# Patient Record
Sex: MALE | Race: BLACK OR AFRICAN AMERICAN | Employment: FULL TIME | URBAN - METROPOLITAN AREA
[De-identification: names, ages, dates, MRNs, and addresses within clinical notes are randomized per-mention and may not be internally consistent; named-entity substitution may affect disease eponyms.]

---

## 2019-08-26 ENCOUNTER — APPOINTMENT (OUTPATIENT)
Dept: NUCLEAR MEDICINE | Age: 49
DRG: 194 | End: 2019-08-26
Payer: MEDICAID

## 2019-08-26 ENCOUNTER — HOSPITAL ENCOUNTER (INPATIENT)
Age: 49
LOS: 2 days | Discharge: HOME | DRG: 194 | End: 2019-08-28
Attending: INTERNAL MEDICINE | Admitting: INTERNAL MEDICINE
Payer: MEDICAID

## 2019-08-26 ENCOUNTER — APPOINTMENT (OUTPATIENT)
Dept: GENERAL RADIOLOGY | Age: 49
DRG: 194 | End: 2019-08-26
Payer: MEDICAID

## 2019-08-26 ENCOUNTER — APPOINTMENT (OUTPATIENT)
Dept: ULTRASOUND IMAGING | Age: 49
DRG: 194 | End: 2019-08-26
Payer: MEDICAID

## 2019-08-26 DIAGNOSIS — N17.9 AKI (ACUTE KIDNEY INJURY) (HCC): ICD-10-CM

## 2019-08-26 DIAGNOSIS — R79.89 ELEVATED D-DIMER: ICD-10-CM

## 2019-08-26 DIAGNOSIS — I50.41 SYSTOLIC AND DIASTOLIC CHF, ACUTE (HCC): Primary | ICD-10-CM

## 2019-08-26 DIAGNOSIS — I50.9 ACUTE CONGESTIVE HEART FAILURE, UNSPECIFIED HEART FAILURE TYPE (HCC): ICD-10-CM

## 2019-08-26 DIAGNOSIS — R06.02 SOB (SHORTNESS OF BREATH) ON EXERTION: ICD-10-CM

## 2019-08-26 DIAGNOSIS — I42.9 CARDIOMYOPATHY, UNSPECIFIED TYPE (HCC): ICD-10-CM

## 2019-08-26 DIAGNOSIS — I10 ESSENTIAL HYPERTENSION: ICD-10-CM

## 2019-08-26 PROBLEM — I16.0 HYPERTENSIVE URGENCY: Status: ACTIVE | Noted: 2019-08-26

## 2019-08-26 LAB
ALBUMIN SERPL-MCNC: 3.7 G/DL (ref 3.5–4.6)
ALP BLD-CCNC: 84 U/L (ref 35–104)
ALT SERPL-CCNC: 21 U/L (ref 0–41)
ANION GAP SERPL CALCULATED.3IONS-SCNC: 14 MEQ/L (ref 9–15)
APTT: 28.8 SEC (ref 24.4–36.8)
AST SERPL-CCNC: 19 U/L (ref 0–40)
BACTERIA: NEGATIVE /HPF
BASOPHILS ABSOLUTE: 0.1 K/UL (ref 0–0.2)
BASOPHILS RELATIVE PERCENT: 0.7 %
BILIRUB SERPL-MCNC: 0.4 MG/DL (ref 0.2–0.7)
BILIRUBIN URINE: NEGATIVE
BLOOD, URINE: ABNORMAL
BUN BLDV-MCNC: 21 MG/DL (ref 6–20)
CALCIUM SERPL-MCNC: 9 MG/DL (ref 8.5–9.9)
CHLORIDE BLD-SCNC: 104 MEQ/L (ref 95–107)
CLARITY: CLEAR
CO2: 23 MEQ/L (ref 20–31)
COLOR: YELLOW
CREAT SERPL-MCNC: 2.72 MG/DL (ref 0.7–1.2)
CREATININE URINE: 48.8 MG/DL
D DIMER: 1.41 MG/L FEU (ref 0–0.5)
EOSINOPHILS ABSOLUTE: 0.1 K/UL (ref 0–0.7)
EOSINOPHILS RELATIVE PERCENT: 1.4 %
EPITHELIAL CELLS, UA: ABNORMAL /HPF (ref 0–5)
GFR AFRICAN AMERICAN: 30.5
GFR NON-AFRICAN AMERICAN: 25.2
GLOBULIN: 3.4 G/DL (ref 2.3–3.5)
GLUCOSE BLD-MCNC: 90 MG/DL (ref 70–99)
GLUCOSE URINE: NEGATIVE MG/DL
HCT VFR BLD CALC: 38.3 % (ref 42–52)
HEMOGLOBIN: 13.6 G/DL (ref 14–18)
HYALINE CASTS: ABNORMAL /HPF (ref 0–5)
INR BLD: 0.9
KETONES, URINE: NEGATIVE MG/DL
LEUKOCYTE ESTERASE, URINE: NEGATIVE
LV EF: 40 %
LVEF MODALITY: NORMAL
LYMPHOCYTES ABSOLUTE: 1 K/UL (ref 1–4.8)
LYMPHOCYTES RELATIVE PERCENT: 9.9 %
MAGNESIUM: 2.1 MG/DL (ref 1.7–2.4)
MCH RBC QN AUTO: 29.8 PG (ref 27–31.3)
MCHC RBC AUTO-ENTMCNC: 35.5 % (ref 33–37)
MCV RBC AUTO: 83.9 FL (ref 80–100)
MONOCYTES ABSOLUTE: 0.9 K/UL (ref 0.2–0.8)
MONOCYTES RELATIVE PERCENT: 9 %
NEUTROPHILS ABSOLUTE: 8 K/UL (ref 1.4–6.5)
NEUTROPHILS RELATIVE PERCENT: 79 %
NITRITE, URINE: NEGATIVE
PDW BLD-RTO: 12.8 % (ref 11.5–14.5)
PH UA: 6 (ref 5–9)
PLATELET # BLD: 358 K/UL (ref 130–400)
POTASSIUM SERPL-SCNC: 3.7 MEQ/L (ref 3.4–4.9)
PRO-BNP: 5803 PG/ML
PROTEIN PROTEIN: 44 MG/DL
PROTEIN UA: 30 MG/DL
PROTEIN/CREAT RATIO: 0.9 ML/ML
PROTEIN/CREAT RATIO: 0.9 ML/ML (ref 0–0.2)
PROTHROMBIN TIME: 12.8 SEC (ref 12.3–14.9)
RBC # BLD: 4.57 M/UL (ref 4.7–6.1)
RBC UA: ABNORMAL /HPF (ref 0–5)
SODIUM BLD-SCNC: 141 MEQ/L (ref 135–144)
SPECIFIC GRAVITY UA: 1.01 (ref 1–1.03)
TOTAL CK: 124 U/L (ref 0–190)
TOTAL PROTEIN: 7.1 G/DL (ref 6.3–8)
TROPONIN: <0.01 NG/ML (ref 0–0.01)
TSH SERPL DL<=0.05 MIU/L-ACNC: 0.82 UIU/ML (ref 0.44–3.86)
UROBILINOGEN, URINE: 0.2 E.U./DL
WBC # BLD: 10.2 K/UL (ref 4.8–10.8)
WBC UA: ABNORMAL /HPF (ref 0–5)

## 2019-08-26 PROCEDURE — 6370000000 HC RX 637 (ALT 250 FOR IP): Performed by: INTERNAL MEDICINE

## 2019-08-26 PROCEDURE — 6360000002 HC RX W HCPCS: Performed by: PHYSICIAN ASSISTANT

## 2019-08-26 PROCEDURE — 84443 ASSAY THYROID STIM HORMONE: CPT

## 2019-08-26 PROCEDURE — 99255 IP/OBS CONSLTJ NEW/EST HI 80: CPT | Performed by: INTERNAL MEDICINE

## 2019-08-26 PROCEDURE — 2060000000 HC ICU INTERMEDIATE R&B

## 2019-08-26 PROCEDURE — 85379 FIBRIN DEGRADATION QUANT: CPT

## 2019-08-26 PROCEDURE — 93306 TTE W/DOPPLER COMPLETE: CPT

## 2019-08-26 PROCEDURE — 2580000003 HC RX 258: Performed by: INTERNAL MEDICINE

## 2019-08-26 PROCEDURE — 80053 COMPREHEN METABOLIC PANEL: CPT

## 2019-08-26 PROCEDURE — 2500000003 HC RX 250 WO HCPCS: Performed by: INTERNAL MEDICINE

## 2019-08-26 PROCEDURE — 96374 THER/PROPH/DIAG INJ IV PUSH: CPT

## 2019-08-26 PROCEDURE — 84156 ASSAY OF PROTEIN URINE: CPT

## 2019-08-26 PROCEDURE — 3430000000 HC RX DIAGNOSTIC RADIOPHARMACEUTICAL: Performed by: PHYSICIAN ASSISTANT

## 2019-08-26 PROCEDURE — 76775 US EXAM ABDO BACK WALL LIM: CPT

## 2019-08-26 PROCEDURE — 78582 LUNG VENTILAT&PERFUS IMAGING: CPT

## 2019-08-26 PROCEDURE — 71046 X-RAY EXAM CHEST 2 VIEWS: CPT

## 2019-08-26 PROCEDURE — 82550 ASSAY OF CK (CPK): CPT

## 2019-08-26 PROCEDURE — 84484 ASSAY OF TROPONIN QUANT: CPT

## 2019-08-26 PROCEDURE — 85610 PROTHROMBIN TIME: CPT

## 2019-08-26 PROCEDURE — 6370000000 HC RX 637 (ALT 250 FOR IP): Performed by: PHYSICIAN ASSISTANT

## 2019-08-26 PROCEDURE — 93005 ELECTROCARDIOGRAM TRACING: CPT | Performed by: INTERNAL MEDICINE

## 2019-08-26 PROCEDURE — 99284 EMERGENCY DEPT VISIT MOD MDM: CPT

## 2019-08-26 PROCEDURE — 83735 ASSAY OF MAGNESIUM: CPT

## 2019-08-26 PROCEDURE — A9540 TC99M MAA: HCPCS | Performed by: PHYSICIAN ASSISTANT

## 2019-08-26 PROCEDURE — 94664 DEMO&/EVAL PT USE INHALER: CPT

## 2019-08-26 PROCEDURE — 85025 COMPLETE CBC W/AUTO DIFF WBC: CPT

## 2019-08-26 PROCEDURE — 2580000003 HC RX 258: Performed by: PHYSICIAN ASSISTANT

## 2019-08-26 PROCEDURE — A9539 TC99M PENTETATE: HCPCS | Performed by: PHYSICIAN ASSISTANT

## 2019-08-26 PROCEDURE — 85730 THROMBOPLASTIN TIME PARTIAL: CPT

## 2019-08-26 PROCEDURE — 36415 COLL VENOUS BLD VENIPUNCTURE: CPT

## 2019-08-26 PROCEDURE — 81001 URINALYSIS AUTO W/SCOPE: CPT

## 2019-08-26 PROCEDURE — 2500000003 HC RX 250 WO HCPCS: Performed by: PHYSICIAN ASSISTANT

## 2019-08-26 PROCEDURE — 83880 ASSAY OF NATRIURETIC PEPTIDE: CPT

## 2019-08-26 RX ORDER — LABETALOL 20 MG/4 ML (5 MG/ML) INTRAVENOUS SYRINGE
10 EVERY 4 HOURS PRN
Status: DISCONTINUED | OUTPATIENT
Start: 2019-08-26 | End: 2019-08-28 | Stop reason: HOSPADM

## 2019-08-26 RX ORDER — SODIUM CHLORIDE 0.9 % (FLUSH) 0.9 %
10 SYRINGE (ML) INJECTION PRN
Status: DISCONTINUED | OUTPATIENT
Start: 2019-08-26 | End: 2019-08-28 | Stop reason: HOSPADM

## 2019-08-26 RX ORDER — ACETAMINOPHEN 325 MG/1
650 TABLET ORAL EVERY 4 HOURS PRN
Status: DISCONTINUED | OUTPATIENT
Start: 2019-08-26 | End: 2019-08-28 | Stop reason: HOSPADM

## 2019-08-26 RX ORDER — METOPROLOL TARTRATE 5 MG/5ML
5 INJECTION INTRAVENOUS ONCE
Status: COMPLETED | OUTPATIENT
Start: 2019-08-26 | End: 2019-08-26

## 2019-08-26 RX ORDER — ISOSORBIDE MONONITRATE 30 MG/1
30 TABLET, EXTENDED RELEASE ORAL DAILY
Status: DISCONTINUED | OUTPATIENT
Start: 2019-08-26 | End: 2019-08-28 | Stop reason: HOSPADM

## 2019-08-26 RX ORDER — HEPARIN SODIUM 5000 [USP'U]/ML
5000 INJECTION, SOLUTION INTRAVENOUS; SUBCUTANEOUS EVERY 8 HOURS SCHEDULED
Status: DISCONTINUED | OUTPATIENT
Start: 2019-08-27 | End: 2019-08-28 | Stop reason: HOSPADM

## 2019-08-26 RX ORDER — LOSARTAN POTASSIUM 25 MG/1
25 TABLET ORAL DAILY
Status: DISCONTINUED | OUTPATIENT
Start: 2019-08-26 | End: 2019-08-26

## 2019-08-26 RX ORDER — GUAIFENESIN/DEXTROMETHORPHAN 100-10MG/5
5 SYRUP ORAL EVERY 4 HOURS PRN
Status: DISCONTINUED | OUTPATIENT
Start: 2019-08-26 | End: 2019-08-28 | Stop reason: HOSPADM

## 2019-08-26 RX ORDER — SODIUM CHLORIDE 0.9 % (FLUSH) 0.9 %
10 SYRINGE (ML) INJECTION EVERY 12 HOURS SCHEDULED
Status: DISCONTINUED | OUTPATIENT
Start: 2019-08-26 | End: 2019-08-28 | Stop reason: HOSPADM

## 2019-08-26 RX ORDER — ASPIRIN 81 MG/1
81 TABLET ORAL DAILY
Status: DISCONTINUED | OUTPATIENT
Start: 2019-08-26 | End: 2019-08-28 | Stop reason: HOSPADM

## 2019-08-26 RX ORDER — CARVEDILOL 6.25 MG/1
6.25 TABLET ORAL 2 TIMES DAILY WITH MEALS
Status: DISCONTINUED | OUTPATIENT
Start: 2019-08-26 | End: 2019-08-27

## 2019-08-26 RX ORDER — FUROSEMIDE 10 MG/ML
20 INJECTION INTRAMUSCULAR; INTRAVENOUS ONCE
Status: DISCONTINUED | OUTPATIENT
Start: 2019-08-26 | End: 2019-08-26

## 2019-08-26 RX ORDER — GUAIFENESIN 600 MG/1
600 TABLET, EXTENDED RELEASE ORAL 2 TIMES DAILY
Status: DISCONTINUED | OUTPATIENT
Start: 2019-08-26 | End: 2019-08-28 | Stop reason: HOSPADM

## 2019-08-26 RX ORDER — IPRATROPIUM BROMIDE AND ALBUTEROL SULFATE 2.5; .5 MG/3ML; MG/3ML
1 SOLUTION RESPIRATORY (INHALATION) EVERY 4 HOURS PRN
Status: DISCONTINUED | OUTPATIENT
Start: 2019-08-26 | End: 2019-08-28 | Stop reason: HOSPADM

## 2019-08-26 RX ORDER — ONDANSETRON 2 MG/ML
4 INJECTION INTRAMUSCULAR; INTRAVENOUS EVERY 6 HOURS PRN
Status: DISCONTINUED | OUTPATIENT
Start: 2019-08-26 | End: 2019-08-26 | Stop reason: SDUPTHER

## 2019-08-26 RX ORDER — SODIUM CHLORIDE 0.9 % (FLUSH) 0.9 %
10 SYRINGE (ML) INJECTION PRN
Status: DISCONTINUED | OUTPATIENT
Start: 2019-08-26 | End: 2019-08-26 | Stop reason: SDUPTHER

## 2019-08-26 RX ORDER — SODIUM CHLORIDE 0.9 % (FLUSH) 0.9 %
10 SYRINGE (ML) INJECTION EVERY 12 HOURS SCHEDULED
Status: DISCONTINUED | OUTPATIENT
Start: 2019-08-26 | End: 2019-08-26 | Stop reason: SDUPTHER

## 2019-08-26 RX ORDER — IPRATROPIUM BROMIDE AND ALBUTEROL SULFATE 2.5; .5 MG/3ML; MG/3ML
1 SOLUTION RESPIRATORY (INHALATION)
Status: DISCONTINUED | OUTPATIENT
Start: 2019-08-26 | End: 2019-08-26

## 2019-08-26 RX ORDER — NICOTINE 21 MG/24HR
1 PATCH, TRANSDERMAL 24 HOURS TRANSDERMAL DAILY
Status: DISCONTINUED | OUTPATIENT
Start: 2019-08-26 | End: 2019-08-28 | Stop reason: HOSPADM

## 2019-08-26 RX ORDER — FUROSEMIDE 10 MG/ML
20 INJECTION INTRAMUSCULAR; INTRAVENOUS 2 TIMES DAILY
Status: DISCONTINUED | OUTPATIENT
Start: 2019-08-26 | End: 2019-08-26

## 2019-08-26 RX ORDER — ONDANSETRON 2 MG/ML
4 INJECTION INTRAMUSCULAR; INTRAVENOUS EVERY 6 HOURS PRN
Status: DISCONTINUED | OUTPATIENT
Start: 2019-08-26 | End: 2019-08-28 | Stop reason: HOSPADM

## 2019-08-26 RX ORDER — KIT FOR THE PREPARATION OF TECHNETIUM TC 99M PENTETATE 20 MG/1
1 INJECTION, POWDER, LYOPHILIZED, FOR SOLUTION INTRAVENOUS; RESPIRATORY (INHALATION)
Status: COMPLETED | OUTPATIENT
Start: 2019-08-26 | End: 2019-08-26

## 2019-08-26 RX ADMIN — ENOXAPARIN SODIUM 40 MG: 40 INJECTION SUBCUTANEOUS at 12:31

## 2019-08-26 RX ADMIN — ISOSORBIDE MONONITRATE 30 MG: 30 TABLET, EXTENDED RELEASE ORAL at 17:24

## 2019-08-26 RX ADMIN — LABETALOL 20 MG/4 ML (5 MG/ML) INTRAVENOUS SYRINGE 10 MG: at 12:30

## 2019-08-26 RX ADMIN — METOPROLOL TARTRATE 5 MG: 1 INJECTION, SOLUTION INTRAVENOUS at 09:49

## 2019-08-26 RX ADMIN — Medication 10 ML: at 12:31

## 2019-08-26 RX ADMIN — Medication 4.6 MILLICURIE: at 14:50

## 2019-08-26 RX ADMIN — CARVEDILOL 6.25 MG: 6.25 TABLET, FILM COATED ORAL at 17:23

## 2019-08-26 RX ADMIN — KIT FOR THE PREPARATION OF TECHNETIUM TC 99M PENTETATE 1 MILLICURIE: 20 INJECTION, POWDER, LYOPHILIZED, FOR SOLUTION INTRAVENOUS; RESPIRATORY (INHALATION) at 14:36

## 2019-08-26 RX ADMIN — ASPIRIN 81 MG: 81 TABLET, COATED ORAL at 17:23

## 2019-08-26 RX ADMIN — GUAIFENESIN 600 MG: 600 TABLET, EXTENDED RELEASE ORAL at 21:10

## 2019-08-26 RX ADMIN — FUROSEMIDE 20 MG: 10 INJECTION, SOLUTION INTRAVENOUS at 12:30

## 2019-08-26 RX ADMIN — LOSARTAN POTASSIUM 25 MG: 25 TABLET ORAL at 12:31

## 2019-08-26 RX ADMIN — Medication 10 ML: at 14:50

## 2019-08-26 ASSESSMENT — ENCOUNTER SYMPTOMS
EYES NEGATIVE: 1
CONSTIPATION: 0
DIARRHEA: 0
NAUSEA: 0
GASTROINTESTINAL NEGATIVE: 1
RHINORRHEA: 0
BLOOD IN STOOL: 0
EYE DISCHARGE: 0
WHEEZING: 0
ABDOMINAL PAIN: 0
SORE THROAT: 0
SHORTNESS OF BREATH: 1
CHEST TIGHTNESS: 0
STRIDOR: 0
COLOR CHANGE: 0
COUGH: 1
ABDOMINAL DISTENTION: 0
VOMITING: 0

## 2019-08-26 ASSESSMENT — PAIN SCALES - GENERAL
PAINLEVEL_OUTOF10: 3
PAINLEVEL_OUTOF10: 2

## 2019-08-26 ASSESSMENT — PAIN DESCRIPTION - ORIENTATION: ORIENTATION: RIGHT;UPPER;MID

## 2019-08-26 ASSESSMENT — PAIN DESCRIPTION - LOCATION: LOCATION: CHEST

## 2019-08-26 ASSESSMENT — PAIN DESCRIPTION - PAIN TYPE: TYPE: ACUTE PAIN

## 2019-08-26 NOTE — PROGRESS NOTES
[facility] [place] Respiratory Therapy Evaluation   Current Order:  duoneb q4       Home Regimen: no      Ordering Physician: Temo Santo  Re-evaluation Date: 8/29     Diagnosis: chf     Patient Status: Stable     The following MDI Criteria must be met in order to convert aerosol to MDI with spacer. If unable to meet, MDI will be converted to aerosol:  []  Patient able to demonstrate the ability to use MDI effectively  []  Patient alert and cooperative  []  Patient able to take deep breath with 5-10 second hold  []  Medication(s) available in this delivery method   []  Peak flow greater than or equal to 200 ml/min            Current Order Substituted To  (same drug, same frequency)   Aerosol to MDI [] Albuterol Sulfate 0.083% unit dose by aerosol Albuterol Sulfate MDI 2 puffs by inhalation with spacer    [] Levalbuterol 1.25 mg unit dose by aerosol Levalbuterol MDI 2 puffs by inhalation with spacer    [] Levalbuterol 0.63 mg unit dose by aerosol Levalbuterol MDI 2 puffs by inhalation with spacer    [] Ipratropium Bromide 0.02% unit dose by aerosol Ipratropium Bromide MDI 2 puffs by inhalation with spacer    [] Duoneb (Ipratropium + Albuterol) unit dose by aerosol Ipratropium MDI + Albuterol MDI 2 puffs by inhalation w/spacer   MDI to Aerosol [] Albuterol Sulfate MDI Albuterol Sulfate 0.083% unit dose by aerosol    [] Levalbuterol MDI 2 puffs by inhalation Levalbuterol 1.25 mg unit dose by aerosol    [] Ipratropium Bromide MDI by inhalation Ipratropium Bromide 0.02% unit dose by aerosol    [] Combivent (Ipratropium + Albuterol) MDI by inhalation Duoneb (Ipratropium + Albuterol) unit dose by aerosol   Treatment Assessment [Frequency/Schedule]:  Change frequency to: _duoneb q4 prn_________________________________________________per Protocol, P&T, MEC      Points 0 1 2 3 4   Pulmonary Status  Non-Smoker  []   Smoking history   < 20 pack years  []   Smoking history  ?  20 pack years  [x]   Pulmonary Disorder  (acute or chronic)  []   Severe or Chronic w/ Exacerbation  []     Surgical Status No [x]   Surgeries     General []   Surgery Lower []   Abdominal Thoracic or []   Upper Abdominal Thoracic with  PulmonaryDisorder  []     Chest X-ray Clear/Not  Ordered     []  Chronic Changes  Results Pending  []  Infiltrates, atelectasis, pleural effusion, or edema  [x]  Infiltrates in more than one lobe []  Infiltrate + Atelectasis, &/or pleural effusion  []    Respiratory Pattern Regular,  RR = 12-20 [x]  Increased,  RR = 21-25 []  [place], irregular,  or RR = 26-30 []  Decreased FEV1  or RR = 31-35 []  Severe SOB, use  of accessory muscles, or RR ? 35  []    Mental Status Alert, oriented,  Cooperative [x]  Confused but Follows commands []  Lethargic or unable to follow commands []  Obtunded  []  Comatose  []    Breath Sounds Clear to  auscultation  [x]  Decreased unilaterally or  in bases only []  Decreased  bilaterally  []  Crackles or intermittent wheezes []  Wheezes []    Cough Strong, Spontan., & nonproductive [x]  Strong,  spontaneous, &  productive []  Weak,  Nonproductive []  Weak, productive or  with wheezes []  No spontaneous  cough or may require suctioning []    Level of Activity Ambulatory [x]  Ambulatory w/ Assist  []  Non-ambulatory []  Paraplegic []  Quadriplegic []    Total    Score:___4___     Triage Score:_____5___      Tri       Triage:     1. (>20) Freq: Q3    2. (16-20) Freq: Q4   3. (11-15) Freq: QID & Albuterol Q2 PRN    4. (6-10) Freq: TID & Albuterol Q2 PRN    5. (0-5) Freq Q4prn

## 2019-08-26 NOTE — H&P
Hospital Medicine History & Physical      PCP: No primary care provider on file. Date of Admission: 8/26/2019    Date of Service: 8/26/19      Chief Complaint:  cough      History Of Present Illness:  54 y.o. male who presented to [facility] ED for complaints of a non productive cough for the past three days. The patient also complains of chest congestion and nocturnal shortness of breath for the past 6 days. The patient states that he has intermittent non radiating chest pain, which is worse with deep inspiration. The patient checked his blood pressure several days ago and it was found to be elevated. The patient admits to drinking alcohol and smoking 2 ppd tobacco. Lung sounds clear. No lower extremity edema. Denies prod cough ha rash anx n v d f    Past Medical History:      History reviewed. No pertinent past medical history. Past Surgical History:      History reviewed. No pertinent surgical history. Medications Prior to Admission:      Prior to Admission medications    Not on File       Allergies:  Patient has no known allergies. Social History:      The patient currently lives home    TOBACCO:   reports that he has been smoking cigarettes. He has been smoking about 1.00 pack per day. He has never used smokeless tobacco.  ETOH:   reports that he drinks alcohol. Family History:      Positive as follows: htn        REVIEW OF SYSTEMS:   Pertinent positives as noted in the HPI. All other systems reviewed and negative. PHYSICAL EXAM:    BP (!) 161/118   Pulse 102   Temp 97.8 °F (36.6 °C) (Oral)   Resp 20   Ht 6' (1.829 m)   Wt 208 lb (94.3 kg)   SpO2 96%   BMI 28.21 kg/m²     General appearance:  No apparent distress, appears stated age and cooperative. HEENT:  Normal cephalic, atraumatic without obvious deformity. Pupils equal, round, and reactive to light. Extra ocular muscles intact. Conjunctivae/corneas clear. Neck: Supple, with full range of motion.  No jugular venous

## 2019-08-26 NOTE — PROGRESS NOTES
cessation of smoking and etOH- will establish patient with a PCP  - supportive care for viral URI    2. Acute Kidney Injury vs chronic kidney disease Status: worsening  - avoid nephrotoxic medications. Renal team following    3. Tobacco Use Disorder: encouraged cessation  4.   EtOH Use Disorder: encouraged cessation    Cardiac diet  [facility]  Full Code       DISCHARGE PLANNING  Home after cardiac medications optimized- hope for tomorrow       Electronically signed by Pedro Bradshaw DO on 8/26/2019 at 5:16 PM

## 2019-08-26 NOTE — ED PROVIDER NOTES
Marty Coma Scale  Eye Opening: Spontaneous  Best Verbal Response: Oriented  Best Motor Response: Obeys commands  Marty Coma Scale Score: 15 @FLOW([id number])@      PHYSICAL EXAM    (up to 7 for level 4, 8 or more for level 5)     ED Triage Vitals [08/26/19 0910]   BP Temp Temp Source Pulse Resp SpO2 Height Weight   (!) 187/118 97.8 °F (36.6 °C) Oral 115 17 98 % 6' (1.829 m) 208 lb (94.3 kg)       Physical Exam   Constitutional: He is oriented to person, place, and time. He appears well-developed and well-nourished. HENT:   Head: Normocephalic and atraumatic. Eyes: Pupils are equal, round, and reactive to light. EOM are normal.   Neck: Normal range of motion. Neck supple. No JVD present. No tracheal deviation present. Cardiovascular: Normal rate. Pulmonary/Chest: Effort normal and breath sounds normal. No respiratory distress. He has no wheezes. He has no rales. He exhibits no tenderness. Lung sounds are clear in all fields there is no wheezes rales or rhonchi there is no accessory muscle use there is no signs of retractions, room air saturations are 98%. Abdominal: Soft. Bowel sounds are normal. He exhibits no distension and no mass. There is no tenderness. There is no guarding. Musculoskeletal: Normal range of motion. He exhibits no edema or deformity. Neurological: He is alert and oriented to person, place, and time. Coordination normal.   Psychiatric: He has a normal mood and affect. DIAGNOSTIC RESULTS     EKG: All EKG's are interpreted by the Emergency Department Physician who either signs or Co-signsthis chart in the absence of a cardiologist.    EKG shows sinus tachycardia at 108 bpm there is no acute ST segment abnormality no ventricular ectopy QT is 360 ms.     RADIOLOGY:   Non-plain filmimages such as CT, Ultrasound and MRI are read by the radiologist. Plain radiographic images are visualized and preliminarily interpreted by the emergency physician with the below HOSPITALIST    PROCEDURES:  Unless otherwise noted below, none     Procedures    FINAL IMPRESSION      1. BLUE (acute kidney injury) ([facility] [address]    2. Acute congestive heart failure, unspecified heart failure type (HCC)    3. Elevated d-dimer    4. SOB (shortness of breath) on exertion    5. Essential hypertension          DISPOSITION/PLAN   DISPOSITION Admitted 08/26/2019 11:16:21 AM      PATIENT REFERRED TO:  No follow-up provider specified.     DISCHARGE MEDICATIONS:  New Prescriptions    No medications on file          (Please note that portions of this note were completed with a voice recognition program.  Efforts were made to edit the dictations but occasionally words are mis-transcribed.)    Astrid Solares PA-C (electronically signed)  Attending Emergency Physician          Astrid Solares PA-C  08/26/19 354 [facility] Jasmyn Carrasco PA-C  08/31/19 [id number]

## 2019-08-27 ENCOUNTER — APPOINTMENT (OUTPATIENT)
Dept: ULTRASOUND IMAGING | Age: 49
DRG: 194 | End: 2019-08-27
Payer: MEDICAID

## 2019-08-27 LAB
ANION GAP SERPL CALCULATED.3IONS-SCNC: 12 MEQ/L (ref 9–15)
BASOPHILS ABSOLUTE: 0.1 K/UL (ref 0–0.2)
BASOPHILS RELATIVE PERCENT: 0.5 %
BUN BLDV-MCNC: 23 MG/DL (ref 6–20)
CALCIUM SERPL-MCNC: 8.8 MG/DL (ref 8.5–9.9)
CHLORIDE BLD-SCNC: 103 MEQ/L (ref 95–107)
CHOLESTEROL, TOTAL: 137 MG/DL (ref 0–199)
CO2: 24 MEQ/L (ref 20–31)
CREAT SERPL-MCNC: 2.93 MG/DL (ref 0.7–1.2)
EOSINOPHILS ABSOLUTE: 0.2 K/UL (ref 0–0.7)
EOSINOPHILS RELATIVE PERCENT: 1.9 %
GFR AFRICAN AMERICAN: 28
GFR NON-AFRICAN AMERICAN: 23.1
GLUCOSE BLD-MCNC: 90 MG/DL (ref 70–99)
HAV IGM SER IA-ACNC: NORMAL
HBA1C MFR BLD: 5 % (ref 4.8–5.9)
HCT VFR BLD CALC: 37.8 % (ref 42–52)
HDLC SERPL-MCNC: 47 MG/DL (ref 40–59)
HEMOGLOBIN: 12.9 G/DL (ref 14–18)
HEPATITIS B CORE IGM ANTIBODY: NORMAL
HEPATITIS B SURFACE ANTIGEN INTERPRETATION: NORMAL
HEPATITIS C ANTIBODY INTERPRETATION: NORMAL
HEPATITIS INTERPRETATION:: NORMAL
LDL CHOLESTEROL CALCULATED: 62 MG/DL (ref 0–129)
LYMPHOCYTES ABSOLUTE: 1.4 K/UL (ref 1–4.8)
LYMPHOCYTES RELATIVE PERCENT: 14.9 %
MAGNESIUM: 2.1 MG/DL (ref 1.7–2.4)
MCH RBC QN AUTO: 29.4 PG (ref 27–31.3)
MCHC RBC AUTO-ENTMCNC: 34.1 % (ref 33–37)
MCV RBC AUTO: 86.3 FL (ref 80–100)
MONOCYTES ABSOLUTE: 1.1 K/UL (ref 0.2–0.8)
MONOCYTES RELATIVE PERCENT: 11.6 %
NEUTROPHILS ABSOLUTE: 6.6 K/UL (ref 1.4–6.5)
NEUTROPHILS RELATIVE PERCENT: 71.1 %
PARATHYROID HORMONE INTACT: 36.7 PG/ML (ref 15–65)
PDW BLD-RTO: 13 % (ref 11.5–14.5)
PLATELET # BLD: 334 K/UL (ref 130–400)
POTASSIUM SERPL-SCNC: 3.5 MEQ/L (ref 3.4–4.9)
RBC # BLD: 4.38 M/UL (ref 4.7–6.1)
SODIUM BLD-SCNC: 139 MEQ/L (ref 135–144)
TRIGL SERPL-MCNC: 139 MG/DL (ref 0–150)
WBC # BLD: 9.3 K/UL (ref 4.8–10.8)

## 2019-08-27 PROCEDURE — 86160 COMPLEMENT ANTIGEN: CPT

## 2019-08-27 PROCEDURE — 80074 ACUTE HEPATITIS PANEL: CPT

## 2019-08-27 PROCEDURE — 2580000003 HC RX 258: Performed by: INTERNAL MEDICINE

## 2019-08-27 PROCEDURE — 80061 LIPID PANEL: CPT

## 2019-08-27 PROCEDURE — 93005 ELECTROCARDIOGRAM TRACING: CPT | Performed by: INTERNAL MEDICINE

## 2019-08-27 PROCEDURE — 83735 ASSAY OF MAGNESIUM: CPT

## 2019-08-27 PROCEDURE — 85025 COMPLETE CBC W/AUTO DIFF WBC: CPT

## 2019-08-27 PROCEDURE — 2060000000 HC ICU INTERMEDIATE R&B

## 2019-08-27 PROCEDURE — 6370000000 HC RX 637 (ALT 250 FOR IP): Performed by: INTERNAL MEDICINE

## 2019-08-27 PROCEDURE — 83036 HEMOGLOBIN GLYCOSYLATED A1C: CPT

## 2019-08-27 PROCEDURE — 6360000002 HC RX W HCPCS: Performed by: INTERNAL MEDICINE

## 2019-08-27 PROCEDURE — 86255 FLUORESCENT ANTIBODY SCREEN: CPT

## 2019-08-27 PROCEDURE — 84165 PROTEIN E-PHORESIS SERUM: CPT

## 2019-08-27 PROCEDURE — 93975 VASCULAR STUDY: CPT | Performed by: INTERNAL MEDICINE

## 2019-08-27 PROCEDURE — 93010 ELECTROCARDIOGRAM REPORT: CPT | Performed by: INTERNAL MEDICINE

## 2019-08-27 PROCEDURE — 83970 ASSAY OF PARATHORMONE: CPT

## 2019-08-27 PROCEDURE — 80048 BASIC METABOLIC PNL TOTAL CA: CPT

## 2019-08-27 PROCEDURE — 99233 SBSQ HOSP IP/OBS HIGH 50: CPT | Performed by: INTERNAL MEDICINE

## 2019-08-27 PROCEDURE — 84160 ASSAY OF PROTEIN ANY SOURCE: CPT

## 2019-08-27 PROCEDURE — 36415 COLL VENOUS BLD VENIPUNCTURE: CPT

## 2019-08-27 PROCEDURE — 86038 ANTINUCLEAR ANTIBODIES: CPT

## 2019-08-27 PROCEDURE — 93975 VASCULAR STUDY: CPT

## 2019-08-27 RX ORDER — POTASSIUM CHLORIDE 20 MEQ/1
40 TABLET, EXTENDED RELEASE ORAL 2 TIMES DAILY WITH MEALS
Status: DISCONTINUED | OUTPATIENT
Start: 2019-08-27 | End: 2019-08-27

## 2019-08-27 RX ORDER — AMLODIPINE BESYLATE 5 MG/1
5 TABLET ORAL DAILY
Status: DISCONTINUED | OUTPATIENT
Start: 2019-08-27 | End: 2019-08-28

## 2019-08-27 RX ORDER — CARVEDILOL 12.5 MG/1
12.5 TABLET ORAL 2 TIMES DAILY WITH MEALS
Status: DISCONTINUED | OUTPATIENT
Start: 2019-08-27 | End: 2019-08-28

## 2019-08-27 RX ADMIN — HEPARIN SODIUM 5000 UNITS: 5000 INJECTION INTRAVENOUS; SUBCUTANEOUS at 16:11

## 2019-08-27 RX ADMIN — HEPARIN SODIUM 5000 UNITS: 5000 INJECTION INTRAVENOUS; SUBCUTANEOUS at 21:36

## 2019-08-27 RX ADMIN — HEPARIN SODIUM 5000 UNITS: 5000 INJECTION INTRAVENOUS; SUBCUTANEOUS at 05:19

## 2019-08-27 RX ADMIN — ISOSORBIDE MONONITRATE 30 MG: 30 TABLET, EXTENDED RELEASE ORAL at 08:19

## 2019-08-27 RX ADMIN — Medication 10 ML: at 08:20

## 2019-08-27 RX ADMIN — GUAIFENESIN 600 MG: 600 TABLET, EXTENDED RELEASE ORAL at 08:19

## 2019-08-27 RX ADMIN — CARVEDILOL 6.25 MG: 6.25 TABLET, FILM COATED ORAL at 08:19

## 2019-08-27 RX ADMIN — GUAIFENESIN 600 MG: 600 TABLET, EXTENDED RELEASE ORAL at 20:47

## 2019-08-27 RX ADMIN — AMLODIPINE BESYLATE 5 MG: 5 TABLET ORAL at 10:29

## 2019-08-27 RX ADMIN — ASPIRIN 81 MG: 81 TABLET, COATED ORAL at 08:19

## 2019-08-27 RX ADMIN — Medication 10 ML: at 20:47

## 2019-08-27 RX ADMIN — CARVEDILOL 12.5 MG: 12.5 TABLET, FILM COATED ORAL at 16:11

## 2019-08-27 ASSESSMENT — PAIN SCALES - GENERAL
PAINLEVEL_OUTOF10: 0

## 2019-08-27 ASSESSMENT — ENCOUNTER SYMPTOMS
GASTROINTESTINAL NEGATIVE: 1
COUGH: 0
BLOOD IN STOOL: 0
STRIDOR: 0
RESPIRATORY NEGATIVE: 1
CHEST TIGHTNESS: 0
SHORTNESS OF BREATH: 1
EYE DISCHARGE: 0
EYES NEGATIVE: 1
SHORTNESS OF BREATH: 0
WHEEZING: 0
NAUSEA: 0
ABDOMINAL DISTENTION: 0

## 2019-08-27 ASSESSMENT — PAIN DESCRIPTION - PAIN TYPE
TYPE: CHRONIC PAIN
TYPE: CHRONIC PAIN;ACUTE PAIN

## 2019-08-27 ASSESSMENT — PAIN DESCRIPTION - LOCATION
LOCATION: GENERALIZED
LOCATION: GENERALIZED

## 2019-08-27 NOTE — CONSULTS
Financial resource strain: Not on file    Food insecurity:     Worry: Not on file     Inability: Not on file    Transportation needs:     Medical: Not on file     Non-medical: Not on file   Tobacco Use    Smoking status: Current Every Day Smoker     Packs/day: 1.00     Types: Cigarettes    Smokeless tobacco: Never Used   Substance and Sexual Activity    Alcohol use: Yes    Drug use: Never    Sexual activity: Not on file   Lifestyle    Physical activity:     Days per week: Not on file     Minutes per session: Not on file    Stress: Not on file   Relationships    Social connections:     Talks on phone: Not on file     Gets together: Not on file     Attends Jehov[employer]'s witness service: Not on file     Active member of club or organization: Not on file     Attends meetings of clubs or organizations: Not on file     Relationship status: Not on file    Intimate partner violence:     Fear of current or ex partner: Not on file     Emotionally abused: Not on file     Physically abused: Not on file     Forced sexual activity: Not on file   Other Topics Concern    Not on file   Social History Narrative    Not on file       Family History:   History reviewed. No pertinent family history. Review of Systems:   Review of Systems   Constitutional: Positive for unexpected weight change. Negative for activity change. HENT: Positive for congestion. Eyes: Negative for discharge. Respiratory: Positive for shortness of breath. Cardiovascular: Negative for chest pain. Gastrointestinal: Negative for abdominal distention. Endocrine: Negative for cold intolerance. Genitourinary: Negative for difficulty urinating. Musculoskeletal: Negative for arthralgias. Allergic/Immunologic: Negative for environmental allergies. Neurological: Negative for dizziness. Hematological: Negative for adenopathy. Psychiatric/Behavioral: Negative for confusion.          Physical exam:   Constitutional:  VITALS:  BP (!) 148/106 Pulse 99   Temp 98.1 °F (36.7 °C) (Oral)   Resp 18   Ht 6' (1.829 m)   Wt 197 lb 14.4 oz (89.8 kg)   SpO2 97%   BMI 26.84 kg/m²   Gen: alert, awake, nad  Skin: no rash, turgor wnl  Heent:  eomi, mmm  Neck: no bruits or jvd noted, thyroid normal  Lungs:  Normal expansion. Clear to auscultation. No rales, rhonchi, or wheezing. Heart:  Heart sounds are normal.  Regular rate and rhythm without murmur, gallop or rub. Abdomen:  +bs, soft, nt, nd, no hepatosplenomegaly   Extremities: Extremities warm to touch, pink, with no edema. Psychiatric: mood and affect appropriate; judgement and insight intact  Musculoskeletal:  Rom, muscular strength intact; digits, nails normal    Data/  CBC:   Lab Results   Component Value Date    WBC 9.3 08/27/2019    RBC 4.38 08/27/2019    HGB 12.9 08/27/2019    HCT 37.8 08/27/2019    MCV 86.3 08/27/2019    MCH 29.4 08/27/2019    MCHC 34.1 08/27/2019    RDW 13.0 08/27/2019     08/27/2019     BMP:    Lab Results   Component Value Date     08/27/2019    K 3.5 08/27/2019     08/27/2019    CO2 24 08/27/2019    BUN 23 08/27/2019    LABALBU 3.7 08/26/2019    CREATININE 2.93 08/27/2019    CALCIUM 8.8 08/27/2019    GFRAA 28.0 08/27/2019    LABGLOM 23.1 08/27/2019    GLUCOSE 90 08/27/2019     Xr Chest Standard (2 Vw)    Result Date: 8/26/2019  EXAMINATION: Chest x-ray, 2 view HISTORY: Cough TECHNIQUE: Frontal and lateral views of the chest COMPARISON: None available FINDINGS: Heart size is mildly enlarged. Pulmonary vascular congestion. Trace left pleural effusion. No pneumothorax or focal consolidation. No acute osseous abnormality. Cardiomegaly, pulmonary vascular congestion, and trace left pleural effusion. Correlation for fluid overload/CHF recommended. Nm Lung Vent/perfusion (vq)    Result Date: 8/26/2019  NM LUNG VENT/PERFUSION (VQ) : 8/26/2019 CLINICAL HISTORY:  sob, elevated d dimer .  COMPARISON: Two-view chest from earlier 8/26/2019 TECHNIQUE: Planar images

## 2019-08-27 NOTE — CARE COORDINATION
Care Transition Nurse provided CHF booklet and zone sheet and reviewed at length with pt. This is a new diagnosis of CHF for this pt, reviewed causes of CHF and ways to manage and prevent acute episodes. Pt admits to unhealthy lifestyle habits, like overindulging in alcohol and smoking cigarettes, reviewed smoking cessation and encouraged pt to stop smoking and limit alcohol use. I informed pt of free smoking cessation programs available through [facility] ([facility]) and [place]. Stressed importance of daily weights as an early indicator of fluid overload. Pt states he has a scale in his garage, but had not been weighing himself, he will pull it out and start using it, made pt aware that if scale is broken and he is unable to purchase one, he can call me for assistance or obtain one from Dr. Kwame Guzman office. Educated on importance of weighing self daily and reporting weight gain of 3 or more pounds in 1 to 7 days. Reminded to weigh self at same time of day with same amount of clothes. Advised to contact physician with any symptoms in yellow zone, including weight gain of 3 pounds in 1-7 days, shortness of breath, edema, fatigue, lack of appetite, increased abdominal girth,or needing more pillows to sleep at night. Discussed early symptom management can help prevent hospitalization. Reminded of need to monitor BP, pt states he has asked his girlfriend to purchase BP cuff, advised he log both weights and BP, to note fluctuations and for review with clinician. Instructed pt he should keep sodium intake at 2000 mg or less. Pt states girlfriend does the cooking, states they do not eat out much, discussed table salt has a high sodium is content and advised he use other condiments like garlic and onion powder,or pepper to season foods. Referred pt to review nutrition section of CHF book for guidance in choosing healthier low sodium alternatives and seasonings  to diet.   Reinforced need to take all medications as ordered, pt states he has prescription coverage. Pt does not have a PCP, he states he would like to be followed by Dr. Miguelito Glover, [address] [place] [address] aware and has scheduled new pt and hospital follow up for next week with Dr. Miguelito Glover. Stressed importance of phoning physician promptly for symptoms in the yellow zone and EMS for symptoms in the red zone. Educated on importance of follow up with clinician within 7 days of discharge.  I left my contact information and requested he call me with any questions or concerns.

## 2019-08-28 VITALS
RESPIRATION RATE: 17 BRPM | HEIGHT: 72 IN | TEMPERATURE: 98 F | HEART RATE: 103 BPM | WEIGHT: 197.9 LBS | OXYGEN SATURATION: 98 % | SYSTOLIC BLOOD PRESSURE: 147 MMHG | BODY MASS INDEX: 26.81 KG/M2 | DIASTOLIC BLOOD PRESSURE: 105 MMHG

## 2019-08-28 PROBLEM — I42.9 CARDIOMYOPATHY (HCC): Status: ACTIVE | Noted: 2019-08-28

## 2019-08-28 PROBLEM — I50.41 SYSTOLIC AND DIASTOLIC CHF, ACUTE (HCC): Status: ACTIVE | Noted: 2019-08-28

## 2019-08-28 PROBLEM — N17.9 AKI (ACUTE KIDNEY INJURY) (HCC): Status: ACTIVE | Noted: 2019-08-28

## 2019-08-28 LAB
ANION GAP SERPL CALCULATED.3IONS-SCNC: 13 MEQ/L (ref 9–15)
BACTERIA: NEGATIVE /HPF
BASOPHILS ABSOLUTE: 0.1 K/UL (ref 0–0.2)
BASOPHILS RELATIVE PERCENT: 0.7 %
BILIRUBIN URINE: NEGATIVE
BLOOD, URINE: NEGATIVE
BUN BLDV-MCNC: 23 MG/DL (ref 6–20)
CALCIUM SERPL-MCNC: 8.9 MG/DL (ref 8.5–9.9)
CHLORIDE BLD-SCNC: 105 MEQ/L (ref 95–107)
CLARITY: CLEAR
CO2: 24 MEQ/L (ref 20–31)
COLOR: YELLOW
COMPLEMENT C3: 134 MG/DL (ref 90–180)
COMPLEMENT C4: 44 MG/DL (ref 10–40)
CREAT SERPL-MCNC: 3.04 MG/DL (ref 0.7–1.2)
EKG ATRIAL RATE: 103 BPM
EKG ATRIAL RATE: 108 BPM
EKG ATRIAL RATE: 96 BPM
EKG P AXIS: 61 DEGREES
EKG P AXIS: 63 DEGREES
EKG P AXIS: 65 DEGREES
EKG P-R INTERVAL: 186 MS
EKG P-R INTERVAL: 188 MS
EKG P-R INTERVAL: 194 MS
EKG Q-T INTERVAL: 360 MS
EKG Q-T INTERVAL: 380 MS
EKG Q-T INTERVAL: 406 MS
EKG QRS DURATION: 102 MS
EKG QRS DURATION: 88 MS
EKG QRS DURATION: 98 MS
EKG QTC CALCULATION (BAZETT): 482 MS
EKG QTC CALCULATION (BAZETT): 497 MS
EKG QTC CALCULATION (BAZETT): 512 MS
EKG R AXIS: -16 DEGREES
EKG R AXIS: -19 DEGREES
EKG R AXIS: -21 DEGREES
EKG T AXIS: 76 DEGREES
EKG T AXIS: 81 DEGREES
EKG T AXIS: 83 DEGREES
EKG VENTRICULAR RATE: 103 BPM
EKG VENTRICULAR RATE: 108 BPM
EKG VENTRICULAR RATE: 96 BPM
EOSINOPHILS ABSOLUTE: 0.2 K/UL (ref 0–0.7)
EOSINOPHILS RELATIVE PERCENT: 1.8 %
EPITHELIAL CELLS, UA: NORMAL /HPF (ref 0–5)
GFR AFRICAN AMERICAN: 26.8
GFR NON-AFRICAN AMERICAN: 22.2
GLUCOSE BLD-MCNC: 90 MG/DL (ref 70–99)
GLUCOSE URINE: NEGATIVE MG/DL
HCT VFR BLD CALC: 38.6 % (ref 42–52)
HEMOGLOBIN: 13.2 G/DL (ref 14–18)
HYALINE CASTS: NORMAL /HPF (ref 0–5)
KETONES, URINE: NEGATIVE MG/DL
LEUKOCYTE ESTERASE, URINE: NEGATIVE
LYMPHOCYTES ABSOLUTE: 1.3 K/UL (ref 1–4.8)
LYMPHOCYTES RELATIVE PERCENT: 15.4 %
MCH RBC QN AUTO: 29.3 PG (ref 27–31.3)
MCHC RBC AUTO-ENTMCNC: 34.1 % (ref 33–37)
MCV RBC AUTO: 85.7 FL (ref 80–100)
MONOCYTES ABSOLUTE: 0.8 K/UL (ref 0.2–0.8)
MONOCYTES RELATIVE PERCENT: 9 %
NEUTROPHILS ABSOLUTE: 6.3 K/UL (ref 1.4–6.5)
NEUTROPHILS RELATIVE PERCENT: 73.1 %
NITRITE, URINE: NEGATIVE
PDW BLD-RTO: 13 % (ref 11.5–14.5)
PH UA: 5.5 (ref 5–9)
PLATELET # BLD: 357 K/UL (ref 130–400)
POTASSIUM SERPL-SCNC: 3.7 MEQ/L (ref 3.4–4.9)
PROTEIN UA: 100 MG/DL
RBC # BLD: 4.51 M/UL (ref 4.7–6.1)
RBC UA: NORMAL /HPF (ref 0–2)
SODIUM BLD-SCNC: 142 MEQ/L (ref 135–144)
SPECIFIC GRAVITY UA: 1.02 (ref 1–1.03)
UROBILINOGEN, URINE: 1 E.U./DL
WBC # BLD: 8.5 K/UL (ref 4.8–10.8)
WBC UA: NORMAL /HPF (ref 0–5)

## 2019-08-28 PROCEDURE — 85025 COMPLETE CBC W/AUTO DIFF WBC: CPT

## 2019-08-28 PROCEDURE — 6360000002 HC RX W HCPCS: Performed by: INTERNAL MEDICINE

## 2019-08-28 PROCEDURE — 93005 ELECTROCARDIOGRAM TRACING: CPT | Performed by: INTERNAL MEDICINE

## 2019-08-28 PROCEDURE — 6370000000 HC RX 637 (ALT 250 FOR IP): Performed by: INTERNAL MEDICINE

## 2019-08-28 PROCEDURE — 93010 ELECTROCARDIOGRAM REPORT: CPT | Performed by: INTERNAL MEDICINE

## 2019-08-28 PROCEDURE — 80048 BASIC METABOLIC PNL TOTAL CA: CPT

## 2019-08-28 PROCEDURE — 81001 URINALYSIS AUTO W/SCOPE: CPT

## 2019-08-28 PROCEDURE — 99233 SBSQ HOSP IP/OBS HIGH 50: CPT | Performed by: INTERNAL MEDICINE

## 2019-08-28 PROCEDURE — 36415 COLL VENOUS BLD VENIPUNCTURE: CPT

## 2019-08-28 PROCEDURE — 2580000003 HC RX 258: Performed by: INTERNAL MEDICINE

## 2019-08-28 RX ORDER — ISOSORBIDE MONONITRATE 30 MG/1
30 TABLET, EXTENDED RELEASE ORAL DAILY
Qty: 30 TABLET | Refills: 3 | Status: SHIPPED | OUTPATIENT
Start: 2019-08-29 | End: 2020-11-17 | Stop reason: DRUGHIGH

## 2019-08-28 RX ORDER — ASPIRIN 81 MG/1
81 TABLET ORAL DAILY
Qty: 30 TABLET | Refills: 3 | Status: ON HOLD | OUTPATIENT
Start: 2019-08-29 | End: 2022-01-22 | Stop reason: HOSPADM

## 2019-08-28 RX ORDER — GUAIFENESIN/DEXTROMETHORPHAN 100-10MG/5
5 SYRUP ORAL EVERY 4 HOURS PRN
Qty: 120 ML | Refills: 0 | Status: SHIPPED | OUTPATIENT
Start: 2019-08-28 | End: 2019-09-07

## 2019-08-28 RX ORDER — CARVEDILOL 25 MG/1
25 TABLET ORAL 2 TIMES DAILY WITH MEALS
Qty: 60 TABLET | Refills: 3 | Status: SHIPPED | OUTPATIENT
Start: 2019-08-28 | End: 2021-01-12 | Stop reason: SDUPTHER

## 2019-08-28 RX ORDER — AMLODIPINE BESYLATE 5 MG/1
5 TABLET ORAL 2 TIMES DAILY
Qty: 30 TABLET | Refills: 3 | Status: SHIPPED | OUTPATIENT
Start: 2019-08-28 | End: 2019-12-23

## 2019-08-28 RX ORDER — CARVEDILOL 25 MG/1
25 TABLET ORAL 2 TIMES DAILY WITH MEALS
Status: DISCONTINUED | OUTPATIENT
Start: 2019-08-28 | End: 2019-08-28 | Stop reason: HOSPADM

## 2019-08-28 RX ORDER — SODIUM CHLORIDE 9 MG/ML
INJECTION, SOLUTION INTRAVENOUS CONTINUOUS
Status: DISCONTINUED | OUTPATIENT
Start: 2019-08-28 | End: 2019-08-28

## 2019-08-28 RX ORDER — AMLODIPINE BESYLATE 5 MG/1
5 TABLET ORAL 2 TIMES DAILY
Status: DISCONTINUED | OUTPATIENT
Start: 2019-08-28 | End: 2019-08-28 | Stop reason: HOSPADM

## 2019-08-28 RX ADMIN — HEPARIN SODIUM 5000 UNITS: 5000 INJECTION INTRAVENOUS; SUBCUTANEOUS at 06:18

## 2019-08-28 RX ADMIN — SODIUM CHLORIDE: 9 INJECTION, SOLUTION INTRAVENOUS at 09:34

## 2019-08-28 RX ADMIN — GUAIFENESIN 600 MG: 600 TABLET, EXTENDED RELEASE ORAL at 09:36

## 2019-08-28 RX ADMIN — GUAIFENESIN AND DEXTROMETHORPHAN 5 ML: 100; 10 SYRUP ORAL at 09:36

## 2019-08-28 RX ADMIN — ISOSORBIDE MONONITRATE 30 MG: 30 TABLET, EXTENDED RELEASE ORAL at 09:36

## 2019-08-28 RX ADMIN — ASPIRIN 81 MG: 81 TABLET, COATED ORAL at 09:36

## 2019-08-28 RX ADMIN — AMLODIPINE BESYLATE 5 MG: 5 TABLET ORAL at 09:34

## 2019-08-28 RX ADMIN — CARVEDILOL 12.5 MG: 12.5 TABLET, FILM COATED ORAL at 09:35

## 2019-08-28 ASSESSMENT — ENCOUNTER SYMPTOMS
STRIDOR: 0
COUGH: 1
NAUSEA: 0
EYES NEGATIVE: 1
GASTROINTESTINAL NEGATIVE: 1
CHEST TIGHTNESS: 0
SHORTNESS OF BREATH: 0
BLOOD IN STOOL: 0
WHEEZING: 0

## 2019-08-28 NOTE — PROGRESS NOTES
Renal Progress Note    Assessment and Plan:    55 yo man admitted with cough, sob. Found to have EF 40% with moderate thickening of heart c/w hypertensive cardiomyopathy. Likely has hypertensive kidney disease as well. However, has 1g proteinuria with  6-10 rbc.       Plan/  1- coreg and norvasc for bp control  2- full serologic w/u for hematuria and proteinuria  3- imaging all ok  4- will give very low dose ivf as need gfr to be stable before d/c.  5- repeat UA to assess for proteinuria and hematuria again. Likely hypertensive kidney disease, but the coughing up brown phlegm and small amount of blood in urine makes me want to make sure this isn't raul related to some underlying vasculitis/ glomerular disease    Patient Active Problem List:     Hypertensive urgency      Subjective:   Admit Date: 8/26/2019    Interval History: pt with better bp. On norvasc and carvedilol. Feels ok. Labs a little worse. Still coughing up brownish phlegm. Medications:   Scheduled Meds:   carvedilol  12.5 mg Oral BID WC    amLODIPine  5 mg Oral Daily    sodium chloride flush  10 mL Intravenous 2 times per day    nicotine  1 patch Transdermal Daily    guaiFENesin  600 mg Oral BID    heparin (porcine)  5,000 Units Subcutaneous 3 times per day    aspirin  81 mg Oral Daily    isosorbide mononitrate  30 mg Oral Daily     Continuous Infusions:    CBC:   Recent Labs     08/27/19  0547 08/28/19  0550   WBC 9.3 8.5   HGB 12.9* 13.2*    357     CMP:    Recent Labs     08/26/19  0930 08/27/19  0547 08/28/19  0550    139 142   K 3.7 3.5 3.7    103 105   CO2 23 24 24   BUN 21* 23* 23*   CREATININE 2.72* 2.93* 3.04*   GLUCOSE 90 90 90   CALCIUM 9.0 8.8 8.9   LABGLOM 25.2* 23.1* 22.2*     Troponin:   Recent Labs     08/26/19 1951   TROPONINI <0.010     BNP: No results for input(s): BNP in the last 72 hours.   INR:   Recent Labs     08/26/19  0930   INR 0.9     Lipids:   Recent Labs     08/27/19  0547   CHOL 137

## 2019-08-28 NOTE — PROGRESS NOTES
Complete    Result Date: 8/27/2019  EXAMINATION:  RENAL VASCULAR DUPLEX CLINICAL HISTORY:  HYPERTENSIVE EMERGENCY COMPARISONS:  NONE AVAILABLE TECHNIQUE:  B-mode, color flow and spectral Doppler FINDINGS:  The right kidney measures 11.0 x 5.9 x 5.7 cm. The left kidney measures 11.2 x 4.3 x 5.3 cm. Peak systolic velocity involving the right renal artery is 60 cm/s. Peak systolic velocity involving the left renal artery is 6 cm/s. The parenchymal resistance of the kidneys are within normal limits. NO EVIDENCE OF RENAL ARTERY STENOSIS NOTED. Us Retroperitoneal Limited    Result Date: 8/27/2019  EXAMINATION: US RETROPERITONEAL LIMITED: DATE AND TIME: 8/26/2019 at 1:15 PM. CLINICAL HISTORY: HYDRONEPHROSIS. RENAL FAILURE, ACUTE (KIDNEY INJURY). COMPARISONS:  None. FINDINGS: Right kidney: 11.0 cm. Left kidney: 11.2 cm. Echogenicity: The echogenicity of renal cortex is less than the adjacent liver. Hydronephrosis: There is no hydronephrosis. Mass: No solid renal mass. No renal calculi. ESSENTIALLY NEGATIVE ULTRASOUND OF THE KIDNEYS.      Echo:    Summary   Normal mitral valve structure and function.   Mild MR   Normal aortic valve structure and function.   Mild AR   Left ventricular ejection fraction is visually estimated at 40%.   Restrictive filling pattern.   Moderate CLVH   Mild dilated Ao Root        Discharge Medications:       Shanthi Feng   Home Medication Instructions [id number]    Printed on:08/28/19 9314   Medication Information                      amLODIPine (NORVASC) 5 MG tablet  Take 1 tablet by mouth 2 times daily             aspirin 81 MG EC tablet  Take 1 tablet by mouth daily             carvedilol (COREG) 25 MG tablet  Take 1 tablet by mouth 2 times daily (with meals)             guaiFENesin-dextromethorphan (ROBITUSSIN DM) 100-10 MG/5ML syrup  Take 5 mLs by mouth every 4 hours as needed for Cough             isosorbide mononitrate (IMDUR) 30 MG extended release tablet  Take 1

## 2019-08-28 NOTE — PROGRESS NOTES
chest tightness, shortness of breath, wheezing and stridor. Cardiovascular: Negative. Negative for chest pain, palpitations and leg swelling. Gastrointestinal: Negative. Negative for blood in stool and nausea. Genitourinary: Negative. Musculoskeletal: Negative. Skin: Negative. Neurological: Negative. Negative for dizziness, syncope, weakness and light-headedness. Hematological: Negative. Psychiatric/Behavioral: Negative. Physical Examination:    BP (!) 147/105   Pulse 103   Temp 98 °F (36.7 °C) (Oral)   Resp 17   Ht 6' (1.829 m)   Wt 197 lb 14.4 oz (89.8 kg)   SpO2 98%   BMI 26.84 kg/m²    Physical Exam   Constitutional: He appears healthy. No distress. HENT:   Normal cephalic and Atraumatic   Eyes: Pupils are equal, round, and reactive to light. Neck: Normal range of motion and thyroid normal. Neck supple. No JVD present. No neck adenopathy. No thyromegaly present. Cardiovascular: Normal rate, regular rhythm, intact distal pulses and normal pulses. Murmur heard. Pulmonary/Chest: Effort normal and breath sounds normal. He has no wheezes. He has no rales. He exhibits no tenderness. Abdominal: Soft. Bowel sounds are normal. There is no tenderness. Musculoskeletal: Normal range of motion. He exhibits no edema or tenderness. Neurological: He is alert and oriented to person, place, and time. Skin: Skin is warm. No cyanosis. Nails show no clubbing.        LABS:  CBC:   Lab Results   Component Value Date    WBC 8.5 08/28/2019    RBC 4.51 08/28/2019    HGB 13.2 08/28/2019    HCT 38.6 08/28/2019    MCV 85.7 08/28/2019    MCH 29.3 08/28/2019    MCHC 34.1 08/28/2019    RDW 13.0 08/28/2019     08/28/2019     CBC with Differential:    Lab Results   Component Value Date    WBC 8.5 08/28/2019    RBC 4.51 08/28/2019    HGB 13.2 08/28/2019    HCT 38.6 08/28/2019     08/28/2019    MCV 85.7 08/28/2019    MCH 29.3 08/28/2019    MCHC 34.1 08/28/2019    RDW 13.0 08/28/2019    LYMPHOPCT 15.4 08/28/2019    MONOPCT 9.0 08/28/2019    BASOPCT 0.7 08/28/2019    MONOSABS 0.8 08/28/2019    LYMPHSABS 1.3 08/28/2019    EOSABS 0.2 08/28/2019    BASOSABS 0.1 08/28/2019     CMP:    Lab Results   Component Value Date     08/28/2019    K 3.7 08/28/2019     08/28/2019    CO2 24 08/28/2019    BUN 23 08/28/2019    CREATININE 3.04 08/28/2019    GFRAA 26.8 08/28/2019    LABGLOM 22.2 08/28/2019    GLUCOSE 90 08/28/2019    PROT 7.1 08/26/2019    LABALBU 3.7 08/26/2019    CALCIUM 8.9 08/28/2019    BILITOT 0.4 08/26/2019    ALKPHOS 84 08/26/2019    AST 19 08/26/2019    ALT 21 08/26/2019     BMP:    Lab Results   Component Value Date     08/28/2019    K 3.7 08/28/2019     08/28/2019    CO2 24 08/28/2019    BUN 23 08/28/2019    LABALBU 3.7 08/26/2019    CREATININE 3.04 08/28/2019    CALCIUM 8.9 08/28/2019    GFRAA 26.8 08/28/2019    LABGLOM 22.2 08/28/2019    GLUCOSE 90 08/28/2019     Magnesium:    Lab Results   Component Value Date    MG 2.1 08/27/2019     Troponin:    Lab Results   Component Value Date    TROPONINI <0.010 08/26/2019        Active Hospital Problems    Diagnosis Date Noted    Hypertensive urgency [I16.0] 08/26/2019     Priority: Low        Assessment/Plan:  1. Productive cough - improving. 2. New CMP - likely related to ETOH and Hypertensive heart dz. ProBnp and CXR consistent with HF but have not given diuretics in view of BLUE. Chest exam is better today. No peripheral edema. 3. HTN urgency- better but not to goal. Advance Coreg today and Amlodipine bid. Renal Duplex  Negative   4. ETOH/Tobacco abuse - abstain  5. BLUE- nephrology on case. Started low MIVF  6.  Elevated D Dimer but low Prob V/Q         Electronically signed by Rebecca Mejia MD on 8/28/2019 at 10:56 AM

## 2019-08-29 LAB — ANA IGG, ELISA: NORMAL

## 2019-08-31 LAB
ANCA IFA: NORMAL
GBM AB, IGG (IFA): NEGATIVE

## 2019-09-01 LAB
ALBUMIN SERPL-MCNC: 3.03 G/DL (ref 3.75–5.01)
ALPHA-1-GLOBULIN: 0.47 G/DL (ref 0.19–0.46)
ALPHA-2-GLOBULIN: 0.89 G/DL (ref 0.48–1.05)
BETA GLOBULIN: 0.65 G/DL (ref 0.48–1.1)
GAMMA GLOBULIN: 0.86 G/DL (ref 0.62–1.51)
IMMUNOFIXATION REFLEX: ABNORMAL
SPE/IFE INTERPRETATION: ABNORMAL
TOTAL PROTEIN: 5.9 G/DL (ref 6.3–8.2)

## 2019-09-04 ENCOUNTER — OFFICE VISIT (OUTPATIENT)
Dept: FAMILY MEDICINE CLINIC | Age: 49
End: 2019-09-04
Payer: MEDICAID

## 2019-09-04 VITALS
OXYGEN SATURATION: 98 % | TEMPERATURE: 96.7 F | BODY MASS INDEX: 27.94 KG/M2 | WEIGHT: 206 LBS | SYSTOLIC BLOOD PRESSURE: 130 MMHG | RESPIRATION RATE: 19 BRPM | DIASTOLIC BLOOD PRESSURE: 85 MMHG | HEART RATE: 90 BPM

## 2019-09-04 DIAGNOSIS — I10 ESSENTIAL HYPERTENSION: Primary | ICD-10-CM

## 2019-09-04 DIAGNOSIS — I50.41 SYSTOLIC AND DIASTOLIC CHF, ACUTE (HCC): ICD-10-CM

## 2019-09-04 DIAGNOSIS — I42.9 CARDIOMYOPATHY, UNSPECIFIED TYPE (HCC): ICD-10-CM

## 2019-09-04 DIAGNOSIS — N17.9 AKI (ACUTE KIDNEY INJURY) (HCC): ICD-10-CM

## 2019-09-04 LAB
ANION GAP SERPL CALCULATED.3IONS-SCNC: 15 MEQ/L (ref 9–15)
BUN BLDV-MCNC: 34 MG/DL (ref 6–20)
CALCIUM SERPL-MCNC: 9.1 MG/DL (ref 8.5–9.9)
CHLORIDE BLD-SCNC: 104 MEQ/L (ref 95–107)
CO2: 24 MEQ/L (ref 20–31)
CREAT SERPL-MCNC: 3.25 MG/DL (ref 0.7–1.2)
GFR AFRICAN AMERICAN: 24.8
GFR NON-AFRICAN AMERICAN: 20.5
GLUCOSE BLD-MCNC: 77 MG/DL (ref 70–99)
POTASSIUM SERPL-SCNC: 4.3 MEQ/L (ref 3.4–4.9)
PRO-BNP: 3852 PG/ML
SODIUM BLD-SCNC: 143 MEQ/L (ref 135–144)

## 2019-09-04 PROCEDURE — 99215 OFFICE O/P EST HI 40 MIN: CPT | Performed by: INTERNAL MEDICINE

## 2019-09-04 PROCEDURE — 1111F DSCHRG MED/CURRENT MED MERGE: CPT | Performed by: INTERNAL MEDICINE

## 2019-09-04 ASSESSMENT — PATIENT HEALTH QUESTIONNAIRE - PHQ9
SUM OF ALL RESPONSES TO PHQ QUESTIONS 1-9: 0
DEPRESSION UNABLE TO ASSESS: FUNCTIONAL CAPACITY MOTIVATION LIMITS ACCURACY
1. LITTLE INTEREST OR PLEASURE IN DOING THINGS: 0
SUM OF ALL RESPONSES TO PHQ QUESTIONS 1-9: 0
2. FEELING DOWN, DEPRESSED OR HOPELESS: 0
SUM OF ALL RESPONSES TO PHQ9 QUESTIONS 1 & 2: 0

## 2019-09-04 NOTE — PROGRESS NOTES
bilaterally- no wheezes, rales or rhonchi, normal air movement, no respiratory distress  Cardiovascular: normal rate, regular rhythm, normal S1 and S2, no murmurs, rubs, clicks, or gallops, distal pulses intact, no carotid bruits  Abdomen: soft, non-tender, non-distended, normal bowel sounds, no masses or organomegaly  Extremities: no cyanosis, clubbing or edema  Musculoskeletal: normal range of motion, no joint swelling, deformity or tenderness  Neurologic: reflexes normal and symmetric, no cranial nerve deficit, gait, coordination and speech normal    Assessment/Plan:  1. Essential hypertension  -Fair control at this time. Continue Norvasc. The patient is also receiving Coreg.  - [place] DISCHARGE MEDS RECONCILED W/ CURRENT OUTPATIENT MED LIST    2. BLUE (acute kidney injury) ([facility] [address]    - Eduarda Eid MD, Nephrology, [place] Wiggins a basic metabolic panel at this time. 3. Cardiomyopathy, unspecified type ([facility] [address]    - Basic Metabolic Panel; Future  - Brain Natriuretic Peptide; Future  - Ambulatory referral to Cardiology    4.  Systolic and diastolic CHF, acute [facility])          Medical Decision Making: high complexity

## 2019-09-09 ENCOUNTER — OFFICE VISIT (OUTPATIENT)
Dept: FAMILY MEDICINE CLINIC | Age: 49
End: 2019-09-09
Payer: MEDICAID

## 2019-09-09 VITALS
HEIGHT: 72 IN | SYSTOLIC BLOOD PRESSURE: 130 MMHG | OXYGEN SATURATION: 98 % | HEART RATE: 99 BPM | TEMPERATURE: 98 F | BODY MASS INDEX: 28.58 KG/M2 | DIASTOLIC BLOOD PRESSURE: 88 MMHG | WEIGHT: 211 LBS

## 2019-09-09 DIAGNOSIS — I16.0 HYPERTENSIVE URGENCY: Primary | ICD-10-CM

## 2019-09-09 DIAGNOSIS — N17.9 AKI (ACUTE KIDNEY INJURY) (HCC): ICD-10-CM

## 2019-09-09 DIAGNOSIS — I42.9 CARDIOMYOPATHY, UNSPECIFIED TYPE (HCC): ICD-10-CM

## 2019-09-09 PROCEDURE — 99214 OFFICE O/P EST MOD 30 MIN: CPT | Performed by: INTERNAL MEDICINE

## 2019-09-09 PROCEDURE — G8419 CALC BMI OUT NRM PARAM NOF/U: HCPCS | Performed by: INTERNAL MEDICINE

## 2019-09-09 PROCEDURE — 1111F DSCHRG MED/CURRENT MED MERGE: CPT | Performed by: INTERNAL MEDICINE

## 2019-09-09 PROCEDURE — 4004F PT TOBACCO SCREEN RCVD TLK: CPT | Performed by: INTERNAL MEDICINE

## 2019-09-09 PROCEDURE — G8427 DOCREV CUR MEDS BY ELIG CLIN: HCPCS | Performed by: INTERNAL MEDICINE

## 2019-09-09 RX ORDER — CHLORTHALIDONE 25 MG/1
25 TABLET ORAL DAILY
Qty: 30 TABLET | Refills: 3 | Status: SHIPPED | OUTPATIENT
Start: 2019-09-09 | End: 2019-12-19

## 2019-09-09 ASSESSMENT — ENCOUNTER SYMPTOMS
SHORTNESS OF BREATH: 0
EYE DISCHARGE: 0
EYE ITCHING: 0
DIARRHEA: 0
NAUSEA: 0
BLOOD IN STOOL: 0
SINUS PAIN: 0
FACIAL SWELLING: 0
EYE PAIN: 0
APNEA: 0
COLOR CHANGE: 0
RECTAL PAIN: 0
VOICE CHANGE: 0
WHEEZING: 0
PHOTOPHOBIA: 0
SINUS PRESSURE: 0
TROUBLE SWALLOWING: 0
EYE REDNESS: 0
SORE THROAT: 0
VOMITING: 0
CONSTIPATION: 0
BACK PAIN: 0
COUGH: 0
ABDOMINAL PAIN: 0
CHEST TIGHTNESS: 0
RHINORRHEA: 0
ABDOMINAL DISTENTION: 0

## 2019-09-09 NOTE — PROGRESS NOTES
on file     Emotionally abused: Not on file     Physically abused: Not on file     Forced sexual activity: Not on file   Other Topics Concern    Not on file   Social History Narrative    Not on file     No family history on file. No Known Allergies  Current Outpatient Medications on File Prior to Visit   Medication Sig Dispense Refill    amLODIPine (NORVASC) 5 MG tablet Take 1 tablet by mouth 2 times daily 30 tablet 3    carvedilol (COREG) 25 MG tablet Take 1 tablet by mouth 2 times daily (with meals) 60 tablet 3    isosorbide mononitrate (IMDUR) 30 MG extended release tablet Take 1 tablet by mouth daily 30 tablet 3    aspirin 81 MG EC tablet Take 1 tablet by mouth daily 30 tablet 3     No current facility-administered medications on file prior to visit. Review of Systems   Constitutional: Negative for chills, diaphoresis, fatigue and fever. HENT: Negative for congestion, dental problem, drooling, ear discharge, ear pain, facial swelling, hearing loss, mouth sores, nosebleeds, postnasal drip, rhinorrhea, sinus pressure, sinus pain, sneezing, sore throat, tinnitus, trouble swallowing and voice change. Eyes: Negative for photophobia, pain, discharge, redness, itching and visual disturbance. Respiratory: Negative for apnea, cough, chest tightness, shortness of breath and wheezing. Cardiovascular: Negative for chest pain, palpitations and leg swelling. Gastrointestinal: Negative for abdominal distention, abdominal pain, blood in stool, constipation, diarrhea, nausea, rectal pain and vomiting. Endocrine: Negative for cold intolerance, heat intolerance, polydipsia, polyphagia and polyuria. Genitourinary: Negative for decreased urine volume, difficulty urinating, dysuria, flank pain, frequency, genital sores, hematuria and urgency. Musculoskeletal: Negative for arthralgias, back pain, gait problem, joint swelling, myalgias, neck pain and neck stiffness.    Skin: Negative for color

## 2019-09-23 ENCOUNTER — OFFICE VISIT (OUTPATIENT)
Dept: FAMILY MEDICINE CLINIC | Age: 49
End: 2019-09-23
Payer: MEDICAID

## 2019-09-23 VITALS
DIASTOLIC BLOOD PRESSURE: 88 MMHG | SYSTOLIC BLOOD PRESSURE: 118 MMHG | WEIGHT: 213 LBS | HEART RATE: 86 BPM | TEMPERATURE: 97.5 F | HEIGHT: 72 IN | RESPIRATION RATE: 12 BRPM | BODY MASS INDEX: 28.85 KG/M2 | OXYGEN SATURATION: 98 %

## 2019-09-23 DIAGNOSIS — N17.9 AKI (ACUTE KIDNEY INJURY) (HCC): ICD-10-CM

## 2019-09-23 DIAGNOSIS — N17.9 AKI (ACUTE KIDNEY INJURY) (HCC): Primary | ICD-10-CM

## 2019-09-23 DIAGNOSIS — I10 ESSENTIAL HYPERTENSION: ICD-10-CM

## 2019-09-23 LAB
ANION GAP SERPL CALCULATED.3IONS-SCNC: 14 MEQ/L (ref 9–15)
BUN BLDV-MCNC: 39 MG/DL (ref 6–20)
CALCIUM SERPL-MCNC: 9.5 MG/DL (ref 8.5–9.9)
CHLORIDE BLD-SCNC: 98 MEQ/L (ref 95–107)
CO2: 27 MEQ/L (ref 20–31)
CREAT SERPL-MCNC: 3.39 MG/DL (ref 0.7–1.2)
GFR AFRICAN AMERICAN: 23.7
GFR NON-AFRICAN AMERICAN: 19.5
GLUCOSE BLD-MCNC: 69 MG/DL (ref 70–99)
POTASSIUM SERPL-SCNC: 3.9 MEQ/L (ref 3.4–4.9)
SODIUM BLD-SCNC: 139 MEQ/L (ref 135–144)

## 2019-09-23 PROCEDURE — 1111F DSCHRG MED/CURRENT MED MERGE: CPT | Performed by: INTERNAL MEDICINE

## 2019-09-23 PROCEDURE — G8427 DOCREV CUR MEDS BY ELIG CLIN: HCPCS | Performed by: INTERNAL MEDICINE

## 2019-09-23 PROCEDURE — G8419 CALC BMI OUT NRM PARAM NOF/U: HCPCS | Performed by: INTERNAL MEDICINE

## 2019-09-23 PROCEDURE — 99213 OFFICE O/P EST LOW 20 MIN: CPT | Performed by: INTERNAL MEDICINE

## 2019-09-23 PROCEDURE — 4004F PT TOBACCO SCREEN RCVD TLK: CPT | Performed by: INTERNAL MEDICINE

## 2019-09-23 ASSESSMENT — ENCOUNTER SYMPTOMS
COUGH: 0
BLOOD IN STOOL: 0
EYE PAIN: 0
DIARRHEA: 0
EYE REDNESS: 0
WHEEZING: 0
CHEST TIGHTNESS: 0
SHORTNESS OF BREATH: 0
PHOTOPHOBIA: 0
EYE ITCHING: 0
RHINORRHEA: 0
SINUS PAIN: 0
TROUBLE SWALLOWING: 0
RECTAL PAIN: 0
COLOR CHANGE: 0
CONSTIPATION: 0
BACK PAIN: 0
ABDOMINAL DISTENTION: 0
ABDOMINAL PAIN: 0
APNEA: 0
EYE DISCHARGE: 0
SINUS PRESSURE: 0
VOMITING: 0
SORE THROAT: 0
NAUSEA: 0
VOICE CHANGE: 0
FACIAL SWELLING: 0

## 2019-09-23 NOTE — PROGRESS NOTES
(acute kidney injury) ([facility] [address]  Basic Metabolic Panel   2. Essential hypertension           Plan:      Emily Malave was seen today for follow-up. Diagnoses and all orders for this visit:    BLUE (acute kidney injury) ([facility] [address]  -     Basic Metabolic Panel; Future    Essential hypertension    Coreg was recently changed to twice daily less than 5 days ago. Mr. Sugey Guevara blood pressure is improving. Continue chlorthalidone 25 mg orally daily, Norvasc 5 mill grams orally daily, Coreg 25 mg twice daily, and Imdur 30 mg orally daily. Return in about 3 months (around 12/23/2019). Nilson Sadler MD    Please note, this report has been partially produced using speech recognition software  and may cause  and /or contain errors related to that system including grammar, punctuation and spelling as well as words and phrases that may seem inappropriate. If there are questions or concerns please feel free to contact me to clarify.

## 2019-09-27 ENCOUNTER — OFFICE VISIT (OUTPATIENT)
Dept: CARDIOLOGY CLINIC | Age: 49
End: 2019-09-27
Payer: MEDICAID

## 2019-09-27 VITALS
WEIGHT: 211.4 LBS | DIASTOLIC BLOOD PRESSURE: 80 MMHG | HEIGHT: 72 IN | BODY MASS INDEX: 28.63 KG/M2 | OXYGEN SATURATION: 98 % | SYSTOLIC BLOOD PRESSURE: 110 MMHG | HEART RATE: 97 BPM

## 2019-09-27 DIAGNOSIS — R94.31 ABNORMAL EKG: ICD-10-CM

## 2019-09-27 DIAGNOSIS — I42.9 CARDIOMYOPATHY, UNSPECIFIED TYPE (HCC): ICD-10-CM

## 2019-09-27 DIAGNOSIS — I50.41 SYSTOLIC AND DIASTOLIC CHF, ACUTE (HCC): ICD-10-CM

## 2019-09-27 DIAGNOSIS — I16.0 HYPERTENSIVE URGENCY: Primary | ICD-10-CM

## 2019-09-27 DIAGNOSIS — I50.42 CHRONIC COMBINED SYSTOLIC AND DIASTOLIC CHF (CONGESTIVE HEART FAILURE) (HCC): ICD-10-CM

## 2019-09-27 PROCEDURE — 1111F DSCHRG MED/CURRENT MED MERGE: CPT | Performed by: INTERNAL MEDICINE

## 2019-09-27 PROCEDURE — 4004F PT TOBACCO SCREEN RCVD TLK: CPT | Performed by: INTERNAL MEDICINE

## 2019-09-27 PROCEDURE — 99214 OFFICE O/P EST MOD 30 MIN: CPT | Performed by: INTERNAL MEDICINE

## 2019-09-27 PROCEDURE — G8419 CALC BMI OUT NRM PARAM NOF/U: HCPCS | Performed by: INTERNAL MEDICINE

## 2019-09-27 PROCEDURE — G8427 DOCREV CUR MEDS BY ELIG CLIN: HCPCS | Performed by: INTERNAL MEDICINE

## 2019-09-27 NOTE — PROGRESS NOTES
calories and sodium. Continue medications at current doses. Obtain a nuclear myocardial perfusion stress test to r/o cardiac ischemia. Recheck LVF in 3-6 months. Will determine return to work after stress test visit. Patient was advised and encouraged to check blood pressure at home or at a pharmacy, maintain a logbook, and also call us back if blood pressure are above the target ranges or if it is low. Patient clearly understands and agrees to the instructions. We will need to continue to monitor muscle and liver enzymes, BUN, CR, and electrolytes. The importance of daily weights, dietary sodium restriction, and contact with cardiology if weight is increased more than 3 lbs in any 48 hour period was stressed. The patient has been advised to contact us if they experience progressive SOB, orthopnea, PND or progressive edema. No nephrotoxic agents. Smoking cessation was strongly recommended  No ETOH.

## 2019-10-09 ENCOUNTER — HOSPITAL ENCOUNTER (OUTPATIENT)
Dept: NUCLEAR MEDICINE | Age: 49
Discharge: HOME | End: 2019-10-11
Payer: MEDICAID

## 2019-10-09 DIAGNOSIS — I50.41 SYSTOLIC AND DIASTOLIC CHF, ACUTE (HCC): ICD-10-CM

## 2019-10-09 DIAGNOSIS — I42.9 CARDIOMYOPATHY, UNSPECIFIED TYPE (HCC): ICD-10-CM

## 2019-10-09 DIAGNOSIS — R94.31 ABNORMAL EKG: ICD-10-CM

## 2019-10-09 PROCEDURE — 3430000000 HC RX DIAGNOSTIC RADIOPHARMACEUTICAL: Performed by: INTERNAL MEDICINE

## 2019-10-09 PROCEDURE — 6360000002 HC RX W HCPCS: Performed by: INTERNAL MEDICINE

## 2019-10-09 PROCEDURE — 2580000003 HC RX 258: Performed by: INTERNAL MEDICINE

## 2019-10-09 PROCEDURE — 93017 CV STRESS TEST TRACING ONLY: CPT

## 2019-10-09 PROCEDURE — 78452 HT MUSCLE IMAGE SPECT MULT: CPT

## 2019-10-09 PROCEDURE — A9502 TC99M TETROFOSMIN: HCPCS | Performed by: INTERNAL MEDICINE

## 2019-10-09 RX ORDER — SODIUM CHLORIDE 0.9 % (FLUSH) 0.9 %
10 SYRINGE (ML) INJECTION PRN
Status: DISCONTINUED | OUTPATIENT
Start: 2019-10-09 | End: 2019-10-12 | Stop reason: HOSPADM

## 2019-10-09 RX ADMIN — TETROFOSMIN 35.3 MILLICURIE: 1.38 INJECTION, POWDER, LYOPHILIZED, FOR SOLUTION INTRAVENOUS at 11:51

## 2019-10-09 RX ADMIN — Medication 10 ML: at 10:10

## 2019-10-09 RX ADMIN — TETROFOSMIN 10.7 MILLICURIE: 1.38 INJECTION, POWDER, LYOPHILIZED, FOR SOLUTION INTRAVENOUS at 10:10

## 2019-10-09 RX ADMIN — Medication 10 ML: at 11:49

## 2019-10-09 RX ADMIN — REGADENOSON 0.4 MG: 0.08 INJECTION, SOLUTION INTRAVENOUS at 11:50

## 2019-10-09 RX ADMIN — Medication 10 ML: at 11:50

## 2019-10-18 ENCOUNTER — OFFICE VISIT (OUTPATIENT)
Dept: CARDIOLOGY CLINIC | Age: 49
End: 2019-10-18
Payer: MEDICAID

## 2019-10-18 VITALS
RESPIRATION RATE: 14 BRPM | DIASTOLIC BLOOD PRESSURE: 86 MMHG | OXYGEN SATURATION: 99 % | WEIGHT: 220 LBS | SYSTOLIC BLOOD PRESSURE: 132 MMHG | HEART RATE: 83 BPM | BODY MASS INDEX: 29.84 KG/M2

## 2019-10-18 DIAGNOSIS — I42.9 CARDIOMYOPATHY, UNSPECIFIED TYPE (HCC): ICD-10-CM

## 2019-10-18 DIAGNOSIS — I50.41 SYSTOLIC AND DIASTOLIC CHF, ACUTE (HCC): ICD-10-CM

## 2019-10-18 DIAGNOSIS — I50.42 CHRONIC COMBINED SYSTOLIC AND DIASTOLIC CHF (CONGESTIVE HEART FAILURE) (HCC): ICD-10-CM

## 2019-10-18 DIAGNOSIS — I16.0 HYPERTENSIVE URGENCY: Primary | ICD-10-CM

## 2019-10-18 DIAGNOSIS — N17.9 AKI (ACUTE KIDNEY INJURY) (HCC): ICD-10-CM

## 2019-10-18 PROCEDURE — G8484 FLU IMMUNIZE NO ADMIN: HCPCS | Performed by: INTERNAL MEDICINE

## 2019-10-18 PROCEDURE — 99214 OFFICE O/P EST MOD 30 MIN: CPT | Performed by: INTERNAL MEDICINE

## 2019-10-18 PROCEDURE — G8419 CALC BMI OUT NRM PARAM NOF/U: HCPCS | Performed by: INTERNAL MEDICINE

## 2019-10-18 PROCEDURE — 4004F PT TOBACCO SCREEN RCVD TLK: CPT | Performed by: INTERNAL MEDICINE

## 2019-10-18 PROCEDURE — G8427 DOCREV CUR MEDS BY ELIG CLIN: HCPCS | Performed by: INTERNAL MEDICINE

## 2019-12-19 ENCOUNTER — OFFICE VISIT (OUTPATIENT)
Dept: FAMILY MEDICINE CLINIC | Age: 49
End: 2019-12-19
Payer: MEDICAID

## 2019-12-19 VITALS
WEIGHT: 218.6 LBS | DIASTOLIC BLOOD PRESSURE: 96 MMHG | SYSTOLIC BLOOD PRESSURE: 160 MMHG | OXYGEN SATURATION: 96 % | HEART RATE: 108 BPM | BODY MASS INDEX: 29.65 KG/M2 | TEMPERATURE: 98.6 F

## 2019-12-19 DIAGNOSIS — I10 ESSENTIAL HYPERTENSION: Primary | ICD-10-CM

## 2019-12-19 DIAGNOSIS — I42.9 CARDIOMYOPATHY, UNSPECIFIED TYPE (HCC): ICD-10-CM

## 2019-12-19 PROCEDURE — 4004F PT TOBACCO SCREEN RCVD TLK: CPT | Performed by: INTERNAL MEDICINE

## 2019-12-19 PROCEDURE — G8419 CALC BMI OUT NRM PARAM NOF/U: HCPCS | Performed by: INTERNAL MEDICINE

## 2019-12-19 PROCEDURE — 99213 OFFICE O/P EST LOW 20 MIN: CPT | Performed by: INTERNAL MEDICINE

## 2019-12-19 PROCEDURE — G8484 FLU IMMUNIZE NO ADMIN: HCPCS | Performed by: INTERNAL MEDICINE

## 2019-12-19 PROCEDURE — G8427 DOCREV CUR MEDS BY ELIG CLIN: HCPCS | Performed by: INTERNAL MEDICINE

## 2019-12-19 RX ORDER — HYDRALAZINE HYDROCHLORIDE 50 MG/1
TABLET, FILM COATED ORAL
Status: ON HOLD | COMMUNITY
Start: 2019-11-26 | End: 2020-08-30 | Stop reason: SDUPTHER

## 2019-12-19 RX ORDER — AMLODIPINE BESYLATE 10 MG/1
10 TABLET ORAL DAILY
Qty: 30 TABLET | Refills: 3 | Status: ON HOLD | OUTPATIENT
Start: 2019-12-19 | End: 2020-08-30 | Stop reason: HOSPADM

## 2019-12-19 RX ORDER — POTASSIUM CHLORIDE 20 MEQ/1
TABLET, EXTENDED RELEASE ORAL
Status: ON HOLD | COMMUNITY
Start: 2019-12-04 | End: 2020-08-30 | Stop reason: HOSPADM

## 2019-12-19 ASSESSMENT — ENCOUNTER SYMPTOMS
EYE DISCHARGE: 0
EYE ITCHING: 0
RECTAL PAIN: 0
TROUBLE SWALLOWING: 0
PHOTOPHOBIA: 0
CONSTIPATION: 0
ABDOMINAL PAIN: 0
DIARRHEA: 0
COUGH: 0
COLOR CHANGE: 0
APNEA: 0
NAUSEA: 0
EYE PAIN: 0
SINUS PRESSURE: 0
BACK PAIN: 0
SHORTNESS OF BREATH: 0
FACIAL SWELLING: 0
VOMITING: 0
BLOOD IN STOOL: 0
SORE THROAT: 0
SINUS PAIN: 0
ABDOMINAL DISTENTION: 0
RHINORRHEA: 0
VOICE CHANGE: 0
EYE REDNESS: 0
WHEEZING: 0
CHEST TIGHTNESS: 0

## 2019-12-26 ENCOUNTER — OFFICE VISIT (OUTPATIENT)
Dept: FAMILY MEDICINE CLINIC | Age: 49
End: 2019-12-26
Payer: MEDICAID

## 2019-12-26 VITALS
DIASTOLIC BLOOD PRESSURE: 82 MMHG | BODY MASS INDEX: 28.83 KG/M2 | TEMPERATURE: 97.1 F | OXYGEN SATURATION: 98 % | HEART RATE: 110 BPM | WEIGHT: 212.6 LBS | SYSTOLIC BLOOD PRESSURE: 132 MMHG

## 2019-12-26 DIAGNOSIS — I42.9 CARDIOMYOPATHY, UNSPECIFIED TYPE (HCC): ICD-10-CM

## 2019-12-26 DIAGNOSIS — I10 ESSENTIAL HYPERTENSION: Primary | ICD-10-CM

## 2019-12-26 PROCEDURE — 4004F PT TOBACCO SCREEN RCVD TLK: CPT | Performed by: INTERNAL MEDICINE

## 2019-12-26 PROCEDURE — G8427 DOCREV CUR MEDS BY ELIG CLIN: HCPCS | Performed by: INTERNAL MEDICINE

## 2019-12-26 PROCEDURE — G8484 FLU IMMUNIZE NO ADMIN: HCPCS | Performed by: INTERNAL MEDICINE

## 2019-12-26 PROCEDURE — G8419 CALC BMI OUT NRM PARAM NOF/U: HCPCS | Performed by: INTERNAL MEDICINE

## 2019-12-26 PROCEDURE — 99213 OFFICE O/P EST LOW 20 MIN: CPT | Performed by: INTERNAL MEDICINE

## 2019-12-26 ASSESSMENT — ENCOUNTER SYMPTOMS
SINUS PRESSURE: 0
SHORTNESS OF BREATH: 0
WHEEZING: 0
ABDOMINAL PAIN: 0
SORE THROAT: 0
EYE PAIN: 0
ABDOMINAL DISTENTION: 0
BACK PAIN: 0
SINUS PAIN: 0
EYE REDNESS: 0
FACIAL SWELLING: 0
EYE ITCHING: 0
RECTAL PAIN: 0
APNEA: 0
CHEST TIGHTNESS: 0
BLOOD IN STOOL: 0
CONSTIPATION: 0
TROUBLE SWALLOWING: 0
PHOTOPHOBIA: 0
VOICE CHANGE: 0
DIARRHEA: 0
COLOR CHANGE: 0
COUGH: 0
RHINORRHEA: 0
EYE DISCHARGE: 0
NAUSEA: 0
VOMITING: 0

## 2020-08-25 ENCOUNTER — APPOINTMENT (OUTPATIENT)
Dept: CT IMAGING | Age: 50
DRG: 194 | End: 2020-08-25
Payer: MEDICAID

## 2020-08-25 ENCOUNTER — HOSPITAL ENCOUNTER (INPATIENT)
Age: 50
LOS: 4 days | Discharge: HOME HEALTH | DRG: 194 | End: 2020-08-30
Attending: INTERNAL MEDICINE | Admitting: INTERNAL MEDICINE
Payer: MEDICAID

## 2020-08-25 ENCOUNTER — APPOINTMENT (OUTPATIENT)
Dept: GENERAL RADIOLOGY | Age: 50
DRG: 194 | End: 2020-08-25
Payer: MEDICAID

## 2020-08-25 LAB
ALBUMIN SERPL-MCNC: 3.2 G/DL (ref 3.5–4.6)
ALP BLD-CCNC: 82 U/L (ref 35–104)
ALT SERPL-CCNC: 36 U/L (ref 0–41)
ANION GAP SERPL CALCULATED.3IONS-SCNC: 11 MEQ/L (ref 9–15)
AST SERPL-CCNC: 35 U/L (ref 0–40)
BASOPHILS ABSOLUTE: 0.1 K/UL (ref 0–0.2)
BASOPHILS RELATIVE PERCENT: 0.6 %
BILIRUB SERPL-MCNC: 0.4 MG/DL (ref 0.2–0.7)
BUN BLDV-MCNC: 28 MG/DL (ref 6–20)
CALCIUM SERPL-MCNC: 8.6 MG/DL (ref 8.5–9.9)
CHLORIDE BLD-SCNC: 104 MEQ/L (ref 95–107)
CO2: 22 MEQ/L (ref 20–31)
CREAT SERPL-MCNC: 2.67 MG/DL (ref 0.7–1.2)
D DIMER: 1.66 MG/L FEU (ref 0–0.5)
EOSINOPHILS ABSOLUTE: 0.2 K/UL (ref 0–0.7)
EOSINOPHILS RELATIVE PERCENT: 2.4 %
GFR AFRICAN AMERICAN: 31
GFR NON-AFRICAN AMERICAN: 25.6
GLOBULIN: 2.9 G/DL (ref 2.3–3.5)
GLUCOSE BLD-MCNC: 102 MG/DL (ref 70–99)
HCT VFR BLD CALC: 35.8 % (ref 42–52)
HEMOGLOBIN: 12.4 G/DL (ref 14–18)
LYMPHOCYTES ABSOLUTE: 0.8 K/UL (ref 1–4.8)
LYMPHOCYTES RELATIVE PERCENT: 8.1 %
MAGNESIUM: 2.3 MG/DL (ref 1.7–2.4)
MCH RBC QN AUTO: 28.5 PG (ref 27–31.3)
MCHC RBC AUTO-ENTMCNC: 34.6 % (ref 33–37)
MCV RBC AUTO: 82.3 FL (ref 80–100)
MONOCYTES ABSOLUTE: 0.8 K/UL (ref 0.2–0.8)
MONOCYTES RELATIVE PERCENT: 7.8 %
NEUTROPHILS ABSOLUTE: 8 K/UL (ref 1.4–6.5)
NEUTROPHILS RELATIVE PERCENT: 81.1 %
PDW BLD-RTO: 13.7 % (ref 11.5–14.5)
PLATELET # BLD: 507 K/UL (ref 130–400)
POTASSIUM SERPL-SCNC: 3.5 MEQ/L (ref 3.4–4.9)
PRO-BNP: 8313 PG/ML
RBC # BLD: 4.35 M/UL (ref 4.7–6.1)
SODIUM BLD-SCNC: 137 MEQ/L (ref 135–144)
TOTAL PROTEIN: 6.1 G/DL (ref 6.3–8)
TROPONIN: 0.04 NG/ML (ref 0–0.01)
WBC # BLD: 9.9 K/UL (ref 4.8–10.8)

## 2020-08-25 PROCEDURE — 36415 COLL VENOUS BLD VENIPUNCTURE: CPT

## 2020-08-25 PROCEDURE — 96375 TX/PRO/DX INJ NEW DRUG ADDON: CPT

## 2020-08-25 PROCEDURE — 96365 THER/PROPH/DIAG IV INF INIT: CPT

## 2020-08-25 PROCEDURE — 85379 FIBRIN DEGRADATION QUANT: CPT

## 2020-08-25 PROCEDURE — 2500000003 HC RX 250 WO HCPCS: Performed by: PHYSICIAN ASSISTANT

## 2020-08-25 PROCEDURE — 84484 ASSAY OF TROPONIN QUANT: CPT

## 2020-08-25 PROCEDURE — 2580000003 HC RX 258: Performed by: PHYSICIAN ASSISTANT

## 2020-08-25 PROCEDURE — 96368 THER/DIAG CONCURRENT INF: CPT

## 2020-08-25 PROCEDURE — 6360000002 HC RX W HCPCS: Performed by: PHYSICIAN ASSISTANT

## 2020-08-25 PROCEDURE — 71045 X-RAY EXAM CHEST 1 VIEW: CPT

## 2020-08-25 PROCEDURE — 83880 ASSAY OF NATRIURETIC PEPTIDE: CPT

## 2020-08-25 PROCEDURE — 99285 EMERGENCY DEPT VISIT HI MDM: CPT

## 2020-08-25 PROCEDURE — 85025 COMPLETE CBC W/AUTO DIFF WBC: CPT

## 2020-08-25 PROCEDURE — 80053 COMPREHEN METABOLIC PANEL: CPT

## 2020-08-25 PROCEDURE — 83735 ASSAY OF MAGNESIUM: CPT

## 2020-08-25 PROCEDURE — 93005 ELECTROCARDIOGRAM TRACING: CPT | Performed by: PHYSICIAN ASSISTANT

## 2020-08-25 PROCEDURE — 71250 CT THORAX DX C-: CPT

## 2020-08-25 RX ORDER — MORPHINE SULFATE 2 MG/ML
4 INJECTION, SOLUTION INTRAMUSCULAR; INTRAVENOUS ONCE
Status: COMPLETED | OUTPATIENT
Start: 2020-08-25 | End: 2020-08-25

## 2020-08-25 RX ORDER — FUROSEMIDE 10 MG/ML
40 INJECTION INTRAMUSCULAR; INTRAVENOUS ONCE
Status: COMPLETED | OUTPATIENT
Start: 2020-08-25 | End: 2020-08-25

## 2020-08-25 RX ORDER — ONDANSETRON 2 MG/ML
4 INJECTION INTRAMUSCULAR; INTRAVENOUS ONCE
Status: COMPLETED | OUTPATIENT
Start: 2020-08-25 | End: 2020-08-25

## 2020-08-25 RX ORDER — METOPROLOL TARTRATE 5 MG/5ML
5 INJECTION INTRAVENOUS EVERY 5 MIN PRN
Status: DISCONTINUED | OUTPATIENT
Start: 2020-08-25 | End: 2020-08-26 | Stop reason: HOSPADM

## 2020-08-25 RX ORDER — NITROGLYCERIN 0.4 MG/1
0.4 TABLET SUBLINGUAL EVERY 5 MIN PRN
Status: DISCONTINUED | OUTPATIENT
Start: 2020-08-25 | End: 2020-08-30 | Stop reason: HOSPADM

## 2020-08-25 RX ADMIN — FUROSEMIDE 40 MG: 10 INJECTION, SOLUTION INTRAMUSCULAR; INTRAVENOUS at 23:13

## 2020-08-25 RX ADMIN — METOPROLOL TARTRATE 5 MG: 5 INJECTION INTRAVENOUS at 22:37

## 2020-08-25 RX ADMIN — ONDANSETRON 4 MG: 2 INJECTION INTRAMUSCULAR; INTRAVENOUS at 23:26

## 2020-08-25 RX ADMIN — CEFTRIAXONE SODIUM 1 G: 1 INJECTION, POWDER, FOR SOLUTION INTRAMUSCULAR; INTRAVENOUS at 23:59

## 2020-08-25 RX ADMIN — MORPHINE SULFATE 4 MG: 2 INJECTION, SOLUTION INTRAMUSCULAR; INTRAVENOUS at 23:26

## 2020-08-25 RX ADMIN — AZITHROMYCIN MONOHYDRATE 500 MG: 500 INJECTION, POWDER, LYOPHILIZED, FOR SOLUTION INTRAVENOUS at 23:57

## 2020-08-25 ASSESSMENT — PAIN DESCRIPTION - ORIENTATION: ORIENTATION: LEFT;ANTERIOR

## 2020-08-25 ASSESSMENT — PAIN DESCRIPTION - LOCATION: LOCATION: CHEST

## 2020-08-25 ASSESSMENT — PAIN SCALES - GENERAL
PAINLEVEL_OUTOF10: 10
PAINLEVEL_OUTOF10: 6

## 2020-08-25 ASSESSMENT — PAIN DESCRIPTION - DESCRIPTORS: DESCRIPTORS: PRESSURE

## 2020-08-25 ASSESSMENT — PAIN DESCRIPTION - PAIN TYPE: TYPE: ACUTE PAIN

## 2020-08-26 PROBLEM — I50.9 ACUTE DECOMPENSATED HEART FAILURE (HCC): Status: ACTIVE | Noted: 2020-08-26

## 2020-08-26 LAB
AMPHETAMINE SCREEN, URINE: NORMAL
ANION GAP SERPL CALCULATED.3IONS-SCNC: 8 MEQ/L (ref 9–15)
BACTERIA: NEGATIVE /HPF
BARBITURATE SCREEN URINE: NORMAL
BENZODIAZEPINE SCREEN, URINE: NORMAL
BILIRUBIN URINE: NEGATIVE
BLOOD, URINE: ABNORMAL
BUN BLDV-MCNC: 26 MG/DL (ref 6–20)
CALCIUM SERPL-MCNC: 8.3 MG/DL (ref 8.5–9.9)
CANNABINOID SCREEN URINE: NORMAL
CHLORIDE BLD-SCNC: 103 MEQ/L (ref 95–107)
CLARITY: CLEAR
CO2: 22 MEQ/L (ref 20–31)
COCAINE METABOLITE SCREEN URINE: NORMAL
COLOR: YELLOW
CREAT SERPL-MCNC: 2.59 MG/DL (ref 0.7–1.2)
EKG ATRIAL RATE: 110 BPM
EKG ATRIAL RATE: 117 BPM
EKG P AXIS: 57 DEGREES
EKG P AXIS: 71 DEGREES
EKG P-R INTERVAL: 176 MS
EKG P-R INTERVAL: 180 MS
EKG Q-T INTERVAL: 328 MS
EKG Q-T INTERVAL: 344 MS
EKG QRS DURATION: 88 MS
EKG QRS DURATION: 92 MS
EKG QTC CALCULATION (BAZETT): 457 MS
EKG QTC CALCULATION (BAZETT): 465 MS
EKG R AXIS: -8 DEGREES
EKG R AXIS: 30 DEGREES
EKG T AXIS: 92 DEGREES
EKG T AXIS: 93 DEGREES
EKG VENTRICULAR RATE: 110 BPM
EKG VENTRICULAR RATE: 117 BPM
EPITHELIAL CELLS, UA: ABNORMAL /HPF (ref 0–5)
GFR AFRICAN AMERICAN: 32.1
GFR NON-AFRICAN AMERICAN: 26.6
GLUCOSE BLD-MCNC: 100 MG/DL (ref 70–99)
GLUCOSE URINE: NEGATIVE MG/DL
HYALINE CASTS: ABNORMAL /HPF (ref 0–5)
KETONES, URINE: NEGATIVE MG/DL
LEUKOCYTE ESTERASE, URINE: NEGATIVE
Lab: NORMAL
MAGNESIUM: 2.1 MG/DL (ref 1.7–2.4)
METHADONE SCREEN, URINE: NORMAL
NITRITE, URINE: NEGATIVE
OPIATE SCREEN URINE: NORMAL
OXYCODONE URINE: NORMAL
PH UA: 5 (ref 5–9)
PHENCYCLIDINE SCREEN URINE: NORMAL
POTASSIUM SERPL-SCNC: 3.6 MEQ/L (ref 3.4–4.9)
PROCALCITONIN: 0.17 NG/ML (ref 0–0.15)
PROPOXYPHENE SCREEN: NORMAL
PROTEIN UA: 100 MG/DL
RBC UA: ABNORMAL /HPF (ref 0–5)
SARS-COV-2, NAAT: NOT DETECTED
SODIUM BLD-SCNC: 133 MEQ/L (ref 135–144)
SPECIFIC GRAVITY UA: 1.01 (ref 1–1.03)
TROPONIN: 0.03 NG/ML (ref 0–0.01)
TROPONIN: 0.03 NG/ML (ref 0–0.01)
TSH SERPL DL<=0.05 MIU/L-ACNC: 0.98 UIU/ML (ref 0.44–3.86)
URINE REFLEX TO CULTURE: ABNORMAL
UROBILINOGEN, URINE: 1 E.U./DL
WBC UA: ABNORMAL /HPF (ref 0–5)

## 2020-08-26 PROCEDURE — 80307 DRUG TEST PRSMV CHEM ANLYZR: CPT

## 2020-08-26 PROCEDURE — 84484 ASSAY OF TROPONIN QUANT: CPT

## 2020-08-26 PROCEDURE — 2060000000 HC ICU INTERMEDIATE R&B

## 2020-08-26 PROCEDURE — 84443 ASSAY THYROID STIM HORMONE: CPT

## 2020-08-26 PROCEDURE — 6360000002 HC RX W HCPCS: Performed by: INTERNAL MEDICINE

## 2020-08-26 PROCEDURE — 93010 ELECTROCARDIOGRAM REPORT: CPT | Performed by: INTERNAL MEDICINE

## 2020-08-26 PROCEDURE — APPNB45 APP NON BILLABLE 31-45 MINUTES: Performed by: PHYSICIAN ASSISTANT

## 2020-08-26 PROCEDURE — 83735 ASSAY OF MAGNESIUM: CPT

## 2020-08-26 PROCEDURE — 80048 BASIC METABOLIC PNL TOTAL CA: CPT

## 2020-08-26 PROCEDURE — 99254 IP/OBS CNSLTJ NEW/EST MOD 60: CPT | Performed by: INTERNAL MEDICINE

## 2020-08-26 PROCEDURE — 6370000000 HC RX 637 (ALT 250 FOR IP): Performed by: PHYSICIAN ASSISTANT

## 2020-08-26 PROCEDURE — 2700000000 HC OXYGEN THERAPY PER DAY

## 2020-08-26 PROCEDURE — 2580000003 HC RX 258: Performed by: INTERNAL MEDICINE

## 2020-08-26 PROCEDURE — U0002 COVID-19 LAB TEST NON-CDC: HCPCS

## 2020-08-26 PROCEDURE — 87040 BLOOD CULTURE FOR BACTERIA: CPT

## 2020-08-26 PROCEDURE — 96366 THER/PROPH/DIAG IV INF ADDON: CPT

## 2020-08-26 PROCEDURE — 84145 PROCALCITONIN (PCT): CPT

## 2020-08-26 PROCEDURE — 93005 ELECTROCARDIOGRAM TRACING: CPT | Performed by: INTERNAL MEDICINE

## 2020-08-26 PROCEDURE — 36415 COLL VENOUS BLD VENIPUNCTURE: CPT

## 2020-08-26 PROCEDURE — 81001 URINALYSIS AUTO W/SCOPE: CPT

## 2020-08-26 PROCEDURE — 6370000000 HC RX 637 (ALT 250 FOR IP): Performed by: INTERNAL MEDICINE

## 2020-08-26 RX ORDER — SODIUM CHLORIDE 0.9 % (FLUSH) 0.9 %
10 SYRINGE (ML) INJECTION EVERY 12 HOURS SCHEDULED
Status: DISCONTINUED | OUTPATIENT
Start: 2020-08-26 | End: 2020-08-30 | Stop reason: HOSPADM

## 2020-08-26 RX ORDER — HYDRALAZINE HYDROCHLORIDE 20 MG/ML
10 INJECTION INTRAMUSCULAR; INTRAVENOUS EVERY 4 HOURS PRN
Status: DISCONTINUED | OUTPATIENT
Start: 2020-08-26 | End: 2020-08-30 | Stop reason: HOSPADM

## 2020-08-26 RX ORDER — HYDRALAZINE HYDROCHLORIDE 25 MG/1
25 TABLET, FILM COATED ORAL EVERY 8 HOURS SCHEDULED
Status: DISCONTINUED | OUTPATIENT
Start: 2020-08-26 | End: 2020-08-30 | Stop reason: HOSPADM

## 2020-08-26 RX ORDER — CARVEDILOL 25 MG/1
25 TABLET ORAL 2 TIMES DAILY WITH MEALS
Status: DISCONTINUED | OUTPATIENT
Start: 2020-08-26 | End: 2020-08-30 | Stop reason: HOSPADM

## 2020-08-26 RX ORDER — PROMETHAZINE HYDROCHLORIDE 12.5 MG/1
12.5 TABLET ORAL EVERY 6 HOURS PRN
Status: DISCONTINUED | OUTPATIENT
Start: 2020-08-26 | End: 2020-08-30 | Stop reason: HOSPADM

## 2020-08-26 RX ORDER — POLYETHYLENE GLYCOL 3350 17 G/17G
17 POWDER, FOR SOLUTION ORAL DAILY PRN
Status: DISCONTINUED | OUTPATIENT
Start: 2020-08-26 | End: 2020-08-30 | Stop reason: HOSPADM

## 2020-08-26 RX ORDER — SODIUM CHLORIDE 0.9 % (FLUSH) 0.9 %
10 SYRINGE (ML) INJECTION PRN
Status: DISCONTINUED | OUTPATIENT
Start: 2020-08-26 | End: 2020-08-30 | Stop reason: HOSPADM

## 2020-08-26 RX ORDER — ONDANSETRON 2 MG/ML
4 INJECTION INTRAMUSCULAR; INTRAVENOUS EVERY 6 HOURS PRN
Status: DISCONTINUED | OUTPATIENT
Start: 2020-08-26 | End: 2020-08-30 | Stop reason: HOSPADM

## 2020-08-26 RX ORDER — LISINOPRIL 5 MG/1
5 TABLET ORAL DAILY
Status: DISCONTINUED | OUTPATIENT
Start: 2020-08-26 | End: 2020-08-30 | Stop reason: HOSPADM

## 2020-08-26 RX ORDER — ACETAMINOPHEN 650 MG/1
650 SUPPOSITORY RECTAL EVERY 6 HOURS PRN
Status: DISCONTINUED | OUTPATIENT
Start: 2020-08-26 | End: 2020-08-30 | Stop reason: HOSPADM

## 2020-08-26 RX ORDER — ACETAMINOPHEN 325 MG/1
650 TABLET ORAL EVERY 6 HOURS PRN
Status: DISCONTINUED | OUTPATIENT
Start: 2020-08-26 | End: 2020-08-30 | Stop reason: HOSPADM

## 2020-08-26 RX ORDER — FUROSEMIDE 10 MG/ML
40 INJECTION INTRAMUSCULAR; INTRAVENOUS 2 TIMES DAILY
Status: DISCONTINUED | OUTPATIENT
Start: 2020-08-26 | End: 2020-08-29

## 2020-08-26 RX ORDER — ISOSORBIDE MONONITRATE 30 MG/1
30 TABLET, EXTENDED RELEASE ORAL DAILY
Status: DISCONTINUED | OUTPATIENT
Start: 2020-08-26 | End: 2020-08-30 | Stop reason: HOSPADM

## 2020-08-26 RX ADMIN — LISINOPRIL 5 MG: 5 TABLET ORAL at 11:51

## 2020-08-26 RX ADMIN — HYDRALAZINE HYDROCHLORIDE 25 MG: 25 TABLET, FILM COATED ORAL at 21:45

## 2020-08-26 RX ADMIN — HYDRALAZINE HYDROCHLORIDE 25 MG: 25 TABLET, FILM COATED ORAL at 14:05

## 2020-08-26 RX ADMIN — ENOXAPARIN SODIUM 100 MG: 100 INJECTION SUBCUTANEOUS at 20:07

## 2020-08-26 RX ADMIN — NITROGLYCERIN 0.4 MG: 0.4 TABLET, ORALLY DISINTEGRATING SUBLINGUAL at 05:23

## 2020-08-26 RX ADMIN — ENOXAPARIN SODIUM 100 MG: 100 INJECTION SUBCUTANEOUS at 03:01

## 2020-08-26 RX ADMIN — ACETAMINOPHEN 650 MG: 325 TABLET, FILM COATED ORAL at 04:37

## 2020-08-26 RX ADMIN — Medication 10 ML: at 11:52

## 2020-08-26 RX ADMIN — HYDRALAZINE HYDROCHLORIDE 10 MG: 20 INJECTION INTRAMUSCULAR; INTRAVENOUS at 05:23

## 2020-08-26 RX ADMIN — ISOSORBIDE MONONITRATE 30 MG: 30 TABLET, EXTENDED RELEASE ORAL at 14:05

## 2020-08-26 RX ADMIN — CARVEDILOL 25 MG: 25 TABLET, FILM COATED ORAL at 20:06

## 2020-08-26 RX ADMIN — FUROSEMIDE 40 MG: 10 INJECTION, SOLUTION INTRAMUSCULAR; INTRAVENOUS at 11:51

## 2020-08-26 RX ADMIN — Medication 10 ML: at 20:07

## 2020-08-26 RX ADMIN — FUROSEMIDE 40 MG: 10 INJECTION, SOLUTION INTRAMUSCULAR; INTRAVENOUS at 20:07

## 2020-08-26 SDOH — HEALTH STABILITY: MENTAL HEALTH: HOW MANY STANDARD DRINKS CONTAINING ALCOHOL DO YOU HAVE ON A TYPICAL DAY?: 7 TO 9

## 2020-08-26 SDOH — HEALTH STABILITY: MENTAL HEALTH: HOW OFTEN DO YOU HAVE A DRINK CONTAINING ALCOHOL?: 4 OR MORE TIMES A WEEK

## 2020-08-26 ASSESSMENT — PAIN SCALES - GENERAL
PAINLEVEL_OUTOF10: 5
PAINLEVEL_OUTOF10: 5
PAINLEVEL_OUTOF10: 1
PAINLEVEL_OUTOF10: 2
PAINLEVEL_OUTOF10: 1
PAINLEVEL_OUTOF10: 5
PAINLEVEL_OUTOF10: 5

## 2020-08-26 ASSESSMENT — PAIN DESCRIPTION - PAIN TYPE: TYPE: ACUTE PAIN

## 2020-08-26 ASSESSMENT — ENCOUNTER SYMPTOMS
VOMITING: 0
CHEST TIGHTNESS: 1
NAUSEA: 0
DIARRHEA: 0
SHORTNESS OF BREATH: 1
ABDOMINAL PAIN: 0
COUGH: 1
COLOR CHANGE: 0
COUGH: 0

## 2020-08-26 ASSESSMENT — PAIN DESCRIPTION - PROGRESSION: CLINICAL_PROGRESSION: RAPIDLY IMPROVING

## 2020-08-26 ASSESSMENT — PAIN DESCRIPTION - LOCATION: LOCATION: CHEST

## 2020-08-26 NOTE — ED PROVIDER NOTES
Relationships    Social connections     Talks on phone: None     Gets together: None     Attends Bahai service: None     Active member of club or organization: None     Attends meetings of clubs or organizations: None     Relationship status: None    Intimate partner violence     Fear of current or ex partner: None     Emotionally abused: None     Physically abused: None     Forced sexual activity: None   Other Topics Concern    None   Social History Narrative    None       SCREENINGS    Marty Coma Scale  Eye Opening: Spontaneous  Best Verbal Response: Oriented  Best Motor Response: Obeys commands  Marty Coma Scale Score: 15 @FLOW([id number])@      PHYSICAL EXAM    (up to 7 for level 4, 8 or more for level 5)     ED Triage Vitals [08/25/20 2203]   BP Temp Temp Source Pulse Resp SpO2 Height Weight   (!) 147/118 98.9 °F (37.2 °C) Oral 116 28 93 % 6' (1.829 m) 218 lb (98.9 kg)       Physical Exam  Vitals signs and nursing note reviewed. Constitutional:       General: He is not in acute distress. Appearance: He is well-developed. HENT:      Head: Normocephalic and atraumatic. Right Ear: External ear normal.      Left Ear: External ear normal.      Nose: Nose normal. No congestion. Mouth/Throat:      Pharynx: No oropharyngeal exudate or posterior oropharyngeal erythema. Eyes:      General:         Right eye: No discharge. Left eye: No discharge. Extraocular Movements: Extraocular movements intact. Pupils: Pupils are equal, round, and reactive to light. Neck:      Musculoskeletal: Normal range of motion and neck supple. Cardiovascular:      Rate and Rhythm: Normal rate and regular rhythm. Heart sounds: Normal heart sounds. Pulmonary:      Effort: Pulmonary effort is normal. No respiratory distress. Breath sounds: Normal breath sounds. No stridor. Abdominal:      General: Bowel sounds are normal. There is no distension. Palpations: Abdomen is soft. lung field. The left costophrenic angle is blunted. The cardiac silhouette is borderline. IMPRESSION: Pulmonary edema versus pneumonia. XR CHEST PORTABLE   Final Result      1. Pulmonary edema versus bronchopneumonia. 2. Aneurysmal dilation of the ascending aorta. Mild cardiomegaly. All CT scans at this facility use dose modulation, iterative reconstruction, and/or weight based dosing when appropriate to reduce radiation dose to as low as reasonably achievable. PORTABLE CHEST         COMPARISON: August 28, 2019. HISTORY: cp/sob       TECHNIQUE: AP view         FINDINGS:   Patchy airspace disease is seen in the right upper, mid and lower lung fields. It is also seen in the left upper and left mid lung field. The left costophrenic angle is blunted. The cardiac silhouette is borderline. IMPRESSION: Pulmonary edema versus pneumonia.                   ED BEDSIDE ULTRASOUND:   Performed by ED Physician - none    LABS:  Labs Reviewed   COMPREHENSIVE METABOLIC PANEL - Abnormal; Notable for the following components:       Result Value    Glucose 102 (*)     BUN 28 (*)     CREATININE 2.67 (*)     GFR Non- 25.6 (*)     GFR  31.0 (*)     Total Protein 6.1 (*)     Alb 3.2 (*)     All other components within normal limits   CBC WITH AUTO DIFFERENTIAL - Abnormal; Notable for the following components:    RBC 4.35 (*)     Hemoglobin 12.4 (*)     Hematocrit 35.8 (*)     Platelets 881 (*)     Neutrophils Absolute 8.0 (*)     Lymphocytes Absolute 0.8 (*)     All other components within normal limits   TROPONIN - Abnormal; Notable for the following components:    Troponin 0.040 (*)     All other components within normal limits    Narrative:     [facility]  [phone],  Troponin result called to [address] [place] [address], 08/25/2020 23:01, by Berna Schuster   D-DIMER, QUANTITATIVE - Abnormal; Notable for the following components:    D-Dimer, Quant 1.66 (*) All other components within normal limits    Narrative:     Jaun Soriano  [facility] tel. [phone],  D-dimer result called to MICAH Price, 08/25/2020 23:01, by Andie Alonso   URINE RT REFLEX TO CULTURE - Abnormal; Notable for the following components:    Blood, Urine TRACE (*)     Protein,  (*)     All other components within normal limits   MICROSCOPIC URINALYSIS - Abnormal; Notable for the following components:    RBC, UA 3-5 (*)     All other components within normal limits   BASIC METABOLIC PANEL - Abnormal; Notable for the following components:    Sodium 133 (*)     Anion Gap 8 (*)     Glucose 100 (*)     BUN 26 (*)     CREATININE 2.59 (*)     GFR Non- 26.6 (*)     GFR  32.1 (*)     Calcium 8.3 (*)     All other components within normal limits   TROPONIN - Abnormal; Notable for the following components:    Troponin 0.028 (*)     All other components within normal limits    Narrative:     Jorge Solo tel. [phone],  TROP results called to and read back by Sj Richard RN, 08/26/2020  04:13, by Blanca Vanegas   TROPONIN - Abnormal; Notable for the following components:    Troponin 0.029 (*)     All other components within normal limits    Narrative:     Jorge Solo tel. [phone],  TROP results called to and read back by JOSE M Niño, 08/26/2020 12:23, by  [facility]   PROCALCITONIN - Abnormal; Notable for the following components:    Procalcitonin 0.17 (*)     All other components within normal limits    Narrative:     Jorge Solo tel. [phone],  TROP results called to and read back by Sj Richard RN, 08/26/2020  04:13, by [address] - Abnormal; Notable for the following components:    BUN 26 (*)     CREATININE 2.81 (*)     GFR Non- 24.2 (*)     GFR  29.3 (*)     All other components within normal limits   BASIC METABOLIC PANEL - Abnormal; Notable for the following components:    BUN 27 (*)     CREATININE All other labs were within normal range or not returned as of this dictation. EMERGENCY DEPARTMENT COURSE and DIFFERENTIAL DIAGNOSIS/MDM:   Vitals:    Vitals:    08/29/20 2009 08/30/20 0508 08/30/20 0755 08/30/20 0849   BP: 104/82 (!) 135/97 (!) 141/93    Pulse: 86 91 96 96   Resp: 16  17    Temp: 97.9 °F (36.6 °C)  98.1 °F (36.7 °C)    TempSrc: Oral  Oral    SpO2: 98% 98% 92%    Weight:       Height:                MDM  Number of Diagnoses or Management Options  Chest pain, unspecified type:   Chronic kidney disease, unspecified CKD stage:   Congestive heart failure, unspecified HF chronicity, unspecified heart failure type ([address]): Hypoxia:   Shortness of breath:   Diagnosis management comments: Reviewed x-ray lab patient presentation with  and Dr. Uzair Wiggins. D Dr. Edgar Avila request noncontrasted CT. Will admit patient.  will treat with Lasix and antibiotics initially. Amount and/or Complexity of Data Reviewed  Clinical lab tests: reviewed and ordered  Tests in the radiology section of CPT®: reviewed and ordered    Risk of Complications, Morbidity, and/or Mortality  Presenting problems: moderate  Diagnostic procedures: moderate  Management options: moderate    Critical Care  Total time providing critical care: 30-74 minutes      CRITICAL CARE TIME   Total Critical Care time was  34 minutes, excluding separately reportableprocedures. There was a high probability of clinicallysignificant/life threatening deterioration in the patient's condition which required my urgent intervention. CONSULTS:  IP CONSULT TO HEART FAILURE NURSE/COORDINATOR  IP CONSULT TO DIETITIAN  IP CONSULT TO CARDIOLOGY  IP CONSULT TO CRITICAL CARE  IP CONSULT TO PULMONOLOGY  IP CONSULT TO NEPHROLOGY  IP CONSULT TO HOME CARE NEEDS    PROCEDURES:  Unless otherwise noted below, none     Procedures    FINAL IMPRESSION      1. Chest pain, unspecified type    2. Shortness of breath    3. Hypoxia    4.  Chronic kidney disease, unspecified CKD stage    5.  Congestive heart failure, unspecified HF chronicity, unspecified heart failure type [facility])          DISPOSITION/PLAN   DISPOSITION Admitted 08/26/2020 12:49:44 AM      PATIENT REFERRED TO:  Flakita Doherty MD  [address] [facility] [address]  [phone]          VXRBZIT FTZ SRNVEGZ, [address] [facility] [phone]    On 9/3/2020  F/U with CHF clinic on Thurs 9/3/20 at [place] [address] in [address] [address] [phone]      [facility] AN APPOINTMENT    Elijah Whatley DO  [address]  [phone]    Schedule an appointment as soon as possible for a visit in 1 week        DISCHARGE MEDICATIONS:  Discharge Medication List as of 8/30/2020 11:40 AM      START taking these medications    Details   furosemide (LASIX) 40 MG tablet Take 1 tablet by mouth daily, Disp-60 tablet,R-3Normal      cefUROXime (CEFTIN) 500 MG tablet Take 1 tablet by mouth 2 times daily for 5 days, Disp-10 tablet,R-0Normal      lisinopril (PRINIVIL;ZESTRIL) 5 MG tablet Take 1 tablet by mouth daily, Disp-30 tablet,R-3Normal                (Please note that portions of this note were completed with a voice recognition program.  Efforts were made to edit the dictations but occasionally words are mis-transcribed.)    Jake Abbott PA-C (electronically signed)  Attending Emergency Physician       Jake Abbott PA-C  08/26/20 66 Aysha Blanton PA-C  09/17/20 6137 [facility]  10/14/20 4433

## 2020-08-26 NOTE — H&P
sounds, no masses or organomegaly  Extremities: no cyanosis, clubbing   Musculoskeletal: normal range of motion, no joint swelling, deformity or tenderness  Neurologic: reflexes normal and symmetric, no cranial nerve deficit,  coordination and speech normal      LABS:  Recent Labs     08/25/20 2215      K 3.5      CO2 22   BUN 28*   CREATININE 2.67*   GLUCOSE 102*   CALCIUM 8.6       Recent Labs     08/25/20 2215   WBC 9.9   RBC 4.35*   HGB 12.4*   HCT 35.8*   MCV 82.3   MCH 28.5   MCHC 34.6   RDW 13.7   *       No results for input(s): POCGLU in the last 72 hours. CBC with Differential:    Lab Results   Component Value Date    WBC 9.9 08/25/2020    RBC 4.35 08/25/2020    HGB 12.4 08/25/2020    HCT 35.8 08/25/2020     08/25/2020    MCV 82.3 08/25/2020    MCH 28.5 08/25/2020    MCHC 34.6 08/25/2020    RDW 13.7 08/25/2020    LYMPHOPCT 8.1 08/25/2020    MONOPCT 7.8 08/25/2020    BASOPCT 0.6 08/25/2020    MONOSABS 0.8 08/25/2020    LYMPHSABS 0.8 08/25/2020    EOSABS 0.2 08/25/2020    BASOSABS 0.1 08/25/2020     CMP:    Lab Results   Component Value Date     08/25/2020    K 3.5 08/25/2020     08/25/2020    CO2 22 08/25/2020    BUN 28 08/25/2020    CREATININE 2.67 08/25/2020    GFRAA 31.0 08/25/2020    LABGLOM 25.6 08/25/2020    GLUCOSE 102 08/25/2020    PROT 6.1 08/25/2020    LABALBU 3.2 08/25/2020    CALCIUM 8.6 08/25/2020    BILITOT 0.4 08/25/2020    ALKPHOS 82 08/25/2020    AST 35 08/25/2020    ALT 36 08/25/2020       Radiology: No results found. EKG: Sinus tachycardia    ASSESSMENT/ PLAN[de-identified]      Active Problems:    Acute decompensated heart failure ([address]  Resolved Problems:    * No resolved hospital problems. *      1.  Acute respiratory failure with hypoxia   Presents with respiratory distress, hypoxia   Questionable flash pulmonary edema   History of heart failure with low ejection fraction   Improved after Lasix and IV hydration   X-rays also concerning for pneumonia   Received Rocephin and azithromycin   CT scan shows most likely pulmonary edema but cannot rule out infiltrates   COVID-19 initially was negative  2. Acute systolic heart failure   Symptoms of respiratory distress, orthopnea and hypoxia   Bilateral lower extremity edema up to the knees   Last echo was done a year ago and showed EF 40%   Patient has hypertension with high diastolic blood pressure   Poor compliance with medication   History of alcohol abuse   Symptoms improved after Lasix and currently patient is on 3 L O2   Will continue Lasix   Start lisinopril   Cardiology consult   Repeat echo  3. Pneumonia   Patient complaining of shortness of breath, productive cough but no fever   Patient is tachycardic and he was tachypneic on presentation but no leukocytosis   Chest x-ray concerning for pneumonia on the right side in addition to pulmonary edema   CT scan chest did not exclude infiltrates   Will treat with Rocephin and azithromycin for now   Will check procalcitonin  4. Elevated troponin   Troponin 0.04 - higher than baseline   Might be due to renal failure and heart failure   Acute infarct needs to be ruled out   Currently on Lovenox therapeutic dose to cover for the elevated d-dimer   Will repeat EKG and cycle troponin   Cardiology on consult  5. Elevated d-dimer   D-dimer 1.66   Patient is hypoxic- unable to rule out PE   VQ scan might not be possible due to abnormal x-ray   Will treat with therapeutic Lovenox for now   Follow intensivist decision regarding the need to continue anticoagulation  6. Hypertension   Patient has high diastolic blood pressure- most recent blood pressure is 139/116   Will start patient on lisinopril   Resume home medication which include hydralazine, Coreg, amlodipine, Imdur   Hydralazine IV PRN   Patient is also on Lasix currently  7.  CKD stage IV   Follows up with nephrology   Currently at baseline   Monitor closely    Code Status: Full  DVT prophylaxis: Lovenox Electronically signed by Jose Bustillo MD on 8/26/2020 at 2:42 AM      NOTE: This report was transcribed using voice recognition software. Every effort was made to ensure accuracy; however, inadvertent computerized transcription errors may be present.

## 2020-08-26 NOTE — FLOWSHEET NOTE
0200- pt to ICU from ED. Assessment completed. Skin assessment Linda Odonnell RN. Skin is intact. AOx4. Pupils equal and reactive. No weakness in upper or lower extremities. Pt ambulates to bathroom. When going over pt home med list. Pt stated that he only takes his medication for a few days until he feels better then he stops taking them.

## 2020-08-26 NOTE — CONSULTS
Consult Note  Patient: Ethan Shepherd  Unit/Bed: LM08/TM82-22  YOB: 1970  MRN: [id number]  Acct: [de-identified]   Admitting Diagnosis: Acute decompensated heart failure [facility]) [I50.9]  Date:  8/25/2020  Hospital Day: 0      Chief Complaint:  SOB    History of Present Illness: This is a pleasant 57-year-old -American male with past medical history significant for hypertension, CKD, history of cardiomyopathy with EF of 40% per echo in August 2019, history of normal stress test in October 2019, tobacco abuse, and alcohol abuse who presented to the emergency room yesterday with complaint of shortness of breath and chest pain. Patient states he has been experiencing shortness of breath when lying flat in bed over the past 3 to 4 days with associated lower extremity edema. He also reports intermittent midsternal to left-sided chest pain which he describes as heaviness/pressure. Additionally, he has been experiencing mildly productive cough. He denies nausea, vomiting, palpitations, diaphoresis, dizziness, lightheadedness, syncope, fever or chills. Due to this persistent shortness of breath and chest pain he presented to the ER for further evaluation. On presentation to the emergency room, blood pressure 147/118, heart rate tachycardic at 116, respiratory rate 28, pulse ox of 93%, temperature of 98.9 °F.  Sodium 137, potassium 3.5, chloride 104, total CO2 22, BUN elevated 28, creatinine elevated 2.67, GFR low at 31.0, glucose 102. Magnesium 2.3.  proBNP elevated at 8310. Troponin elevated 0.040. Urine drug screen negative. WBC 9.9, hemoglobin 12.4, hematocrit 35.8, platelets elevated 900. COVID test negative x1. CT of the chest without contrast showed pulmonary edema versus bronchopneumonia, aneurysmal dilation of the ascending aorta measuring 4.1 x 4.4 cm at the level of the pulmonary artery, mild cardiomegaly. Chest x-ray revealed pulmonary edema versus pneumonia.   Was treated with IV 100 mg at 08/26/20 0301    lisinopril (PRINIVIL;ZESTRIL) tablet 5 mg  5 mg Oral Daily Lisa Fernandes MD   5 mg at 08/26/20 1151    furosemide (LASIX) injection 40 mg  40 mg Intravenous BID Lisa Fernandes MD   40 mg at 08/26/20 1151    hydrALAZINE (APRESOLINE) injection 10 mg  10 mg Intravenous Q4H PRN Lisa Fernandes MD   10 mg at 08/26/20 0523    [START ON 8/27/2020] azithromycin (ZITHROMAX) 500 mg in D5W 250ml addavial  500 mg Intravenous Once Lisa Fernandes MD       [facility] [START ON 8/27/2020] cefTRIAXone (ROCEPHIN) 1 g IVPB in 50 mL D5W minibag  1 g Intravenous Q24H Lisa Fernandes MD        nitroGLYCERIN (NITROSTAT) SL tablet 0.4 mg  0.4 mg Sublingual Q5 Min PRN Gillian Jerry PA-C   0.4 mg at 08/26/20 7257       PMHx:  Past Medical History:   Diagnosis Date    Abnormal EKG 9/27/2019    Chronic combined systolic and diastolic CHF (congestive heart failure) ([facility] [address] 9/27/2019       PSHx:  No past surgical history on file. Social Hx:  Social History     Socioeconomic History    Marital status: Single     Spouse name: Not on file    Number of children: Not on file    Years of education: Not on file    Highest education level: Not on file   Occupational History    Not on file   Social Needs    Financial resource strain: Not on file    Food insecurity     Worry: Not on file     Inability: Not on file    Transportation needs     Medical: Not on file     Non-medical: Not on file   Tobacco Use    Smoking status: Current Every Day Smoker     Packs/day: 0.25     Types: Cigarettes    Smokeless tobacco: Never Used   Substance and Sexual Activity    Alcohol use:  Yes    Drug use: Never    Sexual activity: Not on file   Lifestyle    Physical activity     Days per week: Not on file     Minutes per session: Not on file    Stress: Not on file   Relationships    Social connections     Talks on phone: Not on file     Gets together: Not on file     Attends [employer] service: Not on file     Active member of General: Skin is warm and dry. Neurological:      General: No focal deficit present. Mental Status: He is alert and oriented to person, place, and time. Cranial Nerves: No cranial nerve deficit.    Psychiatric:         Mood and Affect: Mood normal.         Behavior: Behavior normal.         LABS:  CBC:  Lab Results   Component Value Date    WBC 9.9 08/25/2020    RBC 4.35 08/25/2020    HGB 12.4 08/25/2020    HCT 35.8 08/25/2020    MCV 82.3 08/25/2020    MCH 28.5 08/25/2020    MCHC 34.6 08/25/2020    RDW 13.7 08/25/2020     08/25/2020     CBC with Differential:   Lab Results   Component Value Date    WBC 9.9 08/25/2020    RBC 4.35 08/25/2020    HGB 12.4 08/25/2020    HCT 35.8 08/25/2020     08/25/2020    MCV 82.3 08/25/2020    MCH 28.5 08/25/2020    MCHC 34.6 08/25/2020    RDW 13.7 08/25/2020    LYMPHOPCT 8.1 08/25/2020    MONOPCT 7.8 08/25/2020    BASOPCT 0.6 08/25/2020    MONOSABS 0.8 08/25/2020    LYMPHSABS 0.8 08/25/2020    EOSABS 0.2 08/25/2020    BASOSABS 0.1 08/25/2020     CMP:    Lab Results   Component Value Date     08/26/2020    K 3.6 08/26/2020     08/26/2020    CO2 22 08/26/2020    BUN 26 08/26/2020    CREATININE 2.59 08/26/2020    GFRAA 32.1 08/26/2020    LABGLOM 26.6 08/26/2020    GLUCOSE 100 08/26/2020    PROT 6.1 08/25/2020    LABALBU 3.2 08/25/2020    CALCIUM 8.3 08/26/2020    BILITOT 0.4 08/25/2020    ALKPHOS 82 08/25/2020    AST 35 08/25/2020    ALT 36 08/25/2020     BMP:    Lab Results   Component Value Date     08/26/2020    K 3.6 08/26/2020     08/26/2020    CO2 22 08/26/2020    BUN 26 08/26/2020    LABALBU 3.2 08/25/2020    CREATININE 2.59 08/26/2020    CALCIUM 8.3 08/26/2020    GFRAA 32.1 08/26/2020    LABGLOM 26.6 08/26/2020    GLUCOSE 100 08/26/2020     Magnesium:    Lab Results   Component Value Date    MG 2.1 08/26/2020     Troponin:    Lab Results   Component Value Date    TROPONINI 0.029 08/26/2020       Radiology:  Ct Chest Wo Contrast    Result Date: 8/26/2020  HISTORY: Chuckie Mcdaniels is a Male of 52 years age. DIAGNOSIS:  cp/sob COMPARISON: None available. TECHNIQUE: Thin spiral axial CT was performed from the thoracic inlet to the lung bases, without intravenous contrast administration. 2-D reformatted axial, sagittal and coronal images were obtained. 3-D MIPS images were also performed. FINDINGS: Patchy groundglass opacities are seen with air bronchograms. Throughout the lungs. Bilateral small pleural effusions are also seen left slightly greater than right. Slightly enlarged mediastinal lymph nodes are seen in the viola are limited due to the lack of contrast. The ascending aorta measures 4.1 x 4.4 cm at the level of the pulmonary artery. The heart is mildly enlarged. No pericardial effusion is seen. A small hiatal hernia is visualized. Limited survey views of the upper abdomen are unremarkable. Mild degenerative changes are seen in the dorsal spine. 1. Pulmonary edema versus bronchopneumonia. 2. Aneurysmal dilation of the ascending aorta. Mild cardiomegaly. All CT scans at this facility use dose modulation, iterative reconstruction, and/or weight based dosing when appropriate to reduce radiation dose to as low as reasonably achievable. PORTABLE CHEST COMPARISON: August 28, 2019. HISTORY: cp/sob TECHNIQUE: AP view FINDINGS: Patchy airspace disease is seen in the right upper, mid and lower lung fields. It is also seen in the left upper and left mid lung field. The left costophrenic angle is blunted. The cardiac silhouette is borderline. IMPRESSION: Pulmonary edema versus pneumonia. Xr Chest Portable    Result Date: 8/26/2020  HISTORY: Chuckie Mcdaniels is a Male of 52 years age. DIAGNOSIS:  cp/sob COMPARISON: None available. TECHNIQUE: Thin spiral axial CT was performed from the thoracic inlet to the lung bases, without intravenous contrast administration. 2-D reformatted axial, sagittal and coronal images were obtained.  3-D MIPS images were also performed. FINDINGS: Patchy groundglass opacities are seen with air bronchograms. Throughout the lungs. Bilateral small pleural effusions are also seen left slightly greater than right. Slightly enlarged mediastinal lymph nodes are seen in the viola are limited due to the lack of contrast. The ascending aorta measures 4.1 x 4.4 cm at the level of the pulmonary artery. The heart is mildly enlarged. No pericardial effusion is seen. A small hiatal hernia is visualized. Limited survey views of the upper abdomen are unremarkable. Mild degenerative changes are seen in the dorsal spine. 1. Pulmonary edema versus bronchopneumonia. 2. Aneurysmal dilation of the ascending aorta. Mild cardiomegaly. All CT scans at this facility use dose modulation, iterative reconstruction, and/or weight based dosing when appropriate to reduce radiation dose to as low as reasonably achievable. PORTABLE CHEST COMPARISON: August 28, 2019. HISTORY: cp/sob TECHNIQUE: AP view FINDINGS: Patchy airspace disease is seen in the right upper, mid and lower lung fields. It is also seen in the left upper and left mid lung field. The left costophrenic angle is blunted. The cardiac silhouette is borderline. IMPRESSION: Pulmonary edema versus pneumonia. Stress test 10/9/19:  FINDINGS:  The stress and rest images exhibit homogeneous uptake of  tracer throughout the left ventricular myocardium. There is no evidence  of stress-induced reversible perfusion abnormalities to suggest  myocardial ischemia. Gated imaging exhibits normal left ventricular  size and normal wall motion and myocardial thickening. EKG analysis did  not demonstrate ST-segment changes nor arrhythmias concerning for  myocardial ischemia.     LVEF:  50%. TID ratio:  0.95.     IMPRESSION:  1. No evidence of stress-induced myocardial ischemia.   2.  Normal left ventricular systolic function.     Echocardiogram 8/26/19:  Conclusions   Summary   Normal mitral valve twice daily, Imdur 30 mg p.o. daily, lisinopril 5 mg p.o. daily, IV diuresis as tolerated-currently with Lasix 40 mg IV twice daily, sublingual nitroglycerin PRN for chest pain  4. Check echocardiogram  5. Cardiac/less than 2 g sodium diet recommended  6. Check daily weight and strict intake and output  7. Tobacco and alcohol cessation strongly recommended  8. Monitor on telemetry for any tachycardia or bradycardia arrhythmias  9. Maintain potassium greater than 4, magnesium greater than 2  10. GI/DVT prophylaxis  11. Pulmonary recommendations--currently on IV Rocephin and Zithromax for possible pneumonia  12. Internal medicine recommendations  13. Coronary evaluation per Dr. Bella Gleason  14.  Further recommendations to follow            Electronically signed by MICAH Leung on 8/26/2020 at 1:23 PM

## 2020-08-26 NOTE — ACP (ADVANCE CARE PLANNING)
state of health and suddenly became very ill and were unable to breathe on your own, what would your preference be about the use of a ventilator (breathing machine) if it were available to you? \"      Would the patient desire the use of ventilator (breathing machine)?: WOULD NOT ANSWER QUESTION    \"If your health worsens and it becomes clear that your chance of recovery is unlikely, what would your preference be about the use of a ventilator (breathing machine) if it were available to you? \"     Would the patient desire the use of ventilator (breathing machine)?: WOULD NOT ANSWER QUESTION      Resuscitation  \"CPR works best to restart the heart when there is a sudden event, like a heart attack, in someone who is otherwise healthy. Unfortunately, CPR does not typically restart the heart for people who have serious health conditions or who are very sick. \"    \"In the event your heart stopped as a result of an underlying serious health condition, would you want attempts to be made to restart your heart (answer \"yes\" for attempt to resuscitate) or would you prefer a natural death (answer \"no\" for do not attempt to resuscitate)? \" yes      NOTE: If the patient has a valid advance directive AND now provides care preference(s) that are inconsistent with that prior directive, advise the patient to consider either: creating a new advance directive that complies with state-specific requirements; or, if that is not possible, orally revoking that prior directive in accordance with state-specific requirements, which must be documented in the EHR. [] Yes   [] No   Educated Patient / Era Moots regarding differences between Advance Directives and portable DNR orders. Length of ACP Conversation in minutes:      Conversation Outcomes: TOLD  [facility]CUSS VENTILATORS WITH PATIENT. PATIENT FIXATED ON WHAT NEWS REPORTS ARE INDICATING ABOUT VENTILATORS.    [x] ACP discussion completed   [] Existing advance

## 2020-08-26 NOTE — ED TRIAGE NOTES
Patient presents with complaints of chest pain, shortness of breath, and increased swelling to his lower extremities x 3 days.

## 2020-08-26 NOTE — ED NOTES
Blood cultures drawn and sent via tube system with yellow stickers     Cleta Leyden, RN  08/25/20 3953

## 2020-08-26 NOTE — PROGRESS NOTES
Pt stand by assist to bathroom, pt steady but does become a little short of breath with ambulation. O2 sats remain in the mid 90's with ambulation, but resp rate goes into the 30's.

## 2020-08-26 NOTE — ED NOTES
Per supervisor Carlitos Daily, ok to send covid swab as rapid due to not being able to talk to Dr. Michaels Said, RN  08/26/20 8766

## 2020-08-27 ENCOUNTER — APPOINTMENT (OUTPATIENT)
Dept: GENERAL RADIOLOGY | Age: 50
DRG: 194 | End: 2020-08-27
Payer: MEDICAID

## 2020-08-27 LAB
ANION GAP SERPL CALCULATED.3IONS-SCNC: 14 MEQ/L (ref 9–15)
BUN BLDV-MCNC: 26 MG/DL (ref 6–20)
CALCIUM SERPL-MCNC: 8.5 MG/DL (ref 8.5–9.9)
CHLORIDE BLD-SCNC: 102 MEQ/L (ref 95–107)
CHOLESTEROL, TOTAL: 102 MG/DL (ref 0–199)
CO2: 21 MEQ/L (ref 20–31)
CREAT SERPL-MCNC: 2.81 MG/DL (ref 0.7–1.2)
GFR AFRICAN AMERICAN: 29.3
GFR NON-AFRICAN AMERICAN: 24.2
GLUCOSE BLD-MCNC: 91 MG/DL (ref 70–99)
HDLC SERPL-MCNC: 43 MG/DL (ref 40–59)
LDL CHOLESTEROL CALCULATED: 42 MG/DL (ref 0–129)
LV EF: 28 %
LVEF MODALITY: NORMAL
MAGNESIUM: 2.2 MG/DL (ref 1.7–2.4)
POTASSIUM SERPL-SCNC: 3.5 MEQ/L (ref 3.4–4.9)
SARS-COV-2, NAAT: NOT DETECTED
SODIUM BLD-SCNC: 137 MEQ/L (ref 135–144)
TRIGL SERPL-MCNC: 85 MG/DL (ref 0–150)

## 2020-08-27 PROCEDURE — 36415 COLL VENOUS BLD VENIPUNCTURE: CPT

## 2020-08-27 PROCEDURE — 80061 LIPID PANEL: CPT

## 2020-08-27 PROCEDURE — 99231 SBSQ HOSP IP/OBS SF/LOW 25: CPT | Performed by: INTERNAL MEDICINE

## 2020-08-27 PROCEDURE — U0002 COVID-19 LAB TEST NON-CDC: HCPCS

## 2020-08-27 PROCEDURE — 6360000002 HC RX W HCPCS: Performed by: INTERNAL MEDICINE

## 2020-08-27 PROCEDURE — 6370000000 HC RX 637 (ALT 250 FOR IP): Performed by: INTERNAL MEDICINE

## 2020-08-27 PROCEDURE — 99255 IP/OBS CONSLTJ NEW/EST HI 80: CPT | Performed by: INTERNAL MEDICINE

## 2020-08-27 PROCEDURE — 83735 ASSAY OF MAGNESIUM: CPT

## 2020-08-27 PROCEDURE — 80048 BASIC METABOLIC PNL TOTAL CA: CPT

## 2020-08-27 PROCEDURE — 71046 X-RAY EXAM CHEST 2 VIEWS: CPT

## 2020-08-27 PROCEDURE — 93306 TTE W/DOPPLER COMPLETE: CPT

## 2020-08-27 PROCEDURE — 6370000000 HC RX 637 (ALT 250 FOR IP): Performed by: PHYSICIAN ASSISTANT

## 2020-08-27 PROCEDURE — 2580000003 HC RX 258: Performed by: INTERNAL MEDICINE

## 2020-08-27 PROCEDURE — 2060000000 HC ICU INTERMEDIATE R&B

## 2020-08-27 RX ADMIN — CARVEDILOL 25 MG: 25 TABLET, FILM COATED ORAL at 08:28

## 2020-08-27 RX ADMIN — ENOXAPARIN SODIUM 100 MG: 100 INJECTION SUBCUTANEOUS at 08:28

## 2020-08-27 RX ADMIN — AZITHROMYCIN MONOHYDRATE 500 MG: 500 INJECTION, POWDER, LYOPHILIZED, FOR SOLUTION INTRAVENOUS at 02:10

## 2020-08-27 RX ADMIN — CEFTRIAXONE SODIUM 1 G: 1 INJECTION, POWDER, FOR SOLUTION INTRAMUSCULAR; INTRAVENOUS at 01:23

## 2020-08-27 RX ADMIN — FUROSEMIDE 40 MG: 10 INJECTION, SOLUTION INTRAMUSCULAR; INTRAVENOUS at 16:26

## 2020-08-27 RX ADMIN — Medication 10 ML: at 08:29

## 2020-08-27 RX ADMIN — FUROSEMIDE 40 MG: 10 INJECTION, SOLUTION INTRAMUSCULAR; INTRAVENOUS at 08:28

## 2020-08-27 RX ADMIN — HYDRALAZINE HYDROCHLORIDE 25 MG: 25 TABLET, FILM COATED ORAL at 06:43

## 2020-08-27 RX ADMIN — Medication 10 ML: at 22:10

## 2020-08-27 RX ADMIN — ISOSORBIDE MONONITRATE 30 MG: 30 TABLET, EXTENDED RELEASE ORAL at 08:28

## 2020-08-27 RX ADMIN — ENOXAPARIN SODIUM 100 MG: 100 INJECTION SUBCUTANEOUS at 21:30

## 2020-08-27 RX ADMIN — HYDRALAZINE HYDROCHLORIDE 25 MG: 25 TABLET, FILM COATED ORAL at 21:30

## 2020-08-27 RX ADMIN — LISINOPRIL 5 MG: 5 TABLET ORAL at 08:28

## 2020-08-27 ASSESSMENT — ENCOUNTER SYMPTOMS
BLOOD IN STOOL: 0
COUGH: 0
NAUSEA: 0
VOMITING: 0
SHORTNESS OF BREATH: 1
DIARRHEA: 0

## 2020-08-27 ASSESSMENT — PAIN SCALES - GENERAL
PAINLEVEL_OUTOF10: 0

## 2020-08-27 NOTE — PROGRESS NOTES
Keith Rust is a 52 y.o. male patient.   Pt was seen and evaluated, feeling better compare to before  Current Facility-Administered Medications   Medication Dose Route Frequency Provider Last Rate Last Dose    sodium chloride flush 0.9 % injection 10 mL  10 mL Intravenous 2 times per day Emiliana Ritchie MD   10 mL at 08/27/20 0829    sodium chloride flush 0.9 % injection 10 mL  10 mL Intravenous PRN Emiliana Ritchie MD        acetaminophen (TYLENOL) tablet 650 mg  650 mg Oral Q6H PRN Emiliana Ritchie MD   650 mg at 08/26/20 4232    Or    acetaminophen (TYLENOL) suppository 650 mg  650 mg Rectal Q6H PRN Emiliana Ritchie MD        polyethylene glycol (GLYCOLAX) packet 17 g  17 g Oral Daily PRN Emiliana Ritchie MD        promethazine (PHENERGAN) tablet 12.5 mg  12.5 mg Oral Q6H PRN Emiliana Ritchie MD        Or    ondansetron [facility]) injection 4 mg  4 mg Intravenous Q6H PRN Emiliana Ritchie MD        enoxaparin (LOVENOX) injection 100 mg  1 mg/kg Subcutaneous BID Emiliana Ritchie MD   100 mg at 08/27/20 0828    lisinopril (PRINIVIL;ZESTRIL) tablet 5 mg  5 mg Oral Daily Emiliana Ritchie MD   5 mg at 08/27/20 0768    furosemide (LASIX) injection 40 mg  40 mg Intravenous BID Emiliana Ritchie MD   40 mg at 08/27/20 2135    hydrALAZINE (APRESOLINE) injection 10 mg  10 mg Intravenous Q4H PRN Emiliana Ritchie MD   10 mg at 08/26/20 0523    cefTRIAXone (ROCEPHIN) 1 g IVPB in 50 mL D5W minibag  1 g Intravenous Q24H Emiliana Ritchie MD   Stopped at 08/27/20 0200    carvedilol (COREG) tablet 25 mg  25 mg Oral BID  MICAH Carr   25 mg at 08/27/20 1190    hydrALAZINE (APRESOLINE) tablet 25 mg  25 mg Oral 3 times per day MICAH Carr   25 mg at 08/27/20 0520    isosorbide mononitrate (IMDUR) extended release tablet 30 mg  30 mg Oral Daily Edith Montenegro, [address]   30 mg at 08/27/20 0828    nitroGLYCERIN (NITROSTAT) SL tablet 0.4 mg  0.4 mg Sublingual Q5 Min COREYN Bozena Cooper PA-C   0.4 mg at 08/26/20 2857 No Known Allergies  Active Problems:    Acute decompensated heart failure (HCC)    Chest pain  Resolved Problems:    * No resolved hospital problems. *    Blood pressure 105/81, pulse 94, temperature 98 °F (36.7 °C), temperature source Oral, resp. rate 18, height 6' (1.829 m), weight 212 lb 11.9 oz (96.5 kg), SpO2 96 %. Subjective:  Symptoms:  He reports shortness of breath, malaise and weakness. No cough, chest pain, headache, chest pressure, anorexia, diarrhea or anxiety. Diet:  No nausea or vomiting. Objective:  General Appearance: In no acute distress. Vital signs: (most recent): Blood pressure 105/81, pulse 94, temperature 98 °F (36.7 °C), temperature source Oral, resp. rate 18, height 6' (1.829 m), weight 212 lb 11.9 oz (96.5 kg), SpO2 96 %. HEENT: Normal HEENT exam.    Lungs:  Normal effort. Heart: Normal rate. Regular rhythm. S1 normal and S2 normal.    Abdomen: Abdomen is soft. There is no epigastric area tenderness. Neurological: Patient is alert and oriented to person, place and time. Pupils:  Pupils are equal, round, and reactive to light. Skin:  Warm and dry.       Lab Results   Component Value Date    WBC 9.9 08/25/2020    HGB 12.4 (L) 08/25/2020    HCT 35.8 (L) 08/25/2020    MCV 82.3 08/25/2020     (H) 08/25/2020     Lab Results   Component Value Date     08/27/2020    K 3.5 08/27/2020     08/27/2020    CO2 21 08/27/2020    BUN 26 08/27/2020    CREATININE 2.81 08/27/2020    GLUCOSE 91 08/27/2020    CALCIUM 8.5 08/27/2020        Assessment & Plan  1) acute respiratory failure with hypoxia  2) acute systolic HF  3) r/o PNa  4) elevated d/dimer CC to address  5) CKD stage 4  Monitor renal function  C/w IV abx  C/w oxygen therapy to keep O2 sat> 90  C/w IV lasix  covid negative    Terrance Ganser, MD  8/27/2020

## 2020-08-27 NOTE — PROGRESS NOTES
Progress Note  Patient: Frannie Buckner  Unit/Bed: A223/N003-81  YOB: 1970  MRN: [id number]  Acct: [de-identified]   Admitting Diagnosis: Acute decompensated heart failure [facility]) [I50.9]  Date:  8/25/2020  Hospital Day: 1    Chief Complaint:  Shortness of breath    Subjective  COVID negative. Shortness of breath somewhat improved. LV ejection fraction 40%. Has other issues including chronic kidney disease    Review of Systems:   Review of Systems   Constitutional: Negative for diaphoresis. HENT: Negative for nosebleeds. Cardiovascular: Negative for chest pain, palpitations and leg swelling. Gastrointestinal: Negative for blood in stool, nausea and vomiting. Musculoskeletal: Negative for myalgias. Neurological: Negative for dizziness, seizures, weakness and headaches. Psychiatric/Behavioral: The patient is not nervous/anxious. Physical Examination:    /81   Pulse 94   Temp 98 °F (36.7 °C) (Oral)   Resp 18   Ht 6' (1.829 m)   Wt 212 lb 11.9 oz (96.5 kg)   SpO2 96%   BMI 28.85 kg/m²    Physical Exam  Constitutional:       Appearance: He is well-developed. Cardiovascular:      Rate and Rhythm: Normal rate and regular rhythm. Heart sounds: Normal heart sounds. No murmur. No friction rub. No gallop. Pulmonary:      Effort: Pulmonary effort is normal. No respiratory distress. Breath sounds: Normal breath sounds. No wheezing or rales. Chest:      Chest wall: No tenderness. Musculoskeletal: Normal range of motion. General: No tenderness or deformity. Skin:     General: Skin is warm and dry. Findings: No erythema or rash. Neurological:      Mental Status: He is alert and oriented to person, place, and time. Cranial Nerves: No cranial nerve deficit. Motor: No abnormal muscle tone. Coordination: Coordination normal.      Deep Tendon Reflexes: Reflexes are normal and symmetric.  Reflexes normal.   Psychiatric:         Behavior: Behavior normal.         Thought Content: Thought content normal.         Judgment: Judgment normal.         LABS:  CBC:   Lab Results   Component Value Date    WBC 9.9 08/25/2020    RBC 4.35 08/25/2020    HGB 12.4 08/25/2020    HCT 35.8 08/25/2020    MCV 82.3 08/25/2020    MCH 28.5 08/25/2020    MCHC 34.6 08/25/2020    RDW 13.7 08/25/2020     08/25/2020     CBC with Differential:   Lab Results   Component Value Date    WBC 9.9 08/25/2020    RBC 4.35 08/25/2020    HGB 12.4 08/25/2020    HCT 35.8 08/25/2020     08/25/2020    MCV 82.3 08/25/2020    MCH 28.5 08/25/2020    MCHC 34.6 08/25/2020    RDW 13.7 08/25/2020    LYMPHOPCT 8.1 08/25/2020    MONOPCT 7.8 08/25/2020    BASOPCT 0.6 08/25/2020    MONOSABS 0.8 08/25/2020    LYMPHSABS 0.8 08/25/2020    EOSABS 0.2 08/25/2020    BASOSABS 0.1 08/25/2020     CMP:    Lab Results   Component Value Date     08/27/2020    K 3.5 08/27/2020     08/27/2020    CO2 21 08/27/2020    BUN 26 08/27/2020    CREATININE 2.81 08/27/2020    GFRAA 29.3 08/27/2020    LABGLOM 24.2 08/27/2020    GLUCOSE 91 08/27/2020    PROT 6.1 08/25/2020    LABALBU 3.2 08/25/2020    CALCIUM 8.5 08/27/2020    BILITOT 0.4 08/25/2020    ALKPHOS 82 08/25/2020    AST 35 08/25/2020    ALT 36 08/25/2020     BMP:    Lab Results   Component Value Date     08/27/2020    K 3.5 08/27/2020     08/27/2020    CO2 21 08/27/2020    BUN 26 08/27/2020    LABALBU 3.2 08/25/2020    CREATININE 2.81 08/27/2020    CALCIUM 8.5 08/27/2020    GFRAA 29.3 08/27/2020    LABGLOM 24.2 08/27/2020    GLUCOSE 91 08/27/2020     Magnesium:    Lab Results   Component Value Date    MG 2.2 08/27/2020     Troponin:    Lab Results   Component Value Date    TROPONINI 0.029 08/26/2020       Radiology:  Xr Chest (2 Vw)    Result Date: 8/27/2020  EXAMINATION: XR CHEST (2 VW)  CLINICAL HISTORY:  Pulmonary edema versus pneumonia COMPARISONS: August 25, 2020 2238 hours  FINDINGS: Two views of the chest are submitted.   The the right upper, mid and lower lung fields. It is also seen in the left upper and left mid lung field. The left costophrenic angle is blunted. The cardiac silhouette is borderline. IMPRESSION: Pulmonary edema versus pneumonia. Xr Chest Portable    Result Date: 8/26/2020  HISTORY: Florentin Scott is a Male of 52 years age. DIAGNOSIS:  cp/sob COMPARISON: None available. TECHNIQUE: Thin spiral axial CT was performed from the thoracic inlet to the lung bases, without intravenous contrast administration. 2-D reformatted axial, sagittal and coronal images were obtained. 3-D MIPS images were also performed. FINDINGS: Patchy groundglass opacities are seen with air bronchograms. Throughout the lungs. Bilateral small pleural effusions are also seen left slightly greater than right. Slightly enlarged mediastinal lymph nodes are seen in the viola are limited due to the lack of contrast. The ascending aorta measures 4.1 x 4.4 cm at the level of the pulmonary artery. The heart is mildly enlarged. No pericardial effusion is seen. A small hiatal hernia is visualized. Limited survey views of the upper abdomen are unremarkable. Mild degenerative changes are seen in the dorsal spine. 1. Pulmonary edema versus bronchopneumonia. 2. Aneurysmal dilation of the ascending aorta. Mild cardiomegaly. All CT scans at this facility use dose modulation, iterative reconstruction, and/or weight based dosing when appropriate to reduce radiation dose to as low as reasonably achievable. PORTABLE CHEST COMPARISON: August 28, 2019. HISTORY: cp/sob TECHNIQUE: AP view FINDINGS: Patchy airspace disease is seen in the right upper, mid and lower lung fields. It is also seen in the left upper and left mid lung field. The left costophrenic angle is blunted. The cardiac silhouette is borderline. IMPRESSION: Pulmonary edema versus pneumonia.         EKG:      Assessment:    Active Hospital Problems    Diagnosis Date Noted    Acute decompensated heart

## 2020-08-27 NOTE — CONSULTS
Pulmonary Medicine  Consult Note      Reason for consultation: Bronchopneumonia versus pulmonary edema    Consulting physician: Dr. Jesusita Gallardo:    This is 56-year-old male with history of combined systolic and diastolic heart failure with EF 40%, alcohol abuse, tobacco abuse was presented with increased shortness of breath. He was having short of breath for last 4 days worse on lying flat and any exertion. He has been coughing dark brown mucus. He has no fever or chills. He also has some increased swelling in her lower extremity. He was not taking any medication. He is following with cardiology for heart failure. He denies having any chest pain pleuritic pain. He denies any nausea vomiting or diarrhea. He said he is feeling  dizzy when standing up. Shayy Sleet He is still on 3 L O2 via nasal cannula and his O2 saturation is 96%. He had CT chest done which shows pulmonary edema versus bronchopneumonia. Aneurysmal dilatation of ascending aorta mild cardiomegaly. Past Medical History:        Diagnosis Date    Abnormal EKG 9/27/2019    Cardiomyopathy [facility])     per echo 8/2019    Chronic combined systolic and diastolic CHF (congestive heart failure) ([facility] [address] 9/27/2019    ETOH abuse     Hypertension     Tobacco abuse        Past Surgical History:    No past surgical history on file. Social History:     reports that he has been smoking cigarettes. He has been smoking about 1.00 pack per day. He has never used smokeless tobacco. He reports current alcohol use. He reports that he does not use drugs. Family History:       Problem Relation Age of Onset    Coronary Art Dis Mother        Allergies:  Patient has no known allergies.         MEDICATIONS during current hospitalization:    Continuous Infusions:    Scheduled Meds:   sodium chloride flush  10 mL Intravenous 2 times per day    enoxaparin  1 mg/kg Subcutaneous BID    lisinopril  5 mg Oral Daily    furosemide  40 mg last 72 hours. O2 Device: Nasal cannula  O2 Flow Rate (L/min): 3 L/min  [facility]: [facility]    Radiology  Ct Chest Wo Contrast    Result Date: 8/26/2020  HISTORY: Allison Garcia is a Male of 52 years age. DIAGNOSIS:  cp/sob COMPARISON: None available. TECHNIQUE: Thin spiral axial CT was performed from the thoracic inlet to the lung bases, without intravenous contrast administration. 2-D reformatted axial, sagittal and coronal images were obtained. 3-D MIPS images were also performed. FINDINGS: Patchy groundglass opacities are seen with air bronchograms. Throughout the lungs. Bilateral small pleural effusions are also seen left slightly greater than right. Slightly enlarged mediastinal lymph nodes are seen in the viola are limited due to the lack of contrast. The ascending aorta measures 4.1 x 4.4 cm at the level of the pulmonary artery. The heart is mildly enlarged. No pericardial effusion is seen. A small hiatal hernia is visualized. Limited survey views of the upper abdomen are unremarkable. Mild degenerative changes are seen in the dorsal spine. 1. Pulmonary edema versus bronchopneumonia. 2. Aneurysmal dilation of the ascending aorta. Mild cardiomegaly. All CT scans at this facility use dose modulation, iterative reconstruction, and/or weight based dosing when appropriate to reduce radiation dose to as low as reasonably achievable. PORTABLE CHEST COMPARISON: August 28, 2019. HISTORY: cp/sob TECHNIQUE: AP view FINDINGS: Patchy airspace disease is seen in the right upper, mid and lower lung fields. It is also seen in the left upper and left mid lung field. The left costophrenic angle is blunted. The cardiac silhouette is borderline. IMPRESSION: Pulmonary edema versus pneumonia. Xr Chest Portable    Result Date: 8/26/2020  HISTORY: Allison Garcia is a Male of 52 years age. DIAGNOSIS:  cp/sob COMPARISON: None available.  TECHNIQUE: Thin spiral axial CT was performed from the thoracic inlet to the lung bases, without intravenous contrast administration. 2-D reformatted axial, sagittal and coronal images were obtained. 3-D MIPS images were also performed. FINDINGS: Patchy groundglass opacities are seen with air bronchograms. Throughout the lungs. Bilateral small pleural effusions are also seen left slightly greater than right. Slightly enlarged mediastinal lymph nodes are seen in the viola are limited due to the lack of contrast. The ascending aorta measures 4.1 x 4.4 cm at the level of the pulmonary artery. The heart is mildly enlarged. No pericardial effusion is seen. A small hiatal hernia is visualized. Limited survey views of the upper abdomen are unremarkable. Mild degenerative changes are seen in the dorsal spine. 1. Pulmonary edema versus bronchopneumonia. 2. Aneurysmal dilation of the ascending aorta. Mild cardiomegaly. All CT scans at this facility use dose modulation, iterative reconstruction, and/or weight based dosing when appropriate to reduce radiation dose to as low as reasonably achievable. PORTABLE CHEST COMPARISON: August 28, 2019. HISTORY: cp/sob TECHNIQUE: AP view FINDINGS: Patchy airspace disease is seen in the right upper, mid and lower lung fields. It is also seen in the left upper and left mid lung field. The left costophrenic angle is blunted. The cardiac silhouette is borderline. IMPRESSION: Pulmonary edema versus pneumonia. Assessment and  plan:     1. Acute systolic heart failure with pulmonary edema  2. Cardiomyopathy with EF 40%  3. Rule out pneumonia, clinically less likely  4. Acute respiratory failure with hypoxia secondary to above  5. Chronic kidney disease  6. EtOH abuse  7. Tobacco abuse  8. Hypertension  9. Ascending aortic aneurysm 4.1 x 4.4 cm    Cardiology is following, chest x-ray and CT chest reviewed most likely  Acute pulmonary edema causing acute respiratory failure with hypoxia. Shortness of breath is significantly better after IV diuresis.   He is having cough with dark mucus though he has no leukocytosis no fever. Pneumonia cannot be ruled out but clinically less likely. He is on IV Rocephin 1 g every 24 hourly. Will repeat chest x-ray if shows improvement may change antibiotic to p.o. Procalcitonin only slightly elevated to 0.17. COVID-19 test is negative. Discussed with Dr. Sydney Cali will repeat chest x-ray.   Will follow    Thank you for this consult    Electronically signed by Nilsa Santamaria MD, [facility] on 8/27/2020 at 10:25 AM

## 2020-08-28 LAB
ANION GAP SERPL CALCULATED.3IONS-SCNC: 12 MEQ/L (ref 9–15)
BUN BLDV-MCNC: 27 MG/DL (ref 6–20)
CALCIUM SERPL-MCNC: 8.6 MG/DL (ref 8.5–9.9)
CHLORIDE BLD-SCNC: 106 MEQ/L (ref 95–107)
CO2: 23 MEQ/L (ref 20–31)
CREAT SERPL-MCNC: 2.98 MG/DL (ref 0.7–1.2)
GFR AFRICAN AMERICAN: 27.3
GFR NON-AFRICAN AMERICAN: 22.6
GLUCOSE BLD-MCNC: 90 MG/DL (ref 70–99)
MAGNESIUM: 2.3 MG/DL (ref 1.7–2.4)
POTASSIUM SERPL-SCNC: 3.9 MEQ/L (ref 3.4–4.9)
SODIUM BLD-SCNC: 141 MEQ/L (ref 135–144)

## 2020-08-28 PROCEDURE — 6370000000 HC RX 637 (ALT 250 FOR IP): Performed by: PHYSICIAN ASSISTANT

## 2020-08-28 PROCEDURE — 6360000002 HC RX W HCPCS: Performed by: INTERNAL MEDICINE

## 2020-08-28 PROCEDURE — 80048 BASIC METABOLIC PNL TOTAL CA: CPT

## 2020-08-28 PROCEDURE — 2060000000 HC ICU INTERMEDIATE R&B

## 2020-08-28 PROCEDURE — 6370000000 HC RX 637 (ALT 250 FOR IP): Performed by: INTERNAL MEDICINE

## 2020-08-28 PROCEDURE — 2700000000 HC OXYGEN THERAPY PER DAY

## 2020-08-28 PROCEDURE — 83735 ASSAY OF MAGNESIUM: CPT

## 2020-08-28 PROCEDURE — APPNB30 APP NON BILLABLE TIME 0-30 MINS: Performed by: PHYSICIAN ASSISTANT

## 2020-08-28 PROCEDURE — 99232 SBSQ HOSP IP/OBS MODERATE 35: CPT | Performed by: INTERNAL MEDICINE

## 2020-08-28 PROCEDURE — 2580000003 HC RX 258: Performed by: INTERNAL MEDICINE

## 2020-08-28 PROCEDURE — 36415 COLL VENOUS BLD VENIPUNCTURE: CPT

## 2020-08-28 RX ORDER — CEFUROXIME AXETIL 500 MG/1
500 TABLET ORAL 2 TIMES DAILY
Qty: 14 TABLET | Refills: 0 | Status: SHIPPED | OUTPATIENT
Start: 2020-08-28 | End: 2020-08-29 | Stop reason: HOSPADM

## 2020-08-28 RX ORDER — ASPIRIN 81 MG/1
81 TABLET, CHEWABLE ORAL DAILY
Status: DISCONTINUED | OUTPATIENT
Start: 2020-08-28 | End: 2020-08-30 | Stop reason: HOSPADM

## 2020-08-28 RX ADMIN — Medication 10 ML: at 07:57

## 2020-08-28 RX ADMIN — ENOXAPARIN SODIUM 90 MG: 100 INJECTION SUBCUTANEOUS at 20:47

## 2020-08-28 RX ADMIN — ASPIRIN 81 MG: 81 TABLET, CHEWABLE ORAL at 12:01

## 2020-08-28 RX ADMIN — ISOSORBIDE MONONITRATE 30 MG: 30 TABLET, EXTENDED RELEASE ORAL at 07:56

## 2020-08-28 RX ADMIN — CEFTRIAXONE SODIUM 1 G: 1 INJECTION, POWDER, FOR SOLUTION INTRAMUSCULAR; INTRAVENOUS at 01:10

## 2020-08-28 RX ADMIN — Medication 10 ML: at 20:47

## 2020-08-28 RX ADMIN — HYDRALAZINE HYDROCHLORIDE 25 MG: 25 TABLET, FILM COATED ORAL at 12:01

## 2020-08-28 RX ADMIN — LISINOPRIL 5 MG: 5 TABLET ORAL at 07:56

## 2020-08-28 RX ADMIN — ENOXAPARIN SODIUM 90 MG: 100 INJECTION SUBCUTANEOUS at 08:30

## 2020-08-28 RX ADMIN — FUROSEMIDE 40 MG: 10 INJECTION, SOLUTION INTRAMUSCULAR; INTRAVENOUS at 16:57

## 2020-08-28 RX ADMIN — CARVEDILOL 25 MG: 25 TABLET, FILM COATED ORAL at 07:56

## 2020-08-28 RX ADMIN — HYDRALAZINE HYDROCHLORIDE 25 MG: 25 TABLET, FILM COATED ORAL at 06:08

## 2020-08-28 RX ADMIN — CARVEDILOL 25 MG: 25 TABLET, FILM COATED ORAL at 16:56

## 2020-08-28 RX ADMIN — HYDRALAZINE HYDROCHLORIDE 25 MG: 25 TABLET, FILM COATED ORAL at 20:47

## 2020-08-28 RX ADMIN — FUROSEMIDE 40 MG: 10 INJECTION, SOLUTION INTRAMUSCULAR; INTRAVENOUS at 07:57

## 2020-08-28 ASSESSMENT — PAIN DESCRIPTION - PAIN TYPE
TYPE: ACUTE PAIN;CHRONIC PAIN

## 2020-08-28 ASSESSMENT — PAIN DESCRIPTION - LOCATION
LOCATION: GENERALIZED

## 2020-08-28 ASSESSMENT — ENCOUNTER SYMPTOMS
COLOR CHANGE: 0
SHORTNESS OF BREATH: 0
CHEST TIGHTNESS: 0
ABDOMINAL PAIN: 0
VOMITING: 0
NAUSEA: 0

## 2020-08-28 ASSESSMENT — PAIN SCALES - GENERAL
PAINLEVEL_OUTOF10: 0

## 2020-08-28 NOTE — DISCHARGE SUMMARY
Physician Discharge Summary     Patient ID:  Justin Collins  [id number]  51 y.o.  1970    Admit date: 8/25/2020    Discharge date and time: 8//30-/20    Admitting Physician: Jada Geronimo MD     Discharge Physician: [facility]    Admission Diagnoses: Acute decompensated heart failure ([facility] [address] [I50.9]    Discharge Diagnoses: acute systolic HF  PNA  CKD stage 4  BLUE  Past Medical History:   Diagnosis Date    Abnormal EKG 9/27/2019    Cardiomyopathy ([facility] [address]     per echo 8/2019    Chronic combined systolic and diastolic CHF (congestive heart failure) ([facility] [address] 9/27/2019    ETOH abuse     Hypertension     Tobacco abuse          Admission Condition: fair    Discharged Condition: stable  Indication for Admission: Acute respiratory failure  Hospital Course: Patient transferred to ICU for acute respiratory failure and acute systolic HF. Improved with IV Lasix. Patient had a questionable pneumonia was started on IV antibiotics. Chest imaging showed pulmonary edema. Patient has history of alcohol abuse and tobacco use counseling was given  Pulmonary edema versus bronchopneumonia.         2. Aneurysmal dilation of the ascending aorta.  Mild cardiomegaly.             Consults: Renal, cardiology, pulmonary    Significant Diagnostic Studies:   Lab Results   Component Value Date    WBC 9.9 08/25/2020    HGB 12.4 (L) 08/25/2020    HCT 35.8 (L) 08/25/2020    MCV 82.3 08/25/2020     (H) 08/25/2020     Lab Results   Component Value Date     08/28/2020    K 3.9 08/28/2020     08/28/2020    CO2 23 08/28/2020    BUN 27 08/28/2020    CREATININE 2.98 08/28/2020    GLUCOSE 90 08/28/2020    CALCIUM 8.6 08/28/2020          Treatments:   Current Facility-Administered Medications   Medication Dose Route Frequency Provider Last Rate Last Dose    enoxaparin (LOVENOX) injection 90 mg  1 mg/kg Subcutaneous BID Jada Geronimo MD   90 mg at 08/28/20 0830    sodium chloride flush 0.9 % injection 10 mL  10 mL Intravenous 2 times per day Ken Hager MD   10 mL at 08/28/20 0757    sodium chloride flush 0.9 % injection 10 mL  10 mL Intravenous PRN Ken Hager MD        acetaminophen (TYLENOL) tablet 650 mg  650 mg Oral Q6H PRN Ken Hager MD   650 mg at 08/26/20 7071    Or    acetaminophen (TYLENOL) suppository 650 mg  650 mg Rectal Q6H PRN Ken Hager MD        polyethylene glycol (GLYCOLAX) packet 17 g  17 g Oral Daily PRN Ken Hager MD        promethazine (PHENERGAN) tablet 12.5 mg  12.5 mg Oral Q6H PRN Ken Hager MD        Or    [facility] injection 4 mg  4 mg Intravenous Q6H PRN Ken Hager MD        lisinopril (PRINIVIL;ZESTRIL) tablet 5 mg  5 mg Oral Daily Ken Hager MD   5 mg at 08/28/20 0756    furosemide (LASIX) injection 40 mg  40 mg Intravenous BID Ken Hager MD   40 mg at 08/28/20 0757    hydrALAZINE (APRESOLINE) injection 10 mg  10 mg Intravenous Q4H PRN Ken Hager MD   10 mg at 08/26/20 0523    cefTRIAXone (ROCEPHIN) 1 g IVPB in 50 mL D5W minibag  1 g Intravenous Q24H Ken Hager MD   Stopped at 08/28/20 0145    carvedilol (COREG) tablet 25 mg  25 mg Oral BID WC MICAH Yao   25 mg at 08/28/20 0756    hydrALAZINE (APRESOLINE) tablet 25 mg  25 mg Oral 3 times per day MICAH Yao   25 mg at 08/28/20 5364    isosorbide mononitrate (IMDUR) extended release tablet 30 mg  30 mg Oral Daily David Yao   30 mg at 08/28/20 0756    nitroGLYCERIN (NITROSTAT) SL tablet 0.4 mg  0.4 mg Sublingual Q5 Min PRN Rufina Calzada PA-C   0.4 mg at 08/26/20 3862       Discharge Exam:  HEENT: AT/NC, PERRLA, no JVD  HEART: s1/s2 wnl w/o s3  LUNG: clear  ABD: soft, NT  EXT: no edema  SKin : no rash  Neuro:no focal deficits      Disposition:HOME    Patient Instructions:   [unfilled]  Activity: as tolerated  Diet: cardiac diet    Follow-up with PCP in 1 week  Overtime on dc summary was 45 min  [facility] vascular as outpt for [facility]  Signed:  Isabel Galvin Nataliia Sosa  8/28/2020  9:40 AM

## 2020-08-28 NOTE — CARE COORDINATION
Care Transition Nurse met with patient to review CHF booklet and zone sheet. Discussed CHF, symptoms to monitor, daily weights, BP management, and medication compliance. Discussed importance of weighing self daily and phoning cardiologist for weight gain of 3 or more pounds in 1-7 days, Patient states he does not have a scale , will inquire if CHF clinic can provide one for patient, encouraged him to weigh himself daily, discussed importance of early symptom management in preventing hospitalization and stressed importance of daily weights as an early indicator of fluid overload, suggested he weigh himself daily in AM and keep log to note fluctuations or increases over 7 day period. Advised he monitor BP at home, he states he has BP cuff at home, stressed calling clinician with abnormal readings, either high or low. . Reminded to monitor for symptoms in yellow zone including sob, edema, increased fatigue, weight gain,difficulty laying flat or needing more pillows to sleep, increased abdominal girth, and decreased appetite and advised he should contact clinician promptly with any of these symptoms. Educated on importance of following a low sodium diet, reminded that processed and canned foods are higher in sodium content, encouraged him to read food labels for sodium content and not consume more than 2000 mg per day,suggested he review nutrition section of CHF book for healthier alternatives for low sodium diet. Pt states his girlfriend does the cooking and he will share nutrition information with her, dietary also meeting with patient to review sodium restrictions. Pt states he does not have any issues with obtaining prescription medications, encouraged patient to use pill organizer to help with medications regimen.  Pt does admit to drinking alcohol a few times a week and smoking cigarettes, advised on eliminating use of alcohol, encouraged pt to consider a support program, will give pt information on Let's get real prior to discharge. Stressed need to limit fluid intake to 8 glasses a day, unless otherwise instructed by clinician, advised patient on smoking cessation and made him aware of free smoking cessation product through [facility]. Pt states he will keep information for reference, but he is not sure if he can quit smoking altogether, states he does plan on cutting down. Stressed importance of follow up with clinician and of phoning physician promptly for symptoms in the yellow zone and EMS for symptoms in the red zone. Per Dr. Sreedhar Jones, Pt will need heart cath prior to discharge. Patient voiced understanding of teaching, but could use reinforcement of educational material as this is a new diagnosis, [facility] to follow at discharge for disease management.

## 2020-08-28 NOTE — CONSULTS
[address]                      [facility], [address] [facility]                                  CONSULTATION    PATIENT NAME: Francois Landeros                     :        1970  MED REC NO:   [id number]                            ROOM:       [id number]  ACCOUNT NO:   [de-identified]                           ADMIT DATE: 2020  PROVIDER:     Shaw Hallman DO    CONSULT DATE:  2020    RENAL CONSULT    HISTORY OF PRESENT ILLNESS:  A 72-year-old black male admitted to the  hospital with leg swelling and shortness of breath. The patient has not  been taking his medications for a number of days. On conversation, he  appears to be quite noncompliant. He has a significant alcohol use  history. He has known combined systolic and diastolic heart failure  with the ejection fractions listed between 40% and 45%. On admission, he was short of breath and had swelling of his legs. He  denied any nausea, vomiting, or diarrhea. He denied any chest pain or  pressure. He is being seen for decreased GFR and elevated serum  creatinine. On admission to the hospital, the patient had a serum creatinine of 2.6  with a GFR of 31 and now on the day of evaluation, serum creatinine was  2.9 with a GFR of 27 at his baseline, reviewing lab for six months  prior. The patient denies any gross hematuria, urinary tract  infections, or dysuria. PAST MEDICAL HISTORY:  1. Hypertension. 2.  Systolic and diastolic heart failure. 3.  Cardiomyopathy. 4.  CKD IV. PAST SURGICAL HISTORY:  Negative. HABITS:  Pack a day of cigarette smoking; alcohol, in the past has used  8 to 12 beers a day; and denies any street drugs. MEDICATIONS:  At the time of admission; Norvasc, Klor-Con, Coreg,  aspirin, hydralazine, Imdur. ALLERGIES TO MEDICATIONS:  None. PHYSICAL EXAMINATION:  VITAL SIGNS:  Height 6 feet, 199 pounds.   Blood pressure is 130/90,  heart rate 90, respirations 18,

## 2020-08-28 NOTE — CONSULTS
Renal consult dictated    CKD-4 stable  OHDx CHF systolic//diastolic  Hx ETOH abuse  Hypertension      Plan needs home health  Maintain compliance told patient  Avoid ETOH  And no NSAIDS   Follow with primary doctor Scr   Compliance will be an issue unless he has an ephphony

## 2020-08-28 NOTE — PROGRESS NOTES
INPATIENT PROGRESS NOTES    PATIENT NAME: Boy Gibson  MRN: [id number]  SERVICE DATE:  August 28, 2020   SERVICE TIME:  4:49 PM      PRIMARY SERVICE: Pulmonary Disease    CHIEF COMPLAIN: Pulmonary edema, respiratory failure      INTERVAL HPI: Patient seen and examined at bedside, Interval Notes, orders reviewed. Nursing notes noted  Patient is feeling much better today. On 3 L O2 via nasal cannula oxygen saturation 100%. Denies any short of breath. He has some cough but not bringing mucus out. No chest pain or pleuritic pain. He is sitting in a chair near window. No complaint of palpitation    OBJECTIVE    Body mass index is 27.06 kg/m². PHYSICAL EXAM:  Vitals:  /67   Pulse 143   Temp 97.3 °F (36.3 °C) (Oral)   Resp 18   Ht 6' (1.829 m)   Wt 199 lb 8 oz (90.5 kg)   SpO2 100%   BMI 27.06 kg/m²   General: Alert, awake . comfortable in bed, No distress. Head: Atraumatic , Normocephalic   Eyes: PERRL. No sclera icterus. No conjunctival injection. No discharge   ENT: No nasal  discharge. Pharynx clear. lips, teeth, mucosa and gums are normal, tongue protrudes in the midline  Neck:  Trachea midline. No thyromegaly, no JVD, No cervical adenopathy. Chest : Bilaterally symmetrical ,Normal effort,  No accessory muscle use  Lung : . Fair BS bilateral, decreased BS at bases. No Rales. No wheezing. No rhonchi. Heart[de-identified] Tachycardia, regular rhythm. No mumur ,  Rub or gallop  ABD: Non-tender. Non-distended. No masses. No organmegaly. Normal bowel sounds. No hernia.   Ext : No Pitting both leg , No Cyanosis No clubbing  Neuro: no focal weakness          DATA:   Recent Labs     08/25/20 2215   WBC 9.9   HGB 12.4*   HCT 35.8*   MCV 82.3   *     Recent Labs     08/25/20 2215  08/27/20  0621 08/28/20  0601      < > 137 141   K 3.5   < > 3.5 3.9      < > 102 106   CO2 22   < > 21 23   BUN 28*   < > 26* 27*   CREATININE 2.67*   < > 2.81* 2.98*   GLUCOSE 102*   < > 91 90   CALCIUM 8.6   < > 8.5 8.6   PROT 6.1*  --   --   --    LABALBU 3.2*  --   --   --    BILITOT 0.4  --   --   --    ALKPHOS 82  --   --   --    AST 35  --   --   --    ALT 36  --   --   --    LABGLOM 25.6*   < > 24.2* 22.6*   GFRAA 31.0*   < > 29.3* 27.3*   GLOB 2.9  --   --   --     < > = values in this interval not displayed. MV Settings:          No results for input(s): PHART, TWC0UXZ, PO2ART, BPL9SLD, BEART, B2GQXZVD in the last 72 hours. O2 Device: Nasal cannula  O2 Flow Rate (L/min): 3 L/min    DIET LOW SODIUM 2 GM;     MEDICATIONS during current hospitalization:    Continuous Infusions:    Scheduled Meds:   enoxaparin  1 mg/kg Subcutaneous BID    aspirin  81 mg Oral Daily    sodium chloride flush  10 mL Intravenous 2 times per day    lisinopril  5 mg Oral Daily    furosemide  40 mg Intravenous BID    cefTRIAXone (ROCEPHIN) IV  1 g Intravenous Q24H    carvedilol  25 mg Oral BID WC    hydrALAZINE  25 mg Oral 3 times per day    isosorbide mononitrate  30 mg Oral Daily       PRN Meds:sodium chloride flush, acetaminophen **OR** acetaminophen, polyethylene glycol, promethazine **OR** ondansetron, hydrALAZINE, nitroGLYCERIN    Radiology  Xr Chest (2 Vw)    Result Date: 8/27/2020  EXAMINATION: XR CHEST (2 VW)  CLINICAL HISTORY:  Pulmonary edema versus pneumonia COMPARISONS: August 25, 2020 2238 hours  FINDINGS: Two views of the chest are submitted. The cardiac silhouette is borderline enlarged. There is central pulmonary vascular congestion with increased interstitial markings. There has been interval improvement since the prior examination. Right sided trachea. Left lower lobe atelectasis. Trace left pleural effusion. .  No Pneumothoraces. CENTRAL PULMONARY VASCULAR CONGESTION WITH INCREASED INTERSTITIAL MARKINGS. INTERVAL IMPROVEMENT SINCE THE PRIOR EXAMINATION. FINDINGS WOULD FAVOR CHF. CONTINUED CLOSE FOLLOW-UP RECOMMENDED.     Ct Chest Wo Contrast    Result Date: 8/26/2020  HISTORY: Allison Garcia is a Male of 52 years age. DIAGNOSIS:  cp/sob COMPARISON: None available. TECHNIQUE: Thin spiral axial CT was performed from the thoracic inlet to the lung bases, without intravenous contrast administration. 2-D reformatted axial, sagittal and coronal images were obtained. 3-D MIPS images were also performed. FINDINGS: Patchy groundglass opacities are seen with air bronchograms. Throughout the lungs. Bilateral small pleural effusions are also seen left slightly greater than right. Slightly enlarged mediastinal lymph nodes are seen in the viola are limited due to the lack of contrast. The ascending aorta measures 4.1 x 4.4 cm at the level of the pulmonary artery. The heart is mildly enlarged. No pericardial effusion is seen. A small hiatal hernia is visualized. Limited survey views of the upper abdomen are unremarkable. Mild degenerative changes are seen in the dorsal spine. 1. Pulmonary edema versus bronchopneumonia. 2. Aneurysmal dilation of the ascending aorta. Mild cardiomegaly. All CT scans at this facility use dose modulation, iterative reconstruction, and/or weight based dosing when appropriate to reduce radiation dose to as low as reasonably achievable. PORTABLE CHEST COMPARISON: August 28, 2019. HISTORY: cp/sob TECHNIQUE: AP view FINDINGS: Patchy airspace disease is seen in the right upper, mid and lower lung fields. It is also seen in the left upper and left mid lung field. The left costophrenic angle is blunted. The cardiac silhouette is borderline. IMPRESSION: Pulmonary edema versus pneumonia. Xr Chest Portable    Result Date: 8/26/2020  HISTORY: Allison Garcia is a Male of 52 years age. DIAGNOSIS:  cp/sob COMPARISON: None available. TECHNIQUE: Thin spiral axial CT was performed from the thoracic inlet to the lung bases, without intravenous contrast administration. 2-D reformatted axial, sagittal and coronal images were obtained. 3-D MIPS images were also performed. FINDINGS: Patchy groundglass opacities are seen with air bronchograms. Throughout the lungs. Bilateral small pleural effusions are also seen left slightly greater than right. Slightly enlarged mediastinal lymph nodes are seen in the viola are limited due to the lack of contrast. The ascending aorta measures 4.1 x 4.4 cm at the level of the pulmonary artery. The heart is mildly enlarged. No pericardial effusion is seen. A small hiatal hernia is visualized. Limited survey views of the upper abdomen are unremarkable. Mild degenerative changes are seen in the dorsal spine. 1. Pulmonary edema versus bronchopneumonia. 2. Aneurysmal dilation of the ascending aorta. Mild cardiomegaly. All CT scans at this facility use dose modulation, iterative reconstruction, and/or weight based dosing when appropriate to reduce radiation dose to as low as reasonably achievable. PORTABLE CHEST COMPARISON: August 28, 2019. HISTORY: cp/sob TECHNIQUE: AP view FINDINGS: Patchy airspace disease is seen in the right upper, mid and lower lung fields. It is also seen in the left upper and left mid lung field. The left costophrenic angle is blunted. The cardiac silhouette is borderline. IMPRESSION: Pulmonary edema versus pneumonia. IMPRESSION AND SUGGESTION:  1. Acute systolic heart failure with pulmonary edema  2. Cardiomyopathy with EF 40%  3. Rule out pneumonia, clinically less likely  4. Acute respiratory failure with hypoxia secondary to above  5. Chronic kidney disease  6. EtOH abuse  7. Tobacco abuse  8. Hypertension  9. Ascending aortic aneurysm 4.1 x 4.4 cm    Patient will follow-up chest x-ray done showing central pulmonary vascular congestion with increased interstitial marking With interval improvement since the prior exam.  Left lower lobe atelectasis finding favor CHF.   Okay to discharge pulmonary wise  Electronically signed by Yakelin Mcnair MD, FCCP on 8/28/2020 at 4:49 PM

## 2020-08-28 NOTE — PROGRESS NOTES
Progress Note  Patient: Frannie Buckner  Unit/Bed: J492/J820-29  YOB: 1970  MRN: [id number]  Acct: [de-identified]   Admitting Diagnosis: Acute decompensated heart failure [facility]) [I50.9]  Date:  8/25/2020  Hospital Day: 2    Chief Complaint:  SOB    Subjective      8/28/20: Patient resting comfortably in bed in no acute distress. Denies chest pain, shortness of breath or palpitations. Has been diuresed with Lasix 40 mg IV twice daily since admission however I's and O's are inaccurate as urine output is frequently unmeasured. Renal function with some mild fluctuation since admission with creatinine of 2.6 on 8/25/2020 and currently 2.98. Appears that patient's baseline creatinine is around 2.6-2.7. Nephrology consulted yesterday. Pulmonary also consulted yesterday and repeated chest x-ray. Chest x-ray from 8/27/2020 revealed central pulmonary vascular congestion with increased interstitial markings with interval improvement since prior examination. He remains on IV Rocephin for possible pneumonia. Potassium and magnesium stable on morning labs. Currently on telemetry he is sinus rhythm with heart rate in the 90s with frequent PVCs. Telemetry alarms reviewed showed a 6 beat run of nonsustained ventricular tachycardia at 1:16 AM.  Patient is status post negative stress test in October 2019 but has had no prior cardiac catheterization. He does drink alcohol heavily with at least 8 beers every other day and has been encouraged to stop drinking.    8/27/20: COVID negative. Shortness of breath somewhat improved. LV ejection fraction 40%. Has other issues including chronic kidney disease     8/26/20:  This is a pleasant 57-year-old -American male with past medical history significant for hypertension, CKD, history of cardiomyopathy with EF of 40% per echo in August 2019, history of normal stress test in October 2019, tobacco abuse, and alcohol abuse who presented to the emergency room yesterday with complaint of shortness of breath and chest pain. Patient states he has been experiencing shortness of breath when lying flat in bed over the past 3 to 4 days with associated lower extremity edema. He also reports intermittent midsternal to left-sided chest pain which he describes as heaviness/pressure. Additionally, he has been experiencing mildly productive cough. He denies nausea, vomiting, palpitations, diaphoresis, dizziness, lightheadedness, syncope, fever or chills. Due to this persistent shortness of breath and chest pain he presented to the ER for further evaluation.     On presentation to the emergency room, blood pressure 147/118, heart rate tachycardic at 116, respiratory rate 28, pulse ox of 93%, temperature of 98.9 °F.  Sodium 137, potassium 3.5, chloride 104, total CO2 22, BUN elevated 28, creatinine elevated 2.67, GFR low at 31.0, glucose 102. Magnesium 2.3.  proBNP elevated at 8310. Troponin elevated 0.040. Urine drug screen negative. WBC 9.9, hemoglobin 12.4, hematocrit 35.8, platelets elevated 705. COVID test negative x1. CT of the chest without contrast showed pulmonary edema versus bronchopneumonia, aneurysmal dilation of the ascending aorta measuring 4.1 x 4.4 cm at the level of the pulmonary artery, mild cardiomegaly. Chest x-ray revealed pulmonary edema versus pneumonia. Was treated with IV Lasix in ER and admitted for further evaluation.     At time of evaluation today, patient is seen in the ICU. He is mildly tachypneic and exhibits conversational dyspnea. His serial troponins have been elevated x3 at 0.040, 0.028 and 0.029. He has chronic kidney disease with creatinine today of 2.59 and GFR of 32.1.  BP remains uncontrolled on serial vitals but home blood pressure medications including Coreg, hydralazine and Imdur have not yet been resumed. He is on IV diuresis with Lasix 40 mg IV twice daily. Currently on telemetry he is sinus tach with heart rate in the 110s.   He states at times he has been noncompliant with his medications but states that recently he has been taking all of his medications as prescribed.     He is status post stress test in October 2019 which was negative for ischemia. Echocardiogram in August 2019 with mildly reduced LV systolic function with EF of 40%. He admits to drinking heavily approximately 8 beers every other day and smoking 1 pack of cigarettes every other day as well. Review of Systems:   Review of Systems   Constitutional: Negative for activity change and fever. HENT: Negative for congestion. Respiratory: Negative for chest tightness and shortness of breath. Cardiovascular: Negative for chest pain, palpitations and leg swelling. Gastrointestinal: Negative for abdominal pain, nausea and vomiting. Genitourinary: Negative for difficulty urinating. Musculoskeletal: Negative for arthralgias. Skin: Negative for color change, pallor and rash. Neurological: Negative for dizziness and syncope. Psychiatric/Behavioral: Negative for agitation. Physical Examination:    BP (!) 130/92   Pulse 95   Temp 97.9 °F (36.6 °C) (Oral)   Resp 18   Ht 6' (1.829 m)   Wt 199 lb 8 oz (90.5 kg)   SpO2 96%   BMI 27.06 kg/m²    Physical Exam  Constitutional:       General: He is not in acute distress. Appearance: Normal appearance. HENT:      Head: Normocephalic and atraumatic. Neck:      Musculoskeletal: Normal range of motion and neck supple. Cardiovascular:      Rate and Rhythm: Normal rate and regular rhythm. Pulmonary:      Effort: Pulmonary effort is normal. No respiratory distress. Breath sounds: No wheezing, rhonchi or rales. Abdominal:      Palpations: Abdomen is soft. Tenderness: There is no abdominal tenderness. Musculoskeletal: Normal range of motion. Right lower leg: No edema. Left lower leg: No edema. Skin:     General: Skin is warm and dry.    Neurological:      General: No focal deficit present. Mental Status: He is alert and oriented to person, place, and time. Cranial Nerves: No cranial nerve deficit.    Psychiatric:         Mood and Affect: Mood normal.         Behavior: Behavior normal.         LABS:  CBC:   Lab Results   Component Value Date    WBC 9.9 08/25/2020    RBC 4.35 08/25/2020    HGB 12.4 08/25/2020    HCT 35.8 08/25/2020    MCV 82.3 08/25/2020    MCH 28.5 08/25/2020    MCHC 34.6 08/25/2020    RDW 13.7 08/25/2020     08/25/2020     CBC with Differential:   Lab Results   Component Value Date    WBC 9.9 08/25/2020    RBC 4.35 08/25/2020    HGB 12.4 08/25/2020    HCT 35.8 08/25/2020     08/25/2020    MCV 82.3 08/25/2020    MCH 28.5 08/25/2020    MCHC 34.6 08/25/2020    RDW 13.7 08/25/2020    LYMPHOPCT 8.1 08/25/2020    MONOPCT 7.8 08/25/2020    BASOPCT 0.6 08/25/2020    MONOSABS 0.8 08/25/2020    LYMPHSABS 0.8 08/25/2020    EOSABS 0.2 08/25/2020    BASOSABS 0.1 08/25/2020     CMP:    Lab Results   Component Value Date     08/28/2020    K 3.9 08/28/2020     08/28/2020    CO2 23 08/28/2020    BUN 27 08/28/2020    CREATININE 2.98 08/28/2020    GFRAA 27.3 08/28/2020    LABGLOM 22.6 08/28/2020    GLUCOSE 90 08/28/2020    PROT 6.1 08/25/2020    LABALBU 3.2 08/25/2020    CALCIUM 8.6 08/28/2020    BILITOT 0.4 08/25/2020    ALKPHOS 82 08/25/2020    AST 35 08/25/2020    ALT 36 08/25/2020     BMP:    Lab Results   Component Value Date     08/28/2020    K 3.9 08/28/2020     08/28/2020    CO2 23 08/28/2020    BUN 27 08/28/2020    LABALBU 3.2 08/25/2020    CREATININE 2.98 08/28/2020    CALCIUM 8.6 08/28/2020    GFRAA 27.3 08/28/2020    LABGLOM 22.6 08/28/2020    GLUCOSE 90 08/28/2020     Magnesium:    Lab Results   Component Value Date    MG 2.3 08/28/2020     Troponin:    Lab Results   Component Value Date    TROPONINI 0.029 08/26/2020       Radiology:  Xr Chest (2 Vw)    Result Date: 8/27/2020  EXAMINATION: XR CHEST (2 VW)  CLINICAL HISTORY:  Pulmonary edema versus pneumonia COMPARISONS: August 25, 2020 2238 hours  FINDINGS: Two views of the chest are submitted. The cardiac silhouette is borderline enlarged. There is central pulmonary vascular congestion with increased interstitial markings. There has been interval improvement since the prior examination. Right sided trachea. Left lower lobe atelectasis. Trace left pleural effusion. .  No Pneumothoraces. CENTRAL PULMONARY VASCULAR CONGESTION WITH INCREASED INTERSTITIAL MARKINGS. INTERVAL IMPROVEMENT SINCE THE PRIOR EXAMINATION. FINDINGS WOULD FAVOR CHF. CONTINUED CLOSE FOLLOW-UP RECOMMENDED. Stress test 10/9/19:  FINDINGS:  The stress and rest images exhibit homogeneous uptake of  tracer throughout the left ventricular myocardium.  There is no evidence  of stress-induced reversible perfusion abnormalities to suggest  myocardial ischemia.  Gated imaging exhibits normal left ventricular  size and normal wall motion and myocardial thickening.  EKG analysis did  not demonstrate ST-segment changes nor arrhythmias concerning for  myocardial ischemia.     LVEF:  50%. TID ratio:  0.95.     IMPRESSION:  1.  No evidence of stress-induced myocardial ischemia. 2.  Normal left ventricular systolic function.     Echocardiogram 8/26/19:  Conclusions   Summary   Normal mitral valve structure and function.   Mild MR   Normal aortic valve structure and function.   Mild AR   Left ventricular ejection fraction is visually estimated at 40%.   Restrictive filling pattern.   Moderate CLVH   Mild dilated Ao Root   Signature   ----------------------------------------------------------------   Electronically signed by Hugo Salazar MD(Interpreting   physician) on 08/26/2019 04:44 PM   ----------------------------------------------------------------   Findings  Left Ventricle  Left ventricular ejection fraction is visually estimated at 40%.   Restrictive filling pattern. Moderate CLVH  Right Ventricle  Normal right ventricle structure and function. Normal right ventricle systolic pressure. Left Atrium  Mildly dilated left atrium. Right Atrium  Normal right atrium. Mitral Valve  Normal mitral valve structure and function. Mild MR  Tricuspid Valve  Normal tricuspid valve structure and function. Mild TR  Aortic Valve  Normal aortic valve structure and function. Mild AR  Pulmonic Valve  Normal pulmonic valve structure and function. Pericardial Effusion  No evidence of pericardial effusion. Pleural Effusion  No evidence of pleural effusion. Aorta \ Miscellaneous  Mild dilated Ao Root    Echo 8/27/20:  Conclusions   Summary   Normal aortic valve structure and function. Trace AR   Normal tricuspid valve structure and function. trace TR   RVSP 25 mmHg   Left ventricular ejection fraction is visually estimated at 25-30% with   Global Hypokinesis. Pseudonormal filling pattern noted. Signature   ----------------------------------------------------------------   Electronically signed by Brady Alonzo MD(Interpreting   physician) on 08/27/2020 03:28 PM   ----------------------------------------------------------------   Findings  Left Ventricle  Left ventricular ejection fraction is visually estimated at 25-30% with  Global Hypokinesis. Pseudonormal filling pattern noted. Right Ventricle  Normal right ventricle structure and function. Normal right ventricle systolic pressure. Left Atrium  Normal left atrium. Right Atrium  Normal right atrium. Mitral Valve  Normal mitral valve structure and function. Tricuspid Valve  Normal tricuspid valve structure and function. trace TR  RVSP 25 mmHg  Aortic Valve  Normal aortic valve structure and function. Trace AR  Pulmonic Valve  The pulmonic valve was not well visualized . Pericardial Effusion  No evidence of pericardial effusion. Pleural Effusion  No evidence of pleural effusion.   Aorta \ Miscellaneous  The aorta is within normal limits.        EKG 8/26/20: , occasional PVCs, ST depression with T wave inversion V5-V6, QTc 465ms     Telemetry 8/26/20: ST 110s, occasional/frequent PVCs  Telemetry 8/28/20: SR 90s, frequent PVCs, 6 beat NSVT at 1:16am      Assessment:    Active Hospital Problems    Diagnosis Date Noted    Acute decompensated heart failure ([facility] [address] [I50.9] 08/26/2020    Chest pain [R07.9]      1. Acute on chronic systolic CHF  2. Acute respiratory failure  3. Elevated troponin  4. CKD  5. Hx of cardiomyopathy with EF 40% per echo 8/26/19--worsening LVF with EF 25-30% per echo 8/27/20   6. S/p negative stress test 10/9/19  7. HTN  8. Tobacco abuse  9. ETOH abuse  10. Ascending aortic aneurysm--4.1 x4.4cm per CT chest 8/25/20  11. NSVT     Plan:  1. Maximize medical therapy- aspirin 81 mg p.o. daily, Coreg 25 mg p.o. twice daily, Imdur 30 mg p.o. daily, lisinopril 5 mg p.o. daily, IV diuresis as tolerated-currently with Lasix 40 mg IV twice daily, sublingual nitroglycerin PRN for chest pain  2. May consider switching lisinopril to [facility] in future as outpatient but will hold off for now in light of CKD. 3. Consider addition of Corlanor as outpatient if heart rate remains suboptimally controlled despite maximum dose of carvedilol. 4. Cardiac/less than 2 g sodium diet recommended  5. Check daily weight and strict intake and output  6. Tobacco and alcohol cessation strongly recommended  7. Monitor on telemetry for any tachycardia or bradycardia arrhythmias  8. Maintain potassium greater than 4, magnesium greater than 2  9. GI/DVT prophylaxis  10. Pulmonary recommendations--currently on IV Rocephin and Zithromax for possible pneumonia  11. Internal medicine recommendations  12. Nephrology recommendations regarding diuretics/fluid management in the setting of chronic kidney disease  13. Coronary evaluation per Dr. Liv Saldana  14.  Will need to repeat LV function in approximately 3 months and if EF remains severely reduced at less than or equal to 35%, will need referral to electrophysiology for AICD evaluation  15. Recommend outpatient follow-up with CHF clinic upon discharge  16.  Further recommendations to follow              Electronically signed by MICAH Hood on 8/28/2020 at 9:18 AM

## 2020-08-29 ENCOUNTER — APPOINTMENT (OUTPATIENT)
Dept: ULTRASOUND IMAGING | Age: 50
DRG: 194 | End: 2020-08-29
Payer: MEDICAID

## 2020-08-29 LAB
ANION GAP SERPL CALCULATED.3IONS-SCNC: 12 MEQ/L (ref 9–15)
BUN BLDV-MCNC: 28 MG/DL (ref 6–20)
CALCIUM SERPL-MCNC: 8.6 MG/DL (ref 8.5–9.9)
CHLORIDE BLD-SCNC: 104 MEQ/L (ref 95–107)
CO2: 23 MEQ/L (ref 20–31)
CREAT SERPL-MCNC: 2.88 MG/DL (ref 0.7–1.2)
GFR AFRICAN AMERICAN: 28.4
GFR NON-AFRICAN AMERICAN: 23.5
GLUCOSE BLD-MCNC: 92 MG/DL (ref 70–99)
MAGNESIUM: 2.3 MG/DL (ref 1.7–2.4)
POTASSIUM SERPL-SCNC: 3.9 MEQ/L (ref 3.4–4.9)
PRO-BNP: 3789 PG/ML
SODIUM BLD-SCNC: 139 MEQ/L (ref 135–144)

## 2020-08-29 PROCEDURE — 6370000000 HC RX 637 (ALT 250 FOR IP): Performed by: PHYSICIAN ASSISTANT

## 2020-08-29 PROCEDURE — 76775 US EXAM ABDO BACK WALL LIM: CPT

## 2020-08-29 PROCEDURE — 36415 COLL VENOUS BLD VENIPUNCTURE: CPT

## 2020-08-29 PROCEDURE — 6360000002 HC RX W HCPCS: Performed by: INTERNAL MEDICINE

## 2020-08-29 PROCEDURE — 83880 ASSAY OF NATRIURETIC PEPTIDE: CPT

## 2020-08-29 PROCEDURE — 2580000003 HC RX 258: Performed by: INTERNAL MEDICINE

## 2020-08-29 PROCEDURE — 6370000000 HC RX 637 (ALT 250 FOR IP): Performed by: INTERNAL MEDICINE

## 2020-08-29 PROCEDURE — 80048 BASIC METABOLIC PNL TOTAL CA: CPT

## 2020-08-29 PROCEDURE — 99233 SBSQ HOSP IP/OBS HIGH 50: CPT | Performed by: INTERNAL MEDICINE

## 2020-08-29 PROCEDURE — 83735 ASSAY OF MAGNESIUM: CPT

## 2020-08-29 PROCEDURE — 2060000000 HC ICU INTERMEDIATE R&B

## 2020-08-29 PROCEDURE — 2700000000 HC OXYGEN THERAPY PER DAY

## 2020-08-29 RX ORDER — BENZONATATE 100 MG/1
200 CAPSULE ORAL ONCE
Status: COMPLETED | OUTPATIENT
Start: 2020-08-29 | End: 2020-08-30

## 2020-08-29 RX ORDER — FUROSEMIDE 10 MG/ML
40 INJECTION INTRAMUSCULAR; INTRAVENOUS DAILY
Status: DISCONTINUED | OUTPATIENT
Start: 2020-08-30 | End: 2020-08-30

## 2020-08-29 RX ADMIN — CARVEDILOL 25 MG: 25 TABLET, FILM COATED ORAL at 16:17

## 2020-08-29 RX ADMIN — ISOSORBIDE MONONITRATE 30 MG: 30 TABLET, EXTENDED RELEASE ORAL at 07:38

## 2020-08-29 RX ADMIN — ENOXAPARIN SODIUM 90 MG: 100 INJECTION SUBCUTANEOUS at 21:40

## 2020-08-29 RX ADMIN — CARVEDILOL 25 MG: 25 TABLET, FILM COATED ORAL at 07:38

## 2020-08-29 RX ADMIN — Medication 10 ML: at 07:39

## 2020-08-29 RX ADMIN — ASPIRIN 81 MG: 81 TABLET, CHEWABLE ORAL at 07:38

## 2020-08-29 RX ADMIN — CEFTRIAXONE SODIUM 1 G: 1 INJECTION, POWDER, FOR SOLUTION INTRAMUSCULAR; INTRAVENOUS at 01:38

## 2020-08-29 RX ADMIN — HYDRALAZINE HYDROCHLORIDE 25 MG: 25 TABLET, FILM COATED ORAL at 13:40

## 2020-08-29 RX ADMIN — FUROSEMIDE 40 MG: 10 INJECTION, SOLUTION INTRAMUSCULAR; INTRAVENOUS at 07:38

## 2020-08-29 RX ADMIN — LISINOPRIL 5 MG: 5 TABLET ORAL at 07:38

## 2020-08-29 RX ADMIN — Medication 10 ML: at 21:40

## 2020-08-29 RX ADMIN — HYDRALAZINE HYDROCHLORIDE 25 MG: 25 TABLET, FILM COATED ORAL at 05:56

## 2020-08-29 RX ADMIN — ENOXAPARIN SODIUM 90 MG: 100 INJECTION SUBCUTANEOUS at 07:40

## 2020-08-29 ASSESSMENT — PAIN SCALES - GENERAL
PAINLEVEL_OUTOF10: 0

## 2020-08-29 ASSESSMENT — PAIN DESCRIPTION - DESCRIPTORS: DESCRIPTORS: DULL

## 2020-08-29 ASSESSMENT — PAIN DESCRIPTION - PAIN TYPE: TYPE: ACUTE PAIN;CHRONIC PAIN

## 2020-08-29 ASSESSMENT — ENCOUNTER SYMPTOMS
VOMITING: 0
NAUSEA: 0
BLOOD IN STOOL: 0
DIARRHEA: 0
GASTROINTESTINAL NEGATIVE: 1
SHORTNESS OF BREATH: 0
STRIDOR: 0
WHEEZING: 0
CHEST TIGHTNESS: 0
COUGH: 0
RESPIRATORY NEGATIVE: 1
EYES NEGATIVE: 1

## 2020-08-29 ASSESSMENT — PAIN DESCRIPTION - LOCATION: LOCATION: GENERALIZED

## 2020-08-29 NOTE — PROGRESS NOTES
Progress Note  Patient: Robbie Sarmiento  Unit/Bed: Z450/O829-41  YOB: 1970  MRN: [id number]  Acct: [de-identified]   Admitting Diagnosis: Acute decompensated heart failure [facility]) [I50.9]  Admit Date:  8/25/2020  Hospital Day: 3    Chief Complaint:HF PNA     Histories:  Past Medical History:   Diagnosis Date    Abnormal EKG 9/27/2019    Cardiomyopathy ([facility] [address]     per echo 8/2019    [facility] combined [facility] 9/27/2019    ETOH abuse     Hypertension     Tobacco abuse      History reviewed. No pertinent surgical history.   Family History   Problem Relation Age of Onset    Coronary Art Dis Mother      Social History     Socioeconomic History    Marital status: Single     Spouse name: None    Number of children: None    Years of education: None    Highest education level: None   Occupational History    None   Social Needs    Financial resource strain: None    Food insecurity     Worry: None     Inability: None    Transportation needs     Medical: None     Non-medical: None   Tobacco Use    Smoking status: Current Every Day Smoker     Packs/day: 1.00     Types: Cigarettes    Smokeless tobacco: Never Used    Tobacco comment: 1pack/2 days   Substance and Sexual Activity    Alcohol use: Yes     Frequency: 4 or more times a week     Drinks per session: 7 to 9    Drug use: Never    Sexual activity: None   Lifestyle    Physical activity     Days per week: None     Minutes per session: None    Stress: None   Relationships    Social connections     Talks on phone: None     Gets together: None     Attends Jew service: None     Active member of club or organization: None     Attends meetings of clubs or organizations: None     Relationship status: None    Intimate partner violence     Fear of current or ex partner: None     Emotionally abused: None     Physically abused: None     Forced sexual activity: None   Other Topics Concern    None 08/25/2020    MCHC 34.6 08/25/2020    RDW 13.7 08/25/2020     08/25/2020     CBC with Differential:    Lab Results   Component Value Date    WBC 9.9 08/25/2020    RBC 4.35 08/25/2020    HGB 12.4 08/25/2020    HCT 35.8 08/25/2020     08/25/2020    MCV 82.3 08/25/2020    MCH 28.5 08/25/2020    MCHC 34.6 08/25/2020    RDW 13.7 08/25/2020    LYMPHOPCT 8.1 08/25/2020    MONOPCT 7.8 08/25/2020    BASOPCT 0.6 08/25/2020    MONOSABS 0.8 08/25/2020    LYMPHSABS 0.8 08/25/2020    EOSABS 0.2 08/25/2020    BASOSABS 0.1 08/25/2020     CMP:    Lab Results   Component Value Date     08/29/2020    K 3.9 08/29/2020     08/29/2020    CO2 23 08/29/2020    BUN 28 08/29/2020    CREATININE 2.88 08/29/2020    GFRAA 28.4 08/29/2020    LABGLOM 23.5 08/29/2020    GLUCOSE 92 08/29/2020    PROT 6.1 08/25/2020    LABALBU 3.2 08/25/2020    CALCIUM 8.6 08/29/2020    BILITOT 0.4 08/25/2020    ALKPHOS 82 08/25/2020    AST 35 08/25/2020    ALT 36 08/25/2020     BMP:    Lab Results   Component Value Date     08/29/2020    K 3.9 08/29/2020     08/29/2020    CO2 23 08/29/2020    BUN 28 08/29/2020    LABALBU 3.2 08/25/2020    CREATININE 2.88 08/29/2020    CALCIUM 8.6 08/29/2020    GFRAA 28.4 08/29/2020    LABGLOM 23.5 08/29/2020    GLUCOSE 92 08/29/2020     Magnesium:    Lab Results   Component Value Date    MG 2.3 08/29/2020     Troponin:    Lab Results   Component Value Date    TROPONINI 0.029 08/26/2020        Active Hospital Problems    Diagnosis Date Noted    Acute decompensated heart failure ([facility] [address] [I50.9] 08/26/2020     Priority: Low    Chest pain [R07.9]      Priority: Low        Assessment/Plan:  1. SHF- improving. Will decrease iv Lasix to qd. 2. NICM - EF 25-30%  3. ETOH Abuse  4. Tobacco abuse  5. PNA- on iv ABX  6. CKD   7.  Ascending Ao aneurysm 4.1       Electronically signed by Tim Fulton MD on 8/29/2020 at 9:58 AM

## 2020-08-29 NOTE — PROGRESS NOTES
Elvia Lopez is a 52 y.o. male patient.   Pt was seen and evaluated, feeling better compare to before  Current Facility-Administered Medications   Medication Dose Route Frequency Provider Last Rate Last Dose    [START ON 8/30/2020] furosemide (LASIX) injection 40 mg  40 mg Intravenous Daily Baldomero Phillips MD        enoxaparin (LOVENOX) injection 90 mg  1 mg/kg Subcutaneous BID Monique Cramer MD   90 mg at 08/29/20 0740    aspirin chewable tablet 81 mg  81 mg Oral Daily Vidhi Qiu, [address]   81 mg at 08/29/20 2683    sodium chloride flush 0.9 % injection 10 mL  10 mL Intravenous 2 times per day Monique Cramer MD   10 mL at 08/29/20 0739    sodium chloride flush 0.9 % injection 10 mL  10 mL Intravenous PRN Monique Cramer MD        acetaminophen (TYLENOL) tablet 650 mg  650 mg Oral Q6H PRN Monique Cramer MD   650 mg at 08/26/20 2925    Or    acetaminophen (TYLENOL) suppository 650 mg  650 mg Rectal Q6H PRN Monique Cramer MD        polyethylene glycol (GLYCOLAX) packet 17 g  17 g Oral Daily PRN Monique Cramer MD        promethazine (PHENERGAN) tablet 12.5 mg  12.5 mg Oral Q6H PRN Monique Cramer MD        Or    [facility] injection 4 mg  4 mg Intravenous Q6H PRN Monique Cramer MD        lisinopril (PRINIVIL;ZESTRIL) tablet 5 mg  5 mg Oral Daily Monique Cramer MD   5 mg at 08/29/20 0738    hydrALAZINE (APRESOLINE) injection 10 mg  10 mg Intravenous Q4H PRN Monique Cramer MD   10 mg at 08/26/20 0523    cefTRIAXone (ROCEPHIN) 1 g IVPB in 50 mL D5W minibag  1 g Intravenous Q24H Monique Cramer MD   Stopped at 08/29/20 0221    carvedilol (COREG) tablet 25 mg  25 mg Oral BID WC Mellb Qiu PA   25 mg at 08/29/20 3570    hydrALAZINE (APRESOLINE) tablet 25 mg  25 mg Oral 3 times per day MICAH River   25 mg at 08/29/20 0556    isosorbide mononitrate (IMDUR) extended release tablet 30 mg  30 mg Oral Daily Vidhi Qiu, [address]   30 mg at 08/29/20 0738    nitroGLYCERIN (NITROSTAT) SL tablet 0.4 mg  0.4 mg Sublingual Q5 Min PRN Shaggy Alejandre PA-C   0.4 mg at 08/26/20 7612     No Known Allergies  Active Problems:    Acute decompensated heart failure (HCC)    Chest pain  Resolved Problems:    * No resolved hospital problems. *    Blood pressure 121/88, pulse 94, temperature 96.3 °F (35.7 °C), temperature source Oral, resp. rate 18, height 6' (1.829 m), weight 199 lb 8 oz (90.5 kg), SpO2 98 %. Subjective:  Symptoms:  He reports malaise and weakness. No shortness of breath, cough, chest pain, headache, chest pressure, anorexia, diarrhea or anxiety. Diet:  No nausea or vomiting. Objective:  General Appearance: In no acute distress. Vital signs: (most recent): Blood pressure 121/88, pulse 94, temperature 96.3 °F (35.7 °C), temperature source Oral, resp. rate 18, height 6' (1.829 m), weight 199 lb 8 oz (90.5 kg), SpO2 98 %. HEENT: Normal HEENT exam.    Lungs:  Normal effort. Heart: Normal rate. Regular rhythm. S1 normal and S2 normal.    Abdomen: Abdomen is soft. There is no epigastric area tenderness. Neurological: Patient is alert and oriented to person, place and time. Pupils:  Pupils are equal, round, and reactive to light. Skin:  Warm and dry.       Lab Results   Component Value Date    WBC 9.9 08/25/2020    HGB 12.4 (L) 08/25/2020    HCT 35.8 (L) 08/25/2020    MCV 82.3 08/25/2020     (H) 08/25/2020     Lab Results   Component Value Date     08/29/2020    K 3.9 08/29/2020     08/29/2020    CO2 23 08/29/2020    BUN 28 08/29/2020    CREATININE 2.88 08/29/2020    GLUCOSE 92 08/29/2020    CALCIUM 8.6 08/29/2020        Assessment & Plan  1) acute respiratory failure with hypoxia  2) acute systolic HF  Going for Cardiac cath on Monday  Will cancel discharge  Change IV lasix to PO  3)PNa  C/w IV ABx  4) elevated d/dimer CC to address  5) CKD stage 4  Monitor renal function  Renal eval prior to cardiac cath  C/w IV abx  C/w oxygen

## 2020-08-29 NOTE — PROGRESS NOTES
Renal Progress Note    Assessment and Plan:   52years old male admitted for further evaluation and management of 4 days duration of gradual onset and progressive course of shortness of breath with moderate productive cough of dark brown phlegm is chronic comorbidities include systolic and diastolic decompensated heart failure consulted for acute kidney injury on top of chronic kidney disease   1. Chronic kidney disease stage IV the etiology of which yet to be determined   2. No major electrolyte or acid-base imbalance potassium always on the lower side 3.5-3.9   3. Initially hypertensive currently normotensive blood pressure use to fluctuate 177/20 23 currently 130/92   4. No anemia requiring further management   5.   [place] urine sediment    Plan  -Check PTH 25 hydroxy vitamin D and phosphorus  -Continue current therapy  -Check renal ultrasound  -Avoid nephrotoxic exposure      Patient Active Problem List:     Hypertensive urgency     Cardiomyopathy ([address]     Systolic and diastolic CHF, acute (HCC)     BLUE (acute kidney injury) ([address]     Abnormal EKG     Chronic combined systolic and diastolic CHF (congestive heart failure) ([address]     Acute decompensated heart failure ([address]     Chest pain      Subjective:   Admit Date: 8/25/2020    Interval History: Patient seen and examined uneventful night expressed feeling better alert follow command in no apparent pain or distress denied any uremic related or fluid volume overload related symptoms interested to know how he can take better care of his health especially the kidney discussed with the patient to avoid nonsteroidal anti-inflammatory to control his blood pressure and to be compliant with medication and avoid excessive alcohol and tobacco    Medications:   Scheduled Meds:   [START ON 8/30/2020] furosemide  40 mg Intravenous Daily    enoxaparin  1 mg/kg Subcutaneous BID    aspirin  81 mg Oral Daily    sodium chloride flush  10 mL Intravenous 2 times per day    lisinopril  5 mg Oral Daily    cefTRIAXone (ROCEPHIN) IV  1 g Intravenous Q24H    carvedilol  25 mg Oral BID     hydrALAZINE  25 mg Oral 3 times per day    isosorbide mononitrate  30 mg Oral Daily     Continuous Infusions:    CBC: No results for input(s): WBC, HGB, PLT in the last 72 hours. CMP:    Recent Labs     08/27/20  0621 08/28/20  0601 08/29/20  0625    141 139   K 3.5 3.9 3.9    106 104   CO2 21 23 23   BUN 26* 27* 28*   CREATININE 2.81* 2.98* 2.88*   GLUCOSE 91 90 92   CALCIUM 8.5 8.6 8.6   LABGLOM 24.2* 22.6* 23.5*     Troponin: No results for input(s): TROPONINI in the last 72 hours. BNP: No results for input(s): BNP in the last 72 hours. INR: No results for input(s): INR in the last 72 hours. Lipids:   Recent Labs     08/27/20  0621   CHOL 102   TRIG 85   HDL 43     Liver: No results for input(s): AST, ALT, ALKPHOS, PROT, LABALBU, BILITOT in the last 72 hours. Invalid input(s): BILDIR  Iron:  No results for input(s): IRONS, FERRITIN in the last 72 hours. Invalid input(s): LABIRONS  Urinalysis: No results for input(s): UA in the last 72 hours. Objective:   Vitals: /88   Pulse 94   Temp 96.3 °F (35.7 °C) (Oral)   Resp 18   Ht 6' (1.829 m)   Wt 199 lb 8 oz (90.5 kg)   SpO2 98%   BMI 27.06 kg/m²    Wt Readings from Last 3 Encounters:   08/28/20 199 lb 8 oz (90.5 kg)   12/26/19 212 lb 9.6 oz (96.4 kg)   12/19/19 218 lb 9.6 oz (99.2 kg)      24HR INTAKE/OUTPUT:      Intake/Output Summary (Last 24 hours) at 8/29/2020 1253  Last data filed at 8/29/2020 1207  Gross per 24 hour   Intake 1720 ml   Output 3130 ml   Net -1410 ml       Constitutional:  Alert, awake, no apparent distress   Skin:normal, no rash  HEENT:sclera anicteric.   Head atraumatic normocephalic  Neck:supple with no thyromegally  Cardiovascular:  S1, S2 without m/r/g   Respiratory:  CTA B without w/r/r diminished air entry bibasilar abdomen: +bs, soft, nt  Ext: No clinically significant LE edema  Musculoskeletal:Intact  Neuro:Alert and oriented with no deficit      Electronically signed by Zackary Kocher, MD on 8/29/2020 at 12:53 PM

## 2020-08-29 NOTE — PROGRESS NOTES
Physician Progress Note      PATIENT:               Srinivas Balderas  CSN #:                  [id number]  :                       1970  ADMIT DATE:       2020 10:01 PM  [address] [place] DATE:  RESPONDING  PROVIDER #:        Williams Campbell MD          QUERY TEXT:    Dear attending. Patient admitted with acute on chronic systolic CHF, acute respiratory   failure, rule out pneumonia. Noted documentation of pneumonia per d/c summary,   Dr. Vern Castro on  documents \"pneumonia clinically less likely\". If   possible, please document in progress notes and discharge summary:    The medical record reflects the following:  Risk Factors: HTN, CHF, CM, ETOH abuse  Clinical Indicators: procalcitonin 0.17 WBC 10.2/9.3/8.5/8.2/9.9, LS crackles   bilaterally on admission   Dr. Zee Toney out pneumonia, clinically less likely. ..chest x-ray and   CT chest reviewed most likely  Acute pulmonary edema causing acute respiratory failure with hypoxia. [place] He was   having sob for last 4 days worse on lying flat and any exertion. He has been   coughing dark brown mucus. ..no fever or chills. ..some increased swelling in   her lower extremity.  Dr. Dedrick Worthy- r/o PNa  CT chest Pulmonary edema versus bronchopneumonia  Treatment: Zithromax, Rocephin, Lasix IV, pulmonology/cardiology consult,   labs, tele, CXR/CT chest    Thank you, Ian Gibson RN BSN [facility]  [phone]  Options provided:  -- Pneumonia ruled out after study  -- Pneumonia confirmed after study  -- Other - I will add my own diagnosis  -- Disagree - Not applicable / Not valid  -- Disagree - Clinically unable to determine / Unknown  -- Refer to Clinical Documentation Reviewer    PROVIDER RESPONSE TEXT:    pneumonia confirmed after study.     Query created by: John Castillo on 2020 12:58 PM      Electronically signed by:  Williams Campbell MD 2020 9:09 AM

## 2020-08-30 VITALS
TEMPERATURE: 98.1 F | OXYGEN SATURATION: 92 % | BODY MASS INDEX: 27.02 KG/M2 | RESPIRATION RATE: 17 BRPM | HEART RATE: 96 BPM | SYSTOLIC BLOOD PRESSURE: 141 MMHG | HEIGHT: 72 IN | DIASTOLIC BLOOD PRESSURE: 93 MMHG | WEIGHT: 199.5 LBS

## 2020-08-30 LAB
ANION GAP SERPL CALCULATED.3IONS-SCNC: 12 MEQ/L (ref 9–15)
BASOPHILS ABSOLUTE: 0.1 K/UL (ref 0–0.2)
BASOPHILS RELATIVE PERCENT: 1.3 %
BUN BLDV-MCNC: 25 MG/DL (ref 6–20)
CALCIUM SERPL-MCNC: 8.8 MG/DL (ref 8.5–9.9)
CHLORIDE BLD-SCNC: 106 MEQ/L (ref 95–107)
CO2: 23 MEQ/L (ref 20–31)
CREAT SERPL-MCNC: 2.93 MG/DL (ref 0.7–1.2)
EOSINOPHILS ABSOLUTE: 0.3 K/UL (ref 0–0.7)
EOSINOPHILS RELATIVE PERCENT: 5.7 %
GFR AFRICAN AMERICAN: 27.9
GFR NON-AFRICAN AMERICAN: 23
GLUCOSE BLD-MCNC: 87 MG/DL (ref 70–99)
HCT VFR BLD CALC: 35.5 % (ref 42–52)
HEMOGLOBIN: 12.1 G/DL (ref 14–18)
LYMPHOCYTES ABSOLUTE: 1 K/UL (ref 1–4.8)
LYMPHOCYTES RELATIVE PERCENT: 17.7 %
MAGNESIUM: 2.3 MG/DL (ref 1.7–2.4)
MCH RBC QN AUTO: 28.1 PG (ref 27–31.3)
MCHC RBC AUTO-ENTMCNC: 34.2 % (ref 33–37)
MCV RBC AUTO: 82.3 FL (ref 80–100)
MONOCYTES ABSOLUTE: 0.6 K/UL (ref 0.2–0.8)
MONOCYTES RELATIVE PERCENT: 11.2 %
NEUTROPHILS ABSOLUTE: 3.7 K/UL (ref 1.4–6.5)
NEUTROPHILS RELATIVE PERCENT: 64.1 %
PARATHYROID HORMONE INTACT: 55.4 PG/ML (ref 15–65)
PDW BLD-RTO: 13.9 % (ref 11.5–14.5)
PHOSPHORUS: 4.4 MG/DL (ref 2.3–4.8)
PLATELET # BLD: 557 K/UL (ref 130–400)
POTASSIUM SERPL-SCNC: 3.9 MEQ/L (ref 3.4–4.9)
PROCALCITONIN: 0.19 NG/ML (ref 0–0.15)
RBC # BLD: 4.31 M/UL (ref 4.7–6.1)
SODIUM BLD-SCNC: 141 MEQ/L (ref 135–144)
WBC # BLD: 5.7 K/UL (ref 4.8–10.8)

## 2020-08-30 PROCEDURE — 6370000000 HC RX 637 (ALT 250 FOR IP): Performed by: NURSE PRACTITIONER

## 2020-08-30 PROCEDURE — 99233 SBSQ HOSP IP/OBS HIGH 50: CPT | Performed by: INTERNAL MEDICINE

## 2020-08-30 PROCEDURE — 6360000002 HC RX W HCPCS: Performed by: INTERNAL MEDICINE

## 2020-08-30 PROCEDURE — 2580000003 HC RX 258: Performed by: INTERNAL MEDICINE

## 2020-08-30 PROCEDURE — 84100 ASSAY OF PHOSPHORUS: CPT

## 2020-08-30 PROCEDURE — 80048 BASIC METABOLIC PNL TOTAL CA: CPT

## 2020-08-30 PROCEDURE — 84145 PROCALCITONIN (PCT): CPT

## 2020-08-30 PROCEDURE — 6370000000 HC RX 637 (ALT 250 FOR IP): Performed by: INTERNAL MEDICINE

## 2020-08-30 PROCEDURE — 83970 ASSAY OF PARATHORMONE: CPT

## 2020-08-30 PROCEDURE — 6370000000 HC RX 637 (ALT 250 FOR IP): Performed by: PHYSICIAN ASSISTANT

## 2020-08-30 PROCEDURE — 85025 COMPLETE CBC W/AUTO DIFF WBC: CPT

## 2020-08-30 PROCEDURE — 2700000000 HC OXYGEN THERAPY PER DAY

## 2020-08-30 PROCEDURE — 83735 ASSAY OF MAGNESIUM: CPT

## 2020-08-30 PROCEDURE — 36415 COLL VENOUS BLD VENIPUNCTURE: CPT

## 2020-08-30 RX ORDER — LISINOPRIL 5 MG/1
5 TABLET ORAL DAILY
Qty: 30 TABLET | Refills: 3 | Status: SHIPPED | OUTPATIENT
Start: 2020-08-31 | End: 2020-09-17

## 2020-08-30 RX ORDER — CEFUROXIME AXETIL 500 MG/1
500 TABLET ORAL 2 TIMES DAILY
Qty: 10 TABLET | Refills: 0 | Status: SHIPPED | OUTPATIENT
Start: 2020-08-30 | End: 2020-09-04

## 2020-08-30 RX ORDER — FUROSEMIDE 40 MG/1
40 TABLET ORAL DAILY
Qty: 60 TABLET | Refills: 3 | Status: ON HOLD | OUTPATIENT
Start: 2020-08-30 | End: 2020-11-07 | Stop reason: HOSPADM

## 2020-08-30 RX ORDER — HYDRALAZINE HYDROCHLORIDE 50 MG/1
25 TABLET, FILM COATED ORAL 3 TIMES DAILY
Qty: 90 TABLET | Refills: 3 | Status: ON HOLD | OUTPATIENT
Start: 2020-08-30 | End: 2020-11-07 | Stop reason: HOSPADM

## 2020-08-30 RX ORDER — FUROSEMIDE 40 MG/1
40 TABLET ORAL DAILY
Status: DISCONTINUED | OUTPATIENT
Start: 2020-08-30 | End: 2020-08-30 | Stop reason: HOSPADM

## 2020-08-30 RX ADMIN — HYDRALAZINE HYDROCHLORIDE 25 MG: 25 TABLET, FILM COATED ORAL at 05:09

## 2020-08-30 RX ADMIN — ACETAMINOPHEN 650 MG: 325 TABLET, FILM COATED ORAL at 08:03

## 2020-08-30 RX ADMIN — ISOSORBIDE MONONITRATE 30 MG: 30 TABLET, EXTENDED RELEASE ORAL at 08:04

## 2020-08-30 RX ADMIN — ASPIRIN 81 MG: 81 TABLET, CHEWABLE ORAL at 08:03

## 2020-08-30 RX ADMIN — FUROSEMIDE 40 MG: 10 INJECTION, SOLUTION INTRAMUSCULAR; INTRAVENOUS at 08:04

## 2020-08-30 RX ADMIN — CARVEDILOL 25 MG: 25 TABLET, FILM COATED ORAL at 08:03

## 2020-08-30 RX ADMIN — CEFTRIAXONE SODIUM 1 G: 1 INJECTION, POWDER, FOR SOLUTION INTRAMUSCULAR; INTRAVENOUS at 00:18

## 2020-08-30 RX ADMIN — LISINOPRIL 5 MG: 5 TABLET ORAL at 08:03

## 2020-08-30 RX ADMIN — ENOXAPARIN SODIUM 90 MG: 100 INJECTION SUBCUTANEOUS at 08:04

## 2020-08-30 RX ADMIN — BENZONATATE 200 MG: 100 CAPSULE ORAL at 00:18

## 2020-08-30 ASSESSMENT — ENCOUNTER SYMPTOMS
EYES NEGATIVE: 1
SHORTNESS OF BREATH: 0
COUGH: 0
RESPIRATORY NEGATIVE: 1
BLOOD IN STOOL: 0
WHEEZING: 0
DIARRHEA: 0
NAUSEA: 0
STRIDOR: 0
GASTROINTESTINAL NEGATIVE: 1
CHEST TIGHTNESS: 0
VOMITING: 0

## 2020-08-30 ASSESSMENT — PAIN DESCRIPTION - PROGRESSION
CLINICAL_PROGRESSION: GRADUALLY IMPROVING
CLINICAL_PROGRESSION: RAPIDLY IMPROVING
CLINICAL_PROGRESSION: GRADUALLY IMPROVING
CLINICAL_PROGRESSION: GRADUALLY IMPROVING

## 2020-08-30 ASSESSMENT — PAIN DESCRIPTION - LOCATION
LOCATION: GENERALIZED
LOCATION: GENERALIZED

## 2020-08-30 ASSESSMENT — PAIN DESCRIPTION - PAIN TYPE
TYPE: ACUTE PAIN;CHRONIC PAIN
TYPE: ACUTE PAIN;CHRONIC PAIN

## 2020-08-30 ASSESSMENT — PAIN SCALES - GENERAL
PAINLEVEL_OUTOF10: 4

## 2020-08-30 ASSESSMENT — PAIN DESCRIPTION - DESCRIPTORS
DESCRIPTORS: DULL
DESCRIPTORS: DULL

## 2020-08-30 NOTE — PROGRESS NOTES
Patient given discharge instructions. No questions or concerns related to discharge. Advised to call the DrKassidy With any questions. Follow up as ordered. Plan discharge today as ordered per Dr. Omkar Vargas and Dr. Rukhsana Zuñiga. No problems voiced. Deleted NCP. Patient left via wc to home with family.

## 2020-08-30 NOTE — PROGRESS NOTES
Held Apresoline. /82. Other HS medication given. Snack provided. Urinal emptied. Watching TV. Call light at side.

## 2020-08-30 NOTE — PROGRESS NOTES
Keith Rust is a 52 y.o. male patient.   Pt was seen and evaluated, feeling better compare to before  Current Facility-Administered Medications   Medication Dose Route Frequency Provider Last Rate Last Dose    furosemide (LASIX) injection 40 mg  40 mg Intravenous Daily Cristina Wilcox MD   40 mg at 08/30/20 0804    enoxaparin (LOVENOX) injection 90 mg  1 mg/kg Subcutaneous BID Emiliana Ritchie MD   90 mg at 08/30/20 0804    aspirin chewable tablet 81 mg  81 mg Oral Daily Sagrario Carrma   81 mg at 08/30/20 0803    sodium chloride flush 0.9 % injection 10 mL  10 mL Intravenous 2 times per day Emiliana Ritchie MD   10 mL at 08/29/20 2140    sodium chloride flush 0.9 % injection 10 mL  10 mL Intravenous PRN Emiliana Ritchie MD        acetaminophen (TYLENOL) tablet 650 mg  650 mg Oral Q6H PRN Emiliana Ritchie MD   650 mg at 08/30/20 0803    Or    acetaminophen (TYLENOL) suppository 650 mg  650 mg Rectal Q6H PRN Emiliana Ritchie MD        polyethylene glycol (GLYCOLAX) packet 17 g  17 g Oral Daily PRN Emiliana Ritchie MD        promethazine (PHENERGAN) tablet 12.5 mg  12.5 mg Oral Q6H PRN Emiliana Ritchie MD        Or    [facility]etron [facility]) injection 4 mg  4 mg Intravenous Q6H PRN Emiliana Ritchie MD        lisinopril (PRINIVIL;ZESTRIL) tablet 5 mg  5 mg Oral Daily Emiliana Ritchie MD   5 mg at 08/30/20 0803    hydrALAZINE (APRESOLINE) injection 10 mg  10 mg Intravenous Q4H PRN Emiliana Ritchie MD   10 mg at 08/26/20 0523    cefTRIAXone (ROCEPHIN) 1 g IVPB in 50 mL D5W minibag  1 g Intravenous Q24H Emiliana Ritchie MD   Stopped at 08/30/20 0050    carvedilol (COREG) tablet 25 mg  25 mg Oral BID  MICAH Carr   25 mg at 08/30/20 1723    hydrALAZINE (APRESOLINE) tablet 25 mg  25 mg Oral 3 times per day MICAH Carr   25 mg at 08/30/20 0509    isosorbide mononitrate (IMDUR) extended release tablet 30 mg  30 mg Oral Daily David Carr   30 mg at 08/30/20 0804    nitroGLYCERIN (NITROSTAT) SL tablet 0.4 mg  0.4 mg Sublingual Q5 Min PRN Uli Ratliff PA-C   0.4 mg at 08/26/20 5277     No Known Allergies  Active Problems:    Acute decompensated heart failure (HCC)    Chest pain  Resolved Problems:    * No resolved hospital problems. *    Blood pressure (!) 141/93, pulse 96, temperature 98.1 °F (36.7 °C), temperature source Oral, resp. rate 17, height 6' (1.829 m), weight 199 lb 8 oz (90.5 kg), SpO2 92 %. Subjective:  Symptoms:  He reports malaise and weakness. No shortness of breath, cough, chest pain, headache, chest pressure, anorexia, diarrhea or anxiety. Diet:  No nausea or vomiting. Objective:  General Appearance: In no acute distress. Vital signs: (most recent): Blood pressure (!) 141/93, pulse 96, temperature 98.1 °F (36.7 °C), temperature source Oral, resp. rate 17, height 6' (1.829 m), weight 199 lb 8 oz (90.5 kg), SpO2 92 %. HEENT: Normal HEENT exam.    Lungs:  Normal effort. Heart: Normal rate. Regular rhythm. S1 normal and S2 normal.    Abdomen: Abdomen is soft. There is no epigastric area tenderness. Neurological: Patient is alert and oriented to person, place and time. Pupils:  Pupils are equal, round, and reactive to light. Skin:  Warm and dry.       Lab Results   Component Value Date    WBC 5.7 08/30/2020    HGB 12.1 (L) 08/30/2020    HCT 35.5 (L) 08/30/2020    MCV 82.3 08/30/2020     (H) 08/30/2020     Lab Results   Component Value Date     08/30/2020    K 3.9 08/30/2020     08/30/2020    CO2 23 08/30/2020    BUN 25 08/30/2020    CREATININE 2.93 08/30/2020    GLUCOSE 87 08/30/2020    CALCIUM 8.8 08/30/2020        Assessment & Plan  1) acute respiratory failure with hypoxia  2) acute systolic HF  Going for Cardiac cath on Monday  Will cancel discharge  Change IV lasix to PO  3)PNa  C/w IV ABx  Repeat pro calcitonin  If negative descalate abx  4) elevated d/dimer CC to address  5) CKD stage 4  Monitor renal function  Renal eval prior to cardiac cath  C/w IV abx  C/w oxygen therapy to keep O2 sat> 90  Lasix changed to once daily  covid negative    Nancy Fan MD  8/30/2020

## 2020-08-30 NOTE — CARE COORDINATION
Pt already dcd and received call from Melvina Delgado at [facility] re order and she is aware pt was dc today. He is to have [facility] .

## 2020-08-30 NOTE — PROGRESS NOTES
Progress Note  Patient: Miquel Schmidt  Unit/Bed: T698/F100-78  YOB: 1970  MRN: [id number]  Acct: [de-identified]   Admitting Diagnosis: Acute decompensated heart failure [facility]) [I50.9]  Admit Date:  8/25/2020  Hospital Day: 4    Chief Complaint:HF PNA     Histories:  Past Medical History:   Diagnosis Date    Abnormal EKG 9/27/2019    Cardiomyopathy [facility] [address]     per echo 8/2019    Chronic combined systolic and diastolic [facility]cong[facility] 9/27/2019    ETOH abuse     Hypertension     Tobacco abuse      History reviewed. No pertinent surgical history.   Family History   Problem Relation Age of Onset    Coronary Art Dis Mother      Social History     Socioeconomic History    Marital status: Single     Spouse name: None    Number of children: None    Years of education: None    Highest education level: None   Occupational History    None   Social Needs    Financial resource strain: None    Food insecurity     Worry: None     Inability: None    Transportation needs     Medical: None     Non-medical: None   Tobacco Use    Smoking status: Current Every Day Smoker     Packs/day: 1.00     Types: Cigarettes    Smokeless tobacco: Never Used    Tobacco comment: 1pack/2 days   Substance and Sexual Activity    Alcohol use: Yes     Frequency: 4 or more times a week     Drinks per session: 7 to 9    Drug use: Never    Sexual activity: None   Lifestyle    Physical activity     Days per week: None     Minutes per session: None    Stress: None   Relationships    Social connections     Talks on phone: None     Gets together: None     Attends Zoroastrian service: None     Active member of club or organization: None     Attends meetings of clubs or organizations: None     Relationship status: None    Intimate partner violence     Fear of current or ex partner: None     Emotionally abused: None     Physically abused: None     Forced sexual activity: None   Other Topics Concern    None Social History Narrative    None       Subjective/HPIfeels well. Wants to go home. No more edema no cp   EKG: SR 80-90        Review of Systems:   Review of Systems   Constitutional: Negative. Negative for diaphoresis and fatigue. HENT: Negative. Eyes: Negative. Respiratory: Negative. Negative for cough, chest tightness, shortness of breath, wheezing and stridor. Cardiovascular: Negative. Negative for chest pain, palpitations and leg swelling. Gastrointestinal: Negative. Negative for blood in stool and nausea. Genitourinary: Negative. Musculoskeletal: Negative. Skin: Negative. Neurological: Negative for syncope, weakness and light-headedness. Hematological: Negative. Psychiatric/Behavioral: Negative. Physical Examination:    BP (!) 141/93   Pulse 96   Temp 98.1 °F (36.7 °C) (Oral)   Resp 17   Ht 6' (1.829 m)   Wt 199 lb 8 oz (90.5 kg)   SpO2 92%   BMI 27.06 kg/m²    Physical Exam   Constitutional: He appears healthy. No distress. HENT:   Normal cephalic and Atraumatic   Eyes: Pupils are equal, round, and reactive to light. Neck: Normal range of motion and thyroid normal. Neck supple. No JVD present. No neck adenopathy. No thyromegaly present. Cardiovascular: Normal rate, regular rhythm, normal heart sounds, intact distal pulses and normal pulses. Pulmonary/Chest: Effort normal and breath sounds normal. He has no wheezes. He has no rales. He exhibits no tenderness. Abdominal: Soft. Bowel sounds are normal. There is no abdominal tenderness. Musculoskeletal: Normal range of motion. General: No tenderness or edema. Neurological: He is alert and oriented to person, place, and time. Skin: Skin is warm. No cyanosis. Nails show no clubbing.        LABS:  CBC:   Lab Results   Component Value Date    WBC 5.7 08/30/2020    RBC 4.31 08/30/2020    HGB 12.1 08/30/2020    HCT 35.5 08/30/2020    MCV 82.3 08/30/2020    MCH 28.1 08/30/2020    MCHC 34.2 08/30/2020    RDW 13.9 08/30/2020     08/30/2020     CBC with Differential:    Lab Results   Component Value Date    WBC 5.7 08/30/2020    RBC 4.31 08/30/2020    HGB 12.1 08/30/2020    HCT 35.5 08/30/2020     08/30/2020    MCV 82.3 08/30/2020    MCH 28.1 08/30/2020    MCHC 34.2 08/30/2020    RDW 13.9 08/30/2020    LYMPHOPCT 17.7 08/30/2020    MONOPCT 11.2 08/30/2020    BASOPCT 1.3 08/30/2020    MONOSABS 0.6 08/30/2020    LYMPHSABS 1.0 08/30/2020    EOSABS 0.3 08/30/2020    BASOSABS 0.1 08/30/2020     CMP:    Lab Results   Component Value Date     08/30/2020    K 3.9 08/30/2020     08/30/2020    CO2 23 08/30/2020    BUN 25 08/30/2020    CREATININE 2.93 08/30/2020    GFRAA 27.9 08/30/2020    LABGLOM 23.0 08/30/2020    GLUCOSE 87 08/30/2020    PROT 6.1 08/25/2020    LABALBU 3.2 08/25/2020    CALCIUM 8.8 08/30/2020    BILITOT 0.4 08/25/2020    ALKPHOS 82 08/25/2020    AST 35 08/25/2020    ALT 36 08/25/2020     BMP:    Lab Results   Component Value Date     08/30/2020    K 3.9 08/30/2020     08/30/2020    CO2 23 08/30/2020    BUN 25 08/30/2020    LABALBU 3.2 08/25/2020    CREATININE 2.93 08/30/2020    CALCIUM 8.8 08/30/2020    GFRAA 27.9 08/30/2020    LABGLOM 23.0 08/30/2020    GLUCOSE 87 08/30/2020     Magnesium:    Lab Results   Component Value Date    MG 2.3 08/30/2020     Troponin:    Lab Results   Component Value Date    TROPONINI 0.029 08/26/2020        Active Hospital Problems    Diagnosis Date Noted    Acute decompensated heart failure ([facility] [I50.9] 08/26/2020     Priority: Low    Chest pain [R07.9]      Priority: Low        Assessment/Plan:  1. SHF- resolved. Compensated. Change to PO Lasix. 2. NICM - EF 25-30%  3. ETOH Abuse  4. Tobacco abuse  5. PNA- on iv ABX  6. CKD - advanced  7. Ascending Ao aneurysm 4.1  8. OK to DC home today. F/u Dr. Carolynn Kessler for ischemic assessment.         Electronically signed by Elena Celis MD on 8/30/2020 at 9:31 AM

## 2020-08-31 ENCOUNTER — TELEPHONE (OUTPATIENT)
Dept: FAMILY MEDICINE CLINIC | Age: 50
End: 2020-08-31

## 2020-08-31 ENCOUNTER — CARE COORDINATION (OUTPATIENT)
Dept: CASE MANAGEMENT | Age: 50
End: 2020-08-31

## 2020-08-31 LAB
BLOOD CULTURE, ROUTINE: NORMAL
CULTURE, BLOOD 2: NORMAL

## 2020-08-31 NOTE — CARE COORDINATION
[facility] Transitions Initial Follow Up Call    Call within 2 business days of discharge: Yes    Patient: Patricio Silva Patient : 1970   MRN: [id number]  Reason for Admission: -2020 [address] Acute systolic HF, PNA, CKD stage 4, BLUE  Discharge Date: 20 RARS: Readmission Risk Score: 16   CV-19 lab negative  CHF clinic 9/3 10:00, PCP 9/3 9:30. Patient contacted regarding [id number] initial screening. Request call back to P: [phone].

## 2020-08-31 NOTE — TELEPHONE ENCOUNTER
Francisca Or called in from [facility] requesting Dr Carlos Villanueva follows them concerning Tacho Medrano. No need for a return call.

## 2020-09-02 ENCOUNTER — CARE COORDINATION (OUTPATIENT)
Dept: CASE MANAGEMENT | Age: 50
End: 2020-09-02

## 2020-09-02 NOTE — CARE COORDINATION
Call within 2 business days of discharge: Yes     Patient: Frannie Buckner          Patient : 1970   MRN: [id number]         Reason for Admission: -2020 [address] Acute systolic HF, PNA, CKD stage 4, BLUE  Discharge Date: 20       RARS: Readmission Risk Score: 16   CV-19 lab negative  CHF clinic 9/3 10:00, PCP 9/3 9:30.     Patient contacted regarding COVID-19 initial screening. Request call back to P: [phone]. Two call attempts. CTN s/o.

## 2020-09-08 ENCOUNTER — TELEPHONE (OUTPATIENT)
Dept: FAMILY MEDICINE CLINIC | Age: 50
End: 2020-09-08

## 2020-09-08 NOTE — TELEPHONE ENCOUNTER
Olive Jasen is calling from home health stating that the patient has an elevated BP, heart rate and a dry cough. The patient stated that he starting having these symptoms after he started taking a new medication. He was discharged from the hospital on 8/25. Please advise.     /110

## 2020-09-10 NOTE — TELEPHONE ENCOUNTER
Please have the patient discontinue the medication that he believes is causing his symptoms. Please schedule the patient a virtual visit as soon as possible so that I can discuss the matter with him.

## 2020-09-15 ENCOUNTER — TELEPHONE (OUTPATIENT)
Dept: FAMILY MEDICINE CLINIC | Age: 50
End: 2020-09-15

## 2020-09-15 NOTE — TELEPHONE ENCOUNTER
Damon Oreilly is having coughing spells. Annie Ryan noted that he is taking lisinopril. He coughs so much his stomach is painful, and he is gagging up clear phlegm.   Please advise

## 2020-09-15 NOTE — TELEPHONE ENCOUNTER
Please discontinue lisinopril. Please coordinate a virtual visit so that I can discuss the matter with the patient.

## 2020-09-17 ENCOUNTER — TELEPHONE (OUTPATIENT)
Dept: FAMILY MEDICINE CLINIC | Age: 50
End: 2020-09-17

## 2020-09-17 ENCOUNTER — VIRTUAL VISIT (OUTPATIENT)
Dept: FAMILY MEDICINE CLINIC | Age: 50
End: 2020-09-17
Payer: MEDICAID

## 2020-09-17 PROCEDURE — 99442 PR PHYS/QHP TELEPHONE EVALUATION 11-20 MIN: CPT | Performed by: INTERNAL MEDICINE

## 2020-09-17 ASSESSMENT — ENCOUNTER SYMPTOMS
RECTAL PAIN: 0
COLOR CHANGE: 0
PHOTOPHOBIA: 0
APNEA: 0
VOICE CHANGE: 0
SINUS PAIN: 0
PHOTOPHOBIA: 0
TROUBLE SWALLOWING: 0
NAUSEA: 0
APNEA: 0
BACK PAIN: 0
EYE ITCHING: 0
ABDOMINAL DISTENTION: 0
VOICE CHANGE: 0
ABDOMINAL PAIN: 0
SORE THROAT: 0
EYE PAIN: 0
DIARRHEA: 0
SINUS PRESSURE: 0
BLOOD IN STOOL: 0
ANAL BLEEDING: 0
VOMITING: 0
WHEEZING: 0
EYE DISCHARGE: 0
RHINORRHEA: 0
COUGH: 0
FACIAL SWELLING: 0
EYE REDNESS: 0
CONSTIPATION: 0
SHORTNESS OF BREATH: 1
CHEST TIGHTNESS: 0
ABDOMINAL DISTENTION: 0
SHORTNESS OF BREATH: 0
EYE DISCHARGE: 0

## 2020-09-17 NOTE — PROGRESS NOTES
Subjective:      Patient ID: Jarret Davenport is a 52 y.o. male who presents today for:  Chief Complaint   Patient presents with    Hypertension       HPI  82-year-old male with history of hypertension, acute kidney injury, and cardiomyopathy presents for follow-up visit. Hypertension: The patient is presently compliant with hydralazine, Coreg and Imdur. He is also receiving Lasix 40 mg orally daily            Regarding his cardiomyopathy  carvedilol 25 mg twice daily, Imdur 30 mg daily, hydralazine 50 tid      Nicotine abuse: The patient states that he has not smoked in 5 days. At present he denies polyuria,  Polydipsia, constitutional, sinus, visual, cardiopulmonary, gastrointestinal, immunologic/hematologic, musculoskeletal, neurologic,dermatologic, or psychiatric complaints. Past Medical History:   Diagnosis Date    Abnormal EKG 9/27/2019    Cardiomyopathy [facility])     per echo 8/2019    Chronic combined systolic and diastolic CHF (congestive heart failure) ([facility] [address] 9/27/2019    ETOH abuse     Hypertension     Tobacco abuse      No past surgical history on file.   Social History     Socioeconomic History    Marital status: Single     Spouse name: Not on file    Number of children: Not on file    Years of education: Not on file    Highest education level: Not on file   Occupational History    Not on file   Social Needs    Financial resource strain: Not on file    Food insecurity     Worry: Not on file     Inability: Not on file    Transportation needs     Medical: Not on file     Non-medical: Not on file   Tobacco Use    Smoking status: Current Every Day Smoker     Packs/day: 1.00     Types: Cigarettes    Smokeless tobacco: Never Used    Tobacco comment: 1pack/2 days   Substance and Sexual Activity    Alcohol use: Yes     Frequency: 4 or more times a week     Drinks per session: 7 to 9    Drug use: Never    Sexual activity: Not on file   Lifestyle    Physical activity     Days per week: Not on file     Minutes per session: Not on file    Stress: Not on file   Relationships    Social connections     Talks on phone: Not on file     Gets together: Not on file     Attends Church service: Not on file     Active member of club or organization: Not on file     Attends meetings of clubs or organizations: Not on file     Relationship status: Not on file    Intimate partner violence     Fear of current or ex partner: Not on file     Emotionally abused: Not on file     Physically abused: Not on file     Forced sexual activity: Not on file   Other Topics Concern    Not on file   Social History Narrative    Not on file     Family History   Problem Relation Age of Onset    Coronary Art Dis Mother      No Known Allergies  Current Outpatient Medications on File Prior to Visit   Medication Sig Dispense Refill    furosemide (LASIX) 40 MG tablet Take 1 tablet by mouth daily 60 tablet 3    hydrALAZINE (APRESOLINE) 50 MG tablet Take 0.5 tablets by mouth 3 times daily 90 tablet 3    carvedilol (COREG) 25 MG tablet Take 1 tablet by mouth 2 times daily (with meals) 60 tablet 3    isosorbide mononitrate (IMDUR) 30 MG extended release tablet Take 1 tablet by mouth daily (Patient taking differently: Take 60 mg by mouth daily Indications: take 2 tabs 30 mg by mouth daily ) 30 tablet 3    aspirin 81 MG EC tablet Take 1 tablet by mouth daily 30 tablet 3     No current facility-administered medications on file prior to visit. Review of Systems   Constitutional: Negative for chills, diaphoresis, fatigue and fever. HENT: Negative for congestion, dental problem, drooling, ear discharge, ear pain, facial swelling, hearing loss, mouth sores, nosebleeds, postnasal drip, rhinorrhea, sinus pressure, sinus pain, sneezing, sore throat, tinnitus, trouble swallowing and voice change. Eyes: Negative for photophobia, pain, discharge, redness, itching and visual disturbance.    Respiratory: Negative for apnea, cough, chest tightness, shortness of breath and wheezing. Cardiovascular: Negative for chest pain, palpitations and leg swelling. Gastrointestinal: Negative for abdominal distention, abdominal pain, blood in stool, constipation, diarrhea, nausea, rectal pain and vomiting. Endocrine: Negative for cold intolerance, heat intolerance, polydipsia, polyphagia and polyuria. Genitourinary: Negative for decreased urine volume, difficulty urinating, dysuria, flank pain, frequency, genital sores, hematuria and urgency. Musculoskeletal: Negative for arthralgias, back pain, gait problem, joint swelling, myalgias, neck pain and neck stiffness. Skin: Negative for color change, rash and wound. Allergic/Immunologic: Negative for environmental allergies and food allergies. Neurological: Negative for dizziness, tremors, seizures, syncope, facial asymmetry, speech difficulty, weakness, light-headedness, numbness and headaches. Hematological: Negative for adenopathy. Does not bruise/bleed easily. Psychiatric/Behavioral: Negative for agitation, confusion, decreased concentration, hallucinations, self-injury, sleep disturbance and suicidal ideas. The patient is not nervous/anxious. Objective: There were no vitals taken for this visit. Physical Exam  Constitutional:       General: He is not in acute distress. Appearance: He is well-developed. HENT:      Head: Normocephalic. Right Ear: External ear normal.      Left Ear: External ear normal.   Eyes:      Conjunctiva/sclera: Conjunctivae normal.   Neck:      Musculoskeletal: Neck supple. Trachea: No tracheal deviation. Cardiovascular:      Rate and Rhythm: Normal rate and regular rhythm. Heart sounds: Normal heart sounds. Pulmonary:      Effort: Pulmonary effort is normal. No respiratory distress. Breath sounds: Normal breath sounds. No wheezing or rales. Chest:      Chest wall: No tenderness.    Abdominal:      General: Bowel sounds are normal. There is no distension. Palpations: Abdomen is soft. There is no mass. Tenderness: There is no abdominal tenderness. There is no guarding. Musculoskeletal:         General: No tenderness or deformity. Skin:     General: Skin is warm and dry. Coloration: Skin is not pale. Findings: No erythema or rash. Neurological:      Mental Status: He is alert and oriented to person, place, and time. Psychiatric:         Thought Content: Thought content normal.         Judgment: Judgment normal.         Assessment:       Diagnosis Orders   1. Essential hypertension     2. Cardiomyopathy, unspecified type [facility])           Plan:      Renae Ortiz was seen today for follow-up. Diagnoses and all orders for this visit:    Essential hypertension-continue hydralazine 25 mg po tid, carvedilol 25 mg bid, lasix 40 mg po daily ,   imdur 30 mg daily . Cardiomyopathy, unspecified type (HCC)-continue Coreg 5 mg twice daily, hydralazine 50 mg orally daily and Imdur 30 mg orally daily. Return in about 2 weeks (around 10/1/2020). Froylan Byrd MD    Please note, this report has been partially produced using speech recognition software  and may cause  and /or contain errors related to that system including grammar, punctuation and spelling as well as words and phrases that may seem inappropriate. If there are questions or concerns please feel free to contact me to clarify.

## 2020-10-05 ENCOUNTER — OFFICE VISIT (OUTPATIENT)
Dept: FAMILY MEDICINE CLINIC | Age: 50
End: 2020-10-05
Payer: MEDICAID

## 2020-10-05 VITALS
DIASTOLIC BLOOD PRESSURE: 80 MMHG | HEIGHT: 69 IN | OXYGEN SATURATION: 99 % | TEMPERATURE: 97 F | SYSTOLIC BLOOD PRESSURE: 140 MMHG | WEIGHT: 206 LBS | HEART RATE: 83 BPM | BODY MASS INDEX: 30.51 KG/M2 | RESPIRATION RATE: 16 BRPM

## 2020-10-05 PROCEDURE — 99213 OFFICE O/P EST LOW 20 MIN: CPT | Performed by: INTERNAL MEDICINE

## 2020-10-05 PROCEDURE — G8427 DOCREV CUR MEDS BY ELIG CLIN: HCPCS | Performed by: INTERNAL MEDICINE

## 2020-10-05 PROCEDURE — G8484 FLU IMMUNIZE NO ADMIN: HCPCS | Performed by: INTERNAL MEDICINE

## 2020-10-05 PROCEDURE — 4004F PT TOBACCO SCREEN RCVD TLK: CPT | Performed by: INTERNAL MEDICINE

## 2020-10-05 PROCEDURE — G8417 CALC BMI ABV UP PARAM F/U: HCPCS | Performed by: INTERNAL MEDICINE

## 2020-10-05 RX ORDER — CHLORTHALIDONE 25 MG/1
25 TABLET ORAL DAILY
Status: ON HOLD | COMMUNITY
End: 2020-11-07 | Stop reason: HOSPADM

## 2020-10-05 SDOH — ECONOMIC STABILITY: TRANSPORTATION INSECURITY
IN THE PAST 12 MONTHS, HAS THE LACK OF TRANSPORTATION KEPT YOU FROM MEDICAL APPOINTMENTS OR FROM GETTING MEDICATIONS?: NO

## 2020-10-05 SDOH — ECONOMIC STABILITY: INCOME INSECURITY: HOW HARD IS IT FOR YOU TO PAY FOR THE VERY BASICS LIKE FOOD, HOUSING, MEDICAL CARE, AND HEATING?: NOT HARD AT ALL

## 2020-10-05 SDOH — ECONOMIC STABILITY: TRANSPORTATION INSECURITY
IN THE PAST 12 MONTHS, HAS LACK OF TRANSPORTATION KEPT YOU FROM MEETINGS, WORK, OR FROM GETTING THINGS NEEDED FOR DAILY LIVING?: NO

## 2020-10-05 SDOH — ECONOMIC STABILITY: FOOD INSECURITY: WITHIN THE PAST 12 MONTHS, THE FOOD YOU BOUGHT JUST DIDN'T LAST AND YOU DIDN'T HAVE MONEY TO GET MORE.: NEVER TRUE

## 2020-10-05 SDOH — ECONOMIC STABILITY: FOOD INSECURITY: WITHIN THE PAST 12 MONTHS, YOU WORRIED THAT YOUR FOOD WOULD RUN OUT BEFORE YOU GOT MONEY TO BUY MORE.: NEVER TRUE

## 2020-10-05 ASSESSMENT — ENCOUNTER SYMPTOMS
CONSTIPATION: 0
VOMITING: 0
BACK PAIN: 0
SINUS PRESSURE: 0
ABDOMINAL DISTENTION: 0
EYE DISCHARGE: 0
SINUS PAIN: 0
COUGH: 0
SORE THROAT: 0
RECTAL PAIN: 0
EYE PAIN: 0
NAUSEA: 0
DIARRHEA: 0
PHOTOPHOBIA: 0
SHORTNESS OF BREATH: 0
BLOOD IN STOOL: 0
CHEST TIGHTNESS: 0
EYE REDNESS: 0
RHINORRHEA: 0
COLOR CHANGE: 0
FACIAL SWELLING: 0
EYE ITCHING: 0
TROUBLE SWALLOWING: 0
APNEA: 0
VOICE CHANGE: 0
WHEEZING: 0
ABDOMINAL PAIN: 0

## 2020-10-05 ASSESSMENT — PATIENT HEALTH QUESTIONNAIRE - PHQ9
1. LITTLE INTEREST OR PLEASURE IN DOING THINGS: 0
2. FEELING DOWN, DEPRESSED OR HOPELESS: 0
SUM OF ALL RESPONSES TO PHQ QUESTIONS 1-9: 0
SUM OF ALL RESPONSES TO PHQ9 QUESTIONS 1 & 2: 0
SUM OF ALL RESPONSES TO PHQ QUESTIONS 1-9: 0

## 2020-10-05 NOTE — LETTER
[facility]  [address] [facility] [address]  Phone: [phone]  Fax: [phone]    Pascual Thomson MD        October 5, 2020     Patient: Evy Rock   YOB: 1970   Date of Visit: 10/5/2020       To Whom It May Concern: It is my medical opinion that Flora Nair may return to work on 10/06/2020. If you have any questions or concerns, please don't hesitate to call.     Sincerely,        Pascual Thomson MD

## 2020-10-05 NOTE — PROGRESS NOTES
Subjective:      Patient ID: Cass Puente is a 52 y.o. male who presents today for:  Chief Complaint   Patient presents with    Hypertension     2 week        Hypertension   Pertinent negatives include no chest pain, headaches, neck pain, palpitations or shortness of breath. 51-year-old male with history of hypertension, acute kidney injury, and cardiomyopathy presents for follow-up visit. Hypertension: The patient is presently compliant with hydralazine, Coreg and Imdur. He is also receiving Lasix 40 mg orally daily  The patient is presently receiving hydralazine 25 mg 3 times daily to control his blood pressure. His blood pressure remains uncontrolled at this time. Regarding his cardiomyopathy  carvedilol 25 mg twice daily, Imdur 30 mg daily, hydralazine 50 tid      Nicotine abuse: The patient states that he has not smoked in 5 days. At present he denies polyuria,  Polydipsia, constitutional, sinus, visual, cardiopulmonary, gastrointestinal, immunologic/hematologic, musculoskeletal, neurologic,dermatologic, or psychiatric complaints. Past Medical History:   Diagnosis Date    Abnormal EKG 9/27/2019    Cardiomyopathy [facility])     per echo 8/2019    Chronic combined systolic and diastolic CHF (congestive heart failure) ([facility] [address] 9/27/2019    ETOH abuse     Hypertension     Tobacco abuse      No past surgical history on file.   Social History     Socioeconomic History    Marital status: Single     Spouse name: Not on file    Number of children: Not on file    Years of education: Not on file    Highest education level: Not on file   Occupational History    Not on file   Social Needs    Financial resource strain: Not hard at all    Food insecurity     Worry: Never true     Inability: Never true   [employer] needs     Medical: No     Non-medical: No   Tobacco Use    Smoking status: Current Every Day Smoker     Packs/day: 1.00     Types: Cigarettes    Smokeless tobacco: Never Used    Tobacco comment: 1pack/2 days   Substance and Sexual Activity    Alcohol use: Yes     Frequency: 4 or more times a week     Drinks per session: 7 to 9    Drug use: Never    Sexual activity: Not on file   Lifestyle    Physical activity     Days per week: Not on file     Minutes per session: Not on file    Stress: Not on file   Relationships    Social connections     Talks on phone: Not on file     Gets together: Not on file     Attends [employer] service: Not on file     Active member of club or organization: Not on file     Attends meetings of clubs or organizations: Not on file     Relationship status: Not on file    Intimate partner violence     Fear of current or ex partner: Not on file     Emotionally abused: Not on file     Physically abused: Not on file     Forced sexual activity: Not on file   Other Topics Concern    Not on file   Social History Narrative    Not on file     Family History   Problem Relation Age of Onset    Coronary Art Dis Mother      No Known Allergies  Current Outpatient Medications on File Prior to Visit   Medication Sig Dispense Refill    chlorthalidone (HYGROTON) 25 MG tablet Take 25 mg by mouth daily      furosemide (LASIX) 40 MG tablet Take 1 tablet by mouth daily 60 tablet 3    hydrALAZINE (APRESOLINE) 50 MG tablet Take 0.5 tablets by mouth 3 times daily 90 tablet 3    carvedilol (COREG) 25 MG tablet Take 1 tablet by mouth 2 times daily (with meals) 60 tablet 3    isosorbide mononitrate (IMDUR) 30 MG extended release tablet Take 1 tablet by mouth daily (Patient taking differently: Take 60 mg by mouth daily Indications: take 2 tabs 30 mg by mouth daily ) 30 tablet 3    aspirin 81 MG EC tablet Take 1 tablet by mouth daily 30 tablet 3     No current facility-administered medications on file prior to visit. Review of Systems   Constitutional: Negative for chills, diaphoresis, fatigue and fever.    HENT: Negative for congestion, dental problem, drooling, ear discharge, ear pain, facial swelling, hearing loss, mouth sores, nosebleeds, postnasal drip, rhinorrhea, sinus pressure, sinus pain, sneezing, sore throat, tinnitus, trouble swallowing and voice change. Eyes: Negative for photophobia, pain, discharge, redness, itching and visual disturbance. Respiratory: Negative for apnea, cough, chest tightness, shortness of breath and wheezing. Cardiovascular: Negative for chest pain, palpitations and leg swelling. Gastrointestinal: Negative for abdominal distention, abdominal pain, blood in stool, constipation, diarrhea, nausea, rectal pain and vomiting. Endocrine: Negative for cold intolerance, heat intolerance, polydipsia, polyphagia and polyuria. Genitourinary: Negative for decreased urine volume, difficulty urinating, dysuria, flank pain, frequency, genital sores, hematuria and urgency. Musculoskeletal: Negative for arthralgias, back pain, gait problem, joint swelling, myalgias, neck pain and neck stiffness. Skin: Negative for color change, rash and wound. Allergic/Immunologic: Negative for environmental allergies and food allergies. Neurological: Negative for dizziness, tremors, seizures, syncope, facial asymmetry, speech difficulty, weakness, light-headedness, numbness and headaches. Hematological: Negative for adenopathy. Does not bruise/bleed easily. Psychiatric/Behavioral: Negative for agitation, confusion, decreased concentration, hallucinations, self-injury, sleep disturbance and suicidal ideas. The patient is not nervous/anxious. Objective:   BP (!) 140/80   Pulse 83   Temp 97 °F (36.1 °C)   Resp 16   Ht 5' 9\" (1.753 m)   Wt 206 lb (93.4 kg)   SpO2 99%   BMI 30.42 kg/m²     Physical Exam  Constitutional:       General: He is not in acute distress. Appearance: He is well-developed. HENT:      Head: Normocephalic.       Right Ear: External ear normal.      Left Ear: External ear normal.   Eyes:      Conjunctiva/sclera: Conjunctivae normal.   Neck:      Musculoskeletal: Neck supple. Trachea: No tracheal deviation. Cardiovascular:      Rate and Rhythm: Normal rate and regular rhythm. Heart sounds: Normal heart sounds. Pulmonary:      Effort: Pulmonary effort is normal. No respiratory distress. Breath sounds: Normal breath sounds. No wheezing or rales. Chest:      Chest wall: No tenderness. Abdominal:      General: Bowel sounds are normal. There is no distension. Palpations: Abdomen is soft. There is no mass. Tenderness: There is no abdominal tenderness. There is no guarding. Musculoskeletal:         General: No tenderness or deformity. Skin:     General: Skin is warm and dry. Coloration: Skin is not pale. Findings: No erythema or rash. Neurological:      Mental Status: He is alert and oriented to person, place, and time. Psychiatric:         Thought Content: Thought content normal.         Judgment: Judgment normal.         Assessment:       Diagnosis Orders   1. Right inguinal pain  CT ABDOMEN PELVIS WO CONTRAST Additional Contrast? None   2. Essential hypertension  CT ABDOMEN PELVIS WO CONTRAST Additional Contrast? None         Plan:      Tayler Peralta was seen today for follow-up. Diagnoses and all orders for this visit:    Essential hypertension- increase hydralazine to 50 mg po tid, carvedilol 25 mg bid, lasix 40 mg po daily ,   imdur 30 mg daily . Cardiomyopathy, unspecified type (HCC)-continue Coreg 5 mg twice daily, hydralazine 50 mg orally daily and Imdur 30 mg orally daily. Return in about 1 week (around 10/12/2020). Jessy Elizabeth MD    Please note, this report has been partially produced using speech recognition software  and may cause  and /or contain errors related to that system including grammar, punctuation and spelling as well as words and phrases that may seem inappropriate.  If there are questions or concerns please feel free to contact me to clarify.

## 2020-11-03 ENCOUNTER — HOSPITAL ENCOUNTER (INPATIENT)
Age: 50
LOS: 4 days | Discharge: HOME | DRG: 192 | End: 2020-11-07
Attending: INTERNAL MEDICINE | Admitting: INTERNAL MEDICINE
Payer: MEDICAID

## 2020-11-03 ENCOUNTER — APPOINTMENT (OUTPATIENT)
Dept: GENERAL RADIOLOGY | Age: 50
DRG: 192 | End: 2020-11-03
Payer: MEDICAID

## 2020-11-03 PROBLEM — J81.0 PULMONARY EDEMA, ACUTE (HCC): Status: ACTIVE | Noted: 2020-11-03

## 2020-11-03 LAB
ALBUMIN SERPL-MCNC: 3.5 G/DL (ref 3.5–4.6)
ALP BLD-CCNC: 81 U/L (ref 35–104)
ALT SERPL-CCNC: 22 U/L (ref 0–41)
AMPHETAMINE SCREEN, URINE: ABNORMAL
ANION GAP SERPL CALCULATED.3IONS-SCNC: 9 MEQ/L (ref 9–15)
APTT: 33.3 SEC (ref 24.4–36.8)
AST SERPL-CCNC: 18 U/L (ref 0–40)
BARBITURATE SCREEN URINE: ABNORMAL
BASOPHILS ABSOLUTE: 0.1 K/UL (ref 0–0.2)
BASOPHILS RELATIVE PERCENT: 0.7 %
BENZODIAZEPINE SCREEN, URINE: ABNORMAL
BILIRUB SERPL-MCNC: 0.6 MG/DL (ref 0.2–0.7)
BUN BLDV-MCNC: 31 MG/DL (ref 6–20)
CALCIUM SERPL-MCNC: 8.7 MG/DL (ref 8.5–9.9)
CANNABINOID SCREEN URINE: ABNORMAL
CHLORIDE BLD-SCNC: 107 MEQ/L (ref 95–107)
CO2: 22 MEQ/L (ref 20–31)
COCAINE METABOLITE SCREEN URINE: ABNORMAL
CREAT SERPL-MCNC: 2.91 MG/DL (ref 0.7–1.2)
EKG ATRIAL RATE: 117 BPM
EKG ATRIAL RATE: 95 BPM
EKG P AXIS: 56 DEGREES
EKG P AXIS: 58 DEGREES
EKG P-R INTERVAL: 174 MS
EKG P-R INTERVAL: 214 MS
EKG Q-T INTERVAL: 316 MS
EKG Q-T INTERVAL: 396 MS
EKG QRS DURATION: 94 MS
EKG QRS DURATION: 96 MS
EKG QTC CALCULATION (BAZETT): 440 MS
EKG QTC CALCULATION (BAZETT): 497 MS
EKG R AXIS: -6 DEGREES
EKG R AXIS: 22 DEGREES
EKG T AXIS: 106 DEGREES
EKG T AXIS: 223 DEGREES
EKG VENTRICULAR RATE: 117 BPM
EKG VENTRICULAR RATE: 95 BPM
EOSINOPHILS ABSOLUTE: 0.1 K/UL (ref 0–0.7)
EOSINOPHILS RELATIVE PERCENT: 1.3 %
GFR AFRICAN AMERICAN: 28.1
GFR NON-AFRICAN AMERICAN: 23.2
GLOBULIN: 2.6 G/DL (ref 2.3–3.5)
GLUCOSE BLD-MCNC: 106 MG/DL (ref 70–99)
HCT VFR BLD CALC: 36.9 % (ref 42–52)
HEMOGLOBIN: 12.7 G/DL (ref 14–18)
INR BLD: 1.1
LYMPHOCYTES ABSOLUTE: 0.8 K/UL (ref 1–4.8)
LYMPHOCYTES RELATIVE PERCENT: 7.1 %
Lab: ABNORMAL
MCH RBC QN AUTO: 27.6 PG (ref 27–31.3)
MCHC RBC AUTO-ENTMCNC: 34.2 % (ref 33–37)
MCV RBC AUTO: 80.7 FL (ref 80–100)
METHADONE SCREEN, URINE: ABNORMAL
MONOCYTES ABSOLUTE: 0.6 K/UL (ref 0.2–0.8)
MONOCYTES RELATIVE PERCENT: 5.9 %
NEUTROPHILS ABSOLUTE: 9 K/UL (ref 1.4–6.5)
NEUTROPHILS RELATIVE PERCENT: 85 %
OPIATE SCREEN URINE: POSITIVE
OXYCODONE URINE: ABNORMAL
PDW BLD-RTO: 15.6 % (ref 11.5–14.5)
PHENCYCLIDINE SCREEN URINE: ABNORMAL
PLATELET # BLD: 455 K/UL (ref 130–400)
POTASSIUM SERPL-SCNC: 3.9 MEQ/L (ref 3.4–4.9)
PRO-BNP: NORMAL PG/ML
PROPOXYPHENE SCREEN: ABNORMAL
PROTHROMBIN TIME: 14.2 SEC (ref 12.3–14.9)
RBC # BLD: 4.58 M/UL (ref 4.7–6.1)
SODIUM BLD-SCNC: 138 MEQ/L (ref 135–144)
TOTAL PROTEIN: 6.1 G/DL (ref 6.3–8)
TROPONIN: 0.03 NG/ML (ref 0–0.01)
WBC # BLD: 10.6 K/UL (ref 4.8–10.8)

## 2020-11-03 PROCEDURE — 83880 ASSAY OF NATRIURETIC PEPTIDE: CPT

## 2020-11-03 PROCEDURE — 6360000002 HC RX W HCPCS: Performed by: PERSONAL EMERGENCY RESPONSE ATTENDANT

## 2020-11-03 PROCEDURE — 93010 ELECTROCARDIOGRAM REPORT: CPT | Performed by: INTERNAL MEDICINE

## 2020-11-03 PROCEDURE — 85610 PROTHROMBIN TIME: CPT

## 2020-11-03 PROCEDURE — 85730 THROMBOPLASTIN TIME PARTIAL: CPT

## 2020-11-03 PROCEDURE — 6370000000 HC RX 637 (ALT 250 FOR IP): Performed by: PERSONAL EMERGENCY RESPONSE ATTENDANT

## 2020-11-03 PROCEDURE — 36415 COLL VENOUS BLD VENIPUNCTURE: CPT

## 2020-11-03 PROCEDURE — 99291 CRITICAL CARE FIRST HOUR: CPT | Performed by: INTERNAL MEDICINE

## 2020-11-03 PROCEDURE — 93005 ELECTROCARDIOGRAM TRACING: CPT | Performed by: INTERNAL MEDICINE

## 2020-11-03 PROCEDURE — 2580000003 HC RX 258: Performed by: PERSONAL EMERGENCY RESPONSE ATTENDANT

## 2020-11-03 PROCEDURE — 96376 TX/PRO/DX INJ SAME DRUG ADON: CPT

## 2020-11-03 PROCEDURE — 6360000002 HC RX W HCPCS: Performed by: INTERNAL MEDICINE

## 2020-11-03 PROCEDURE — 85025 COMPLETE CBC W/AUTO DIFF WBC: CPT

## 2020-11-03 PROCEDURE — 2500000003 HC RX 250 WO HCPCS: Performed by: PERSONAL EMERGENCY RESPONSE ATTENDANT

## 2020-11-03 PROCEDURE — 6370000000 HC RX 637 (ALT 250 FOR IP): Performed by: INTERNAL MEDICINE

## 2020-11-03 PROCEDURE — 2060000000 HC ICU INTERMEDIATE R&B

## 2020-11-03 PROCEDURE — 96375 TX/PRO/DX INJ NEW DRUG ADDON: CPT

## 2020-11-03 PROCEDURE — 71045 X-RAY EXAM CHEST 1 VIEW: CPT

## 2020-11-03 PROCEDURE — 99285 EMERGENCY DEPT VISIT HI MDM: CPT

## 2020-11-03 PROCEDURE — 84484 ASSAY OF TROPONIN QUANT: CPT

## 2020-11-03 PROCEDURE — 80053 COMPREHEN METABOLIC PANEL: CPT

## 2020-11-03 PROCEDURE — 96374 THER/PROPH/DIAG INJ IV PUSH: CPT

## 2020-11-03 PROCEDURE — 80307 DRUG TEST PRSMV CHEM ANLYZR: CPT

## 2020-11-03 PROCEDURE — 93005 ELECTROCARDIOGRAM TRACING: CPT | Performed by: PERSONAL EMERGENCY RESPONSE ATTENDANT

## 2020-11-03 PROCEDURE — 99223 1ST HOSP IP/OBS HIGH 75: CPT | Performed by: INTERNAL MEDICINE

## 2020-11-03 RX ORDER — ONDANSETRON 2 MG/ML
4 INJECTION INTRAMUSCULAR; INTRAVENOUS EVERY 6 HOURS PRN
Status: DISCONTINUED | OUTPATIENT
Start: 2020-11-03 | End: 2020-11-07 | Stop reason: HOSPADM

## 2020-11-03 RX ORDER — ACETAMINOPHEN 325 MG/1
650 TABLET ORAL EVERY 6 HOURS PRN
Status: DISCONTINUED | OUTPATIENT
Start: 2020-11-03 | End: 2020-11-07 | Stop reason: HOSPADM

## 2020-11-03 RX ORDER — LABETALOL HYDROCHLORIDE 5 MG/ML
20 INJECTION, SOLUTION INTRAVENOUS EVERY 4 HOURS PRN
Status: DISCONTINUED | OUTPATIENT
Start: 2020-11-03 | End: 2020-11-07 | Stop reason: HOSPADM

## 2020-11-03 RX ORDER — LABETALOL HYDROCHLORIDE 5 MG/ML
20 INJECTION, SOLUTION INTRAVENOUS ONCE
Status: COMPLETED | OUTPATIENT
Start: 2020-11-03 | End: 2020-11-03

## 2020-11-03 RX ORDER — MORPHINE SULFATE 2 MG/ML
4 INJECTION, SOLUTION INTRAMUSCULAR; INTRAVENOUS ONCE
Status: COMPLETED | OUTPATIENT
Start: 2020-11-03 | End: 2020-11-03

## 2020-11-03 RX ORDER — HYDRALAZINE HYDROCHLORIDE 25 MG/1
25 TABLET, FILM COATED ORAL 3 TIMES DAILY
Status: DISCONTINUED | OUTPATIENT
Start: 2020-11-03 | End: 2020-11-05

## 2020-11-03 RX ORDER — ONDANSETRON 2 MG/ML
4 INJECTION INTRAMUSCULAR; INTRAVENOUS ONCE
Status: COMPLETED | OUTPATIENT
Start: 2020-11-03 | End: 2020-11-03

## 2020-11-03 RX ORDER — POLYETHYLENE GLYCOL 3350 17 G/17G
17 POWDER, FOR SOLUTION ORAL DAILY PRN
Status: DISCONTINUED | OUTPATIENT
Start: 2020-11-03 | End: 2020-11-07 | Stop reason: HOSPADM

## 2020-11-03 RX ORDER — PROMETHAZINE HYDROCHLORIDE 12.5 MG/1
12.5 TABLET ORAL EVERY 6 HOURS PRN
Status: DISCONTINUED | OUTPATIENT
Start: 2020-11-03 | End: 2020-11-07 | Stop reason: HOSPADM

## 2020-11-03 RX ORDER — ACETAMINOPHEN 650 MG/1
650 SUPPOSITORY RECTAL EVERY 6 HOURS PRN
Status: DISCONTINUED | OUTPATIENT
Start: 2020-11-03 | End: 2020-11-07 | Stop reason: HOSPADM

## 2020-11-03 RX ORDER — FUROSEMIDE 10 MG/ML
40 INJECTION INTRAMUSCULAR; INTRAVENOUS ONCE
Status: COMPLETED | OUTPATIENT
Start: 2020-11-03 | End: 2020-11-03

## 2020-11-03 RX ORDER — ASPIRIN 81 MG/1
81 TABLET ORAL DAILY
Status: DISCONTINUED | OUTPATIENT
Start: 2020-11-03 | End: 2020-11-07 | Stop reason: HOSPADM

## 2020-11-03 RX ORDER — SODIUM CHLORIDE 0.9 % (FLUSH) 0.9 %
10 SYRINGE (ML) INJECTION EVERY 12 HOURS SCHEDULED
Status: DISCONTINUED | OUTPATIENT
Start: 2020-11-03 | End: 2020-11-07 | Stop reason: HOSPADM

## 2020-11-03 RX ORDER — CARVEDILOL 25 MG/1
25 TABLET ORAL 2 TIMES DAILY WITH MEALS
Status: DISCONTINUED | OUTPATIENT
Start: 2020-11-03 | End: 2020-11-07 | Stop reason: HOSPADM

## 2020-11-03 RX ORDER — NITROGLYCERIN 20 MG/100ML
50 INJECTION INTRAVENOUS CONTINUOUS
Status: DISCONTINUED | OUTPATIENT
Start: 2020-11-03 | End: 2020-11-03

## 2020-11-03 RX ORDER — HYDRALAZINE HYDROCHLORIDE 50 MG/1
50 TABLET, FILM COATED ORAL ONCE
Status: COMPLETED | OUTPATIENT
Start: 2020-11-03 | End: 2020-11-03

## 2020-11-03 RX ORDER — METOPROLOL TARTRATE 5 MG/5ML
5 INJECTION INTRAVENOUS EVERY 5 MIN PRN
Status: COMPLETED | OUTPATIENT
Start: 2020-11-03 | End: 2020-11-03

## 2020-11-03 RX ORDER — HYDRALAZINE HYDROCHLORIDE 20 MG/ML
10 INJECTION INTRAMUSCULAR; INTRAVENOUS ONCE
Status: DISCONTINUED | OUTPATIENT
Start: 2020-11-03 | End: 2020-11-03

## 2020-11-03 RX ORDER — ISOSORBIDE MONONITRATE 30 MG/1
60 TABLET, EXTENDED RELEASE ORAL DAILY
Status: DISCONTINUED | OUTPATIENT
Start: 2020-11-03 | End: 2020-11-07 | Stop reason: HOSPADM

## 2020-11-03 RX ORDER — SODIUM CHLORIDE 0.9 % (FLUSH) 0.9 %
10 SYRINGE (ML) INJECTION PRN
Status: DISCONTINUED | OUTPATIENT
Start: 2020-11-03 | End: 2020-11-07 | Stop reason: HOSPADM

## 2020-11-03 RX ORDER — ASPIRIN 81 MG/1
81 TABLET, CHEWABLE ORAL DAILY
Status: DISCONTINUED | OUTPATIENT
Start: 2020-11-03 | End: 2020-11-03 | Stop reason: SDUPTHER

## 2020-11-03 RX ORDER — FUROSEMIDE 10 MG/ML
20 INJECTION INTRAMUSCULAR; INTRAVENOUS 2 TIMES DAILY
Status: DISCONTINUED | OUTPATIENT
Start: 2020-11-03 | End: 2020-11-04

## 2020-11-03 RX ADMIN — METOPROLOL TARTRATE 5 MG: 5 INJECTION, SOLUTION INTRAVENOUS at 05:31

## 2020-11-03 RX ADMIN — FUROSEMIDE 40 MG: 10 INJECTION, SOLUTION INTRAMUSCULAR; INTRAVENOUS at 01:46

## 2020-11-03 RX ADMIN — HYDRALAZINE HYDROCHLORIDE 25 MG: 25 TABLET, FILM COATED ORAL at 20:05

## 2020-11-03 RX ADMIN — MORPHINE SULFATE 4 MG: 2 INJECTION, SOLUTION INTRAMUSCULAR; INTRAVENOUS at 00:45

## 2020-11-03 RX ADMIN — ISOSORBIDE MONONITRATE 60 MG: 30 TABLET, EXTENDED RELEASE ORAL at 09:27

## 2020-11-03 RX ADMIN — CARVEDILOL 25 MG: 25 TABLET, FILM COATED ORAL at 09:27

## 2020-11-03 RX ADMIN — ASPIRIN 81 MG: 81 TABLET, COATED ORAL at 09:27

## 2020-11-03 RX ADMIN — CARVEDILOL 25 MG: 25 TABLET, FILM COATED ORAL at 18:54

## 2020-11-03 RX ADMIN — LABETALOL HYDROCHLORIDE 20 MG: 5 INJECTION, SOLUTION INTRAVENOUS at 05:53

## 2020-11-03 RX ADMIN — METOPROLOL TARTRATE 5 MG: 5 INJECTION, SOLUTION INTRAVENOUS at 02:09

## 2020-11-03 RX ADMIN — Medication 10 ML: at 20:05

## 2020-11-03 RX ADMIN — ENOXAPARIN SODIUM 40 MG: 40 INJECTION SUBCUTANEOUS at 09:28

## 2020-11-03 RX ADMIN — HYDRALAZINE HYDROCHLORIDE 25 MG: 25 TABLET, FILM COATED ORAL at 09:27

## 2020-11-03 RX ADMIN — HYDRALAZINE HYDROCHLORIDE 25 MG: 25 TABLET, FILM COATED ORAL at 14:05

## 2020-11-03 RX ADMIN — FUROSEMIDE 20 MG: 10 INJECTION, SOLUTION INTRAVENOUS at 09:29

## 2020-11-03 RX ADMIN — METOPROLOL TARTRATE 5 MG: 5 INJECTION, SOLUTION INTRAVENOUS at 01:04

## 2020-11-03 RX ADMIN — ONDANSETRON 4 MG: 2 INJECTION INTRAMUSCULAR; INTRAVENOUS at 00:45

## 2020-11-03 RX ADMIN — FUROSEMIDE 20 MG: 10 INJECTION, SOLUTION INTRAVENOUS at 18:54

## 2020-11-03 RX ADMIN — NITROGLYCERIN 5 MCG/MIN: 20 INJECTION INTRAVENOUS at 04:17

## 2020-11-03 RX ADMIN — NITROGLYCERIN 1 INCH: 20 OINTMENT TOPICAL at 00:45

## 2020-11-03 RX ADMIN — HYDRALAZINE HYDROCHLORIDE 50 MG: 50 TABLET, FILM COATED ORAL at 02:39

## 2020-11-03 ASSESSMENT — ENCOUNTER SYMPTOMS
DIARRHEA: 0
NAUSEA: 0
WHEEZING: 0
RHINORRHEA: 0
EYES NEGATIVE: 1
BLOOD IN STOOL: 0
VOMITING: 0
ALLERGIC/IMMUNOLOGIC NEGATIVE: 1
SORE THROAT: 0
SHORTNESS OF BREATH: 1
ABDOMINAL PAIN: 0
COLOR CHANGE: 0
COUGH: 0
GASTROINTESTINAL NEGATIVE: 1

## 2020-11-03 ASSESSMENT — PAIN SCALES - GENERAL
PAINLEVEL_OUTOF10: 4
PAINLEVEL_OUTOF10: 0

## 2020-11-03 ASSESSMENT — PATIENT HEALTH QUESTIONNAIRE - PHQ9: SUM OF ALL RESPONSES TO PHQ QUESTIONS 1-9: 0

## 2020-11-03 NOTE — H&P
strain: Not hard at all    Food insecurity     Worry: Never true     Inability: Never true    Transportation needs     Medical: No     Non-medical: No   Tobacco Use    Smoking status: Current Every Day Smoker     Packs/day: 1.00     Types: Cigarettes    Smokeless tobacco: Never Used    Tobacco comment: 1pack/2 days   Substance and Sexual Activity    Alcohol use: Yes     Frequency: 4 or more times a week     Drinks per session: 7 to 9    Drug use: Never    Sexual activity: Not on file   Lifestyle    Physical activity     Days per week: Not on file     Minutes per session: Not on file    Stress: Not on file   Relationships    Social connections     Talks on phone: Not on file     Gets together: Not on file     Attends Pentecostalism service: Not on file     Active member of club or organization: Not on file     Attends meetings of clubs or organizations: Not on file     Relationship status: Not on file    Intimate partner violence     Fear of current or ex partner: Not on file     Emotionally abused: Not on file     Physically abused: Not on file     Forced sexual activity: Not on file   Other Topics Concern    Not on file   Social History Narrative    Not on file       Family History   Problem Relation Age of Onset    Coronary Art Dis Mother        Current Facility-Administered Medications   Medication Dose Route Frequency Provider Last Rate Last Dose    sodium chloride flush 0.9 % injection 10 mL  10 mL Intravenous 2 times per day MICAH Hernandez   Stopped at 11/03/20 0935    sodium chloride flush 0.9 % injection 10 mL  10 mL Intravenous PRN MICAH Hernandez        acetaminophen (TYLENOL) tablet 650 mg  650 mg Oral Q6H PRN MICAH Hernandez        Or    acetaminophen (TYLENOL) suppository 650 mg  650 mg Rectal Q6H PRN MICAH Hernandez        polyethylene glycol (GLYCOLAX) packet 17 g  17 g Oral Daily PRN MICAH Hernandez        promethazine (PHENERGAN) tablet 12.5 mg  12.5 mg Oral Q6H PRN MICAH Hernandez        Or    ondansetron [facility]) injection 4 mg  4 mg Intravenous Q6H PRN MICAH Hernandez        enoxaparin (LOVENOX) injection 40 mg  40 mg Subcutaneous Daily David Hernandez   40 mg at 11/03/20 0928    aspirin EC tablet 81 mg  81 mg Oral Daily [facility] Holiday, DO   81 mg at 11/03/20 4453    carvedilol (COREG) tablet 25 mg  25 mg Oral BID [facility] J Holiday, DO   25 mg at 11/03/20 8694    hydrALAZINE (APRESOLINE) tablet 25 mg  25 mg Oral TID [facility] Holiday, DO   25 mg at 11/03/20 1405    isosorbide mononitrate (IMDUR) extended release tablet 60 mg  60 mg Oral Daily [facility] Holiday, DO   60 mg at 11/03/20 8848    labetalol (NORMODYNE;TRANDATE) injection 20 mg  20 mg Intravenous Q4H PRN [facility] Holiday, DO        furosemide (LASIX) injection 20 mg  20 mg Intravenous BID [facility] Holiday, DO   20 mg at 11/03/20 2612       ALLERGIES: Patient has no known allergies. Review of Systems   Constitutional: Negative. Negative for chills and fever. HENT: Negative. Eyes: Negative. Respiratory: Positive for shortness of breath. Negative for wheezing. Cardiovascular: Positive for leg swelling. Negative for chest pain and palpitations. Gastrointestinal: Negative. Negative for abdominal pain, nausea and vomiting. Genitourinary: Negative. Musculoskeletal: Negative. Skin: Negative. Negative for rash. Allergic/Immunologic: Negative. Neurological: Negative for dizziness, weakness and headaches. Psychiatric/Behavioral: Negative. VITALS:  Blood pressure (!) 135/98, pulse 102, temperature 97.3 °F (36.3 °C), temperature source Oral, resp. rate 20, height 6' (1.829 m), weight 260 lb (117.9 kg), SpO2 98 %. Body mass index is 35.26 kg/m². Physical Exam   Constitutional: He is oriented to person, place, and time. He appears well-developed and well-nourished. HENT:   Head: Normocephalic and atraumatic. Eyes: Pupils are equal, round, and reactive to light.    Neck: Normal range of motion. Neck supple. No JVD present. No tracheal deviation present. No thyromegaly present. Cardiovascular: Normal rate, regular rhythm, normal heart sounds and intact distal pulses. PMI is not displaced. Exam reveals no gallop, no S3, no distant heart sounds and no friction rub. No murmur heard. Pulmonary/Chest: No respiratory distress. He has no wheezes. He has no rales. He exhibits no tenderness. Abdominal: Soft. Bowel sounds are normal. He exhibits no distension and no mass. There is no abdominal tenderness. There is no rebound and no guarding. Musculoskeletal:         General: Edema present. Neurological: He is alert and oriented to person, place, and time. No cranial nerve deficit. Skin: Skin is warm and dry. No rash noted. He is not diaphoretic. No erythema. No pallor. Psychiatric: He has a normal mood and affect.  His behavior is normal. Judgment and thought content normal.       LABS:  Recent Results (from the past 24 hour(s))   Comprehensive Metabolic Panel    Collection Time: 11/03/20 12:15 AM   Result Value Ref Range    Sodium 138 135 - 144 mEq/L    Potassium 3.9 3.4 - 4.9 mEq/L    Chloride 107 95 - 107 mEq/L    CO2 22 20 - 31 mEq/L    Anion Gap 9 9 - 15 mEq/L    Glucose 106 (H) 70 - 99 mg/dL    BUN 31 (H) 6 - 20 mg/dL    CREATININE 2.91 (H) 0.70 - 1.20 mg/dL    GFR Non-African American 23.2 (L) >60    GFR  28.1 (L) >60    Calcium 8.7 8.5 - 9.9 mg/dL    Total Protein 6.1 (L) 6.3 - 8.0 g/dL    Alb 3.5 3.5 - 4.6 g/dL    Total Bilirubin 0.6 0.2 - 0.7 mg/dL    Alkaline Phosphatase 81 35 - 104 U/L    ALT 22 0 - 41 U/L    AST 18 0 - 40 U/L    Globulin 2.6 2.3 - 3.5 g/dL   CBC Auto Differential    Collection Time: 11/03/20 12:15 AM   Result Value Ref Range    WBC 10.6 4.8 - 10.8 K/uL    RBC 4.58 (L) 4.70 - 6.10 M/uL    Hemoglobin 12.7 (L) 14.0 - 18.0 g/dL    Hematocrit 36.9 (L) 42.0 - 52.0 %    MCV 80.7 80.0 - 100.0 fL    MCH 27.6 27.0 - 31.3 pg    MCHC 34.2 33.0 - 37.0 %    RDW 15.6 (H) 11.5 - 14.5 %    Platelets 418 (H) 816 - 400 K/uL    Neutrophils % 85.0 %    Lymphocytes % 7.1 %    Monocytes % 5.9 %    Eosinophils % 1.3 %    Basophils % 0.7 %    Neutrophils Absolute 9.0 (H) 1.4 - 6.5 K/uL    Lymphocytes Absolute 0.8 (L) 1.0 - 4.8 K/uL    Monocytes Absolute 0.6 0.2 - 0.8 K/uL    Eosinophils Absolute 0.1 0.0 - 0.7 K/uL    Basophils Absolute 0.1 0.0 - 0.2 K/uL   Troponin    Collection Time: 11/03/20 12:15 AM   Result Value Ref Range    Troponin 0.032 (HH) 0.000 - 0.010 ng/mL   Brain Natriuretic Peptide    Collection Time: 11/03/20 12:15 AM   Result Value Ref Range    Pro-BNP 13,817 pg/mL   Protime-INR    Collection Time: 11/03/20 12:15 AM   Result Value Ref Range    Protime 14.2 12.3 - 14.9 sec    INR 1.1    APTT    Collection Time: 11/03/20 12:15 AM   Result Value Ref Range    aPTT 33.3 24.4 - 36.8 sec   EKG 12 Lead - Chest Pain    Collection Time: 11/03/20 12:18 AM   Result Value Ref Range    Ventricular Rate 117 BPM    Atrial Rate 117 BPM    P-R Interval 174 ms    QRS Duration 94 ms    Q-T Interval 316 ms    QTc Calculation (Bazett) 440 ms    P Axis 58 degrees    R Axis 22 degrees    T Axis 106 degrees   Urine Drug Screen    Collection Time: 11/03/20  5:30 AM   Result Value Ref Range    Amphetamine Screen, Urine Neg Negative <1000 ng/mL    Barbiturate Screen, Ur Neg Negative < 200 ng/mL    Benzodiazepine Screen, Urine Neg Negative < 200 ng/mL    Cannabinoid Scrn, Ur Neg Negative < 50 ng/mL    Cocaine Metabolite Screen, Urine Neg Negative < 300 ng/mL    Opiate Scrn, Ur POSITIVE (A) Negative < 300 ng/mL    PCP Screen, Urine Neg Negative < 25 ng/mL    Methadone Screen, Urine Neg Negative <300 ng/mL    Propoxyphene Scrn, Ur Neg Negative <300 ng/mL    Oxycodone Urine Neg Negative <100 ng/mL    Drug Screen Comment: see below    Troponin    Collection Time: 11/03/20  8:11 AM   Result Value Ref Range    Troponin 0.026 () 0.000 - 0.010 ng/mL   Troponin    Collection Time: 11/03/20 11:17 AM   Result Value Ref Range    Troponin 0.027 (HH) 0.000 - 0.010 ng/mL     Troponin:   Lab Results   Component Value Date    TROPONINI 0.027 11/03/2020       EKG: normal sinus rhythm, nonspecific ST and T waves changes      ASSESSMENT:    Acute on chronic decompensated combined congestive heart failure. Worsening cardiomyopathy rule out ischemic versus nonischemic. Elevated C.E likely demand ischemia but need to rule out cardiac ischemia. Essential hypertension    Tobacco abuse    History of alcohol abuse    Chronic kidney disease      PLAN:   1. As always, aggressive risk factor modification is strongly recommended. We should adhere to the JNC VIII guidelines for HTN management and the NCEPATP III guidelines for LDL-C management. 2. Had a long talk with the patient regarding their symptoms, test results and the different treatment options available which include cardiac cath, alternate imaging modality and medical therapy. Patient would like to proceed with cardiac catheterization and understands the risks and benefits of the procedure. 3. Maximize cardiac medications  4. Continue with IV diuresis as kasie, monitor I/O's, renal function, electrolytes. Keep K+>4 and Mg>2  5. Consult nephrology  6. Avoid nephrotoxic agents  7. CHF teaching and follow-up in CHF clinic post discharge  8. Continue monitor on telemetry for any tachycardia or bradyarrhythmias  9. Tobacco and alcohol cessation was strongly recommended. 10. Low-sodium diet. Thank you for allowing me to participate in the care of your patient, please don't hesitate to contact me if you have any further questions.     Electronically signed by Nima Altman DO on 11/3/2020 at 5:37 PM

## 2020-11-03 NOTE — CONSULTS
Pulmonary and Critical Care Medicine  Consult Note  Encounter Date: 11/3/2020 10:40 AM    Mr. Fadumo Henry is a 52 y.o. male  : 1970  Requesting Provider: DO Mago    Reason for request: ICU management            HISTORY OF PRESENT ILLNESS:    Patient is 52 y.o. presents with worsening shortness of breath, patient has history of hypertension, he ran out of his medication for couple days, he woke up yesterday with significant shortness of breath, chest tightness and pain, no cough, no fever, no worsening lower extremity edema, denies illicit drug use, blood pressure in ED was 179/135, patient is a smoker to 3 cigarettes a week, he does not use illicit drugs, he drinks beer, currently does not work, he does have family history of hypertension and heart disease. Per patient he was taken off   2 of his medication almost 2 months ago possibly Lasix and Imdur. He does not snore, denies apnea while asleep or excessive daytime sleepiness. Past Medical History:        Diagnosis Date    Abnormal EKG 2019    Cardiomyopathy [facility])     per echo 2019    Chronic combined systolic and diastolic CHF (congestive heart failure) ([facility] [address] 2019    ETOH abuse     Hypertension     Tobacco abuse        Past Surgical History:    No past surgical history on file. Social History:     reports that he has been smoking cigarettes. He has been smoking about 1.00 pack per day. He has never used smokeless tobacco. He reports current alcohol use. He reports that he does not use drugs. Family History:       Problem Relation Age of Onset    Coronary Art Dis Mother        Allergies:  Patient has no known allergies.         MEDICATIONS during current hospitalization:    Continuous Infusions:   nitroGLYCERIN Stopped (20 1005)       Scheduled Meds:   sodium chloride flush  10 mL Intravenous 2 times per day    enoxaparin  40 mg Subcutaneous Daily    aspirin  81 mg Oral Daily    carvedilol  25 mg monitoring and intervention due to below noted problems     · Hypertensive emergency  · Worsening shortness of breath secondary to #1  · Acute decompensated systolic and diastolic heart failure secondary to #1  · Noncompliance  · Alcoholism  · Increased troponin likely demand ischemia  · Chronic kidney disease at baseline     Recommendations  · Resume home blood pressure medication  · Continue nitro drip wean as tolerated target systolic blood pressure 059-569  · Lasix and monitor electrolytes, wean as tolerated  · Watch in ICU while on nitro drip  · O2 to keep sat 90 to 92%    Due to the immediate potential for life-threatening deterioration due to hypertensive emergency, I spent 36 minutes providing critical care. This time is excluding time spent performing procedures.     Thank you for consultation    Electronically signed by Shadi Simons MD, FCCP,  on 11/3/2020 at 10:40 AM

## 2020-11-03 NOTE — PROGRESS NOTES
0715 admission assessment completed as noted. Vss, will continue to monitor. Electronically signed by Lalita Mcduffie RN on 11/3/2020 at 8:07 AM  Approximately [id number] spoke with Dr. Marilia Prescott about pt bp and nitro drip. Dr. Marilia Prescott is going to address pt care when he reviews pt chart. Electronically signed by Lalita Mcduffie RN on 11/3/2020 at 8:09 AM  1005 Nitro drip stopped per Dr. Marilia Prescott. Electronically signed by Lalita Mcduffie RN on 11/3/2020 at 10:06 AM  1022 notified Dr. Marilia Prescott of ST elevation on lead 2. Electronically signed by Lalita Mcduffie RN on 11/3/2020 at 10:23 AM  1033 Dr. Marilia Prescott responded \"looks ok. .. no stemi\". Electronically signed by Lalita Mcduffie RN on 11/3/2020 at 10:36 AM  [phone] verified krystal is working with [employer] monitor. Electronically signed by Lalita Mcduffie RN on 11/3/2020 at 2:57 PM  [phone] report called to rikki.  Electronically signed by Lalita Mcduffie RN on 11/3/2020 at 3:05 PM

## 2020-11-03 NOTE — ED NOTES
Contacted Nurse supervisor on Pt status Due to medication requires Change of unit.  Waiting for new bed request. Pt resting in bed     Marty Roca RN  11/03/20 8797

## 2020-11-03 NOTE — ED NOTES
Lopressor given at 0531 3rd dose  [facility] notified,      Kain Moreno, RN  11/03/20 0928 [facility], RN  11/03/20 4600

## 2020-11-03 NOTE — ED PROVIDER NOTES
[address] [facility] ED  eMERGENCY dEPARTMENT eNCOUnter      Pt Name: Jillian Moreland  MRN: [id number]  [place] 1970  Date of evaluation: 11/3/2020  Provider: MICAH Guerra      HISTORY OF PRESENT ILLNESS    Jillian Moreland is a 52 y.o. male with PMHx of hypertension, cardiomyopathy, CHF, history of alcohol abuse, hypertension, tobacco abuse presents to the emergency department with SOB. Patient states throughout the day today he has been having increasing shortness of breath, orthopnea present. He denies fevers, cough, chest pain, abdominal pain, nausea, vomiting, leg swelling. He denies any recent hospitalizations or surgeries, travels. Patient states he is not taking Lasix at home and was taken off 2 meds 1-2 months ago by PCP (possibly lasix and imdur). HPI    Nursing Notes were reviewed. REVIEW OF SYSTEMS       Review of Systems   Constitutional: Negative for appetite change, chills and fever. HENT: Negative for congestion, rhinorrhea and sore throat. Respiratory: Positive for shortness of breath. Negative for cough. Cardiovascular: Negative for chest pain. Gastrointestinal: Negative for abdominal pain, blood in stool, diarrhea, nausea and vomiting. Genitourinary: Negative for difficulty urinating. Musculoskeletal: Negative for neck stiffness. Skin: Negative for color change and rash. Neurological: Negative for dizziness, syncope, weakness, light-headedness, numbness and headaches. All other systems reviewed and are negative. PAST MEDICAL HISTORY     Past Medical History:   Diagnosis Date    Abnormal EKG 9/27/2019    Cardiomyopathy ([facility] [address]     per echo 8/2019    Chronic combined systolic and diastolic CHF (congestive heart failure) ([address] 9/27/2019    ETOH abuse     Hypertension     Tobacco abuse          SURGICAL HISTORY     No past surgical history on file.       CURRENT MEDICATIONS       Previous Medications    ASPIRIN 81 MG EC TABLET    Take 1 tablet by mouth daily    CARVEDILOL (COREG) 25 MG TABLET    Take 1 tablet by mouth 2 times daily (with meals)    CHLORTHALIDONE (HYGROTON) 25 MG TABLET    Take 25 mg by mouth daily    FUROSEMIDE (LASIX) 40 MG TABLET    Take 1 tablet by mouth daily    HYDRALAZINE (APRESOLINE) 50 MG TABLET    Take 0.5 tablets by mouth 3 times daily    ISOSORBIDE MONONITRATE (IMDUR) 30 MG EXTENDED RELEASE TABLET    Take 1 tablet by mouth daily       ALLERGIES     Patient has no known allergies.     FAMILY HISTORY       Family History   Problem Relation Age of Onset    Coronary Art Dis Mother           SOCIAL HISTORY       Social History     Socioeconomic History    Marital status: Single     Spouse name: Not on file    Number of children: Not on file    Years of education: Not on file    Highest education level: Not on file   Occupational History    Not on file   Social Needs    Financial resource strain: Not hard at all    Food insecurity     Worry: Never true     Inability: Never true   [employer] needs     Medical: No     Non-medical: No   Tobacco Use    Smoking status: Current Every Day Smoker     Packs/day: 1.00     Types: Cigarettes    Smokeless tobacco: Never Used    Tobacco comment: 1pack/2 days   Substance and Sexual Activity    Alcohol use: Yes     Frequency: 4 or more times a week     Drinks per session: 7 to 9    Drug use: Never    Sexual activity: Not on file   Lifestyle    Physical activity     Days per week: Not on file     Minutes per session: Not on file    Stress: Not on file   Relationships    Social connections     Talks on phone: Not on file     Gets together: Not on file     Attends Restoration service: Not on file     Active member of club or organization: Not on file     Attends meetings of clubs or organizations: Not on file     Relationship status: Not on file    Intimate partner violence     Fear of current or ex partner: Not on file     Emotionally abused: Not on file     Physically abused: Not on file     Forced sexual activity: Not on file   Other Topics Concern    Not on file   Social History Narrative    Not on file         PHYSICAL EXAM         ED Triage Vitals [11/03/20 0011]   BP Temp Temp Source Pulse Resp SpO2 Height Weight   (!) 179/135 99.1 °F (37.3 °C) Oral 119 (!) 36 95 % 6' (1.829 m) 260 lb (117.9 kg)       Physical Exam  Constitutional:       Appearance: He is well-developed. HENT:      Head: Normocephalic and atraumatic. Eyes:      Conjunctiva/sclera: Conjunctivae normal.      Pupils: Pupils are equal, round, and reactive to light. Neck:      Musculoskeletal: Normal range of motion and neck supple. Trachea: No tracheal deviation. Cardiovascular:      Heart sounds: Normal heart sounds. Pulmonary:      Effort: Pulmonary effort is normal. No respiratory distress. Breath sounds: Normal breath sounds. No stridor. Comments: Tachypnea  Abdominal:      General: Bowel sounds are normal. There is no distension. Palpations: Abdomen is soft. There is no mass. Tenderness: There is no abdominal tenderness. There is no guarding or rebound. Musculoskeletal: Normal range of motion. Skin:     General: Skin is warm and dry. Capillary Refill: Capillary refill takes less than 2 seconds. Findings: No rash. Neurological:      Mental Status: He is alert and oriented to person, place, and time. Deep Tendon Reflexes: Reflexes are normal and symmetric. Psychiatric:         Behavior: Behavior normal.         Thought Content: Thought content normal.         Judgment: Judgment normal.         DIAGNOSTIC RESULTS     EKG:All EKG's are interpreted by the Emergency Department Physician who either signs or Co-signs this chart in the absence of a cardiologist.    Sinus tachycardia, rate 117, normal intervals, normal axis, no ST segment changes.   Left atrial enlargement    RADIOLOGY:   Non-plain film images such as CT, Ultrasound and MRI are read by theradiologist. Plain radiographic images are visualized and preliminarily interpreted by the emergency physician with the below findings:    Interpretation per theRadiologist below, if available at the time of this note:    XR CHEST PORTABLE    (Results Pending)           LABS:  Labs Reviewed   COMPREHENSIVE METABOLIC PANEL - Abnormal; Notable for the following components:       Result Value    Glucose 106 (*)     BUN 31 (*)     CREATININE 2.91 (*)     GFR Non- 23.2 (*)     GFR  28.1 (*)     Total Protein 6.1 (*)     All other components within normal limits   CBC WITH AUTO DIFFERENTIAL - Abnormal; Notable for the following components:    RBC 4.58 (*)     Hemoglobin 12.7 (*)     Hematocrit 36.9 (*)     RDW 15.6 (*)     Platelets 269 (*)     Neutrophils Absolute 9.0 (*)     Lymphocytes Absolute 0.8 (*)     All other components within normal limits   TROPONIN - Abnormal; Notable for the following components:    Troponin 0.032 (*)     All other components within normal limits    Narrative:     CALL  [facility]  [facility] tel. [phone],  TROP results called to and read back by [facility], 11/03/2020 03:06, by  Iam Reyes    Narrative:     Eugenie Bustillos tel. [phone],  TROP results called to and read back by [facility], 11/03/2020 03:06, by  [facility]   PROTIME-INR   APTT       All other labs were within normal range or not returned as of this dictation. EMERGENCY DEPARTMENT COURSE and DIFFERENTIAL DIAGNOSIS/MDM:   Vitals:    Vitals:    11/03/20 0159 11/03/20 0200 11/03/20 0230 11/03/20 0300   BP: (!) 167/128 (!) 167/128 (!) 158/129 (!) 166/121   Pulse: 101 101 105 109   Resp: 12 16 (!) 33 25   Temp:       TempSrc:       SpO2: 99% 93% 98%    Weight:       Height:             MDM    Chest x-ray shows pulmonary edema. He does have hypertensive and tachycardic. States he has been taking his Coreg and hydralazine as prescribed.   Patient was given metoprolol, Zofran, morphine, nitro paste,

## 2020-11-03 NOTE — ED NOTES
Shea OBRIEN at bedside pt resting in bed nonlabored breaths skin warm dry.      Dolores Davis RN  11/03/20 8298

## 2020-11-03 NOTE — ED NOTES
Pt to start on Nitro iv and titrate per PA, Pt status to floor held until medication dose is confirmed. Pt status on medication depends on room status in hospital. [facility] Nurse supervisor informed.      Kirit Arriaga RN  11/03/20 0116

## 2020-11-04 ENCOUNTER — APPOINTMENT (OUTPATIENT)
Dept: CARDIAC CATH/INVASIVE PROCEDURES | Age: 50
DRG: 192 | End: 2020-11-04
Payer: MEDICAID

## 2020-11-04 LAB
ANION GAP SERPL CALCULATED.3IONS-SCNC: 15 MEQ/L (ref 9–15)
BUN BLDV-MCNC: 36 MG/DL (ref 6–20)
CALCIUM SERPL-MCNC: 9 MG/DL (ref 8.5–9.9)
CHLORIDE BLD-SCNC: 102 MEQ/L (ref 95–107)
CO2: 23 MEQ/L (ref 20–31)
CREAT SERPL-MCNC: 3.04 MG/DL (ref 0.7–1.2)
GFR AFRICAN AMERICAN: 26.7
GFR NON-AFRICAN AMERICAN: 22.1
GLUCOSE BLD-MCNC: 93 MG/DL (ref 70–99)
HCT VFR BLD CALC: 38.8 % (ref 42–52)
HEMOGLOBIN: 13 G/DL (ref 14–18)
INR BLD: 1.1
LV EF: 20 %
LVEF MODALITY: NORMAL
MCH RBC QN AUTO: 27.5 PG (ref 27–31.3)
MCHC RBC AUTO-ENTMCNC: 33.5 % (ref 33–37)
MCV RBC AUTO: 82.2 FL (ref 80–100)
PDW BLD-RTO: 16.1 % (ref 11.5–14.5)
PLATELET # BLD: 493 K/UL (ref 130–400)
POTASSIUM SERPL-SCNC: 3.5 MEQ/L (ref 3.4–4.9)
PROTHROMBIN TIME: 14.4 SEC (ref 12.3–14.9)
RBC # BLD: 4.72 M/UL (ref 4.7–6.1)
SODIUM BLD-SCNC: 140 MEQ/L (ref 135–144)
WBC # BLD: 8.2 K/UL (ref 4.8–10.8)

## 2020-11-04 PROCEDURE — 6370000000 HC RX 637 (ALT 250 FOR IP): Performed by: INTERNAL MEDICINE

## 2020-11-04 PROCEDURE — 6360000002 HC RX W HCPCS: Performed by: STUDENT IN AN ORGANIZED HEALTH CARE EDUCATION/TRAINING PROGRAM

## 2020-11-04 PROCEDURE — 6370000000 HC RX 637 (ALT 250 FOR IP)

## 2020-11-04 PROCEDURE — 85610 PROTHROMBIN TIME: CPT

## 2020-11-04 PROCEDURE — 2700000000 HC OXYGEN THERAPY PER DAY

## 2020-11-04 PROCEDURE — B2111ZZ FLUOROSCOPY OF MULTIPLE CORONARY ARTERIES USING LOW OSMOLAR CONTRAST: ICD-10-PCS | Performed by: INTERNAL MEDICINE

## 2020-11-04 PROCEDURE — 85027 COMPLETE CBC AUTOMATED: CPT

## 2020-11-04 PROCEDURE — 4A023N7 MEASUREMENT OF CARDIAC SAMPLING AND PRESSURE, LEFT HEART, PERCUTANEOUS APPROACH: ICD-10-PCS | Performed by: INTERNAL MEDICINE

## 2020-11-04 PROCEDURE — 2580000003 HC RX 258

## 2020-11-04 PROCEDURE — C1894 INTRO/SHEATH, NON-LASER: HCPCS

## 2020-11-04 PROCEDURE — 2060000000 HC ICU INTERMEDIATE R&B

## 2020-11-04 PROCEDURE — 6360000004 HC RX CONTRAST MEDICATION: Performed by: INTERNAL MEDICINE

## 2020-11-04 PROCEDURE — 2709999900 HC NON-CHARGEABLE SUPPLY

## 2020-11-04 PROCEDURE — 2580000003 HC RX 258: Performed by: PERSONAL EMERGENCY RESPONSE ATTENDANT

## 2020-11-04 PROCEDURE — 2580000003 HC RX 258: Performed by: INTERNAL MEDICINE

## 2020-11-04 PROCEDURE — 93458 L HRT ARTERY/VENTRICLE ANGIO: CPT | Performed by: INTERNAL MEDICINE

## 2020-11-04 PROCEDURE — 36415 COLL VENOUS BLD VENIPUNCTURE: CPT

## 2020-11-04 PROCEDURE — 2500000003 HC RX 250 WO HCPCS

## 2020-11-04 PROCEDURE — 6360000002 HC RX W HCPCS: Performed by: INTERNAL MEDICINE

## 2020-11-04 PROCEDURE — C1769 GUIDE WIRE: HCPCS

## 2020-11-04 PROCEDURE — 80048 BASIC METABOLIC PNL TOTAL CA: CPT

## 2020-11-04 PROCEDURE — 6360000002 HC RX W HCPCS

## 2020-11-04 PROCEDURE — B2151ZZ FLUOROSCOPY OF LEFT HEART USING LOW OSMOLAR CONTRAST: ICD-10-PCS | Performed by: INTERNAL MEDICINE

## 2020-11-04 RX ORDER — FUROSEMIDE 10 MG/ML
80 INJECTION INTRAMUSCULAR; INTRAVENOUS 2 TIMES DAILY
Status: DISCONTINUED | OUTPATIENT
Start: 2020-11-04 | End: 2020-11-06

## 2020-11-04 RX ORDER — FUROSEMIDE 10 MG/ML
60 INJECTION INTRAMUSCULAR; INTRAVENOUS ONCE
Status: COMPLETED | OUTPATIENT
Start: 2020-11-04 | End: 2020-11-04

## 2020-11-04 RX ORDER — PREDNISONE 50 MG/1
50 TABLET ORAL ONCE
Status: DISCONTINUED | OUTPATIENT
Start: 2020-11-04 | End: 2020-11-07 | Stop reason: HOSPADM

## 2020-11-04 RX ORDER — SODIUM CHLORIDE 9 MG/ML
INJECTION, SOLUTION INTRAVENOUS CONTINUOUS
Status: DISCONTINUED | OUTPATIENT
Start: 2020-11-04 | End: 2020-11-04

## 2020-11-04 RX ORDER — ACETAMINOPHEN 325 MG/1
650 TABLET ORAL EVERY 4 HOURS PRN
Status: DISCONTINUED | OUTPATIENT
Start: 2020-11-04 | End: 2020-11-07 | Stop reason: HOSPADM

## 2020-11-04 RX ORDER — NITROGLYCERIN 0.4 MG/1
0.4 TABLET SUBLINGUAL EVERY 5 MIN PRN
Status: DISCONTINUED | OUTPATIENT
Start: 2020-11-04 | End: 2020-11-07 | Stop reason: HOSPADM

## 2020-11-04 RX ORDER — DIPHENHYDRAMINE HCL 25 MG
50 TABLET ORAL ONCE
Status: DISCONTINUED | OUTPATIENT
Start: 2020-11-04 | End: 2020-11-07 | Stop reason: HOSPADM

## 2020-11-04 RX ADMIN — IOPAMIDOL 35 ML: 612 INJECTION, SOLUTION INTRAVENOUS at 08:42

## 2020-11-04 RX ADMIN — ISOSORBIDE MONONITRATE 60 MG: 30 TABLET, EXTENDED RELEASE ORAL at 10:32

## 2020-11-04 RX ADMIN — ASPIRIN 81 MG: 81 TABLET, COATED ORAL at 08:08

## 2020-11-04 RX ADMIN — SODIUM CHLORIDE: 9 INJECTION, SOLUTION INTRAVENOUS at 05:47

## 2020-11-04 RX ADMIN — FUROSEMIDE 20 MG: 10 INJECTION, SOLUTION INTRAVENOUS at 10:32

## 2020-11-04 RX ADMIN — CARVEDILOL 25 MG: 25 TABLET, FILM COATED ORAL at 10:31

## 2020-11-04 RX ADMIN — CARVEDILOL 25 MG: 25 TABLET, FILM COATED ORAL at 16:08

## 2020-11-04 RX ADMIN — FUROSEMIDE 80 MG: 10 INJECTION, SOLUTION INTRAMUSCULAR; INTRAVENOUS at 20:47

## 2020-11-04 RX ADMIN — Medication 10 ML: at 20:47

## 2020-11-04 RX ADMIN — HYDRALAZINE HYDROCHLORIDE 25 MG: 25 TABLET, FILM COATED ORAL at 10:32

## 2020-11-04 RX ADMIN — HYDRALAZINE HYDROCHLORIDE 25 MG: 25 TABLET, FILM COATED ORAL at 13:14

## 2020-11-04 RX ADMIN — FUROSEMIDE 60 MG: 10 INJECTION, SOLUTION INTRAMUSCULAR; INTRAVENOUS at 13:14

## 2020-11-04 RX ADMIN — HYDRALAZINE HYDROCHLORIDE 25 MG: 25 TABLET, FILM COATED ORAL at 20:46

## 2020-11-04 ASSESSMENT — PAIN SCALES - GENERAL
PAINLEVEL_OUTOF10: 0

## 2020-11-04 NOTE — PROGRESS NOTES
Pt resting comfortably, vitals are stable, no bleeding or hematoma at puncture site  - right radial, called report to JOSE M Guevara on [address], will continue to monitor

## 2020-11-04 NOTE — PROGRESS NOTES
No bleeding or hematoma at puncture site - right radial, vitals are stable, will continue to monitor.

## 2020-11-04 NOTE — CONSULTS
vomiting, diarrhea, constipation   Endocrine: polydipsia, polyphagia, cold intolerance, heat intolerance  Genitourinary: dysuria, flank pain, frequency, urgency   Hematologic: easy bruising, easy bleeding  Musculoskeletal: back pain, neck pain, myalgias, arthalgias  Neurological: syncope, lightheadedness, dizziness, seizures, tremors, weakness  Psychiatric/Behavioral: anxiety, depression, hallucinations  Skin: rash, itching    Physical exam:   Constitutional:  VITALS:  BP (!) 154/72   Pulse 106   Temp 97.5 °F (36.4 °C) (Oral)   Resp 18   Ht 6' (1.829 m)   Wt 260 lb (117.9 kg)   SpO2 98%   BMI 35.26 kg/m²     General: alert, in no apparent distress  HEENT: normocephalic, atraumatic, anicteric  Neck: supple, no mass  Lungs: non-labored respirations, clear to auscultation bilaterally  Heart: regular rate and rhythm, no murmurs or rubs  Abdomen: soft, non-tender, non-distended  MSK: no joint swelling or tenderness  Ext: no cyanosis, ++ peripheral edema  Neuro: alert and oriented, no gross abnormalities  Psych: normal mood and affect    Data/  CBC:   Lab Results   Component Value Date    WBC 8.2 11/04/2020    RBC 4.72 11/04/2020    HGB 13.0 11/04/2020    HCT 38.8 11/04/2020    MCV 82.2 11/04/2020    MCH 27.5 11/04/2020    MCHC 33.5 11/04/2020    RDW 16.1 11/04/2020     11/04/2020     BMP:    Lab Results   Component Value Date     11/04/2020    K 3.5 11/04/2020     11/04/2020    CO2 23 11/04/2020    BUN 36 11/04/2020    LABALBU 3.5 11/03/2020    CREATININE 3.04 11/04/2020    CALCIUM 9.0 11/04/2020    GFRAA 26.7 11/04/2020    LABGLOM 22.1 11/04/2020    GLUCOSE 93 11/04/2020     Xr Chest Portable    Result Date: 11/3/2020  EXAMINATION: XR CHEST PORTABLE CLINICAL HISTORY: SHORTNESS OF BREATH COMPARISONS: AUGUST 27, 2020 FINDINGS: Osseous structures intact. Cardiopericardial silhouette vasculature indistinct. Bilateral perihilar and lower lung airspace disease. Blunting left costophrenic angle. CONGESTIVE HEART FAILURE. Assessment:  52y.o. year old AA male with history s/f HFrEF, HTN, medical non-compliance and CKD stage IV who presented for SOB and LE edema for a few days. 1. CKD stage IV: most likely hypertensive nephrosclerosis +/- CRS, baseline Scr ~2.7-3 w/ eGFR mid 20s/low 130s  2. Acute on chronic combined HF: s/p LHC w/ nml coronaries   3. HTN    Plan:  - increase lasix to 80 mg BID   - monitor function, at risk of contrast induced nephropathy     Thank you for the consultation. Will continue to follow  Please do not hesitate to call with questions.     Ethel Boss MD

## 2020-11-05 LAB
ANION GAP SERPL CALCULATED.3IONS-SCNC: 15 MEQ/L (ref 9–15)
BUN BLDV-MCNC: 37 MG/DL (ref 6–20)
CALCIUM SERPL-MCNC: 9.2 MG/DL (ref 8.5–9.9)
CHLORIDE BLD-SCNC: 101 MEQ/L (ref 95–107)
CO2: 25 MEQ/L (ref 20–31)
CREAT SERPL-MCNC: 3.41 MG/DL (ref 0.7–1.2)
GFR AFRICAN AMERICAN: 23.4
GFR NON-AFRICAN AMERICAN: 19.3
GLUCOSE BLD-MCNC: 109 MG/DL (ref 70–99)
HCT VFR BLD CALC: 39.9 % (ref 42–52)
HEMOGLOBIN: 13.7 G/DL (ref 14–18)
MCH RBC QN AUTO: 27.9 PG (ref 27–31.3)
MCHC RBC AUTO-ENTMCNC: 34.4 % (ref 33–37)
MCV RBC AUTO: 81 FL (ref 80–100)
PDW BLD-RTO: 16.3 % (ref 11.5–14.5)
PLATELET # BLD: 523 K/UL (ref 130–400)
POTASSIUM SERPL-SCNC: 3.3 MEQ/L (ref 3.4–4.9)
RBC # BLD: 4.92 M/UL (ref 4.7–6.1)
SODIUM BLD-SCNC: 141 MEQ/L (ref 135–144)
WBC # BLD: 7.7 K/UL (ref 4.8–10.8)

## 2020-11-05 PROCEDURE — 2580000003 HC RX 258: Performed by: PERSONAL EMERGENCY RESPONSE ATTENDANT

## 2020-11-05 PROCEDURE — 6370000000 HC RX 637 (ALT 250 FOR IP): Performed by: PHYSICIAN ASSISTANT

## 2020-11-05 PROCEDURE — APPNB30 APP NON BILLABLE TIME 0-30 MINS: Performed by: PHYSICIAN ASSISTANT

## 2020-11-05 PROCEDURE — 36415 COLL VENOUS BLD VENIPUNCTURE: CPT

## 2020-11-05 PROCEDURE — 93010 ELECTROCARDIOGRAM REPORT: CPT | Performed by: INTERNAL MEDICINE

## 2020-11-05 PROCEDURE — 80048 BASIC METABOLIC PNL TOTAL CA: CPT

## 2020-11-05 PROCEDURE — 2060000000 HC ICU INTERMEDIATE R&B

## 2020-11-05 PROCEDURE — 99232 SBSQ HOSP IP/OBS MODERATE 35: CPT | Performed by: INTERNAL MEDICINE

## 2020-11-05 PROCEDURE — 6370000000 HC RX 637 (ALT 250 FOR IP): Performed by: INTERNAL MEDICINE

## 2020-11-05 PROCEDURE — 6360000002 HC RX W HCPCS: Performed by: STUDENT IN AN ORGANIZED HEALTH CARE EDUCATION/TRAINING PROGRAM

## 2020-11-05 PROCEDURE — 85027 COMPLETE CBC AUTOMATED: CPT

## 2020-11-05 RX ORDER — NICOTINE 21 MG/24HR
1 PATCH, TRANSDERMAL 24 HOURS TRANSDERMAL DAILY
Status: DISCONTINUED | OUTPATIENT
Start: 2020-11-05 | End: 2020-11-07 | Stop reason: HOSPADM

## 2020-11-05 RX ORDER — HYDRALAZINE HYDROCHLORIDE 50 MG/1
50 TABLET, FILM COATED ORAL 3 TIMES DAILY
Status: DISCONTINUED | OUTPATIENT
Start: 2020-11-05 | End: 2020-11-07 | Stop reason: HOSPADM

## 2020-11-05 RX ORDER — POTASSIUM CHLORIDE 20 MEQ/1
20 TABLET, EXTENDED RELEASE ORAL
Status: DISCONTINUED | OUTPATIENT
Start: 2020-11-05 | End: 2020-11-07 | Stop reason: HOSPADM

## 2020-11-05 RX ADMIN — FUROSEMIDE 80 MG: 10 INJECTION, SOLUTION INTRAMUSCULAR; INTRAVENOUS at 07:50

## 2020-11-05 RX ADMIN — Medication 10 ML: at 21:05

## 2020-11-05 RX ADMIN — POTASSIUM CHLORIDE 20 MEQ: 20 TABLET, EXTENDED RELEASE ORAL at 15:58

## 2020-11-05 RX ADMIN — CARVEDILOL 25 MG: 25 TABLET, FILM COATED ORAL at 07:49

## 2020-11-05 RX ADMIN — CARVEDILOL 25 MG: 25 TABLET, FILM COATED ORAL at 15:58

## 2020-11-05 RX ADMIN — HYDRALAZINE HYDROCHLORIDE 25 MG: 25 TABLET, FILM COATED ORAL at 07:49

## 2020-11-05 RX ADMIN — HYDRALAZINE HYDROCHLORIDE 25 MG: 25 TABLET, FILM COATED ORAL at 13:16

## 2020-11-05 RX ADMIN — ASPIRIN 81 MG: 81 TABLET, COATED ORAL at 07:49

## 2020-11-05 RX ADMIN — ISOSORBIDE MONONITRATE 60 MG: 30 TABLET, EXTENDED RELEASE ORAL at 07:49

## 2020-11-05 RX ADMIN — Medication 10 ML: at 07:50

## 2020-11-05 RX ADMIN — FUROSEMIDE 80 MG: 10 INJECTION, SOLUTION INTRAMUSCULAR; INTRAVENOUS at 15:58

## 2020-11-05 RX ADMIN — HYDRALAZINE HYDROCHLORIDE 50 MG: 50 TABLET, FILM COATED ORAL at 21:05

## 2020-11-05 ASSESSMENT — ENCOUNTER SYMPTOMS
COLOR CHANGE: 0
CHEST TIGHTNESS: 0
NAUSEA: 0
SHORTNESS OF BREATH: 1
VOMITING: 0

## 2020-11-05 ASSESSMENT — PAIN SCALES - GENERAL
PAINLEVEL_OUTOF10: 0

## 2020-11-05 NOTE — CARE COORDINATION
Care Transition Nurse had provided CHF booklet and zone sheet and provided education in August of this year. Discussed potential triggers for this exacerbation, states he was doing well at home and shortness of breath came on suddenly. Reviewed symptoms to monitor, daily weights, BP management, and medication compliance. Explained the importance of weighing self daily and phoning cardiologist for weight gain of 3 or more pounds in 1-7 days, Patient states he has scale, and was weighing selfoccasionally,declined any weight gain, states he has actually lost weight. Discussed importance of early symptom management in preventing hospitalization and stressed importance of daily weights as an early indicator of fluid overload,  Pt has history of HTN, states he does not monitor BP at home, advised he obtain BP cuff and monitor BP closely, stressed need tocall clinician with  abnormal readings as high blood pressure affect heart function. I called Dr. Bard Parker office and they will provide BP cuff at follow up visit. Reminded to monitor for symptoms in yellow zone including sob, edema, increased fatigue, weight gain,difficulty laying flat or needing more pillows to sleep, increased abdominal girth, and decreased appetite and advised he should contact clinician promptly with any of these symptoms. States the only symptom he had with this admit was shortness of breath. Stressed importance of following a low sodium diet,  Pt states he uses table salt sparingly, states his girlfriend cooks meals and does not add salt to food. Reminded him that processed and restaurant foods are higher in sodium content. Advised use of salt free seasoning alternatives made of herbs and spices to make food taste better without increasing sodium content. Educated on importance of medication compliance. Pt states he does not have any issues with obtaining prescription medications.  Discussed avoiding alcohol and smoking cessation, he states he plans to quit smoking, did request Nicoderm patch while he is here, made Chloe aware of patient request. Stressed importance of follow up with clinician within one week of discharge and importance of phoning physician promptly for symptoms in the yellow zone and EMS for symptoms in the red zone. Patient voiced understanding.

## 2020-11-05 NOTE — PROGRESS NOTES
Nephrology Progress Note    Assessment:  52y.o. year old AA male with history s/f HFrEF, HTN, medical non-compliance and CKD stage IV who presented for SOB and LE edema for a few days.     1. CKD stage IV: most likely hypertensive nephrosclerosis +/- CRS, baseline Scr ~2.7-3 w/ eGFR mid 20s/low 30s  2. Acute on chronic combined HF: s/p LHC w/ nml coronaries   3. HTN     Plan:  - continue lasix to 80 mg BID   - checking BMP as at risk of contrast induced nephropathy     Patient Active Problem List:     Hypertensive urgency     Cardiomyopathy ([facility] [address]     Systolic and diastolic CHF, acute (HCC)     BLUE (acute kidney injury) ([facility]     Abnormal EKG     Chronic combined systolic and diastolic CHF (congestive heart failure) (HCC)     Acute decompensated heart failure (HCC)     Chest pain     Pulmonary edema, acute ([facility])     NSTEMI (non-ST elevated myocardial infarction) ([facility] [address]      Subjective:  Admit Date: 11/3/2020    Interval History: no BMP today, on lasix 80 mg BID, feeling better     Medications:  Scheduled Meds:   predniSONE  50 mg Oral Once    diphenhydrAMINE  50 mg Oral Once    hydrocortisone sodium succinate PF  200 mg Intravenous Once    furosemide  80 mg Intravenous BID    sodium chloride flush  10 mL Intravenous 2 times per day    enoxaparin  40 mg Subcutaneous Daily    aspirin  81 mg Oral Daily    carvedilol  25 mg Oral BID WC    hydrALAZINE  25 mg Oral TID    isosorbide mononitrate  60 mg Oral Daily     Continuous Infusions:    CBC:   Recent Labs     11/04/20  0559 11/05/20  0553   WBC 8.2 7.7   HGB 13.0* 13.7*   * 523*     CMP:    Recent Labs     11/03/20  0015 11/04/20  0559    140   K 3.9 3.5    102   CO2 22 23   BUN 31* 36*   CREATININE 2.91* 3.04*   GLUCOSE 106* 93   CALCIUM 8.7 9.0   LABGLOM 23.2* 22.1*     Troponin:   Recent Labs     11/03/20  1117   TROPONINI 0.027*     BNP: No results for input(s): BNP in the last 72 hours.   INR:   Recent Labs     11/04/20  0559   INR 1.1 Lipids: No results for input(s): CHOL, LDLDIRECT, TRIG, HDL, AMYLASE, LIPASE in the last 72 hours. Liver:   Recent Labs     11/03/20  0015   AST 18   ALT 22   ALKPHOS 81   PROT 6.1*   LABALBU 3.5   BILITOT 0.6     Iron:  No results for input(s): IRONS, FERRITIN in the last 72 hours. Invalid input(s): LABIRONS  Urinalysis: No results for input(s): UA in the last 72 hours.     Objective:  Vitals: /80   Pulse 91   Temp 97.2 °F (36.2 °C) (Oral)   Resp 18   Ht 6' (1.829 m)   Wt 260 lb (117.9 kg)   SpO2 95%   BMI 35.26 kg/m²    Wt Readings from Last 3 Encounters:   11/03/20 260 lb (117.9 kg)   10/05/20 206 lb (93.4 kg)   08/28/20 199 lb 8 oz (90.5 kg)      24HR INTAKE/OUTPUT:      Intake/Output Summary (Last 24 hours) at 11/5/2020 1021  Last data filed at 11/5/2020 0749  Gross per 24 hour   Intake 5 ml   Output 1700 ml   Net -1695 ml       General: alert, in no apparent distress  HEENT: normocephalic, atraumatic, anicteric  Lungs: non-labored respirations, clear to auscultation bilaterally  Heart: regular rate and rhythm, no murmurs or rubs  Abdomen: soft, non-tender, non-distended  Ext: no cyanosis, ++ peripheral edema  Neuro: alert and oriented, no gross abnormalities      Electronically signed by Liv Wright MD

## 2020-11-05 NOTE — PROGRESS NOTES
Progress Note  Patient: Zofia Mcclendon  Unit/Bed: B730/X213-15  YOB: 1970  MRN: [id number]  Acct: [de-identified]   Admitting Diagnosis: Pulmonary edema, acute ([facility] [J81.0]  Date:  11/3/2020  Hospital Day: 2    Chief Complaint:  Shortness of breath    Subjective      11/5/20: Patient underwent cardiac catheterization with Dr. Steve Lan yesterday which revealed normal coronary arteries and EF of 20%. Since admission, he has been diuresed with IV Lasix for treatment of acute on chronic systolic heart failure. He was evaluated by nephrology yesterday and his Lasix dose was increased to 80 mg IV twice daily. He is known to have CKD with a baseline creatinine between 2.7 and 3. He is diuresing well on IV Lasix with approximately 3.2 L negative fluid balance so far. Blood pressure remains suboptimally controlled and his hydralazine will be titrated further today. A.m. labs reviewed. He is hypokalemic with a potassium of 3.3 so we will order replacement with 20 mEq of potassium chloride today and then daily. Mild worsening in renal function with creatinine currently 3.41, BUN of 37 and GFR of 23.4. He is resting comfortably at this time. Shortness of breath much improved. Denies chest pain. Currently on telemetry, sinus rhythm with heart rates 80s to 90s. Has been educated on the importance of low-sodium diet and fluid restriction as well as compliance with medications and compliance with outpatient follow-up. 11/3/20: Patient is a 52 y.o. male who presents with a chief complaint of shortness of breath. Patient is followed on a regular basis by Dr. Dany Babb MD.  Patient with past medical history of cardiomyopathy, hypertension, tobacco abuse, EtOH abuse, congestive heart failure and medical noncompliance who presented to [facility] emergency department chief complaint of worsening shortness of breath as well as lower extremity edema for the past few days.   Patient states states he ran out of his medications a few days ago. He was noted to have elevated BNP, mildly elevated cardiac enzyme. Patient with chronic kidney disease. He denies any chest pain. Status post normal nuclear stress test and October 2019. Most recent echocardiogram on August 25, 2020 with ejection fraction of 20-25%. Patient denies any history of cardiac catheterization. He continues to smoke unfortunate. He is noncompliant with his diet. Review of Systems:   Review of Systems   Constitutional: Negative for activity change and fever. HENT: Negative for congestion. Respiratory: Positive for shortness of breath (improving). Negative for chest tightness. Cardiovascular: Negative for chest pain, palpitations and leg swelling. Gastrointestinal: Negative for nausea and vomiting. Genitourinary: Negative for difficulty urinating. Musculoskeletal: Negative for arthralgias. Skin: Negative for color change, pallor and rash. Neurological: Negative for dizziness and syncope. Psychiatric/Behavioral: Negative for agitation and behavioral problems. Physical Examination:    BP (!) 150/103 Comment: Simultaneous filing. User may not have seen previous data. Pulse 93 Comment: Simultaneous filing. User may not have seen previous data. Temp 98.1 °F (36.7 °C) (Oral) Comment: Simultaneous filing. User may not have seen previous data. Resp 17 Comment: Simultaneous filing. User may not have seen previous data. Ht 6' (1.829 m)   Wt 260 lb (117.9 kg)   SpO2 100% Comment: Simultaneous filing. User may not have seen previous data. BMI 35.26 kg/m²    Physical Exam  Constitutional:       Appearance: Normal appearance. HENT:      Head: Normocephalic and atraumatic. Neck:      Musculoskeletal: Normal range of motion and neck supple. Cardiovascular:      Rate and Rhythm: Normal rate and regular rhythm. Pulmonary:      Effort: Pulmonary effort is normal. No respiratory distress. Breath sounds:  No wheezing, rhonchi or rales. Abdominal:      Palpations: Abdomen is soft. Tenderness: There is no abdominal tenderness. Musculoskeletal: Normal range of motion. Right lower leg: No edema. Left lower leg: No edema. Skin:     General: Skin is warm and dry. Neurological:      General: No focal deficit present. Mental Status: He is alert and oriented to person, place, and time. Cranial Nerves: No cranial nerve deficit.    Psychiatric:         Mood and Affect: Mood normal.         Behavior: Behavior normal.         LABS:  CBC:   Lab Results   Component Value Date    WBC 7.7 11/05/2020    RBC 4.92 11/05/2020    HGB 13.7 11/05/2020    HCT 39.9 11/05/2020    MCV 81.0 11/05/2020    MCH 27.9 11/05/2020    MCHC 34.4 11/05/2020    RDW 16.3 11/05/2020     11/05/2020     CBC with Differential:   Lab Results   Component Value Date    WBC 7.7 11/05/2020    RBC 4.92 11/05/2020    HGB 13.7 11/05/2020    HCT 39.9 11/05/2020     11/05/2020    MCV 81.0 11/05/2020    MCH 27.9 11/05/2020    MCHC 34.4 11/05/2020    RDW 16.3 11/05/2020    LYMPHOPCT 7.1 11/03/2020    MONOPCT 5.9 11/03/2020    BASOPCT 0.7 11/03/2020    MONOSABS 0.6 11/03/2020    LYMPHSABS 0.8 11/03/2020    EOSABS 0.1 11/03/2020    BASOSABS 0.1 11/03/2020     CMP:    Lab Results   Component Value Date     11/05/2020    K 3.3 11/05/2020     11/05/2020    CO2 25 11/05/2020    BUN 37 11/05/2020    CREATININE 3.41 11/05/2020    GFRAA 23.4 11/05/2020    LABGLOM 19.3 11/05/2020    GLUCOSE 109 11/05/2020    PROT 6.1 11/03/2020    LABALBU 3.5 11/03/2020    CALCIUM 9.2 11/05/2020    BILITOT 0.6 11/03/2020    ALKPHOS 81 11/03/2020    AST 18 11/03/2020    ALT 22 11/03/2020     BMP:    Lab Results   Component Value Date     11/05/2020    K 3.3 11/05/2020     11/05/2020    CO2 25 11/05/2020    BUN 37 11/05/2020    LABALBU 3.5 11/03/2020    CREATININE 3.41 11/05/2020    CALCIUM 9.2 11/05/2020    GFRAA 23.4 11/05/2020 LABGLOM 19.3 11/05/2020    GLUCOSE 109 11/05/2020     Magnesium:    Lab Results   Component Value Date    MG 2.3 08/30/2020     Troponin:    Lab Results   Component Value Date    TROPONINI 0.027 11/03/2020       Radiology:  No results found. Echocardiogram 8/27/20:  Conclusions   Summary   Normal aortic valve structure and function. Trace AR   Normal tricuspid valve structure and function. trace TR   RVSP 25 mmHg   Left ventricular ejection fraction is visually estimated at 25-30% with   Global Hypokinesis. Pseudonormal filling pattern noted. Signature   ----------------------------------------------------------------   Electronically signed by James Mackenzie MD(Interpreting   physician) on 08/27/2020 03:28 PM   ----------------------------------------------------------------   Findings  Left Ventricle  Left ventricular ejection fraction is visually estimated at 25-30% with  Global Hypokinesis. Pseudonormal filling pattern noted. Right Ventricle  Normal right ventricle structure and function. Normal right ventricle systolic pressure. Left Atrium  Normal left atrium. Right Atrium  Normal right atrium. Mitral Valve  Normal mitral valve structure and function. Tricuspid Valve  Normal tricuspid valve structure and function. trace TR  RVSP 25 mmHg  Aortic Valve  Normal aortic valve structure and function. Trace AR  Pulmonic Valve  The pulmonic valve was not well visualized . Pericardial Effusion  No evidence of pericardial effusion. Pleural Effusion  No evidence of pleural effusion. Aorta \ Miscellaneous  The aorta is within normal limits.     [facility] 11/4/20:   Procedure(s):  LHC, b/l coronary angio  Pre-operative Diagnosis:  NSTEMI, CMP  H&P Status: Completed and reviewed.      Post-operative Diagnosis:    Normal Coronaries,   LVEDP=29mmhg  EF of 20%     Findings:  See full report  Complications:  none  Primary Proceduralist:   Dr.Wes Palomo DO     Plan  RFM  Max med rx        EKG 11/3/20: SR 95, T wave inversion in leads I, aVL, ST depression with T wave inversion in leads II, III, aVF, deep T wave inversion V3-V6, QTc 497ms    Telemetry 11/5/20: SR 80s-90s      Assessment:    Active Hospital Problems    Diagnosis Date Noted    [facility]non[facility]ST elevated [facility] [I21.4]      Priority: High    Pulmonary edema, acute ([facility] [address] [J81.0] 11/03/2020     1. Acute on chronic systolic CHF  2. NSTEMI s/p LHC 11/4/20 with normal coronaries  3. NICMP EF 25-30% per echo 8/27/20 and EF 20% per LHC  4. HTN  5. Tobacco abuse  6. ETOH abuse  7. CKD  8. Hypokalemia--replaced       Plan:  1. Maximize medical therapy-aspirin 81 mg p.o. daily, Coreg 25 mg p.o. twice daily, diuretic management per nephrology-currently on Lasix 80 mg IV twice daily, Imdur 60 mg p.o. daily, titrate hydralazine 50 mg p.o. 3 times daily, DVT prophylaxis with Lovenox 40 mg subcu daily, NicoDerm patch every 24 hours  2. Cardiac/less than 2 g sodium diet recommended  3. 1500 mL daily fluid restriction  4. Check daily weight and strict intake and output  5. Monitor on telemetry for any tachycardia or bradycardia arrhythmias  6. Maintain potassium greater than 4, magnesium greater than 2  7. GI/DVT prophylaxis  8. Tobacco cessation strongly recommended  9. Alcohol cessation strongly recommended  10. Nephrology recommendations regarding diuretics/fluid management in the setting of CKD  11. Will need to repeat LV function in 3 to 6 months and if EF remains severely reduced at less than or equal to 35%, will need referral to electrophysiology for AICD evaluation  12. Will need outpatient follow-up with CHF clinic upon discharge  13. Further recommendations to follow        Electronically signed by MICAH Alanis on 11/5/2020 at 2:21 PM    Attending Supervising [de-identified] R Harvinder Lino  The patient is a 52 y.o. male. I have performed a history and physical examination of the patient.  I discussed the case with the physician at 3:58 PM EST

## 2020-11-06 LAB
ANION GAP SERPL CALCULATED.3IONS-SCNC: 17 MEQ/L (ref 9–15)
BUN BLDV-MCNC: 35 MG/DL (ref 6–20)
CALCIUM SERPL-MCNC: 9.2 MG/DL (ref 8.5–9.9)
CHLORIDE BLD-SCNC: 102 MEQ/L (ref 95–107)
CO2: 22 MEQ/L (ref 20–31)
CREAT SERPL-MCNC: 3.21 MG/DL (ref 0.7–1.2)
GFR AFRICAN AMERICAN: 25.1
GFR NON-AFRICAN AMERICAN: 20.7
GLUCOSE BLD-MCNC: 96 MG/DL (ref 70–99)
HCT VFR BLD CALC: 40.3 % (ref 42–52)
HEMOGLOBIN: 13.9 G/DL (ref 14–18)
MAGNESIUM: 2.2 MG/DL (ref 1.7–2.4)
MCH RBC QN AUTO: 27.7 PG (ref 27–31.3)
MCHC RBC AUTO-ENTMCNC: 34.4 % (ref 33–37)
MCV RBC AUTO: 80.6 FL (ref 80–100)
PDW BLD-RTO: 15.6 % (ref 11.5–14.5)
PLATELET # BLD: 532 K/UL (ref 130–400)
POTASSIUM SERPL-SCNC: 3.3 MEQ/L (ref 3.4–4.9)
RBC # BLD: 5 M/UL (ref 4.7–6.1)
SODIUM BLD-SCNC: 141 MEQ/L (ref 135–144)
WBC # BLD: 7.6 K/UL (ref 4.8–10.8)

## 2020-11-06 PROCEDURE — 6370000000 HC RX 637 (ALT 250 FOR IP): Performed by: PHYSICIAN ASSISTANT

## 2020-11-06 PROCEDURE — 85027 COMPLETE CBC AUTOMATED: CPT

## 2020-11-06 PROCEDURE — 2580000003 HC RX 258: Performed by: PERSONAL EMERGENCY RESPONSE ATTENDANT

## 2020-11-06 PROCEDURE — 99232 SBSQ HOSP IP/OBS MODERATE 35: CPT | Performed by: INTERNAL MEDICINE

## 2020-11-06 PROCEDURE — 6370000000 HC RX 637 (ALT 250 FOR IP): Performed by: PERSONAL EMERGENCY RESPONSE ATTENDANT

## 2020-11-06 PROCEDURE — 6370000000 HC RX 637 (ALT 250 FOR IP): Performed by: STUDENT IN AN ORGANIZED HEALTH CARE EDUCATION/TRAINING PROGRAM

## 2020-11-06 PROCEDURE — 80048 BASIC METABOLIC PNL TOTAL CA: CPT

## 2020-11-06 PROCEDURE — 83735 ASSAY OF MAGNESIUM: CPT

## 2020-11-06 PROCEDURE — 6360000002 HC RX W HCPCS: Performed by: PERSONAL EMERGENCY RESPONSE ATTENDANT

## 2020-11-06 PROCEDURE — 6370000000 HC RX 637 (ALT 250 FOR IP): Performed by: INTERNAL MEDICINE

## 2020-11-06 PROCEDURE — 2060000000 HC ICU INTERMEDIATE R&B

## 2020-11-06 PROCEDURE — 6360000002 HC RX W HCPCS: Performed by: STUDENT IN AN ORGANIZED HEALTH CARE EDUCATION/TRAINING PROGRAM

## 2020-11-06 PROCEDURE — 36415 COLL VENOUS BLD VENIPUNCTURE: CPT

## 2020-11-06 RX ORDER — POTASSIUM CHLORIDE 20 MEQ/1
20 TABLET, EXTENDED RELEASE ORAL ONCE
Status: COMPLETED | OUTPATIENT
Start: 2020-11-06 | End: 2020-11-06

## 2020-11-06 RX ADMIN — POTASSIUM CHLORIDE 20 MEQ: 20 TABLET, EXTENDED RELEASE ORAL at 07:55

## 2020-11-06 RX ADMIN — ENOXAPARIN SODIUM 40 MG: 40 INJECTION SUBCUTANEOUS at 07:55

## 2020-11-06 RX ADMIN — CARVEDILOL 25 MG: 25 TABLET, FILM COATED ORAL at 07:56

## 2020-11-06 RX ADMIN — TORSEMIDE 50 MG: 10 TABLET ORAL at 15:47

## 2020-11-06 RX ADMIN — ISOSORBIDE MONONITRATE 60 MG: 30 TABLET, EXTENDED RELEASE ORAL at 07:56

## 2020-11-06 RX ADMIN — HYDRALAZINE HYDROCHLORIDE 50 MG: 50 TABLET, FILM COATED ORAL at 15:47

## 2020-11-06 RX ADMIN — FUROSEMIDE 80 MG: 10 INJECTION, SOLUTION INTRAMUSCULAR; INTRAVENOUS at 07:56

## 2020-11-06 RX ADMIN — ACETAMINOPHEN 650 MG: 325 TABLET, FILM COATED ORAL at 18:31

## 2020-11-06 RX ADMIN — Medication 10 ML: at 20:32

## 2020-11-06 RX ADMIN — HYDRALAZINE HYDROCHLORIDE 50 MG: 50 TABLET, FILM COATED ORAL at 07:56

## 2020-11-06 RX ADMIN — CARVEDILOL 25 MG: 25 TABLET, FILM COATED ORAL at 15:47

## 2020-11-06 RX ADMIN — Medication 10 ML: at 08:04

## 2020-11-06 RX ADMIN — ASPIRIN 81 MG: 81 TABLET, COATED ORAL at 07:56

## 2020-11-06 RX ADMIN — POTASSIUM CHLORIDE 20 MEQ: 20 TABLET, EXTENDED RELEASE ORAL at 15:47

## 2020-11-06 RX ADMIN — HYDRALAZINE HYDROCHLORIDE 50 MG: 50 TABLET, FILM COATED ORAL at 20:32

## 2020-11-06 ASSESSMENT — PAIN SCALES - GENERAL
PAINLEVEL_OUTOF10: 0
PAINLEVEL_OUTOF10: 0
PAINLEVEL_OUTOF10: 6
PAINLEVEL_OUTOF10: 0

## 2020-11-06 ASSESSMENT — ENCOUNTER SYMPTOMS
CHEST TIGHTNESS: 0
VOMITING: 0
SHORTNESS OF BREATH: 1
COLOR CHANGE: 0
NAUSEA: 0

## 2020-11-06 NOTE — PROGRESS NOTES
Progress Note  Patient: Janis Levin  Unit/Bed: H682/P486-44  YOB: 1970  MRN: [id number]  Acct: [de-identified]   Admitting Diagnosis: Pulmonary edema, acute ([facility] [address] [J81.0]  Date:  11/3/2020  Hospital Day: 3    Chief Complaint:  Shortness of breath    Subjective      11/5/20: Patient underwent cardiac catheterization with Dr. Derrick Gordon yesterday which revealed normal coronary arteries and EF of 20%. Since admission, he has been diuresed with IV Lasix for treatment of acute on chronic systolic heart failure. He was evaluated by nephrology yesterday and his Lasix dose was increased to 80 mg IV twice daily. He is known to have CKD with a baseline creatinine between 2.7 and 3. He is diuresing well on IV Lasix with approximately 3.2 L negative fluid balance so far. Blood pressure remains suboptimally controlled and his hydralazine will be titrated further today. A.m. labs reviewed. He is hypokalemic with a potassium of 3.3 so we will order replacement with 20 mEq of potassium chloride today and then daily. Mild worsening in renal function with creatinine currently 3.41, BUN of 37 and GFR of 23.4. He is resting comfortably at this time. Shortness of breath much improved. Denies chest pain. Currently on telemetry, sinus rhythm with heart rates 80s to 90s. Has been educated on the importance of low-sodium diet and fluid restriction as well as compliance with medications and compliance with outpatient follow-up. 11/3/20: Patient is a 52 y.o. male who presents with a chief complaint of shortness of breath. Patient is followed on a regular basis by Dr. Veronique Powell MD.  Patient with past medical history of cardiomyopathy, hypertension, tobacco abuse, EtOH abuse, congestive heart failure and medical noncompliance who presented to [facility] emergency department chief complaint of worsening shortness of breath as well as lower extremity edema for the past few days.   Patient states states he ran out of his medications a few days ago. He was noted to have elevated BNP, mildly elevated cardiac enzyme. Patient with chronic kidney disease. He denies any chest pain. Status post normal nuclear stress test and October 2019. Most recent echocardiogram on August 25, 2020 with ejection fraction of 20-25%. Patient denies any history of cardiac catheterization. He continues to smoke unfortunate. He is noncompliant with his diet. 11-6-20: doing well. nephro recs appreciated. No CP or SOB. No LE edema. Review of Systems:   Review of Systems   Constitutional: Negative for activity change and fever. HENT: Negative for congestion. Respiratory: Positive for shortness of breath (improving). Negative for chest tightness. Cardiovascular: Negative for chest pain, palpitations and leg swelling. Gastrointestinal: Negative for nausea and vomiting. Genitourinary: Negative for difficulty urinating. Musculoskeletal: Negative for arthralgias. Skin: Negative for color change, pallor and rash. Neurological: Negative for dizziness and syncope. Psychiatric/Behavioral: Negative for agitation and behavioral problems. Physical Examination:    /77   Pulse 82   Temp 97.7 °F (36.5 °C) (Oral)   Resp 16   Ht 6' (1.829 m)   Wt 260 lb (117.9 kg)   SpO2 100%   BMI 35.26 kg/m²    Physical Exam  Constitutional:       Appearance: Normal appearance. HENT:      Head: Normocephalic and atraumatic. Neck:      Musculoskeletal: Normal range of motion and neck supple. Cardiovascular:      Rate and Rhythm: Normal rate and regular rhythm. Pulmonary:      Effort: Pulmonary effort is normal. No respiratory distress. Breath sounds: No wheezing, rhonchi or rales. Abdominal:      Palpations: Abdomen is soft. Tenderness: There is no abdominal tenderness. Musculoskeletal: Normal range of motion. Right lower leg: No edema. Left lower leg: No edema.    Skin:     General: Skin is warm and dry. Neurological:      General: No focal deficit present. Mental Status: He is alert and oriented to person, place, and time. Cranial Nerves: No cranial nerve deficit. Psychiatric:         Mood and Affect: Mood normal.         Behavior: Behavior normal.         LABS:  CBC:   Lab Results   Component Value Date    WBC 7.6 11/06/2020    RBC 5.00 11/06/2020    HGB 13.9 11/06/2020    HCT 40.3 11/06/2020    MCV 80.6 11/06/2020    MCH 27.7 11/06/2020    MCHC 34.4 11/06/2020    RDW 15.6 11/06/2020     11/06/2020     CBC with Differential:   Lab Results   Component Value Date    WBC 7.6 11/06/2020    RBC 5.00 11/06/2020    HGB 13.9 11/06/2020    HCT 40.3 11/06/2020     11/06/2020    MCV 80.6 11/06/2020    MCH 27.7 11/06/2020    MCHC 34.4 11/06/2020    RDW 15.6 11/06/2020    LYMPHOPCT 7.1 11/03/2020    MONOPCT 5.9 11/03/2020    BASOPCT 0.7 11/03/2020    MONOSABS 0.6 11/03/2020    LYMPHSABS 0.8 11/03/2020    EOSABS 0.1 11/03/2020    BASOSABS 0.1 11/03/2020     CMP:    Lab Results   Component Value Date     11/06/2020    K 3.3 11/06/2020     11/06/2020    CO2 22 11/06/2020    BUN 35 11/06/2020    CREATININE 3.21 11/06/2020    GFRAA 25.1 11/06/2020    LABGLOM 20.7 11/06/2020    GLUCOSE 96 11/06/2020    PROT 6.1 11/03/2020    LABALBU 3.5 11/03/2020    CALCIUM 9.2 11/06/2020    BILITOT 0.6 11/03/2020    ALKPHOS 81 11/03/2020    AST 18 11/03/2020    ALT 22 11/03/2020     BMP:    Lab Results   Component Value Date     11/06/2020    K 3.3 11/06/2020     11/06/2020    CO2 22 11/06/2020    BUN 35 11/06/2020    LABALBU 3.5 11/03/2020    CREATININE 3.21 11/06/2020    CALCIUM 9.2 11/06/2020    GFRAA 25.1 11/06/2020    LABGLOM 20.7 11/06/2020    GLUCOSE 96 11/06/2020     Magnesium:    Lab Results   Component Value Date    MG 2.2 11/06/2020     Troponin:    Lab Results   Component Value Date    TROPONINI 0.027 11/03/2020       Radiology:  No results found.      Echocardiogram 8/27/20:  Conclusions   Summary   Normal aortic valve structure and function. Trace AR   Normal tricuspid valve structure and function. trace TR   RVSP 25 mmHg   Left ventricular ejection fraction is visually estimated at 25-30% with   Global Hypokinesis. Pseudonormal filling pattern noted. Signature   ----------------------------------------------------------------   Electronically signed by Paola Palacios MD(Interpreting   physician) on 08/27/2020 03:28 PM   ----------------------------------------------------------------   Findings  Left Ventricle  Left ventricular ejection fraction is visually estimated at 25-30% with  Global Hypokinesis. Pseudonormal filling pattern noted. Right Ventricle  Normal right ventricle structure and function. Normal right ventricle systolic pressure. Left Atrium  Normal left atrium. Right Atrium  Normal right atrium. Mitral Valve  Normal mitral valve structure and function. Tricuspid Valve  Normal tricuspid valve structure and function. trace TR  RVSP 25 mmHg  Aortic Valve  Normal aortic valve structure and function. Trace AR  Pulmonic Valve  The pulmonic valve was not well visualized . Pericardial Effusion  No evidence of pericardial effusion. Pleural Effusion  No evidence of pleural effusion. Aorta \ Miscellaneous  The aorta is within normal limits.     [facility] 11/4/20:   Procedure(s):  [facility], b/l coronary angio  Pre-operative Diagnosis:  NSTEMI, CMP  H&P Status: Completed and reviewed.      Post-operative Diagnosis:    Normal Coronaries,   LVEDP=29mmhg  EF of 20%     Findings:  See full report  Complications:  none  Primary Proceduralist:   Dr.Wes Palomo DO     Plan  RFM  Max med rx        EKG 11/3/20: SR 95, T wave inversion in leads I, aVL, ST depression with T wave inversion in leads II, III, aVF, deep T wave inversion V3-V6, QTc 497ms    Telemetry 11/5/20: SR 80s-90s      Assessment:    Active Hospital Problems    Diagnosis Date Noted    NSTEMI (non-ST elevated myocardial infarction) [facility]) [I21.4]      Priority: High    Pulmonary edema, acute ([facility] [address] [J81.0] 11/03/2020     Priority: Low     1. Acute on chronic systolic CHF  2. NSTEMI s/p LHC 11/4/20 with normal coronaries  3. NICMP EF 25-30% per echo 8/27/20 and EF 20% per LHC  4. HTN  5. Tobacco abuse  6. ETOH abuse  7. CKD  8. Hypokalemia--replaced       Plan:  1. Maximize medical therapy-aspirin 81 mg p.o. daily, Coreg 25 mg p.o. twice daily, diuretic management per nephrology-currently on Lasix 80 mg IV twice daily, Imdur 60 mg p.o. daily, titrate hydralazine 50 mg p.o. 3 times daily, DVT prophylaxis with Lovenox 40 mg subcu daily, NicoDerm patch every 24 hours  2. Cardiac/less than 2 g sodium diet recommended  3. 1500 mL daily fluid restriction  4. Check daily weight and strict intake and output  5. Monitor on telemetry for any tachycardia or bradycardia arrhythmias  6. Maintain potassium greater than 4, magnesium greater than 2  7. GI/DVT prophylaxis  8. Tobacco cessation strongly recommended  9. Alcohol cessation strongly recommended  10. Nephrology recommendations regarding diuretics/fluid management in the setting of CKD  11. Will need to repeat LV function in 3 to 6 months and if EF remains severely reduced at less than or equal to 35%, will need referral to electrophysiology for AICD evaluation  12. Will need outpatient follow-up with CHF clinic upon discharge  13. Further recommendations to follow        Electronically signed by Monica Alanis DO on 11/6/2020 at 2:33 PM    Attending Supervising [de-identified] Attestation Statement  The patient is a 52 y.o. male. I have performed a history and physical examination of the patient. I discussed the case with the physician assistant. I reviewed the patient's Past Medical History, Past Surgical History, Medications, and Allergies.      Physical Exam:  Vitals:    11/05/20 1038 11/05/20 1420 11/05/20 1840 11/06/20 0722   BP: (!) 150/103 (!) 128/90 112/82 126/77   Pulse: 93 89 87 82   Resp: 17 18 18 16   Temp: 98.1 °F (36.7 °C) 97.9 °F (36.6 °C) 97.9 °F (36.6 °C) 97.7 °F (36.5 °C)   TempSrc: Oral Oral Oral Oral   SpO2: 100% 97% 93% 100%   Weight:       Height:               Pulmonary/Chest: clear to auscultation bilaterally- no wheezes, rales or rhonchi, normal air movement, no respiratory distress  Cardiovascular: normal rate, normal S1 and S2, no gallops, intact distal pulses and no carotid bruits  Abdomen: soft, non-tender, non-distended, normal bowel sounds, no masses or organomegaly    Active Hospital Problems    Diagnosis Date Noted    NSTEMI (non-ST elevated myocardial infarction) ([facility] [address] [I21.4]      Priority: High    Pulmonary edema, acute ([facility] [address] [J81.0] 11/03/2020     Priority: Low        I reviewed and agree with the findings and plan documented in her note . ASSESSMENT:     Acute on chronic decompensated combined congestive heart failure.     NICMP- EF of 25-30%     Elevated C.E likely demand ischemia but need to rule out cardiac ischemia.      Essential hypertension     Tobacco abuse     History of alcohol abuse     Chronic kidney disease        PLAN:   1. As always, aggressive risk factor modification is strongly recommended. We should adhere to the JNC VIII guidelines for HTN management and the NCEPATP III guidelines for LDL-C management. 2. Maximize cardiac medications  3. diuresis as kasie per nephro, monitor I/O's, renal function, electrolytes. Keep K+>4 and Mg>2  4. nephro recs. 5. Avoid nephrotoxic agents  6. CHF teaching and follow-up in CHF clinic post discharge  7. Continue monitor on telemetry for any tachycardia or bradyarrhythmias  8. Tobacco and alcohol cessation was strongly recommended. 9. Low-sodium diet. 10. DC home when ok with nephro.      Electronically signed by Nima Altman DO on 11/5/20 at 3:58 PM EST

## 2020-11-07 VITALS
RESPIRATION RATE: 17 BRPM | DIASTOLIC BLOOD PRESSURE: 98 MMHG | WEIGHT: 260 LBS | TEMPERATURE: 97.4 F | BODY MASS INDEX: 35.21 KG/M2 | HEIGHT: 72 IN | HEART RATE: 88 BPM | SYSTOLIC BLOOD PRESSURE: 138 MMHG | OXYGEN SATURATION: 98 %

## 2020-11-07 LAB
ANION GAP SERPL CALCULATED.3IONS-SCNC: 15 MEQ/L (ref 9–15)
BUN BLDV-MCNC: 33 MG/DL (ref 6–20)
CALCIUM SERPL-MCNC: 9.5 MG/DL (ref 8.5–9.9)
CHLORIDE BLD-SCNC: 98 MEQ/L (ref 95–107)
CO2: 26 MEQ/L (ref 20–31)
CREAT SERPL-MCNC: 3.06 MG/DL (ref 0.7–1.2)
GFR AFRICAN AMERICAN: 26.5
GFR NON-AFRICAN AMERICAN: 21.9
GLUCOSE BLD-MCNC: 90 MG/DL (ref 70–99)
HCT VFR BLD CALC: 43.1 % (ref 42–52)
HEMOGLOBIN: 14.6 G/DL (ref 14–18)
MCH RBC QN AUTO: 27.4 PG (ref 27–31.3)
MCHC RBC AUTO-ENTMCNC: 33.9 % (ref 33–37)
MCV RBC AUTO: 80.9 FL (ref 80–100)
PDW BLD-RTO: 15.4 % (ref 11.5–14.5)
PLATELET # BLD: 545 K/UL (ref 130–400)
POTASSIUM SERPL-SCNC: 3.5 MEQ/L (ref 3.4–4.9)
RBC # BLD: 5.32 M/UL (ref 4.7–6.1)
SODIUM BLD-SCNC: 139 MEQ/L (ref 135–144)
WBC # BLD: 6.8 K/UL (ref 4.8–10.8)

## 2020-11-07 PROCEDURE — 6370000000 HC RX 637 (ALT 250 FOR IP): Performed by: INTERNAL MEDICINE

## 2020-11-07 PROCEDURE — 6370000000 HC RX 637 (ALT 250 FOR IP): Performed by: PHYSICIAN ASSISTANT

## 2020-11-07 PROCEDURE — 2580000003 HC RX 258: Performed by: PERSONAL EMERGENCY RESPONSE ATTENDANT

## 2020-11-07 PROCEDURE — 80048 BASIC METABOLIC PNL TOTAL CA: CPT

## 2020-11-07 PROCEDURE — 85027 COMPLETE CBC AUTOMATED: CPT

## 2020-11-07 PROCEDURE — 6370000000 HC RX 637 (ALT 250 FOR IP): Performed by: STUDENT IN AN ORGANIZED HEALTH CARE EDUCATION/TRAINING PROGRAM

## 2020-11-07 PROCEDURE — 99239 HOSP IP/OBS DSCHRG MGMT >30: CPT | Performed by: INTERNAL MEDICINE

## 2020-11-07 PROCEDURE — 36415 COLL VENOUS BLD VENIPUNCTURE: CPT

## 2020-11-07 RX ORDER — HYDRALAZINE HYDROCHLORIDE 50 MG/1
50 TABLET, FILM COATED ORAL 3 TIMES DAILY
Qty: 90 TABLET | Refills: 3 | Status: SHIPPED | OUTPATIENT
Start: 2020-11-07 | End: 2020-11-17

## 2020-11-07 RX ORDER — NICOTINE 21 MG/24HR
1 PATCH, TRANSDERMAL 24 HOURS TRANSDERMAL DAILY
Qty: 30 PATCH | Refills: 3 | Status: ON HOLD | OUTPATIENT
Start: 2020-11-08 | End: 2022-04-15 | Stop reason: HOSPADM

## 2020-11-07 RX ORDER — TORSEMIDE 10 MG/1
50 TABLET ORAL DAILY
Qty: 30 TABLET | Refills: 3 | Status: SHIPPED | OUTPATIENT
Start: 2020-11-08 | End: 2021-02-15

## 2020-11-07 RX ADMIN — Medication 10 ML: at 08:57

## 2020-11-07 RX ADMIN — TORSEMIDE 50 MG: 10 TABLET ORAL at 08:56

## 2020-11-07 RX ADMIN — HYDRALAZINE HYDROCHLORIDE 50 MG: 50 TABLET, FILM COATED ORAL at 08:57

## 2020-11-07 RX ADMIN — ASPIRIN 81 MG: 81 TABLET, COATED ORAL at 08:57

## 2020-11-07 RX ADMIN — ISOSORBIDE MONONITRATE 60 MG: 30 TABLET, EXTENDED RELEASE ORAL at 08:57

## 2020-11-07 RX ADMIN — POTASSIUM CHLORIDE 20 MEQ: 20 TABLET, EXTENDED RELEASE ORAL at 08:56

## 2020-11-07 RX ADMIN — CARVEDILOL 25 MG: 25 TABLET, FILM COATED ORAL at 08:57

## 2020-11-07 ASSESSMENT — PAIN SCALES - GENERAL
PAINLEVEL_OUTOF10: 0

## 2020-11-07 NOTE — DISCHARGE INSTR - DIET

## 2020-11-07 NOTE — DISCHARGE SUMMARY
Discharge Summary    Date: 11/12/2020  Patient Name: Walter Piper YOB: 1970 Age: 52 y.o. Admit Date: 11/3/2020  Discharge Date: 11/7/2020  Discharge Condition: Good    Admission Diagnosis  Pulmonary edema, acute ([facility] (J81.0)     Discharge Diagnosis  Active Problems: NSTEMI (non-ST elevated myocardial infarction) ([facility] Pulmonary edema, acute (HCC)Resolved Problems: * No resolved hospital problems. [facility] Stay  Narrative of Hospital Course:  Patient presented with acute decompensated combined congestive heart failure. He was also noted to be in acute on chronic kidney injury and nephrology was consulted. Patient was noted to have mildly elevated cardiac enzymes in the setting of acute kidney injury and acute decompensated heart failure. Given his risk factors and echo showing worsening cardiomyopathy ejection fraction of 20%, decision was made to undergo cardiac catheterization which showed normal coronary arteries and ejection fraction of 20%. Patient was maximized on medical therapy but unfortunately was unable to be initiated on Entresto as well as Aldactone due to his renal failure. He was discharged home in stable and satisfactory condition. He was provided CHF education and tobacco cessation as well as alcohol cessation education. He is arranged to follow-up with the CHF clinic as well as nephrology and myself over the next few weeks. He will also be referred to cardiac rehab as outpatient if proves compliance. Also needs ongoing evaluation for AICD placement in future if no improvement in ejection fraction.     Consultants:  IP CONSULT TO NEPHROLOGY    Surgeries/procedures Performed:  [facility]     Treatments:    Cardiac Medications    Beta-Blocker and Furosemide    Discharge Plan/Disposition:  Home    Hospital/Incidental Findings Requiring Follow Up:    Patient Instructions:    Diet: Renal Diet    Activity:Activity as Tolerated and No Heavy Lifting  For number of days (if applicable): Other Instructions:    Provider Follow-Up:   No follow-ups on file. Significant Diagnostic Studies:    Recent Labs:  Admission on 11/03/2020, Discharged on 11/07/2020Sodium                                        Date: 11/03/2020Value: 138         Ref range: 135 - 144 mEq/L    Status: FinalPotassium                                     Date: 11/03/2020Value: 3.9         Ref range: 3.4 - 4.9 mEq/L    Status: FinalChloride                                      Date: 11/03/2020Value: 107         Ref range: 95 - 107 mEq/L     Status: FinalCO2                                           Date: 11/03/2020Value: 22          Ref range: 20 - 31 mEq/L      Status: FinalAnion Gap                                     Date: 11/03/2020Value: 9           Ref range: 9 - 15 mEq/L       Status: FinalGlucose                                       Date: 11/03/2020Value: 106*        Ref range: 70 - 99 mg/dL      Status: FinalBUN                                           Date: 11/03/2020Value: 31*         Ref range: 6 - 20 mg/dL       Status: FinalCREATININE                                    Date: 11/03/2020Value: 2.91*       Ref range: 0.70 - 1.20 mg/dL  Status: FinalGFR Non-                      Date: 11/03/2020Value: 23.2*       Ref range: >60                Status: Final              Comment: >60 mL/min/1.73m2 EGFR, calc. for ages 25 and older using theMDRD formula (not corrected for weight), is valid for stablerenal function. GFR                           Date: 11/03/2020Value: 28.1*       Ref range: >60                Status: Final              Comment: >60 mL/min/1.73m2 EGFR, calc. for ages 25 and older using theMDRD formula (not corrected for weight), is valid for stablerenal function. Calcium                                       Date: 11/03/2020Value: 8.7         Ref range: 8.5 - 9.9 mg/dL    Status: FinalTotal Protein                                 Date: 11/03/2020Value: 6.1* Ref range: 6.3 - 8.0 g/dL     Status: FinalAlb                                           Date: 11/03/2020Value: 3.5         Ref range: 3.5 - 4.6 g/dL     Status: FinalTotal Bilirubin                               Date: 11/03/2020Value: 0.6         Ref range: 0.2 - 0.7 mg/dL    Status: FinalAlkaline Phosphatase                          Date: 11/03/2020Value: 81          Ref range: 35 - 104 U/L       Status: FinalALT                                           Date: 11/03/2020Value: 22          Ref range: 0 - 41 U/L         Status: FinalAST                                           Date: 11/03/2020Value: 18          Ref range: 0 - 40 U/L         Status: FinalGlobulin                                      Date: 11/03/2020Value: 2.6         Ref range: 2.3 - 3.5 g/dL     Status: [address] [facility]                                           Date: 11/03/2020Value: 10.6        Ref range: 4.8 - 10.8 K/uL    Status: FinalRBC                                           Date: 11/03/2020Value: 4.58*       Ref range: 4.70 - 6.10 M/uL   Status: FinalHemoglobin                                    Date: 11/03/2020Value: 12.7*       Ref range: 14.0 - 18.0 g/dL   Status: FinalHematocrit                                    Date: 11/03/2020Value: 36.9*       Ref range: 42.0 - 52.0 %      Status: FinalMCV                                           Date: 11/03/2020Value: 80.7        Ref range: 80.0 - 100.0 fL    Status: [facility]                                           Date: 11/03/2020Value: 27.6        Ref range: 27.0 - 31.3 pg     Status: [address]                                          Date: 11/03/2020Value: 34.2        Ref range: 33.0 - 37.0 %      Status: FinalRDW                                           Date: 11/03/2020Value: 15.6*       Ref range: 11.5 - 14.5 %      Status: FinalPlatelets                                     Date: 11/03/2020Value: 455*        Ref range: 130 - 400 K/uL     Status: FinalNeutrophils % Date: 11/03/2020Value: 85.0        Ref range: %                  Status: FinalLymphocytes %                                 Date: 11/03/2020Value: 7.1         Ref range: %                  Status: FinalMonocytes %                                   Date: 11/03/2020Value: 5.9         Ref range: %                  Status: FinalEosinophils %                                 Date: 11/03/2020Value: 1.3         Ref range: %                  Status: FinalBasophils %                                   Date: 11/03/2020Value: 0.7         Ref range: %                  Status: FinalNeutrophils Absolute                          Date: 11/03/2020Value: 9.0*        Ref range: 1.4 - 6.5 K/uL     Status: FinalLymphocytes Absolute                          Date: 11/03/2020Value: 0.8*        Ref range: 1.0 - 4.8 K/uL     Status: FinalMonocytes Absolute                            Date: 11/03/2020Value: 0.6         Ref range: 0.2 - 0.8 K/uL     Status: FinalEosinophils Absolute                          Date: 11/03/2020Value: 0.1         Ref range: 0.0 - 0.7 K/uL     Status: FinalBasophils Absolute                            Date: 11/03/2020Value: 0.1         Ref range: 0.0 - 0.2 K/uL     Status: FinalTroponin                                      Date: 11/03/2020Value: 0.032*      Ref range: 0.000 - 0.010 ng*  Status: Final              Comment: Methodology by Troponin T.Pro-BNP                                       Date: 11/03/2020Value: 13,817      Ref range: pg/mL              Status: Final              Comment: NT-pro BNP ACUTE Interpretive Guidelines:      Age        Cutoff for Heart Failure   Less than 50 yrs   450 pg/mL   50-75 yrs           900 pg/mL   Greater than 75 yrs  1800 pg/mLNT-pro BNP NON-ACUTE Interpretive Guidelines:    Age                 Reference Range  Less than 74 yrs   0-125 pg/mL  Greater than 74 yrs   0-450 pg/mLOther possible causes of an elevated NT-proBNP include:cardiac ischemia, acute coronary syndrome, COPD, Ref range: Negative < 200 n*  Status: FinalBenzodiazepine Screen, Urine                  Date: 11/03/2020Value: Neg         Ref range: Negative < 200 n*  Status: FinalCannabinoid Scrn, Ur                          Date: 11/03/2020Value: Neg         Ref range: Negative < 50 ng*  Status: FinalCocaine Metabolite Screen, Urine              Date: 11/03/2020Value: Neg         Ref range: Negative < 300 n*  Status: FinalOpiate Scrn, Ur                               Date: 11/03/2020Value: POSITIVE*   Ref range: Negative < 300 n*  Status: FinalPCP Screen, Urine                             Date: 11/03/2020Value: Neg         Ref range: Negative < 25 ng*  Status: FinalMethadone Screen, Urine                       Date: 11/03/2020Value: Neg         Ref range: Negative <300 ng*  Status: Final              Comment: Effective:  9/9/19New Test for Qualitative Drug Screen. Propoxyphene Scrn, Ur                         Date: 11/03/2020Value: Neg         Ref range: Negative <300 ng*  Status: Final              Comment: Effective:  9/9/19New Test for Qualitative Drug Screen. Oxycodone Urine                               Date: 11/03/2020Value: Neg         Ref range: Negative <100 ng*  Status: Final              Comment: Effective:  9/9/19New Test for Qualitative Drug Screen. Drug Screen Comment:                          Date: 11/03/2020Value: see below     Status: Final              Comment: This method is a screening test to detect only these drugclasses as part of a medical workup. Confirmatory testingby another method should be ordered if clinically indicated. Troponin                                      Date: 11/03/2020Value: 0.026*      Ref range: 0.000 - 0.010 ng*  Status: Final              Comment: Methodology by Troponin T. Troponin                                      Date: 11/03/2020Value: 0.027*      Ref range: 0.000 - 0.010 ng*  Status: Final              Comment: Methodology by Troponin T.WBC Date: 11/04/2020Value: 8.2         Ref range: 4.8 - 10.8 K/uL    Status: FinalRBC                                           Date: 11/04/2020Value: 4.72        Ref range: 4.70 - 6.10 M/uL   Status: FinalHemoglobin                                    Date: 11/04/2020Value: 13.0*       Ref range: 14.0 - 18.0 g/dL   Status: FinalHematocrit                                    Date: 11/04/2020Value: 38.8*       Ref range: 42.0 - 52.0 %      Status: FinalMCV                                           Date: 11/04/2020Value: 82.2        Ref range: 80.0 - 100.0 fL    Status: [facility]                                           Date: 11/04/2020Value: 27.5        Ref range: 27.0 - 31.3 pg     Status: [address]                                          Date: 11/04/2020Value: 33.5        Ref range: 33.0 - 37.0 %      Status: FinalRDW                                           Date: 11/04/2020Value: 16.1*       Ref range: 11.5 - 14.5 %      Status: FinalPlatelets                                     Date: 11/04/2020Value: 493*        Ref range: 130 - 400 K/uL     Status: FinalSodium                                        Date: 11/04/2020Value: 140         Ref range: 135 - 144 mEq/L    Status: FinalPotassium                                     Date: 11/04/2020Value: 3.5         Ref range: 3.4 - 4.9 mEq/L    Status: FinalChloride                                      Date: 11/04/2020Value: 102         Ref range: 95 - 107 mEq/L     Status: FinalCO2                                           Date: 11/04/2020Value: 23          Ref range: 20 - 31 mEq/L      Status: FinalAnion Gap                                     Date: 11/04/2020Value: 15          Ref range: 9 - 15 mEq/L       Status: FinalGlucose                                       Date: 11/04/2020Value: 93          Ref range: 70 - 99 mg/dL      Status: FinalBUN                                           Date: 11/04/2020Value: 36*         Ref range: 6 - 20 mg/dL       Status: [facility] Baltazar                                    Date: 11/04/2020Value: 3.04*       Ref range: 0.70 - 1.20 mg/dL  Status: FinalGFR Non-                      Date: 11/04/2020Value: 22.1*       Ref range: >60                Status: Final              Comment: >60 mL/min/1.73m2 EGFR, calc. for ages 25 and older using theMDRD formula (not corrected for weight), is valid for stablerenal function. GFR                           Date: 11/04/2020Value: 26.7*       Ref range: >60                Status: Final              Comment: >60 mL/min/1.73m2 EGFR, calc. for ages 25 and older using theMDRD formula (not corrected for weight), is valid for stablerenal function. Calcium                                       Date: 11/04/2020Value: 9.0         Ref range: 8.5 - 9.9 mg/dL    Status: FinalProtime                                       Date: 11/04/2020Value: 14.4        Ref range: 12.3 - 14.9 sec    Status: FinalINR                                           Date: 11/04/2020Value: 1.1           Status: FinalVentricular Rate                              Date: 11/03/2020Value: 95          Ref range: BPM                Status: FinalAtrial Rate                                   Date: 11/03/2020Value: 95          Ref range: BPM                Status: FinalP-R Interval                                  Date: 11/03/2020Value: 214         Ref range: ms                 Status: FinalQRS Duration                                  Date: 11/03/2020Value: 96          Ref range: ms                 Status: FinalQ-T Interval                                  Date: 11/03/2020Value: 396         Ref range: ms                 Status: FinalQTc Calculation (Bazett)                      Date: 11/03/2020Value: 497         Ref range: ms                 Status: FinalP Axis                                        Date: 11/03/2020Value: 56          Ref range: degrees            Status: FinalR Axis Date: 11/05/2020Value: 109*        Ref range: 70 - 99 mg/dL      Status: FinalBUN                                           Date: 11/05/2020Value: 37*         Ref range: 6 - 20 mg/dL       Status: FinalCREATININE                                    Date: 11/05/2020Value: 3.41*       Ref range: 0.70 - 1.20 mg/dL  Status: FinalGFR Non-                      Date: 11/05/2020Value: 19.3*       Ref range: >60                Status: Final              Comment: >60 mL/min/1.73m2 EGFR, calc. for ages 25 and older using theMDRD formula (not corrected for weight), is valid for stablerenal function. GFR                           Date: 11/05/2020Value: 23.4*       Ref range: >60                Status: Final              Comment: >60 mL/min/1.73m2 EGFR, calc. for ages 25 and older using theMDRD formula (not corrected for weight), is valid for stablerenal function. Calcium                                       Date: 11/05/2020Value: 9.2         Ref range: 8.5 - 9.9 mg/dL    Status: [address] [facility]                                           Date: 11/06/2020Value: 7.6         Ref range: 4.8 - 10.8 K/uL    Status: FinalRBC                                           Date: 11/06/2020Value: 5.00        Ref range: 4.70 - 6.10 M/uL   Status: FinalHemoglobin                                    Date: 11/06/2020Value: 13.9*       Ref range: 14.0 - 18.0 g/dL   Status: FinalHematocrit                                    Date: 11/06/2020Value: 40.3*       Ref range: 42.0 - 52.0 %      Status: FinalMCV                                           Date: 11/06/2020Value: 80.6        Ref range: 80.0 - 100.0 fL    Status: [address]                                           Date: 11/06/2020Value: 27.7        Ref range: 27.0 - 31.3 pg     Status: [address]                                          Date: 11/06/2020Value: 34.4        Ref range: 33.0 - 37.0 %      Status: FinalRDW Date: 11/06/2020Value: 15.6*       Ref range: 11.5 - 14.5 %      Status: FinalPlatelets                                     Date: 11/06/2020Value: 532*        Ref range: 130 - 400 K/uL     Status: FinalSodium                                        Date: 11/06/2020Value: 141         Ref range: 135 - 144 mEq/L    Status: FinalPotassium                                     Date: 11/06/2020Value: 3.3*        Ref range: 3.4 - 4.9 mEq/L    Status: FinalChloride                                      Date: 11/06/2020Value: 102         Ref range: 95 - 107 mEq/L     Status: FinalCO2                                           Date: 11/06/2020Value: 22          Ref range: 20 - 31 mEq/L      Status: FinalAnion Gap                                     Date: 11/06/2020Value: 17*         Ref range: 9 - 15 mEq/L       Status: FinalGlucose                                       Date: 11/06/2020Value: 96          Ref range: 70 - 99 mg/dL      Status: FinalBUN                                           Date: 11/06/2020Value: 35*         Ref range: 6 - 20 mg/dL       Status: FinalCREATININE                                    Date: 11/06/2020Value: 3.21*       Ref range: 0.70 - 1.20 mg/dL  Status: FinalGFR Non-                      Date: 11/06/2020Value: 20.7*       Ref range: >60                Status: Final              Comment: >60 mL/min/1.73m2 EGFR, calc. for ages 25 and older using theMDRD formula (not corrected for weight), is valid for stablerenal function. GFR                           Date: 11/06/2020Value: 25.1*       Ref range: >60                Status: Final              Comment: >60 mL/min/1.73m2 EGFR, calc. for ages 25 and older using theMDRD formula (not corrected for weight), is valid for stablerenal function. Calcium                                       Date: 11/06/2020Value: 9.2         Ref range: 8.5 - 9.9 mg/dL    Status: FinalMagnesium taking these medicationschlorthalidone (HYGROTON) 25 MG tabletComments:Reason for Stopping:furosemide (LASIX) 40 MG tabletComments:Reason for Stopping:    Time Spent on Discharge:3E] minutes were spent in patient examination, evaluation, counseling as well as medication reconciliation, prescriptions for required medications, discharge plan, and follow up.     Electronically signed by Corby Macdonald DO on 11/12/20 at 7:48 AM EST

## 2020-11-07 NOTE — PROGRESS NOTES
Nephrology Progress Note    Assessment:  CKD-4 stable GFR 26 cc/min  Hypertension  OHD hfref  Cardiomyopathy         Plan: follow with Dr David Wheat nephrology    Patient Active Problem List:     Hypertensive urgency     Cardiomyopathy ([facility]     Systolic and diastolic CHF, acute (HCC)     BLUE (acute kidney injury) ([facility]     Abnormal EKG     Chronic combined systolic and diastolic CHF (congestive heart failure) ([facility]     Acute decompensated heart failure (HCC)     Chest pain     Pulmonary edema, acute ([facility])     NSTEMI (non-ST elevated myocardial infarction) ([facility]      Subjective:  Admit Date: 11/3/2020    Interval History: going home needs work excuse    Medications:  Scheduled Meds:   torsemide  50 mg Oral Daily    nicotine  1 patch Transdermal Daily    potassium chloride  20 mEq Oral Daily with breakfast    hydrALAZINE  50 mg Oral TID    predniSONE  50 mg Oral Once    diphenhydrAMINE  50 mg Oral Once    hydrocortisone sodium succinate PF  200 mg Intravenous Once    sodium chloride flush  10 mL Intravenous 2 times per day    enoxaparin  40 mg Subcutaneous Daily    aspirin  81 mg Oral Daily    carvedilol  25 mg Oral BID WC    isosorbide mononitrate  60 mg Oral Daily     Continuous Infusions:    CBC:   Recent Labs     11/06/20  0539 11/07/20  0607   WBC 7.6 6.8   HGB 13.9* 14.6   * 545*     CMP:    Recent Labs     11/05/20  1101 11/06/20  0539 11/07/20  0607    141 139   K 3.3* 3.3* 3.5    102 98   CO2 25 22 26   BUN 37* 35* 33*   CREATININE 3.41* 3.21* 3.06*   GLUCOSE 109* 96 90   CALCIUM 9.2 9.2 9.5   LABGLOM 19.3* 20.7* 21.9*     Troponin: No results for input(s): TROPONINI in the last 72 hours. BNP: No results for input(s): BNP in the last 72 hours. INR: No results for input(s): INR in the last 72 hours. Lipids: No results for input(s): CHOL, LDLDIRECT, TRIG, HDL, AMYLASE, LIPASE in the last 72 hours.   Liver: No results for input(s): AST, ALT, ALKPHOS, PROT, LABALBU, BILITOT in the last 72 hours. Invalid input(s): BILDIR  Iron:  No results for input(s): IRONS, FERRITIN in the last 72 hours. Invalid input(s): LABIRONS  Urinalysis: No results for input(s): UA in the last 72 hours.     Objective:  Vitals: BP (!) 138/98   Pulse 88   Temp 97.4 °F (36.3 °C) (Oral)   Resp 17   Ht 6' (1.829 m)   Wt 260 lb (117.9 kg)   SpO2 98%   BMI 35.26 kg/m²    Wt Readings from Last 3 Encounters:   11/03/20 260 lb (117.9 kg)   10/05/20 206 lb (93.4 kg)   08/28/20 199 lb 8 oz (90.5 kg)      24HR INTAKE/OUTPUT:      Intake/Output Summary (Last 24 hours) at 11/7/2020 0842  Last data filed at 11/6/2020 1437  Gross per 24 hour   Intake 540 ml   Output 1100 ml   Net -560 ml       General: alert, in no apparent distress  HEENT: normocephalic, atraumatic, anicteric  Neck: supple, no mass  Lungs: non-labored respirations, clear to auscultation bilaterally  Heart: regular rate and rhythm, no murmurs or rubs  Abdomen: soft, non-tender, non-distended  Ext: no cyanosis, no peripheral edema  Neuro: alert and oriented, no gross abnormalities  Psych: normal mood and affect  Skin: no rash      Electronically signed by Jany Good MD

## 2020-11-11 ENCOUNTER — TELEPHONE (OUTPATIENT)
Dept: INTERNAL MEDICINE CLINIC | Age: 50
End: 2020-11-11

## 2020-11-11 NOTE — TELEPHONE ENCOUNTER
[facility] Transitions Initial Follow Up Call    Outreach made within 2 business days of discharge: Yes    Patient: Rudolph Blount Patient : 1970   MRN: [id number]  Reason for Admission: There are no discharge diagnoses documented for the most recent discharge. Discharge Date: 20       Spoke with: Patient     Discharge department/facility: [address] Interactive Patient Contact:  Was patient able to fill all prescriptions: Yes  Was patient instructed to bring all medications to the follow-up visit: Yes  Is patient taking all medications as directed in the discharge summary? Yes  Does patient understand their discharge instructions: Yes  Does patient have questions or concerns that need addressed prior to 7-14 day follow up office visit: no    Scheduled appointment with PCP within 7-14 days - [address] \"[facility] ERIC\" TOMORROW AND DECLINED MAKING PCP FOLLOW-UP AT THIS TIME.     Follow Up  Future Appointments   Date Time Provider Perez Ness   2020 10:30 AM David Alanis [address]

## 2020-11-12 ENCOUNTER — TELEPHONE (OUTPATIENT)
Dept: CARDIOLOGY CLINIC | Age: 50
End: 2020-11-12

## 2020-11-12 NOTE — TELEPHONE ENCOUNTER
Attempted to call patient to reschedule missed appt in the CHF clinic. Left messages with both phone numbers.

## 2020-11-17 ENCOUNTER — OFFICE VISIT (OUTPATIENT)
Dept: CARDIOLOGY CLINIC | Age: 50
End: 2020-11-17
Payer: MEDICAID

## 2020-11-17 VITALS
HEIGHT: 72 IN | SYSTOLIC BLOOD PRESSURE: 175 MMHG | RESPIRATION RATE: 18 BRPM | HEART RATE: 60 BPM | OXYGEN SATURATION: 100 % | DIASTOLIC BLOOD PRESSURE: 120 MMHG | BODY MASS INDEX: 27.77 KG/M2 | WEIGHT: 205 LBS

## 2020-11-17 DIAGNOSIS — I42.8 NONISCHEMIC CARDIOMYOPATHY (HCC): ICD-10-CM

## 2020-11-17 DIAGNOSIS — I50.21 ACUTE SYSTOLIC CONGESTIVE HEART FAILURE, NYHA CLASS 2 (HCC): ICD-10-CM

## 2020-11-17 LAB
ANION GAP SERPL CALCULATED.3IONS-SCNC: 9 MEQ/L (ref 9–15)
BUN BLDV-MCNC: 27 MG/DL (ref 6–20)
CALCIUM SERPL-MCNC: 8.8 MG/DL (ref 8.5–9.9)
CHLORIDE BLD-SCNC: 107 MEQ/L (ref 95–107)
CO2: 23 MEQ/L (ref 20–31)
CREAT SERPL-MCNC: 2.82 MG/DL (ref 0.7–1.2)
GFR AFRICAN AMERICAN: 29.1
GFR NON-AFRICAN AMERICAN: 24.1
GLUCOSE BLD-MCNC: 78 MG/DL (ref 70–99)
POTASSIUM SERPL-SCNC: 3.7 MEQ/L (ref 3.4–4.9)
PRO-BNP: 9972 PG/ML
SODIUM BLD-SCNC: 139 MEQ/L (ref 135–144)

## 2020-11-17 PROCEDURE — 1111F DSCHRG MED/CURRENT MED MERGE: CPT | Performed by: PHYSICIAN ASSISTANT

## 2020-11-17 PROCEDURE — G8427 DOCREV CUR MEDS BY ELIG CLIN: HCPCS | Performed by: PHYSICIAN ASSISTANT

## 2020-11-17 PROCEDURE — 4004F PT TOBACCO SCREEN RCVD TLK: CPT | Performed by: PHYSICIAN ASSISTANT

## 2020-11-17 PROCEDURE — G8484 FLU IMMUNIZE NO ADMIN: HCPCS | Performed by: PHYSICIAN ASSISTANT

## 2020-11-17 PROCEDURE — G8417 CALC BMI ABV UP PARAM F/U: HCPCS | Performed by: PHYSICIAN ASSISTANT

## 2020-11-17 PROCEDURE — 99215 OFFICE O/P EST HI 40 MIN: CPT | Performed by: PHYSICIAN ASSISTANT

## 2020-11-17 RX ORDER — ISOSORBIDE MONONITRATE 60 MG/1
60 TABLET, EXTENDED RELEASE ORAL DAILY
Qty: 30 TABLET | Refills: 3 | Status: SHIPPED | OUTPATIENT
Start: 2020-11-17 | End: 2020-12-01

## 2020-11-17 RX ORDER — HYDRALAZINE HYDROCHLORIDE 100 MG/1
100 TABLET, FILM COATED ORAL 3 TIMES DAILY
Qty: 90 TABLET | Refills: 3 | Status: SHIPPED | OUTPATIENT
Start: 2020-11-17 | End: 2022-03-30 | Stop reason: SDUPTHER

## 2020-11-17 ASSESSMENT — ENCOUNTER SYMPTOMS
VOMITING: 0
NAUSEA: 0
COUGH: 1
SHORTNESS OF BREATH: 1
BLOOD IN STOOL: 0
CHEST TIGHTNESS: 0

## 2020-11-17 NOTE — PROGRESS NOTES
Patient: Walter Piper  YOB: 1970  MRN: [id number]    Chief Complaint:  Chief Complaint   Patient presents with    Congestive Heart Failure    Cough     dry    Shortness of Breath         Subjective/HPI        11/17/20 CHF visit #1: This is a pleasant 68-year-old -American male with past medical history significant for nonischemic cardiomyopathy with severely reduced LV function and ejection fraction of 20%, normal coronary arteries per cardiac catheterization 11/3/2020, abnormal EKG, hypertension, tobacco abuse, history of alcohol abuse, chronic kidney disease and medical noncompliance who presents for initial CHF clinic evaluation. Patient originally presented to the emergency room on 11/3/2020 with complaints of shortness of breath and lower extremity edema. His initial cardiac enzyme was mildly elevated and proBNP elevated at 18,817. Has baseline CKD with creatinine of 2.91 on presentation. He was optimized on heart failure medications. Nephrology was consulted for evaluation of CKD and fluid/diuretic management. Given non-STEMI, severely reduced LV systolic function and risk factors for CAD, he underwent cardiac catheterization on 11/4/2020 which revealed normal coronary arteries and LVEDP of 29 mmHg. Previous echocardiogram completed in August 2020 showed severely reduced LV systolic function with EF of 25 to 30%. He was diuresed aggressively during admission and symptoms improved. He was eventually transitioned back from IV Lasix to torsemide 50 mg p.o. daily and subsequently discharged home in stable condition on 11/7/2020. Since discharge, patient states he has been doing well overall. No new or worsening heart failure symptoms. He states he is compliant with all of his medications. He does report eating diet high in sodium content and has been educated on the importance of low-sodium diet and fluid restriction.   He denies any complaint of shortness of breath or significant dyspnea on exertion but does report mild shortness of breath with moderate to strenuous activity. He denies chest pain, palpitations, diaphoresis, paroxysmal nocturnal dyspnea, orthopnea, lower extremity edema, syncope, fever or chills. He does experience an occasionally productive cough. Recent labs reviewed and documented below. BMP 11/7/2020: Sodium 139, potassium 3.5, chloride 98, total CO2 26, BUN elevated 33, creatinine elevated 3.06, GFR low at 26.5, glucose 90  CBC 11/7/2020: WBC is 6.8, hemoglobin 14.6, hematocrit 43.1, platelets elevated 735. Magnesium 11/6/2020: 2.2    EKG 11/3/20: SR 95, 1st degree AVB,  Deep T wave inversion V3-V6, mild ST depression with T wave inversion leads I and aVL, QTc 497ms    Blood pressure elevated in office today at 184/118 on the right and 175/120 on the left but patient is completely asymptomatic. Heart rate 60 bpm, pulse ox 100% on room air. Weight is 205 pounds      [facility] 11/3/20:  Conclusions  Procedure Summary  Normal coronaries. LVEDP=29mmhg  EF of 20%. Recommendations  Aggressive risk factor management. Maximize medical therapy   Angiographic Findings   Cardiac Arteries and Lesion Findings  LMCA: Normal.  LAD: Normal.  LCx: Normal.  RCA: Normal.  Dominance: Right  LV function assessed as:Abnormal.  Ejection Fraction    - Method: LV gram. EF%: 20.      Echocardiogram 8/25/20:   Conclusions   Summary   Normal aortic valve structure and function. Trace AR   Normal tricuspid valve structure and function. trace TR   RVSP 25 mmHg   Left ventricular ejection fraction is visually estimated at 25-30% with   Global Hypokinesis. Pseudonormal filling pattern noted.    Signature   ----------------------------------------------------------------   Electronically signed by Shauna Rayo MD(Interpreting   physician) on 08/27/2020 03:28 PM   ----------------------------------------------------------------    Echocardiogram 8/26/19:  Conclusions   Summary Normal mitral valve structure and function. Mild MR   Normal aortic valve structure and function. Mild AR   Left ventricular ejection fraction is visually estimated at 40%. Restrictive filling pattern. Moderate CLVH   Mild dilated Ao Root   Signature   ----------------------------------------------------------------   Electronically signed by Salome Renee MD(Interpreting   physician) on 08/26/2019 04:44 PM   ----------------------------------------------------------------    PMHx:  HTN  CKD  NICMP EF 20% per echo 8/2020 and per [facility] 11/3/20  Normal coronaries per [facility] 11/3/20  Hx ETOH abuse  Tobacco abuse    PSHx:  None    Allergies: None    Social Hx:  Former smoker--Quit during recent hospitalization 11/2020. 1ppd x 15 year. Using nicoderm patches now  +ETOH--3-4 beers 3-4 times per weeks  No drugs    Family Hx:  Mom passed from MI at 64years old    No Known Allergies    Current Outpatient Medications   Medication Sig Dispense Refill    hydrALAZINE (APRESOLINE) 100 MG tablet Take 1 tablet by mouth 3 times daily 90 tablet 3    isosorbide mononitrate (IMDUR) 60 MG extended release tablet Take 1 tablet by mouth daily 30 tablet 3    torsemide (DEMADEX) 10 MG tablet Take 5 tablets by mouth daily 30 tablet 3    nicotine (NICODERM CQ) 21 MG/24HR Place 1 patch onto the skin daily 30 patch 3    carvedilol (COREG) 25 MG tablet Take 1 tablet by mouth 2 times daily (with meals) 60 tablet 3    aspirin 81 MG EC tablet Take 1 tablet by mouth daily 30 tablet 3     No current facility-administered medications for this visit. Past Medical History:   Diagnosis Date    Abnormal EKG 9/27/2019    Cardiomyopathy [facility])     per echo 8/2019    Chronic combined systolic and diastolic CHF (congestive heart failure) ([facility] [address] 9/27/2019    ETOH abuse     Hypertension     Tobacco abuse        No past surgical history on file.     Social History     Socioeconomic History    Marital status: Single     Spouse name: None    Number of children: None    Years of education: None    Highest education level: None   Occupational History    None   Social Needs    Financial resource strain: Not hard at all   [place] insecurity     Worry: Never true     Inability: Never true   [employer] needs     Medical: No     Non-medical: No   Tobacco Use    Smoking status: Current Every Day Smoker     Packs/day: 1.00     Types: Cigarettes    Smokeless tobacco: Never Used    Tobacco comment: 1pack/2 days   Substance and Sexual Activity    Alcohol use: Yes     Frequency: 4 or more times a week     Drinks per session: 7 to 9    Drug use: Never    Sexual activity: None   Lifestyle    Physical activity     Days per week: None     Minutes per session: None    Stress: None   Relationships    Social connections     Talks on phone: None     Gets together: None     Attends [employer] service: None     Active member of club or organization: None     Attends meetings of clubs or organizations: None     Relationship status: None    Intimate partner violence     Fear of current or ex partner: None     Emotionally abused: None     Physically abused: None     Forced sexual activity: None   Other Topics Concern    None   Social History Narrative    None       Family History   Problem Relation Age of Onset    Coronary Art Dis Mother          Review of Systems:   Review of Systems   Constitutional: Negative for activity change, chills, fatigue, fever and unexpected weight change. HENT: Negative for congestion. Respiratory: Positive for cough (nonproductive) and shortness of breath (with moderate exertion). Negative for chest tightness. Cardiovascular: Negative for chest pain, palpitations and leg swelling. Gastrointestinal: Negative for blood in stool, nausea and vomiting. Right groin pain with coughing--? hernia   Genitourinary: Negative for difficulty urinating, dysuria and hematuria.    Musculoskeletal: Negative for arthralgias and myalgias. Neurological: Negative for dizziness, syncope and light-headedness. Psychiatric/Behavioral: Negative for agitation and behavioral problems. Physical Examination:    BP (!) 175/120 (Site: Left Upper Arm, Position: Sitting, Cuff Size: Large Adult)   Pulse 60   Resp 18   Ht 6' (1.829 m)   Wt 205 lb (93 kg)   SpO2 100%   BMI 27.80 kg/m²    Physical Exam  Constitutional:       General: He is not in acute distress. Appearance: Normal appearance. HENT:      Head: Normocephalic and atraumatic. Neck:      Musculoskeletal: Normal range of motion and neck supple. Cardiovascular:      Rate and Rhythm: Normal rate and regular rhythm. Pulmonary:      Effort: Pulmonary effort is normal. No respiratory distress. Breath sounds: No wheezing, rhonchi or rales. Abdominal:      Palpations: Abdomen is soft. Tenderness: There is no abdominal tenderness. Musculoskeletal: Normal range of motion. Right lower leg: No edema. Left lower leg: No edema. Skin:     General: Skin is warm and dry. Neurological:      General: No focal deficit present. Mental Status: He is alert and oriented to person, place, and time. Cranial Nerves: No cranial nerve deficit.    Psychiatric:         Mood and Affect: Mood normal.         Behavior: Behavior normal.         LABS:  CBC:   Lab Results   Component Value Date    WBC 6.8 11/07/2020    RBC 5.32 11/07/2020    HGB 14.6 11/07/2020    HCT 43.1 11/07/2020    MCV 80.9 11/07/2020    MCH 27.4 11/07/2020    MCHC 33.9 11/07/2020    RDW 15.4 11/07/2020     11/07/2020     Lipids:  Lab Results   Component Value Date    CHOL 102 08/27/2020    CHOL 137 08/27/2019     Lab Results   Component Value Date    TRIG 85 08/27/2020    TRIG 139 08/27/2019     Lab Results   Component Value Date    HDL 43 08/27/2020    HDL 47 08/27/2019     Lab Results   Component Value Date    LDLCALC 42 08/27/2020    LDLCALC 62 08/27/2019     No results found for: LABVLDL, VLDL  No results found for: CHOLHDLRATIO  CMP:    Lab Results   Component Value Date     11/07/2020    K 3.5 11/07/2020    CL 98 11/07/2020    CO2 26 11/07/2020    BUN 33 11/07/2020    CREATININE 3.06 11/07/2020    GFRAA 26.5 11/07/2020    LABGLOM 21.9 11/07/2020    GLUCOSE 90 11/07/2020    PROT 6.1 11/03/2020    LABALBU 3.5 11/03/2020    CALCIUM 9.5 11/07/2020    BILITOT 0.6 11/03/2020    ALKPHOS 81 11/03/2020    AST 18 11/03/2020    ALT 22 11/03/2020     BMP:    Lab Results   Component Value Date     11/07/2020    K 3.5 11/07/2020    CL 98 11/07/2020    CO2 26 11/07/2020    BUN 33 11/07/2020    LABALBU 3.5 11/03/2020    CREATININE 3.06 11/07/2020    CALCIUM 9.5 11/07/2020    GFRAA 26.5 11/07/2020    LABGLOM 21.9 11/07/2020    GLUCOSE 90 11/07/2020     Magnesium:    Lab Results   Component Value Date    MG 2.2 11/06/2020     TSH:  Lab Results   Component Value Date    TSH 0.976 08/26/2020     . result  No results for input(s): PROBNP in the last 72 hours. No results for input(s): INR in the last 72 hours. Patient Active Problem List   Diagnosis    Hypertensive urgency    Cardiomyopathy ([facility] [address]    Systolic and diastolic CHF, acute ([facility] [address]    BLUE (acute kidney injury) ([facility] [address]    Abnormal EKG    Chronic combined systolic and diastolic CHF (congestive heart failure) ([facility] [address]    Acute decompensated heart failure ([facility] [address]    Chest pain    Pulmonary edema, acute ([facility] [address]    NSTEMI (non-ST elevated myocardial infarction) ([facility] [address]       Assessment/Plan:     Diagnosis Orders   1. Acute systolic congestive heart failure, NYHA class 2 (HCC)  Basic Metabolic Panel    Brain Natriuretic Peptide    compensated now   2. Nonischemic cardiomyopathy [facility])  [facility] Cardiac Rehab    Basic Metabolic Panel    Brain Natriuretic Peptide    EF 25-30% per echo 8/26/20 and EF 20% per [facility] 11/3/20   3. Uncontrolled hypertension      Titrate hydralazine and Imdur   4.  Stage 3 chronic kidney disease, unspecified whether stage 3a or 3b CKD  [facility] - Mey Powell MD, Nephrology, [facility] Underwood    Refer to nephrology for follow-up and ongoing management. Repeat BMP today   5. Tobacco abuse      tobacco cessation recommended. Using nicoderm patches now   6. History of alcohol abuse      currently drinking 3-4 days per week with 3-4 beers per occasion. Alcohol cessation recommended   7. History of noncompliance with medical treatment         -Maximize medical therapy--aspirin 81mg PO daily, Coreg 25 mg p.o. twice daily, titrate hydralazine to 100 mg p.o. 3 times daily for improved blood pressure management, titrate Imdur to 60 mg p.o. daily, torsemide 50mg PO daily  -Consider addition of Corlanor in future  -Cardiac/<2 gram sodium diet recommended  -1500mL daily fluid restriction   -Repeat BMP today to monitor renal function and electrolytes  -Repeat BNP today  -Tobacco cessation recommended  -Whole cessation recommended  -Instructed patient to weigh self daily every morning upon waking and keep log book of daily weights. Notify office if gaining more than 3 pounds in 48 hours. --provided with scale today  -Recommend routine BP monitoring at home. Advised checking BP twice daily in AM and PM and keep a log of blood pressure trends to discuss at next visit. Advised patient to notify CHF clinic if persistent hypertension with systolic blood pressure greater than 096 or diastolic blood pressure greater than 95.  -Advised patient to notify office immediately if experiencing any progressive SOB, orthopnea, PND, LE edema or weight gain  -Educated patient on importance of fluid and salt restriction as well as lifestyle modification. Patient currently extremely noncompliant with diet and sodium intake.   Has been educated at length regarding less than 2000 mg sodium diet and 1500 mL daily fluid restriction.  -Refer to cardiac rehab  -Will plan to reevaluate LV systolic function in 3 months (round February 2021) following optimization of

## 2020-12-01 ENCOUNTER — VIRTUAL VISIT (OUTPATIENT)
Dept: CARDIOLOGY CLINIC | Age: 50
End: 2020-12-01
Payer: MEDICAID

## 2020-12-01 PROCEDURE — 99214 OFFICE O/P EST MOD 30 MIN: CPT | Performed by: PHYSICIAN ASSISTANT

## 2020-12-01 RX ORDER — IVABRADINE 5 MG/1
5 TABLET, FILM COATED ORAL 2 TIMES DAILY WITH MEALS
Qty: 60 TABLET | Refills: 3 | Status: SHIPPED | OUTPATIENT
Start: 2020-12-01 | End: 2021-01-19 | Stop reason: SDUPTHER

## 2020-12-01 RX ORDER — AMLODIPINE BESYLATE 5 MG/1
5 TABLET ORAL DAILY
Qty: 30 TABLET | Refills: 0 | Status: SHIPPED | OUTPATIENT
Start: 2020-12-01 | End: 2020-12-29 | Stop reason: DRUGHIGH

## 2020-12-01 RX ORDER — ISOSORBIDE MONONITRATE 120 MG/1
120 TABLET, EXTENDED RELEASE ORAL DAILY
Qty: 30 TABLET | Refills: 3 | Status: ON HOLD | OUTPATIENT
Start: 2020-12-01 | End: 2022-01-22 | Stop reason: HOSPADM

## 2020-12-01 ASSESSMENT — ENCOUNTER SYMPTOMS
NAUSEA: 0
CHEST TIGHTNESS: 0
COLOR CHANGE: 0
BLOOD IN STOOL: 0
ABDOMINAL PAIN: 0
VOMITING: 0
COUGH: 1
SHORTNESS OF BREATH: 1

## 2020-12-01 NOTE — PROGRESS NOTES
2020    TELEHEALTH EVALUATION -- Audio/Visual (During IKSHO-76 public health emergency)    HPI:    Cielo Apnote (:  1970) has requested an audio/video evaluation for the following concern(s): systolic CHF/NICMP       (virtual visit): For follow-up of nonischemic cardiomyopathy and systolic congestive heart failure. Was seen for initial CHF clinic evaluation on 2020. BP at last office visit was uncontrolled with systolic in the 513F and diastolic above 989 and BP meds were titrated. Hydralazine was increased to 100 mg p.o. 3 times daily and Imdur was increased to 60 mg p.o. daily. Patient states that he has been doing well overall since last visit but has had fluctuating blood pressures. He will occasionally get systolic pressures in the 130s but more consistently between 150s and 170s range. He states his diastolic blood pressure is never below 100. He was advised to check blood pressure while on the phone with me today and he was hypertensive with initial blood pressure of 193/121 and repeat blood pressure 189/122. He states he had already taken him 1 dose of hydralazine and his Imdur but had not yet not yet taken his carvedilol which he was advised to do so while on the phone with me. He is asymptomatic despite this elevated blood pressure with no neurologic complaints. He does report experiencing shortness of breath and some dizziness earlier today while working outside shoveling snow. He has been advised to avoid any type of strenuous activity or any further snow shoveling. He denies chest pain, nausea, vomiting, diaphoresis, paroxysmal nocturnal dyspnea, orthopnea, lower extremity edema, syncope, fever or chills. He is weighing himself every day and his weight has been stable with current weight of 194 pounds compared to 205 pounds at last visit. Recent labs reviewed and documented below.   BMP 2020: Sodium 139, potassium 3.7, chloride 107, total CO2 of 23, BUN elevated 27, creatinine elevated 2.82, GFR low at 29.1, glucose 78--renal function stable when compared to prior  proBNP 11/17/2020: 9972 compared to 13,817 on 11/3/2020. Weight: 194 pounds  BP today: 193/121 during virtual appt and repeat 189/122  HR: 115    Renal duplex US 8/27/2019:  Impression    NO EVIDENCE OF RENAL ARTERY STENOSIS NOTED.         11/17/20 CHF visit #1: This is a pleasant 43-year-old -American male with past medical history significant for nonischemic cardiomyopathy with severely reduced LV function and ejection fraction of 20%, normal coronary arteries per cardiac catheterization 11/3/2020, abnormal EKG, hypertension, tobacco abuse, history of alcohol abuse, chronic kidney disease and medical noncompliance who presents for initial CHF clinic evaluation. Patient originally presented to the emergency room on 11/3/2020 with complaints of shortness of breath and lower extremity edema. His initial cardiac enzyme was mildly elevated and proBNP elevated at 18,817. Has baseline CKD with creatinine of 2.91 on presentation. He was optimized on heart failure medications. Nephrology was consulted for evaluation of CKD and fluid/diuretic management. Given non-STEMI, severely reduced LV systolic function and risk factors for CAD, he underwent cardiac catheterization on 11/4/2020 which revealed normal coronary arteries and LVEDP of 29 mmHg. Previous echocardiogram completed in August 2020 showed severely reduced LV systolic function with EF of 25 to 30%. He was diuresed aggressively during admission and symptoms improved. He was eventually transitioned back from IV Lasix to torsemide 50 mg p.o. daily and subsequently discharged home in stable condition on 11/7/2020.     Since discharge, patient states he has been doing well overall. No new or worsening heart failure symptoms. He states he is compliant with all of his medications.   He does report eating diet high in sodium content and has been educated on the importance of low-sodium diet and fluid restriction. He denies any complaint of shortness of breath or significant dyspnea on exertion but does report mild shortness of breath with moderate to strenuous activity. He denies chest pain, palpitations, diaphoresis, paroxysmal nocturnal dyspnea, orthopnea, lower extremity edema, syncope, fever or chills. He does experience an occasionally productive cough.     Recent labs reviewed and documented below. BMP 11/7/2020: Sodium 139, potassium 3.5, chloride 98, total CO2 26, BUN elevated 33, creatinine elevated 3.06, GFR low at 26.5, glucose 90  CBC 11/7/2020: WBC is 6.8, hemoglobin 14.6, hematocrit 43.1, platelets elevated 406. Magnesium 11/6/2020: 2.2     EKG 11/3/20: SR 95, 1st degree AVB,  Deep T wave inversion V3-V6, mild ST depression with T wave inversion leads I and aVL, QTc 497ms     Blood pressure elevated in office today at 184/118 on the right and 175/120 on the left but patient is completely asymptomatic. Heart rate 60 bpm, pulse ox 100% on room air. Weight is 205 pounds        [facility] 11/3/20:  Conclusions  Procedure Summary  Normal coronaries. LVEDP=29mmhg  EF of 20%. Recommendations  Aggressive risk factor management.   Maximize medical therapy   Angiographic Findings   Cardiac Arteries and Lesion Findings  LMCA: Normal.  LAD: Normal.  LCx: Normal.  RCA: Normal.  Dominance: Right  LV function assessed as:Abnormal.  Ejection Fraction    - Method: LV gram. EF%: 20.        Echocardiogram 8/25/20:   Conclusions   Summary   Normal aortic valve structure and function.   Trace AR   Normal tricuspid valve structure and function.   trace TR   RVSP 25 mmHg   Left ventricular ejection fraction is visually estimated at 25-30% with   Global Hypokinesis.   Pseudonormal filling pattern noted.   Signature   ----------------------------------------------------------------   Electronically signed by Bonnie Fox MD(Interpreting   physician) on 08/27/2020 mononitrate (IMDUR) 120 MG extended release tablet Take 1 tablet by mouth daily Yes MICAH Egan   amLODIPine (NORVASC) 5 MG tablet Take 1 tablet by mouth daily Yes MICAH Egan   ivabradine (CORLANOR) 5 MG TABS tablet Take 1 tablet by mouth 2 times daily (with meals) Yes MICAH Egan   hydrALAZINE (APRESOLINE) 100 MG tablet Take 1 tablet by mouth 3 times daily  MICAH Egan   torsemide (DEMADEX) 10 MG tablet Take 5 tablets by mouth daily  aYn Palomo, DO   nicotine (NICODERM CQ) 21 MG/24HR Place 1 patch onto the skin daily  Yan Palomo, DO   carvedilol (COREG) 25 MG tablet Take 1 tablet by mouth 2 times daily (with meals)  Loueljenni Honey, DO   aspirin 81 MG EC tablet Take 1 tablet by mouth daily  Louellen Honey, DO       Social History     Tobacco Use    Smoking status: Current Every Day Smoker     Packs/day: 1.00     Types: Cigarettes    Smokeless tobacco: Never Used    Tobacco comment: 1pack/2 days   Substance Use Topics    Alcohol use: Yes     Frequency: 4 or more times a week     Drinks per session: 7 to 9    Drug use: Never        No Known Allergies,   Past Medical History:   Diagnosis Date    Abnormal EKG 9/27/2019    [facility]     per echo 8/2019    Chronic combined systolic and diastolic CHF (congestive heart failure) [facility] 9/27/2019    ETOH abuse     Hypertension     Tobacco abuse    , No past surgical history on file.,   Social History     Tobacco Use    Smoking status: Current Every Day Smoker     Packs/day: 1.00     Types: Cigarettes    Smokeless tobacco: Never Used    Tobacco comment: 1pack/2 days   Substance Use Topics    Alcohol use: Yes     Frequency: 4 or more times a week     Drinks per session: 7 to 9    Drug use: Never       PHYSICAL EXAMINATION:  [ INSTRUCTIONS:  \"[x]\" Indicates a positive item  \"[]\" Indicates a negative item  -- DELETE ALL ITEMS NOT EXAMINED]  Vital Signs: (As obtained by patient/caregiver or practitioner observation)    Blood pressure- 193/121 Heart rate- 115   Respiratory rate-    Temperature-  Pulse oximetry-     Constitutional: [x] Appears well-developed and well-nourished [x] No apparent distress      [] Abnormal-   Mental status  [x] Alert and awake  [x] Oriented to person/place/time [x]Able to follow commands      Eyes:  EOM    [x]  Normal  [] Abnormal-  Sclera  []  Normal  [] Abnormal -         Discharge []  None visible  [] Abnormal -    HENT:   [x] Normocephalic, atraumatic. [] Abnormal   [] Mouth/Throat: Mucous membranes are moist.     External Ears [x] Normal  [] Abnormal-     Neck: [x] No visualized mass     Pulmonary/Chest: [x] Respiratory effort normal.  [x] No visualized signs of difficulty breathing or respiratory distress        [] Abnormal-      Musculoskeletal:   [] Normal gait with no signs of ataxia         [] Normal range of motion of neck        [] Abnormal-       Neurological:        [x] No Facial Asymmetry (Cranial nerve 7 motor function) (limited exam to video visit)          [] No gaze palsy        [] Abnormal-         Skin:        [] No significant exanthematous lesions or discoloration noted on facial skin         [] Abnormal-            Psychiatric:       [x] Normal Affect [] No Hallucinations        [] Abnormal-     Other pertinent observable physical exam findings-     ASSESSMENT/PLAN:  1. Chronic systolic CHF (congestive heart failure), NYHA class 2 (HCC)  Compensated. 2. Nonischemic cardiomyopathy ([facility]  EF 20% per [facility] 11/3/20 and 25-30% per echo 8/25/20.     -[facility] 11/3/20 with normal coronaries    3. Uncontrolled hypertension  -Increase Imdur 120mg PO daily and add amlodipine 5mg PO daily. -Advised routine BP monitoring at home and notify office if BP remains uncontrolled    4. Tachycardia  -likely sinus tach with resting HR 115bpm.   -Add Corlanor 5mg PO BID (prior auth needed).  Patient advised to  samples from office until prior auth obtained  -Check EKG at next visit. 5. Tobacco abuse  -Tobacco cessation recommended    6. Stage 3 chronic kidney disease, unspecified whether stage 3a or 3b CKD  -referred to Dr. Holden Wiggins at last visit. Repeat BMP 11/17/20 stable compared to prior          -Maximize medical therapy--aspirin 81mg PO daily, Coreg 25 mg p.o. twice daily, hydralazine 100 mg p.o. 3 times daily, titrate Imdur to 120 mg p.o. daily, add amlodipine 5mg PO daily torsemide 50mg PO daily  -Add Corlanor 5mg PO twice daily for improved heart rate management. Patient's resting heart rate today 115 bpm on maximal dose of beta-blocker/carvedilol. Prior authorization needed and patient has been advised to come and obtain samples from the office until prior authorization is complete  -Heart failure appears compensated at this time  -Cardiac/<2 gram sodium diet recommended  -1500mL daily fluid restriction   -Recent BMP and BNP from 11/17/2020 reviewed and stable.  -Tobacco cessation recommended  -Alcohol cessation recommended  -Instructed patient to weigh self daily every morning upon waking and keep log book of daily weights. Notify office if gaining more than 3 pounds in 48 hours. --provided with scale today  -Recommend routine BP monitoring at home. Advised checking BP twice daily in AM and PM and keep a log of blood pressure trends to discuss at next visit. Advised patient to notify CHF clinic if persistent hypertension with systolic blood pressure greater than 946 or diastolic blood pressure greater than 95. --Patient's blood pressure is uncontrolled currently and BP meds again adjusted. Patient has been advised to notify office by the end of the week if blood pressures not improved.   Advised patient to call 911 or present immediately to ER if experiencing any neurologic symptoms such as blurred or double vision, headache, unilateral weakness, slurred speech or facial drooping.  -Advised patient to notify office immediately if experiencing any progressive SOB, orthopnea, PND, LE edema or weight gain  -Educated patient on importance of fluid and salt restriction as well as lifestyle modification. Patient currently extremely noncompliant with diet and sodium intake. Has been educated at length regarding less than 2000 mg sodium diet and 1500 mL daily fluid restriction. **Patient advised to avoid any strenuous activity or heavy lifting and has been instructed to refrain from shoveling snow. 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 He will provide us with fax number and letter will be sent. **  -Will plan to reevaluate LV systolic function in 3 months (around February 2021) following optimization of heart failure regimen. If EF remains severely reduced at less than or equal to 35%, will need referral to electrophysiology for AICD evaluation.  -Referred to nephrology for follow-up evaluation of chronic kidney disease at last visit 11/17/2020  -Referred to PCP to establish provider for medical management at last visit on 11/17/2020  -Maintain routine outpatient follow-up with general cardiologist, Dr. Juve Mooney appointment scheduled on 12/11/2020  -Maintain routine outpatient follow-up with PCP  -F/U with CHF clinic in 2 weeks or sooner if needed      Return in about 2 weeks (around 12/15/2020). Walter Piper is a 52 y.o. male being evaluated by a Virtual Visit (video visit) encounter to address concerns as mentioned above. A caregiver was present when appropriate. Due to this being a TeleHealth encounter (During XUQRJ-96 public health emergency), evaluation of the following organ systems was limited: Vitals/Constitutional/EENT/Resp/CV/GI//MS/Neuro/Skin/Heme-Lymph-Imm.   Pursuant to the emergency declaration under the [facility] [place], [facility] waiver authority and the [employer], this Virtual Visit was conducted with patient's (and/or legal guardian's) consent, to reduce the patient's risk of exposure to COVID-19 and provide necessary medical care. The patient (and/or legal guardian) has also been advised to contact this office for worsening conditions or problems, and seek emergency medical treatment and/or call 911 if deemed necessary. Patient identification was verified at the start of the visit: Yes    Total time spent on this encounter: 22 minutes with patient via virtual encounter excluding documentation time    Services were provided through a video synchronous discussion virtually to substitute for in-person clinic visit. Patient and provider were located at their individual homes. --MICAH Alarcon on 12/1/2020 at 4:08 PM    An electronic signature was used to authenticate this note.

## 2020-12-09 ENCOUNTER — TELEPHONE (OUTPATIENT)
Dept: CARDIOLOGY CLINIC | Age: 50
End: 2020-12-09

## 2020-12-09 NOTE — TELEPHONE ENCOUNTER
FANG Barnard at bedside examining patient. LMOM to check on patient condition and to check on blood pressure.

## 2020-12-29 ENCOUNTER — TELEPHONE (OUTPATIENT)
Dept: CARDIOLOGY CLINIC | Age: 50
End: 2020-12-29

## 2020-12-29 RX ORDER — AMLODIPINE BESYLATE 10 MG/1
10 TABLET ORAL DAILY
Qty: 30 TABLET | Refills: 5 | Status: SHIPPED | OUTPATIENT
Start: 2020-12-29 | End: 2021-05-06

## 2020-12-29 NOTE — TELEPHONE ENCOUNTER
Patient calling today to report that lst night his blood pressure was 200/??. Today was 166/120. No CP, DZ, HA but some SOB. Patient taking amlodipine 5 mg once daily, carvedilol 25 mg twice daily and taking his hydralazine 100 mg but only has been taking this once daily and not three times daily as prescribed. Per Charisse Sanabria, PAC he is to take the hydralazine three times daily and increase amlodipine to 10 mg once daily. Patient aware. NEW RX for amlodipine 10 mg sent into pharmacy.

## 2021-01-02 ENCOUNTER — APPOINTMENT (OUTPATIENT)
Dept: ULTRASOUND IMAGING | Age: 51
End: 2021-01-02
Payer: MEDICAID

## 2021-01-02 ENCOUNTER — HOSPITAL ENCOUNTER (EMERGENCY)
Age: 51
Discharge: HOME | End: 2021-01-02
Attending: EMERGENCY MEDICINE
Payer: MEDICAID

## 2021-01-02 VITALS
RESPIRATION RATE: 23 BRPM | SYSTOLIC BLOOD PRESSURE: 150 MMHG | HEIGHT: 69 IN | BODY MASS INDEX: 29.92 KG/M2 | OXYGEN SATURATION: 99 % | TEMPERATURE: 98.2 F | HEART RATE: 99 BPM | DIASTOLIC BLOOD PRESSURE: 110 MMHG | WEIGHT: 202 LBS

## 2021-01-02 DIAGNOSIS — M79.604 RIGHT LEG PAIN: Primary | ICD-10-CM

## 2021-01-02 LAB
ALBUMIN SERPL-MCNC: 3.9 G/DL (ref 3.5–4.6)
ALP BLD-CCNC: 102 U/L (ref 35–104)
ALT SERPL-CCNC: 17 U/L (ref 0–41)
ANION GAP SERPL CALCULATED.3IONS-SCNC: 15 MEQ/L (ref 9–15)
AST SERPL-CCNC: 19 U/L (ref 0–40)
BASOPHILS ABSOLUTE: 0.1 K/UL (ref 0–0.2)
BASOPHILS RELATIVE PERCENT: 0.9 %
BILIRUB SERPL-MCNC: 0.6 MG/DL (ref 0.2–0.7)
BUN BLDV-MCNC: 33 MG/DL (ref 6–20)
CALCIUM SERPL-MCNC: 9 MG/DL (ref 8.5–9.9)
CHLORIDE BLD-SCNC: 100 MEQ/L (ref 95–107)
CO2: 24 MEQ/L (ref 20–31)
CREAT SERPL-MCNC: 3.19 MG/DL (ref 0.7–1.2)
EOSINOPHILS ABSOLUTE: 0.2 K/UL (ref 0–0.7)
EOSINOPHILS RELATIVE PERCENT: 1.9 %
GFR AFRICAN AMERICAN: 23
GFR AFRICAN AMERICAN: 25.2
GFR NON-AFRICAN AMERICAN: 19
GFR NON-AFRICAN AMERICAN: 20.9
GLOBULIN: 3.3 G/DL (ref 2.3–3.5)
GLUCOSE BLD-MCNC: 110 MG/DL (ref 70–99)
HCT VFR BLD CALC: 39.8 % (ref 42–52)
HEMOGLOBIN: 13.7 G/DL (ref 14–18)
LYMPHOCYTES ABSOLUTE: 1.6 K/UL (ref 1–4.8)
LYMPHOCYTES RELATIVE PERCENT: 14.6 %
MCH RBC QN AUTO: 27.8 PG (ref 27–31.3)
MCHC RBC AUTO-ENTMCNC: 34.5 % (ref 33–37)
MCV RBC AUTO: 80.7 FL (ref 80–100)
MONOCYTES ABSOLUTE: 0.7 K/UL (ref 0.2–0.8)
MONOCYTES RELATIVE PERCENT: 6.9 %
NEUTROPHILS ABSOLUTE: 8.2 K/UL (ref 1.4–6.5)
NEUTROPHILS RELATIVE PERCENT: 75.7 %
PDW BLD-RTO: 14.8 % (ref 11.5–14.5)
PERFORMED ON: ABNORMAL
PLATELET # BLD: 456 K/UL (ref 130–400)
POC CREATININE: 3.5 MG/DL (ref 0.9–1.3)
POC SAMPLE TYPE: ABNORMAL
POTASSIUM SERPL-SCNC: 3.1 MEQ/L (ref 3.4–4.9)
RBC # BLD: 4.93 M/UL (ref 4.7–6.1)
SODIUM BLD-SCNC: 139 MEQ/L (ref 135–144)
TOTAL PROTEIN: 7.2 G/DL (ref 6.3–8)
TROPONIN: 0.04 NG/ML (ref 0–0.01)
WBC # BLD: 10.8 K/UL (ref 4.8–10.8)

## 2021-01-02 PROCEDURE — 80053 COMPREHEN METABOLIC PANEL: CPT

## 2021-01-02 PROCEDURE — 6360000002 HC RX W HCPCS: Performed by: EMERGENCY MEDICINE

## 2021-01-02 PROCEDURE — 36415 COLL VENOUS BLD VENIPUNCTURE: CPT

## 2021-01-02 PROCEDURE — 2500000003 HC RX 250 WO HCPCS: Performed by: EMERGENCY MEDICINE

## 2021-01-02 PROCEDURE — 96375 TX/PRO/DX INJ NEW DRUG ADDON: CPT

## 2021-01-02 PROCEDURE — 93970 EXTREMITY STUDY: CPT

## 2021-01-02 PROCEDURE — 6370000000 HC RX 637 (ALT 250 FOR IP): Performed by: EMERGENCY MEDICINE

## 2021-01-02 PROCEDURE — 96374 THER/PROPH/DIAG INJ IV PUSH: CPT

## 2021-01-02 PROCEDURE — 85025 COMPLETE CBC W/AUTO DIFF WBC: CPT

## 2021-01-02 PROCEDURE — 99283 EMERGENCY DEPT VISIT LOW MDM: CPT

## 2021-01-02 PROCEDURE — 84484 ASSAY OF TROPONIN QUANT: CPT

## 2021-01-02 PROCEDURE — 2580000003 HC RX 258: Performed by: EMERGENCY MEDICINE

## 2021-01-02 RX ORDER — 0.9 % SODIUM CHLORIDE 0.9 %
1000 INTRAVENOUS SOLUTION INTRAVENOUS ONCE
Status: COMPLETED | OUTPATIENT
Start: 2021-01-02 | End: 2021-01-02

## 2021-01-02 RX ORDER — LABETALOL HYDROCHLORIDE 5 MG/ML
20 INJECTION, SOLUTION INTRAVENOUS ONCE
Status: COMPLETED | OUTPATIENT
Start: 2021-01-02 | End: 2021-01-02

## 2021-01-02 RX ORDER — KETOROLAC TROMETHAMINE 15 MG/ML
15 INJECTION, SOLUTION INTRAMUSCULAR; INTRAVENOUS ONCE
Status: COMPLETED | OUTPATIENT
Start: 2021-01-02 | End: 2021-01-02

## 2021-01-02 RX ORDER — HYDRALAZINE HYDROCHLORIDE 50 MG/1
100 TABLET, FILM COATED ORAL ONCE
Status: COMPLETED | OUTPATIENT
Start: 2021-01-02 | End: 2021-01-02

## 2021-01-02 RX ADMIN — KETOROLAC TROMETHAMINE 15 MG: 15 INJECTION, SOLUTION INTRAMUSCULAR; INTRAVENOUS at 02:58

## 2021-01-02 RX ADMIN — LABETALOL HYDROCHLORIDE 20 MG: 5 INJECTION, SOLUTION INTRAVENOUS at 02:58

## 2021-01-02 RX ADMIN — HYDRALAZINE HYDROCHLORIDE 100 MG: 50 TABLET, FILM COATED ORAL at 02:58

## 2021-01-02 RX ADMIN — SODIUM CHLORIDE 1000 ML: 9 INJECTION, SOLUTION INTRAVENOUS at 02:58

## 2021-01-02 ASSESSMENT — ENCOUNTER SYMPTOMS
COUGH: 0
SORE THROAT: 0
EYE DISCHARGE: 0
SHORTNESS OF BREATH: 0
ABDOMINAL PAIN: 0
NAUSEA: 0
PHOTOPHOBIA: 0
VOMITING: 0
CHEST TIGHTNESS: 0
ABDOMINAL DISTENTION: 0
WHEEZING: 0
DIARRHEA: 0

## 2021-01-02 ASSESSMENT — PAIN SCALES - GENERAL: PAINLEVEL_OUTOF10: 4

## 2021-01-02 NOTE — ED PROVIDER NOTES
[facility] ED  eMERGENCY dEPARTMENT eNCOUnter      Pt Name: Kadi Velez  MRN: [id number]  [place] 1970  Date of evaluation: 1/2/2021  Provider: Jt Moreno MD    CHIEF COMPLAINT       Chief Complaint   Patient presents with    Leg Pain         HISTORY OF PRESENT ILLNESS   (Location/Symptom, Timing/Onset,Context/Setting, Quality, Duration, Modifying Factors, Severity)  Note limiting factors. Kadi Velez is a 52 y.o. male who presents to the emergency department for evaluation of bilateral lower leg pain. She woke up with cramping of his right foot into his right calf area. He also complains of left leg swelling in the lower leg. No related chest pain. He does have a history of uncontrolled hypertension on multiple meds. He was admitted just over a month ago for hypertensive urgency and pulmonary edema. No related fever chills. HPI    NursingNotes were reviewed. REVIEW OF SYSTEMS    (2-9 systems for level 4, 10 or more for level 5)     Review of Systems   Constitutional: Negative for chills, diaphoresis and fatigue. HENT: Negative for congestion, ear pain, mouth sores and sore throat. Eyes: Negative for photophobia and discharge. Respiratory: Negative for cough, chest tightness, shortness of breath and wheezing. Cardiovascular: Negative for chest pain and palpitations. Gastrointestinal: Negative for abdominal distention, abdominal pain, diarrhea, nausea and vomiting. Endocrine: Negative for cold intolerance. Genitourinary: Negative for difficulty urinating. Musculoskeletal: Negative for arthralgias, gait problem and myalgias. Skin: Negative for pallor and rash. Allergic/Immunologic: Negative for immunocompromised state. Neurological: Negative for dizziness, syncope and weakness. Hematological: Negative for adenopathy. Psychiatric/Behavioral: Negative for agitation and hallucinations. All other systems reviewed and are negative.       Except as noted above the remainder of the review of systems was reviewed and negative. PAST MEDICAL HISTORY     Past Medical History:   Diagnosis Date    Abnormal EKG 9/27/2019    Cardiomyopathy ([facility]     per echo 8/2019    Chronic combined systolic and diastolic CHF (congestive heart failure) ([facility] 9/27/2019    ETOH abuse     Hypertension     Tobacco abuse          SURGICALHISTORY     History reviewed. No pertinent surgical history. CURRENT MEDICATIONS       Previous Medications    AMLODIPINE (NORVASC) 10 MG TABLET    Take 1 tablet by mouth daily    ASPIRIN 81 MG EC TABLET    Take 1 tablet by mouth daily    CARVEDILOL (COREG) 25 MG TABLET    Take 1 tablet by mouth 2 times daily (with meals)    HYDRALAZINE (APRESOLINE) 100 MG TABLET    Take 1 tablet by mouth 3 times daily    ISOSORBIDE MONONITRATE (IMDUR) 120 MG EXTENDED RELEASE TABLET    Take 1 tablet by mouth daily    IVABRADINE (CORLANOR) 5 MG TABS TABLET    Take 1 tablet by mouth 2 times daily (with meals)    NICOTINE (NICODERM CQ) 21 MG/24HR    Place 1 patch onto the skin daily    TORSEMIDE (DEMADEX) 10 MG TABLET    Take 5 tablets by mouth daily       ALLERGIES     Patient has no known allergies.     FAMILY HISTORY       Family History   Problem Relation Age of Onset    Coronary Art Dis Mother           SOCIAL HISTORY       Social History     Socioeconomic History    Marital status: Single     Spouse name: None    Number of children: None    Years of education: None    Highest education level: None   Occupational History    None   Social Needs    Financial resource strain: Not hard at all   Solange-Ramses insecurity     Worry: Never true     Inability: Never true    Transportation needs     Medical: No     Non-medical: No   Tobacco Use    Smoking status: Current Every Day Smoker     Packs/day: 1.00     Types: Cigarettes    Smokeless tobacco: Never Used    Tobacco comment: 1pack/2 days   Substance and Sexual Activity    Alcohol use: Yes     Frequency: 4 or more times a week     Drinks per session: 7 to 9    Drug use: Never    Sexual activity: None   Lifestyle    Physical activity     Days per week: None     Minutes per session: None    Stress: None   Relationships    Social connections     Talks on phone: None     Gets together: None     Attends L[employer] service: None     Active member of club or organization: None     Attends meetings of clubs or organizations: None     Relationship status: None    Intimate partner violence     Fear of current or ex partner: None     Emotionally abused: None     Physically abused: None     Forced sexual activity: None   Other Topics Concern    None   Social History Narrative    None       SCREENINGS      @FLOW([id number])@      PHYSICAL EXAM    (up to 7 for level 4, 8 or more for level 5)     ED Triage Vitals [01/02/21 0239]   BP Temp Temp Source Pulse Resp SpO2 Height Weight   (!) 179/131 98.2 °F (36.8 °C) Oral 112 16 97 % 5' 9\" (1.753 m) 202 lb (91.6 kg)       Physical Exam  Vitals signs and nursing note reviewed. Constitutional:       Appearance: He is well-developed. HENT:      Head: Normocephalic. Nose: Nose normal.   Eyes:      Conjunctiva/sclera: Conjunctivae normal.      Pupils: Pupils are equal, round, and reactive to light. Neck:      Musculoskeletal: Normal range of motion and neck supple. Cardiovascular:      Rate and Rhythm: Normal rate and regular rhythm. Heart sounds: Normal heart sounds. Pulmonary:      Effort: Pulmonary effort is normal.      Breath sounds: Normal breath sounds. Abdominal:      General: Bowel sounds are normal.      Palpations: Abdomen is soft. Tenderness: There is no abdominal tenderness. There is no guarding. Musculoskeletal: Normal range of motion. Right lower leg: Edema present. Left lower leg: Edema present. Skin:     General: Skin is warm and dry. Capillary Refill: Capillary refill takes less than 2 seconds.    Neurological:      Mental Status: He is alert and oriented to person, place, and time. Psychiatric:         Mood and Affect: Mood normal.         DIAGNOSTIC RESULTS     EKG: All EKG's are interpreted by the Emergency Department Physician who either signs or Co-signsthis chart in the absence of a cardiologist.      RADIOLOGY:   Verdie Bonier such as CT, Ultrasound and MRI are read by the radiologist. Plain radiographic images are visualized and preliminarily interpreted by the emergency physician with the below findings:      Interpretation per the Radiologist below, if available at the time ofthis note:    [facility] DUP [address]    (Results Pending)         ED BEDSIDE ULTRASOUND:   Performed by ED Physician - none    LABS:  Labs Reviewed   COMPREHENSIVE METABOLIC PANEL - Abnormal; Notable for the following components:       Result Value    Potassium 3.1 (*)     Glucose 110 (*)     BUN 33 (*)     CREATININE 3.19 (*)     GFR Non- 20.9 (*)     GFR  25.2 (*)     All other components within normal limits   CBC WITH AUTO DIFFERENTIAL - Abnormal; Notable for the following components:    Hemoglobin 13.7 (*)     Hematocrit 39.8 (*)     RDW 14.8 (*)     Platelets 729 (*)     Neutrophils Absolute 8.2 (*)     All other components within normal limits   TROPONIN - Abnormal; Notable for the following components:    Troponin 0.042 (*)     All other components within normal limits    Narrative:     Meek Rashid tel. [id number],  results called to and read back by, 01/02/2021 03:30, by [facility]       All other labs were within normal range or not returned as of this dictation.     EMERGENCY DEPARTMENT COURSE and DIFFERENTIAL DIAGNOSIS/MDM:   Vitals:    Vitals:    01/02/21 0245 01/02/21 0300 01/02/21 0315 01/02/21 0330   BP: (!) 178/134 (!) 179/133 (!) 150/127 (!) 153/113   Pulse: 109 110 94 95   Resp: 25 23 17 18   Temp:       TempSrc:       SpO2: 97% 97% 94% 96%   Weight:       Height: MDM on recheck. Blood pressure is now 150/100. I have stressed to the patient he needs to be compliant with his medications. He has chronic renal insufficiency and this needs to be closely monitored. Ultrasound of the legs is negative today. He is discharged home improved. CONSULTS:  None    PROCEDURES:  Unless otherwise noted below, none     Procedures    FINAL IMPRESSION      1.  Right leg pain          DISPOSITION/PLAN   DISPOSITION Decision To Discharge 01/02/2021 04:36:37 AM      PATIENT REFERRED TO:  Yenifer Priest MD  [id number] [address] [phone]    In 1 week        DISCHARGE MEDICATIONS:  New Prescriptions    No medications on file          (Please note that portions of this note were completed with a voice recognition program.  Efforts were made to edit the dictations but occasionally words are mis-transcribed.)    Marty Barrett MD (electronically signed)  Attending Emergency Physician         Marty Barrett MD  01/02/21 [address] Juli Barnes MD  01/21/21 [id number]

## 2021-01-02 NOTE — ED TRIAGE NOTES
Arrived to the ER by [facility] for c/o R leg pain. States had cramping pain that started by the right medial ankle and radiated up the calf.  Reports swelling of left leg

## 2021-01-02 NOTE — ED NOTES
Bed: 12  Expected date: 1/2/21  Expected time: 2:26 AM  Means of arrival:   Comments:  52 M, right leg cramping, HX CHF, AO4, 98 % RA, 20 LAC, 200/150, 120bpm,      Richard López, JOSE M  01/02/21 [id number]

## 2021-01-04 ENCOUNTER — NURSE TRIAGE (OUTPATIENT)
Dept: OTHER | Facility: CLINIC | Age: 51
End: 2021-01-04

## 2021-01-04 NOTE — TELEPHONE ENCOUNTER
Hurts in groin, swelling right side about size of tennis ball. difficulty walking. Started about a month ago. Was coughing and that when he felt pain in area. Possible hernia. Has US order and needs it. Pain 10/10    Reason for Disposition   [1] New-onset hernia suspected (reducible bulge in groin or abdomen; non-tender) AND [2] NO pain or vomiting    Answer Assessment - Initial Assessment Questions  1. APPEARANCE of SWELLING: \"What does it look like? \" (e.g., lymph node, insect bite, mole)        Right side tennis ball. 2. SIZE: \"How large is the swelling? \" (inches, cm or compare to coins)        Unsure    3. LOCATION: \"Where is the swelling located? \"        See above    4. ONSET: \"When did the swelling start? \"        See above    5. PAIN: \"Is it painful? \" If so, ask: \"How much? \"        Pain 10/10    6. ITCH: \"Does it itch? \" If so, ask: \"How much? \"        NO    7. CAUSE: \"What do you think caused the swelling? \"        Hernia    8. OTHER SYMPTOMS: \"Do you have any other symptoms? \" (e.g., fever)        See above    Protocols used: SKIN LUMP OR LOCALIZED SWELLING-ADULT-    Caller provided care advice and instructed to call back with worsening symptoms. Attention Provider: Thank you for allowing me to participate in the care of your patient. The patient was connected to triage in response to information provided to the [facility]. Please do not respond through this encounter as the response is not directed to a shared pool. Warm transfer to [facility] at [facility].

## 2021-01-06 ENCOUNTER — OFFICE VISIT (OUTPATIENT)
Dept: FAMILY MEDICINE CLINIC | Age: 51
End: 2021-01-06
Payer: MEDICAID

## 2021-01-06 VITALS
OXYGEN SATURATION: 96 % | SYSTOLIC BLOOD PRESSURE: 138 MMHG | HEIGHT: 69 IN | HEART RATE: 113 BPM | DIASTOLIC BLOOD PRESSURE: 80 MMHG | WEIGHT: 202 LBS | TEMPERATURE: 97.8 F | BODY MASS INDEX: 29.92 KG/M2

## 2021-01-06 DIAGNOSIS — K40.90 INGUINAL HERNIA OF RIGHT SIDE WITHOUT OBSTRUCTION OR GANGRENE: Primary | ICD-10-CM

## 2021-01-06 PROCEDURE — 4004F PT TOBACCO SCREEN RCVD TLK: CPT | Performed by: PHYSICIAN ASSISTANT

## 2021-01-06 PROCEDURE — G8417 CALC BMI ABV UP PARAM F/U: HCPCS | Performed by: PHYSICIAN ASSISTANT

## 2021-01-06 PROCEDURE — 99213 OFFICE O/P EST LOW 20 MIN: CPT | Performed by: PHYSICIAN ASSISTANT

## 2021-01-06 PROCEDURE — G8484 FLU IMMUNIZE NO ADMIN: HCPCS | Performed by: PHYSICIAN ASSISTANT

## 2021-01-06 PROCEDURE — G8427 DOCREV CUR MEDS BY ELIG CLIN: HCPCS | Performed by: PHYSICIAN ASSISTANT

## 2021-01-06 ASSESSMENT — PATIENT HEALTH QUESTIONNAIRE - PHQ9
SUM OF ALL RESPONSES TO PHQ QUESTIONS 1-9: 0
SUM OF ALL RESPONSES TO PHQ QUESTIONS 1-9: 0
SUM OF ALL RESPONSES TO PHQ9 QUESTIONS 1 & 2: 0
SUM OF ALL RESPONSES TO PHQ QUESTIONS 1-9: 0
2. FEELING DOWN, DEPRESSED OR HOPELESS: 0

## 2021-01-06 ASSESSMENT — ENCOUNTER SYMPTOMS
CHEST TIGHTNESS: 0
COUGH: 0
EYE DISCHARGE: 0
ABDOMINAL PAIN: 1
TROUBLE SWALLOWING: 0
EYE PAIN: 0
DIARRHEA: 0
VOMITING: 0
BACK PAIN: 0
EYE ITCHING: 0
SHORTNESS OF BREATH: 0
SINUS PRESSURE: 0

## 2021-01-06 NOTE — PROGRESS NOTES
Subjective:      Patient ID: Ishan Rizzo is a 52 y.o. male who presents today for:  Chief Complaint   Patient presents with    Other     patient said was coughing and now causing pain in groan area, was taking a med and thats when it started. Has a bump there        HPI  52year old male who complains having a lump in his right inguinal area. He describes the pain at 10/10 with lifting heavy objects or prolonged walking. The patient having to lift heavy objects through out his life. The is a large palpable inguinal hernia present on examination    Past Medical History:   Diagnosis Date    Abnormal EKG 9/27/2019    Cardiomyopathy ([facility] [address]     per echo 8/2019    Chronic combined systolic and diastolic CHF (congestive heart failure) ([facility] [address] 9/27/2019    ETOH abuse     Hypertension     Tobacco abuse      No past surgical history on file.   Social History     Socioeconomic History    Marital status: Single     Spouse name: Not on file    Number of children: Not on file    Years of education: Not on file    Highest education level: Not on file   Occupational History    Not on file   Social Needs    Financial resource strain: Not hard at all    Food insecurity     Worry: Never true     Inability: Never true   [employer] needs     Medical: No     Non-medical: No   Tobacco Use    Smoking status: Current Every Day Smoker     Packs/day: 1.00     Types: Cigarettes    Smokeless tobacco: Never Used    Tobacco comment: 1pack/2 days   Substance and Sexual Activity    Alcohol use: Yes     Frequency: 4 or more times a week     Drinks per session: 7 to 9    Drug use: Never    Sexual activity: Not on file   Lifestyle    Physical activity     Days per week: Not on file     Minutes per session: Not on file    Stress: Not on file   Relationships    Social connections     Talks on phone: Not on file     Gets together: Not on file     Attends Gnosticism service: Not on file     Active member of club or organization: Not on file     Attends meetings of clubs or organizations: Not on file     Relationship status: Not on file    Intimate partner violence     Fear of current or ex partner: Not on file     Emotionally abused: Not on file     Physically abused: Not on file     Forced sexual activity: Not on file   Other Topics Concern    Not on file   Social History Narrative    Not on file     Family History   Problem Relation Age of Onset    Coronary Art Dis Mother      No Known Allergies  Current Outpatient Medications   Medication Sig Dispense Refill    amLODIPine (NORVASC) 10 MG tablet Take 1 tablet by mouth daily 30 tablet 5    isosorbide mononitrate (IMDUR) 120 MG extended release tablet Take 1 tablet by mouth daily 30 tablet 3    ivabradine (CORLANOR) 5 MG TABS tablet Take 1 tablet by mouth 2 times daily (with meals) 60 tablet 3    hydrALAZINE (APRESOLINE) 100 MG tablet Take 1 tablet by mouth 3 times daily 90 tablet 3    torsemide (DEMADEX) 10 MG tablet Take 5 tablets by mouth daily 30 tablet 3    nicotine (NICODERM CQ) 21 MG/24HR Place 1 patch onto the skin daily 30 patch 3    carvedilol (COREG) 25 MG tablet Take 1 tablet by mouth 2 times daily (with meals) 60 tablet 3    aspirin 81 MG EC tablet Take 1 tablet by mouth daily 30 tablet 3     No current facility-administered medications for this visit. Review of Systems   Constitutional: Negative for activity change, appetite change, chills, fever and unexpected weight change. HENT: Negative for drooling, ear pain, nosebleeds, sinus pressure and trouble swallowing. Eyes: Negative for pain, discharge and itching. Respiratory: Negative for cough, chest tightness and shortness of breath. Cardiovascular: Negative for chest pain and leg swelling. Gastrointestinal: Positive for abdominal pain (ingunial hernia). Negative for diarrhea and vomiting. Endocrine: Negative for polydipsia and polyphagia.    Genitourinary: Negative for dysuria, flank pain and frequency. Musculoskeletal: Negative for back pain and myalgias. Skin: Negative for pallor and rash. Neurological: Negative for syncope, weakness and headaches. Hematological: Does not bruise/bleed easily. Psychiatric/Behavioral: Negative for agitation, behavioral problems and confusion. All other systems reviewed and are negative. Objective:   /80 (Site: Right Upper Arm, Position: Sitting, Cuff Size: Large Adult)   Pulse 113   Temp 97.8 °F (36.6 °C) (Temporal)   Ht 5' 9\" (1.753 m)   Wt 202 lb (91.6 kg)   SpO2 96%   BMI 29.83 kg/m²     Physical Exam  Vitals signs and nursing note reviewed. Constitutional:       General: He is awake. He is not in acute distress. Appearance: Normal appearance. He is well-developed and normal weight. He is not ill-appearing, toxic-appearing or diaphoretic. Comments: No photophobia. No phonophobia. HENT:      Head: Normocephalic and atraumatic. No Jamison's sign. Right Ear: External ear normal.      Left Ear: External ear normal.      Nose: Nose normal. No congestion or rhinorrhea. Mouth/Throat:      Mouth: Mucous membranes are moist.      Pharynx: Oropharynx is clear. No oropharyngeal exudate or posterior oropharyngeal erythema. Eyes:      General: No scleral icterus. Right eye: No foreign body or discharge. Left eye: No discharge. Extraocular Movements: Extraocular movements intact. Conjunctiva/sclera: Conjunctivae normal.      Left eye: No exudate. Pupils: Pupils are equal, round, and reactive to light. Neck:      Musculoskeletal: Normal range of motion and neck supple. No neck rigidity. Vascular: No JVD. Trachea: No tracheal deviation. Comments: No meningismus. Cardiovascular:      Rate and Rhythm: Normal rate and regular rhythm. Heart sounds: Normal heart sounds. Heart sounds not distant. No murmur. No friction rub. No gallop.     Pulmonary:      Effort: Pulmonary effort is normal. No respiratory distress. Breath sounds: Normal breath sounds. No stridor. No wheezing, rhonchi or rales. Chest:      Chest wall: No tenderness. Abdominal:      General: Abdomen is flat. Bowel sounds are normal. There is no distension. Palpations: Abdomen is soft. There is mass (right inguinal hernia). Tenderness: There is abdominal tenderness (right inguinal hernia). There is no right CVA tenderness, left CVA tenderness, guarding or rebound. Hernia: No hernia is present. Musculoskeletal: Normal range of motion. General: No swelling, tenderness, deformity or signs of injury. Lymphadenopathy:      Head:      Right side of head: No submental adenopathy. Left side of head: No submental adenopathy. Skin:     General: Skin is warm and dry. Capillary Refill: Capillary refill takes less than 2 seconds. Coloration: Skin is not jaundiced or pale. Findings: No bruising, erythema, lesion or rash. Neurological:      General: No focal deficit present. Mental Status: He is alert and oriented to person, place, and time. Mental status is at baseline. Cranial Nerves: No cranial nerve deficit. Sensory: No sensory deficit. Motor: No weakness. Coordination: Coordination normal.      Deep Tendon Reflexes: Reflexes are normal and symmetric. Psychiatric:         Mood and Affect: Mood normal.         Behavior: Behavior normal. Behavior is cooperative. Thought Content: Thought content normal.         Judgment: Judgment normal.         Assessment:       Diagnosis Orders   1. Inguinal hernia of right side without obstruction or gangrene  Ambulatory referral to General Surgery    CT ABDOMEN WO CONTRAST Additional Contrast? Radiologist Recommendation     No results found for this visit on 01/06/21. Plan:     [address] was seen today for other.     Diagnoses and all orders for this visit:    Inguinal hernia of right side without

## 2021-01-07 ENCOUNTER — VIRTUAL VISIT (OUTPATIENT)
Dept: FAMILY MEDICINE CLINIC | Age: 51
End: 2021-01-07
Payer: MEDICAID

## 2021-01-07 DIAGNOSIS — I10 ESSENTIAL HYPERTENSION: ICD-10-CM

## 2021-01-07 DIAGNOSIS — K40.90 INGUINAL HERNIA OF RIGHT SIDE WITHOUT OBSTRUCTION OR GANGRENE: Primary | ICD-10-CM

## 2021-01-07 DIAGNOSIS — I42.9 CARDIOMYOPATHY, UNSPECIFIED TYPE (HCC): ICD-10-CM

## 2021-01-07 PROCEDURE — G8428 CUR MEDS NOT DOCUMENT: HCPCS | Performed by: INTERNAL MEDICINE

## 2021-01-07 PROCEDURE — 99214 OFFICE O/P EST MOD 30 MIN: CPT | Performed by: INTERNAL MEDICINE

## 2021-01-07 PROCEDURE — G8417 CALC BMI ABV UP PARAM F/U: HCPCS | Performed by: INTERNAL MEDICINE

## 2021-01-07 PROCEDURE — 4004F PT TOBACCO SCREEN RCVD TLK: CPT | Performed by: INTERNAL MEDICINE

## 2021-01-07 PROCEDURE — G8484 FLU IMMUNIZE NO ADMIN: HCPCS | Performed by: INTERNAL MEDICINE

## 2021-01-07 RX ORDER — HYDROCODONE BITARTRATE AND ACETAMINOPHEN 5; 325 MG/1; MG/1
1 TABLET ORAL EVERY 4 HOURS PRN
Qty: 42 TABLET | Refills: 0 | Status: SHIPPED | OUTPATIENT
Start: 2021-01-07 | End: 2021-02-03

## 2021-01-07 ASSESSMENT — ENCOUNTER SYMPTOMS
RHINORRHEA: 0
SORE THROAT: 0
WHEEZING: 0
EYE REDNESS: 0
SHORTNESS OF BREATH: 0
EYE ITCHING: 0
FACIAL SWELLING: 0
VOICE CHANGE: 0
CONSTIPATION: 0
CHEST TIGHTNESS: 0
RECTAL PAIN: 0
ABDOMINAL DISTENTION: 0
COUGH: 0
DIARRHEA: 0
VOMITING: 0
TROUBLE SWALLOWING: 0
EYE DISCHARGE: 0
BACK PAIN: 0
ABDOMINAL PAIN: 0
SINUS PAIN: 0
PHOTOPHOBIA: 0
NAUSEA: 0
EYE PAIN: 0
COLOR CHANGE: 0
APNEA: 0
SINUS PRESSURE: 0
BLOOD IN STOOL: 0

## 2021-01-07 NOTE — PROGRESS NOTES
Subjective:      Patient ID: Monica Watkins is a 52 y.o. male who presents today for:  No chief complaint on file. 73-year-old male with history of hypertension, acute kidney injury, and cardiomyopathy presents for follow-up visit. Hypertension: The patient is presently compliant with hydralazine, Coreg and Imdur. He is also receiving Lasix 40 mg orally daily  The patient is presently receiving hydralazine 25 mg 3 times daily to control his blood pressure. His blood pressure remains uncontrolled at this time. Regarding his cardiomyopathy  carvedilol 25 mg twice daily, Imdur 30 mg daily, hydralazine 50 tid      Nicotine abuse: The patient states that he has not smoked in 5 days. At present he denies polyuria,  Polydipsia, constitutional, sinus, visual, cardiopulmonary, gastrointestinal, immunologic/hematologic, musculoskeletal, neurologic,dermatologic, or psychiatric complaints. Past Medical History:   Diagnosis Date    Abnormal EKG 9/27/2019    Cardiomyopathy [facility])     per echo 8/2019    Chronic combined systolic and diastolic CHF (congestive heart failure) ([facility] [address] 9/27/2019    ETOH abuse     Hypertension     Tobacco abuse      No past surgical history on file.   Social History     Socioeconomic History    Marital status: Single     Spouse name: Not on file    Number of children: Not on file    Years of education: Not on file    Highest education level: Not on file   Occupational History    Not on file   Social Needs    Financial resource strain: Not hard at all    Food insecurity     Worry: Never true     Inability: Never true   [employer] needs     Medical: No     Non-medical: No   Tobacco Use    Smoking status: Current Every Day Smoker     Packs/day: 1.00     Types: Cigarettes    Smokeless tobacco: Never Used    Tobacco comment: 1pack/2 days   Substance and Sexual Activity    Alcohol use: Yes     Frequency: 4 or more times a week     Drinks per session: 7 to 9    Drug use: Never    Sexual activity: Not on file   Lifestyle    Physical activity     Days per week: Not on file     Minutes per session: Not on file    Stress: Not on file   Relationships    Social connections     Talks on phone: Not on file     Gets together: Not on file     Attends Hinduism service: Not on file     Active member of club or organization: Not on file     Attends meetings of clubs or organizations: Not on file     Relationship status: Not on file    Intimate partner violence     Fear of current or ex partner: Not on file     Emotionally abused: Not on file     Physically abused: Not on file     Forced sexual activity: Not on file   Other Topics Concern    Not on file   Social History Narrative    Not on file     Family History   Problem Relation Age of Onset    Coronary Art Dis Mother      No Known Allergies  Current Outpatient Medications on File Prior to Visit   Medication Sig Dispense Refill    amLODIPine (NORVASC) 10 MG tablet Take 1 tablet by mouth daily 30 tablet 5    isosorbide mononitrate (IMDUR) 120 MG extended release tablet Take 1 tablet by mouth daily 30 tablet 3    ivabradine (CORLANOR) 5 MG TABS tablet Take 1 tablet by mouth 2 times daily (with meals) 60 tablet 3    hydrALAZINE (APRESOLINE) 100 MG tablet Take 1 tablet by mouth 3 times daily 90 tablet 3    torsemide (DEMADEX) 10 MG tablet Take 5 tablets by mouth daily 30 tablet 3    nicotine (NICODERM CQ) 21 MG/24HR Place 1 patch onto the skin daily 30 patch 3    carvedilol (COREG) 25 MG tablet Take 1 tablet by mouth 2 times daily (with meals) 60 tablet 3    aspirin 81 MG EC tablet Take 1 tablet by mouth daily 30 tablet 3     No current facility-administered medications on file prior to visit. Review of Systems   Constitutional: Negative for chills, diaphoresis, fatigue and fever.    HENT: Negative for congestion, dental problem, drooling, ear discharge, ear pain, facial swelling, hearing loss, mouth sores, nosebleeds, postnasal drip, rhinorrhea, sinus pressure, sinus pain, sneezing, sore throat, tinnitus, trouble swallowing and voice change. Eyes: Negative for photophobia, pain, discharge, redness, itching and visual disturbance. Respiratory: Negative for apnea, cough, chest tightness, shortness of breath and wheezing. Cardiovascular: Negative for chest pain, palpitations and leg swelling. Gastrointestinal: Negative for abdominal distention, abdominal pain, blood in stool, constipation, diarrhea, nausea, rectal pain and vomiting. Endocrine: Negative for cold intolerance, heat intolerance, polydipsia, polyphagia and polyuria. Genitourinary: Negative for decreased urine volume, difficulty urinating, dysuria, flank pain, frequency, genital sores, hematuria and urgency. Musculoskeletal: Negative for arthralgias, back pain, gait problem, joint swelling, myalgias, neck pain and neck stiffness. Skin: Negative for color change, rash and wound. Allergic/Immunologic: Negative for environmental allergies and food allergies. Neurological: Negative for dizziness, tremors, seizures, syncope, facial asymmetry, speech difficulty, weakness, light-headedness, numbness and headaches. Hematological: Negative for adenopathy. Does not bruise/bleed easily. Psychiatric/Behavioral: Negative for agitation, confusion, decreased concentration, hallucinations, self-injury, sleep disturbance and suicidal ideas. The patient is not nervous/anxious. Objective: There were no vitals taken for this visit. Physical Exam  Constitutional:       General: He is not in acute distress. Appearance: He is well-developed. HENT:      Head: Normocephalic. Right Ear: External ear normal.      Left Ear: External ear normal.   Eyes:      Conjunctiva/sclera: Conjunctivae normal.   Neck:      Musculoskeletal: Neck supple. Trachea: No tracheal deviation.    Cardiovascular:      Rate and Rhythm: Normal rate and regular rhythm. Heart sounds: Normal heart sounds. Pulmonary:      Effort: Pulmonary effort is normal. No respiratory distress. Breath sounds: Normal breath sounds. No wheezing or rales. Chest:      Chest wall: No tenderness. Abdominal:      General: Bowel sounds are normal. There is no distension. Palpations: Abdomen is soft. There is no mass. Tenderness: There is no abdominal tenderness. There is no guarding. Musculoskeletal:         General: No tenderness or deformity. Skin:     General: Skin is warm and dry. Coloration: Skin is not pale. Findings: No erythema or rash. Neurological:      Mental Status: He is alert and oriented to person, place, and time. Psychiatric:         Thought Content: Thought content normal.         Judgment: Judgment normal.         Assessment:       Diagnosis Orders   1. Inguinal hernia of right side without obstruction or gangrene  HYDROcodone-acetaminophen (NORCO) 5-325 MG per tablet   2. Essential hypertension     3. Cardiomyopathy, unspecified type [facility]           Plan:      Clover Alberts was seen today for follow-up. Diagnoses and all orders for this visit:  Inguinal hernia: norco q 4 prn        Essential hypertension increase hydralazine to 50 mg po tid, carvedilol 25 mg bid, lasix 40 mg po daily ,   imdur 30 mg daily . Cardiomyopathy, unspecified type (HCC)continue Coreg 5 mg twice daily, hydralazine 50 mg orally daily and Imdur 30 mg orally daily. CKD: Continue to monitor renal function . Avoid NSAIDS. Return in about 1 month (around 2/7/2021). Rosemary Henley MD    Please note, this report has been partially produced using speech recognition software  and may cause  and /or contain errors related to that system including grammar, punctuation and spelling as well as words and phrases that may seem inappropriate. If there are questions or concerns please feel free to contact me to clarify.

## 2021-01-12 ENCOUNTER — OFFICE VISIT (OUTPATIENT)
Dept: CARDIOLOGY CLINIC | Age: 51
End: 2021-01-12
Payer: MEDICAID

## 2021-01-12 ENCOUNTER — HOSPITAL ENCOUNTER (OUTPATIENT)
Dept: CT IMAGING | Age: 51
Discharge: HOME | End: 2021-01-14
Payer: MEDICAID

## 2021-01-12 VITALS
BODY MASS INDEX: 27.77 KG/M2 | DIASTOLIC BLOOD PRESSURE: 99 MMHG | HEIGHT: 72 IN | OXYGEN SATURATION: 99 % | WEIGHT: 205 LBS | HEART RATE: 110 BPM | RESPIRATION RATE: 18 BRPM | SYSTOLIC BLOOD PRESSURE: 150 MMHG

## 2021-01-12 DIAGNOSIS — N18.30 STAGE 3 CHRONIC KIDNEY DISEASE, UNSPECIFIED WHETHER STAGE 3A OR 3B CKD (HCC): ICD-10-CM

## 2021-01-12 DIAGNOSIS — R10.31 RIGHT INGUINAL PAIN: ICD-10-CM

## 2021-01-12 DIAGNOSIS — I50.21 ACUTE SYSTOLIC CHF (CONGESTIVE HEART FAILURE), NYHA CLASS 2 (HCC): ICD-10-CM

## 2021-01-12 DIAGNOSIS — R00.0 TACHYCARDIA: ICD-10-CM

## 2021-01-12 DIAGNOSIS — I10 ESSENTIAL HYPERTENSION: ICD-10-CM

## 2021-01-12 DIAGNOSIS — I42.8 NONISCHEMIC CARDIOMYOPATHY (HCC): ICD-10-CM

## 2021-01-12 DIAGNOSIS — F10.11 HISTORY OF ALCOHOL ABUSE: ICD-10-CM

## 2021-01-12 DIAGNOSIS — I10 UNCONTROLLED HYPERTENSION: ICD-10-CM

## 2021-01-12 DIAGNOSIS — I50.22 CHRONIC SYSTOLIC CHF (CONGESTIVE HEART FAILURE), NYHA CLASS 1 (HCC): ICD-10-CM

## 2021-01-12 LAB
ANION GAP SERPL CALCULATED.3IONS-SCNC: 15 MEQ/L (ref 9–15)
BUN BLDV-MCNC: 35 MG/DL (ref 6–20)
CALCIUM SERPL-MCNC: 9.2 MG/DL (ref 8.5–9.9)
CHLORIDE BLD-SCNC: 102 MEQ/L (ref 95–107)
CO2: 25 MEQ/L (ref 20–31)
CREAT SERPL-MCNC: 3.17 MG/DL (ref 0.7–1.2)
GFR AFRICAN AMERICAN: 25.4
GFR NON-AFRICAN AMERICAN: 21
GLUCOSE BLD-MCNC: 86 MG/DL (ref 70–99)
POTASSIUM SERPL-SCNC: 3.5 MEQ/L (ref 3.4–4.9)
PRO-BNP: 5876 PG/ML
SODIUM BLD-SCNC: 142 MEQ/L (ref 135–144)

## 2021-01-12 PROCEDURE — G8417 CALC BMI ABV UP PARAM F/U: HCPCS | Performed by: PHYSICIAN ASSISTANT

## 2021-01-12 PROCEDURE — G8428 CUR MEDS NOT DOCUMENT: HCPCS | Performed by: PHYSICIAN ASSISTANT

## 2021-01-12 PROCEDURE — 99214 OFFICE O/P EST MOD 30 MIN: CPT | Performed by: PHYSICIAN ASSISTANT

## 2021-01-12 PROCEDURE — G8484 FLU IMMUNIZE NO ADMIN: HCPCS | Performed by: PHYSICIAN ASSISTANT

## 2021-01-12 PROCEDURE — 74176 CT ABD & PELVIS W/O CONTRAST: CPT

## 2021-01-12 PROCEDURE — 4004F PT TOBACCO SCREEN RCVD TLK: CPT | Performed by: PHYSICIAN ASSISTANT

## 2021-01-12 RX ORDER — CARVEDILOL 25 MG/1
12.5 TABLET ORAL 2 TIMES DAILY
Qty: 60 TABLET | Refills: 3 | Status: SHIPPED | OUTPATIENT
Start: 2021-01-12 | End: 2021-01-19

## 2021-01-12 ASSESSMENT — ENCOUNTER SYMPTOMS
COLOR CHANGE: 0
VOMITING: 0
SHORTNESS OF BREATH: 0
COUGH: 0
CHEST TIGHTNESS: 0
NAUSEA: 0
BLOOD IN STOOL: 0

## 2021-01-12 NOTE — PROGRESS NOTES
Patient: Colin Fabry  YOB: 1970  MRN: [id number]    Chief Complaint:  Chief Complaint   Patient presents with    Congestive Heart Failure     systolic    Swelling     BLE    Dizziness     occasional         Subjective/HPI        1/12/21: Patient here for follow-up of systolic congestive heart failure and nonischemic cardiomyopathy. Patient is a very difficult historian and is often uncertain with his medications. He brought in all the medications that he is currently taking which include both a 5 and 10 mg dose of amlodipine, 60 and 120 mg dose of Imdur, hydralazine 100 mg p.o. 3 times daily, aspirin 81 mg p.o. daily and torsemide 50 mg p.o. daily. Per our medicine list and per patient's report he believed he had been taking Coreg 25 mg twice a day however this is not one of his current medication bottles and he reports that he must not be taking it. I had added Corlanor 5 mg p.o. twice daily at last visit however this was not approved by his insurance as he apparently was not on a beta-blocker and patient failed to come  samples. Blood pressure remains uncontrolled currently. He states that he had some worsening lower extremity edema yesterday which improved after taking his torsemide. He denies chest pain, shortness of breath, nausea, vomiting, dizziness, lightheadedness, diaphoresis, palpitations, paroxysmal nocturnal dyspnea, orthopnea, lower extremity edema, syncope, fever or chills. Patient has been educated at length regarding importance of compliance with medications as prescribed. I went through patient's medication bottles with him and marked the ones that he should be taking. He verbalizes understanding. Most recent labs reviewed and documented below.   CBC 1/2/21: WBC 10.8, hemoglobin 13.7, hematocrit 39.8, platelets 453  CMP 3/2/83: Sodium 139, potassium low at 3.1, chloride 100, total CO2 of 24, BUN elevated at 33, creatinine elevated at 3.19, GFR low at 25.2, glucose 110  proBNP 11/17/2020: 9972    Blood pressure elevated in office today at 156/104 on the left and 150/99 on the right. Heart rate tachycardic at 110, pulse ox 99% on room air. Weight is 205 pounds which is the same weight from last visit on 11/17/2020.      12/1/20 (virtual visit): For follow-up of nonischemic cardiomyopathy and systolic congestive heart failure. Was seen for initial CHF clinic evaluation on 11/17/2020. BP at last office visit was uncontrolled with systolic in the 243U and diastolic above 494 and BP meds were titrated. Hydralazine was increased to 100 mg p.o. 3 times daily and Imdur was increased to 60 mg p.o. daily. Patient states that he has been doing well overall since last visit but has had fluctuating blood pressures. He will occasionally get systolic pressures in the 130s but more consistently between 150s and 170s range. He states his diastolic blood pressure is never below 100. He was advised to check blood pressure while on the phone with me today and he was hypertensive with initial blood pressure of 193/121 and repeat blood pressure 189/122. He states he had already taken him 1 dose of hydralazine and his Imdur but had not yet not yet taken his carvedilol which he was advised to do so while on the phone with me. He is asymptomatic despite this elevated blood pressure with no neurologic complaints. He does report experiencing shortness of breath and some dizziness earlier today while working outside shoveling snow. He has been advised to avoid any type of strenuous activity or any further snow shoveling. He denies chest pain, nausea, vomiting, diaphoresis, paroxysmal nocturnal dyspnea, orthopnea, lower extremity edema, syncope, fever or chills. He is weighing himself every day and his weight has been stable with current weight of 194 pounds compared to 205 pounds at last visit.     Recent labs reviewed and documented below.   BMP 11/17/2020: Sodium 139, potassium 3.7, chloride 107, total CO2 of 23, BUN elevated 27, creatinine elevated 2.82, GFR low at 29.1, glucose 78--renal function stable when compared to prior  proBNP 11/17/2020: 9972 compared to 13,817 on 11/3/2020.     Weight: 194 pounds  BP today: 193/121 during virtual appt and repeat 189/122  HR: 115     Renal duplex US 8/27/2019:  Impression    NO EVIDENCE OF RENAL ARTERY STENOSIS NOTED.         11/17/20 CHF visit #1: This is a pleasant 73-year-old -American male with past medical history significant for nonischemic cardiomyopathy with severely reduced LV function and ejection fraction of 20%, normal coronary arteries per cardiac catheterization 11/3/2020, abnormal EKG, hypertension, tobacco abuse, history of alcohol abuse, chronic kidney disease and medical noncompliance who presents for initial CHF clinic evaluation. Patient originally presented to the emergency room on 11/3/2020 with complaints of shortness of breath and lower extremity edema. His initial cardiac enzyme was mildly elevated and proBNP elevated at 18,817. Has baseline CKD with creatinine of 2.91 on presentation. He was optimized on heart failure medications. Nephrology was consulted for evaluation of CKD and fluid/diuretic management. Given non-STEMI, severely reduced LV systolic function and risk factors for CAD, he underwent cardiac catheterization on 11/4/2020 which revealed normal coronary arteries and LVEDP of 29 mmHg. Previous echocardiogram completed in August 2020 showed severely reduced LV systolic function with EF of 25 to 30%. He was diuresed aggressively during admission and symptoms improved. He was eventually transitioned back from IV Lasix to torsemide 50 mg p.o. daily and subsequently discharged home in stable condition on 11/7/2020. Since discharge, patient states he has been doing well overall. No new or worsening heart failure symptoms. He states he is compliant with all of his medications.   He does report eating diet high in sodium content and has been educated on the importance of low-sodium diet and fluid restriction. He denies any complaint of shortness of breath or significant dyspnea on exertion but does report mild shortness of breath with moderate to strenuous activity. He denies chest pain, palpitations, diaphoresis, paroxysmal nocturnal dyspnea, orthopnea, lower extremity edema, syncope, fever or chills. He does experience an occasionally productive cough. Recent labs reviewed and documented below. BMP 11/7/2020: Sodium 139, potassium 3.5, chloride 98, total CO2 26, BUN elevated 33, creatinine elevated 3.06, GFR low at 26.5, glucose 90  CBC 11/7/2020: WBC is 6.8, hemoglobin 14.6, hematocrit 43.1, platelets elevated 195. Magnesium 11/6/2020: 2.2    EKG 11/3/20: SR 95, 1st degree AVB,  Deep T wave inversion V3-V6, mild ST depression with T wave inversion leads I and aVL, QTc 497ms    Blood pressure elevated in office today at 184/118 on the right and 175/120 on the left but patient is completely asymptomatic. Heart rate 60 bpm, pulse ox 100% on room air. Weight is 205 pounds      [facility] 11/3/20:  Conclusions  Procedure Summary  Normal coronaries. LVEDP=29mmhg  EF of 20%. Recommendations  Aggressive risk factor management. Maximize medical therapy   Angiographic Findings   Cardiac Arteries and Lesion Findings  LMCA: Normal.  LAD: Normal.  LCx: Normal.  RCA: Normal.  Dominance: Right  LV function assessed as:Abnormal.  Ejection Fraction    - Method: LV gram. EF%: 20.      Echocardiogram 8/25/20:   Conclusions   Summary   Normal aortic valve structure and function. Trace AR   Normal tricuspid valve structure and function. trace TR   RVSP 25 mmHg   Left ventricular ejection fraction is visually estimated at 25-30% with   Global Hypokinesis. Pseudonormal filling pattern noted.    Signature   ----------------------------------------------------------------   Electronically signed by Marie Means MD(Interpreting   physician) on 08/27/2020 03:28 PM   ----------------------------------------------------------------    Echocardiogram 8/26/19:  Conclusions   Summary   Normal mitral valve structure and function. Mild MR   Normal aortic valve structure and function. Mild AR   Left ventricular ejection fraction is visually estimated at 40%. Restrictive filling pattern. Moderate CLVH   Mild dilated Ao Root   Signature   ----------------------------------------------------------------   Electronically signed by Kristen Busch MD(Interpreting   physician) on 08/26/2019 04:44 PM   ----------------------------------------------------------------    PMHx:  HTN  CKD  NICMP EF 20% per echo 8/2020 and per [facility] 11/3/20  Normal coronaries per [facility] 11/3/20  Hx ETOH abuse  Tobacco abuse    PSHx:  None    Allergies: None    Social Hx:  Former smoker--Quit during recent hospitalization 11/2020. 1ppd x 15 year. Using nicoderm patches now  +ETOH--3-4 beers 3-4 times per weeks  No drugs    Family Hx:  Mom passed from MI at 64years old    No Known Allergies    Current Outpatient Medications   Medication Sig Dispense Refill    carvedilol (COREG) 25 MG tablet Take 0.5 tablets by mouth 2 times daily HOLD for SBP<100 or HR<60 60 tablet 3    HYDROcodone-acetaminophen (NORCO) 5-325 MG per tablet Take 1 tablet by mouth every 4 hours as needed for Pain for up to 7 days. Intended supply: 7 days.  Take lowest dose possible to manage pain 42 tablet 0    amLODIPine (NORVASC) 10 MG tablet Take 1 tablet by mouth daily 30 tablet 5    isosorbide mononitrate (IMDUR) 120 MG extended release tablet Take 1 tablet by mouth daily 30 tablet 3    hydrALAZINE (APRESOLINE) 100 MG tablet Take 1 tablet by mouth 3 times daily 90 tablet 3    torsemide (DEMADEX) 10 MG tablet Take 5 tablets by mouth daily 30 tablet 3    nicotine (NICODERM CQ) 21 MG/24HR Place 1 patch onto the skin daily 30 patch 3    aspirin 81 MG EC tablet Take 1 tablet by mouth daily 30 tablet 3    ivabradine (CORLANOR) 5 MG TABS tablet Take 1 tablet by mouth 2 times daily (with meals) 60 tablet 3     No current facility-administered medications for this visit. Facility-Administered Medications Ordered in Other Visits   Medication Dose Route Frequency Provider Last Rate Last Admin    barium sulfate (READI-CAT 2) 2 % suspension 450 mL  450 mL Oral ONCE PRN Krystle Peoples MD           Past Medical History:   Diagnosis Date    Abnormal EKG 9/27/2019    Cardiomyopathy ([facility]     per echo 8/2019    Chronic combined systolic and diastolic CHF (congestive heart failure) ([facility] 9/27/2019    ETOH abuse     Hypertension     Tobacco abuse        No past surgical history on file.     Social History     Socioeconomic History    Marital status: Single     Spouse name: None    Number of children: None    Years of education: None    Highest education level: None   Occupational History    None   Social Needs    Financial resource strain: Not hard at all   [place] insecurity     Worry: Never true     Inability: Never true    Transportation needs     Medical: No     Non-medical: No   Tobacco Use    Smoking status: Current Every Day Smoker     Packs/day: 1.00     Types: Cigarettes    Smokeless tobacco: Never Used    Tobacco comment: 1pack/2 days   Substance and Sexual Activity    Alcohol use: Yes     Frequency: 4 or more times a week     Drinks per session: 7 to 9    Drug use: Never    Sexual activity: None   Lifestyle    Physical activity     Days per week: None     Minutes per session: None    Stress: None   Relationships    Social connections     Talks on phone: None     Gets together: None     Attends Jewish service: None     Active member of club or organization: None     Attends meetings of clubs or organizations: None     Relationship status: None    Intimate partner violence     Fear of current or ex partner: None     Emotionally abused: None     Physically abused: None Forced sexual activity: None   Other Topics Concern    None   Social History Narrative    None       Family History   Problem Relation Age of Onset    Coronary Art Dis Mother          Review of Systems:   Review of Systems   Constitutional: Negative for activity change, chills, fatigue, fever and unexpected weight change. HENT: Negative for congestion. Respiratory: Negative for cough, chest tightness and shortness of breath. Cardiovascular: Positive for leg swelling. Negative for chest pain and palpitations. Gastrointestinal: Negative for blood in stool, nausea and vomiting. Genitourinary: Negative for difficulty urinating, dysuria and hematuria. Musculoskeletal: Negative for arthralgias and myalgias. Skin: Negative for color change, pallor and rash. Neurological: Negative for dizziness, syncope and light-headedness. Psychiatric/Behavioral: Negative for agitation and behavioral problems. Physical Examination:    BP (!) 150/99 (Site: Right Upper Arm, Position: Sitting, Cuff Size: Large Adult)   Pulse 110   Resp 18   Ht 6' (1.829 m)   Wt 205 lb (93 kg)   SpO2 99%   BMI 27.80 kg/m²    Physical Exam  Constitutional:       General: He is not in acute distress. Appearance: Normal appearance. HENT:      Head: Normocephalic and atraumatic. Neck:      Musculoskeletal: Normal range of motion and neck supple. Cardiovascular:      Rate and Rhythm: Normal rate and regular rhythm. Pulmonary:      Effort: Pulmonary effort is normal. No respiratory distress. Breath sounds: No wheezing, rhonchi or rales. Abdominal:      Palpations: Abdomen is soft. Tenderness: There is no abdominal tenderness. Musculoskeletal: Normal range of motion. Right lower leg: Edema (1-2+ ankle edema) present. Left lower leg: Edema (1-2+ ankle edema) present. Skin:     General: Skin is warm and dry. Neurological:      General: No focal deficit present.       Mental Status: He is alert and oriented to person, place, and time. Cranial Nerves: No cranial nerve deficit. Psychiatric:         Mood and Affect: Mood normal.         Behavior: Behavior normal.         LABS:  CBC:   Lab Results   Component Value Date    WBC 10.8 01/02/2021    RBC 4.93 01/02/2021    HGB 13.7 01/02/2021    HCT 39.8 01/02/2021    MCV 80.7 01/02/2021    MCH 27.8 01/02/2021    MCHC 34.5 01/02/2021    RDW 14.8 01/02/2021     01/02/2021     Lipids:  Lab Results   Component Value Date    CHOL 102 08/27/2020    CHOL 137 08/27/2019     Lab Results   Component Value Date    TRIG 85 08/27/2020    TRIG 139 08/27/2019     Lab Results   Component Value Date    HDL 43 08/27/2020    HDL 47 08/27/2019     Lab Results   Component Value Date    LDLCALC 42 08/27/2020    LDLCALC 62 08/27/2019     No results found for: LABVLDL, VLDL  No results found for: CHOLHDLRATIO  CMP:    Lab Results   Component Value Date     01/02/2021    K 3.1 01/02/2021     01/02/2021    CO2 24 01/02/2021    BUN 33 01/02/2021    CREATININE 3.5 01/02/2021    CREATININE 3.19 01/02/2021    GFRAA 23 01/02/2021    LABGLOM 19 01/02/2021    GLUCOSE 110 01/02/2021    PROT 7.2 01/02/2021    LABALBU 3.9 01/02/2021    CALCIUM 9.0 01/02/2021    BILITOT 0.6 01/02/2021    ALKPHOS 102 01/02/2021    AST 19 01/02/2021    ALT 17 01/02/2021     BMP:    Lab Results   Component Value Date     01/02/2021    K 3.1 01/02/2021     01/02/2021    CO2 24 01/02/2021    BUN 33 01/02/2021    LABALBU 3.9 01/02/2021    CREATININE 3.5 01/02/2021    CREATININE 3.19 01/02/2021    CALCIUM 9.0 01/02/2021    GFRAA 23 01/02/2021    LABGLOM 19 01/02/2021    GLUCOSE 110 01/02/2021     Magnesium:    Lab Results   Component Value Date    MG 2.2 11/06/2020     TSH:  Lab Results   Component Value Date    TSH 0.976 08/26/2020     . result  No results for input(s): PROBNP in the last 72 hours. No results for input(s): INR in the last 72 hours.             Patient Active Problem List   Diagnosis    Hypertensive urgency    Cardiomyopathy ([facility]    Systolic and diastolic CHF, acute ([facility]    BLUE (acute kidney injury) ([facility]    Abnormal EKG    Chronic combined systolic and diastolic CHF (congestive heart failure) ([facility]    Acute decompensated heart failure ([facility]    Chest pain    Pulmonary edema, acute ([facility]    NSTEMI (non-ST elevated myocardial infarction) ([facility]    Inguinal hernia of right side without obstruction or gangrene       Assessment/Plan:     Diagnosis Orders   1. Chronic systolic CHF (congestive heart failure), NYHA class 1 ([facility])  Basic Metabolic Panel    Brain Natriuretic Peptide    stable. Worsening LE possibly secondary to addition of amlodipine +/- CHF   2. Nonischemic cardiomyopathy ([facility]      EF 25-30% per echo 8/25/20. LHC 11/3/20 with normal coronaries   3. Uncontrolled hypertension      Add coreg 12.5mg PO BID. Continue to titrate meds as needed   4. Tachycardia      Add coreg 12.5mg PO BID. 5. Stage 3 chronic kidney disease, unspecified whether stage 3a or 3b CKD      Maintain follow-up with Dr. Shaquille White   6. History of alcohol abuse         -Maximize medical therapy--aspirin 81mg PO daily, add Coreg 12.5 mg p.o. twice daily (patient has not been taking Coreg 25 mg p.o. twice daily as previously thought), hydralazine 100 mg p.o. 3 times daily, Imdur to 120 mg p.o. daily, amlodipine 10mg PO daily, torsemide 50mg PO daily  -We will consider further titration of Coreg at next office visit if needed for improved BP or heart rate management.  -If heart rate remains uncontrolled despite maximal dose of beta-blocker, will add Corlanor at that time  -Consider discontinuation of amlodipine in future if blood pressure improved following addition of Coreg.   Lower extremity edema could possibly be the result of amlodipine addition  -Cardiac/<2 gram sodium diet recommended  -1500mL daily fluid restriction   -Repeat BMP and BNP today  -Tobacco cessation recommended  -Alcohol cessation recommended  -Instructed patient to weigh self daily every morning upon waking and keep log book of daily weights. Notify office if gaining more than 3 pounds in 48 hours. --provided with scale today  -Recommend routine BP monitoring at home. Advised checking BP twice daily in AM and PM and keep a log of blood pressure trends to discuss at next visit.  Advised patient to notify CHF clinic if persistent hypertension with systolic blood pressure greater than 455 or diastolic blood pressure greater than 95. Advised patient to call 911 or present immediately to ER if experiencing any neurologic symptoms such as blurred or double vision, headache, unilateral weakness, slurred speech or facial drooping. **BP remains uncontrolled and patient with much confusion regarding current BP medications. He has been taking 2 different doses of amlodipine and Imdur as dose adjustments were made during prior visits. Also, he was not taking Coreg as he previously thought. Patient educated at length regarding importance of compliance with medications and taking only medications as prescribed. He is to adhere to current medication list from office visit. **  -Advised patient to notify office immediately if experiencing any progressive SOB, orthopnea, PND, LE edema or weight gain  -Educated patient on importance of fluid and salt restriction as well as lifestyle modification. --States he has been compliant with sodium and fluid restriction  **Will plan to order either repeat echo or MUGA scan to be completed between February and March to reevaluate LV function.   If EF remains severely reduced at less than or equal to 35%, will need referral to electrophysiology for AICD evaluation**  -Maintain follow-up with nephrology, Dr. Pura Galloway routine outpatient follow-up with general cardiologist, Dr. Daly Nogueira  -Maintain routine outpatient follow-up with PCP  -F/U with CHF clinic in 1 week or sooner if needed          Counseling: The importance of daily weights, dietary sodium restriction, and contact with cardiology if weight is increased more than 3 lbs in any 48 hour period was stressed. The patient has been advised to contact us if theyexperience progressive SOB, orthopnea, PND or progressive edema.

## 2021-01-19 ENCOUNTER — OFFICE VISIT (OUTPATIENT)
Dept: CARDIOLOGY CLINIC | Age: 51
End: 2021-01-19
Payer: MEDICAID

## 2021-01-19 VITALS
OXYGEN SATURATION: 99 % | HEART RATE: 113 BPM | BODY MASS INDEX: 27.36 KG/M2 | WEIGHT: 202 LBS | SYSTOLIC BLOOD PRESSURE: 157 MMHG | RESPIRATION RATE: 18 BRPM | HEIGHT: 72 IN | DIASTOLIC BLOOD PRESSURE: 102 MMHG

## 2021-01-19 DIAGNOSIS — N18.30 STAGE 3 CHRONIC KIDNEY DISEASE, UNSPECIFIED WHETHER STAGE 3A OR 3B CKD (HCC): ICD-10-CM

## 2021-01-19 DIAGNOSIS — I50.22 CHRONIC SYSTOLIC CHF (CONGESTIVE HEART FAILURE), NYHA CLASS 1 (HCC): ICD-10-CM

## 2021-01-19 DIAGNOSIS — I10 UNCONTROLLED HYPERTENSION: ICD-10-CM

## 2021-01-19 DIAGNOSIS — R00.0 TACHYCARDIA: ICD-10-CM

## 2021-01-19 DIAGNOSIS — F10.11 HISTORY OF ALCOHOL ABUSE: ICD-10-CM

## 2021-01-19 DIAGNOSIS — I42.8 NONISCHEMIC CARDIOMYOPATHY (HCC): ICD-10-CM

## 2021-01-19 PROCEDURE — 4004F PT TOBACCO SCREEN RCVD TLK: CPT | Performed by: PHYSICIAN ASSISTANT

## 2021-01-19 PROCEDURE — 99214 OFFICE O/P EST MOD 30 MIN: CPT | Performed by: PHYSICIAN ASSISTANT

## 2021-01-19 PROCEDURE — G8484 FLU IMMUNIZE NO ADMIN: HCPCS | Performed by: PHYSICIAN ASSISTANT

## 2021-01-19 PROCEDURE — G8427 DOCREV CUR MEDS BY ELIG CLIN: HCPCS | Performed by: PHYSICIAN ASSISTANT

## 2021-01-19 PROCEDURE — G8417 CALC BMI ABV UP PARAM F/U: HCPCS | Performed by: PHYSICIAN ASSISTANT

## 2021-01-19 RX ORDER — CLONIDINE HYDROCHLORIDE 0.1 MG/1
0.1 TABLET ORAL 2 TIMES DAILY
Qty: 60 TABLET | Refills: 3 | Status: ON HOLD | OUTPATIENT
Start: 2021-01-19 | End: 2021-04-25 | Stop reason: HOSPADM

## 2021-01-19 RX ORDER — IVABRADINE 5 MG/1
5 TABLET, FILM COATED ORAL 2 TIMES DAILY WITH MEALS
Qty: 60 TABLET | Refills: 3 | Status: SHIPPED | OUTPATIENT
Start: 2021-01-19 | End: 2021-05-06 | Stop reason: SDUPTHER

## 2021-01-19 RX ORDER — CARVEDILOL 25 MG/1
25 TABLET ORAL 2 TIMES DAILY
Qty: 60 TABLET | Refills: 3 | Status: SHIPPED | OUTPATIENT
Start: 2021-01-19 | End: 2022-03-01

## 2021-01-19 ASSESSMENT — ENCOUNTER SYMPTOMS
COLOR CHANGE: 0
SHORTNESS OF BREATH: 0
CHEST TIGHTNESS: 0
BLOOD IN STOOL: 0
NAUSEA: 0
VOMITING: 0
ABDOMINAL PAIN: 1
COUGH: 0

## 2021-01-19 NOTE — PROGRESS NOTES
Patient: Alissa Santoro  YOB: 1970  MRN: [id number]    Chief Complaint:  Chief Complaint   Patient presents with    Congestive Heart Failure     systolic    Swelling     BLE         Subjective/HPI        1/19/21: Here for follow-up of chronic systolic congestive heart failure, nonischemic cardiomyopathy and uncontrolled hypertension. Carvedilol 12.5 mg p.o. twice daily was added at last office visit as patient was not taking as previously thought and blood pressure remained uncontrolled. Patient states that he is actually been taking Coreg 25 mg twice daily instead of cutting the tablet in half twice a day. Blood pressure is somewhat improved but remains uncontrolled. He denies any new or worsening heart failure symptoms. States that his lower extremity edema is actually improved since last visit. Denies chest pain, shortness of breath or dyspnea exertion, nausea, vomiting, dizziness, lightheadedness, diaphoresis, palpitations, paroxysmal nocturnal dyspnea, orthopnea, lower extremity edema, syncope, fever or chills. Recent labs from 1/12/2021 reviewed and documented below  BMP 1/12/2021: Sodium 142, potassium 3.5, chloride 102, total CO2 25, BUN elevated 35, creatinine elevated at 3.17, GFR low at 25.4, glucose 86--stable renal function when compared to prior labs from 1/2/21  proBNP 1/12/2021: 5876 compared to 9972 on 11/17/2020    Vital signs stable in office today. Blood pressure elevated but improved from prior at 153/99 on the right and 157/102 on the left. Heart rate tachycardic at 113, pulse ox 99% on room air. Weight is 202 pounds compared to 205 pounds at last visit on 1/12/2021.    1/12/21: Patient here for follow-up of systolic congestive heart failure and nonischemic cardiomyopathy. Patient is a very difficult historian and is often uncertain with his medications.   He brought in all the medications that he is currently taking which include both a 5 and 10 mg dose of were titrated. Hydralazine was increased to 100 mg p.o. 3 times daily and Imdur was increased to 60 mg p.o. daily. Patient states that he has been doing well overall since last visit but has had fluctuating blood pressures. He will occasionally get systolic pressures in the 130s but more consistently between 150s and 170s range. He states his diastolic blood pressure is never below 100. He was advised to check blood pressure while on the phone with me today and he was hypertensive with initial blood pressure of 193/121 and repeat blood pressure 189/122. He states he had already taken him 1 dose of hydralazine and his Imdur but had not yet not yet taken his carvedilol which he was advised to do so while on the phone with me. He is asymptomatic despite this elevated blood pressure with no neurologic complaints. He does report experiencing shortness of breath and some dizziness earlier today while working outside shoveling snow. He has been advised to avoid any type of strenuous activity or any further snow shoveling. He denies chest pain, nausea, vomiting, diaphoresis, paroxysmal nocturnal dyspnea, orthopnea, lower extremity edema, syncope, fever or chills. He is weighing himself every day and his weight has been stable with current weight of 194 pounds compared to 205 pounds at last visit.     Recent labs reviewed and documented below.   BMP 11/17/2020: Sodium 139, potassium 3.7, chloride 107, total CO2 of 23, BUN elevated 27, creatinine elevated 2.82, GFR low at 29.1, glucose 78--renal function stable when compared to prior  proBNP 11/17/2020: 9972 compared to 13,817 on 11/3/2020.     Weight: 194 pounds  BP today: 193/121 during virtual appt and repeat 189/122  HR: 115     Renal duplex US 8/27/2019:  Impression    NO EVIDENCE OF RENAL ARTERY STENOSIS NOTED.         11/17/20 CHF visit #1: This is a pleasant 68-year-old -American male with past medical history significant for nonischemic cardiomyopathy with severely reduced LV function and ejection fraction of 20%, normal coronary arteries per cardiac catheterization 11/3/2020, abnormal EKG, hypertension, tobacco abuse, history of alcohol abuse, chronic kidney disease and medical noncompliance who presents for initial CHF clinic evaluation. Patient originally presented to the emergency room on 11/3/2020 with complaints of shortness of breath and lower extremity edema. His initial cardiac enzyme was mildly elevated and proBNP elevated at 18,817. Has baseline CKD with creatinine of 2.91 on presentation. He was optimized on heart failure medications. Nephrology was consulted for evaluation of CKD and fluid/diuretic management. Given non-STEMI, severely reduced LV systolic function and risk factors for CAD, he underwent cardiac catheterization on 11/4/2020 which revealed normal coronary arteries and LVEDP of 29 mmHg. Previous echocardiogram completed in August 2020 showed severely reduced LV systolic function with EF of 25 to 30%. He was diuresed aggressively during admission and symptoms improved. He was eventually transitioned back from IV Lasix to torsemide 50 mg p.o. daily and subsequently discharged home in stable condition on 11/7/2020. Since discharge, patient states he has been doing well overall. No new or worsening heart failure symptoms. He states he is compliant with all of his medications. He does report eating diet high in sodium content and has been educated on the importance of low-sodium diet and fluid restriction. He denies any complaint of shortness of breath or significant dyspnea on exertion but does report mild shortness of breath with moderate to strenuous activity. He denies chest pain, palpitations, diaphoresis, paroxysmal nocturnal dyspnea, orthopnea, lower extremity edema, syncope, fever or chills. He does experience an occasionally productive cough. Recent labs reviewed and documented below.   BMP 11/7/2020: Sodium 139, potassium 3.5, chloride 98, total CO2 26, BUN elevated 33, creatinine elevated 3.06, GFR low at 26.5, glucose 90  CBC 11/7/2020: WBC is 6.8, hemoglobin 14.6, hematocrit 43.1, platelets elevated 085. Magnesium 11/6/2020: 2.2    EKG 11/3/20: SR 95, 1st degree AVB,  Deep T wave inversion V3-V6, mild ST depression with T wave inversion leads I and aVL, QTc 497ms    Blood pressure elevated in office today at 184/118 on the right and 175/120 on the left but patient is completely asymptomatic. Heart rate 60 bpm, pulse ox 100% on room air. Weight is 205 pounds      [facility] 11/3/20:  Conclusions  Procedure Summary  Normal coronaries. LVEDP=29mmhg  EF of 20%. Recommendations  Aggressive risk factor management. Maximize medical therapy   Angiographic Findings   Cardiac Arteries and Lesion Findings  LMCA: Normal.  LAD: Normal.  LCx: Normal.  RCA: Normal.  Dominance: Right  LV function assessed as:Abnormal.  Ejection Fraction    - Method: LV gram. EF%: 20.      Echocardiogram 8/25/20:   Conclusions   Summary   Normal aortic valve structure and function. Trace AR   Normal tricuspid valve structure and function. trace TR   RVSP 25 mmHg   Left ventricular ejection fraction is visually estimated at 25-30% with   Global Hypokinesis. Pseudonormal filling pattern noted. Signature   ----------------------------------------------------------------   Electronically signed by Pop Reyes MD(Interpreting   physician) on 08/27/2020 03:28 PM   ----------------------------------------------------------------    Echocardiogram 8/26/19:  Conclusions   Summary   Normal mitral valve structure and function. Mild MR   Normal aortic valve structure and function. Mild AR   Left ventricular ejection fraction is visually estimated at 40%. Restrictive filling pattern.    Moderate CLVH   Mild dilated Ao Root   Signature   ----------------------------------------------------------------   Electronically signed by Cinthya Winn Lisandro Del Castillo MD(Interpreting   physician) on 08/26/2019 04:44 PM   ----------------------------------------------------------------    PMHx:  HTN  CKD  NICMP EF 20% per echo 8/2020 and per [facility] 11/3/20  Normal coronaries per [facility] 11/3/20  Hx ETOH abuse  Tobacco abuse    PSHx:  None    Allergies: None    Social Hx:  Former smoker--Quit during recent hospitalization 11/2020. 1ppd x 15 year. Using nicoderm patches now  +ETOH--3-4 beers 3-4 times per weeks  No drugs    Family Hx:  Mom passed from MI at 64years old    No Known Allergies    Current Outpatient Medications   Medication Sig Dispense Refill    carvedilol (COREG) 25 MG tablet Take 1 tablet by mouth 2 times daily HOLD for SBP<100 or HR<60 60 tablet 3    ivabradine (CORLANOR) 5 MG TABS tablet Take 1 tablet by mouth 2 times daily (with meals) 60 tablet 3    cloNIDine (CATAPRES) 0.1 MG tablet Take 1 tablet by mouth 2 times daily 60 tablet 3    amLODIPine (NORVASC) 10 MG tablet Take 1 tablet by mouth daily 30 tablet 5    isosorbide mononitrate (IMDUR) 120 MG extended release tablet Take 1 tablet by mouth daily 30 tablet 3    hydrALAZINE (APRESOLINE) 100 MG tablet Take 1 tablet by mouth 3 times daily 90 tablet 3    torsemide (DEMADEX) 10 MG tablet Take 5 tablets by mouth daily 30 tablet 3    nicotine (NICODERM CQ) 21 MG/24HR Place 1 patch onto the skin daily 30 patch 3    aspirin 81 MG EC tablet Take 1 tablet by mouth daily 30 tablet 3     No current facility-administered medications for this visit. Past Medical History:   Diagnosis Date    Abnormal EKG 9/27/2019    Cardiomyopathy [facility])     per echo 8/2019    Chronic combined systolic and diastolic CHF (congestive heart failure) ([facility] [address] 9/27/2019    ETOH abuse     Hypertension     Tobacco abuse        No past surgical history on file.     Social History     Socioeconomic History    Marital status: Single     Spouse name: None    Number of children: None    Years of education: None    Highest education level: None   Occupational History    None   Social Needs    Financial resource strain: Not hard at all   Sol[place]-Ramses insecurity     Worry: Never true     Inability: Never true   [employer] needs     Medical: No     Non-medical: No   Tobacco Use    Smoking status: Current Every Day Smoker     Packs/day: 1.00     Types: Cigarettes    Smokeless tobacco: Never Used    Tobacco comment: 1pack/2 days   Substance and Sexual Activity    Alcohol use: Yes     Frequency: 4 or more times a week     Drinks per session: 7 to 9    Drug use: Never    Sexual activity: None   Lifestyle    Physical activity     Days per week: None     Minutes per session: None    Stress: None   Relationships    Social connections     Talks on phone: None     Gets together: None     Attends Church service: None     Active member of club or organization: None     Attends meetings of clubs or organizations: None     Relationship status: None    Intimate partner violence     Fear of current or ex partner: None     Emotionally abused: None     Physically abused: None     Forced sexual activity: None   Other Topics Concern    None   Social History Narrative    None       Family History   Problem Relation Age of Onset    Coronary Art Dis Mother          Review of Systems:   Review of Systems   Constitutional: Negative for activity change, chills, fatigue, fever and unexpected weight change. HENT: Negative for congestion. Respiratory: Negative for cough, chest tightness and shortness of breath. Cardiovascular: Positive for leg swelling. Negative for chest pain and palpitations. Gastrointestinal: Positive for abdominal pain (RLQ pain due to hernia). Negative for blood in stool, nausea and vomiting. Genitourinary: Negative for difficulty urinating, dysuria and hematuria. Musculoskeletal: Negative for arthralgias and myalgias. Skin: Negative for color change, pallor and rash.    Neurological: Negative for dizziness, syncope and light-headedness. Psychiatric/Behavioral: Negative for agitation and behavioral problems. Physical Examination:    BP (!) 157/102 (Site: Left Upper Arm, Position: Sitting, Cuff Size: Small Adult)   Pulse 113   Resp 18   Ht 6' (1.829 m)   Wt 202 lb (91.6 kg)   SpO2 99%   BMI 27.40 kg/m²    Physical Exam  Constitutional:       General: He is not in acute distress. Appearance: Normal appearance. HENT:      Head: Normocephalic and atraumatic. Neck:      Musculoskeletal: Normal range of motion and neck supple. Cardiovascular:      Rate and Rhythm: Normal rate and regular rhythm. Pulmonary:      Effort: Pulmonary effort is normal. No respiratory distress. Breath sounds: No wheezing, rhonchi or rales. Abdominal:      Palpations: Abdomen is soft. Tenderness: There is no abdominal tenderness. Musculoskeletal: Normal range of motion. Right lower leg: Edema (trace-1+) present. Left lower leg: Edema (trace-1+) present. Skin:     General: Skin is warm and dry. Neurological:      General: No focal deficit present. Mental Status: He is alert and oriented to person, place, and time. Cranial Nerves: No cranial nerve deficit.    Psychiatric:         Mood and Affect: Mood normal.         Behavior: Behavior normal.         LABS:  CBC:   Lab Results   Component Value Date    WBC 10.8 01/02/2021    RBC 4.93 01/02/2021    HGB 13.7 01/02/2021    HCT 39.8 01/02/2021    MCV 80.7 01/02/2021    MCH 27.8 01/02/2021    MCHC 34.5 01/02/2021    RDW 14.8 01/02/2021     01/02/2021     Lipids:  Lab Results   Component Value Date    CHOL 102 08/27/2020    CHOL 137 08/27/2019     Lab Results   Component Value Date    TRIG 85 08/27/2020    TRIG 139 08/27/2019     Lab Results   Component Value Date    HDL 43 08/27/2020    HDL 47 08/27/2019     Lab Results   Component Value Date    LDLCALC 42 08/27/2020    LDLCALC 62 08/27/2019     No results found for: LABVLDL, VLDL  No results found for: CHOLHDLRATIO  CMP:    Lab Results   Component Value Date     01/12/2021    K 3.5 01/12/2021     01/12/2021    CO2 25 01/12/2021    BUN 35 01/12/2021    CREATININE 3.17 01/12/2021    GFRAA 25.4 01/12/2021    LABGLOM 21.0 01/12/2021    GLUCOSE 86 01/12/2021    PROT 7.2 01/02/2021    LABALBU 3.9 01/02/2021    CALCIUM 9.2 01/12/2021    BILITOT 0.6 01/02/2021    ALKPHOS 102 01/02/2021    AST 19 01/02/2021    ALT 17 01/02/2021     BMP:    Lab Results   Component Value Date     01/12/2021    K 3.5 01/12/2021     01/12/2021    CO2 25 01/12/2021    BUN 35 01/12/2021    LABALBU 3.9 01/02/2021    CREATININE 3.17 01/12/2021    CALCIUM 9.2 01/12/2021    GFRAA 25.4 01/12/2021    LABGLOM 21.0 01/12/2021    GLUCOSE 86 01/12/2021     Magnesium:    Lab Results   Component Value Date    MG 2.2 11/06/2020     TSH:  Lab Results   Component Value Date    TSH 0.976 08/26/2020     . result  No results for input(s): PROBNP in the last 72 hours. No results for input(s): INR in the last 72 hours. Patient Active Problem List   Diagnosis    Hypertensive urgency    Cardiomyopathy ([facility] [address]    Systolic and diastolic CHF, acute ([facility] [address]    BLUE (acute kidney injury) ([facility] [address]    Abnormal EKG    Chronic combined systolic and diastolic CHF (congestive heart failure) ([facility] [address]    Acute decompensated heart failure ([facility] [address]    Chest pain    Pulmonary edema, acute ([facility] [address]    NSTEMI (non-ST elevated myocardial infarction) ([facility] [address]    Inguinal hernia of right side without obstruction or gangrene       Assessment/Plan:     Diagnosis Orders   1. Chronic systolic CHF (congestive heart failure), NYHA class 1 (HCC)      stable   2. Nonischemic cardiomyopathy ([address]      EF 25-30% per echo 8/25/20. Normal coronaries per [facility] 11/3/20   3. Uncontrolled hypertension      improving. Continue to titrate BP meds   4. Tachycardia      Coreg titrated to 25mg PO BID. Add Corlanor 5mg PO BID   5.  Stage 3 chronic kidney disease, unspecified whether stage 3a or 3b CKD      Stable compared to prior. Maintain follow-up with nephrology, Dr. Tavo Colvin   6. History of alcohol abuse         -Maximize medical therapy--aspirin 81mg PO daily, increase Coreg 25mg PO BID (patient believes he was taking a whole tablet BID instead and 1/2 tablet BID as previously prescribed), hydralazine 100 mg p.o. 3 times daily, Imdur to 120 mg p.o. daily, amlodipine 10mg PO daily, torsemide 50mg PO daily, add clonidine 0.1 mg p.o. twice daily for improved blood pressure management  -Add Corlanor 5 mg p.o. twice daily as resting heart rate greater than 70 despite maximal dose of Coreg at 25 mg twice a day  -Heart failure appears compensated at this time. No overt signs of fluid overload  -Cardiac/<2 gram sodium diet recommended  -1500mL daily fluid restriction   -Renal function stable when last checked on 1/12/2021  -Tobacco cessation recommended  -Alcohol cessation recommended  -Instructed patient to weigh self daily every morning upon waking and keep log book of daily weights. Notify office if gaining more than 3 pounds in 48 hours. --provided with scale today  -Recommend routine BP monitoring at home. Advised checking BP twice daily in AM and PM and keep a log of blood pressure trends to discuss at next visit.  Advised patient to notify CHF clinic if persistent hypertension with systolic blood pressure greater than 858 or diastolic blood pressure greater than 95. **Patient educated at length regarding importance of compliance with medications and taking only medications as prescribed. He is to adhere to current medication list from office visit. **  -Advised patient to notify office immediately if experiencing any progressive SOB, orthopnea, PND, LE edema or weight gain  -Educated patient on importance of fluid and salt restriction as well as lifestyle modification. --States he has been compliant with sodium and fluid restriction  **Will plan to order either repeat echo or MUGA scan to be completed between around March 2021 to reevaluate LV function. If EF remains severely reduced at less than or equal to 35%, will need referral to electrophysiology for AICD evaluation**  -Maintain follow-up with nephrology, Dr. Mike Inman routine outpatient follow-up with general cardiologist, Dr. Lance Gonzalez  -Maintain routine outpatient follow-up with PCP  -F/U with CHF clinic in 2 weeks or sooner if needed          Counseling: The importance of daily weights, dietary sodium restriction, and contact with cardiology if weight is increased more than 3 lbs in any 48 hour period was stressed. The patient has been advised to contact us if theyexperience progressive SOB, orthopnea, PND or progressive edema.

## 2021-01-19 NOTE — PATIENT INSTRUCTIONS
-Increase Coreg (carvedilol) to 25mg PO twice daily  -Add Corlanor 5mg PO twice daily (to lower heart rate)  -Add clonidine 0.1mg PO twice daily    -Limit total daily fluid intake to less than 1.5 liter (48 ounces) daily    **Minimize eating out/fast food**    -Limit total daily sodium intake to less than 2000mg daily      **Bring all pill bottles to next office appointment**

## 2021-01-26 ENCOUNTER — OFFICE VISIT (OUTPATIENT)
Dept: SURGERY | Age: 51
End: 2021-01-26
Payer: MEDICAID

## 2021-01-26 VITALS
HEIGHT: 72 IN | OXYGEN SATURATION: 98 % | HEART RATE: 114 BPM | BODY MASS INDEX: 27.36 KG/M2 | TEMPERATURE: 97.9 F | WEIGHT: 202 LBS

## 2021-01-26 DIAGNOSIS — K40.90 INGUINAL HERNIA OF RIGHT SIDE WITHOUT OBSTRUCTION OR GANGRENE: Primary | ICD-10-CM

## 2021-01-26 PROCEDURE — G8417 CALC BMI ABV UP PARAM F/U: HCPCS | Performed by: COLON & RECTAL SURGERY

## 2021-01-26 PROCEDURE — 4004F PT TOBACCO SCREEN RCVD TLK: CPT | Performed by: COLON & RECTAL SURGERY

## 2021-01-26 PROCEDURE — 99203 OFFICE O/P NEW LOW 30 MIN: CPT | Performed by: COLON & RECTAL SURGERY

## 2021-01-26 PROCEDURE — G8484 FLU IMMUNIZE NO ADMIN: HCPCS | Performed by: COLON & RECTAL SURGERY

## 2021-01-26 PROCEDURE — G8427 DOCREV CUR MEDS BY ELIG CLIN: HCPCS | Performed by: COLON & RECTAL SURGERY

## 2021-01-26 ASSESSMENT — ENCOUNTER SYMPTOMS
COLOR CHANGE: 0
SHORTNESS OF BREATH: 0
CONSTIPATION: 0
APNEA: 0
ABDOMINAL PAIN: 0
DIARRHEA: 0
VOMITING: 0

## 2021-01-26 NOTE — PROGRESS NOTES
Subjective:      Patient ID: Christie Bustamante is a 52 y.o. male who presents for:  Chief Complaint   Patient presents with    New Patient       This is a 53-year-old male who has a right inguinal hernia that he has noticed over the past few months. He had a CAT scan which showed a right inguinal hernia. Patient's past medical history is significant for congestive heart failure with ejection fraction of 20% as well as chronic kidney disease. He has had no abdominal operations in the past.  He is on multiple cardiac medications for congestive heart failure. I reviewed his CAT scan with him showing the hernia present. Past Medical History:   Diagnosis Date    Abnormal EKG 9/27/2019    Cardiomyopathy ([facility] [address]     per echo 8/2019    Chronic combined systolic and diastolic CHF (congestive heart failure) ([facility] [address] 9/27/2019    ETOH abuse     Hypertension     Tobacco abuse      History reviewed. No pertinent surgical history.   Social History     Socioeconomic History    Marital status: Single     Spouse name: Not on file    Number of children: Not on file    Years of education: Not on file    Highest education level: Not on file   Occupational History    Not on file   Social Needs    Financial resource strain: Not hard at all    Food insecurity     Worry: Never true     Inability: Never true   [employer] needs     Medical: No     Non-medical: No   Tobacco Use    Smoking status: Current Every Day Smoker     Packs/day: 1.00     Types: Cigarettes    Smokeless tobacco: Never Used    Tobacco comment: 1pack/2 days   Substance and Sexual Activity    Alcohol use: Yes     Frequency: 4 or more times a week     Drinks per session: 7 to 9    Drug use: Never    Sexual activity: Not on file   Lifestyle    Physical activity     Days per week: Not on file     Minutes per session: Not on file    Stress: Not on file   Relationships    Social connections     Talks on phone: Not on file     Gets together: Not on file     Attends Holiness service: Not on file     Active member of club or organization: Not on file     Attends meetings of clubs or organizations: Not on file     Relationship status: Not on file    Intimate partner violence     Fear of current or ex partner: Not on file     Emotionally abused: Not on file     Physically abused: Not on file     Forced sexual activity: Not on file   Other Topics Concern    Not on file   Social History Narrative    Not on file     Family History   Problem Relation Age of Onset    Coronary Art Dis Mother      Allergies:  Patient has no known allergies. Review of Systems   Constitutional: Negative for activity change, appetite change, fatigue, fever and unexpected weight change. HENT: Negative for congestion. Respiratory: Negative for apnea and shortness of breath. Cardiovascular: Negative for chest pain and palpitations. Gastrointestinal: Negative for abdominal pain, constipation, diarrhea and vomiting. Genitourinary: Negative for difficulty urinating. Musculoskeletal: Negative for arthralgias. Skin: Negative for color change. Neurological: Negative for dizziness and headaches. Hematological: Does not bruise/bleed easily. Psychiatric/Behavioral: Negative for agitation. Objective:    Pulse 114   Temp 97.9 °F (36.6 °C) (Temporal)   Ht 6' (1.829 m)   Wt 202 lb (91.6 kg)   SpO2 98%   BMI 27.40 kg/m²     Physical Exam  Constitutional:       General: He is not in acute distress. Appearance: He is well-developed. He is not diaphoretic. HENT:      Head: Normocephalic and atraumatic. Eyes:      Pupils: Pupils are equal, round, and reactive to light. Neck:      Musculoskeletal: Normal range of motion. Cardiovascular:      Rate and Rhythm: Normal rate and regular rhythm. Heart sounds: Normal heart sounds. Pulmonary:      Effort: Pulmonary effort is normal. No respiratory distress. Breath sounds: Normal breath sounds.  No wheezing. Abdominal:      General: There is no distension. Palpations: Abdomen is soft. Tenderness: There is no abdominal tenderness. There is no guarding. Hernia: A hernia is present. Comments: He has a reducible large right inguinal hernia present. Left side looks fine. Scrotum is normal with normal testicles. Musculoskeletal: Normal range of motion. General: No tenderness. Skin:     General: Skin is warm and dry. Findings: No erythema or rash. Neurological:      Mental Status: He is alert and oriented to person, place, and time. Psychiatric:         Behavior: Behavior normal.         Thought Content: Thought content normal.         Judgment: Judgment normal.              Assessment/Plan:          Diagnosis Orders   1. Inguinal hernia of right side without obstruction or gangrene       I discussed with him using anatomic diagrams the risks and benefits of right inguinal hernia repair with mesh. Risks of the procedure including infection, bleeding, damage to the spermatic cord reoperation recurrence were all addressed. He understands the risks and wishes to proceed. Given his diagnosis of congestive heart failure and chronic kidney disease, he will see preadmit testing to evaluate recent cardiac catheterization and echo reports. He is obviously high risk given his medical issues. I told him that if the anesthesiologist is not comfortable, then I might not be able to proceed with hernia repair but I will wait to see what is reported. Please note this report has beenpartially produced using speech recognition software and may cause contain errors related to that system including grammar, punctuation and spelling as well as words and phrases that may seem inappropriate.  If there arequestions or concerns please feel free to contact me to clarify

## 2021-02-03 ENCOUNTER — TELEPHONE (OUTPATIENT)
Dept: CARDIOLOGY CLINIC | Age: 51
End: 2021-02-03

## 2021-02-03 ENCOUNTER — HOSPITAL ENCOUNTER (OUTPATIENT)
Dept: PREADMISSION TESTING | Age: 51
Discharge: HOME | End: 2021-02-07
Payer: MEDICAID

## 2021-02-03 ENCOUNTER — NURSE ONLY (OUTPATIENT)
Dept: PRIMARY CARE CLINIC | Age: 51
End: 2021-02-03

## 2021-02-03 VITALS
TEMPERATURE: 97.8 F | OXYGEN SATURATION: 98 % | SYSTOLIC BLOOD PRESSURE: 157 MMHG | HEIGHT: 72 IN | HEART RATE: 97 BPM | WEIGHT: 206.6 LBS | DIASTOLIC BLOOD PRESSURE: 100 MMHG | BODY MASS INDEX: 27.98 KG/M2 | RESPIRATION RATE: 16 BRPM

## 2021-02-03 DIAGNOSIS — K40.90 INGUINAL HERNIA OF RIGHT SIDE WITHOUT OBSTRUCTION OR GANGRENE: ICD-10-CM

## 2021-02-03 LAB
ANION GAP SERPL CALCULATED.3IONS-SCNC: 10 MEQ/L (ref 9–15)
BUN BLDV-MCNC: 31 MG/DL (ref 6–20)
CALCIUM SERPL-MCNC: 9 MG/DL (ref 8.5–9.9)
CHLORIDE BLD-SCNC: 110 MEQ/L (ref 95–107)
CO2: 22 MEQ/L (ref 20–31)
CREAT SERPL-MCNC: 2.89 MG/DL (ref 0.7–1.2)
EKG ATRIAL RATE: 95 BPM
EKG P AXIS: 55 DEGREES
EKG P-R INTERVAL: 198 MS
EKG Q-T INTERVAL: 394 MS
EKG QRS DURATION: 88 MS
EKG QTC CALCULATION (BAZETT): 495 MS
EKG R AXIS: -11 DEGREES
EKG T AXIS: 86 DEGREES
EKG VENTRICULAR RATE: 95 BPM
GFR AFRICAN AMERICAN: 28.3
GFR NON-AFRICAN AMERICAN: 23.4
GLUCOSE BLD-MCNC: 94 MG/DL (ref 70–99)
HCT VFR BLD CALC: 35.7 % (ref 42–52)
HEMOGLOBIN: 11.7 G/DL (ref 14–18)
MCH RBC QN AUTO: 26.3 PG (ref 27–31.3)
MCHC RBC AUTO-ENTMCNC: 32.9 % (ref 33–37)
MCV RBC AUTO: 80 FL (ref 80–100)
PDW BLD-RTO: 15.7 % (ref 11.5–14.5)
PLATELET # BLD: 352 K/UL (ref 130–400)
POTASSIUM SERPL-SCNC: 3.7 MEQ/L (ref 3.4–4.9)
RBC # BLD: 4.46 M/UL (ref 4.7–6.1)
SODIUM BLD-SCNC: 142 MEQ/L (ref 135–144)
WBC # BLD: 8.9 K/UL (ref 4.8–10.8)

## 2021-02-03 PROCEDURE — 80048 BASIC METABOLIC PNL TOTAL CA: CPT

## 2021-02-03 PROCEDURE — 93005 ELECTROCARDIOGRAM TRACING: CPT | Performed by: COLON & RECTAL SURGERY

## 2021-02-03 PROCEDURE — 85027 COMPLETE CBC AUTOMATED: CPT

## 2021-02-03 RX ORDER — SODIUM CHLORIDE, SODIUM LACTATE, POTASSIUM CHLORIDE, CALCIUM CHLORIDE 600; 310; 30; 20 MG/100ML; MG/100ML; MG/100ML; MG/100ML
INJECTION, SOLUTION INTRAVENOUS CONTINUOUS
Status: CANCELLED | OUTPATIENT
Start: 2021-02-10

## 2021-02-03 RX ORDER — SODIUM CHLORIDE 0.9 % (FLUSH) 0.9 %
10 SYRINGE (ML) INJECTION PRN
Status: CANCELLED | OUTPATIENT
Start: 2021-02-10

## 2021-02-03 RX ORDER — LIDOCAINE HYDROCHLORIDE 10 MG/ML
1 INJECTION, SOLUTION EPIDURAL; INFILTRATION; INTRACAUDAL; PERINEURAL
Status: CANCELLED | OUTPATIENT
Start: 2021-02-10 | End: 2021-02-10

## 2021-02-03 RX ORDER — SODIUM CHLORIDE 0.9 % (FLUSH) 0.9 %
10 SYRINGE (ML) INJECTION EVERY 12 HOURS SCHEDULED
Status: CANCELLED | OUTPATIENT
Start: 2021-02-10

## 2021-02-03 RX ORDER — CEFAZOLIN SODIUM 2 G/50ML
2000 SOLUTION INTRAVENOUS ONCE
Status: CANCELLED | OUTPATIENT
Start: 2021-02-10

## 2021-02-03 RX ORDER — HYDROCODONE BITARTRATE AND ACETAMINOPHEN 5; 325 MG/1; MG/1
TABLET ORAL
Qty: 42 TABLET | Refills: 0 | Status: SHIPPED | OUTPATIENT
Start: 2021-02-03 | End: 2021-02-10

## 2021-02-03 NOTE — PROGRESS NOTES
Call to Dr. Jose Adams to request cardiac clearance for right inguinal hernia reapir with Dr. Vera Dominguez on 2/10/2021.

## 2021-02-04 LAB
SARS-COV-2: NOT DETECTED
SOURCE: NORMAL

## 2021-02-04 PROCEDURE — 93010 ELECTROCARDIOGRAM REPORT: CPT | Performed by: INTERNAL MEDICINE

## 2021-02-04 NOTE — TELEPHONE ENCOUNTER
Patt Roberson for surgery, intermediate risk.     Electronically signed by Oziel Finnegan DO on 2/4/2021 at 5:13 PM

## 2021-02-10 ENCOUNTER — HOSPITAL ENCOUNTER (OUTPATIENT)
Age: 51
Setting detail: OUTPATIENT SURGERY
Discharge: HOME | End: 2021-02-10
Attending: COLON & RECTAL SURGERY | Admitting: COLON & RECTAL SURGERY
Payer: MEDICAID

## 2021-02-10 ENCOUNTER — ANESTHESIA (OUTPATIENT)
Dept: OPERATING ROOM | Age: 51
End: 2021-02-10
Payer: MEDICAID

## 2021-02-10 ENCOUNTER — ANESTHESIA EVENT (OUTPATIENT)
Dept: OPERATING ROOM | Age: 51
End: 2021-02-10
Payer: MEDICAID

## 2021-02-10 VITALS
RESPIRATION RATE: 15 BRPM | TEMPERATURE: 97 F | WEIGHT: 206 LBS | BODY MASS INDEX: 27.9 KG/M2 | HEART RATE: 87 BPM | SYSTOLIC BLOOD PRESSURE: 143 MMHG | DIASTOLIC BLOOD PRESSURE: 90 MMHG | OXYGEN SATURATION: 97 % | HEIGHT: 72 IN

## 2021-02-10 VITALS — SYSTOLIC BLOOD PRESSURE: 99 MMHG | OXYGEN SATURATION: 94 % | DIASTOLIC BLOOD PRESSURE: 60 MMHG

## 2021-02-10 DIAGNOSIS — K40.90 INGUINAL HERNIA OF RIGHT SIDE WITHOUT OBSTRUCTION OR GANGRENE: Primary | ICD-10-CM

## 2021-02-10 PROCEDURE — 7100000001 HC PACU RECOVERY - ADDTL 15 MIN: Performed by: COLON & RECTAL SURGERY

## 2021-02-10 PROCEDURE — 3600000014 HC SURGERY LEVEL 4 ADDTL 15MIN: Performed by: COLON & RECTAL SURGERY

## 2021-02-10 PROCEDURE — 6360000002 HC RX W HCPCS: Performed by: ANESTHESIOLOGY

## 2021-02-10 PROCEDURE — 6360000002 HC RX W HCPCS: Performed by: NURSE ANESTHETIST, CERTIFIED REGISTERED

## 2021-02-10 PROCEDURE — 2580000003 HC RX 258: Performed by: COLON & RECTAL SURGERY

## 2021-02-10 PROCEDURE — 64486 TAP BLOCK UNIL BY INJECTION: CPT | Performed by: ANESTHESIOLOGY

## 2021-02-10 PROCEDURE — 6360000002 HC RX W HCPCS: Performed by: COLON & RECTAL SURGERY

## 2021-02-10 PROCEDURE — C1781 MESH (IMPLANTABLE): HCPCS | Performed by: COLON & RECTAL SURGERY

## 2021-02-10 PROCEDURE — 7100000010 HC PHASE II RECOVERY - FIRST 15 MIN: Performed by: COLON & RECTAL SURGERY

## 2021-02-10 PROCEDURE — 3600000004 HC SURGERY LEVEL 4 BASE: Performed by: COLON & RECTAL SURGERY

## 2021-02-10 PROCEDURE — 2580000003 HC RX 258: Performed by: NURSE PRACTITIONER

## 2021-02-10 PROCEDURE — 49505 PRP I/HERN INIT REDUC >5 YR: CPT | Performed by: COLON & RECTAL SURGERY

## 2021-02-10 PROCEDURE — 3700000000 HC ANESTHESIA ATTENDED CARE: Performed by: COLON & RECTAL SURGERY

## 2021-02-10 PROCEDURE — 3700000001 HC ADD 15 MINUTES (ANESTHESIA): Performed by: COLON & RECTAL SURGERY

## 2021-02-10 PROCEDURE — 7100000000 HC PACU RECOVERY - FIRST 15 MIN: Performed by: COLON & RECTAL SURGERY

## 2021-02-10 PROCEDURE — 2709999900 HC NON-CHARGEABLE SUPPLY: Performed by: COLON & RECTAL SURGERY

## 2021-02-10 PROCEDURE — 6370000000 HC RX 637 (ALT 250 FOR IP): Performed by: ANESTHESIOLOGY

## 2021-02-10 DEVICE — MESH HERN W1.3XL1.55IN M POLYPR INGUINAL NONABSORBABLE: Type: IMPLANTABLE DEVICE | Site: GROIN | Status: FUNCTIONAL

## 2021-02-10 RX ORDER — SODIUM CHLORIDE 0.9 % (FLUSH) 0.9 %
10 SYRINGE (ML) INJECTION EVERY 12 HOURS SCHEDULED
Status: DISCONTINUED | OUTPATIENT
Start: 2021-02-10 | End: 2021-02-10 | Stop reason: HOSPADM

## 2021-02-10 RX ORDER — LIDOCAINE HYDROCHLORIDE 10 MG/ML
1 INJECTION, SOLUTION EPIDURAL; INFILTRATION; INTRACAUDAL; PERINEURAL
Status: DISCONTINUED | OUTPATIENT
Start: 2021-02-10 | End: 2021-02-10 | Stop reason: HOSPADM

## 2021-02-10 RX ORDER — FENTANYL CITRATE 50 UG/ML
25 INJECTION, SOLUTION INTRAMUSCULAR; INTRAVENOUS EVERY 5 MIN PRN
Status: DISCONTINUED | OUTPATIENT
Start: 2021-02-10 | End: 2021-02-10 | Stop reason: HOSPADM

## 2021-02-10 RX ORDER — HYDROCODONE BITARTRATE AND ACETAMINOPHEN 5; 325 MG/1; MG/1
2 TABLET ORAL PRN
Status: COMPLETED | OUTPATIENT
Start: 2021-02-10 | End: 2021-02-10

## 2021-02-10 RX ORDER — MIDAZOLAM HYDROCHLORIDE 1 MG/ML
INJECTION INTRAMUSCULAR; INTRAVENOUS PRN
Status: DISCONTINUED | OUTPATIENT
Start: 2021-02-10 | End: 2021-02-10 | Stop reason: SDUPTHER

## 2021-02-10 RX ORDER — MIDAZOLAM HYDROCHLORIDE 2 MG/2ML
INJECTION, SOLUTION INTRAMUSCULAR; INTRAVENOUS PRN
Status: DISCONTINUED | OUTPATIENT
Start: 2021-02-10 | End: 2021-02-10 | Stop reason: SDUPTHER

## 2021-02-10 RX ORDER — DEXAMETHASONE SODIUM PHOSPHATE 4 MG/ML
INJECTION, SOLUTION INTRA-ARTICULAR; INTRALESIONAL; INTRAMUSCULAR; INTRAVENOUS; SOFT TISSUE PRN
Status: DISCONTINUED | OUTPATIENT
Start: 2021-02-10 | End: 2021-02-10 | Stop reason: SDUPTHER

## 2021-02-10 RX ORDER — FENTANYL CITRATE 50 UG/ML
INJECTION, SOLUTION INTRAMUSCULAR; INTRAVENOUS PRN
Status: DISCONTINUED | OUTPATIENT
Start: 2021-02-10 | End: 2021-02-10 | Stop reason: SDUPTHER

## 2021-02-10 RX ORDER — HYDROCODONE BITARTRATE AND ACETAMINOPHEN 5; 325 MG/1; MG/1
1 TABLET ORAL PRN
Status: COMPLETED | OUTPATIENT
Start: 2021-02-10 | End: 2021-02-10

## 2021-02-10 RX ORDER — 0.9 % SODIUM CHLORIDE 0.9 %
500 INTRAVENOUS SOLUTION INTRAVENOUS
Status: DISCONTINUED | OUTPATIENT
Start: 2021-02-10 | End: 2021-02-10 | Stop reason: HOSPADM

## 2021-02-10 RX ORDER — ONDANSETRON 2 MG/ML
INJECTION INTRAMUSCULAR; INTRAVENOUS PRN
Status: DISCONTINUED | OUTPATIENT
Start: 2021-02-10 | End: 2021-02-10 | Stop reason: SDUPTHER

## 2021-02-10 RX ORDER — LABETALOL HYDROCHLORIDE 5 MG/ML
5 INJECTION, SOLUTION INTRAVENOUS EVERY 10 MIN PRN
Status: DISCONTINUED | OUTPATIENT
Start: 2021-02-10 | End: 2021-02-10 | Stop reason: HOSPADM

## 2021-02-10 RX ORDER — DIPHENHYDRAMINE HYDROCHLORIDE 50 MG/ML
12.5 INJECTION INTRAMUSCULAR; INTRAVENOUS
Status: DISCONTINUED | OUTPATIENT
Start: 2021-02-10 | End: 2021-02-10 | Stop reason: HOSPADM

## 2021-02-10 RX ORDER — SODIUM CHLORIDE, SODIUM LACTATE, POTASSIUM CHLORIDE, CALCIUM CHLORIDE 600; 310; 30; 20 MG/100ML; MG/100ML; MG/100ML; MG/100ML
INJECTION, SOLUTION INTRAVENOUS CONTINUOUS
Status: DISCONTINUED | OUTPATIENT
Start: 2021-02-10 | End: 2021-02-10 | Stop reason: HOSPADM

## 2021-02-10 RX ORDER — PROPOFOL 10 MG/ML
INJECTION, EMULSION INTRAVENOUS PRN
Status: DISCONTINUED | OUTPATIENT
Start: 2021-02-10 | End: 2021-02-10 | Stop reason: SDUPTHER

## 2021-02-10 RX ORDER — ROPIVACAINE HYDROCHLORIDE 5 MG/ML
INJECTION, SOLUTION EPIDURAL; INFILTRATION; PERINEURAL
Status: DISCONTINUED | OUTPATIENT
Start: 2021-02-10 | End: 2021-02-10 | Stop reason: SDUPTHER

## 2021-02-10 RX ORDER — SODIUM CHLORIDE 0.9 % (FLUSH) 0.9 %
10 SYRINGE (ML) INJECTION PRN
Status: DISCONTINUED | OUTPATIENT
Start: 2021-02-10 | End: 2021-02-10 | Stop reason: HOSPADM

## 2021-02-10 RX ORDER — MORPHINE SULFATE 2 MG/ML
1 INJECTION, SOLUTION INTRAMUSCULAR; INTRAVENOUS EVERY 5 MIN PRN
Status: DISCONTINUED | OUTPATIENT
Start: 2021-02-10 | End: 2021-02-10 | Stop reason: HOSPADM

## 2021-02-10 RX ORDER — OXYCODONE HYDROCHLORIDE AND ACETAMINOPHEN 5; 325 MG/1; MG/1
1 TABLET ORAL EVERY 6 HOURS PRN
Qty: 12 TABLET | Refills: 0 | Status: SHIPPED | OUTPATIENT
Start: 2021-02-10 | End: 2021-02-13

## 2021-02-10 RX ORDER — MEPERIDINE HYDROCHLORIDE 25 MG/ML
12.5 INJECTION INTRAMUSCULAR; INTRAVENOUS; SUBCUTANEOUS EVERY 5 MIN PRN
Status: DISCONTINUED | OUTPATIENT
Start: 2021-02-10 | End: 2021-02-10 | Stop reason: HOSPADM

## 2021-02-10 RX ORDER — ONDANSETRON 2 MG/ML
4 INJECTION INTRAMUSCULAR; INTRAVENOUS
Status: DISCONTINUED | OUTPATIENT
Start: 2021-02-10 | End: 2021-02-10 | Stop reason: HOSPADM

## 2021-02-10 RX ORDER — CEFAZOLIN SODIUM 2 G/50ML
2000 SOLUTION INTRAVENOUS ONCE
Status: COMPLETED | OUTPATIENT
Start: 2021-02-10 | End: 2021-02-10

## 2021-02-10 RX ORDER — METOCLOPRAMIDE HYDROCHLORIDE 5 MG/ML
10 INJECTION INTRAMUSCULAR; INTRAVENOUS
Status: DISCONTINUED | OUTPATIENT
Start: 2021-02-10 | End: 2021-02-10 | Stop reason: HOSPADM

## 2021-02-10 RX ADMIN — PROPOFOL 150 MG: 10 INJECTION, EMULSION INTRAVENOUS at 08:24

## 2021-02-10 RX ADMIN — PHENYLEPHRINE HYDROCHLORIDE 100 MCG: 10 INJECTION INTRAVENOUS at 09:04

## 2021-02-10 RX ADMIN — MIDAZOLAM HYDROCHLORIDE 2 MG: 1 INJECTION, SOLUTION INTRAMUSCULAR; INTRAVENOUS at 08:20

## 2021-02-10 RX ADMIN — ROPIVACAINE HYDROCHLORIDE 30 ML: 5 INJECTION, SOLUTION EPIDURAL; INFILTRATION; PERINEURAL at 08:10

## 2021-02-10 RX ADMIN — HYDROMORPHONE HYDROCHLORIDE 0.5 MG: 1 INJECTION, SOLUTION INTRAMUSCULAR; INTRAVENOUS; SUBCUTANEOUS at 09:21

## 2021-02-10 RX ADMIN — PHENYLEPHRINE HYDROCHLORIDE 150 MCG: 10 INJECTION INTRAVENOUS at 08:47

## 2021-02-10 RX ADMIN — ONDANSETRON 4 MG: 2 INJECTION INTRAMUSCULAR; INTRAVENOUS at 08:29

## 2021-02-10 RX ADMIN — FENTANYL CITRATE 25 MCG: 50 INJECTION, SOLUTION INTRAMUSCULAR; INTRAVENOUS at 09:32

## 2021-02-10 RX ADMIN — FENTANYL CITRATE 25 MCG: 50 INJECTION, SOLUTION INTRAMUSCULAR; INTRAVENOUS at 09:38

## 2021-02-10 RX ADMIN — SODIUM CHLORIDE, POTASSIUM CHLORIDE, SODIUM LACTATE AND CALCIUM CHLORIDE: 600; 310; 30; 20 INJECTION, SOLUTION INTRAVENOUS at 07:56

## 2021-02-10 RX ADMIN — SODIUM CHLORIDE, POTASSIUM CHLORIDE, SODIUM LACTATE AND CALCIUM CHLORIDE: 600; 310; 30; 20 INJECTION, SOLUTION INTRAVENOUS at 09:07

## 2021-02-10 RX ADMIN — SODIUM CHLORIDE, POTASSIUM CHLORIDE, SODIUM LACTATE AND CALCIUM CHLORIDE: 600; 310; 30; 20 INJECTION, SOLUTION INTRAVENOUS at 08:00

## 2021-02-10 RX ADMIN — HYDROCODONE BITARTRATE AND ACETAMINOPHEN 1 TABLET: 5; 325 TABLET ORAL at 10:05

## 2021-02-10 RX ADMIN — PHENYLEPHRINE HYDROCHLORIDE 100 MCG: 10 INJECTION INTRAVENOUS at 08:58

## 2021-02-10 RX ADMIN — FENTANYL CITRATE 50 MCG: 50 INJECTION, SOLUTION INTRAMUSCULAR; INTRAVENOUS at 08:34

## 2021-02-10 RX ADMIN — DEXAMETHASONE SODIUM PHOSPHATE 4 MG: 4 INJECTION, SOLUTION INTRAMUSCULAR; INTRAVENOUS at 08:29

## 2021-02-10 RX ADMIN — FENTANYL CITRATE 50 MCG: 50 INJECTION, SOLUTION INTRAMUSCULAR; INTRAVENOUS at 08:29

## 2021-02-10 RX ADMIN — CEFAZOLIN SODIUM 2 G: 2 SOLUTION INTRAVENOUS at 08:22

## 2021-02-10 RX ADMIN — MIDAZOLAM HYDROCHLORIDE 2 MG: 2 INJECTION, SOLUTION INTRAMUSCULAR; INTRAVENOUS at 08:10

## 2021-02-10 ASSESSMENT — PULMONARY FUNCTION TESTS
PIF_VALUE: 14
PIF_VALUE: 4
PIF_VALUE: 14
PIF_VALUE: 5
PIF_VALUE: 5
PIF_VALUE: 4
PIF_VALUE: 14
PIF_VALUE: 18
PIF_VALUE: 19
PIF_VALUE: 14
PIF_VALUE: 5
PIF_VALUE: 5
PIF_VALUE: 14
PIF_VALUE: 13
PIF_VALUE: 1
PIF_VALUE: 0
PIF_VALUE: 3
PIF_VALUE: 4
PIF_VALUE: 4
PIF_VALUE: 0
PIF_VALUE: 14
PIF_VALUE: 13
PIF_VALUE: 11
PIF_VALUE: 14
PIF_VALUE: 2

## 2021-02-10 ASSESSMENT — PAIN SCALES - GENERAL
PAINLEVEL_OUTOF10: 10
PAINLEVEL_OUTOF10: 7
PAINLEVEL_OUTOF10: 8

## 2021-02-10 NOTE — ANESTHESIA PRE PROCEDURE
Department of Anesthesiology  Preprocedure Note       Name:  Bobbi Siddiqi   Age:  52 y.o.  :  1970                                          MRN:  [id number]         Date:  2/10/2021      Surgeon: Lisa Herrera):  Marita Villanueva MD    Procedure: Procedure(s):  RIGHT INGUINAL HERNIA REPAIR WITH MESH    Medications prior to admission:   Prior to Admission medications    Medication Sig Start Date End Date Taking?  Authorizing Provider   HYDROcodone-acetaminophen (NORCO) 5-325 MG per tablet take 1 tablet by mouth every 4 hours if needed 2/3/21 2/10/21 Yes Mayuri Layne MD   carvedilol (COREG) 25 MG tablet Take 1 tablet by mouth 2 times daily HOLD for SBP<100 or HR<60 21  Yes MICAH Horne   ivabradine (CORLANOR) 5 MG TABS tablet Take 1 tablet by mouth 2 times daily (with meals) 21  Yes MICAH Horne   cloNIDine (CATAPRES) 0.1 MG tablet Take 1 tablet by mouth 2 times daily 21  Yes MICAH Horne   amLODIPine (NORVASC) 10 MG tablet Take 1 tablet by mouth daily 20  Yes MICAH Horne   isosorbide mononitrate (IMDUR) 120 MG extended release tablet Take 1 tablet by mouth daily 20  Yes MICAH Horne   hydrALAZINE (APRESOLINE) 100 MG tablet Take 1 tablet by mouth 3 times daily 20  Yes MICAH Horne   torsemide (DEMADEX) 10 MG tablet Take 5 tablets by mouth daily 20  Yes Yan Palomo DO   nicotine (NICODERM CQ) 21 MG/24HR Place 1 patch onto the skin daily 20  Yes Yan Palomo DO   aspirin 81 MG EC tablet Take 1 tablet by mouth daily 19  Yes Lacey Bernard DO       Current medications:    Current Facility-Administered Medications   Medication Dose Route Frequency Provider Last Rate Last Admin    lactated ringers infusion   Intravenous Continuous JULIO CESAR Verdugo  mL/hr at 02/10/21 0800 New Bag at 02/10/21 0800    lidocaine PF 1 % injection 1 mL  1 mL Intradermal Once PRN [facility] Juan Antonio Curiel APRN - CNP        sodium chloride flush 0.9 % injection 10 mL  10 mL Intravenous 2 times per day Guston Carolyn, APRN - CNP        sodium chloride flush 0.9 % injection 10 mL  10 mL Intravenous PRN No Leon, APRN - CNP        ceFAZolin (ANCEF) 2000 mg in dextrose 3 % 50 mL IVPB (duplex)  2,000 mg Intravenous Once Romina Zavala MD           Allergies:  No Known Allergies    Problem List:    Patient Active Problem List   Diagnosis Code    Hypertensive urgency I16.0    Cardiomyopathy ([facility] [address] T28.6    Systolic and diastolic CHF, acute ([facility] [address] I50.41    BLUE (acute kidney injury) ([facility] [address] N17.9    Abnormal EKG R94.31    Chronic combined systolic and diastolic CHF (congestive heart failure) ([facility]) I50.42    Acute decompensated heart failure ([facility] [address] I50.9    Chest pain R07.9    Pulmonary edema, acute ([facility]) J81.0    NSTEMI (non-ST elevated myocardial infarction) ([facility] [address] I21.4    Inguinal hernia of right side without obstruction or gangrene K40.90       Past Medical History:        Diagnosis Date    Abnormal EKG 2019    Cardiomyopathy ([facility] [address]     per echo 2019    Chronic combined systolic and diastolic CHF (congestive heart failure) ([facility] [address] 2019    ETOH abuse     Hypertension     Tobacco abuse        Past Surgical History:  History reviewed. No pertinent surgical history.     Social History:    Social History     Tobacco Use    Smoking status: Former Smoker     Packs/day: 1.00     Types: Cigarettes     Quit date: 12/3/2020     Years since quittin.1    Smokeless tobacco: Never Used    Tobacco comment: 1pack/2 days   Substance Use Topics    Alcohol use: Yes     Frequency: 4 or more times a week     Drinks per session: 7 to 9                                Counseling given: Not Answered  Comment: 1pack/2 days      Vital Signs (Current):   Vitals:    02/10/21 0715   BP: (!) 133/94   Pulse: 88   Resp: 18   Temp: 96.7 °F (35.9 °C)   TempSrc: Temporal   SpO2: 99%   Weight: 206 lb (93.4 kg)   Height: 5' 11.5\" (1.816 m)                                              BP Readings from Last 3 Encounters:   02/10/21 (!) 133/94   02/03/21 (!) 157/100   01/19/21 (!) 157/102       NPO Status: Time of last liquid consumption: 2245                        Time of last solid consumption: 2245                        Date of last liquid consumption: 02/09/21                        Date of last solid food consumption: 02/09/21    BMI:   Wt Readings from Last 3 Encounters:   02/10/21 206 lb (93.4 kg)   02/03/21 206 lb 9.6 oz (93.7 kg)   01/26/21 202 lb (91.6 kg)     Body mass index is 28.33 kg/m². CBC:   Lab Results   Component Value Date    WBC 8.9 02/03/2021    RBC 4.46 02/03/2021    HGB 11.7 02/03/2021    HCT 35.7 02/03/2021    MCV 80.0 02/03/2021    RDW 15.7 02/03/2021     02/03/2021       CMP:   Lab Results   Component Value Date     02/03/2021    K 3.7 02/03/2021     02/03/2021    CO2 22 02/03/2021    BUN 31 02/03/2021    CREATININE 2.89 02/03/2021    GFRAA 28.3 02/03/2021    LABGLOM 23.4 02/03/2021    GLUCOSE 94 02/03/2021    PROT 7.2 01/02/2021    CALCIUM 9.0 02/03/2021    BILITOT 0.6 01/02/2021    ALKPHOS 102 01/02/2021    AST 19 01/02/2021    ALT 17 01/02/2021       POC Tests: No results for input(s): POCGLU, POCNA, POCK, POCCL, POCBUN, POCHEMO, POCHCT in the last 72 hours.     Coags:   Lab Results   Component Value Date    PROTIME 14.4 11/04/2020    INR 1.1 11/04/2020    APTT 33.3 11/03/2020       HCG (If Applicable): No results found for: PREGTESTUR, PREGSERUM, HCG, HCGQUANT     ABGs: No results found for: PHART, PO2ART, TSV1QMU, EDA4ROI, BEART, W9SDYIGD     Type & Screen (If Applicable):  No results found for: LABABO, LABRH    Drug/Infectious Status (If Applicable):  No results found for: HIV, HEPCAB    COVID-19 Screening (If Applicable):   Lab Results   Component Value Date    COVID19 Not Detected 02/03/2021         Anesthesia Evaluation  Patient summary reviewed and Nursing notes reviewed no history of anesthetic complications:   Airway: Mallampati: II  TM distance: >3 FB   Neck ROM: full  Mouth opening: > = 3 FB Dental: normal exam         Pulmonary:Negative Pulmonary ROS and normal exam                               Cardiovascular:Negative CV ROS  Exercise tolerance: good (>4 METS),   (+) hypertension:, CHF:,       ECG reviewed      Echocardiogram reviewed         Beta Blocker:  Not on Beta Blocker         Neuro/Psych:   Negative Neuro/Psych ROS              GI/Hepatic/Renal: Neg GI/Hepatic/Renal ROS  (+) renal disease: CRI and no interval change,           Endo/Other: Negative Endo/Other ROS             Pt had PAT visit. Abdominal:           Vascular: negative vascular ROS. Anesthesia Plan      general and regional     ASA 3       Induction: intravenous. MIPS: Postoperative opioids intended and Prophylactic antiemetics administered. Anesthetic plan and risks discussed with patient. Plan discussed with CRNA.     Attending anesthesiologist reviewed and agrees with Pre Eval content              Lizzy Saxena MD   2/10/2021

## 2021-02-10 NOTE — ANESTHESIA PROCEDURE NOTES
Peripheral Block    Patient location during procedure: pre-op  Start time: 2/10/2021 8:02 AM  End time: 2/10/2021 8:12 AM  Staffing  Performed: anesthesiologist   Anesthesiologist: Tawana Chester MD  Preanesthetic Checklist  Completed: patient identified, IV checked, site marked, risks and benefits discussed, surgical consent, monitors and equipment checked, pre-op evaluation, timeout performed, anesthesia consent given, oxygen available and patient being monitored  Peripheral Block  Patient position: supine  Prep: ChloraPrep  Patient monitoring: cardiac monitor, continuous pulse ox, frequent blood pressure checks and IV access  Block type: TAP  Laterality: right  Injection technique: single-shot  Guidance: nerve stimulator and ultrasound guided  Local infiltration: ropivacaine  Infiltration strength: 0.5 %  Dose: 30 mL  Provider prep: mask and sterile gloves (Sterile probe cover)  Local infiltration: ropivacaine  Needle  Needle type: combined needle/nerve stimulator   Needle gauge: 22 G  Needle length: 5 cm  Needle localization: anatomical landmarks and ultrasound guidance  Assessment  Injection assessment: negative aspiration for heme, no paresthesia on injection and local visualized surrounding nerve on ultrasound  Paresthesia pain: immediately resolved  Slow fractionated injection: yes  Hemodynamics: stable  Additional Notes  Ultrasound image printed and saved in patient chart.     Sterile probe cover used    Medications Administered  Ropivacaine (NAROPIN) injection 0.5%, 30 mL  Reason for block: post-op pain management and at surgeon's request

## 2021-02-10 NOTE — OP NOTE
[address]                      [address], [address] [facility]                                OPERATIVE REPORT    PATIENT NAME: Fela Yousif                     :        1970  MED REC NO:   [id number]                            ROOM:  ACCOUNT NO:   [de-identified]                           ADMIT DATE: 02/10/2021  PROVIDER:     Romel Valencia MD    DATE OF PROCEDURE:  02/10/2021    PREOPERATIVE DIAGNOSIS:  Right inguinal hernia. POSTOPERATIVE DIAGNOSIS:  Indirect right inguinal hernia. OPERATION PERFORMED:  Right inguinal hernia repair with mesh. SURGEON:  Romel Valencia MD    ASSISTANT:  Ms. Stuart Lan. ANESTHESIA:  1. General anesthesia. 2.  Right-sided TAP block. ESTIMATED BLOOD LOSS:  30 mL. SPECIMEN:  None. COMPLICATIONS:  None. INDICATIONS:  A 62-year-old male with a symptomatic right inguinal  hernia. Risks and benefits of right inguinal hernia repair with mesh  described. Risks of the procedure including infection, bleeding, damage  to spermatic cord, reoperation, recurrence and postoperative numbness  and pain all addressed. Despite these risks, he wishes to proceed. Consent obtained. OPERATIVE PROCEDURE:  He was taken to the operating room and placed in  the supine position. General anesthesia with LMA performed. A TAP  block placed preoperatively. Ancef given preoperatively. Time-out was  taken for appropriate verification and verification of laterality. An incision was made superior and parallel to the inguinal ligament. Subcutaneous tissues were incised down to an attenuated external  oblique, which was opened under direct visualization. The spermatic  cord was circumferentiated. A large indirect sac was teased off the  anterior medial surface of the spermatic cord down to the internal ring. The sac was opened.   The appendix was present and reduced back into the abdomen. The sac was suture ligated using a 0 Vicryl suture in a high  fashion. The sac was amputated and discarded. A medium mesh plug was placed in the attenuated internal ring and  sutured to the surrounding fascia including the fascia of the internal  oblique, the periosteum of the pubic tubercle and the shelving edge of  the inguinal ligament with interrupted 0 Vicryl and 0 Prolene suture. The mesh repair was intact and under no tension. Excellent hemostasis  was achieved and assured. Ancef-containing solution was used for  irrigation. The deep layer was closed in an en angeline fashion using a running 0 Vicryl  suture. The skin was closed with staples. Dressings were applied. The scrotum was palpated and both testicles were present under no  tension. Prior to and after closure, the lap, needle and instrument counts were  all correct. The patient was extubated and taken to recovery for postop monitoring.         Marielena Okeefe MD    D: 02/10/2021 9:13:47       T: 02/10/2021 9:20:38     [email or web address]  Job#: [id number]     Doc#: [id number]    CC:

## 2021-02-10 NOTE — ANESTHESIA POSTPROCEDURE EVALUATION
Department of Anesthesiology  Postprocedure Note    Patient: Renee Smalls  MRN: [id number]  [place]: 1970  Date of evaluation: 2/10/2021  Time:  9:16 AM     Procedure Summary     Date: 02/10/21 Room / Location: [address] [place] [address]    Anesthesia Start: 7661 Anesthesia Stop: 5010    Procedure: RIGHT INGUINAL HERNIA REPAIR WITH MESH (Right Groin) Diagnosis: (RIGHT INGUINAL HERNIA)    Surgeons: Cecilia Elkins MD Responsible Provider: Toyin Marr MD    Anesthesia Type: general, regional ASA Status: 3          Anesthesia Type: general, regional    Gee Phase I:      Gee Phase II:      Last vitals: Reviewed and per EMR flowsheets.        Anesthesia Post Evaluation    Patient location during evaluation: bedside  Patient participation: complete - patient participated  Level of consciousness: awake  Airway patency: patent  Nausea & Vomiting: no nausea and no vomiting  Complications: no  Cardiovascular status: hemodynamically stable  Respiratory status: acceptable  Hydration status: euvolemic

## 2021-02-10 NOTE — BRIEF OP NOTE
Department of General Surgery - Adult  Surgical Service general surgery  Brief Operative Report      Pre-operative Diagnosis: Right inguinal hernia    Post-operative Diagnosis: Indirect right inguinal hernia    Procedure: Right inguinal hernia repair with mesh    Surgeon: Manuelito Nava     Assistant(s):  Tony    Anesthesia: General with tap block right    Estimated blood loss: 30    Total Urine Output: Not recorded     Drains: None    Specimens: None    Implants: Medium mesh plug    Findings: Indirect right inguinal hernia    Complications: None    Condition:  stable    See dictated operative report for full details.

## 2021-02-16 RX ORDER — TORSEMIDE 10 MG/1
50 TABLET ORAL DAILY
Qty: 30 TABLET | Refills: 5 | Status: SHIPPED | OUTPATIENT
Start: 2021-02-16 | End: 2021-05-06

## 2021-02-19 ENCOUNTER — OFFICE VISIT (OUTPATIENT)
Dept: SURGERY | Age: 51
End: 2021-02-19

## 2021-02-19 VITALS
TEMPERATURE: 96.7 F | OXYGEN SATURATION: 98 % | BODY MASS INDEX: 27.9 KG/M2 | HEART RATE: 106 BPM | WEIGHT: 206 LBS | HEIGHT: 72 IN

## 2021-02-19 DIAGNOSIS — K40.90 INGUINAL HERNIA OF RIGHT SIDE WITHOUT OBSTRUCTION OR GANGRENE: Primary | ICD-10-CM

## 2021-02-19 PROCEDURE — 99024 POSTOP FOLLOW-UP VISIT: CPT | Performed by: COLON & RECTAL SURGERY

## 2021-02-19 NOTE — PROGRESS NOTES
Subjective:      Patient ID: Esme Fontenot is a 52 y.o. male who presents for:  Chief Complaint   Patient presents with    Post-Op Check       He returns to the office 9 days out from a right inguinal hernia repair with mesh. He is doing well. Denies pain. Denies fever. Eating well.       Past Medical History:   Diagnosis Date    Abnormal EKG 2019    Cardiomyopathy ([facility] [address]     per echo 2019    Chronic combined systolic and diastolic CHF (congestive heart failure) ([facility] [address] 2019    ETOH abuse     Hypertension     Tobacco abuse      Past Surgical History:   Procedure Laterality Date    HERNIA REPAIR Right 2/10/2021    RIGHT INGUINAL HERNIA REPAIR WITH MESH performed by Patricio Bumpers, MD at [address] [place] [address] Marital status: Single     Spouse name: Not on file    Number of children: Not on file    Years of education: Not on file    Highest education level: Not on file   Occupational History    Not on file   Social Needs    Financial resource strain: Not hard at all    Food insecurity     Worry: Never true     Inability: Never true   [employer] needs     Medical: No     Non-medical: No   Tobacco Use    Smoking status: Former Smoker     Packs/day: 1.00     Types: Cigarettes     Quit date: 12/3/2020     Years since quittin.2    Smokeless tobacco: Never Used    Tobacco comment: 1pack/2 days   Substance and Sexual Activity    Alcohol use: Yes     Frequency: 4 or more times a week     Drinks per session: 7 to 9    Drug use: Never    Sexual activity: Not on file   Lifestyle    Physical activity     Days per week: Not on file     Minutes per session: Not on file    Stress: Not on file   Relationships    Social connections     Talks on phone: Not on file     Gets together: Not on file     Attends Protestant service: Not on file     Active member of club or organization: Not on file     Attends meetings of clubs or organizations: Not on file Relationship status: Not on file    Intimate partner violence     Fear of current or ex partner: Not on file     Emotionally abused: Not on file     Physically abused: Not on file     Forced sexual activity: Not on file   Other Topics Concern    Not on file   Social History Narrative    Not on file     Family History   Problem Relation Age of Onset    Coronary Art Dis Mother      Allergies:  Patient has no known allergies. Review of Systems    Objective:    Pulse 106   Temp 96.7 °F (35.9 °C) (Temporal)   Ht 5' 11.5\" (1.816 m)   Wt 206 lb (93.4 kg)   SpO2 98%   BMI 28.33 kg/m²     Physical Exam  On exam his incision looks fine. Staples were all removed. Steri-Strips were placed for epithelial support. No swelling or bruising present. No signs of infection. No evidence of recurrence. Assessment/Plan:          Diagnosis Orders   1. Inguinal hernia of right side without obstruction or gangrene       Wound care and activity instructions were given. He will return to work Wednesday without restrictions. If there are any problems or questions that arise or any way I can be of further assistance, I asked him to call. Please note this report has beenpartially produced using speech recognition software and may cause contain errors related to that system including grammar, punctuation and spelling as well as words and phrases that may seem inappropriate.  If there arequestions or concerns please feel free to contact me to clarify

## 2021-02-19 NOTE — LETTER
[facility]  [facility] [address] [address]  Phone: [phone]  Fax: [phone]    Heather Okeefe MD        February 19, 2021     Patient: Alla Moore   YOB: 1970   Date of Visit: 2/19/2021       To Whom It May Concern: It is my medical opinion that Rosas Phlegm may return to work on 2/24/2021 with the following restrictions: none . He has been under my care since 2/10/2021    If you have any questions or concerns, please don't hesitate to call.     Sincerely,        Heather Okeefe MD no

## 2021-04-22 ENCOUNTER — HOSPITAL ENCOUNTER (INPATIENT)
Age: 51
LOS: 3 days | Discharge: HOME | DRG: 194 | End: 2021-04-25
Attending: EMERGENCY MEDICINE | Admitting: INTERNAL MEDICINE
Payer: MEDICAID

## 2021-04-22 ENCOUNTER — APPOINTMENT (OUTPATIENT)
Dept: GENERAL RADIOLOGY | Age: 51
DRG: 194 | End: 2021-04-22
Payer: MEDICAID

## 2021-04-22 DIAGNOSIS — N18.9 CHRONIC RENAL FAILURE, UNSPECIFIED CKD STAGE: ICD-10-CM

## 2021-04-22 DIAGNOSIS — D50.8 OTHER IRON DEFICIENCY ANEMIA: ICD-10-CM

## 2021-04-22 DIAGNOSIS — R06.00 DYSPNEA, UNSPECIFIED TYPE: ICD-10-CM

## 2021-04-22 DIAGNOSIS — I50.20 SYSTOLIC CONGESTIVE HEART FAILURE, UNSPECIFIED HF CHRONICITY (HCC): Primary | ICD-10-CM

## 2021-04-22 DIAGNOSIS — Z86.79 HISTORY OF CARDIOMYOPATHY: ICD-10-CM

## 2021-04-22 PROBLEM — I50.43 CHF (CONGESTIVE HEART FAILURE), NYHA CLASS I, ACUTE ON CHRONIC, COMBINED (HCC): Status: ACTIVE | Noted: 2021-04-22

## 2021-04-22 LAB
ALBUMIN SERPL-MCNC: 3.5 G/DL (ref 3.5–4.6)
ALP BLD-CCNC: 103 U/L (ref 35–104)
ALT SERPL-CCNC: 22 U/L (ref 0–41)
ANION GAP SERPL CALCULATED.3IONS-SCNC: 13 MEQ/L (ref 9–15)
ANISOCYTOSIS: ABNORMAL
AST SERPL-CCNC: 20 U/L (ref 0–40)
BACTERIA: NEGATIVE /HPF
BASOPHILS ABSOLUTE: 0.1 K/UL (ref 0–0.2)
BASOPHILS RELATIVE PERCENT: 0.9 %
BILIRUB SERPL-MCNC: 0.4 MG/DL (ref 0.2–0.7)
BILIRUBIN URINE: NEGATIVE
BLOOD, URINE: NEGATIVE
BUN BLDV-MCNC: 39 MG/DL (ref 6–20)
CALCIUM SERPL-MCNC: 9.3 MG/DL (ref 8.5–9.9)
CHLORIDE BLD-SCNC: 105 MEQ/L (ref 95–107)
CLARITY: CLEAR
CO2: 19 MEQ/L (ref 20–31)
COLOR: YELLOW
CREAT SERPL-MCNC: 3.45 MG/DL (ref 0.7–1.2)
D DIMER: 1.69 MG/L FEU (ref 0–0.5)
EKG ATRIAL RATE: 105 BPM
EKG P AXIS: 48 DEGREES
EKG P-R INTERVAL: 186 MS
EKG Q-T INTERVAL: 378 MS
EKG QRS DURATION: 84 MS
EKG QTC CALCULATION (BAZETT): 499 MS
EKG R AXIS: 14 DEGREES
EKG T AXIS: 62 DEGREES
EKG VENTRICULAR RATE: 105 BPM
EOSINOPHILS ABSOLUTE: 0.2 K/UL (ref 0–0.7)
EOSINOPHILS RELATIVE PERCENT: 2.9 %
EPITHELIAL CELLS, UA: NORMAL /HPF (ref 0–5)
GFR AFRICAN AMERICAN: 23
GFR NON-AFRICAN AMERICAN: 19
GLOBULIN: 2.9 G/DL (ref 2.3–3.5)
GLUCOSE BLD-MCNC: 89 MG/DL (ref 70–99)
GLUCOSE URINE: NEGATIVE MG/DL
HCT VFR BLD CALC: 25.8 % (ref 42–52)
HEMOGLOBIN: 8.4 G/DL (ref 14–18)
HYALINE CASTS: NORMAL /HPF (ref 0–5)
INR BLD: 1.2
KETONES, URINE: NEGATIVE MG/DL
LEUKOCYTE ESTERASE, URINE: NEGATIVE
LV EF: 38 %
LVEF MODALITY: NORMAL
LYMPHOCYTES ABSOLUTE: 0.5 K/UL (ref 1–4.8)
LYMPHOCYTES RELATIVE PERCENT: 5.8 %
MCH RBC QN AUTO: 23.6 PG (ref 27–31.3)
MCHC RBC AUTO-ENTMCNC: 32.6 % (ref 33–37)
MCV RBC AUTO: 72.4 FL (ref 80–100)
MICROCYTES: ABNORMAL
MONOCYTES ABSOLUTE: 0.6 K/UL (ref 0.2–0.8)
MONOCYTES RELATIVE PERCENT: 8.1 %
NEUTROPHILS ABSOLUTE: 6.5 K/UL (ref 1.4–6.5)
NEUTROPHILS RELATIVE PERCENT: 82.3 %
NITRITE, URINE: NEGATIVE
PDW BLD-RTO: 17.3 % (ref 11.5–14.5)
PH UA: 5 (ref 5–9)
PLATELET # BLD: 605 K/UL (ref 130–400)
PLATELET SLIDE REVIEW: ABNORMAL
POTASSIUM SERPL-SCNC: 4.4 MEQ/L (ref 3.4–4.9)
PRO-BNP: NORMAL PG/ML
PROTEIN UA: 30 MG/DL
PROTHROMBIN TIME: 14.8 SEC (ref 12.3–14.9)
RBC # BLD: 3.56 M/UL (ref 4.7–6.1)
RBC UA: NORMAL /HPF (ref 0–5)
SARS-COV-2, NAAT: NOT DETECTED
SODIUM BLD-SCNC: 137 MEQ/L (ref 135–144)
SPECIFIC GRAVITY UA: 1.01 (ref 1–1.03)
TOTAL PROTEIN: 6.4 G/DL (ref 6.3–8)
TROPONIN: 0.02 NG/ML (ref 0–0.01)
URINE REFLEX TO CULTURE: ABNORMAL
UROBILINOGEN, URINE: 0.2 E.U./DL
WBC # BLD: 7.9 K/UL (ref 4.8–10.8)
WBC UA: NORMAL /HPF (ref 0–5)

## 2021-04-22 PROCEDURE — 71045 X-RAY EXAM CHEST 1 VIEW: CPT

## 2021-04-22 PROCEDURE — 6370000000 HC RX 637 (ALT 250 FOR IP): Performed by: PHYSICIAN ASSISTANT

## 2021-04-22 PROCEDURE — 85610 PROTHROMBIN TIME: CPT

## 2021-04-22 PROCEDURE — 96375 TX/PRO/DX INJ NEW DRUG ADDON: CPT

## 2021-04-22 PROCEDURE — 6370000000 HC RX 637 (ALT 250 FOR IP): Performed by: EMERGENCY MEDICINE

## 2021-04-22 PROCEDURE — 93010 ELECTROCARDIOGRAM REPORT: CPT | Performed by: INTERNAL MEDICINE

## 2021-04-22 PROCEDURE — 99282 EMERGENCY DEPT VISIT SF MDM: CPT

## 2021-04-22 PROCEDURE — 87635 SARS-COV-2 COVID-19 AMP PRB: CPT

## 2021-04-22 PROCEDURE — 6370000000 HC RX 637 (ALT 250 FOR IP): Performed by: INTERNAL MEDICINE

## 2021-04-22 PROCEDURE — 93306 TTE W/DOPPLER COMPLETE: CPT

## 2021-04-22 PROCEDURE — 2060000000 HC ICU INTERMEDIATE R&B

## 2021-04-22 PROCEDURE — 36415 COLL VENOUS BLD VENIPUNCTURE: CPT

## 2021-04-22 PROCEDURE — 6360000002 HC RX W HCPCS: Performed by: EMERGENCY MEDICINE

## 2021-04-22 PROCEDURE — 2500000003 HC RX 250 WO HCPCS: Performed by: EMERGENCY MEDICINE

## 2021-04-22 PROCEDURE — 81001 URINALYSIS AUTO W/SCOPE: CPT

## 2021-04-22 PROCEDURE — 6360000002 HC RX W HCPCS: Performed by: PHYSICIAN ASSISTANT

## 2021-04-22 PROCEDURE — 96374 THER/PROPH/DIAG INJ IV PUSH: CPT

## 2021-04-22 PROCEDURE — 80053 COMPREHEN METABOLIC PANEL: CPT

## 2021-04-22 PROCEDURE — APPSS45 APP SPLIT SHARED TIME 31-45 MINUTES: Performed by: PHYSICIAN ASSISTANT

## 2021-04-22 PROCEDURE — 85025 COMPLETE CBC W/AUTO DIFF WBC: CPT

## 2021-04-22 PROCEDURE — 84484 ASSAY OF TROPONIN QUANT: CPT

## 2021-04-22 PROCEDURE — 85379 FIBRIN DEGRADATION QUANT: CPT

## 2021-04-22 PROCEDURE — 83880 ASSAY OF NATRIURETIC PEPTIDE: CPT

## 2021-04-22 PROCEDURE — 2580000003 HC RX 258: Performed by: EMERGENCY MEDICINE

## 2021-04-22 PROCEDURE — 93005 ELECTROCARDIOGRAM TRACING: CPT | Performed by: EMERGENCY MEDICINE

## 2021-04-22 RX ORDER — SODIUM CHLORIDE 9 MG/ML
25 INJECTION, SOLUTION INTRAVENOUS PRN
Status: DISCONTINUED | OUTPATIENT
Start: 2021-04-22 | End: 2021-04-25 | Stop reason: HOSPADM

## 2021-04-22 RX ORDER — FUROSEMIDE 10 MG/ML
40 INJECTION INTRAMUSCULAR; INTRAVENOUS 2 TIMES DAILY
Status: DISCONTINUED | OUTPATIENT
Start: 2021-04-22 | End: 2021-04-23

## 2021-04-22 RX ORDER — NITROGLYCERIN 0.4 MG/1
0.4 TABLET SUBLINGUAL EVERY 5 MIN PRN
Status: DISCONTINUED | OUTPATIENT
Start: 2021-04-22 | End: 2021-04-25 | Stop reason: HOSPADM

## 2021-04-22 RX ORDER — FUROSEMIDE 10 MG/ML
INJECTION INTRAMUSCULAR; INTRAVENOUS
Status: DISPENSED
Start: 2021-04-22 | End: 2021-04-22

## 2021-04-22 RX ORDER — SODIUM CHLORIDE 0.9 % (FLUSH) 0.9 %
5-40 SYRINGE (ML) INJECTION PRN
Status: DISCONTINUED | OUTPATIENT
Start: 2021-04-22 | End: 2021-04-25 | Stop reason: HOSPADM

## 2021-04-22 RX ORDER — LANOLIN ALCOHOL/MO/W.PET/CERES
3 CREAM (GRAM) TOPICAL NIGHTLY PRN
Status: DISCONTINUED | OUTPATIENT
Start: 2021-04-22 | End: 2021-04-25 | Stop reason: HOSPADM

## 2021-04-22 RX ORDER — HYDRALAZINE HYDROCHLORIDE 100 MG/1
100 TABLET, FILM COATED ORAL 3 TIMES DAILY
Status: DISCONTINUED | OUTPATIENT
Start: 2021-04-22 | End: 2021-04-25 | Stop reason: HOSPADM

## 2021-04-22 RX ORDER — AMLODIPINE BESYLATE 10 MG/1
10 TABLET ORAL DAILY
Status: DISCONTINUED | OUTPATIENT
Start: 2021-04-22 | End: 2021-04-25 | Stop reason: HOSPADM

## 2021-04-22 RX ORDER — CARVEDILOL 25 MG/1
25 TABLET ORAL 2 TIMES DAILY
Status: DISCONTINUED | OUTPATIENT
Start: 2021-04-22 | End: 2021-04-25 | Stop reason: HOSPADM

## 2021-04-22 RX ORDER — FUROSEMIDE 10 MG/ML
60 INJECTION INTRAMUSCULAR; INTRAVENOUS ONCE
Status: COMPLETED | OUTPATIENT
Start: 2021-04-22 | End: 2021-04-22

## 2021-04-22 RX ORDER — NICOTINE 21 MG/24HR
1 PATCH, TRANSDERMAL 24 HOURS TRANSDERMAL DAILY
Status: DISCONTINUED | OUTPATIENT
Start: 2021-04-22 | End: 2021-04-25 | Stop reason: HOSPADM

## 2021-04-22 RX ORDER — ASPIRIN 81 MG/1
81 TABLET ORAL DAILY
Status: DISCONTINUED | OUTPATIENT
Start: 2021-04-22 | End: 2021-04-22

## 2021-04-22 RX ORDER — ACETAMINOPHEN 650 MG/1
650 SUPPOSITORY RECTAL EVERY 6 HOURS PRN
Status: DISCONTINUED | OUTPATIENT
Start: 2021-04-22 | End: 2021-04-25 | Stop reason: HOSPADM

## 2021-04-22 RX ORDER — ACETAMINOPHEN 325 MG/1
650 TABLET ORAL EVERY 6 HOURS PRN
Status: DISCONTINUED | OUTPATIENT
Start: 2021-04-22 | End: 2021-04-25 | Stop reason: HOSPADM

## 2021-04-22 RX ORDER — PROMETHAZINE HYDROCHLORIDE 12.5 MG/1
12.5 TABLET ORAL EVERY 6 HOURS PRN
Status: DISCONTINUED | OUTPATIENT
Start: 2021-04-22 | End: 2021-04-25 | Stop reason: HOSPADM

## 2021-04-22 RX ORDER — FUROSEMIDE 10 MG/ML
40 INJECTION INTRAMUSCULAR; INTRAVENOUS ONCE
Status: DISCONTINUED | OUTPATIENT
Start: 2021-04-22 | End: 2021-04-22

## 2021-04-22 RX ORDER — ASPIRIN 81 MG/1
81 TABLET, CHEWABLE ORAL DAILY
Status: DISCONTINUED | OUTPATIENT
Start: 2021-04-23 | End: 2021-04-25 | Stop reason: HOSPADM

## 2021-04-22 RX ORDER — ONDANSETRON 2 MG/ML
4 INJECTION INTRAMUSCULAR; INTRAVENOUS EVERY 6 HOURS PRN
Status: DISCONTINUED | OUTPATIENT
Start: 2021-04-22 | End: 2021-04-25 | Stop reason: HOSPADM

## 2021-04-22 RX ORDER — ISOSORBIDE MONONITRATE 60 MG/1
120 TABLET, EXTENDED RELEASE ORAL DAILY
Status: DISCONTINUED | OUTPATIENT
Start: 2021-04-22 | End: 2021-04-25 | Stop reason: HOSPADM

## 2021-04-22 RX ORDER — SODIUM CHLORIDE 0.9 % (FLUSH) 0.9 %
5-40 SYRINGE (ML) INJECTION EVERY 12 HOURS SCHEDULED
Status: DISCONTINUED | OUTPATIENT
Start: 2021-04-22 | End: 2021-04-25 | Stop reason: HOSPADM

## 2021-04-22 RX ORDER — CLONIDINE HYDROCHLORIDE 0.1 MG/1
0.2 TABLET ORAL 2 TIMES DAILY
Status: DISCONTINUED | OUTPATIENT
Start: 2021-04-22 | End: 2021-04-25 | Stop reason: HOSPADM

## 2021-04-22 RX ORDER — LABETALOL HYDROCHLORIDE 5 MG/ML
20 INJECTION, SOLUTION INTRAVENOUS ONCE
Status: COMPLETED | OUTPATIENT
Start: 2021-04-22 | End: 2021-04-22

## 2021-04-22 RX ORDER — POLYETHYLENE GLYCOL 3350 17 G/17G
17 POWDER, FOR SOLUTION ORAL DAILY PRN
Status: DISCONTINUED | OUTPATIENT
Start: 2021-04-22 | End: 2021-04-25 | Stop reason: HOSPADM

## 2021-04-22 RX ORDER — CARVEDILOL 25 MG/1
25 TABLET ORAL 2 TIMES DAILY WITH MEALS
Status: DISCONTINUED | OUTPATIENT
Start: 2021-04-22 | End: 2021-04-22 | Stop reason: SDUPTHER

## 2021-04-22 RX ORDER — ATORVASTATIN CALCIUM 40 MG/1
80 TABLET, FILM COATED ORAL NIGHTLY
Status: DISCONTINUED | OUTPATIENT
Start: 2021-04-22 | End: 2021-04-25 | Stop reason: HOSPADM

## 2021-04-22 RX ORDER — CLONIDINE HYDROCHLORIDE 0.1 MG/1
0.1 TABLET ORAL 2 TIMES DAILY
Status: DISCONTINUED | OUTPATIENT
Start: 2021-04-22 | End: 2021-04-22

## 2021-04-22 RX ORDER — LANOLIN ALCOHOL/MO/W.PET/CERES
6 CREAM (GRAM) TOPICAL NIGHTLY PRN
Status: DISCONTINUED | OUTPATIENT
Start: 2021-04-22 | End: 2021-04-22

## 2021-04-22 RX ADMIN — CARVEDILOL 25 MG: 25 TABLET, FILM COATED ORAL at 20:13

## 2021-04-22 RX ADMIN — FUROSEMIDE 60 MG: 10 INJECTION, SOLUTION INTRAMUSCULAR; INTRAVENOUS at 08:59

## 2021-04-22 RX ADMIN — CARVEDILOL 25 MG: 25 TABLET, FILM COATED ORAL at 12:54

## 2021-04-22 RX ADMIN — HYDRALAZINE HYDROCHLORIDE 100 MG: 100 TABLET, FILM COATED ORAL at 15:20

## 2021-04-22 RX ADMIN — Medication 5 ML: at 20:16

## 2021-04-22 RX ADMIN — CLONIDINE HYDROCHLORIDE 0.2 MG: 0.1 TABLET ORAL at 21:00

## 2021-04-22 RX ADMIN — ACETAMINOPHEN 650 MG: 325 TABLET ORAL at 15:27

## 2021-04-22 RX ADMIN — HYDRALAZINE HYDROCHLORIDE 100 MG: 100 TABLET, FILM COATED ORAL at 20:13

## 2021-04-22 RX ADMIN — ISOSORBIDE MONONITRATE 120 MG: 60 TABLET, EXTENDED RELEASE ORAL at 12:54

## 2021-04-22 RX ADMIN — TORSEMIDE 50 MG: 10 TABLET ORAL at 12:54

## 2021-04-22 RX ADMIN — AMLODIPINE BESYLATE 10 MG: 10 TABLET ORAL at 12:54

## 2021-04-22 RX ADMIN — ATORVASTATIN CALCIUM 80 MG: 40 TABLET, FILM COATED ORAL at 20:13

## 2021-04-22 RX ADMIN — LABETALOL HYDROCHLORIDE 20 MG: 5 INJECTION, SOLUTION INTRAVENOUS at 08:59

## 2021-04-22 RX ADMIN — CLONIDINE HYDROCHLORIDE 0.1 MG: 0.1 TABLET ORAL at 12:53

## 2021-04-22 RX ADMIN — FUROSEMIDE 40 MG: 10 INJECTION, SOLUTION INTRAVENOUS at 17:14

## 2021-04-22 RX ADMIN — ASPIRIN 81 MG: 81 TABLET, COATED ORAL at 12:53

## 2021-04-22 SDOH — HEALTH STABILITY: MENTAL HEALTH: HOW MANY STANDARD DRINKS CONTAINING ALCOHOL DO YOU HAVE ON A TYPICAL DAY?: 3 OR 4

## 2021-04-22 ASSESSMENT — PAIN SCALES - GENERAL: PAINLEVEL_OUTOF10: 5

## 2021-04-22 ASSESSMENT — ENCOUNTER SYMPTOMS
COLOR CHANGE: 0
VOMITING: 0
NAUSEA: 0
EYE PAIN: 0
CHEST TIGHTNESS: 0
COUGH: 0
CHEST TIGHTNESS: 1
BACK PAIN: 1
ABDOMINAL PAIN: 0
SORE THROAT: 0
SHORTNESS OF BREATH: 1

## 2021-04-22 NOTE — ED NOTES
Pt reports feeling short of breath. Pt tachypnic, RR 34 Pt placed on 2L NC. PT instructed to take slow deep breath. Pt followed instructions.  Pt RR 24.    Echo Tech at the bedside for Echo         Jay Ibrahim RN  04/22/21 0339

## 2021-04-22 NOTE — H&P
History and Physical  Patient: Keon Ash  Unit/Bed:W182/W182-01  YOB: 1970  MRN: [id number]  Acct: [de-identified]   Admitting Diagnosis: CHF (congestive heart failure), NYHA class I, acute on chronic, combined ([facility] [I50.43]  Admit Date:  4/22/2021  Hospital Day: 0      Chief Complaint:   SOB/LE edema/CP    History of Present Illness: This is a 46-year-old -American male with past medical history significant for nonischemic cardiomyopathy with EF of 25 to 30% per echo in August 2020, history of normal coronary arteries per cardiac catheterization in December 2020, hypertension, tobacco abuse, chronic kidney disease, EtOH abuse and history noncompliance who presented to the emergency room today with complaints of shortness of breath. Patient complains of worsening shortness of breath over the past week both with exertion and occasionally at rest.  He is positive for symptoms of orthopnea. Also, he reports lower extremity edema and occasional chest discomfort with inspiration. He denies nausea, vomiting, dizziness, lightheadedness, diaphoresis, syncope, fever, chills or recent upper respiratory infection. Due to this worsening shortness of breath he presented to ER for further evaluation. On presentation to the emergency room, blood pressure was elevated 163/118, heart rate tachycardic 109, respiratory rate 20, pulse ox 98%, temperature 97.8 °F.  Sodium 137, potassium 4.4, chloride 105, total CO2 of 19, BUN elevated 39, creatinine elevated 3.45, GFR low at 23.0, glucose 89.  proBNP elevated at 17,660. Troponin mildly elevated 0.024. WBC 7.9, hemoglobin low at 8.4, hematocrit low at 25.8, platelets elevated at 605. D-dimer elevated 1.69 but no further testing completed in ER to rule out PE. Chest x-ray revealed increased opacity in the left lung base which may represent atelectasis, infiltrate or small effusion.   He received Lasix 60 mg IV x1 in ER and was admitted for further evaluation. At time of evaluation today, patient is resting comfortably and maintaining SPO2 100% on room air but does exhibit conversational dyspnea and occasional pursed lip breathing. He has been resumed on all of his home medications as he had not yet taken any of his BP meds this morning and most recent pressure 145/97. Patient reports not taking his torsemide for at least the past 3 days because he has been having lower extremity cramping which he thought was related to his diuretic. He reports drinking approximately 3 beers every couple of days and per his significant other she reports that he drinks more. Status post left heart catheterization in December 2020 which revealed normal coronary arteries. Last echocardiogram in August 2020 revealed severely reduced LV systolic function with EF of 25 to 30%. He has followed with CHF clinic in the past but was a no-show for his last office visit on 2/2/2021.     No Known Allergies    Current Facility-Administered Medications   Medication Dose Route Frequency Provider Last Rate Last Admin    sodium chloride flush 0.9 % injection 5-40 mL  5-40 mL Intravenous 2 times per day Carmella Ugarte, DO        sodium chloride flush 0.9 % injection 5-40 mL  5-40 mL Intravenous PRN Carmella Reese DO        0.9 % sodium chloride infusion  25 mL Intravenous PRN Carmella Ugarte, DO        promethazine (PHENERGAN) tablet 12.5 mg  12.5 mg Oral Q6H PRN Carmella Reese DO        Or    ondansetron (ZOFRAN) injection 4 mg  4 mg Intravenous Q6H PRN Carmella Ugarte, DO        acetaminophen (TYLENOL) tablet 650 mg  650 mg Oral Q6H PRN Sj Sanchez DO   650 mg at 04/22/21 1527    Or    acetaminophen (TYLENOL) suppository 650 mg  650 mg Rectal Q6H PRN Carmella Ugarte, DO        polyethylene glycol (GLYCOLAX) packet 17 g  17 g Oral Daily PRN Carmella Ugarte DO        [START ON 4/23/2021] aspirin chewable tablet 81 mg  81 mg Oral Daily Carmella Ugarte, DO  atorvastatin (LIPITOR) tablet 80 mg  80 mg Oral Nightly Carmella Reese, DO        nitroGLYCERIN (NITROSTAT) SL tablet 0.4 mg  0.4 mg Sublingual Q5 Min PRN Carmella Osorio, DO        nicotine (NICODERM CQ) 21 MG/24HR 1 patch  1 patch Transdermal Daily [facility] Holiday, DO        hydrALAZINE (APRESOLINE) tablet 100 mg  100 mg Oral TID [facility] Holiday, DO   100 mg at 04/22/21 1520    isosorbide mononitrate (IMDUR) extended release tablet 120 mg  120 mg Oral Daily [facility] Holiday, DO   120 mg at 04/22/21 1254    amLODIPine (NORVASC) tablet 10 mg  10 mg Oral Daily [facility] Holiday, DO   10 mg at 04/22/21 1254    carvedilol (COREG) tablet 25 mg  25 mg Oral BID [facility] Holiday, DO   25 mg at 04/22/21 1254    ivabradine (CORLANOR) tablet 5 mg  5 mg Oral BID [facility] Holiday, DO        torsemide (DEMADEX) tablet 50 mg  50 mg Oral Daily [facility] Holiday, DO   50 mg at 04/22/21 1254    furosemide (LASIX) injection 40 mg  40 mg Intravenous BID [place], [place]        cloNIDine (CATAPRES) tablet 0.2 mg  0.2 mg Oral BID [place], [place]           PMHx:  Past Medical History:   Diagnosis Date    Abnormal EKG 9/27/2019    Cardiomyopathy ([facility]     per echo 8/2019    Chronic combined systolic and diastolic CHF (congestive heart failure) ([facility] 9/27/2019    ETOH abuse     Hypertension     Tobacco abuse        PSHx:  Past Surgical History:   Procedure Laterality Date    HERNIA REPAIR Right 2/10/2021    RIGHT INGUINAL HERNIA REPAIR WITH MESH performed by Charla Deal MD at [place] [address] Hx:  Social History     Socioeconomic History    Marital status: Single     Spouse name: Not on file    Number of children: Not on file    Years of education: Not on file    Highest education level: Not on file   Occupational History    Not on file   Social Needs    Financial resource strain: Not hard at all    Food insecurity     Worry: Never true     Inability: Never true   [employer] needs Medical: No     Non-medical: No   Tobacco Use    Smoking status: Former Smoker     Packs/day: 1.00     Types: Cigarettes     Quit date: 12/3/2020     Years since quittin.3    Smokeless tobacco: Never Used    Tobacco comment: 1pack/2 days   Substance and Sexual Activity    Alcohol use: Yes     Frequency: 4 or more times a week     Drinks per session: 7 to 9    Drug use: Never    Sexual activity: Not on file   Lifestyle    Physical activity     Days per week: Not on file     Minutes per session: Not on file    Stress: Not on file   Relationships    Social connections     Talks on phone: Not on file     Gets together: Not on file     Attends Orthodox service: Not on file     Active member of club or organization: Not on file     Attends meetings of clubs or organizations: Not on file     Relationship status: Not on file    Intimate partner violence     Fear of current or ex partner: Not on file     Emotionally abused: Not on file     Physically abused: Not on file     Forced sexual activity: Not on file   Other Topics Concern    Not on file   Social History Narrative    Not on file       Family Hx:  Family History   Problem Relation Age of Onset    Coronary Art Dis Mother        Review ofSystems:   Review of Systems   Constitutional: Negative for appetite change, chills and fever. HENT: Negative for congestion. Respiratory: Positive for chest tightness and shortness of breath. Cardiovascular: Negative for palpitations. +orthopnea   Gastrointestinal: Negative for abdominal pain, nausea and vomiting. Genitourinary: Negative for difficulty urinating. Musculoskeletal: Negative for arthralgias. Skin: Negative for color change, pallor and rash. Neurological: Negative for dizziness and syncope. Psychiatric/Behavioral: Negative for agitation and behavioral problems.           Physical Examination:    BP (!) 145/97   Pulse 93   Temp 97.7 °F (36.5 °C) (Oral)   Resp 18   Ht 5' 9\" (1.753 m)   Wt 196 lb (88.9 kg)   SpO2 97%   BMI 28.94 kg/m²    Physical Exam  Constitutional:       Appearance: Normal appearance. Comments: +conversational dyspnea   HENT:      Head: Normocephalic and atraumatic. Neck:      Musculoskeletal: Normal range of motion and neck supple. Cardiovascular:      Rate and Rhythm: Regular rhythm. Tachycardia present. Pulmonary:      Breath sounds: No wheezing, rhonchi or rales. Comments: +tachypnea  Abdominal:      Palpations: Abdomen is soft. Tenderness: There is no abdominal tenderness. Musculoskeletal: Normal range of motion. Right lower leg: Edema (trace-1+) present. Left lower leg: Edema (trace-1+) present. Skin:     General: Skin is warm and dry. Neurological:      General: No focal deficit present. Mental Status: He is alert and oriented to person, place, and time. Cranial Nerves: No cranial nerve deficit.    Psychiatric:         Mood and Affect: Mood normal.         Behavior: Behavior normal.          LABS:  CBC:   Lab Results   Component Value Date    WBC 7.9 04/22/2021    RBC 3.56 04/22/2021    HGB 8.4 04/22/2021    HCT 25.8 04/22/2021    MCV 72.4 04/22/2021    MCH 23.6 04/22/2021    MCHC 32.6 04/22/2021    RDW 17.3 04/22/2021     04/22/2021     CBC with Differential:   Lab Results   Component Value Date    WBC 7.9 04/22/2021    RBC 3.56 04/22/2021    HGB 8.4 04/22/2021    HCT 25.8 04/22/2021     04/22/2021    MCV 72.4 04/22/2021    MCH 23.6 04/22/2021    MCHC 32.6 04/22/2021    RDW 17.3 04/22/2021    LYMPHOPCT 5.8 04/22/2021    MONOPCT 8.1 04/22/2021    BASOPCT 0.9 04/22/2021    MONOSABS 0.6 04/22/2021    LYMPHSABS 0.5 04/22/2021    EOSABS 0.2 04/22/2021    BASOSABS 0.1 04/22/2021     CMP:    Lab Results   Component Value Date     04/22/2021    K 4.4 04/22/2021     04/22/2021    CO2 19 04/22/2021    BUN 39 04/22/2021    CREATININE 3.45 04/22/2021    GFRAA 23.0 04/22/2021    LABGLOM 19.0 04/22/2021    GLUCOSE 89 04/22/2021    PROT 6.4 04/22/2021    LABALBU 3.5 04/22/2021    CALCIUM 9.3 04/22/2021    BILITOT 0.4 04/22/2021    ALKPHOS 103 04/22/2021    AST 20 04/22/2021    ALT 22 04/22/2021     BMP:    Lab Results   Component Value Date     04/22/2021    K 4.4 04/22/2021     04/22/2021    CO2 19 04/22/2021    BUN 39 04/22/2021    LABALBU 3.5 04/22/2021    CREATININE 3.45 04/22/2021    CALCIUM 9.3 04/22/2021    GFRAA 23.0 04/22/2021    LABGLOM 19.0 04/22/2021    GLUCOSE 89 04/22/2021     Magnesium:    Lab Results   Component Value Date    MG 2.2 11/06/2020     Troponin:    Lab Results   Component Value Date    TROPONINI 0.024 04/22/2021     Recent Labs     04/22/21  0830   PROBNP 17,660     Recent Labs     04/22/21  0830   INR 1.2       RADIOLOGY:  Xr Chest Portable    Result Date: 4/22/2021  Exam: XR CHEST PORTABLE  CLINICAL HISTORY:  cp  COMPARISONS:  Chest x-ray from November 3, 2020 CHEST, 2 VIEWS FINDINGS: The cardiac silhouette is at the upper limits of normal in size. There are mild increased markings throughout both lungs. There is increased density in the left lung base which may represent a combination of atelectasis, infiltrate and/or small effusion. Bones of the thorax appear intact. Increased opacity in the left lung base may represent atelectasis, infiltrate and/or a small effusion. Echocardiogram 8/27/20:  Conclusions   Summary   Normal aortic valve structure and function. Trace AR   Normal tricuspid valve structure and function. trace TR   RVSP 25 mmHg   Left ventricular ejection fraction is visually estimated at 25-30% with   Global Hypokinesis. Pseudonormal filling pattern noted. Signature   ----------------------------------------------------------------   Electronically signed by Thom Durand MD(Interpreting   physician) on 08/27/2020 03:28 PM    5 [facility] 11/4/20:  Conclusions  Procedure Summary  Normal coronaries.   LVEDP=29mmhg  EF of 20%.  Recommendations  Aggressive risk factor management. Maximize medical therapy   Angiographic Findings   Cardiac Arteries and Lesion Findings  LMCA: Normal.  LAD: Normal.  LCx: Normal.  RCA: Normal.  Dominance: Right  LV function assessed as:Abnormal.  Ejection Fraction    - Method: LV gram. EF%: 20. EKG 4/22/21: , nonspecific ST/T wave changes, QTc 499ms    Telemetry 4/22/21: SR 90s; HR into 120s earlier (sinus tach)      Assessment:    Active Hospital Problems    Diagnosis Date Noted    CHF (congestive heart failure), NYHA class I, acute on chronic, combined ([facility] [I50.43] 04/22/2021     1. Acute on chronic systolic CHF  2. Elevated troponin secondary to acute CHF +/- renal failure +/- HTN urgency vs other  3. NICMP EF 25-30% per echo 8/27/21  4. CKD  5. HTN urgency  6. Worsening anemia--Hgb 8.4 from 11.7 on 2/3/21 and 13.7 from 1/2/21  7. Hx ETOH abuse  8. History of renal artery duplex ultrasound on 8/27/2019 which revealed no evidence of renal artery stenosis    Plan:  1. Admit to telemetry  2. Maximize medical therapy-aspirin 81 mg p.o. daily, Coreg 25 mg p.o. twice daily, increase clonidine 0.2mg p.o. twice daily, amlodipine 10 mg p.o. daily, hydralazine 100 mg p.o. 3 times daily, Imdur 120 mg p.o. daily, Corlanor 5 mg p.o. twice daily, Lipitor 80 mg p.o. daily, IV diuresis as tolerated, sublingual nitroglycerin as needed for chest pain  3. Cardiac/less than 2 g sodium diet recommended  4. 1500 mL daily fluid restriction  5. Echocardiogram completed today and result pending  6. Monitor on telemetry for any tachycardia or bradycardia arrhythmias  7. Maintain potassium greater than 4, magnesium greater than 2  8. GI/DVT prophylaxis  9. Tobacco cessation strongly recommended  10. Monitor H&H closely and monitor for any signs of bleeding given acute worsening anemia. 11. May need to consider GI evaluation for evaluation of anemia -questionable inpatient versus outpatient   12.  No indication for ischemic evaluation as patient status post cardiac catheterization on 11/4/2020 which revealed normal coronary arteries  13. Consult pulmonology for further evaluation of shortness of breath complaints as dyspnea may be multifactorial secondary to CHF plus or minus underlying COPD   14. Consult nephrology regarding BLUE on CKD and help with fluid management  15. Recommend continued outpatient follow-up with CHF clinic upon discharge  16. Further recommendations to follow        Electronically signed by MICAH Granado on 4/22/2021 at 4:38 PM      Attending Supervising [de-identified] Attestation Statement  The patient is a 52 y.o. male. I have performed a history and physical examination of the patient. I discussed the case with the physician assistant. I reviewed the patient's Past Medical History, Past Surgical History, Medications, and Allergies. Physical Exam:  Vitals:    04/22/21 1943 04/23/21 0156 04/23/21 0500 04/23/21 0713   BP: 110/82 110/67  (!) 135/90   Pulse: 94   87   Resp: 15   18   Temp: 97.7 °F (36.5 °C)   97.9 °F (36.6 °C)   TempSrc: Oral   Oral   SpO2: 98%   100%   Weight:   194 lb (88 kg)    Height:           Review of Systems - Respiratory ROS: positive for - shortness of breath  Cardiovascular ROS: positive for - dyspnea on exertion  Gastrointestinal ROS: no abdominal pain, change in bowel habits, or black or bloody stools    Pulmonary/Chest: decreased breath sounds noted  Cardiovascular: normal rate, normal S1 and S2, no gallops, intact distal pulses and no carotid bruits  Abdomen: soft, non-tender, non-distended, normal bowel sounds, no masses or organomegaly    Active Hospital Problems    Diagnosis Date Noted    CHF (congestive heart failure), NYHA class I, acute on chronic, [facility] [I50.43] 04/22/2021     Priority: Low        I reviewed and agree with the findings and plan documented in her note .     Impression    Elevated cardiac enzyme flat pattern likely secondary to demand ischemia/acute decompensated heart failure  Acute on chronic decompensated combined congestive heart failure  Anemia questionable etiology. No active bleeding. Nonischemic cardiomyopathy fraction 35 to 40%. Grade 2 diastolic dysfunction  Pleural effusion  2+ mitral regurgitation  2+ tricuspid rotation  Moderate to severe pulmonary tension with an RVSP of 62 mmHg  Chronic kidney disease  Hypertensive urgency  History of alcohol abuse        Plan     1. Maximize cardiac medications-continue with cardiac medication increase clonidine to 0.2 mg twice a day  2. IV diuretics as tolerated. Monitor I's and O's and renal function electrolytes. 3. Nephrology and pulmonary evaluation. 4. Consult GI for anemia  5. Tobacco alcohol cessation strongly recommended  6. CHF teaching  7. Monitor on telemetry  8. Keep potassium greater than 4, magnesium greater than 2  9.  No plans for any invasive or noninvasive work-up       Electronically signed by DO Mago on 4/23/21 at 1:22 PM EDT

## 2021-04-22 NOTE — FLOWSHEET NOTE
Fill assessment complete at this time. Pt is resting in bed. Does state he is getting some intermittent leg pain after receiving IV lasix in the ER. Pt to order lunch tray. Perfect serve sent to Dr. Emilee Forrester. 1245 Pt resting in bed. BP medications given. Pt made aware when he urinates he needs to use the urinal so we can keep track of his I and O's.   1357 pt sleeping at this time   1749 pt finished with dinner resting in bed at this time with no complaints.

## 2021-04-22 NOTE — ED PROVIDER NOTES
[facility] ED  EMERGENCY DEPARTMENT ENCOUNTER      Pt Name: Laura Solares  MRN: [id number]  [place] 1970  Date of evaluation: 4/22/2021  Provider: Rosy Snyder DO    CHIEF COMPLAINT       Chief Complaint   Patient presents with    Shortness of Breath     x 7 days. feet swelling. chest and  back pain         HISTORY OF PRESENT ILLNESS   (Location/Symptom, Timing/Onset, Context/Setting, Quality, Duration, Modifying Factors, Severity)  Note limiting factors. Laura Solares is a 52 y.o. male who presents to the emergency department . Patient comes in with shortness of breath for 4 to 5 days, increased leg swelling, pain between his shoulder blades and the left side of his chest.  Hurts more when he takes deep breath. Did not take his blood pressure medications today but states he was taking them through yesterday. History of cardiomyopathy, CHF and reduced ejection fraction. Ejection fraction 20 November 2020. Sees Dr. Jaylon Cole from cardiology. History of renal insufficiency secondary to blood pressure. HPI    Nursing Notes were reviewed. REVIEW OF SYSTEMS    (2-9 systems for level 4, 10 or more for level 5)     Review of Systems   Constitutional: Negative for activity change, appetite change, fatigue and fever. HENT: Negative for congestion and sore throat. Eyes: Negative for pain and visual disturbance. Respiratory: Positive for shortness of breath. Negative for cough and chest tightness. Cardiovascular: Positive for chest pain and leg swelling. Gastrointestinal: Negative for abdominal pain, nausea and vomiting. Endocrine: Negative for polydipsia. Genitourinary: Negative for flank pain and urgency. Musculoskeletal: Positive for back pain. Negative for gait problem and neck stiffness. Skin: Negative for rash. Neurological: Negative for weakness, light-headedness and headaches. Psychiatric/Behavioral: Negative for confusion and sleep disturbance. needs     Medical: No     Non-medical: No   Tobacco Use    Smoking status: Former Smoker     Packs/day: 1.00     Types: Cigarettes     Quit date: 12/3/2020     Years since quittin.3    Smokeless tobacco: Never Used    Tobacco comment: 1pack/2 days   Substance and Sexual Activity    Alcohol use: Yes     Frequency: 4 or more times a week     Drinks per session: 7 to 9    Drug use: Never    Sexual activity: Not on file   Lifestyle    Physical activity     Days per week: Not on file     Minutes per session: Not on file    Stress: Not on file   Relationships    Social connections     Talks on phone: Not on file     Gets together: Not on file     Attends [employer] service: Not on file     Active member of club or organization: Not on file     Attends meetings of clubs or organizations: Not on file     Relationship status: Not on file    Intimate partner violence     Fear of current or ex partner: Not on file     Emotionally abused: Not on file     Physically abused: Not on file     Forced sexual activity: Not on file   Other Topics Concern    Not on file   Social History Narrative    Not on file       SCREENINGS                        PHYSICAL EXAM    (up to 7 for level 4, 8 or more for level 5)     ED Triage Vitals [21 0815]   BP Temp Temp Source Pulse Resp SpO2 Height Weight   (!) 163/118 97.8 °F (36.6 °C) Oral 109 20 98 % 5' 8\" (1.727 m) 199 lb (90.3 kg)       Physical Exam  Vitals signs and nursing note reviewed. Constitutional:       General: He is in acute distress. Appearance: He is well-developed. He is not diaphoretic. HENT:      Head: Normocephalic and atraumatic. Right Ear: External ear normal.      Left Ear: External ear normal.      Mouth/Throat:      Pharynx: No oropharyngeal exudate. Eyes:      Conjunctiva/sclera: Conjunctivae normal.      Pupils: Pupils are equal, round, and reactive to light. Neck:      Musculoskeletal: Normal range of motion and neck supple. Thyroid: No thyromegaly. Vascular: No JVD. Trachea: No tracheal deviation. Cardiovascular:      Rate and Rhythm: Normal rate. Heart sounds: Normal heart sounds. No murmur. Pulmonary:      Effort: Tachypnea and respiratory distress present. Breath sounds: Normal breath sounds. No wheezing. Abdominal:      General: Bowel sounds are normal.      Palpations: Abdomen is soft. Tenderness: There is no abdominal tenderness. There is no guarding. Musculoskeletal: Normal range of motion. Right lower leg: Edema present. Left lower leg: Edema present. Skin:     General: Skin is warm and dry. Findings: No rash. Neurological:      Mental Status: He is alert and oriented to person, place, and time. Cranial Nerves: No cranial nerve deficit. Psychiatric:         Behavior: Behavior normal.         DIAGNOSTIC RESULTS     EKG: All EKG's are interpreted by the Emergency Department Physician who either signs or Co-signs this chart in the absence of a cardiologist.    Sinus tachycardia 105 bpm left atrial enlargement. T wave inversion laterally    RADIOLOGY:   Non-plain film images such as CT, Ultrasound and MRI are read by the radiologist. Plain radiographic images are visualized and preliminarily interpreted by the emergency physician with the below findings:    Chest x-ray shows cardiomegaly and increased interstitial markings consistent with congestive heart failure    Interpretation per the Radiologist below, if available at the time of this note:    XR CHEST PORTABLE    (Results Pending)         ED BEDSIDE ULTRASOUND:   Performed by ED Physician - none    LABS:  Labs Reviewed   CBC WITH AUTO DIFFERENTIAL   COMPREHENSIVE METABOLIC PANEL   TROPONIN   URINE RT REFLEX TO [address] [place] [address]   D-DIMER, QUANTITATIVE   PROTIME-INR       All other labs were within normal range or not returned as of this dictation.     EMERGENCY DEPARTMENT COURSE and DIFFERENTIAL DIAGNOSIS/MDM:   Vitals:    Vitals:    04/22/21 0815   BP: (!) 163/118   Pulse: 109   Resp: 20   Temp: 97.8 °F (36.6 °C)   TempSrc: Oral   SpO2: 98%   Weight: 199 lb (90.3 kg)   Height: 5' 8\" (1.727 m)       Patient here with shortness of breath chest pain leg swelling for several days. Found to be in congestive heart failure. History of cardiomyopathy. Patient to be admitted for further evaluation and treatment    MDM      REASSESSMENT          CRITICAL CARE TIME   Total Critical Care time was 0 minutes, excluding separately reportable procedures. There was a high probability of clinically significant/life threatening deterioration in the patient's condition which required my urgent intervention. CONSULTS:  None    PROCEDURES:  Unless otherwise noted below, none     Procedures        FINAL IMPRESSION      1. Systolic congestive heart failure, unspecified HF chronicity ([address]    2. Dyspnea, unspecified type    3. Chronic renal failure, unspecified CKD stage    4. Other iron deficiency anemia    5. History of cardiomyopathy          DISPOSITION/PLAN   DISPOSITION  Admit      PATIENT REFERRED TO:  No follow-up provider specified. DISCHARGE MEDICATIONS:  New Prescriptions    No medications on file     Controlled Substances Monitoring:     No flowsheet data found.     (Please note that portions of this note were completed with a voice recognition program.  Efforts were made to edit the dictations but occasionally words are mis-transcribed.)    Nicky Gillespie DO (electronically signed)  Attending Emergency Physician           Nicky Gillespie DO  04/26/21 1039

## 2021-04-22 NOTE — ED NOTES
Report called to [facility]W. Tele pack applied. Transportation notified.       Cecilio Soliman, RN  04/22/21 5117

## 2021-04-22 NOTE — ED TRIAGE NOTES
Pt comes to er c/o sob x 1 week and swelling in his feet. Pt has h/o  Chf. Pt bp noted to be elevated on arrival but he admits he did not take his bp meds this morning.  During triage, pt also stated that he is having back pain between the shoulders and left sided chest pain

## 2021-04-23 LAB
ANION GAP SERPL CALCULATED.3IONS-SCNC: 17 MEQ/L (ref 9–15)
BUN BLDV-MCNC: 46 MG/DL (ref 6–20)
CALCIUM SERPL-MCNC: 9.1 MG/DL (ref 8.5–9.9)
CHLORIDE BLD-SCNC: 103 MEQ/L (ref 95–107)
CHOLESTEROL, TOTAL: 108 MG/DL (ref 0–199)
CO2: 22 MEQ/L (ref 20–31)
CREAT SERPL-MCNC: 3.67 MG/DL (ref 0.7–1.2)
CREATININE URINE: 51.6 MG/DL
EKG ATRIAL RATE: 87 BPM
EKG P AXIS: 48 DEGREES
EKG P-R INTERVAL: 204 MS
EKG Q-T INTERVAL: 426 MS
EKG QRS DURATION: 92 MS
EKG QTC CALCULATION (BAZETT): 512 MS
EKG R AXIS: -55 DEGREES
EKG T AXIS: 130 DEGREES
EKG VENTRICULAR RATE: 87 BPM
FOLATE: 13.3 NG/ML (ref 7.3–26.1)
GFR AFRICAN AMERICAN: 21.4
GFR NON-AFRICAN AMERICAN: 17.7
GLUCOSE BLD-MCNC: 93 MG/DL (ref 70–99)
HCT VFR BLD CALC: 23.9 % (ref 42–52)
HDLC SERPL-MCNC: 53 MG/DL (ref 40–59)
HEMOCCULT STL QL: ABNORMAL
HEMOGLOBIN: 7.9 G/DL (ref 14–18)
IRON SATURATION: 6 % (ref 11–46)
IRON: 16 UG/DL (ref 59–158)
LDL CHOLESTEROL CALCULATED: 40 MG/DL (ref 0–129)
MAGNESIUM: 2.2 MG/DL (ref 1.7–2.4)
MCH RBC QN AUTO: 23.4 PG (ref 27–31.3)
MCHC RBC AUTO-ENTMCNC: 33 % (ref 33–37)
MCV RBC AUTO: 71 FL (ref 80–100)
MICROALBUMIN UR-MCNC: 15.8 MG/DL
MICROALBUMIN/CREAT UR-RTO: 306.2 MG/G (ref 0–30)
PDW BLD-RTO: 17.5 % (ref 11.5–14.5)
PLATELET # BLD: 571 K/UL (ref 130–400)
POTASSIUM SERPL-SCNC: 3.4 MEQ/L (ref 3.4–4.9)
PRO-BNP: NORMAL PG/ML
RBC # BLD: 3.36 M/UL (ref 4.7–6.1)
SODIUM BLD-SCNC: 142 MEQ/L (ref 135–144)
TOTAL IRON BINDING CAPACITY: 287 UG/DL (ref 178–450)
TRIGL SERPL-MCNC: 73 MG/DL (ref 0–150)
TROPONIN: 0.02 NG/ML (ref 0–0.01)
VITAMIN B-12: 418 PG/ML (ref 232–1245)
WBC # BLD: 7.8 K/UL (ref 4.8–10.8)

## 2021-04-23 PROCEDURE — APPNB60 APP NON BILLABLE TIME 46-60 MINS: Performed by: NURSE PRACTITIONER

## 2021-04-23 PROCEDURE — 93005 ELECTROCARDIOGRAM TRACING: CPT | Performed by: EMERGENCY MEDICINE

## 2021-04-23 PROCEDURE — 82274 ASSAY TEST FOR BLOOD FECAL: CPT

## 2021-04-23 PROCEDURE — 93010 ELECTROCARDIOGRAM REPORT: CPT | Performed by: INTERNAL MEDICINE

## 2021-04-23 PROCEDURE — 84484 ASSAY OF TROPONIN QUANT: CPT

## 2021-04-23 PROCEDURE — 99254 IP/OBS CNSLTJ NEW/EST MOD 60: CPT | Performed by: INTERNAL MEDICINE

## 2021-04-23 PROCEDURE — 83540 ASSAY OF IRON: CPT

## 2021-04-23 PROCEDURE — 83550 IRON BINDING TEST: CPT

## 2021-04-23 PROCEDURE — 83880 ASSAY OF NATRIURETIC PEPTIDE: CPT

## 2021-04-23 PROCEDURE — 2580000003 HC RX 258: Performed by: EMERGENCY MEDICINE

## 2021-04-23 PROCEDURE — 82570 ASSAY OF URINE CREATININE: CPT

## 2021-04-23 PROCEDURE — 82607 VITAMIN B-12: CPT

## 2021-04-23 PROCEDURE — 36415 COLL VENOUS BLD VENIPUNCTURE: CPT

## 2021-04-23 PROCEDURE — 83735 ASSAY OF MAGNESIUM: CPT

## 2021-04-23 PROCEDURE — 6370000000 HC RX 637 (ALT 250 FOR IP): Performed by: INTERNAL MEDICINE

## 2021-04-23 PROCEDURE — 82043 UR ALBUMIN QUANTITATIVE: CPT

## 2021-04-23 PROCEDURE — 85027 COMPLETE CBC AUTOMATED: CPT

## 2021-04-23 PROCEDURE — 82746 ASSAY OF FOLIC ACID SERUM: CPT

## 2021-04-23 PROCEDURE — 6360000002 HC RX W HCPCS: Performed by: PHYSICIAN ASSISTANT

## 2021-04-23 PROCEDURE — 6370000000 HC RX 637 (ALT 250 FOR IP): Performed by: PHYSICIAN ASSISTANT

## 2021-04-23 PROCEDURE — 99253 IP/OBS CNSLTJ NEW/EST LOW 45: CPT | Performed by: INTERNAL MEDICINE

## 2021-04-23 PROCEDURE — 80061 LIPID PANEL: CPT

## 2021-04-23 PROCEDURE — 2060000000 HC ICU INTERMEDIATE R&B

## 2021-04-23 PROCEDURE — 6370000000 HC RX 637 (ALT 250 FOR IP): Performed by: EMERGENCY MEDICINE

## 2021-04-23 PROCEDURE — 80048 BASIC METABOLIC PNL TOTAL CA: CPT

## 2021-04-23 PROCEDURE — APPSS30 APP SPLIT SHARED TIME 16-30 MINUTES: Performed by: PHYSICIAN ASSISTANT

## 2021-04-23 PROCEDURE — 99223 1ST HOSP IP/OBS HIGH 75: CPT | Performed by: INTERNAL MEDICINE

## 2021-04-23 RX ORDER — FUROSEMIDE 10 MG/ML
40 INJECTION INTRAMUSCULAR; INTRAVENOUS DAILY
Status: DISCONTINUED | OUTPATIENT
Start: 2021-04-24 | End: 2021-04-25

## 2021-04-23 RX ORDER — POTASSIUM CHLORIDE 20 MEQ/1
20 TABLET, EXTENDED RELEASE ORAL ONCE
Status: COMPLETED | OUTPATIENT
Start: 2021-04-23 | End: 2021-04-23

## 2021-04-23 RX ADMIN — HYDRALAZINE HYDROCHLORIDE 100 MG: 100 TABLET, FILM COATED ORAL at 14:09

## 2021-04-23 RX ADMIN — CARVEDILOL 25 MG: 25 TABLET, FILM COATED ORAL at 07:49

## 2021-04-23 RX ADMIN — HYDRALAZINE HYDROCHLORIDE 100 MG: 100 TABLET, FILM COATED ORAL at 07:49

## 2021-04-23 RX ADMIN — CLONIDINE HYDROCHLORIDE 0.2 MG: 0.1 TABLET ORAL at 07:48

## 2021-04-23 RX ADMIN — ACETAMINOPHEN 650 MG: 325 TABLET ORAL at 01:56

## 2021-04-23 RX ADMIN — CARVEDILOL 25 MG: 25 TABLET, FILM COATED ORAL at 20:48

## 2021-04-23 RX ADMIN — TORSEMIDE 50 MG: 10 TABLET ORAL at 07:48

## 2021-04-23 RX ADMIN — POTASSIUM CHLORIDE 20 MEQ: 1500 TABLET, EXTENDED RELEASE ORAL at 14:09

## 2021-04-23 RX ADMIN — AMLODIPINE BESYLATE 10 MG: 10 TABLET ORAL at 07:53

## 2021-04-23 RX ADMIN — CLONIDINE HYDROCHLORIDE 0.2 MG: 0.1 TABLET ORAL at 20:48

## 2021-04-23 RX ADMIN — ATORVASTATIN CALCIUM 80 MG: 40 TABLET, FILM COATED ORAL at 20:48

## 2021-04-23 RX ADMIN — FUROSEMIDE 40 MG: 10 INJECTION, SOLUTION INTRAVENOUS at 07:47

## 2021-04-23 RX ADMIN — HYDRALAZINE HYDROCHLORIDE 100 MG: 100 TABLET, FILM COATED ORAL at 20:48

## 2021-04-23 RX ADMIN — ISOSORBIDE MONONITRATE 120 MG: 60 TABLET, EXTENDED RELEASE ORAL at 07:49

## 2021-04-23 RX ADMIN — ASPIRIN 81 MG: 81 TABLET, CHEWABLE ORAL at 07:49

## 2021-04-23 RX ADMIN — Medication 5 ML: at 21:43

## 2021-04-23 RX ADMIN — Medication 10 ML: at 07:48

## 2021-04-23 ASSESSMENT — PAIN SCALES - GENERAL
PAINLEVEL_OUTOF10: 0

## 2021-04-23 ASSESSMENT — ENCOUNTER SYMPTOMS
CHEST TIGHTNESS: 0
NAUSEA: 0
SHORTNESS OF BREATH: 0
COLOR CHANGE: 0
VOMITING: 0

## 2021-04-23 NOTE — PROGRESS NOTES
Nutrition Education    · Referral for CHF education. Pt was seen in 2019 for diet education and this morning by care transition nurse, Pt had poor recall of high Na foods, contiues to eat 4-5 breakfast sauasge every day. Did not appear motivated to stop eating them or in decreasing amount or frequency. Does eat salty snacks like chips  And thinks salt is added during cooking at home. Explained rationale for low sodium diet for management of CHF. Pt does not grocery shop nor cook, Was not interested in reading nutrtion label. Pt states he lives with ana mariat other Glenys Pearl) but that she will not be in to [place]. Encouraged pt to have her call with questions  · Verbally reviewed information with patient  · Educated on 2gNa  · Written educational materials provided. · Contact name and number provided. · Refer to Patient Education activity for more details.     Electronically signed by Emi Knapp RD, LD on 4/23/21 at 3:10 PM EDT

## 2021-04-23 NOTE — CONSULTS
INPATIENT PROGRESS NOTES    PATIENT NAME: Nina Berry  MRN: [id number]  SERVICE DATE:  April 23, 2021   SERVICE TIME:  1:37 PM      PRIMARY SERVICE: Pulmonary Disease    CHIEF COMPLAIN: Shortness of breath      INTERVAL HPI: Patient seen and examined at bedside, Interval Notes, orders reviewed. Nursing notes noted  Is a 57-year-old -American male who has past medical history significant for nonischemic cardiomyopathy with EF 25 to 30%. , Chronic kidney disease, hypertension, tobacco abuse, EtOH abuse, history of noncompliance was admitted to the hospital with complaint of shortness of breath for 1 week and he has orthopnea. He also has increased swelling in lower extremity and chest discomfort with taking deep breath. Today patient is feeling much better shortness of breath improving. No fever or chills. No nausea vomiting diarrhea. No chest pain. Chest x-ray shows opacity in left lung base which may represent atelectasis infiltration or effusion. OBJECTIVE    Body mass index is 28.65 kg/m². PHYSICAL EXAM:  Vitals:  BP (!) 135/90   Pulse 87   Temp 97.9 °F (36.6 °C) (Oral)   Resp 18   Ht 5' 9\" (1.753 m)   Wt 194 lb (88 kg)   SpO2 100%   BMI 28.65 kg/m²   General: Alert, awake . comfortable in bed, No distress. Head: Atraumatic , Normocephalic   Eyes: PERRL. No sclera icterus. No conjunctival injection. No discharge   ENT: No nasal  discharge. Pharynx clear. Neck:  Trachea midline. No thyromegaly, no JVD, No cervical adenopathy. Chest : Bilaterally symmetrical ,Normal effort,  No accessory muscle use  Lung : . Fair BS bilateral, decreased BS at bases. No Rales. No wheezing. No rhonchi. Heart[de-identified] Normal  rate. Regular rhythm. No mumur ,  Rub or gallop  ABD: Non-tender. Non-distended. No masses. No organmegaly. Normal bowel sounds. No hernia.   Ext : No Pitting both leg , No Cyanosis No clubbing  Neuro: no focal weakness          DATA:   Recent Labs     04/22/21  0830 04/23/21  0615   WBC 7.9 7.8 HGB 8.4* 7.9*   HCT 25.8* 23.9*   MCV 72.4* 71.0*   * 571*     Recent Labs     04/22/21  0830 04/23/21  0851    142   K 4.4 3.4    103   CO2 19* 22   BUN 39* 46*   CREATININE 3.45* 3.67*   GLUCOSE 89 93   CALCIUM 9.3 9.1   PROT 6.4  --    LABALBU 3.5  --    BILITOT 0.4  --    ALKPHOS 103  --    AST 20  --    ALT 22  --    LABGLOM 19.0* 17.7*   GFRAA 23.0* 21.4*   GLOB 2.9  --        MV Settings:          No results for input(s): PHART, KKZ7BVE, PO2ART, ZDY9VNY, BEART, T1FLMCYP in the last 72 hours.     O2 Device: None (Room air)    DIET LOW SODIUM 2 GM; No Caffeine     MEDICATIONS during current hospitalization:    Continuous Infusions:   sodium chloride         Scheduled Meds:   potassium chloride  20 mEq Oral Once    [START ON 4/24/2021] furosemide  40 mg Intravenous Daily    sodium chloride flush  5-40 mL Intravenous 2 times per day    aspirin  81 mg Oral Daily    atorvastatin  80 mg Oral Nightly    nicotine  1 patch Transdermal Daily    hydrALAZINE  100 mg Oral TID    isosorbide mononitrate  120 mg Oral Daily    amLODIPine  10 mg Oral Daily    carvedilol  25 mg Oral BID    ivabradine  5 mg Oral BID WC    [Held by provider] torsemide  50 mg Oral Daily    cloNIDine  0.2 mg Oral BID       PRN Meds:sodium chloride flush, sodium chloride, promethazine **OR** ondansetron, acetaminophen **OR** acetaminophen, polyethylene glycol, nitroGLYCERIN, melatonin    Radiology  Echocardiogram Complete 2d With Doppler With Color    Result Date: 4/23/2021  Transthoracic Echocardiography Report (TTE)  Demographics   Patient Name   Gus Casper  Gender              Male                 J   Patient Number [id number]      Race                Black                                Ethnicity   Visit Number   [id number]     Room Number         W279   Corporate ID                 Date of Study       04/22/2021   Accession      [id number]    Referring Physician  Number   Date of Birth  1970    Sonographer Spencer Del CidDAYANA   Age            52 year(s)    [facility]                               Physician           David Wilkinson MD  Procedure Type of Study   TTE procedure:ECHO COMPLETE 2D W/DOP W/COLOR. Procedure Date Date: 04/22/2021 Start: 09:18 AM Study Location: ER Technical Quality: Good visualization Indications:Dyspnea. Patient Status: STAT Height: 69 inches Weight: 199 pounds BSA: 2.06 m^2 BMI: 29.39 kg/m^2 BP: 173/125 mmHg  Conclusions   Summary  Normal mitral valve structure and function. 2+ MR  Normal tricuspid valve structure and function. 2+ TR  RVSP 62 mmHg  Left ventricular ejection fraction is visually estimated at 35-40%. Pseudonormal filling pattern noted. Large pleural effusion. Signature   ----------------------------------------------------------------  Electronically signed by David Wilkinson MD(Interpreting  physician) on 04/22/2021 11:44 AM  ----------------------------------------------------------------   Findings  Left Ventricle Left ventricular ejection fraction is visually estimated at 35-40%. Pseudonormal filling pattern noted. Right Ventricle Normal right ventricle structure and function. Normal right ventricle systolic pressure. Left Atrium Moderately dilated left atrium. Right Atrium Normal right atrium. Mitral Valve Normal mitral valve structure and function. 2+ MR Tricuspid Valve Normal tricuspid valve structure and function. 2+ TR RVSP 62 mmHg Aortic Valve Normal aortic valve structure and function. Pulmonic Valve The pulmonic valve was not well visualized . Pericardial Effusion No evidence of pericardial effusion. Pleural Effusion Large pleural effusion. Aorta \ Miscellaneous The aorta is within normal limits.  M-Mode Measurements (cm)   LVIDd: 6.92 cm                         LVIDs: 5.62 cm  IVSd: 0.89 cm                          IVSs: 2.02 cm  LVPWd: 1.54 cm LVPWs: 1.5 cm  Rt. Vent.  Dimension: 1.21 cm           AO Root Dimension: 4.43 cm                                         ACS: 2.46 cm                                         LA: 3.41 cm                                         LVOT: 2.28 cm  Doppler Measurements:   AV Velocity:0.03 m/s  AV Peak Gradient: 3.29 mmHg  AV Mean Gradient: 1.49 mmHg  AV Area (Continuity):3.29 cm^2  TR Velocity:3.46 m/s  TR Gradient:47.94 mmHg  Valves  Mitral Valve   MR Velocity: 4.81 m/s   Tissue Doppler   E' Septal Velocity: 0.05 m/s  E' Lateral Velocity: 0.06 m/s   Aortic Valve   Peak Velocity: 0.91 m/s                Mean Velocity: 0.54 m/s  Peak Gradient: 3.29 mmHg               Mean Gradient: 1.49 mmHg  Area (continuity): 3.29 cm^2  AV VTI: 11.42 cm   Cusp Separation: 2.46 cm   Tricuspid Valve   TR Velocity: 3.46 m/s              TR Gradient: 47.94 mmHg   Pulmonic Valve   Peak Velocity: 0.57 m/s             Peak Gradient: 1.29 mmHg   LVOT   Peak Velocity: 0.6 m/s               Mean Velocity: 0.35 m/s  Peak Gradient: 1.38 mmHg             Mean Gradient: 0.61 mmHg  LVOT Diameter: 2.28 cm               LVOT VTI: 9.22 cm  Structures  Left Atrium   LA Dimension: 3.41 cm                        LA Area: 22.23 cm^2  LA/Aorta: 0.77  LA Volume/Index: 98.76 ml /48 m^2   Left Ventricle   Diastolic Dimension: 8.85 cm          Systolic Dimension: 0.38 cm  Septum Diastolic: 4.45 cm             Septum Systolic: 5.24 cm  PW Diastolic: 1.52 cm                 PW Systolic: 1.5 cm                                        FS: 18.8 %  LV EDV/LV EDV Index: 248.89 ml/121    LV ESV/LV ESV Index: 154.72 ml/75  m^2                                   m^2  EF Calculated: 37.8 %                 LV Length: 8.53 cm   LVOT Diameter: 2.28 cm   Right Ventricle   Diastolic Dimension: 3.11 cm  Aorta/ Miscellaneous Aorta   Aortic Root: 4.43 cm  LVOT Diameter: 2.28 cm      Xr Chest Portable    Result Date: 4/22/2021  Exam: XR CHEST PORTABLE  CLINICAL HISTORY:  cp  COMPARISONS: Chest x-ray from November 3, 2020 CHEST, 2 VIEWS FINDINGS: The cardiac silhouette is at the upper limits of normal in size. There are mild increased markings throughout both lungs. There is increased density in the left lung base which may represent a combination of atelectasis, infiltrate and/or small effusion. Bones of the thorax appear intact. Increased opacity in the left lung base may represent atelectasis, infiltrate and/or a small effusion. IMPRESSION AND SUGGESTION:  1. Left basilar atelectasis doubt pneumonia  2. Shortness of breath likely secondary to acute on chronic  3. Nonischemic cardiomyopathy with EF 25 to 30%  4. Chronic kidney disease  5. Hypertension  6. EtOH abuse  7. Anemia    Patient was having exertional shortness of breath and orthopnea and increased leg swelling likely due to acute on chronic CHF. Left basilar density looks like due to atelectasis or effusion. Doubt any pneumonia. WBC is within normal range and he is afebrile. Will repeat chest x-ray PA and lateral in a.m. Advised incentive spirometer and deep breathing exercise. He is currently on room air and O2 saturation 100%. .  Nephrology is following. Regarding renal insufficiency. Creatinine is 3.67. Thank you for consult    NOTE: This report was transcribed using voice recognition software. Every effort was made to ensure accuracy; however, inadvertent computerized transcription errors may be present.       Electronically signed by Romelia Zepeda MD, FCCP on 4/23/2021 at 1:37 PM

## 2021-04-23 NOTE — CONSULTS
Inpatient consult to GI  Consult performed by: Sajan Lyon MD  Consult ordered by: MICAH Reed          Patient Name: Ansley Johnston Date: 2021  8:20 AM  MR #: [id number]  : 1970    Attending Physician: Jazmine Powell DO  Reason for consult: anemia    History of Presenting Illness:      Bentley Encarnacion is a 52 y.o. male on hospital day 1 with a history of cardiomyopathy, hypertension, CKD. Past surgical history significant for hernia repair. Family history was reviewed and is negative for GI malignancies. Social history patient reports daily nicotine, EtOH use, no illicit drug use. History Obtained From:  patient, electronic medical record  GI consult for anemia-admitted with CHF, has a history of nonischemic cardiomyopathy with an ejection fraction of 25 to 30% patient reported progressively worsening shortness of breath over the course of the last week. GI was asked to evaluate anemia, patient denies a history of anemia. Baseline hemoglobin from January was 13.7, hemoglobin is now 7.9, MCV 71,  noted iron deficiency anemia. Denies dysphagia, GERD, abdominal pain, changes to bowel habits, nausea or vomiting, hematemesis, melena, or hematochezia. Reports of 20 to 30 pound weight loss over the past 1 to 2 years. Denies NSAIDs, at baseline is not on anticoagulation or antiplatelets. Reports history of heavy EtOH use however over the past 3 months has cut back and drinks about a sixpack of beer weekly. No previous endoscopic history.      History:      Past Medical History:   Diagnosis Date    Abnormal EKG 2019    Cardiomyopathy ([facility] [address]     per echo 2019    Chronic combined systolic and diastolic CHF (congestive heart failure) ([address] 2019    ETOH abuse     Hypertension     Tobacco abuse      Past Surgical History:   Procedure Laterality Date    HERNIA REPAIR Right 2/10/2021    RIGHT INGUINAL HERNIA REPAIR WITH MESH performed by Jose Camarena MD at [facility] Family History  Family History   Problem Relation Age of Onset    Coronary Art Dis Mother      [] Unable to obtain due to ventilated and/ or neurologic status  Social History     Socioeconomic History    Marital status: Single     Spouse name: Not on file    Number of children: Not on file    Years of education: Not on file    Highest education level: Not on file   Occupational History    Not on file   Social Needs    Financial resource strain: Not hard at all   [place] insecurity     Worry: Never true     Inability: Never true   [employer] needs     Medical: No     Non-medical: No   Tobacco Use    Smoking status: Current Every Day Smoker     Packs/day: 1.00     Types: Cigarettes     Last attempt to quit: 12/3/2020     Years since quittin.3    Smokeless tobacco: Never Used    Tobacco comment: currently smoking only couple cigarettes daily   Substance and Sexual Activity    Alcohol use: Yes     Frequency: 4 or more times a week     Drinks per session: 3 or 4    Drug use: Never    Sexual activity: Not on file   Lifestyle    Physical activity     Days per week: Not on file     Minutes per session: Not on file    Stress: Not on file   Relationships    Social connections     Talks on phone: Not on file     Gets together: Not on file     Attends Quaker service: Not on file     Active member of club or organization: Not on file     Attends meetings of clubs or organizations: Not on file     Relationship status: Not on file    Intimate partner violence     Fear of current or ex partner: Not on file     Emotionally abused: Not on file     Physically abused: Not on file     Forced sexual activity: Not on file   Other Topics Concern    Not on file   Social History Narrative    Not on file      [] Unable to obtain due to ventilated and/ or neurologic status    Home Medications:      Medications Prior to Admission: torsemide (DEMADEX) 10 MG tablet, take 5 tablets by mouth daily  carvedilol (COREG) 25 MG tablet, Take 1 tablet by mouth 2 times daily HOLD for SBP<100 or HR<60  ivabradine (CORLANOR) 5 MG TABS tablet, Take 1 tablet by mouth 2 times daily (with meals)  cloNIDine (CATAPRES) 0.1 MG tablet, Take 1 tablet by mouth 2 times daily  amLODIPine (NORVASC) 10 MG tablet, Take 1 tablet by mouth daily  isosorbide mononitrate (IMDUR) 120 MG extended release tablet, Take 1 tablet by mouth daily  hydrALAZINE (APRESOLINE) 100 MG tablet, Take 1 tablet by mouth 3 times daily  nicotine (NICODERM CQ) 21 MG/24HR, Place 1 patch onto the skin daily  aspirin 81 MG EC tablet, Take 1 tablet by mouth daily    Current Hospital Medications:   Scheduled Meds:   [START ON 4/24/2021] furosemide  40 mg Intravenous Daily    sodium chloride flush  5-40 mL Intravenous 2 times per day    [Held by provider] aspirin  81 mg Oral Daily    atorvastatin  80 mg Oral Nightly    nicotine  1 patch Transdermal Daily    hydrALAZINE  100 mg Oral TID    isosorbide mononitrate  120 mg Oral Daily    amLODIPine  10 mg Oral Daily    carvedilol  25 mg Oral BID    ivabradine  5 mg Oral BID WC    [Held by provider] torsemide  50 mg Oral Daily    cloNIDine  0.2 mg Oral BID     Continuous Infusions:   sodium chloride       PRN Meds:.sodium chloride flush, sodium chloride, promethazine **OR** ondansetron, acetaminophen **OR** acetaminophen, polyethylene glycol, nitroGLYCERIN, melatonin   sodium chloride        Allergies:   No Known Allergies   Review of Systems:       [x] CV, Resp, Neuro, , and all other systems reviewed and negative other than listed in HPI.         Objective Findings:     Vitals:   Vitals:    04/23/21 0156 04/23/21 0500 04/23/21 0713 04/23/21 1417   BP: 110/67  (!) 135/90 115/88   Pulse:   87 85   Resp:   18 18   Temp:   97.9 °F (36.6 °C) 97.3 °F (36.3 °C)   TempSrc:   Oral Oral   SpO2:   100% 100%   Weight:  194 lb (88 kg)     Height:            Physical Examination:  General: alert  HEENT: Normocephalic, no scleral icterus. Neck: No JVD. Heart: Regular, no murmur, no rub/gallop. No RV heave. Lungs: Clear to ascultation, no rales/wheezing/rhonchi. Good chest wall excursion. Abdomen: Appearance:, no Distension , Soft , tenderness , Scars , Bowel sounds present  Extremities: No clubbing/cyanosis, no edema. Skin: Warm, dry, normal turgor, no rash, no bruise, no petichiae. Neuro: No myoclonus or tremor. Psych: Normal affect    Results/ Medications reviewed 4/23/2021, 4:16 PM     Laboratory, Microbiology, Pathology, Radiology, Cardiology, Medications and Transcriptions reviewed  Scheduled Meds:   [START ON 4/24/2021] furosemide  40 mg Intravenous Daily    sodium chloride flush  5-40 mL Intravenous 2 times per day    [Held by provider] aspirin  81 mg Oral Daily    atorvastatin  80 mg Oral Nightly    nicotine  1 patch Transdermal Daily    hydrALAZINE  100 mg Oral TID    isosorbide mononitrate  120 mg Oral Daily    amLODIPine  10 mg Oral Daily    carvedilol  25 mg Oral BID    ivabradine  5 mg Oral BID WC    [Held by provider] torsemide  50 mg Oral Daily    cloNIDine  0.2 mg Oral BID     Continuous Infusions:   sodium chloride         Recent Labs     04/22/21  0830 04/23/21  0615   WBC 7.9 7.8   HGB 8.4* 7.9*   HCT 25.8* 23.9*   MCV 72.4* 71.0*   * 571*     Recent Labs     04/22/21  0830 04/23/21  0851    142   K 4.4 3.4    103   CO2 19* 22   BUN 39* 46*   CREATININE 3.45* 3.67*     Recent Labs     04/22/21  0830   AST 20   ALT 22   BILITOT 0.4   ALKPHOS 103     No results for input(s): LIPASE, AMYLASE in the last 72 hours.   Recent Labs     04/22/21  0830   PROT 6.4   INR 1.2     Echocardiogram Complete 2d With Doppler With Color    Result Date: 4/23/2021  Transthoracic Echocardiography Report (TTE)  Demographics   Patient Name   Maggy Aburto  Gender              Male                 J   Patient Number [id number]      Race                Black                                Ethnicity   Visit Number [id number]     Room Number         F610   Corporate ID                 Date of Study       04/22/2021   Accession      [id number]    Referring Physician  Number   Date of Birth  1970    Sonographer         Yifan Castelan LPN   Age            52 year(s)    [facility] Cardiology                               Physician           Richardson Gil MD  Procedure Type of Study   TTE procedure:ECHO COMPLETE 2D W/DOP W/COLOR. Procedure Date Date: 04/22/2021 Start: 09:18 AM Study Location: ER Technical Quality: Good visualization Indications:Dyspnea. Patient Status: STAT Height: 69 inches Weight: 199 pounds BSA: 2.06 m^2 BMI: 29.39 kg/m^2 BP: 173/125 mmHg  Conclusions   Summary  Normal mitral valve structure and function. 2+ MR  Normal tricuspid valve structure and function. 2+ TR  RVSP 62 mmHg  Left ventricular ejection fraction is visually estimated at 35-40%. Pseudonormal filling pattern noted. Large pleural effusion. Signature   ----------------------------------------------------------------  Electronically signed by Richardson Gil MD(Interpreting  physician) on 04/22/2021 11:44 AM  ----------------------------------------------------------------   Findings  Left Ventricle Left ventricular ejection fraction is visually estimated at 35-40%. Pseudonormal filling pattern noted. Right Ventricle Normal right ventricle structure and function. Normal right ventricle systolic pressure. Left Atrium Moderately dilated left atrium. Right Atrium Normal right atrium. Mitral Valve Normal mitral valve structure and function. 2+ MR Tricuspid Valve Normal tricuspid valve structure and function. 2+ TR RVSP 62 mmHg Aortic Valve Normal aortic valve structure and function. Pulmonic Valve The pulmonic valve was not well visualized . Pericardial Effusion No evidence of pericardial effusion. Pleural Effusion Large pleural effusion.  Aorta \ Miscellaneous The aorta is within normal limits. M-Mode Measurements (cm)   LVIDd: 6.92 cm                         LVIDs: 5.62 cm  IVSd: 0.89 cm                          IVSs: 2.02 cm  LVPWd: 1.54 cm                         LVPWs: 1.5 cm  Rt. Vent.  Dimension: 1.21 cm           AO Root Dimension: 4.43 cm                                         ACS: 2.46 cm                                         LA: 3.41 cm                                         LVOT: 2.28 cm  Doppler Measurements:   AV Velocity:0.03 m/s  AV Peak Gradient: 3.29 mmHg  AV Mean Gradient: 1.49 mmHg  AV Area (Continuity):3.29 cm^2  TR Velocity:3.46 m/s  TR Gradient:47.94 mmHg  Valves  Mitral Valve   MR Velocity: 4.81 m/s   Tissue Doppler   E' Septal Velocity: 0.05 m/s  E' Lateral Velocity: 0.06 m/s   Aortic Valve   Peak Velocity: 0.91 m/s                Mean Velocity: 0.54 m/s  Peak Gradient: 3.29 mmHg               Mean Gradient: 1.49 mmHg  Area (continuity): 3.29 cm^2  AV VTI: 11.42 cm   Cusp Separation: 2.46 cm   Tricuspid Valve   TR Velocity: 3.46 m/s              TR Gradient: 47.94 mmHg   Pulmonic Valve   Peak Velocity: 0.57 m/s             Peak Gradient: 1.29 mmHg   LVOT   Peak Velocity: 0.6 m/s               Mean Velocity: 0.35 m/s  Peak Gradient: 1.38 mmHg             Mean Gradient: 0.61 mmHg  LVOT Diameter: 2.28 cm               LVOT VTI: 9.22 cm  Structures  Left Atrium   LA Dimension: 3.41 cm                        LA Area: 22.23 cm^2  LA/Aorta: 0.77  LA Volume/Index: 98.76 ml /48 m^2   Left Ventricle   Diastolic Dimension: 2.02 cm          Systolic Dimension: 0.31 cm  Septum Diastolic: 6.46 cm             Septum Systolic: 5.46 cm  PW Diastolic: 1.22 cm                 PW Systolic: 1.5 cm                                        FS: 18.8 %  LV EDV/LV EDV Index: 248.89 ml/121    LV ESV/LV ESV Index: 154.72 ml/75  m^2                                   m^2  EF Calculated: 37.8 %                 LV Length: 8.53 cm   LVOT Diameter: 2.28 cm   Right Ventricle concerns arise. A/P  Agree regarding assessment and plan. Patient with history of nonischemic cardiomyopathy who presented owing to dyspnea and was found to have iron deficiency anemia. Patient is currently  denies any overt bleeding. No hematemesis melena hematochezia. No previous EGD and colonoscopy. Anemia is likely multifactorial, ongoing cardiac optimization. We will proceed with EGD colonoscopy pending course. Timing of the endoscopy to follow  Severa Morrow MD  Please note this report has been partially produced using speech recognition software and may cause contain errors related to that system including grammar, punctuation and spelling as well as words and phrases that may seem inappropriate. If there are questions or concerns please feel free to contact me to clarify.

## 2021-04-23 NOTE — CARE COORDINATION
MET WITH PATIENT. FREEDOM OF CHOICE GIVEN. HE IS AGREEABLE TO CARDIAC REHAB, IF INDICATED. HE IS AGREEABLE TO [facility] IF THEY ASSIST HIM WITH CARDIAC DIET. DECLINES ANY OTHER NEEDS FOR D/C AT THIS TIME. [facility] [place] Case Management Initial Discharge Assessment    Met with Patient to discuss discharge plan. PCP: Kunal Beal MD                                Date of Last Visit: February 2021    If no PCP, list provided? N/A    Discharge Planning    Living Arrangements: independently at home    Who do you live with? ROOMMATE Parris Paul    Who helps you with your care:  self    If lives at home:     Do you have any barriers navigating in your home? no    Patient can perform ADL? Yes    Current Services (outpatient and in home) :  None    Dialysis: No    Is transportation available to get to your appointments? Yes    DME Equipment:  no    Respiratory equipment: None    Respiratory provider:  no     Pharmacy:  yes - [address] [place] [address] with Medication Assistance Program?  No      Patient agreeable to [address]? Declined    Patient agreeable to SNF/Rehab? Declined    Other discharge needs identified? Palliative Care and Cardiac Rehab    Does Patient Have a High-Risk for Readmission Diagnosis (CHF, PN, MI, COPD)? Yes    If Yes,     Consult with pulmonologist? No   Consult with cardiologist? Yes   Cardiac Rehab referral if EF <35%? Yes   Consult with Pharmacy for medication assessment prior to discharge? Yes   Consult with Behavioral health to aid in depression, anxiety, or coping issues? No   Palliative Care Consult? No   Pulmonary Rehab order for COPD, PN, and CHF (if EF > 35%)? No    Does patient have a reliable scale and know how to read it (for CHF)? Yes   Nutrition consult for CHF? Yes   Respiratory therapy consult that includes bedside instruction on administration of nebulizers and/or inhalers, and assessment of oxygen and equipment needs in the home?  No    Initial Discharge Plan? (Note: please see concurrent daily documentation for any updates after initial note).     EASIER BREATHING    Readmission Risk              Risk of Unplanned Readmission:        19         Electronically signed by Alicia Zimmerman RN on 4/23/2021 at 12:39 PM

## 2021-04-23 NOTE — CONSULTS
[address]                      [address], [address] [facility]                                  CONSULTATION    PATIENT NAME: Luiz Moser                     :        1970  MED REC NO:   [id number]                            ROOM:       W780  ACCOUNT NO:   [de-identified]                           ADMIT DATE: 2021  PROVIDER:     Misty Soto DO    CONSULT DATE:  2021    RENAL CONSULTATION    HISTORY OF PRESENT ILLNESS:  A 70-year-old  male  admitted to the hospital with an uncertain feeling of his upper chest  wall. The patient had normal coronary arteries on a catheterization in  2020. He does have a history of cardiomyopathy with ejection  fraction of 15% to 20% possibly related to alcohol abuse. The patient  is noncompliant with his medications. He is being treated for  hypertension, but stops these on occasion because it makes him feel  poorly. The patient has known CKD IV. On admission to the hospital,  the patient's serum creatinine was 3.45 with a GFR of 23 mL per minute. The patient has normal electrolytes. Noted was low hemoglobin at 8.4 gm  on admission. The patient is in no distress at this time. He denies  being told of renal disease in the past.  He denies any excessive  nonsteroidal anti-inflammatory medications, gross hematuria or dysuria. The patient's blood pressure was slightly elevated on admission but  normal at the time of my evaluation today. Review of prior records show  that 6 months ago, the patient had a serum creatinine of 2.9 and a GFR  of 28 mL per minute. PAST MEDICAL HISTORY:  Cardiomyopathy possibly related to alcohol,  systolic and diastolic heart failure, CKD IV. PAST SURGICAL HISTORY:  Right hernia repair. HABITS:  Currently smokes a pack of cigarettes a day. Admits to use  alcohol despite being warned of its danger.     CURRENT MEDICATIONS:  He is probably not taking according to _____  history, furosemide 50 mg daily, Coreg 25 mg b.i.d. Corlopam 5 mg  b.i.d., Catapres 0.1 twice a day, Norvasc 10 mg daily, Imdur 120 mg  daily, Apresoline 100 mg 3 times a day, aspirin daily. ALLERGIES:  None. PHYSICAL EXAMINATION:  VITAL SIGNS:  Height 5 feet 9 inches, 194 pounds. Blood pressure at  this time 135/90, heart rate 86, respirations 18. HEENT:  Normocephalic. Pupils equal and reactive to light. Sclerae are  clear. Throat shows no exudates. NECK:  Supple. No JVD or adenopathy. CHEST:  The lungs are relatively clear. No wheezing, rales, rhonchi or  accessory muscle use. CARDIOVASCULAR:  Heart is regular without murmurs, gallops, clicks or  rubs. ABDOMEN:  Soft. No guarding or rigidity. EXTREMITIES:  Showed no edema. The patient moves all limbs. SKIN:  Warm and dry. IMPRESSION:  1.  CKD IV with BLUE, possibly related to cardiorenal syndrome. 2.  Cardiomyopathy, possibly related to alcohol. 3.  History of alcohol abuse. 4.  Cognitive issues. 5.  Noncompliance. 6.  Anemia. PLAN:  Optimize cardiac function. Obtain iron levels and B12 to  evaluate anemia. Check stool for blood. Follow I and O's, daily  weights, avoid nephrotoxic exposure.         Sebastian Shin DO    D: 04/23/2021 8:28:21       T: 04/23/2021 8:38:59     [email or web address]  Job#: [id number]     Doc#: [id number]    CC:

## 2021-04-23 NOTE — CARE COORDINATION
Definition of CHF discussed with patient. Symptoms of heart failure and decompensation discussed. Weight gain of >3 #, edema, difficulty breathing, cough, issues with appetite, fatigue, or difficulty with sleep. He acknowledges having all of these. Causes of CHF reviewed. CAD, MI, HTN, valve dz., infection,  ETOH or drug abuse, or genetic problems. Importance of daily weight and B/P monitoring discussed. Pt to use a calender or notebook to record daily weight. Low sodium diet and fluid intake discussed. Pt was taught how to calculate a fluid restriction. This is to include his ETOH intake. Shown how to read labels for sodium levels, recommended food list provided. Importance of following his physician orders for medications reinforced. Pt advised not to stop or adjust medications without consulting his physician first.  Importance of Flu and pneumonia vaccinations reinforced. Pt encouraged to obtain these. Common CHF medications reviewed as well as avoiding certain other meds (decongestants, NSAIDS)  Instructed to discuss activity recommendations with physician. Pt. Currently smoking. Smoking cessation discussed with patient. 1-800-quit-now number provided. Sample CHF weight documentation form provided. Patient to follow Elba Welch in the CHF clinic. He was encouraged to make arrangements for transportation ahead of his appointment so that he does not miss them. If he can't get to the appointment, he is to call and reschedule. CHF Zones discussed. Importance of staying in \"green\" area stressed. Pt verbalized understanding to call MD ASAP when he reaches the yellow zone, and to call 911 when reaching the red zone. Booklet and zone pamphlet provided to the pt. Patient denies any further questions at this time.    Electronically signed by Casey Gillette RN on 4/23/2021 at 12:00 PM

## 2021-04-23 NOTE — PROGRESS NOTES
positive for symptoms of orthopnea. Also, he reports lower extremity edema and occasional chest discomfort with inspiration. He denies nausea, vomiting, dizziness, lightheadedness, diaphoresis, syncope, fever, chills or recent upper respiratory infection. Due to this worsening shortness of breath he presented to ER for further evaluation.      On presentation to the emergency room, blood pressure was elevated 163/118, heart rate tachycardic 109, respiratory rate 20, pulse ox 98%, temperature 97.8 °F.  Sodium 137, potassium 4.4, chloride 105, total CO2 of 19, BUN elevated 39, creatinine elevated 3.45, GFR low at 23.0, glucose 89.  proBNP elevated at 17,660. Troponin mildly elevated 0.024. WBC 7.9, hemoglobin low at 8.4, hematocrit low at 25.8, platelets elevated at 605. D-dimer elevated 1.69 but no further testing completed in ER to rule out PE. Chest x-ray revealed increased opacity in the left lung base which may represent atelectasis, infiltrate or small effusion. He received Lasix 60 mg IV x1 in ER and was admitted for further evaluation.     At time of evaluation today, patient is resting comfortably and maintaining SPO2 100% on room air but does exhibit conversational dyspnea and occasional pursed lip breathing. He has been resumed on all of his home medications as he had not yet taken any of his BP meds this morning and most recent pressure 145/97. Patient reports not taking his torsemide for at least the past 3 days because he has been having lower extremity cramping which he thought was related to his diuretic. He reports drinking approximately 3 beers every couple of days and per his significant other she reports that he drinks more.     Status post left heart catheterization in December 2020 which revealed normal coronary arteries.   Last echocardiogram in August 2020 revealed severely reduced LV systolic function with EF of 25 to 30%.       He has followed with CHF clinic in the past but was a no-show for his last office visit on 2/2/2021. Review of Systems:   Review of Systems   Constitutional: Negative for activity change and fever. HENT: Negative for congestion. Respiratory: Negative for chest tightness and shortness of breath. Cardiovascular: Negative for chest pain, palpitations and leg swelling. Gastrointestinal: Negative for nausea and vomiting. Genitourinary: Negative for difficulty urinating. Musculoskeletal: Negative for arthralgias. Skin: Negative for color change, pallor and rash. Neurological: Negative for dizziness and syncope. Psychiatric/Behavioral: Negative for agitation and behavioral problems. Physical Examination:    BP (!) 135/90   Pulse 87   Temp 97.9 °F (36.6 °C) (Oral)   Resp 18   Ht 5' 9\" (1.753 m)   Wt 194 lb (88 kg)   SpO2 100%   BMI 28.65 kg/m²    Physical Exam  Constitutional:       General: He is not in acute distress. Appearance: Normal appearance. HENT:      Head: Normocephalic and atraumatic. Neck:      Musculoskeletal: Normal range of motion and neck supple. Cardiovascular:      Rate and Rhythm: Normal rate and regular rhythm. Pulmonary:      Effort: Pulmonary effort is normal. No respiratory distress. Breath sounds: No wheezing, rhonchi or rales. Abdominal:      Palpations: Abdomen is soft. Tenderness: There is no abdominal tenderness. Musculoskeletal: Normal range of motion. Right lower leg: No edema. Left lower leg: No edema. Skin:     General: Skin is warm and dry. Neurological:      General: No focal deficit present. Mental Status: He is alert and oriented to person, place, and time. Cranial Nerves: No cranial nerve deficit.    Psychiatric:         Mood and Affect: Mood normal.         Behavior: Behavior normal.         LABS:  CBC:   Lab Results   Component Value Date    WBC 7.8 04/23/2021    RBC 3.36 04/23/2021    HGB 7.9 04/23/2021    HCT 23.9 04/23/2021    MCV 71.0 04/23/2021 MCH 23.4 04/23/2021    MCHC 33.0 04/23/2021    RDW 17.5 04/23/2021     04/23/2021     CBC with Differential:   Lab Results   Component Value Date    WBC 7.8 04/23/2021    RBC 3.36 04/23/2021    HGB 7.9 04/23/2021    HCT 23.9 04/23/2021     04/23/2021    MCV 71.0 04/23/2021    MCH 23.4 04/23/2021    MCHC 33.0 04/23/2021    RDW 17.5 04/23/2021    LYMPHOPCT 5.8 04/22/2021    MONOPCT 8.1 04/22/2021    BASOPCT 0.9 04/22/2021    MONOSABS 0.6 04/22/2021    LYMPHSABS 0.5 04/22/2021    EOSABS 0.2 04/22/2021    BASOSABS 0.1 04/22/2021     CMP:    Lab Results   Component Value Date     04/22/2021    K 4.4 04/22/2021     04/22/2021    CO2 19 04/22/2021    BUN 39 04/22/2021    CREATININE 3.45 04/22/2021    GFRAA 23.0 04/22/2021    LABGLOM 19.0 04/22/2021    GLUCOSE 89 04/22/2021    PROT 6.4 04/22/2021    LABALBU 3.5 04/22/2021    CALCIUM 9.3 04/22/2021    BILITOT 0.4 04/22/2021    ALKPHOS 103 04/22/2021    AST 20 04/22/2021    ALT 22 04/22/2021     BMP:    Lab Results   Component Value Date     04/22/2021    K 4.4 04/22/2021     04/22/2021    CO2 19 04/22/2021    BUN 39 04/22/2021    LABALBU 3.5 04/22/2021    CREATININE 3.45 04/22/2021    CALCIUM 9.3 04/22/2021    GFRAA 23.0 04/22/2021    LABGLOM 19.0 04/22/2021    GLUCOSE 89 04/22/2021     Magnesium:    Lab Results   Component Value Date    MG 2.2 04/23/2021     Troponin:    Lab Results   Component Value Date    TROPONINI 0.023 04/23/2021       Radiology:  Xr Chest Portable    Result Date: 4/22/2021  Exam: XR CHEST PORTABLE  CLINICAL HISTORY:  cp  COMPARISONS:  Chest x-ray from November 3, 2020 CHEST, 2 VIEWS FINDINGS: The cardiac silhouette is at the upper limits of normal in size. There are mild increased markings throughout both lungs. There is increased density in the left lung base which may represent a combination of atelectasis, infiltrate and/or small effusion. Bones of the thorax appear intact.      Increased opacity in the left lung base may represent atelectasis, infiltrate and/or a small effusion.           [facility] 11/4/20:  Conclusions  Procedure Summary  Normal coronaries. LVEDP=29mmhg  EF of 20%. Recommendations  Aggressive risk factor management. Maximize medical therapy   Angiographic Findings   Cardiac Arteries and Lesion Findings  LMCA: Normal.  LAD: Normal.  LCx: Normal.  RCA: Normal.  Dominance: Right  LV function assessed as:Abnormal.  Ejection Fraction    - Method: LV gram. EF%: 20.    Echocardiogram 4/22/21:  Normal mitral valve structure and function. 2+ MR  Normal tricuspid valve structure and function. 2+ TR  RVSP 62 mmHg  Left ventricular ejection fraction is visually estimated at 35-40%. Pseudonormal filling pattern noted. Large pleural effusion.       EKG 4/22/21: , nonspecific ST/T wave changes, QTc 499ms     Telemetry 4/22/21: SR 90s; HR into 120s earlier (sinus tach)  Telemetry 4/23/21: SR 80s      Assessment:    Active Hospital Problems    Diagnosis Date Noted    CHF (congestive heart failure), NYHA class I, acute on chronic, combined ([facility] [facility] [I50.43] 04/22/2021     1. Acute on chronic systolic CHF  2. Elevated troponin secondary to acute CHF +/- renal failure +/- HTN urgency vs other  3. NICMP EF 25-30% per echo 8/27/21--EF improved to 35-40% per echo 4/22/21  4. CKD  5. HTN urgency  6. Worsening anemia--Hgb 7.9 on 4/23/21  7. Hx ETOH abuse  8. History of renal artery duplex ultrasound on 8/27/2019 which revealed no evidence of renal artery stenosis   9. Valvular heart disease (2+MR/TR)  10. PHTN with RVSP 62mmHg     Plan:  1. Maximize medical therapy-aspirin 81 mg p.o. daily, Coreg 25 mg p.o. twice daily, clonidine increased 0.2mg p.o. twice daily, amlodipine 10 mg p.o. daily, hydralazine 100 mg p.o. 3 times daily, Imdur 120 mg p.o. daily, Corlanor 5 mg p.o. twice daily, Lipitor 80 mg p.o. daily, IV diuresis as tolerated, sublingual nitroglycerin as needed for chest pain  2.  Cardiac/less than 2 g sodium diet recommended  3. 1500 mL daily fluid restriction  4. Echocardiogram completed today and result pending  5. Monitor on telemetry for any tachycardia or bradycardia arrhythmias  6. Maintain potassium greater than 4, magnesium greater than 2  7. GI/DVT prophylaxis  8. Tobacco cessation strongly recommended  9. Monitor H&H closely and monitor for any signs of bleeding given acute worsening anemia. 10. May need to consider GI evaluation for evaluation of anemia -questionable inpatient versus outpatient   11. No indication for ischemic evaluation as patient status post cardiac catheterization on 11/4/2020 which revealed normal coronary arteries  12. Pulmonary recommendations  13. Nephrology recommendations  14. Recommend continued outpatient follow-up with CHF clinic upon discharge  15.  Further recommendations to follow          Electronically signed by MICAH Reed on 4/23/2021 at 10:19 AM

## 2021-04-24 ENCOUNTER — APPOINTMENT (OUTPATIENT)
Dept: GENERAL RADIOLOGY | Age: 51
DRG: 194 | End: 2021-04-24
Payer: MEDICAID

## 2021-04-24 LAB
ANION GAP SERPL CALCULATED.3IONS-SCNC: 12 MEQ/L (ref 9–15)
BUN BLDV-MCNC: 44 MG/DL (ref 6–20)
CALCIUM SERPL-MCNC: 8.7 MG/DL (ref 8.5–9.9)
CHLORIDE BLD-SCNC: 105 MEQ/L (ref 95–107)
CO2: 23 MEQ/L (ref 20–31)
CREAT SERPL-MCNC: 3.68 MG/DL (ref 0.7–1.2)
GFR AFRICAN AMERICAN: 21.4
GFR NON-AFRICAN AMERICAN: 17.7
GLUCOSE BLD-MCNC: 91 MG/DL (ref 70–99)
HCT VFR BLD CALC: 25.5 % (ref 42–52)
HEMOGLOBIN: 8.2 G/DL (ref 14–18)
POTASSIUM SERPL-SCNC: 3.6 MEQ/L (ref 3.4–4.9)
PROCALCITONIN: 0.15 NG/ML (ref 0–0.15)
SODIUM BLD-SCNC: 140 MEQ/L (ref 135–144)

## 2021-04-24 PROCEDURE — 71046 X-RAY EXAM CHEST 2 VIEWS: CPT

## 2021-04-24 PROCEDURE — 85014 HEMATOCRIT: CPT

## 2021-04-24 PROCEDURE — 6370000000 HC RX 637 (ALT 250 FOR IP): Performed by: INTERNAL MEDICINE

## 2021-04-24 PROCEDURE — 6370000000 HC RX 637 (ALT 250 FOR IP): Performed by: PHYSICIAN ASSISTANT

## 2021-04-24 PROCEDURE — 99233 SBSQ HOSP IP/OBS HIGH 50: CPT | Performed by: INTERNAL MEDICINE

## 2021-04-24 PROCEDURE — 2580000003 HC RX 258: Performed by: EMERGENCY MEDICINE

## 2021-04-24 PROCEDURE — 85018 HEMOGLOBIN: CPT

## 2021-04-24 PROCEDURE — 84145 PROCALCITONIN (PCT): CPT

## 2021-04-24 PROCEDURE — 80048 BASIC METABOLIC PNL TOTAL CA: CPT

## 2021-04-24 PROCEDURE — 36415 COLL VENOUS BLD VENIPUNCTURE: CPT

## 2021-04-24 PROCEDURE — 6370000000 HC RX 637 (ALT 250 FOR IP): Performed by: EMERGENCY MEDICINE

## 2021-04-24 PROCEDURE — 6360000002 HC RX W HCPCS: Performed by: PHYSICIAN ASSISTANT

## 2021-04-24 PROCEDURE — 2060000000 HC ICU INTERMEDIATE R&B

## 2021-04-24 RX ADMIN — CARVEDILOL 25 MG: 25 TABLET, FILM COATED ORAL at 08:42

## 2021-04-24 RX ADMIN — Medication 10 ML: at 20:43

## 2021-04-24 RX ADMIN — ISOSORBIDE MONONITRATE 120 MG: 60 TABLET, EXTENDED RELEASE ORAL at 08:42

## 2021-04-24 RX ADMIN — FUROSEMIDE 40 MG: 10 INJECTION, SOLUTION INTRAVENOUS at 08:42

## 2021-04-24 RX ADMIN — CARVEDILOL 25 MG: 25 TABLET, FILM COATED ORAL at 20:43

## 2021-04-24 RX ADMIN — Medication 10 ML: at 08:43

## 2021-04-24 RX ADMIN — HYDRALAZINE HYDROCHLORIDE 100 MG: 100 TABLET, FILM COATED ORAL at 20:43

## 2021-04-24 RX ADMIN — CLONIDINE HYDROCHLORIDE 0.2 MG: 0.1 TABLET ORAL at 20:43

## 2021-04-24 RX ADMIN — ATORVASTATIN CALCIUM 80 MG: 40 TABLET, FILM COATED ORAL at 20:43

## 2021-04-24 RX ADMIN — Medication 8 ML: at 08:21

## 2021-04-24 ASSESSMENT — PAIN SCALES - GENERAL
PAINLEVEL_OUTOF10: 0
PAINLEVEL_OUTOF10: 0

## 2021-04-24 ASSESSMENT — ENCOUNTER SYMPTOMS
WHEEZING: 0
COUGH: 0
NAUSEA: 0
SHORTNESS OF BREATH: 0
STRIDOR: 0
DIARRHEA: 0
VOMITING: 0
BLOOD IN STOOL: 0

## 2021-04-24 NOTE — PROGRESS NOTES
[id number]    Referring Physician  Number   Date of Birth  1970    Sonographer         Jarad Draper                                                   DAYANA   Age            52 year(s)    [place] [id number] [facility]                               Physician           Landy Tejada MD  Procedure Type of Study   TTE procedure:ECHO COMPLETE 2D W/DOP W/COLOR. Procedure Date Date: 04/22/2021 Start: 09:18 AM Study Location: ER Technical Quality: Good visualization Indications:Dyspnea. Patient Status: STAT Height: 69 inches Weight: 199 pounds BSA: 2.06 m^2 BMI: 29.39 kg/m^2 BP: 173/125 mmHg  Conclusions   Summary  Normal mitral valve structure and function. 2+ MR  Normal tricuspid valve structure and function. 2+ TR  RVSP 62 mmHg  Left ventricular ejection fraction is visually estimated at 35-40%. Pseudonormal filling pattern noted. Large pleural effusion. Signature   ----------------------------------------------------------------  Electronically signed by Landy Tejada MD(Interpreting  physician) on 04/22/2021 11:44 AM  ----------------------------------------------------------------   Findings  Left Ventricle Left ventricular ejection fraction is visually estimated at 35-40%. Pseudonormal filling pattern noted. Right Ventricle Normal right ventricle structure and function. Normal right ventricle systolic pressure. Left Atrium Moderately dilated left atrium. Right Atrium Normal right atrium. Mitral Valve Normal mitral valve structure and function. 2+ MR Tricuspid Valve Normal tricuspid valve structure and function. 2+ TR RVSP 62 mmHg Aortic Valve Normal aortic valve structure and function. Pulmonic Valve The pulmonic valve was not well visualized . Pericardial Effusion No evidence of pericardial effusion. Pleural Effusion Large pleural effusion. Aorta \ Miscellaneous The aorta is within normal limits.  M-Mode Measurements (cm)   LVIDd: 6.92 cm                         LVIDs: 5.62 cm  IVSd: 0.89 cm                          IVSs: 2.02 cm  LVPWd: 1.54 cm                         LVPWs: 1.5 cm  Rt. Vent.  Dimension: 1.21 cm           AO Root Dimension: 4.43 cm                                         ACS: 2.46 cm                                         LA: 3.41 cm                                         LVOT: 2.28 cm  Doppler Measurements:   AV Velocity:0.03 m/s  AV Peak Gradient: 3.29 mmHg  AV Mean Gradient: 1.49 mmHg  AV Area (Continuity):3.29 cm^2  TR Velocity:3.46 m/s  TR Gradient:47.94 mmHg  Valves  Mitral Valve   MR Velocity: 4.81 m/s   Tissue Doppler   E' Septal Velocity: 0.05 m/s  E' Lateral Velocity: 0.06 m/s   Aortic Valve   Peak Velocity: 0.91 m/s                Mean Velocity: 0.54 m/s  Peak Gradient: 3.29 mmHg               Mean Gradient: 1.49 mmHg  Area (continuity): 3.29 cm^2  AV VTI: 11.42 cm   Cusp Separation: 2.46 cm   Tricuspid Valve   TR Velocity: 3.46 m/s              TR Gradient: 47.94 mmHg   Pulmonic Valve   Peak Velocity: 0.57 m/s             Peak Gradient: 1.29 mmHg   LVOT   Peak Velocity: 0.6 m/s               Mean Velocity: 0.35 m/s  Peak Gradient: 1.38 mmHg             Mean Gradient: 0.61 mmHg  LVOT Diameter: 2.28 cm               LVOT VTI: 9.22 cm  Structures  Left Atrium   LA Dimension: 3.41 cm                        LA Area: 22.23 cm^2  LA/Aorta: 0.77  LA Volume/Index: 98.76 ml /48 m^2   Left Ventricle   Diastolic Dimension: 7.19 cm          Systolic Dimension: 2.03 cm  Septum Diastolic: 8.97 cm             Septum Systolic: 8.19 cm  PW Diastolic: 0.12 cm                 PW Systolic: 1.5 cm                                        FS: 18.8 %  LV EDV/LV EDV Index: 248.89 ml/121    LV ESV/LV ESV Index: 154.72 ml/75  m^2                                   m^2  EF Calculated: 37.8 %                 LV Length: 8.53 cm   LVOT Diameter: 2.28 cm   Right Ventricle   Diastolic Dimension: 6.26 cm  Aorta/ Miscellaneous Aorta   Aortic Root: 4.43 cm  LVOT Diameter: 2.28 cm      Xr Chest Portable    Result Date: 4/22/2021  Exam: XR CHEST PORTABLE  CLINICAL HISTORY:  cp  COMPARISONS:  Chest x-ray from November 3, 2020 CHEST, 2 VIEWS FINDINGS: The cardiac silhouette is at the upper limits of normal in size. There are mild increased markings throughout both lungs. There is increased density in the left lung base which may represent a combination of atelectasis, infiltrate and/or small effusion. Bones of the thorax appear intact. Increased opacity in the left lung base may represent atelectasis, infiltrate and/or a small effusion. Chest x-ray shows moderate-sized left-sided pleural effusion        IMPRESSION AND SUGGESTION:  Patient is at risk due to   · Left-sided pleural effusion, likely secondary to underlying heart failure patient did have small pleural effusion on the left on CT scan from January of this year  · Nonischemic cardiomyopathy  · Chronic kidney disease    Recommendation  · Chest x-ray PA and lateral  · If effusion persists will obtain CT scan  · Check procalcitonin  · Might need thoracentesis  · Continue cardioprotective medication  · Continue diuretic, watch renal function and monitor electrolytes  · Watch renal function and avoid volume overload     I spent 35 min with this patient, greater the 50% of this time was spent in counseling and/or coordinating of care.     Electronically signed by Cuate Vogt MD,  FCCP   on 4/24/2021 at 11:52 AM

## 2021-04-24 NOTE — PROGRESS NOTES
Progress Note  Patient: Esther Clark  Unit/Bed: X279/E293-13  YOB: 1970  MRN: [id number]  Acct: [de-identified]   Admitting Diagnosis: CHF (congestive heart failure), NYHA class I, acute on chronic, combined ([facility] [I50.43]  Date:  4/22/2021  Hospital Day: 2    Chief Complaint:  CHF    Subjective  Diuresed well with IV diuretic. Almost 4 L negative balance since admission. Hemoglobin is 8.2. Creatinine 3.6 breathing better. Echo shows EF of 35 to 40% 2+ MR 2+ TR moderately elevated ventricular systolic pressure at 62 mmHg. Review of Systems:   Review of Systems   Constitutional: Negative for diaphoresis. HENT: Negative for nosebleeds. Respiratory: Negative for cough, shortness of breath, wheezing and stridor. Cardiovascular: Negative for chest pain, palpitations and leg swelling. Gastrointestinal: Negative for blood in stool, diarrhea, nausea and vomiting. Musculoskeletal: Negative for myalgias. Neurological: Negative for dizziness, seizures, weakness and headaches. Hematological: Does not bruise/bleed easily. Psychiatric/Behavioral: Negative for suicidal ideas. The patient is not nervous/anxious. All other systems reviewed and are negative. Physical Examination:    /76   Pulse 82   Temp 97.9 °F (36.6 °C) (Oral)   Resp 18   Ht 5' 9\" (1.753 m)   Wt 190 lb (86.2 kg)   SpO2 100%   BMI 28.06 kg/m²    Physical Exam  Constitutional:       Appearance: He is well-developed. HENT:      Head: Normocephalic and atraumatic. Right Ear: External ear normal.      Left Ear: External ear normal.   Eyes:      Conjunctiva/sclera: Conjunctivae normal.      Pupils: Pupils are equal, round, and reactive to light. Neck:      Musculoskeletal: Normal range of motion and neck supple. Cardiovascular:      Rate and Rhythm: Normal rate and regular rhythm. Heart sounds: Normal heart sounds.    Pulmonary:      Effort: Pulmonary effort is normal.      Breath sounds: Normal

## 2021-04-25 VITALS
OXYGEN SATURATION: 100 % | DIASTOLIC BLOOD PRESSURE: 73 MMHG | BODY MASS INDEX: 28.14 KG/M2 | TEMPERATURE: 97.9 F | HEIGHT: 69 IN | WEIGHT: 190 LBS | SYSTOLIC BLOOD PRESSURE: 120 MMHG | RESPIRATION RATE: 18 BRPM | HEART RATE: 85 BPM

## 2021-04-25 LAB
ALP BLD-CCNC: 90 U/L (ref 35–104)
ALT SERPL-CCNC: 38 U/L (ref 0–41)
ANION GAP SERPL CALCULATED.3IONS-SCNC: 15 MEQ/L (ref 9–15)
AST SERPL-CCNC: 36 U/L (ref 0–40)
BUN BLDV-MCNC: 42 MG/DL (ref 6–20)
CALCIUM SERPL-MCNC: 8.1 MG/DL (ref 8.5–9.9)
CHLORIDE BLD-SCNC: 100 MEQ/L (ref 95–107)
CO2: 24 MEQ/L (ref 20–31)
CREAT SERPL-MCNC: 3.73 MG/DL (ref 0.7–1.2)
FERRITIN: 59.8 NG/ML (ref 30–400)
GAMMA GLUTAMYL TRANSFERASE: 25 U/L (ref 0–60)
GFR AFRICAN AMERICAN: 21
GFR NON-AFRICAN AMERICAN: 17.4
GLUCOSE BLD-MCNC: 90 MG/DL (ref 70–99)
PARATHYROID HORMONE INTACT: 111.9 PG/ML (ref 15–65)
PHOSPHORUS: 5.2 MG/DL (ref 2.3–4.8)
POTASSIUM SERPL-SCNC: 3.7 MEQ/L (ref 3.4–4.9)
SODIUM BLD-SCNC: 139 MEQ/L (ref 135–144)

## 2021-04-25 PROCEDURE — 84100 ASSAY OF PHOSPHORUS: CPT

## 2021-04-25 PROCEDURE — 36415 COLL VENOUS BLD VENIPUNCTURE: CPT

## 2021-04-25 PROCEDURE — 84450 TRANSFERASE (AST) (SGOT): CPT

## 2021-04-25 PROCEDURE — 99239 HOSP IP/OBS DSCHRG MGMT >30: CPT | Performed by: INTERNAL MEDICINE

## 2021-04-25 PROCEDURE — 6370000000 HC RX 637 (ALT 250 FOR IP): Performed by: INTERNAL MEDICINE

## 2021-04-25 PROCEDURE — 6360000002 HC RX W HCPCS: Performed by: PHYSICIAN ASSISTANT

## 2021-04-25 PROCEDURE — 84460 ALANINE AMINO (ALT) (SGPT): CPT

## 2021-04-25 PROCEDURE — 83970 ASSAY OF PARATHORMONE: CPT

## 2021-04-25 PROCEDURE — 84075 ASSAY ALKALINE PHOSPHATASE: CPT

## 2021-04-25 PROCEDURE — 99232 SBSQ HOSP IP/OBS MODERATE 35: CPT | Performed by: INTERNAL MEDICINE

## 2021-04-25 PROCEDURE — 2580000003 HC RX 258: Performed by: EMERGENCY MEDICINE

## 2021-04-25 PROCEDURE — 82728 ASSAY OF FERRITIN: CPT

## 2021-04-25 PROCEDURE — 82977 ASSAY OF GGT: CPT

## 2021-04-25 PROCEDURE — 80048 BASIC METABOLIC PNL TOTAL CA: CPT

## 2021-04-25 RX ORDER — FUROSEMIDE 40 MG/1
40 TABLET ORAL DAILY
Status: DISCONTINUED | OUTPATIENT
Start: 2021-04-25 | End: 2021-04-25 | Stop reason: HOSPADM

## 2021-04-25 RX ORDER — CLONIDINE HYDROCHLORIDE 0.2 MG/1
0.2 TABLET ORAL 2 TIMES DAILY
Qty: 60 TABLET | Refills: 3 | Status: ON HOLD | OUTPATIENT
Start: 2021-04-25 | End: 2021-07-14 | Stop reason: HOSPADM

## 2021-04-25 RX ORDER — ATORVASTATIN CALCIUM 80 MG/1
80 TABLET, FILM COATED ORAL NIGHTLY
Qty: 30 TABLET | Refills: 3 | Status: ON HOLD | OUTPATIENT
Start: 2021-04-25 | End: 2021-07-12 | Stop reason: SINTOL

## 2021-04-25 RX ADMIN — AMLODIPINE BESYLATE 10 MG: 10 TABLET ORAL at 08:32

## 2021-04-25 RX ADMIN — FUROSEMIDE 40 MG: 10 INJECTION, SOLUTION INTRAVENOUS at 08:32

## 2021-04-25 RX ADMIN — Medication 10 ML: at 08:32

## 2021-04-25 RX ADMIN — CARVEDILOL 25 MG: 25 TABLET, FILM COATED ORAL at 08:32

## 2021-04-25 RX ADMIN — ISOSORBIDE MONONITRATE 120 MG: 60 TABLET, EXTENDED RELEASE ORAL at 08:32

## 2021-04-25 ASSESSMENT — PAIN SCALES - GENERAL: PAINLEVEL_OUTOF10: 0

## 2021-04-25 NOTE — PROGRESS NOTES
3.6 3.7    100   CO2 23 24   BUN 44* 42*   CREATININE 3.68* 3.73*   GLUCOSE 91 90   CALCIUM 8.7 8.1*   ALKPHOS  --  90   AST  --  36   ALT  --  38   LABGLOM 17.7* 17.4*   GFRAA 21.4* 21.0*       MV Settings:          No results for input(s): PHART, LFT0XII, PO2ART, MCD9JHV, BEART, N3XQPDQX in the last 72 hours.     O2 Device: Nasal cannula  O2 Flow Rate (L/min): 0 L/min    DIET LOW SODIUM 2 GM; No Caffeine     MEDICATIONS during current hospitalization:    Continuous Infusions:   sodium chloride         Scheduled Meds:   furosemide  40 mg Oral Daily    sodium chloride flush  5-40 mL Intravenous 2 times per day    [Held by provider] aspirin  81 mg Oral Daily    atorvastatin  80 mg Oral Nightly    nicotine  1 patch Transdermal Daily    hydrALAZINE  100 mg Oral TID    isosorbide mononitrate  120 mg Oral Daily    amLODIPine  10 mg Oral Daily    carvedilol  25 mg Oral BID    ivabradine  5 mg Oral BID WC    [Held by provider] torsemide  50 mg Oral Daily    cloNIDine  0.2 mg Oral BID       PRN Meds:sodium chloride flush, sodium chloride, promethazine **OR** ondansetron, acetaminophen **OR** acetaminophen, polyethylene glycol, nitroGLYCERIN, melatonin    Radiology  Echocardiogram Complete 2d With Doppler With Color    Result Date: 4/23/2021  Transthoracic Echocardiography Report (TTE)  Demographics   Patient Name   Gus Casper  Gender              Male                 J   Patient Number [id number]      Race                Black                                Ethnicity   Visit Number   [id number]     Room Number         Z281   Corporate ID                 Date of Study       04/22/2021   Accession      [id number]    Referring Physician  Number   Date of Birth  1970    Sonographer         Spencer Del Cid LPN   Age            52 year(s)    Interpreting        [facility]) Cardiology                               Physician           David Wilkinson MD Procedure Type of Study   TTE procedure:ECHO COMPLETE 2D W/DOP W/COLOR. Procedure Date Date: 04/22/2021 Start: 09:18 AM Study Location: ER Technical Quality: Good visualization Indications:Dyspnea. Patient Status: STAT Height: 69 inches Weight: 199 pounds BSA: 2.06 m^2 BMI: 29.39 kg/m^2 BP: 173/125 mmHg  Conclusions   Summary  Normal mitral valve structure and function. 2+ MR  Normal tricuspid valve structure and function. 2+ TR  RVSP 62 mmHg  Left ventricular ejection fraction is visually estimated at 35-40%. Pseudonormal filling pattern noted. Large pleural effusion. Signature   ----------------------------------------------------------------  Electronically signed by Eugenia Garcia MD(Interpreting  physician) on 04/22/2021 11:44 AM  ----------------------------------------------------------------   Findings  Left Ventricle Left ventricular ejection fraction is visually estimated at 35-40%. Pseudonormal filling pattern noted. Right Ventricle Normal right ventricle structure and function. Normal right ventricle systolic pressure. Left Atrium Moderately dilated left atrium. Right Atrium Normal right atrium. Mitral Valve Normal mitral valve structure and function. 2+ MR Tricuspid Valve Normal tricuspid valve structure and function. 2+ TR RVSP 62 mmHg Aortic Valve Normal aortic valve structure and function. Pulmonic Valve The pulmonic valve was not well visualized . Pericardial Effusion No evidence of pericardial effusion. Pleural Effusion Large pleural effusion. Aorta \ Miscellaneous The aorta is within normal limits. M-Mode Measurements (cm)   LVIDd: 6.92 cm                         LVIDs: 5.62 cm  IVSd: 0.89 cm                          IVSs: 2.02 cm  LVPWd: 1.54 cm                         LVPWs: 1.5 cm  Rt. Vent.  Dimension: 1.21 cm           AO Root Dimension: 4.43 cm                                         ACS: 2.46 cm                                         LA: 3.41 cm LVOT: 2.28 cm  Doppler Measurements:   AV Velocity:0.03 m/s  AV Peak Gradient: 3.29 mmHg  AV Mean Gradient: 1.49 mmHg  AV Area (Continuity):3.29 cm^2  TR Velocity:3.46 m/s  TR Gradient:47.94 mmHg  Valves  Mitral Valve   MR Velocity: 4.81 m/s   Tissue Doppler   E' Septal Velocity: 0.05 m/s  E' Lateral Velocity: 0.06 m/s   Aortic Valve   Peak Velocity: 0.91 m/s                Mean Velocity: 0.54 m/s  Peak Gradient: 3.29 mmHg               Mean Gradient: 1.49 mmHg  Area (continuity): 3.29 cm^2  AV VTI: 11.42 cm   Cusp Separation: 2.46 cm   Tricuspid Valve   TR Velocity: 3.46 m/s              TR Gradient: 47.94 mmHg   Pulmonic Valve   Peak Velocity: 0.57 m/s             Peak Gradient: 1.29 mmHg   LVOT   Peak Velocity: 0.6 m/s               Mean Velocity: 0.35 m/s  Peak Gradient: 1.38 mmHg             Mean Gradient: 0.61 mmHg  LVOT Diameter: 2.28 cm               LVOT VTI: 9.22 cm  Structures  Left Atrium   LA Dimension: 3.41 cm                        LA Area: 22.23 cm^2  LA/Aorta: 0.77  LA Volume/Index: 98.76 ml /48 m^2   Left Ventricle   Diastolic Dimension: 3.23 cm          Systolic Dimension: 0.55 cm  Septum Diastolic: 5.29 cm             Septum Systolic: 0.46 cm  PW Diastolic: 3.56 cm                 PW Systolic: 1.5 cm                                        FS: 18.8 %  LV EDV/LV EDV Index: 248.89 ml/121    LV ESV/LV ESV Index: 154.72 ml/75  m^2                                   m^2  EF Calculated: 37.8 %                 LV Length: 8.53 cm   LVOT Diameter: 2.28 cm   Right Ventricle   Diastolic Dimension: 2.82 cm  Aorta/ Miscellaneous Aorta   Aortic Root: 4.43 cm  LVOT Diameter: 2.28 cm      Xr Chest Portable    Result Date: 4/22/2021  Exam: XR CHEST PORTABLE  CLINICAL HISTORY:  cp  COMPARISONS:  Chest x-ray from November 3, 2020 CHEST, 2 VIEWS FINDINGS: The cardiac silhouette is at the upper limits of normal in size. There are mild increased markings throughout both lungs.  There is increased density in the left lung base which may represent a combination of atelectasis, infiltrate and/or small effusion. Bones of the thorax appear intact. Increased opacity in the left lung base may represent atelectasis, infiltrate and/or a small effusion.      Chest x-ray shows moderate-sized left-sided pleural effusion    Chest x-ray reviewed by me, improved left-sided pleural effusion    IMPRESSION AND SUGGESTION:  Patient is at risk due to   · Left-sided pleural effusion, likely secondary to underlying heart failure patient did have small pleural effusion on the left on CT scan from January of this year  · Nonischemic cardiomyopathy  · Chronic kidney disease    Recommendation  · Procalcitonin is negative  · Continue cardioprotective medication  · Continue diuretic, watch renal function and monitor electrolytes  · Watch renal function and avoid volume overload      Electronically signed by Daniela Renee MD,  FCCP   on 4/25/2021 at 1:53 PM

## 2021-04-25 NOTE — PROGRESS NOTES
Renal Progress Note  Assessment and Plan:   52years old male presented for further evaluation of chest discomfort with cardiac diagnosis of systolic decompensated heart failure  Chronic comorbidities does include alcohol abuse and a mention of cardiomyopathy     Consulted for acute kidney injury on top of chronic kidney disease     Acute component could be secondary to cardiorenal syndrome type I     Chronic component could be as well cardiorenal syndrome type II     In alcoholic patient hepatorenal syndrome type B should be considered     No associated major electrolyte or acid-base imbalance    No increase in extracellular fluid volume    No significant hyperparathyroidism requiring active management PTH intact is 111    Associated iron deficiency anemia with positive stool for occult blood   1.     No clinical or laboratory indication to initiate any renal replacement therapy no therapeutic intervention for today  Patient Active Problem List:     Hypertensive urgency     Cardiomyopathy ([address]     Systolic and diastolic CHF, acute (HCC)     BLUE (acute kidney injury) ([address]     Abnormal EKG     Chronic combined systolic and diastolic CHF (congestive heart failure) ([address]     Acute decompensated heart failure (HCC)     Chest pain     Pulmonary edema, acute (HCC)     NSTEMI (non-ST elevated myocardial infarction) ([address]     Inguinal hernia of right side without obstruction or gangrene     CHF (congestive heart failure), NYHA class I, acute on chronic, combined ([address]     Systolic congestive heart failure (HCC)      Subjective:   Admit Date: 4/22/2021    Interval History: Seen and examined uneventful night expressed no uremic related or fluid volume overload related symptoms      Medications:   Scheduled Meds:   furosemide  40 mg Oral Daily    sodium chloride flush  5-40 mL Intravenous 2 times per day    [Held by provider] aspirin  81 mg Oral Daily    atorvastatin  80 mg Oral Nightly    nicotine  1 patch Transdermal with no deficit      Electronically signed by Luther Luna MD on 4/25/2021 at 1:01 PM

## 2021-04-25 NOTE — DISCHARGE INSTR - DIET

## 2021-04-25 NOTE — FLOWSHEET NOTE
Pt's BP rechecked and WNL. Pt reports he feels \"great\" and is ready to go home. Discharge instructions reviewed and understanding indicated. Pt to follow up at CHF clinic and advised on the importance of doing so. Pt states he's limited by not having a ride, but will have a friend take him to appointment. Diet instructions and daily weights also reviewed. Walked from unit to main entrance, refused wheelchair escort.  Electronically signed by Faustino Rao RN on 4/25/2021 at 4:00 PM

## 2021-04-25 NOTE — FLOWSHEET NOTE
Responded to pt's call light. Up in chair, reports feeling dizzy and \"seeing stars\" BP checked and low at 90/57. Pt back to bed, steady gait and now resting in bed. Dr. Rachelle Valdes on the unit and updated. Pt dressed. IV out, monitor off. Ok to leave off. Monitor pt for 2 hours and recheck BP. Will decide on discharge after that.  Electronically signed by Faraz Pa RN on 4/25/2021 at 2:51 PM

## 2021-04-25 NOTE — DISCHARGE SUMMARY
sounds. Abdominal:      General: Bowel sounds are normal.      Palpations: Abdomen is soft. Musculoskeletal: Normal range of motion. Skin:     General: Skin is warm and dry. Neurological:      Mental Status: He is alert and oriented to person, place, and time. Deep Tendon Reflexes: Reflexes are normal and symmetric. Psychiatric:         Behavior: Behavior normal.         Thought Content:  Thought content normal.         Judgment: Judgment normal.         Medications:  Current Discharge Medication List      START taking these medications    Details   atorvastatin (LIPITOR) 80 MG tablet Take 1 tablet by mouth nightly  Qty: 30 tablet, Refills: 3         CONTINUE these medications which have CHANGED    Details   cloNIDine (CATAPRES) 0.2 MG tablet Take 1 tablet by mouth 2 times daily  Qty: 60 tablet, Refills: 3         CONTINUE these medications which have NOT CHANGED    Details   torsemide (DEMADEX) 10 MG tablet take 5 tablets by mouth daily  Qty: 30 tablet, Refills: 5      carvedilol (COREG) 25 MG tablet Take 1 tablet by mouth 2 times daily HOLD for SBP<100 or HR<60  Qty: 60 tablet, Refills: 3      ivabradine (CORLANOR) 5 MG TABS tablet Take 1 tablet by mouth 2 times daily (with meals)  Qty: 60 tablet, Refills: 3      amLODIPine (NORVASC) 10 MG tablet Take 1 tablet by mouth daily  Qty: 30 tablet, Refills: 5    Comments: Note dosage increase from 5 mg to 10 mg  Associated Diagnoses: Essential hypererythropoietinemia      isosorbide mononitrate (IMDUR) 120 MG extended release tablet Take 1 tablet by mouth daily  Qty: 30 tablet, Refills: 3      hydrALAZINE (APRESOLINE) 100 MG tablet Take 1 tablet by mouth 3 times daily  Qty: 90 tablet, Refills: 3      nicotine (NICODERM CQ) 21 MG/24HR Place 1 patch onto the skin daily  Qty: 30 patch, Refills: 3      aspirin 81 MG EC tablet Take 1 tablet by mouth daily  Qty: 30 tablet, Refills: 3             Significant Diagnostics:   Radiology: Echocardiogram Complete 2d With Doppler With Color    Result Date: 4/23/2021  Transthoracic Echocardiography Report (TTE)  Demographics   Patient Name   Ramiro Rodriguez  Gender              Male                 J   Patient Number [id number]      Race                Black                                Ethnicity   Visit Number   [id number]     Room Number         M002   Corporate ID                 Date of Study       04/22/2021   Accession      [id number]    Referring Physician  Number   Date of Birth  1970    Sonographer         Lan AntoniDAYANA carver   Age            52 year(s)    [place] [address] Cardiology                               Physician           Shirley Ash MD  Procedure Type of Study   TTE procedure:ECHO COMPLETE 2D W/DOP W/COLOR. Procedure Date Date: 04/22/2021 Start: 09:18 AM Study Location: ER Technical Quality: Good visualization Indications:Dyspnea. Patient Status: STAT Height: 69 inches Weight: 199 pounds BSA: 2.06 m^2 BMI: 29.39 kg/m^2 BP: 173/125 mmHg  Conclusions   Summary  Normal mitral valve structure and function. 2+ MR  Normal tricuspid valve structure and function. 2+ TR  RVSP 62 mmHg  Left ventricular ejection fraction is visually estimated at 35-40%. Pseudonormal filling pattern noted. Large pleural effusion. Signature   ----------------------------------------------------------------  Electronically signed by Shirley Ash MD(Interpreting  physician) on 04/22/2021 11:44 AM  ----------------------------------------------------------------   Findings  Left Ventricle Left ventricular ejection fraction is visually estimated at 35-40%. Pseudonormal filling pattern noted. Right Ventricle Normal right ventricle structure and function. Normal right ventricle systolic pressure. Left Atrium Moderately dilated left atrium. Right Atrium Normal right atrium. Mitral Valve Normal mitral valve structure and function.  2+ MR Tricuspid Valve Normal tricuspid valve structure and function. 2+ TR RVSP 62 mmHg Aortic Valve Normal aortic valve structure and function. Pulmonic Valve The pulmonic valve was not well visualized . Pericardial Effusion No evidence of pericardial effusion. Pleural Effusion Large pleural effusion. Aorta \ Miscellaneous The aorta is within normal limits. M-Mode Measurements (cm)   LVIDd: 6.92 cm                         LVIDs: 5.62 cm  IVSd: 0.89 cm                          IVSs: 2.02 cm  LVPWd: 1.54 cm                         LVPWs: 1.5 cm  Rt. Vent.  Dimension: 1.21 cm           AO Root Dimension: 4.43 cm                                         ACS: 2.46 cm                                         LA: 3.41 cm                                         LVOT: 2.28 cm  Doppler Measurements:   AV Velocity:0.03 m/s  AV Peak Gradient: 3.29 mmHg  AV Mean Gradient: 1.49 mmHg  AV Area (Continuity):3.29 cm^2  TR Velocity:3.46 m/s  TR Gradient:47.94 mmHg  Valves  Mitral Valve   MR Velocity: 4.81 m/s   Tissue Doppler   E' Septal Velocity: 0.05 m/s  E' Lateral Velocity: 0.06 m/s   Aortic Valve   Peak Velocity: 0.91 m/s                Mean Velocity: 0.54 m/s  Peak Gradient: 3.29 mmHg               Mean Gradient: 1.49 mmHg  Area (continuity): 3.29 cm^2  AV VTI: 11.42 cm   Cusp Separation: 2.46 cm   Tricuspid Valve   TR Velocity: 3.46 m/s              TR Gradient: 47.94 mmHg   Pulmonic Valve   Peak Velocity: 0.57 m/s             Peak Gradient: 1.29 mmHg   LVOT   Peak Velocity: 0.6 m/s               Mean Velocity: 0.35 m/s  Peak Gradient: 1.38 mmHg             Mean Gradient: 0.61 mmHg  LVOT Diameter: 2.28 cm               LVOT VTI: 9.22 cm  Structures  Left Atrium   LA Dimension: 3.41 cm                        LA Area: 22.23 cm^2  LA/Aorta: 0.77  LA Volume/Index: 98.76 ml /48 m^2   Left Ventricle   Diastolic Dimension: 3.18 cm          Systolic Dimension: 3.80 cm  Septum Diastolic: 8.92 cm             Septum Systolic: 8.52 cm  PW Diastolic: 9.07 cm PW Systolic: 1.5 cm                                        FS: 18.8 %  LV EDV/LV EDV Index: 248.89 ml/121    LV ESV/LV ESV Index: 154.72 ml/75  m^2                                   m^2  EF Calculated: 37.8 %                 LV Length: 8.53 cm   LVOT Diameter: 2.28 cm   Right Ventricle   Diastolic Dimension: 5.86 cm  Aorta/ Miscellaneous Aorta   Aortic Root: 4.43 cm  LVOT Diameter: 2.28 cm      Xr Chest Portable    Result Date: 4/22/2021  Exam: XR CHEST PORTABLE  CLINICAL HISTORY:  cp  COMPARISONS:  Chest x-ray from November 3, 2020 CHEST, 2 VIEWS FINDINGS: The cardiac silhouette is at the upper limits of normal in size. There are mild increased markings throughout both lungs. There is increased density in the left lung base which may represent a combination of atelectasis, infiltrate and/or small effusion. Bones of the thorax appear intact. Increased opacity in the left lung base may represent atelectasis, infiltrate and/or a small effusion.        Labs:   Recent Results (from the past 72 hour(s))   EKG 12 lead    Collection Time: 04/23/21  2:09 AM   Result Value Ref Range    Ventricular Rate 87 BPM    Atrial Rate 87 BPM    P-R Interval 204 ms    QRS Duration 92 ms    Q-T Interval 426 ms    QTc Calculation (Bazett) 512 ms    P Axis 48 degrees    R Axis -55 degrees    T Axis 130 degrees   Troponin    Collection Time: 04/23/21  6:15 AM   Result Value Ref Range    Troponin 0.023 (HH) 0.000 - 0.010 ng/mL   Brain natriuretic peptide    Collection Time: 04/23/21  6:15 AM   Result Value Ref Range    Pro-BNP 11,296 pg/mL   Magnesium    Collection Time: 04/23/21  6:15 AM   Result Value Ref Range    Magnesium 2.2 1.7 - 2.4 mg/dL   Lipid panel - fasting    Collection Time: 04/23/21  6:15 AM   Result Value Ref Range    Cholesterol, Total 108 0 - 199 mg/dL    Triglycerides 73 0 - 150 mg/dL    HDL 53 40 - 59 mg/dL    LDL Calculated 40 0 - 129 mg/dL   CBC    Collection Time: 04/23/21  6:15 AM   Result Value Ref Range    WBC 7.8 4.8 - 10.8 K/uL    RBC 3.36 (L) 4.70 - 6.10 M/uL    Hemoglobin 7.9 (L) 14.0 - 18.0 g/dL    Hematocrit 23.9 (L) 42.0 - 52.0 %    MCV 71.0 (L) 80.0 - 100.0 fL    MCH 23.4 (L) 27.0 - 31.3 pg    MCHC 33.0 33.0 - 37.0 %    RDW 17.5 (H) 11.5 - 14.5 %    Platelets 361 (H) 890 - 400 K/uL   Iron and TIBC    Collection Time: 04/23/21  8:51 AM   Result Value Ref Range    Iron 16 (L) 59 - 158 ug/dL    TIBC 287 178 - 450 ug/dL    Iron Saturation 6 (L) 11 - 46 %   Vitamin B12 & Folate    Collection Time: 04/23/21  8:51 AM   Result Value Ref Range    Vitamin B-12 418 232 - 1245 pg/mL    Folate 13.3 7.3 - 26.1 ng/mL   BASIC METABOLIC PANEL    Collection Time: 04/23/21  8:51 AM   Result Value Ref Range    Sodium 142 135 - 144 mEq/L    Potassium 3.4 3.4 - 4.9 mEq/L    Chloride 103 95 - 107 mEq/L    CO2 22 20 - 31 mEq/L    Anion Gap 17 (H) 9 - 15 mEq/L    Glucose 93 70 - 99 mg/dL    BUN 46 (H) 6 - 20 mg/dL    CREATININE 3.67 (H) 0.70 - 1.20 mg/dL    GFR Non-African American 17.7 (L) >60    GFR  21.4 (L) >60    Calcium 9.1 8.5 - 9.9 mg/dL   BLOOD OCCULT STOOL SCREEN #1    Collection Time: 04/23/21 10:12 AM   Result Value Ref Range    OCCULT BLOOD FECAL POSITIVE  Normal Range:Negative   (A)    Microalbumin / Creatinine Urine Ratio    Collection Time: 04/23/21 10:16 AM   Result Value Ref Range    Microalbumin, Random Urine 15.80 (H) Not Established mg/dL    Creatinine, Ur 51.6 Not Established mg/dL    Microalbumin Creatinine Ratio 306.2 (H) 0.0 - 30.0 mg/G   Basic Metabolic Panel    Collection Time: 04/24/21  6:10 AM   Result Value Ref Range    Sodium 140 135 - 144 mEq/L    Potassium 3.6 3.4 - 4.9 mEq/L    Chloride 105 95 - 107 mEq/L    CO2 23 20 - 31 mEq/L    Anion Gap 12 9 - 15 mEq/L    Glucose 91 70 - 99 mg/dL    BUN 44 (H) 6 - 20 mg/dL    CREATININE 3.68 (H) 0.70 - 1.20 mg/dL    GFR Non-African American 17.7 (L) >60    GFR  21.4 (L) >60    Calcium 8.7 8.5 - 9.9 mg/dL   Hemoglobin and Hematocrit, Blood    Collection Time: 04/24/21  6:10 AM   Result Value Ref Range    Hemoglobin 8.2 (L) 14.0 - 18.0 g/dL    Hematocrit 25.5 (L) 42.0 - 52.0 %   Procalcitonin    Collection Time: 04/24/21 12:18 PM   Result Value Ref Range    Procalcitonin 0.15 0.00 - 0.15 ng/mL   Basic Metabolic Panel    Collection Time: 04/25/21  6:38 AM   Result Value Ref Range    Sodium 139 135 - 144 mEq/L    Potassium 3.7 3.4 - 4.9 mEq/L    Chloride 100 95 - 107 mEq/L    CO2 24 20 - 31 mEq/L    Anion Gap 15 9 - 15 mEq/L    Glucose 90 70 - 99 mg/dL    BUN 42 (H) 6 - 20 mg/dL    CREATININE 3.73 (H) 0.70 - 1.20 mg/dL    GFR Non-African American 17.4 (L) >60    GFR  21.0 (L) >60    Calcium 8.1 (L) 8.5 - 9.9 mg/dL   Phosphorus    Collection Time: 04/25/21  6:38 AM   Result Value Ref Range    Phosphorus 5.2 (H) 2.3 - 4.8 mg/dL   PTH, INTACT    Collection Time: 04/25/21  6:38 AM   Result Value Ref Range    .9 (H) 15.0 - 65.0 pg/mL   AST    Collection Time: 04/25/21  6:38 AM   Result Value Ref Range    AST 36 0 - 40 U/L   ALT    Collection Time: 04/25/21  6:38 AM   Result Value Ref Range    ALT 38 0 - 41 U/L   ALKALINE PHOSPHATASE    Collection Time: 04/25/21  6:38 AM   Result Value Ref Range    Alkaline Phosphatase 90 35 - 104 U/L   GAMMA GT    Collection Time: 04/25/21  6:38 AM   Result Value Ref Range    GGT 25 0 - 60 U/L             Follow-up visits:        Assessment:  Active Hospital Problems    Diagnosis Date Noted    Systolic congestive heart failure (HCC) [I50.20]      Priority: High    CHF (congestive heart failure), NYHA class I, acute on chronic, combined ([facility] [I50.43] 04/22/2021         Plan:   1.   Discharge home        Electronically signed by Bubba Godinez, [address] [place] [address] 4/25/2021 at 12:08 PM

## 2021-04-26 ENCOUNTER — CARE COORDINATION (OUTPATIENT)
Dept: CASE MANAGEMENT | Age: 51
End: 2021-04-26

## 2021-04-26 NOTE — CARE COORDINATION
[facility] Transitions Initial Follow Up Call    Call within 2 business days of discharge: Yes    Patient: Esther Clark Patient : 1970   MRN: [id number]  Reason for Admission: 2021 - 2021 CHF  Discharge Date: 21 RARS: Readmission Risk Score: 24  CT    Last Discharge [address]       Complaint Diagnosis Description Type Department Provider    21 Shortness of Breath Systolic congestive heart failure, unspecified HF chronicity ([facility] . .. ED to Hosp-Admission (Discharged) (ADMITTED) [id number] Yan Palomo DO; Carmella Lind. .. Initial CT outreach. Left Hippa compliant VM.         Care Transitions 24 Hour Call    Care Transitions Interventions         Follow Up  Future Appointments   Date Time Provider Perez Ness   2021  9:30 AM David Berrios CHF [address], [facility]

## 2021-04-27 ENCOUNTER — CARE COORDINATION (OUTPATIENT)
Dept: CASE MANAGEMENT | Age: 51
End: 2021-04-27

## 2021-05-06 ENCOUNTER — OFFICE VISIT (OUTPATIENT)
Dept: CARDIOLOGY CLINIC | Age: 51
End: 2021-05-06
Payer: MEDICAID

## 2021-05-06 VITALS
BODY MASS INDEX: 26.68 KG/M2 | HEART RATE: 103 BPM | HEIGHT: 72 IN | OXYGEN SATURATION: 100 % | SYSTOLIC BLOOD PRESSURE: 154 MMHG | RESPIRATION RATE: 18 BRPM | WEIGHT: 197 LBS | DIASTOLIC BLOOD PRESSURE: 106 MMHG

## 2021-05-06 DIAGNOSIS — I42.8 NONISCHEMIC CARDIOMYOPATHY (HCC): ICD-10-CM

## 2021-05-06 DIAGNOSIS — I50.22 CHRONIC SYSTOLIC CHF (CONGESTIVE HEART FAILURE), NYHA CLASS 1 (HCC): ICD-10-CM

## 2021-05-06 DIAGNOSIS — E78.5 DYSLIPIDEMIA: ICD-10-CM

## 2021-05-06 DIAGNOSIS — R00.0 TACHYCARDIA: ICD-10-CM

## 2021-05-06 DIAGNOSIS — Z91.199 HISTORY OF NONCOMPLIANCE WITH MEDICAL TREATMENT: ICD-10-CM

## 2021-05-06 DIAGNOSIS — N18.30 STAGE 3 CHRONIC KIDNEY DISEASE, UNSPECIFIED WHETHER STAGE 3A OR 3B CKD (HCC): ICD-10-CM

## 2021-05-06 DIAGNOSIS — F10.11 HISTORY OF ALCOHOL ABUSE: ICD-10-CM

## 2021-05-06 DIAGNOSIS — I38 VALVULAR HEART DISEASE: ICD-10-CM

## 2021-05-06 DIAGNOSIS — I10 UNCONTROLLED HYPERTENSION: ICD-10-CM

## 2021-05-06 LAB
ANION GAP SERPL CALCULATED.3IONS-SCNC: 11 MEQ/L (ref 9–15)
BUN BLDV-MCNC: 39 MG/DL (ref 6–20)
CALCIUM SERPL-MCNC: 9.6 MG/DL (ref 8.5–9.9)
CHLORIDE BLD-SCNC: 108 MEQ/L (ref 95–107)
CHOLESTEROL, TOTAL: 174 MG/DL (ref 0–199)
CO2: 20 MEQ/L (ref 20–31)
CREAT SERPL-MCNC: 3.12 MG/DL (ref 0.7–1.2)
GFR AFRICAN AMERICAN: 25.9
GFR NON-AFRICAN AMERICAN: 21.4
GLUCOSE BLD-MCNC: 88 MG/DL (ref 70–99)
HDLC SERPL-MCNC: 87 MG/DL (ref 40–59)
LDL CHOLESTEROL CALCULATED: 67 MG/DL (ref 0–129)
POTASSIUM SERPL-SCNC: 4.1 MEQ/L (ref 3.4–4.9)
PRO-BNP: NORMAL PG/ML
SODIUM BLD-SCNC: 139 MEQ/L (ref 135–144)
TRIGL SERPL-MCNC: 98 MG/DL (ref 0–150)

## 2021-05-06 PROCEDURE — 1036F TOBACCO NON-USER: CPT | Performed by: PHYSICIAN ASSISTANT

## 2021-05-06 PROCEDURE — 1111F DSCHRG MED/CURRENT MED MERGE: CPT | Performed by: PHYSICIAN ASSISTANT

## 2021-05-06 PROCEDURE — G8417 CALC BMI ABV UP PARAM F/U: HCPCS | Performed by: PHYSICIAN ASSISTANT

## 2021-05-06 PROCEDURE — 99214 OFFICE O/P EST MOD 30 MIN: CPT | Performed by: PHYSICIAN ASSISTANT

## 2021-05-06 PROCEDURE — G8427 DOCREV CUR MEDS BY ELIG CLIN: HCPCS | Performed by: PHYSICIAN ASSISTANT

## 2021-05-06 RX ORDER — IVABRADINE 5 MG/1
5 TABLET, FILM COATED ORAL 2 TIMES DAILY WITH MEALS
Qty: 60 TABLET | Refills: 3 | Status: ON HOLD | OUTPATIENT
Start: 2021-05-06 | End: 2022-04-15 | Stop reason: HOSPADM

## 2021-05-06 RX ORDER — AMLODIPINE BESYLATE 10 MG/1
10 TABLET ORAL DAILY
Qty: 30 TABLET | Refills: 3 | Status: ON HOLD | OUTPATIENT
Start: 2021-05-06 | End: 2021-07-14 | Stop reason: HOSPADM

## 2021-05-06 RX ORDER — FUROSEMIDE 40 MG/1
40 TABLET ORAL DAILY
Status: ON HOLD | COMMUNITY
End: 2021-07-14 | Stop reason: HOSPADM

## 2021-05-06 RX ORDER — CHLORTHALIDONE 25 MG/1
25 TABLET ORAL DAILY
COMMUNITY
End: 2021-05-06 | Stop reason: HOSPADM

## 2021-05-06 ASSESSMENT — ENCOUNTER SYMPTOMS
NAUSEA: 0
ABDOMINAL PAIN: 0
COUGH: 0
COLOR CHANGE: 0
SHORTNESS OF BREATH: 0
BLOOD IN STOOL: 0
ABDOMINAL DISTENTION: 0
CHEST TIGHTNESS: 0
VOMITING: 0

## 2021-05-06 NOTE — PATIENT INSTRUCTIONS
-Add amlodipine 10mg daily  -Resume Corlanor 5mg PO daily (prescription sent to pharmacy and samples provided)      Check daily weight every morning and notify CHF clinic if gaining more than 3 pounds in 2 days    Check blood pressure twice daily in AM and PM and keep log of BP readings to review at next office visit  Also, record heart rate with each blood pressure reading    Limit total daily sodium intake to less than 2000mg daily    Limit total daily fluid intake to less than 1.5 liter (48 ounces) daily      -Please call and schedule follow-up with nephrology, Dr. Jay Pacheco   -Schedule follow-up with Dr. oJhnnie Ring      **MUGA scan ordered to re-evaluate LVF

## 2021-05-06 NOTE — PROGRESS NOTES
Patient: Dottie Escobar  YOB: 1970  MRN: [id number]    Chief Complaint:  Chief Complaint   Patient presents with    Congestive Heart Failure     SYSTOLIC    Dizziness         Subjective/HPI        5/6/21: Here for follow-up of chronic systolic congestive heart failure with recent acute exacerbation requiring hospital admission on 4/22/2021. Patient had presented to ER at that time due to recently worsening shortness of breath over 1 week prior to presentation with symptoms of orthopnea and mild lower extremity edema. proBNP elevated at 17,660. Troponin mildly elevated. Chest x-ray had revealed increased opacity in the left lung base which may represent atelectasis, infiltrate or small effusion. Also on presentation he had significantly elevated blood pressures. He was initiated on IV diuretics and BP medications were further optimized for improved BP management. Pulmonology was consulted regarding shortness of breath complaints as well as underlying COPD. Nephrology was consulted regarding BLUE on CKD. GI was consulted regarding worsening anemia with hemoglobin in January 2013 0.7 and hemoglobin on presentation of 7.9. He clinically improved following optimization of blood pressure and heart failure medications. He was stable for discharge home on 4/25/2021. Since discharge, patient states he has been doing well overall with no new or worsening heart failure symptoms. He denies shortness of breath or dyspnea on exertion, chest pain, palpitations, diaphoresis, paroxysmal nocturnal dyspnea, orthopnea, lower extremity edema, syncope, fever or chills. He does report feeling drowsy after taking his medications. He is not checking routine blood pressures at home and is not checking daily weights. He continues to be noncompliant with sodium and fluid restriction. He continues to drink alcohol but reports only drinking 4 beers per week.   He states he is only smoking 1 to 2 cigarettes/day. He brings with him all of his medication bottles today. I have personally reviewed all of his medications and there is significant discrepancies but in between these current meds and what he had previously been taking. He has numerous old medication bottles with fill dates in 2019 and 2020. He uses [facility] on [facility] and pharmacy will be called to clarify most recent medication dispensing. Recent labs reviewed and documented below. BMP from 4/25/2021: Sodium 139, potassium 3.7, chloride 103, total CO2 24, BUN 42, creatinine elevated 3.73, GFR low at 21.0, glucose 90  Hemoglobin and hematocrit on 4/24/2021: 8.2 and 25.5  Hemoccult stool from 4/23/2021: Positive  CBC from 4/23/2021: WBC 7.8, hemoglobin 7.9, hematocrit 23.9, platelets 242  Fasting lipid panel 4/23/2021: Total cholesterol 108, triglycerides 73, HDL 53, LDL 40  proBNP on 4/23/2021: 11,296 compared to [place] 73 on 4/22/2021      Blood pressure elevated in office today at 152/106 in the right arm 154/106 in the left arm, heart rate tachycardic at 103, pulse ox 100% on room air. Weight is 197 pounds compared to 202 pounds at last office visit on 1/19/2021. Echocardiogram 4/22/21:  Summary   Normal mitral valve structure and function. 2+ MR   Normal tricuspid valve structure and function. 2+ TR   RVSP 62 mmHg   Left ventricular ejection fraction is visually estimated at 35-40%. Pseudonormal filling pattern noted. Large pleural effusion. Signature      ----------------------------------------------------------------   Electronically signed by Luiz Yanes MD(Interpreting   physician) on 04/22/2021 11:44 AM   ----------------------------------------------------------------     Echocardiogram 8/27/20:  Conclusions   Summary   Normal aortic valve structure and function. Trace AR   Normal tricuspid valve structure and function.    trace TR   RVSP 25 mmHg   Left ventricular ejection fraction is visually rate tachycardic at 113, pulse ox 99% on room air. Weight is 202 pounds compared to 205 pounds at last visit on 1/12/2021.    1/12/21: Patient here for follow-up of systolic congestive heart failure and nonischemic cardiomyopathy. Patient is a very difficult historian and is often uncertain with his medications. He brought in all the medications that he is currently taking which include both a 5 and 10 mg dose of amlodipine, 60 and 120 mg dose of Imdur, hydralazine 100 mg p.o. 3 times daily, aspirin 81 mg p.o. daily and torsemide 50 mg p.o. daily. Per our medicine list and per patient's report he believed he had been taking Coreg 25 mg twice a day however this is not one of his current medication bottles and he reports that he must not be taking it. I had added Corlanor 5 mg p.o. twice daily at last visit however this was not approved by his insurance as he apparently was not on a beta-blocker and patient failed to come  samples. Blood pressure remains uncontrolled currently. He states that he had some worsening lower extremity edema yesterday which improved after taking his torsemide. He denies chest pain, shortness of breath, nausea, vomiting, dizziness, lightheadedness, diaphoresis, palpitations, paroxysmal nocturnal dyspnea, orthopnea, lower extremity edema, syncope, fever or chills. Patient has been educated at length regarding importance of compliance with medications as prescribed. I went through patient's medication bottles with him and marked the ones that he should be taking. He verbalizes understanding. Most recent labs reviewed and documented below.   CBC 1/2/21: WBC 10.8, hemoglobin 13.7, hematocrit 39.8, platelets 712  CMP 2/3/00: Sodium 139, potassium low at 3.1, chloride 100, total CO2 of 24, BUN elevated at 33, creatinine elevated at 3.19, GFR low at 25.2, glucose 110  proBNP 11/17/2020: 9972    Blood pressure elevated in office today at 156/104 on the left and 150/99 on the right.  Heart rate tachycardic at 110, pulse ox 99% on room air. Weight is 205 pounds which is the same weight from last visit on 11/17/2020.      12/1/20 (virtual visit): For follow-up of nonischemic cardiomyopathy and systolic congestive heart failure. Was seen for initial CHF clinic evaluation on 11/17/2020. BP at last office visit was uncontrolled with systolic in the 074L and diastolic above 626 and BP meds were titrated. Hydralazine was increased to 100 mg p.o. 3 times daily and Imdur was increased to 60 mg p.o. daily. Patient states that he has been doing well overall since last visit but has had fluctuating blood pressures. He will occasionally get systolic pressures in the 130s but more consistently between 150s and 170s range. He states his diastolic blood pressure is never below 100. He was advised to check blood pressure while on the phone with me today and he was hypertensive with initial blood pressure of 193/121 and repeat blood pressure 189/122. He states he had already taken him 1 dose of hydralazine and his Imdur but had not yet not yet taken his carvedilol which he was advised to do so while on the phone with me. He is asymptomatic despite this elevated blood pressure with no neurologic complaints. He does report experiencing shortness of breath and some dizziness earlier today while working outside shoveling snow. He has been advised to avoid any type of strenuous activity or any further snow shoveling. He denies chest pain, nausea, vomiting, diaphoresis, paroxysmal nocturnal dyspnea, orthopnea, lower extremity edema, syncope, fever or chills. He is weighing himself every day and his weight has been stable with current weight of 194 pounds compared to 205 pounds at last visit.     Recent labs reviewed and documented below.   BMP 11/17/2020: Sodium 139, potassium 3.7, chloride 107, total CO2 of 23, BUN elevated 27, creatinine elevated 2.82, GFR low at 29.1, glucose 78--renal function stable when compared to prior  proBNP 11/17/2020: 9972 compared to 13,817 on 11/3/2020.     Weight: 194 pounds  BP today: 193/121 during virtual appt and repeat 189/122  HR: 115     Renal duplex US 8/27/2019:  Impression    NO EVIDENCE OF RENAL ARTERY STENOSIS NOTED.         11/17/20 CHF visit #1: This is a pleasant 81-year-old -American male with past medical history significant for nonischemic cardiomyopathy with severely reduced LV function and ejection fraction of 20%, normal coronary arteries per cardiac catheterization 11/3/2020, abnormal EKG, hypertension, tobacco abuse, history of alcohol abuse, chronic kidney disease and medical noncompliance who presents for initial CHF clinic evaluation. Patient originally presented to the emergency room on 11/3/2020 with complaints of shortness of breath and lower extremity edema. His initial cardiac enzyme was mildly elevated and proBNP elevated at 18,817. Has baseline CKD with creatinine of 2.91 on presentation. He was optimized on heart failure medications. Nephrology was consulted for evaluation of CKD and fluid/diuretic management. Given non-STEMI, severely reduced LV systolic function and risk factors for CAD, he underwent cardiac catheterization on 11/4/2020 which revealed normal coronary arteries and LVEDP of 29 mmHg. Previous echocardiogram completed in August 2020 showed severely reduced LV systolic function with EF of 25 to 30%. He was diuresed aggressively during admission and symptoms improved. He was eventually transitioned back from IV Lasix to torsemide 50 mg p.o. daily and subsequently discharged home in stable condition on 11/7/2020. Since discharge, patient states he has been doing well overall. No new or worsening heart failure symptoms. He states he is compliant with all of his medications. He does report eating diet high in sodium content and has been educated on the importance of low-sodium diet and fluid restriction.   He denies any complaint of shortness of breath or significant dyspnea on exertion but does report mild shortness of breath with moderate to strenuous activity. He denies chest pain, palpitations, diaphoresis, paroxysmal nocturnal dyspnea, orthopnea, lower extremity edema, syncope, fever or chills. He does experience an occasionally productive cough. Recent labs reviewed and documented below. BMP 11/7/2020: Sodium 139, potassium 3.5, chloride 98, total CO2 26, BUN elevated 33, creatinine elevated 3.06, GFR low at 26.5, glucose 90  CBC 11/7/2020: WBC is 6.8, hemoglobin 14.6, hematocrit 43.1, platelets elevated 391. Magnesium 11/6/2020: 2.2    EKG 11/3/20: SR 95, 1st degree AVB,  Deep T wave inversion V3-V6, mild ST depression with T wave inversion leads I and aVL, QTc 497ms    Blood pressure elevated in office today at 184/118 on the right and 175/120 on the left but patient is completely asymptomatic. Heart rate 60 bpm, pulse ox 100% on room air. Weight is 205 pounds      [facility] 11/3/20:  Conclusions  Procedure Summary  Normal coronaries. LVEDP=29mmhg  EF of 20%. Recommendations  Aggressive risk factor management. Maximize medical therapy   Angiographic Findings   Cardiac Arteries and Lesion Findings  LMCA: Normal.  LAD: Normal.  LCx: Normal.  RCA: Normal.  Dominance: Right  LV function assessed as:Abnormal.  Ejection Fraction    - Method: LV gram. EF%: 20.      Echocardiogram 8/25/20:   Conclusions   Summary   Normal aortic valve structure and function. Trace AR   Normal tricuspid valve structure and function. trace TR   RVSP 25 mmHg   Left ventricular ejection fraction is visually estimated at 25-30% with   Global Hypokinesis. Pseudonormal filling pattern noted.    Signature   ----------------------------------------------------------------   Electronically signed by Thom Durand MD(Interpreting   physician) on 08/27/2020 03:28 PM ----------------------------------------------------------------    Echocardiogram 8/26/19:  Conclusions   Summary   Normal mitral valve structure and function. Mild MR   Normal aortic valve structure and function. Mild AR   Left ventricular ejection fraction is visually estimated at 40%. Restrictive filling pattern. Moderate CLVH   Mild dilated Ao Root   Signature   ----------------------------------------------------------------   Electronically signed by Arsenio Packer MD(Interpreting   physician) on 08/26/2019 04:44 PM   ----------------------------------------------------------------    PMHx:  HTN  CKD  NICMP EF 20% per echo 8/2020 and per [facility] 11/3/20  Normal coronaries per [facility] 11/3/20  Hx ETOH abuse  Tobacco abuse    PSHx:  None    Allergies: None    Social Hx:  Former smoker--Quit during recent hospitalization 11/2020. 1ppd x 15 year.  Using nicoderm patches now  +ETOH--3-4 beers 3-4 times per weeks  No drugs    Family Hx:  Mom passed from MI at 64years old    No Known Allergies    Current Outpatient Medications   Medication Sig Dispense Refill    furosemide (LASIX) 40 MG tablet Take 40 mg by mouth daily      ivabradine (CORLANOR) 5 MG TABS tablet Take 1 tablet by mouth 2 times daily (with meals) 60 tablet 3    amLODIPine (NORVASC) 10 MG tablet Take 1 tablet by mouth daily 30 tablet 3    atorvastatin (LIPITOR) 80 MG tablet Take 1 tablet by mouth nightly 30 tablet 3    cloNIDine (CATAPRES) 0.2 MG tablet Take 1 tablet by mouth 2 times daily 60 tablet 3    carvedilol (COREG) 25 MG tablet Take 1 tablet by mouth 2 times daily HOLD for SBP<100 or HR<60 60 tablet 3    isosorbide mononitrate (IMDUR) 120 MG extended release tablet Take 1 tablet by mouth daily 30 tablet 3    hydrALAZINE (APRESOLINE) 100 MG tablet Take 1 tablet by mouth 3 times daily 90 tablet 3    nicotine (NICODERM CQ) 21 MG/24HR Place 1 patch onto the skin daily 30 patch 3    aspirin 81 MG EC tablet Take 1 tablet by mouth daily 30 tablet 3     No current facility-administered medications for this visit.         Past Medical History:   Diagnosis Date    Abnormal EKG 2019    Cardiomyopathy ([facility]     per echo 2019    Chronic combined systolic and diastolic CHF (congestive heart failure) ([facility] 2019    ETOH abuse     Hypertension     Tobacco abuse        Past Surgical History:   Procedure Laterality Date    HERNIA REPAIR Right 2/10/2021    RIGHT INGUINAL HERNIA REPAIR WITH MESH performed by Yara Sauceda [facility] at [facility] History     Socioeconomic History    Marital status: Single     Spouse name: Not on file    Number of children: Not on file    Years of education: Not on file    Highest education level: Not on file   Occupational History    Not on file   Social Needs    Financial resource strain: Not hard at all    Food insecurity     Worry: Never true     Inability: Never true   [employer] needs     Medical: No     Non-medical: No   Tobacco Use    Smoking status: Current Every Day Smoker     Packs/day: 1.00     Types: Cigarettes     Last attempt to quit: 12/3/2020     Years since quittin.4    Smokeless tobacco: Never Used    Tobacco comment: currently smoking only couple cigarettes daily   Substance and Sexual Activity    Alcohol use: Yes     Frequency: 4 or more times a week     Drinks per session: 3 or 4    Drug use: Never    Sexual activity: Not on file   Lifestyle    Physical activity     Days per week: Not on file     Minutes per session: Not on file    Stress: Not on file   Relationships    Social connections     Talks on phone: Not on file     Gets together: Not on file     Attends [employer] service: Not on file     Active member of club or organization: Not on file     Attends meetings of clubs or organizations: Not on file     Relationship status: Not on file    Intimate partner violence     Fear of current or ex partner: Not on file     Emotionally abused: Not on file Physically abused: Not on file     Forced sexual activity: Not on file   Other Topics Concern    Not on file   Social History Narrative    Not on file       Family History   Problem Relation Age of Onset    Coronary Art Dis Mother          Review of Systems:   Review of Systems   Constitutional: Negative for activity change, chills, fatigue, fever and unexpected weight change. HENT: Negative for congestion. Respiratory: Negative for cough, chest tightness and shortness of breath. Cardiovascular: Negative for chest pain, palpitations and leg swelling. Gastrointestinal: Negative for abdominal distention, abdominal pain, blood in stool, nausea and vomiting. Genitourinary: Negative for difficulty urinating, dysuria and hematuria. Musculoskeletal: Negative for arthralgias and myalgias. Skin: Negative for color change, pallor and rash. Neurological: Negative for dizziness, syncope and light-headedness. Psychiatric/Behavioral: Negative for agitation and behavioral problems. Physical Examination:    BP (!) 154/106 (Site: Left Upper Arm, Position: Sitting, Cuff Size: Large Adult)   Pulse 103   Resp 18   Ht 6' (1.829 m)   Wt 197 lb (89.4 kg)   SpO2 100%   BMI 26.72 kg/m²    Physical Exam  Constitutional:       General: He is not in acute distress. Appearance: Normal appearance. HENT:      Head: Normocephalic and atraumatic. Neck:      Musculoskeletal: Normal range of motion and neck supple. Cardiovascular:      Rate and Rhythm: Normal rate and regular rhythm. Pulmonary:      Effort: Pulmonary effort is normal. No respiratory distress. Breath sounds: No wheezing, rhonchi or rales. Abdominal:      Palpations: Abdomen is soft. Tenderness: There is no abdominal tenderness. Musculoskeletal: Normal range of motion. Right lower leg: No edema. Left lower leg: No edema. Skin:     General: Skin is warm and dry.    Neurological:      General: No focal deficit present. Mental Status: He is alert and oriented to person, place, and time. Cranial Nerves: No cranial nerve deficit. Psychiatric:         Mood and Affect: Mood normal.         Behavior: Behavior normal.         LABS:  CBC:   Lab Results   Component Value Date    WBC 7.8 04/23/2021    RBC 3.36 04/23/2021    HGB 8.2 04/24/2021    HCT 25.5 04/24/2021    MCV 71.0 04/23/2021    MCH 23.4 04/23/2021    MCHC 33.0 04/23/2021    RDW 17.5 04/23/2021     04/23/2021     Lipids:  Lab Results   Component Value Date    CHOL 108 04/23/2021    CHOL 102 08/27/2020    CHOL 137 08/27/2019     Lab Results   Component Value Date    TRIG 73 04/23/2021    TRIG 85 08/27/2020    TRIG 139 08/27/2019     Lab Results   Component Value Date    HDL 53 04/23/2021    HDL 43 08/27/2020    HDL 47 08/27/2019     Lab Results   Component Value Date    LDLCALC 40 04/23/2021    LDLCALC 42 08/27/2020    LDLCALC 62 08/27/2019     No results found for: LABVLDL, VLDL  No results found for: CHOLHDLRATIO  CMP:    Lab Results   Component Value Date     04/25/2021    K 3.7 04/25/2021     04/25/2021    CO2 24 04/25/2021    BUN 42 04/25/2021    CREATININE 3.73 04/25/2021    GFRAA 21.0 04/25/2021    LABGLOM 17.4 04/25/2021    GLUCOSE 90 04/25/2021    PROT 6.4 04/22/2021    LABALBU 3.5 04/22/2021    CALCIUM 8.1 04/25/2021    BILITOT 0.4 04/22/2021    ALKPHOS 90 04/25/2021    AST 36 04/25/2021    ALT 38 04/25/2021     BMP:    Lab Results   Component Value Date     04/25/2021    K 3.7 04/25/2021     04/25/2021    CO2 24 04/25/2021    BUN 42 04/25/2021    LABALBU 3.5 04/22/2021    CREATININE 3.73 04/25/2021    CALCIUM 8.1 04/25/2021    GFRAA 21.0 04/25/2021    LABGLOM 17.4 04/25/2021    GLUCOSE 90 04/25/2021     Magnesium:    Lab Results   Component Value Date    MG 2.2 04/23/2021     TSH:  Lab Results   Component Value Date    TSH 0.976 08/26/2020     . result  No results for input(s): PROBNP in the last 72 hours.   No results for input(s): INR in the last 72 hours. Patient Active Problem List   Diagnosis    Hypertensive urgency    Cardiomyopathy ([facility]    Systolic and diastolic CHF, acute ([facility]    BLUE (acute kidney injury) ([facility]    Abnormal EKG    Chronic combined systolic and diastolic CHF (congestive heart failure) ([facility]    Acute decompensated heart failure ([facility]    Chest pain    [facility], [facility]    NSTEMI (non-ST elevated myocardial infarction) [facility]    Inguinal hernia of right side without obstruction or gangrene    CHF (congestive heart failure), NYHA class I, acute on chronic, combined ([facility]    Systolic congestive heart failure (HCC)       Assessment/Plan:     Diagnosis Orders   1. Chronic systolic CHF (congestive heart failure), NYHA class 1 (HCC)  Basic Metabolic Panel    Brain Natriuretic Peptide    compensated currently   2. Nonischemic cardiomyopathy ([facility]  NM CARDIAC MUGA SCAN    Basic Metabolic Panel    Brain Natriuretic Peptide    EF 35-40% per echo 4/22/21 which is mildly improved from prior echo 8/2020. Normal coronaries per [facility] 11/4/20   3. Uncontrolled hypertension      Meds adjusted   4. Tachycardia      Resume corlanor 5mg PO BID   5. Valvular heart disease      2+ MR/TR per echo 4/22/21   6. Stage 3 chronic kidney disease, unspecified whether stage 3a or 3b CKD      repeat BMP today. Encouraged routine follow-up with nephrology for ongoing management   7. History of alcohol abuse      currently reports drinking 4 beers weekly   8. Dyslipidemia  Lipid Panel    Check lipid panel. Continue Lipitor 80mg daily for now   9.  History of noncompliance with medical treatment         -Maximize medical therapy--aspirin 81mg PO daily, Coreg 25mg PO BID, hydralazine 100 mg p.o. 3 times daily, Imdur to 120 mg p.o. daily, resume amlodipine 10mg PO daily for improved BP management, lasix 40mg PO daily, clonidine 0.2 mg p.o. twice daily   -Resume Corlanor 5 mg p.o. twice daily as resting heart rate greater than 70 despite maximal dose of Coreg at 25 mg twice a day  -Heart failure appears compensated at this time. No overt signs of fluid overload  -Cardiac/<2 gram sodium diet recommended  -1500mL daily fluid restriction   -Check BMP today  -Check BNP today  -Consider repeat CBC in future to monitor anemia is noted during recent hospital admission in April 2021  -Tobacco cessation recommended  -Alcohol cessation recommended  -Instructed patient to weigh self daily every morning upon waking and keep log book of daily weights. Notify office if gaining more than 3 pounds in 48 hours. --provided with scale today  -Recommend routine BP monitoring at home. Advised checking BP twice daily in AM and PM and keep a log of blood pressure trends to discuss at next visit.  Advised patient to notify CHF clinic if persistent hypertension with systolic blood pressure greater than 614 or diastolic blood pressure greater than 95. **Patient educated at length regarding importance of compliance with medications and taking only medications as prescribed. He is to adhere to current medication list from office visit. **  -Advised patient to notify office immediately if experiencing any progressive SOB, orthopnea, PND, LE edema or weight gain  -Educated patient on importance of fluid and salt restriction as well as lifestyle modification. --States he has been compliant with sodium and fluid restriction  **MUGA scan ordered to reevaluate LV function. If EF remains severely reduced at less than or equal to 35%, will need referral to electrophysiology for AICD evaluation**  Status post echocardiogram on 4/22/2021 which revealed EF of 35 to 40%.     As EF per echocardiogram on borderline or indication for ICD, MUGA scan ordered for further evaluation  -Maintain follow-up with nephrology, Dr. Georgie Ramesh provided with office number to call and schedule follow-up  -Maintain routine outpatient follow-up with general cardiologist,

## 2021-05-18 ENCOUNTER — HOSPITAL ENCOUNTER (OUTPATIENT)
Dept: NUCLEAR MEDICINE | Age: 51
Discharge: HOME | End: 2021-05-20
Payer: MEDICAID

## 2021-05-18 DIAGNOSIS — I42.8 NONISCHEMIC CARDIOMYOPATHY (HCC): ICD-10-CM

## 2021-05-18 LAB
LV EF: 35 %
LVEF MODALITY: NORMAL

## 2021-05-18 PROCEDURE — A9560 TC99M LABELED RBC: HCPCS | Performed by: PHYSICIAN ASSISTANT

## 2021-05-18 PROCEDURE — 2580000003 HC RX 258: Performed by: PHYSICIAN ASSISTANT

## 2021-05-18 PROCEDURE — 3430000000 HC RX DIAGNOSTIC RADIOPHARMACEUTICAL: Performed by: PHYSICIAN ASSISTANT

## 2021-05-18 PROCEDURE — 78472 GATED HEART PLANAR SINGLE: CPT

## 2021-05-18 PROCEDURE — 78472 GATED HEART PLANAR SINGLE: CPT | Performed by: INTERNAL MEDICINE

## 2021-05-18 PROCEDURE — 6360000002 HC RX W HCPCS: Performed by: PHYSICIAN ASSISTANT

## 2021-05-18 RX ORDER — SODIUM CHLORIDE 0.9 % (FLUSH) 0.9 %
10 SYRINGE (ML) INJECTION PRN
Status: DISCONTINUED | OUTPATIENT
Start: 2021-05-18 | End: 2021-05-21 | Stop reason: HOSPADM

## 2021-05-18 RX ORDER — HEPARIN SODIUM 1000 [USP'U]/ML
1000 INJECTION, SOLUTION INTRAVENOUS; SUBCUTANEOUS ONCE
Status: COMPLETED | OUTPATIENT
Start: 2021-05-18 | End: 2021-05-18

## 2021-05-18 RX ADMIN — Medication 10 ML: at 11:23

## 2021-05-18 RX ADMIN — HEPARIN SODIUM 300 UNITS: 1000 INJECTION INTRAVENOUS; SUBCUTANEOUS at 10:48

## 2021-05-18 RX ADMIN — Medication 31 MILLICURIE: at 11:22

## 2021-05-18 RX ADMIN — Medication 10 ML: at 10:45

## 2021-05-20 DIAGNOSIS — Z45.02 ENCOUNTER FOR ASSESSMENT OF IMPLANTABLE CARDIOVERTER-DEFIBRILLATOR (ICD): Primary | ICD-10-CM

## 2021-05-25 ENCOUNTER — TELEPHONE (OUTPATIENT)
Dept: CARDIOLOGY CLINIC | Age: 51
End: 2021-05-25

## 2021-07-12 ENCOUNTER — APPOINTMENT (OUTPATIENT)
Dept: GENERAL RADIOLOGY | Age: 51
DRG: 194 | End: 2021-07-12
Payer: MEDICAID

## 2021-07-12 ENCOUNTER — HOSPITAL ENCOUNTER (INPATIENT)
Age: 51
LOS: 2 days | Discharge: HOME | DRG: 194 | End: 2021-07-14
Attending: EMERGENCY MEDICINE | Admitting: INTERNAL MEDICINE
Payer: MEDICAID

## 2021-07-12 DIAGNOSIS — I50.23 ACUTE ON CHRONIC SYSTOLIC CONGESTIVE HEART FAILURE (HCC): ICD-10-CM

## 2021-07-12 DIAGNOSIS — R06.01 ORTHOPNEA: Primary | ICD-10-CM

## 2021-07-12 DIAGNOSIS — I16.1 HYPERTENSIVE EMERGENCY: ICD-10-CM

## 2021-07-12 DIAGNOSIS — Z91.14 NONCOMPLIANCE WITH MEDICATION REGIMEN: ICD-10-CM

## 2021-07-12 LAB
ALBUMIN SERPL-MCNC: 3.4 G/DL (ref 3.5–4.6)
ALP BLD-CCNC: 121 U/L (ref 35–104)
ALT SERPL-CCNC: 22 U/L (ref 0–41)
ANION GAP SERPL CALCULATED.3IONS-SCNC: 10 MEQ/L (ref 9–15)
ANISOCYTOSIS: ABNORMAL
APTT: 33.2 SEC (ref 24.4–36.8)
AST SERPL-CCNC: 16 U/L (ref 0–40)
BASOPHILS ABSOLUTE: 0 K/UL (ref 0–0.2)
BASOPHILS RELATIVE PERCENT: 0.5 %
BILIRUB SERPL-MCNC: 0.7 MG/DL (ref 0.2–0.7)
BUN BLDV-MCNC: 34 MG/DL (ref 6–20)
CALCIUM SERPL-MCNC: 8.8 MG/DL (ref 8.5–9.9)
CHLORIDE BLD-SCNC: 105 MEQ/L (ref 95–107)
CK MB: 6.4 NG/ML (ref 0–6.7)
CO2: 22 MEQ/L (ref 20–31)
CREAT SERPL-MCNC: 3.48 MG/DL (ref 0.7–1.2)
CREATINE KINASE-MB INDEX: 3.1 % (ref 0–3.5)
EOSINOPHILS ABSOLUTE: 0.1 K/UL (ref 0–0.7)
EOSINOPHILS RELATIVE PERCENT: 0.8 %
GFR AFRICAN AMERICAN: 22.8
GFR NON-AFRICAN AMERICAN: 18.8
GLOBULIN: 2.7 G/DL (ref 2.3–3.5)
GLUCOSE BLD-MCNC: 110 MG/DL (ref 70–99)
HCT VFR BLD CALC: 27.2 % (ref 42–52)
HEMOGLOBIN: 8.8 G/DL (ref 14–18)
HYPOCHROMIA: ABNORMAL
INR BLD: 1.1
LYMPHOCYTES ABSOLUTE: 0.4 K/UL (ref 1–4.8)
LYMPHOCYTES RELATIVE PERCENT: 4.4 %
MCH RBC QN AUTO: 21.2 PG (ref 27–31.3)
MCHC RBC AUTO-ENTMCNC: 32.1 % (ref 33–37)
MCV RBC AUTO: 65.8 FL (ref 80–100)
MICROCYTES: ABNORMAL
MONOCYTES ABSOLUTE: 0.8 K/UL (ref 0.2–0.8)
MONOCYTES RELATIVE PERCENT: 7.6 %
NEUTROPHILS ABSOLUTE: 8.8 K/UL (ref 1.4–6.5)
NEUTROPHILS RELATIVE PERCENT: 86.7 %
OVALOCYTES: ABNORMAL
PDW BLD-RTO: 19.8 % (ref 11.5–14.5)
PLATELET # BLD: 398 K/UL (ref 130–400)
PLATELET SLIDE REVIEW: NORMAL
POIKILOCYTES: ABNORMAL
POTASSIUM SERPL-SCNC: 3.9 MEQ/L (ref 3.4–4.9)
PRO-BNP: NORMAL PG/ML
PROTHROMBIN TIME: 14.5 SEC (ref 12.3–14.9)
RBC # BLD: 4.14 M/UL (ref 4.7–6.1)
SODIUM BLD-SCNC: 137 MEQ/L (ref 135–144)
TOTAL CK: 209 U/L (ref 0–190)
TOTAL PROTEIN: 6.1 G/DL (ref 6.3–8)
TROPONIN: 0.04 NG/ML (ref 0–0.01)
WBC # BLD: 10.1 K/UL (ref 4.8–10.8)

## 2021-07-12 PROCEDURE — 6360000002 HC RX W HCPCS: Performed by: EMERGENCY MEDICINE

## 2021-07-12 PROCEDURE — 85610 PROTHROMBIN TIME: CPT

## 2021-07-12 PROCEDURE — 2580000003 HC RX 258: Performed by: INTERNAL MEDICINE

## 2021-07-12 PROCEDURE — 2060000000 HC ICU INTERMEDIATE R&B

## 2021-07-12 PROCEDURE — 82553 CREATINE MB FRACTION: CPT

## 2021-07-12 PROCEDURE — 71045 X-RAY EXAM CHEST 1 VIEW: CPT

## 2021-07-12 PROCEDURE — 6370000000 HC RX 637 (ALT 250 FOR IP): Performed by: EMERGENCY MEDICINE

## 2021-07-12 PROCEDURE — 36415 COLL VENOUS BLD VENIPUNCTURE: CPT

## 2021-07-12 PROCEDURE — 99284 EMERGENCY DEPT VISIT MOD MDM: CPT

## 2021-07-12 PROCEDURE — 83880 ASSAY OF NATRIURETIC PEPTIDE: CPT

## 2021-07-12 PROCEDURE — 80053 COMPREHEN METABOLIC PANEL: CPT

## 2021-07-12 PROCEDURE — 85730 THROMBOPLASTIN TIME PARTIAL: CPT

## 2021-07-12 PROCEDURE — 84484 ASSAY OF TROPONIN QUANT: CPT

## 2021-07-12 PROCEDURE — 6360000002 HC RX W HCPCS: Performed by: INTERNAL MEDICINE

## 2021-07-12 PROCEDURE — 85025 COMPLETE CBC W/AUTO DIFF WBC: CPT

## 2021-07-12 PROCEDURE — 99223 1ST HOSP IP/OBS HIGH 75: CPT | Performed by: INTERNAL MEDICINE

## 2021-07-12 PROCEDURE — 93005 ELECTROCARDIOGRAM TRACING: CPT | Performed by: EMERGENCY MEDICINE

## 2021-07-12 PROCEDURE — 82550 ASSAY OF CK (CPK): CPT

## 2021-07-12 PROCEDURE — 96374 THER/PROPH/DIAG INJ IV PUSH: CPT

## 2021-07-12 RX ORDER — SODIUM CHLORIDE 0.9 % (FLUSH) 0.9 %
5-40 SYRINGE (ML) INJECTION PRN
Status: DISCONTINUED | OUTPATIENT
Start: 2021-07-12 | End: 2021-07-14 | Stop reason: HOSPADM

## 2021-07-12 RX ORDER — ASPIRIN 81 MG/1
81 TABLET, CHEWABLE ORAL DAILY
Status: DISCONTINUED | OUTPATIENT
Start: 2021-07-13 | End: 2021-07-14 | Stop reason: HOSPADM

## 2021-07-12 RX ORDER — CARVEDILOL 25 MG/1
25 TABLET ORAL ONCE
Status: COMPLETED | OUTPATIENT
Start: 2021-07-12 | End: 2021-07-12

## 2021-07-12 RX ORDER — ACETAMINOPHEN 650 MG/1
650 SUPPOSITORY RECTAL EVERY 6 HOURS PRN
Status: DISCONTINUED | OUTPATIENT
Start: 2021-07-12 | End: 2021-07-14 | Stop reason: HOSPADM

## 2021-07-12 RX ORDER — SODIUM CHLORIDE 9 MG/ML
25 INJECTION, SOLUTION INTRAVENOUS PRN
Status: DISCONTINUED | OUTPATIENT
Start: 2021-07-12 | End: 2021-07-14 | Stop reason: HOSPADM

## 2021-07-12 RX ORDER — ONDANSETRON 4 MG/1
4 TABLET, ORALLY DISINTEGRATING ORAL EVERY 8 HOURS PRN
Status: DISCONTINUED | OUTPATIENT
Start: 2021-07-12 | End: 2021-07-14 | Stop reason: HOSPADM

## 2021-07-12 RX ORDER — ONDANSETRON 2 MG/ML
4 INJECTION INTRAMUSCULAR; INTRAVENOUS EVERY 6 HOURS PRN
Status: DISCONTINUED | OUTPATIENT
Start: 2021-07-12 | End: 2021-07-14 | Stop reason: HOSPADM

## 2021-07-12 RX ORDER — NITROGLYCERIN 0.4 MG/1
0.4 TABLET SUBLINGUAL EVERY 5 MIN PRN
Status: DISCONTINUED | OUTPATIENT
Start: 2021-07-12 | End: 2021-07-14 | Stop reason: HOSPADM

## 2021-07-12 RX ORDER — HYDRALAZINE HYDROCHLORIDE 20 MG/ML
10 INJECTION INTRAMUSCULAR; INTRAVENOUS EVERY 4 HOURS PRN
Status: DISCONTINUED | OUTPATIENT
Start: 2021-07-12 | End: 2021-07-14 | Stop reason: HOSPADM

## 2021-07-12 RX ORDER — HYDRALAZINE HYDROCHLORIDE 50 MG/1
100 TABLET, FILM COATED ORAL 3 TIMES DAILY
Status: DISCONTINUED | OUTPATIENT
Start: 2021-07-12 | End: 2021-07-14 | Stop reason: HOSPADM

## 2021-07-12 RX ORDER — FUROSEMIDE 10 MG/ML
80 INJECTION INTRAMUSCULAR; INTRAVENOUS ONCE
Status: COMPLETED | OUTPATIENT
Start: 2021-07-12 | End: 2021-07-12

## 2021-07-12 RX ORDER — ASPIRIN 81 MG/1
324 TABLET, CHEWABLE ORAL ONCE
Status: COMPLETED | OUTPATIENT
Start: 2021-07-12 | End: 2021-07-12

## 2021-07-12 RX ORDER — POLYETHYLENE GLYCOL 3350 17 G/17G
17 POWDER, FOR SOLUTION ORAL DAILY PRN
Status: DISCONTINUED | OUTPATIENT
Start: 2021-07-12 | End: 2021-07-14 | Stop reason: HOSPADM

## 2021-07-12 RX ORDER — ACETAMINOPHEN 325 MG/1
650 TABLET ORAL EVERY 6 HOURS PRN
Status: DISCONTINUED | OUTPATIENT
Start: 2021-07-12 | End: 2021-07-14 | Stop reason: HOSPADM

## 2021-07-12 RX ORDER — ATORVASTATIN CALCIUM 80 MG/1
80 TABLET, FILM COATED ORAL NIGHTLY
Status: DISCONTINUED | OUTPATIENT
Start: 2021-07-12 | End: 2021-07-12

## 2021-07-12 RX ORDER — SODIUM CHLORIDE 0.9 % (FLUSH) 0.9 %
5-40 SYRINGE (ML) INJECTION EVERY 12 HOURS SCHEDULED
Status: DISCONTINUED | OUTPATIENT
Start: 2021-07-12 | End: 2021-07-14 | Stop reason: HOSPADM

## 2021-07-12 RX ORDER — HEPARIN SODIUM 5000 [USP'U]/ML
5000 INJECTION, SOLUTION INTRAVENOUS; SUBCUTANEOUS EVERY 8 HOURS SCHEDULED
Status: DISCONTINUED | OUTPATIENT
Start: 2021-07-12 | End: 2021-07-14 | Stop reason: HOSPADM

## 2021-07-12 RX ADMIN — Medication 10 ML: at 22:11

## 2021-07-12 RX ADMIN — HYDRALAZINE HYDROCHLORIDE 100 MG: 50 TABLET, FILM COATED ORAL at 11:09

## 2021-07-12 RX ADMIN — ENOXAPARIN SODIUM 30 MG: 30 INJECTION SUBCUTANEOUS at 18:13

## 2021-07-12 RX ADMIN — ASPIRIN 324 MG: 81 TABLET, CHEWABLE ORAL at 11:02

## 2021-07-12 RX ADMIN — FUROSEMIDE 80 MG: 10 INJECTION, SOLUTION INTRAMUSCULAR; INTRAVENOUS at 11:02

## 2021-07-12 RX ADMIN — HEPARIN SODIUM 5000 UNITS: 5000 INJECTION INTRAVENOUS; SUBCUTANEOUS at 21:11

## 2021-07-12 RX ADMIN — CARVEDILOL 25 MG: 25 TABLET, FILM COATED ORAL at 13:44

## 2021-07-12 RX ADMIN — NITROGLYCERIN 0.5 INCH: 20 OINTMENT TOPICAL at 11:02

## 2021-07-12 RX ADMIN — HYDRALAZINE HYDROCHLORIDE 100 MG: 50 TABLET, FILM COATED ORAL at 21:11

## 2021-07-12 ASSESSMENT — ENCOUNTER SYMPTOMS
EYES NEGATIVE: 1
FACIAL SWELLING: 0
GASTROINTESTINAL NEGATIVE: 1
WHEEZING: 0
VOMITING: 0
SHORTNESS OF BREATH: 1
NAUSEA: 0
COLOR CHANGE: 0
CHEST TIGHTNESS: 1
EYE DISCHARGE: 0
ALLERGIC/IMMUNOLOGIC NEGATIVE: 1
ABDOMINAL PAIN: 0
RHINORRHEA: 0

## 2021-07-12 ASSESSMENT — PAIN SCALES - GENERAL
PAINLEVEL_OUTOF10: 0

## 2021-07-12 NOTE — CARE COORDINATION
[facility] AT [place] Case Management Initial Discharge Assessment    Met with Patient to discuss discharge plan. PCP: Adalgisa Fernandez MD                                Date of Last Visit: 1/21  [facility] MAY 2021    Discharge Planning    Living Arrangements: independently at home    Who do you live with? ROOMMATE    Who helps you with your care:  self    If lives at home:     Do you have any barriers navigating in your home? no    Patient can perform ADL? Yes    Current Services (outpatient and in home) : CHF CLINIC    Dialysis: No    Is transportation available to get to your appointments? Yes PT DRIVES    DME Equipment:  no    Respiratory equipment: None    Respiratory provider:  no     Pharmacy:  yes - [address] [place] [address] with Medication Assistance Program?  No      Patient agreeable to [facility]? Declined    Patient agreeable to SNF/Rehab? Declined    Other discharge needs identified? Other CONTINUE WITH CHF CLINIC    Does Patient Have a High-Risk for Readmission Diagnosis (CHF, PN, MI, COPD)? CHF    If Yes,     Consult with pulmonologist? No   Consult with cardiologist? Yes   Cardiac Rehab referral if EF <35%? N/A   Consult with Pharmacy for medication assessment prior to discharge? N/A   Consult with Behavioral health to aid in depression, anxiety, or coping issues? No   Palliative Care Consult? No   Pulmonary Rehab order for COPD, PN, and CHF (if EF > 35%)? No    Does patient have a reliable scale and know how to read it (for CHF)? Yes   Nutrition consult for CHF? Yes   Respiratory therapy consult that includes bedside instruction on administration of nebulizers and/or inhalers, and assessment of oxygen and equipment needs in the home? Yes    Initial Discharge Plan? (Note: please see concurrent daily documentation for any updates after initial note). MET WITH PATIENT, DENIES ANY HOME NEEDS. NOTIFIED SECURITY OF PTS CAR IN ER PARKING LOT UPON REQUEST OF PATIENT. Readmission Risk              Risk of Unplanned Readmission:  14         Electronically signed by Casandra Hanson RN on 7/12/2021 at 4:04 PM

## 2021-07-12 NOTE — ED TRIAGE NOTES
Patient states he has had shortness of breath for years but came to er today because he cant sleep, states he feels short of breath that goes up to his throat, denies any pain, reports swelling to both feet

## 2021-07-12 NOTE — H&P
Chief Complaint   Patient presents with    Shortness of Breath     shortness of breath x 2 weeks and feet swollen       This is a 80-year-old -American male with past medical history significant for nonischemic cardiomyopathy with EF of 35 to 40% per echo on 4/22/2021., history of normal coronary arteries per cardiac catheterization in December 2020, hypertension, tobacco abuse, chronic kidney disease, EtOH abuse and history noncompliance who presented to the emergency room today with complaints of shortness of breath. Patient complains of worsening shortness of breath over the past week both with exertion and occasionally at rest.  He is positive for symptoms of orthopnea. Also, he reports lower extremity edema and occasional chest discomfort with inspiration. He denies nausea, vomiting, dizziness, lightheadedness, diaphoresis, syncope, fever, chills or recent upper respiratory infection. Due to this worsening shortness of breath he presented to ER for further evaluation. Patient states he has been out of his carvedilol for the past couple weeks. He denies any chest pain chest pressure heaviness. He has been noncompliant with his diet. He works at [employer]. Noted to have mildly elevated cardiac enzyme with a BNP of 12,000. His creatinine is 3.48 and hemoglobin of 8.8 with an MCV of 65. Patient with stage IV chronic kidney disease.     Status post left heart catheterization in December 2020 which revealed normal coronary arteries. Last echocardiogram in August 2020 revealed severely reduced LV systolic function with EF of 25 to 30%.            Past Medical History:   Diagnosis Date    Abnormal EKG 9/27/2019    Cardiomyopathy ([address]     per echo 8/2019    Chronic combined systolic and diastolic CHF (congestive heart failure) ([address] 9/27/2019    ETOH abuse     Hypertension     Tobacco abuse       Patient Active Problem List   Diagnosis    Hypertensive urgency    [facility]    Systolic and diastolic CHF, acute ([facility]    BLUE (acute kidney injury) ([facility]    Abnormal EKG    Chronic combined systolic and diastolic CHF (congestive heart failure) (HCC)    Acute decompensated heart failure (HCC)    Chest pain    Pulmonary edema, acute (HCC)    NSTEMI (non-ST elevated myocardial infarction) (HCC)    Inguinal hernia of right side without obstruction or gangrene    CHF (congestive heart failure), NYHA class I, acute on chronic, combined (HCC)    Systolic congestive heart failure (HCC)       Past Surgical History:   Procedure Laterality Date    HERNIA REPAIR Right 2/10/2021    RIGHT INGUINAL HERNIA REPAIR WITH MESH performed by Christina Mulligan MD at [facility] History     Socioeconomic History    Marital status: Single     Spouse name: None    Number of children: None    Years of education: None    Highest education level: None   Occupational History    None   Tobacco Use    Smoking status: Current Every Day Smoker     Packs/day: 1.00     Types: Cigarettes     Last attempt to quit: 12/3/2020     Years since quittin.6    Smokeless tobacco: Never Used    Tobacco comment: currently smoking only couple cigarettes daily   Substance and Sexual Activity    Alcohol use: Yes    Drug use: Never    Sexual activity: None   Other Topics Concern    None   Social History Narrative    None     Social Determinants of Health     Financial Resource Strain: Low Risk     Difficulty of Paying Living Expenses: Not hard at all   Food Insecurity: No Food Insecurity    Worried About Running Out of Food in the Last Year: Never true    Ginette of Food in the Last Year: Never true   Transportation Needs: No Transportation Needs    Lack of Transportation (Medical): No    Lack of Transportation (Non-Medical):  No   Physical Activity:     Days of Exercise per Week:     Minutes of Exercise per Session:    Stress:     Feeling of Stress :    Social Connections:     Frequency of Communication with Friends and Family:     Frequency of Social Gatherings with Friends and Family:     Attends [employer] Services:     Active Member of Clubs or Organizations:     Attends Club or Organization Meetings:     Marital Status:    Intimate Partner Violence:     Fear of Current or Ex-Partner:     Emotionally Abused:     Physically Abused:     Sexually Abused:        Family History   Problem Relation Age of Onset    Coronary Art Dis Mother        Current Facility-Administered Medications   Medication Dose Route Frequency Provider Last Rate Last Admin    hydrALAZINE (APRESOLINE) tablet 100 mg  100 mg Oral TID [facility], DO   100 mg at 07/12/21 1109    sodium chloride flush 0.9 % injection 5-40 mL  5-40 mL Intravenous 2 times per day Yan JEFE Holiday, DO        sodium chloride flush 0.9 % injection 5-40 mL  5-40 mL Intravenous PRN Yan MAYBERRY Holiday, DO        0.9 % sodium chloride infusion  25 mL Intravenous PRN Yan JEFE Holiday, DO        ondansetron (ZOFRAN-ODT) disintegrating tablet 4 mg  4 mg Oral Q8H PRN Yan JEFE Holiday, DO        Or    ondansetron (ZOFRAN) injection 4 mg  4 mg Intravenous Q6H PRN Yan JEFE Holiday, DO        acetaminophen (TYLENOL) tablet 650 mg  650 mg Oral Q6H PRN [facility] Holiday, DO        Or    acetaminophen (TYLENOL) suppository 650 mg  650 mg Rectal Q6H PRN Yan JEFE Holiday, DO        polyethylene glycol (GLYCOLAX) packet 17 g  17 g Oral Daily PRN Yan Palomo, DO        [START ON 7/13/2021] aspirin chewable tablet 81 mg  81 mg Oral Daily Yan JEFE Holiday, DO        enoxaparin (LOVENOX) injection 30 mg  30 mg Subcutaneous Daily Yan Laddiday, DO   30 mg at 07/12/21 1813    atorvastatin (LIPITOR) tablet 80 mg  80 mg Oral Nightly Yan J Holiday, DO        nitroGLYCERIN (NITROSTAT) SL tablet 0.4 mg  0.4 mg Sublingual Q5 Min PRN Yan Laddiday, DO           ALLERGIES: Lipitor [atorvastatin]    Review of Systems   Constitutional: Positive for fatigue.  Negative for chills and fever.   HENT: Negative. Eyes: Negative. Respiratory: Positive for shortness of breath. Negative for wheezing. Cardiovascular: Positive for leg swelling. Negative for chest pain and palpitations. Gastrointestinal: Negative. Negative for abdominal pain, nausea and vomiting. Endocrine: Negative. Genitourinary: Negative. Musculoskeletal: Negative. Skin: Negative. Negative for rash. Allergic/Immunologic: Negative. Neurological: Negative for dizziness, weakness and headaches. Hematological: Negative. VITALS:  Blood pressure (!) 146/95, pulse 94, temperature 97.7 °F (36.5 °C), temperature source Oral, resp. rate 18, height 6' (1.829 m), weight 202 lb 3.2 oz (91.7 kg), SpO2 100 %. Body mass index is 27.42 kg/m². Physical Exam  Constitutional:       Appearance: He is well-developed. He is not diaphoretic. HENT:      Head: Normocephalic and atraumatic. Eyes:      Pupils: Pupils are equal, round, and reactive to light. Neck:      Thyroid: No thyromegaly. Vascular: No JVD. Trachea: No tracheal deviation. Cardiovascular:      Rate and Rhythm: Normal rate and regular rhythm. Chest Wall: PMI is not displaced. Pulses: Intact distal pulses. Heart sounds: Normal heart sounds. Heart sounds not distant. No murmur heard. No friction rub. No gallop. No S3 sounds. Pulmonary:      Effort: No respiratory distress. Breath sounds: No wheezing or rales. Chest:      Chest wall: No tenderness. Abdominal:      General: Bowel sounds are normal. There is no distension. Palpations: Abdomen is soft. There is no mass. Tenderness: There is no abdominal tenderness. There is no guarding or rebound. Musculoskeletal:      Cervical back: Normal range of motion and neck supple. Right lower leg: Edema present. Left lower leg: Edema present. Skin:     General: Skin is warm and dry. Coloration: Skin is not pale.       Findings: No erythema or rash.   Neurological:      Mental Status: He is alert and oriented to person, place, and time. Cranial Nerves: No cranial nerve deficit. Psychiatric:         Behavior: Behavior normal.         Thought Content:  Thought content normal.         Judgment: Judgment normal.         LABS:  Recent Results (from the past 24 hour(s))   EKG 12 Lead    Collection Time: 07/12/21 10:36 AM   Result Value Ref Range    Ventricular Rate 116 BPM    Atrial Rate 116 BPM    P-R Interval 180 ms    QRS Duration 88 ms    Q-T Interval 338 ms    QTc Calculation (Bazett) 469 ms    P Axis 50 degrees    R Axis 8 degrees    T Axis 92 degrees   CBC Auto Differential    Collection Time: 07/12/21 10:45 AM   Result Value Ref Range    WBC 10.1 4.8 - 10.8 K/uL    RBC 4.14 (L) 4.70 - 6.10 M/uL    Hemoglobin 8.8 (L) 14.0 - 18.0 g/dL    Hematocrit 27.2 (L) 42.0 - 52.0 %    MCV 65.8 (L) 80.0 - 100.0 fL    MCH 21.2 (L) 27.0 - 31.3 pg    MCHC 32.1 (L) 33.0 - 37.0 %    RDW 19.8 (H) 11.5 - 14.5 %    Platelets 528 185 - 454 K/uL    PLATELET SLIDE REVIEW Normal     Neutrophils % 86.7 %    Lymphocytes % 4.4 %    Monocytes % 7.6 %    Eosinophils % 0.8 %    Basophils % 0.5 %    Neutrophils Absolute 8.8 (H) 1.4 - 6.5 K/uL    Lymphocytes Absolute 0.4 (L) 1.0 - 4.8 K/uL    Monocytes Absolute 0.8 0.2 - 0.8 K/uL    Eosinophils Absolute 0.1 0.0 - 0.7 K/uL    Basophils Absolute 0.0 0.0 - 0.2 K/uL    Anisocytosis 3+     Microcytes 2+     Hypochromia 1+     Poikilocytes 1+     Ovalocytes 1+    Protime-INR    Collection Time: 07/12/21 10:45 AM   Result Value Ref Range    Protime 14.5 12.3 - 14.9 sec    INR 1.1    APTT    Collection Time: 07/12/21 10:45 AM   Result Value Ref Range    aPTT 33.2 24.4 - 36.8 sec   CK    Collection Time: 07/12/21 10:45 AM   Result Value Ref Range    Total  (H) 0 - 190 U/L   Troponin    Collection Time: 07/12/21 10:45 AM   Result Value Ref Range    Troponin 0.038 (HH) 0.000 - 0.010 ng/mL   Comprehensive Metabolic Panel    Collection Time: 07/12/21 10:45 AM   Result Value Ref Range    Sodium 137 135 - 144 mEq/L    Potassium 3.9 3.4 - 4.9 mEq/L    Chloride 105 95 - 107 mEq/L    CO2 22 20 - 31 mEq/L    Anion Gap 10 9 - 15 mEq/L    Glucose 110 (H) 70 - 99 mg/dL    BUN 34 (H) 6 - 20 mg/dL    CREATININE 3.48 (H) 0.70 - 1.20 mg/dL    GFR Non-African American 18.8 (L) >60    GFR  22.8 (L) >60    Calcium 8.8 8.5 - 9.9 mg/dL    Total Protein 6.1 (L) 6.3 - 8.0 g/dL    Albumin 3.4 (L) 3.5 - 4.6 g/dL    Total Bilirubin 0.7 0.2 - 0.7 mg/dL    Alkaline Phosphatase 121 (H) 35 - 104 U/L    ALT 22 0 - 41 U/L    AST 16 0 - 40 U/L    Globulin 2.7 2.3 - 3.5 g/dL   Brain Natriuretic Peptide    Collection Time: 07/12/21 10:45 AM   Result Value Ref Range    Pro-BNP 12,575 pg/mL   CKMB & RELATIVE PERCENT    Collection Time: 07/12/21 10:45 AM   Result Value Ref Range    CK-MB 6.4 0.0 - 6.7 ng/mL    CK-MB Index 3.1 0.0 - 3.5 %   Troponin    Collection Time: 07/12/21  3:23 PM   Result Value Ref Range    Troponin 0.036 (HH) 0.000 - 0.010 ng/mL     Troponin:   Lab Results   Component Value Date    TROPONINI 0.036 07/12/2021       EKG: normal sinus rhythm, nonspecific ST and T waves changes      ASSESSMENT:    Acute on chronic decompensated combined congestive heart failure. History of nonischemic cardiomyopathy with ejection fraction of 25 to 30%  Elevated cardiac enzymes-demand ischemia  Stage IV chronic kidney disease  Uncontrolled hypertension  Hypertensive urgency  Chronic anemia, microcytic  2+ MR/tricuspid regurgitation  Dyslipidemia  Medical noncompliance  History of alcohol abuse    PLAN:   1. As always, aggressive risk factor modification is strongly recommended. We should adhere to the JNC VIII guidelines for HTN management and the NCEPATP III guidelines for LDL-C management. 2. Continue with IV diuresis as kasie, monitor I/O's, renal function, electrolytes. Keep K+>4 and Mg>2  3. Check limited echocardiogram for LV function  4.  Maximize cardiac medications  5. Consult nephro for CKD/fluid and diuretic management  6. CHF teaching  7. Follow-up in CHF clinic post discharge  8. Low-sodium diet  9. No plans for any invasive work-up.   10. GI/DVT prophylaxis    Electronically signed by Monica Alanis DO on 7/12/2021 at 7:05 PM

## 2021-07-12 NOTE — ED PROVIDER NOTES
Allergic/Immunologic: Negative for environmental allergies. Neurological: Negative for dizziness and light-headedness. Hematological: Negative for adenopathy. Psychiatric/Behavioral: Negative for behavioral problems. Except as noted above the remainder of the review of systems was reviewed and negative. PAST MEDICAL HISTORY     Past Medical History:   Diagnosis Date    Abnormal EKG 9/27/2019    Cardiomyopathy [facility])     per echo 8/2019    Chronic combined systolic and diastolic CHF (congestive heart failure) ([address] 9/27/2019    ETOH abuse     Hypertension     Tobacco abuse          SURGICALHISTORY       Past Surgical History:   Procedure Laterality Date    HERNIA REPAIR Right 2/10/2021    RIGHT INGUINAL HERNIA REPAIR WITH MESH performed by Angelita Mao MD at [address] [place] [address]       Previous Medications    AMLODIPINE (NORVASC) 10 MG TABLET    Take 1 tablet by mouth daily    ASPIRIN 81 MG EC TABLET    Take 1 tablet by mouth daily    ATORVASTATIN (LIPITOR) 80 MG TABLET    Take 1 tablet by mouth nightly    CARVEDILOL (COREG) 25 MG TABLET    Take 1 tablet by mouth 2 times daily HOLD for SBP<100 or HR<60    CEFUROXIME (CEFTIN) 500 MG TABLET    take 1 tablet by mouth twice a day    CLONIDINE (CATAPRES) 0.2 MG TABLET    Take 1 tablet by mouth 2 times daily    FUROSEMIDE (LASIX) 40 MG TABLET    Take 40 mg by mouth daily    HYDRALAZINE (APRESOLINE) 100 MG TABLET    Take 1 tablet by mouth 3 times daily    ISOSORBIDE MONONITRATE (IMDUR) 120 MG EXTENDED RELEASE TABLET    Take 1 tablet by mouth daily    IVABRADINE (CORLANOR) 5 MG TABS TABLET    Take 1 tablet by mouth 2 times daily (with meals)    NICOTINE (NICODERM CQ) 21 MG/24HR    Place 1 patch onto the skin daily       ALLERGIES     Patient has no known allergies.     FAMILY HISTORY       Family History   Problem Relation Age of Onset    Coronary Art Dis Mother           SOCIAL HISTORY       Social History     Socioeconomic History    Marital status: Single     Spouse name: None    Number of children: None    Years of education: None    Highest education level: None   Occupational History    None   Tobacco Use    Smoking status: Current Every Day Smoker     Packs/day: 1.00     Types: Cigarettes     Last attempt to quit: 12/3/2020     Years since quittin.6    Smokeless tobacco: Never Used    Tobacco comment: currently smoking only couple cigarettes daily   Substance and Sexual Activity    Alcohol use: Yes    Drug use: Never    Sexual activity: None   Other Topics Concern    None   Social History Narrative    None     Social Determinants of Health     Financial Resource Strain: Low Risk     Difficulty of Paying Living Expenses: Not hard at all   Food Insecurity: No Food Insecurity    Worried About Running Out of Food in the Last Year: Never true    Ginette of Food in the Last Year: Never true   Transportation Needs: No Transportation Needs    Lack of Transportation (Medical): No    Lack of Transportation (Non-Medical): No   Physical Activity:     Days of Exercise per Week:     Minutes of Exercise per Session:    Stress:     Feeling of Stress :    Social Connections:     Frequency of Communication with Friends and Family:     Frequency of Social Gatherings with Friends and Family:     Attends [employer] Services:     Active Member of Clubs or Organizations:     Attends Club or Organization Meetings:     Marital Status:    Intimate Partner Violence:     Fear of Current or Ex-Partner:     Emotionally Abused:     Physically Abused:     Sexually Abused:        SCREENINGS      @FLOW([id number])@      PHYSICAL EXAM    (up to 7 for level 4, 8 or more for level 5)     ED Triage Vitals [21 1028]   BP Temp Temp src Pulse Resp SpO2 Height Weight   -- 97.8 °F (36.6 °C) -- 116 18 97 % 6' (1.829 m) 210 lb (95.3 kg)       Physical Exam  Constitutional:       Appearance: He is well-developed. He is not ill-appearing. Comments: Patient clearly displays orthopneic dyspnea   HENT:      Head: Normocephalic and atraumatic. Mouth/Throat:      Pharynx: No pharyngeal swelling. Eyes:      Conjunctiva/sclera: Conjunctivae normal.      Pupils: Pupils are equal, round, and reactive to light. Neck:      Vascular: JVD present. Cardiovascular:      Rate and Rhythm: Tachycardia present. Pulmonary:      Effort: Pulmonary effort is normal.      Breath sounds: Examination of the right-lower field reveals rales. Examination of the left-lower field reveals rales. Rales present. No decreased breath sounds, wheezing or rhonchi. Chest:      Chest wall: No mass, tenderness or edema. Abdominal:      General: Bowel sounds are normal.      Palpations: Abdomen is soft. Musculoskeletal:         General: Normal range of motion. Cervical back: Normal range of motion and neck supple. Right lower leg: No tenderness. Edema present. Left lower leg: No tenderness. Edema present. Skin:     General: Skin is warm and dry. Coloration: Skin is not cyanotic or pale. Findings: No rash. Neurological:      General: No focal deficit present. Mental Status: He is alert and oriented to person, place, and time. Deep Tendon Reflexes: Reflexes are normal and symmetric. Psychiatric:         Mood and Affect: Mood is anxious. DIAGNOSTIC RESULTS     EKG: All EKG's are interpreted by the Emergency Department Physician who either signs or Co-signsthis chart in the absence of a cardiologist.    Patient is sinus tachycardia, most likely secondary to congestive heart failure as well as withdrawal from carvedilol. He has some left atrial enlargement and voltage criteria for left ventricular enlargement. These changes are not new when compared to an EKG April 23, 2021. There is no acute ST segment elevation or T wave inversion though there are some present that have been longstanding.     RADIOLOGY:   Non-plain filmimages such as CT, Ultrasound and MRI are read by the radiologist. Plain radiographic images are visualized and preliminarily interpreted by the emergency physician with the below findings:        Interpretation per the Radiologist below, if available at the time ofthis note:    XR CHEST PORTABLE   Final Result   The findings are worrisome for infiltrate, atelectasis in both lung bases and a left pleural effusion. ED BEDSIDE ULTRASOUND:   Performed by ED Physician - none    LABS:  Labs Reviewed   CBC WITH AUTO DIFFERENTIAL - Abnormal; Notable for the following components:       Result Value    RBC 4.14 (*)     Hemoglobin 8.8 (*)     Hematocrit 27.2 (*)     MCV 65.8 (*)     MCH 21.2 (*)     MCHC 32.1 (*)     RDW 19.8 (*)     Neutrophils Absolute 8.8 (*)     Lymphocytes Absolute 0.4 (*)     All other components within normal limits   CK - Abnormal; Notable for the following components:     Total  (*)     All other components within normal limits   TROPONIN - Abnormal; Notable for the following components:    Troponin 0.038 (*)     All other components within normal limits    Narrative:     CALL  [facility]  ED tel. [phone],  TROP results called to and read back by Nii Best, 07/12/2021 11:24, by  [address] - Abnormal; Notable for the following components:    Glucose 110 (*)     BUN 34 (*)     CREATININE 3.48 (*)     GFR Non- 18.8 (*)     GFR  22.8 (*)     Total Protein 6.1 (*)     Albumin 3.4 (*)     Alkaline Phosphatase 121 (*)     All other components within normal limits   PROTIME-INR   APTT   BRAIN NATRIURETIC PEPTIDE    Narrative:     CALL  [facility]  [employer] tel. [phone],  TROP results called to and read back by Nii Best, 07/12/2021 11:24, by  [address] [place] [address]    Narrative:     Marc Torres tel. [phone],  TROP results called to and read back by Nii Best, 07/12/2021 11:24, by  Leah Preciado       All other labs were within normal range or not returned as of this dictation. EMERGENCY DEPARTMENT COURSE and DIFFERENTIAL DIAGNOSIS/MDM:   Vitals:    Vitals:    07/12/21 1028 07/12/21 1107 07/12/21 1109 07/12/21 1115   BP:  (!) 163/119 (!) 163/118 (!) 171/116   Pulse: 116 111  111   Resp: 18 19  (!) 37   Temp: 97.8 °F (36.6 °C)      SpO2: 97% 99%  99%   Weight: 210 lb (95.3 kg)      Height: 6' (1.829 m)          Patient is initially evaluated and found to be slightly dyspneic. He is simply sat up and this markedly improves his sensation of being able to get a full breath and and he is able to calm his breathing significantly. Patient denies infectious type history and is not treated for infiltrate despite this being a concern per radiology on chest x-ray as clinically he is presenting as hypertensive urgency. Patient has marked improvement while here in the emergency department and has less work of breathing however he was severe enough upon arrival that admission is necessary. He is currently stable awaiting cardiology callback. MDM    CRITICAL CARE TIME   Total Critical Care time was 0 minutes, excluding separately reportableprocedures. There was a high probability of clinicallysignificant/life threatening deterioration in the patient's condition which required my urgent intervention. CONSULTS:  None    PROCEDURES:  Unless otherwise noted below, none     Procedures    FINAL IMPRESSION      1. Orthopnea    2. Hypertensive emergency    3. Acute on chronic systolic congestive heart failure ([address]    4. Noncompliance with medication regimen          DISPOSITION/PLAN   DISPOSITION Decision To Admit 07/12/2021 01:19:36 PM      PATIENT REFERRED TO:  No follow-up provider specified.     DISCHARGE MEDICATIONS:  New Prescriptions    No medications on file          (Please note that portions of this note were completed with a voice recognition program.  Efforts were made to edit the dictations but occasionally words are mis-transcribed.)    Finn Ronquillo DO (electronically signed)  Attending Emergency Physician          Finn Ronquillo DO  07/12/21 4193

## 2021-07-13 LAB
CHOLESTEROL, TOTAL: 143 MG/DL (ref 0–199)
EKG ATRIAL RATE: 104 BPM
EKG ATRIAL RATE: 116 BPM
EKG P AXIS: 48 DEGREES
EKG P AXIS: 50 DEGREES
EKG P-R INTERVAL: 180 MS
EKG P-R INTERVAL: 194 MS
EKG Q-T INTERVAL: 338 MS
EKG Q-T INTERVAL: 358 MS
EKG QRS DURATION: 88 MS
EKG QRS DURATION: 90 MS
EKG QTC CALCULATION (BAZETT): 469 MS
EKG QTC CALCULATION (BAZETT): 470 MS
EKG R AXIS: -17 DEGREES
EKG R AXIS: 8 DEGREES
EKG T AXIS: 86 DEGREES
EKG T AXIS: 92 DEGREES
EKG VENTRICULAR RATE: 104 BPM
EKG VENTRICULAR RATE: 116 BPM
HCT VFR BLD CALC: 27.3 % (ref 42–52)
HDLC SERPL-MCNC: 71 MG/DL (ref 40–59)
HEMOGLOBIN: 8.6 G/DL (ref 14–18)
LDL CHOLESTEROL CALCULATED: 54 MG/DL (ref 0–129)
MAGNESIUM: 2 MG/DL (ref 1.7–2.4)
MCH RBC QN AUTO: 20.6 PG (ref 27–31.3)
MCHC RBC AUTO-ENTMCNC: 31.7 % (ref 33–37)
MCV RBC AUTO: 65 FL (ref 80–100)
PDW BLD-RTO: 19.7 % (ref 11.5–14.5)
PLATELET # BLD: 429 K/UL (ref 130–400)
RBC # BLD: 4.19 M/UL (ref 4.7–6.1)
TRIGL SERPL-MCNC: 88 MG/DL (ref 0–150)
WBC # BLD: 8.8 K/UL (ref 4.8–10.8)

## 2021-07-13 PROCEDURE — 2580000003 HC RX 258: Performed by: INTERNAL MEDICINE

## 2021-07-13 PROCEDURE — 2700000000 HC OXYGEN THERAPY PER DAY

## 2021-07-13 PROCEDURE — 6360000002 HC RX W HCPCS: Performed by: INTERNAL MEDICINE

## 2021-07-13 PROCEDURE — 93010 ELECTROCARDIOGRAM REPORT: CPT | Performed by: INTERNAL MEDICINE

## 2021-07-13 PROCEDURE — 85027 COMPLETE CBC AUTOMATED: CPT

## 2021-07-13 PROCEDURE — 93308 TTE F-UP OR LMTD: CPT

## 2021-07-13 PROCEDURE — 80061 LIPID PANEL: CPT

## 2021-07-13 PROCEDURE — 83735 ASSAY OF MAGNESIUM: CPT

## 2021-07-13 PROCEDURE — APPSS30 APP SPLIT SHARED TIME 16-30 MINUTES: Performed by: PHYSICIAN ASSISTANT

## 2021-07-13 PROCEDURE — 6370000000 HC RX 637 (ALT 250 FOR IP): Performed by: EMERGENCY MEDICINE

## 2021-07-13 PROCEDURE — 93320 DOPPLER ECHO COMPLETE: CPT

## 2021-07-13 PROCEDURE — 2060000000 HC ICU INTERMEDIATE R&B

## 2021-07-13 PROCEDURE — 93325 DOPPLER ECHO COLOR FLOW MAPG: CPT

## 2021-07-13 PROCEDURE — 99233 SBSQ HOSP IP/OBS HIGH 50: CPT | Performed by: INTERNAL MEDICINE

## 2021-07-13 PROCEDURE — 2580000003 HC RX 258: Performed by: PHYSICIAN ASSISTANT

## 2021-07-13 PROCEDURE — 6370000000 HC RX 637 (ALT 250 FOR IP): Performed by: INTERNAL MEDICINE

## 2021-07-13 PROCEDURE — 36415 COLL VENOUS BLD VENIPUNCTURE: CPT

## 2021-07-13 PROCEDURE — 93005 ELECTROCARDIOGRAM TRACING: CPT | Performed by: INTERNAL MEDICINE

## 2021-07-13 RX ORDER — FUROSEMIDE 10 MG/ML
40 INJECTION INTRAMUSCULAR; INTRAVENOUS 2 TIMES DAILY
Status: DISCONTINUED | OUTPATIENT
Start: 2021-07-13 | End: 2021-07-14 | Stop reason: HOSPADM

## 2021-07-13 RX ORDER — ISOSORBIDE MONONITRATE 60 MG/1
120 TABLET, EXTENDED RELEASE ORAL DAILY
Status: DISCONTINUED | OUTPATIENT
Start: 2021-07-13 | End: 2021-07-14 | Stop reason: HOSPADM

## 2021-07-13 RX ORDER — ZOLPIDEM TARTRATE 5 MG/1
5 TABLET ORAL NIGHTLY PRN
Status: DISCONTINUED | OUTPATIENT
Start: 2021-07-13 | End: 2021-07-14 | Stop reason: HOSPADM

## 2021-07-13 RX ORDER — 0.9 % SODIUM CHLORIDE 0.9 %
500 INTRAVENOUS SOLUTION INTRAVENOUS ONCE
Status: COMPLETED | OUTPATIENT
Start: 2021-07-13 | End: 2021-07-13

## 2021-07-13 RX ORDER — NICOTINE 21 MG/24HR
1 PATCH, TRANSDERMAL 24 HOURS TRANSDERMAL DAILY
Status: DISCONTINUED | OUTPATIENT
Start: 2021-07-13 | End: 2021-07-14 | Stop reason: HOSPADM

## 2021-07-13 RX ORDER — AMLODIPINE BESYLATE 10 MG/1
10 TABLET ORAL DAILY
Status: DISCONTINUED | OUTPATIENT
Start: 2021-07-13 | End: 2021-07-13

## 2021-07-13 RX ORDER — CLONIDINE HYDROCHLORIDE 0.1 MG/1
0.2 TABLET ORAL 2 TIMES DAILY
Status: DISCONTINUED | OUTPATIENT
Start: 2021-07-13 | End: 2021-07-14 | Stop reason: HOSPADM

## 2021-07-13 RX ORDER — HYDRALAZINE HYDROCHLORIDE 100 MG/1
100 TABLET, FILM COATED ORAL 3 TIMES DAILY
Status: DISCONTINUED | OUTPATIENT
Start: 2021-07-13 | End: 2021-07-13 | Stop reason: SDUPTHER

## 2021-07-13 RX ORDER — CARVEDILOL 25 MG/1
25 TABLET ORAL 2 TIMES DAILY
Status: DISCONTINUED | OUTPATIENT
Start: 2021-07-13 | End: 2021-07-14 | Stop reason: HOSPADM

## 2021-07-13 RX ADMIN — FUROSEMIDE 40 MG: 10 INJECTION, SOLUTION INTRAMUSCULAR; INTRAVENOUS at 14:00

## 2021-07-13 RX ADMIN — CARVEDILOL 25 MG: 25 TABLET, FILM COATED ORAL at 20:16

## 2021-07-13 RX ADMIN — SODIUM CHLORIDE 500 ML: 9 INJECTION, SOLUTION INTRAVENOUS at 16:45

## 2021-07-13 RX ADMIN — HYDRALAZINE HYDROCHLORIDE 100 MG: 50 TABLET, FILM COATED ORAL at 08:21

## 2021-07-13 RX ADMIN — ASPIRIN 81 MG: 81 TABLET, CHEWABLE ORAL at 08:21

## 2021-07-13 RX ADMIN — CLONIDINE HYDROCHLORIDE 0.2 MG: 0.1 TABLET ORAL at 14:01

## 2021-07-13 RX ADMIN — Medication 10 ML: at 08:22

## 2021-07-13 RX ADMIN — HEPARIN SODIUM 5000 UNITS: 5000 INJECTION INTRAVENOUS; SUBCUTANEOUS at 14:02

## 2021-07-13 RX ADMIN — CARVEDILOL 25 MG: 25 TABLET, FILM COATED ORAL at 14:01

## 2021-07-13 RX ADMIN — HEPARIN SODIUM 5000 UNITS: 5000 INJECTION INTRAVENOUS; SUBCUTANEOUS at 20:15

## 2021-07-13 RX ADMIN — HYDRALAZINE HYDROCHLORIDE 100 MG: 50 TABLET, FILM COATED ORAL at 14:01

## 2021-07-13 RX ADMIN — Medication 10 ML: at 20:17

## 2021-07-13 RX ADMIN — ISOSORBIDE MONONITRATE 120 MG: 60 TABLET, EXTENDED RELEASE ORAL at 14:01

## 2021-07-13 RX ADMIN — AMLODIPINE BESYLATE 10 MG: 10 TABLET ORAL at 14:01

## 2021-07-13 RX ADMIN — HEPARIN SODIUM 5000 UNITS: 5000 INJECTION INTRAVENOUS; SUBCUTANEOUS at 06:01

## 2021-07-13 ASSESSMENT — ENCOUNTER SYMPTOMS
COLOR CHANGE: 0
NAUSEA: 0
VOMITING: 0
CHEST TIGHTNESS: 0
SHORTNESS OF BREATH: 1

## 2021-07-13 ASSESSMENT — PAIN SCALES - GENERAL
PAINLEVEL_OUTOF10: 0

## 2021-07-13 NOTE — PROGRESS NOTES
pain chest pressure heaviness. He has been noncompliant with his diet. He works at [employer]. Noted to have mildly elevated cardiac enzyme with a BNP of 12,000. His creatinine is 3.48 and hemoglobin of 8.8 with an MCV of 65. Patient with stage IV chronic kidney disease.     Status post left heart catheterization in December 2020 which revealed normal coronary arteries.  Last echocardiogram in August 2020 revealed severely reduced LV systolic function with EF of 25 to 30%      Review of Systems:   Review of Systems   Constitutional: Negative for activity change. HENT: Negative for congestion. Respiratory: Positive for shortness of breath. Negative for chest tightness. Cardiovascular: Positive for leg swelling. Negative for chest pain and palpitations. Gastrointestinal: Negative for nausea and vomiting. Genitourinary: Negative for difficulty urinating. Skin: Negative for color change. Neurological: Negative for dizziness and syncope. Psychiatric/Behavioral: Negative for agitation and behavioral problems. Physical Examination:    BP (!) 142/95   Pulse 104   Temp 98.1 °F (36.7 °C) (Oral)   Resp 18   Ht 6' (1.829 m)   Wt 198 lb 3.2 oz (89.9 kg)   SpO2 95%   BMI 26.88 kg/m²    Physical Exam  Constitutional:       General: He is not in acute distress. Appearance: Normal appearance. Cardiovascular:      Rate and Rhythm: Regular rhythm. Tachycardia present. Pulmonary:      Effort: No respiratory distress. Breath sounds: No wheezing, rhonchi or rales. Comments: +tachypnea  Abdominal:      Palpations: Abdomen is soft. Tenderness: There is no abdominal tenderness. Musculoskeletal:         General: Normal range of motion. Cervical back: Normal range of motion and neck supple. Right lower leg: Edema (trace) present. Left lower leg: Edema (trace) present. Skin:     General: Skin is warm and dry.    Neurological:      General: No focal deficit present. Mental Status: He is alert and oriented to person, place, and time. Cranial Nerves: No cranial nerve deficit.    Psychiatric:         Mood and Affect: Mood normal.         Behavior: Behavior normal.         LABS:  CBC:   Lab Results   Component Value Date    WBC 8.8 07/13/2021    RBC 4.19 07/13/2021    HGB 8.6 07/13/2021    HCT 27.3 07/13/2021    MCV 65.0 07/13/2021    MCH 20.6 07/13/2021    MCHC 31.7 07/13/2021    RDW 19.7 07/13/2021     07/13/2021     CBC with Differential:   Lab Results   Component Value Date    WBC 8.8 07/13/2021    RBC 4.19 07/13/2021    HGB 8.6 07/13/2021    HCT 27.3 07/13/2021     07/13/2021    MCV 65.0 07/13/2021    MCH 20.6 07/13/2021    MCHC 31.7 07/13/2021    RDW 19.7 07/13/2021    LYMPHOPCT 4.4 07/12/2021    MONOPCT 7.6 07/12/2021    BASOPCT 0.5 07/12/2021    MONOSABS 0.8 07/12/2021    LYMPHSABS 0.4 07/12/2021    EOSABS 0.1 07/12/2021    BASOSABS 0.0 07/12/2021     CMP:    Lab Results   Component Value Date     07/12/2021    K 3.9 07/12/2021     07/12/2021    CO2 22 07/12/2021    BUN 34 07/12/2021    CREATININE 3.48 07/12/2021    GFRAA 22.8 07/12/2021    LABGLOM 18.8 07/12/2021    GLUCOSE 110 07/12/2021    PROT 6.1 07/12/2021    LABALBU 3.4 07/12/2021    CALCIUM 8.8 07/12/2021    BILITOT 0.7 07/12/2021    ALKPHOS 121 07/12/2021    AST 16 07/12/2021    ALT 22 07/12/2021     BMP:    Lab Results   Component Value Date     07/12/2021    K 3.9 07/12/2021     07/12/2021    CO2 22 07/12/2021    BUN 34 07/12/2021    LABALBU 3.4 07/12/2021    CREATININE 3.48 07/12/2021    CALCIUM 8.8 07/12/2021    GFRAA 22.8 07/12/2021    LABGLOM 18.8 07/12/2021    GLUCOSE 110 07/12/2021     Magnesium:    Lab Results   Component Value Date    MG 2.0 07/13/2021     Troponin:    Lab Results   Component Value Date    TROPONINI 0.044 07/12/2021       Radiology:  XR CHEST PORTABLE    Result Date: 7/12/2021  Exam: XR CHEST PORTABLE History:  pain Technique: AP portable view of the chest obtained. Comparison: Chest x-ray from April 24, 2021 Chest x-ray portable Findings: The cardiac silhouette is enlarged which may be secondary to cardiomegaly versus a pericardial effusion. There are ill-defined alveolar opacities in both lungs worrisome for infiltrates, pneumonia. There is opacification of the left hemidiaphragm which may be secondary to presence of a left pleural effusion. Bones of the thorax appear intact. The findings are worrisome for infiltrate, atelectasis in both lung bases and a left pleural effusion. Echocardiogram 4/22/21:  Conclusions      Summary   Normal mitral valve structure and function. 2+ MR   Normal tricuspid valve structure and function. 2+ TR   RVSP 62 mmHg   Left ventricular ejection fraction is visually estimated at 35-40%. Pseudonormal filling pattern noted. Large pleural effusion. Signature      ----------------------------------------------------------------   Electronically signed by Brandi Hoffman MD(Interpreting   physician) on 04/22/2021 11:44 AM     EKG 7/13/21: , slight ST depression with T wave inversion leads I, aVL and V5-V6, QTc 470ms    Telemetry 7/13/21: ST 100s      Assessment:    Active Hospital Problems    Diagnosis Date Noted    Hypertensive urgency [I16.0] 08/26/2019     1. Acute on chronic combined CHF  2. Hx NICMP EF 35-40% per echo 4/22/21  3. Hx normal coronaries per [facility] 11/4/20  4. HTN urgency/uncontrolled HTN--improving  5. CKD stage IV  6. Elevated troponin--likely type II MI  7. Chronic anemia  8. Hx ETOH abuse     Plan:  1. Maximize medical therapy-aspirin 81 mg p.o. daily, Coreg 25 mg p.o. twice daily, hydralazine 100 mg p.o. 3 times daily, Imdur 120 mg p.o. daily, Corlanor 5 mg p.o. twice daily, amlodipine 10 mg p.o. daily, IV diuresis as tolerated-currently with 40 mg IV twice daily, NicoDerm patch as directed  2. Cardiac/less than 2 g sodium diet  3.  Recommend 1500 mL daily fluid restriction  4. Monitor on telemetry for any tachycardia or bradycardia arrhythmias  5. Maintain potassium greater than 4, magnesium greater than 2  6. GI/DVT prophylaxis  7. Nephrology recommendations regarding CKD/fluid and diuretic management  8. Conservative medical therapy from a cardiac standpoint. No need for any ischemic evaluation as patient is status post cardiac catheterization in November 2020 which revealed normal coronary arteries  9. Recent echocardiogram on 4/22/2021 with EF of 35 to 40% no definite indication for ICD at this time. Consider MUGA scan as outpatient for further evaluation of LV function  10. Patient educated on the importance of compliance with medical therapy. He states he has not been taking his Lasix as prescribed as it gives him muscle cramps. Also he ran out of his carvedilol few days ago and did not call for refill. 11. Further recommendations to follow        Electronically signed by MICAH Sotomayor on 7/13/2021 at 12:22 PM    Attending Supervising [facility] [address]  The patient is a 54 y.o. male. I have performed a history and physical examination of the patient. I discussed the case with the physician assistant. I reviewed the patient's Past Medical History, Past Surgical History, Medications, and Allergies.      Physical Exam:  Vitals:    07/13/21 0656 07/13/21 0712 07/13/21 1356 07/13/21 1748   BP: (!) 147/96 (!) 142/95 (!) 153/105 105/72   Pulse: 104 104 109    Resp: 18 18 18    Temp:  98.1 °F (36.7 °C) 98.2 °F (36.8 °C)    TempSrc:  Oral Oral    SpO2: 99% 95% 99%    Weight:       Height:             Pulmonary/Chest: clear to auscultation bilaterally- no wheezes, rales or rhonchi, normal air movement, no respiratory distress  Cardiovascular: normal rate, normal S1 and S2, no gallops, intact distal pulses and no carotid bruits  Abdomen: soft, non-tender, non-distended, normal bowel sounds, no masses or organomegaly    Active Hospital Problems    Diagnosis Date Noted    Hypertensive urgency [I16.0] 08/26/2019     Priority: Low        I reviewed and agree with the findings and plan documented in her note . ASSESSMENT:     Acute on chronic decompensated combined congestive heart failure. History of nonischemic cardiomyopathy with ejection fraction of 25 to 30%  Elevated cardiac enzymes-demand ischemia  Stage IV chronic kidney disease  Uncontrolled hypertension  Hypertensive urgency  Chronic anemia, microcytic  2+ MR/tricuspid regurgitation  Dyslipidemia  Medical noncompliance  History of alcohol abuse     PLAN:   1. As always, aggressive risk factor modification is strongly recommended. We should adhere to the JNC VIII guidelines for HTN management and the NCEPATP III guidelines for LDL-C management. 2. Continue with IV diuresis as kasie, monitor I/O's, renal function, electrolytes. Keep K+>4 and Mg>2  3. We will review limited echocardiogram for LV function. May need to consider AICD if EF is less than 35%. 4. Maximize cardiac medications  5. Consult nephro for CKD/fluid and diuretic management  6. CHF teaching  7. Follow-up in CHF clinic post discharge  8. Low-sodium diet  9. No plans for any invasive work-up.   10. GI/DVT prophylaxis       Electronically signed by Regis Dakins, DO on 7/13/21 at 6:06 PM EDT

## 2021-07-13 NOTE — ACP (ADVANCE CARE PLANNING)
Advance Care Planning     Advance Care Planning Activator (Inpatient)  Conversation Note      Date of ACP Conversation: 7/12/2021     Conversation Conducted with: Patient with Decision Making Capacity    ACP Activator: Temo Siddiqui    Pt given information on advance directives as per his request.    Health Care Decision Maker:     Current Designated Health Care Decision Maker:     Primary Decision Maker: Yadira Paola - Brother/Sister - [phone]      Care Preferences    Ventilation: \"If you were in your present state of health and suddenly became very ill and were unable to breathe on your own, what would your preference be about the use of a ventilator (breathing machine) if it were available to you? \"      Would the patient desire the use of ventilator (breathing machine)?: yes    \"If your health worsens and it becomes clear that your chance of recovery is unlikely, what would your preference be about the use of a ventilator (breathing machine) if it were available to you? \"     Would the patient desire the use of ventilator (breathing machine)? : this would change his opinion      Resuscitation  \"CPR works best to restart the heart when there is a sudden event, like a heart attack, in someone who is otherwise healthy. Unfortunately, CPR does not typically restart the heart for people who have serious health conditions or who are very sick. \"    \"In the event your heart stopped as a result of an underlying serious health condition, would you want attempts to be made to restart your heart (answer \"yes\" for attempt to resuscitate) or would you prefer a natural death (answer \"no\" for do not attempt to resuscitate)? \" yes       [x] Yes   [] No   Educated Patient / Stevie Scanlon regarding differences between Advance Directives and portable DNR orders.     Length of ACP Conversation in minutes:  10  Conversation Outcomes:  [x] ACP discussion completed  [] Existing advance directive reviewed with patient; no changes to patient's previously recorded wishes  [] New Advance Directive completed  [] Portable Do Not Rescitate prepared for Provider review and signature  [] POLST/POST/MOLST/MOST prepared for Provider review and signature      Follow-up plan:    [] Schedule follow-up conversation to continue planning  [] Referred individual to Provider for additional questions/concerns   [] Advised patient/agent/surrogate to review completed ACP document and update if needed with changes in condition, patient preferences or care setting    [x] This note routed to one or more involved healthcare providers

## 2021-07-13 NOTE — CARE COORDINATION
Met with patient to discuss CHF again. Pt. reports being noncompliant with lasix, diet, and fluid intake. He admits to only weighing himself weekly. Reviewed CHF booklet with patient and emphasized compliance with his physician orders.    Electronically signed by Jovana Xiong RN on 7/13/2021 at 2:05 PM

## 2021-07-13 NOTE — ACP (ADVANCE CARE PLANNING)
Advance Care Planning     Advance Care Planning Inpatient Note  [facility] Department    Today's Date: 7/13/2021  Unit: [facility]LO[facility] 1W TELEMETRY    Received request from Other: Nurse. Upon review of chart and communication with care team, patient's decision making abilities are not in question. Patientwas/were present in the room during visit. Goals of ACP Conversation:  Discuss advance care planning documents    Health Care Decision Makers:      Primary Decision Maker: Delon Bustamante - Brother/Sister - [phone]    Secondary Decision Maker: Aliene Cheadle - Other - [phone]  Click here to complete [place] including selection of the Healthcare Decision Maker Relationship (ie \"Primary\")     Today we:  Verified [address] (Patient Wishes):  Healthcare Power of /Advance Directive Appointment of Health Care Agent     Assessment:  Patient requested to complete his [address] document. Patient named his brother Savannah French as his primary agent. A copy was placed in his chart and the original was returned to the patient. Interventions:  Provided education on documents for clarity and greater understanding. Assisted in the completion of documents according to patient's wishes at this time. Outcomes/Plan:  New advance directive completed. Original advance directive form and copies for agent(s) given to patient. Copy of advance directive given to staff to scan into medical record. Routed ACP note to attending provider or other IDT member.     Electronically signed by Lisa Ac [facility] on 7/13/2021 at 2:34 PM

## 2021-07-13 NOTE — FLOWSHEET NOTE
1645- Pt sitting up in bed eating dinner. Pt c/o of \"feeling funny. \" BP checked and was 86/50 on left and 73/53 on right. Spoke with BEATRIZ OBRIEN and orders placed for NS bolus. NS up and infusing. Call light in reach. Will monitor pt     1716- Dr Shemar Estrada in to see pt. BP rechecked 85/53. Dr Shemar Estrada only wants 250 cc of bolus to be given    21 [phone]- Pt rechecked 105/72. Dr Shemar Estrada notified and said to DC fluids.  Pt resting in bed quietly Electronically signed by Sylvester Kessler RN on 7/13/2021 at 6:43 PM

## 2021-07-14 VITALS
WEIGHT: 198.2 LBS | BODY MASS INDEX: 26.84 KG/M2 | HEART RATE: 95 BPM | TEMPERATURE: 97.5 F | OXYGEN SATURATION: 99 % | RESPIRATION RATE: 18 BRPM | DIASTOLIC BLOOD PRESSURE: 83 MMHG | SYSTOLIC BLOOD PRESSURE: 127 MMHG | HEIGHT: 72 IN

## 2021-07-14 LAB
ANION GAP SERPL CALCULATED.3IONS-SCNC: 14 MEQ/L (ref 9–15)
BUN BLDV-MCNC: 38 MG/DL (ref 6–20)
CALCIUM SERPL-MCNC: 8.9 MG/DL (ref 8.5–9.9)
CHLORIDE BLD-SCNC: 106 MEQ/L (ref 95–107)
CO2: 21 MEQ/L (ref 20–31)
CREAT SERPL-MCNC: 3.83 MG/DL (ref 0.7–1.2)
GFR AFRICAN AMERICAN: 20.4
GFR NON-AFRICAN AMERICAN: 16.9
GLUCOSE BLD-MCNC: 92 MG/DL (ref 70–99)
HCT VFR BLD CALC: 26.3 % (ref 42–52)
HEMOGLOBIN: 8.3 G/DL (ref 14–18)
MCH RBC QN AUTO: 21.1 PG (ref 27–31.3)
MCHC RBC AUTO-ENTMCNC: 31.7 % (ref 33–37)
MCV RBC AUTO: 66.6 FL (ref 80–100)
PDW BLD-RTO: 19.7 % (ref 11.5–14.5)
PLATELET # BLD: 418 K/UL (ref 130–400)
POTASSIUM SERPL-SCNC: 3.8 MEQ/L (ref 3.4–4.9)
RBC # BLD: 3.95 M/UL (ref 4.7–6.1)
SODIUM BLD-SCNC: 141 MEQ/L (ref 135–144)
WBC # BLD: 6.3 K/UL (ref 4.8–10.8)

## 2021-07-14 PROCEDURE — 85027 COMPLETE CBC AUTOMATED: CPT

## 2021-07-14 PROCEDURE — 99239 HOSP IP/OBS DSCHRG MGMT >30: CPT | Performed by: INTERNAL MEDICINE

## 2021-07-14 PROCEDURE — 6360000002 HC RX W HCPCS: Performed by: INTERNAL MEDICINE

## 2021-07-14 PROCEDURE — 2580000003 HC RX 258: Performed by: INTERNAL MEDICINE

## 2021-07-14 PROCEDURE — 6370000000 HC RX 637 (ALT 250 FOR IP): Performed by: INTERNAL MEDICINE

## 2021-07-14 PROCEDURE — 36415 COLL VENOUS BLD VENIPUNCTURE: CPT

## 2021-07-14 PROCEDURE — 80048 BASIC METABOLIC PNL TOTAL CA: CPT

## 2021-07-14 PROCEDURE — 6370000000 HC RX 637 (ALT 250 FOR IP): Performed by: EMERGENCY MEDICINE

## 2021-07-14 RX ORDER — TORSEMIDE 20 MG/1
40 TABLET ORAL DAILY
Qty: 60 TABLET | Refills: 1 | Status: ON HOLD | OUTPATIENT
Start: 2021-07-14 | End: 2022-04-15 | Stop reason: HOSPADM

## 2021-07-14 RX ADMIN — Medication 5 ML: at 08:30

## 2021-07-14 RX ADMIN — ASPIRIN 81 MG: 81 TABLET, CHEWABLE ORAL at 08:25

## 2021-07-14 RX ADMIN — CARVEDILOL 25 MG: 25 TABLET, FILM COATED ORAL at 08:25

## 2021-07-14 RX ADMIN — FUROSEMIDE 40 MG: 10 INJECTION, SOLUTION INTRAMUSCULAR; INTRAVENOUS at 08:25

## 2021-07-14 RX ADMIN — HEPARIN SODIUM 5000 UNITS: 5000 INJECTION INTRAVENOUS; SUBCUTANEOUS at 15:54

## 2021-07-14 RX ADMIN — ISOSORBIDE MONONITRATE 120 MG: 60 TABLET, EXTENDED RELEASE ORAL at 08:25

## 2021-07-14 RX ADMIN — HEPARIN SODIUM 5000 UNITS: 5000 INJECTION INTRAVENOUS; SUBCUTANEOUS at 06:07

## 2021-07-14 RX ADMIN — FUROSEMIDE 40 MG: 10 INJECTION, SOLUTION INTRAMUSCULAR; INTRAVENOUS at 16:54

## 2021-07-14 RX ADMIN — HYDRALAZINE HYDROCHLORIDE 100 MG: 50 TABLET, FILM COATED ORAL at 15:54

## 2021-07-14 RX ADMIN — HYDRALAZINE HYDROCHLORIDE 100 MG: 50 TABLET, FILM COATED ORAL at 08:26

## 2021-07-14 NOTE — CONSULTS
[facility]  [facility]           Reason for Consult:  BLUE on CKD   Requesting Physician:  Dr. Daniel Whatley    Chief Complaint:  SOB and leg swelling   History Obtained From:  patient, electronic medical record    History of Present Ilness:    54 y.o. AA male with history s/f HFrEF, HTN, medical non-compliance and CKD stage IV who presented for SOB and LE edema for over a week. Had been out of coreg for ~ 2 weeks and has been non-compliant w/ his diet. On presentation pt was significantly hypertensive w/ Scr 3.48 now up to 3.8. Got fluids on presentation, now on lasix. Renal function seems to have worsened since the beginning of the year as Scr now more in the 3.4-3.6 range w/ eGFR low 20s. Only once in the past 3 months has it been 3.1    Past Medical History:        Diagnosis Date    Abnormal EKG 9/27/2019    Cardiomyopathy ([facility] [address]     per echo 8/2019    Chronic combined systolic and diastolic CHF (congestive heart failure) ([facility] [address] 9/27/2019    ETOH abuse     Hypertension     Tobacco abuse        Past Surgical History:        Procedure Laterality Date    HERNIA REPAIR Right 2/10/2021    RIGHT INGUINAL HERNIA REPAIR WITH MESH performed by Lara Conway MD at [facility] Medications:    No current facility-administered medications on file prior to encounter.      Current Outpatient Medications on File Prior to Encounter   Medication Sig Dispense Refill    cefUROXime (CEFTIN) 500 MG tablet take 1 tablet by mouth twice a day      furosemide (LASIX) 40 MG tablet Take 40 mg by mouth daily      ivabradine (CORLANOR) 5 MG TABS tablet Take 1 tablet by mouth 2 times daily (with meals) 60 tablet 3    amLODIPine (NORVASC) 10 MG tablet Take 1 tablet by mouth daily 30 tablet 3    cloNIDine (CATAPRES) 0.2 MG tablet Take 1 tablet by mouth 2 times daily 60 tablet 3    carvedilol (COREG) 25 MG tablet Take 1 tablet by mouth 2 times daily HOLD for SBP<100 or HR<60 60 tablet 3    isosorbide mononitrate (IMDUR) 120 MG extended release tablet Take 1 tablet by mouth daily 30 tablet 3    hydrALAZINE (APRESOLINE) 100 MG tablet Take 1 tablet by mouth 3 times daily 90 tablet 3    nicotine (NICODERM CQ) 21 MG/24HR Place 1 patch onto the skin daily 30 patch 3    aspirin 81 MG EC tablet Take 1 tablet by mouth daily 30 tablet 3       Allergies:  Lipitor [atorvastatin]    Social History:    Social History     Socioeconomic History    Marital status: Single     Spouse name: Not on file    Number of children: Not on file    Years of education: Not on file    Highest education level: Not on file   Occupational History    Not on file   Tobacco Use    Smoking status: Current Every Day Smoker     Packs/day: 1.00     Types: Cigarettes     Last attempt to quit: 12/3/2020     Years since quittin.6    Smokeless tobacco: Never Used    Tobacco comment: currently smoking only couple cigarettes daily   Substance and Sexual Activity    Alcohol use: Yes    Drug use: Never    Sexual activity: Not on file   Other Topics Concern    Not on file   Social History Narrative    Not on file     Social Determinants of Health     Financial Resource Strain: Low Risk     Difficulty of Paying Living Expenses: Not hard at all   Food Insecurity: No Food Insecurity    Worried About Running Out of Food in the Last Year: Never true    [address] in the Last Year: Never true   Transportation Needs: No Transportation Needs    Lack of Transportation (Medical): No    Lack of Transportation (Non-Medical):  No   Physical Activity:     Days of Exercise per Week:     Minutes of Exercise per Session:    Stress:     Feeling of Stress :    Social Connections:     Frequency of Communication with Friends and Family:     Frequency of Social Gatherings with Friends and Family:     Attends [employer] Services:     Active Member of Clubs or Organizations:     Attends Club or Organization Meetings:     Marital Status:    Intimate Partner Violence:     Fear of Current or Ex-Partner:     Emotionally Abused:     Physically Abused:     Sexually Abused:        Family History:   Family History   Problem Relation Age of Onset    Coronary Art Dis Mother        Review of Systems:   Positives in bold  Constitutional: fever, chills, fatigue, malaise   HENT:  rhinorrhea, sinus pain, sore throat, epistaxis    Eyes:  photophobia, visual disturbance, eye redness  Respiratory: shortness of breath, cough, hemoptysis    Cardiovascular: chest pain, palpitations, orthopnea, leg swelling   Gastrointestinal: abdominal pain, nausea, vomiting, diarrhea, constipation   Endocrine: polydipsia, polyphagia, cold intolerance, heat intolerance  Genitourinary: dysuria, flank pain, frequency, urgency   Hematologic: easy bruising, easy bleeding  Musculoskeletal: back pain, neck pain, myalgias, arthalgias  Neurological: syncope, lightheadedness, dizziness, seizures, tremors, weakness  Psychiatric/Behavioral: anxiety, depression, hallucinations  Skin: rash, itching    Physical exam:   Constitutional:  VITALS:  BP (!) 148/95   Pulse 97   Temp 97.5 °F (36.4 °C) (Oral)   Resp 18   Ht 6' (1.829 m)   Wt 198 lb 3.2 oz (89.9 kg)   SpO2 100%   BMI 26.88 kg/m²     General: alert, in no apparent distress  HEENT: normocephalic, atraumatic, anicteric  Neck: supple, no mass  Lungs: non-labored respirations, clear to auscultation bilaterally  Heart: regular rate and rhythm, no murmurs or rubs  Abdomen: soft, non-tender, non-distended  MSK: no joint swelling or tenderness  Ext: no cyanosis, no peripheral edema  Neuro: alert and oriented, no gross abnormalities  Psych: normal mood and affect    Data/  CBC:   Lab Results   Component Value Date    WBC 6.3 07/14/2021    RBC 3.95 07/14/2021    HGB 8.3 07/14/2021    HCT 26.3 07/14/2021    MCV 66.6 07/14/2021    MCH 21.1 07/14/2021    MCHC 31.7 07/14/2021    RDW 19.7 07/14/2021     07/14/2021     BMP:    Lab Results   Component Value Date     07/14/2021    K 3.8 07/14/2021     07/14/2021    CO2 21 07/14/2021    BUN 38 07/14/2021    LABALBU 3.4 07/12/2021    CREATININE 3.83 07/14/2021    CALCIUM 8.9 07/14/2021    GFRAA 20.4 07/14/2021    LABGLOM 16.9 07/14/2021    GLUCOSE 92 07/14/2021     ECHO Limited    Result Date: 7/13/2021  Transthoracic Echocardiography Report (TTE)  Demographics   Patient Name    Armand Alcazar Gender               Male   Patient Number  [id number]       Race                 Vergie Ringer                                  Ethnicity   Visit Number    [id number]      Room Number          W164   Corporate ID                   Date of Study        07/13/2021   Accession       [id number]     Referring Physician  Alissa Lindsay DO  Number   Date of Birth   1970     Sonographer          Xiomara Mcguire   Age             54 year(s)     Interpreting         [facility] [place] [place])                                 Physician            Cardiology                                                      Alissa Lindsay DO  Procedure Type of Study   TTE procedure:ECHOCARDIOGRAM LIMITED. Procedure Date Date: 07/13/2021 Start: 09:47 AM Study Location: Portable Technical Quality: Adequate visualization Indications:Cardiomyopathy, idiopathic dilated. Patient Status: Routine Height: 70 inches Weight: 202 pounds BSA: 2.1 m^2 BMI: 28.98 kg/m^2 BP: 12/95 mmHg  Conclusions   Summary  Left ventricular ejection fraction is visually estimated at 20-25%. Left ventricular size is severely increased . Restrictive filling pattern. Mild (1+) mitral regurgitation is present. No evidence of aortic valve regurgitation . No evidence of aortic valve stenosis. The left atrium is Mildly dilated.    Signature   ----------------------------------------------------------------  Electronically signed by Alissa Lindsay DO(Interpreting  physician) on 07/13/2021 07:30 PM  ----------------------------------------------------------------   Findings  Left Ventricle Left ventricular ejection fraction is visually estimated at 20-25%. Left ventricular size is severely increased . Normal left ventricular wall thickness. Restrictive filling pattern. Right Ventricle Normal right ventricle structure and function. Normal right ventricle systolic pressure. Left Atrium The left atrium is Mildly dilated. Right Atrium Normal right atrium. Mitral Valve Diffusely thickened and pliable mitral valve leaflets with normal excursion. Mild (1+) mitral regurgitation is present. Tricuspid Valve Normal tricuspid valve structure and function. Aortic Valve trileaflet aortic valve with diffusely thickened leaflets with normal cusp separation and excursion. No evidence of aortic valve regurgitation . No evidence of aortic valve stenosis. Pulmonic Valve Normal pulmonic valve structure and function. Pericardial Effusion No evidence of pericardial effusion. Pleural Effusion No evidence of pleural effusion. Aorta \ Miscellaneous Miscellaneous normal findings were found. M-Mode Measurements (cm)   LVIDd: 5.36 cm                                      LVIDs: 4.5 cm  IVSd: 1.16 cm                                       IVSs: 1.37 cm  LVPWd: 0.97 cm  Rt. Vent. Dimension: 2.37 cm  Structures  Left Atrium   LA Volume/Index: 107.81 ml /51 m^2            LA Area: 21.72 cm^2   Left Ventricle   Diastolic Dimension: 5.78 cm          Systolic Dimension: 4.5 cm  Septum Diastolic: 3.01 cm             Septum Systolic: 2.99 cm  PW Diastolic: 9.17 cm                                        FS: 16 %  LV EDV/LV EDV Index: 138.99 ml/66 m^2 LV ESV/LV ESV Index: 92.28 ml/44 m^2  EF Calculated: 33.6 %   Right Ventricle   Diastolic Dimension: 9.86 cm      XR CHEST PORTABLE    Result Date: 7/12/2021  Exam: XR CHEST PORTABLE History:  pain Technique: AP portable view of the chest obtained. Comparison: Chest x-ray from April 24, 2021 Chest x-ray portable Findings:  The cardiac silhouette is enlarged which may be secondary to cardiomegaly versus a pericardial effusion. There are ill-defined alveolar opacities in both lungs worrisome for infiltrates, pneumonia. There is opacification of the left hemidiaphragm which may be secondary to presence of a left pleural effusion. Bones of the thorax appear intact. The findings are worrisome for infiltrate, atelectasis in both lung bases and a left pleural effusion. Assessment:  54 y.o. AA male with history s/f HFrEF, HTN, medical non-compliance and CKD stage IV who presented for SOB and LE edema for over a week    1. BLUE on proteinuric CKD stage IV: 2/2 CRS, baseline Scr ~ 3.4-3.6 w/ eGFR low 20s, gradually worsening throughout the year, risk factors for CKD include HTN and CHF, pt of mine  2. Fluid overload; in setting of worsening heart function, non-compliance w/ diet and medications, HFrEF w/ LVEF now 20-25%  3. Hypertensive urgency: due to non-compliance  4. Anemia of renal disease  5. Secondary renal hyperparathyroidism    Plan:  - had long conversation about medication and dietary compliance, at risk of ending up on RRT w/in a year if no improvement in lifestyle  - will switch him to torsemide 40 mg QD on discharge   - ok to discharge from renal standpoint w/ f/u in 1 week w/ me     Thank you for the consultation. Will continue to follow  Please do not hesitate to call with questions.     Liv Wright MD, MD

## 2021-07-14 NOTE — DISCHARGE SUMMARY
Discharge Summary    Date: 7/14/2021  Patient Name: Hilaria Wilkerson YOB: 1970 Age: 54 y.o. Admit Date: 7/12/2021  Discharge Date: 7/14/2021  Discharge Condition: Good    Admission Diagnosis  Hypertensive urgency (I16.0)     Discharge Diagnosis  Active Problems: Hypertensive urgencyResolved Problems: * No resolved hospital problems. [facility] Stay  Narrative of Hospital Course:  Patient presented with typical CHF type symptoms, he was placed on IV diuretics and responded well. Medical noncompliance was discussed in detail as well as dietary compliance. Status post 2D echocardiogram showing worsening of ejection fraction to 20 to 25%. Nephrology was consulted and patient was placed on torsemide as he developed cramping on furosemide. His Norvasc was discontinued along with clonidine. Consultants:  IP CONSULT TO SPIRITUAL SERVICESIP CONSULT TO NEPHROLOGY    Surgeries/procedures Performed:       Treatments:    Cardiac Medications        Discharge Plan/Disposition:  Home    Hospital/Incidental Findings Requiring Follow Up:    Patient Instructions:    Diet: Cardiac Diet and Renal Diet    Activity:Activity as Tolerated  For number of days (if applicable): Other Instructions:    Provider Follow-Up:   No follow-ups on file.      Significant Diagnostic Studies:    Recent Labs:  Admission on 07/12/2021WBC                                           Date: 07/12/2021Value: 10.1        Ref range: 4.8 - 10.8 K/uL    Status: FinalRBC                                           Date: 07/12/2021Value: 4.14*       Ref range: 4.70 - 6.10 M/uL   Status: FinalHemoglobin                                    Date: 07/12/2021Value: 8.8*        Ref range: 14.0 - 18.0 g/dL   Status: FinalHematocrit                                    Date: 07/12/2021Value: 27.2*       Ref range: 42.0 - 52.0 %      Status: FinalMCV                                           Date: 07/12/2021Value: 65.8*       Ref range: 80.0 - 100.0 fL Status: [facility]                                           Date: 07/12/2021Value: 21.2*       Ref range: 27.0 - 31.3 pg     Status: [address]                                          Date: 07/12/2021Value: 32.1*       Ref range: 33.0 - 37.0 %      Status: FinalRDW                                           Date: 07/12/2021Value: 19.8*       Ref range: 11.5 - 14.5 %      Status: FinalPlatelets                                     Date: 07/12/2021Value: 398         Ref range: 130 - 400 K/uL     Status: FinalPLATELET SLIDE REVIEW                         Date: 07/12/2021Value: Normal        Status: FinalNeutrophils %                                 Date: 07/12/2021Value: 86.7        Ref range: %                  Status: FinalLymphocytes %                                 Date: 07/12/2021Value: 4.4         Ref range: %                  Status: FinalMonocytes %                                   Date: 07/12/2021Value: 7.6         Ref range: %                  Status: FinalEosinophils %                                 Date: 07/12/2021Value: 0.8         Ref range: %                  Status: FinalBasophils %                                   Date: 07/12/2021Value: 0.5         Ref range: %                  Status: FinalNeutrophils Absolute                          Date: 07/12/2021Value: 8.8*        Ref range: 1.4 - 6.5 K/uL     Status: FinalLymphocytes Absolute                          Date: 07/12/2021Value: 0.4*        Ref range: 1.0 - 4.8 K/uL     Status: FinalMonocytes Absolute                            Date: 07/12/2021Value: 0.8         Ref range: 0.2 - 0.8 K/uL     Status: FinalEosinophils Absolute                          Date: 07/12/2021Value: 0.1         Ref range: 0.0 - 0.7 K/uL     Status: FinalBasophils Absolute                            Date: 07/12/2021Value: 0.0         Ref range: 0.0 - 0.2 K/uL     Status: FinalAnisocytosis                                  Date: 07/12/2021Value: 3+            Status: range: 6 - 20 mg/dL       Status: FinalCREATININE                                    Date: 07/12/2021Value: 3.48*       Ref range: 0.70 - 1.20 mg/dL  Status: FinalGFR Non-                      Date: 07/12/2021Value: 18.8*       Ref range: >60                Status: Final              Comment: >60 mL/min/1.73m2 EGFR, calc. for ages 25 and older using theMDRD formula (not corrected for weight), is valid for stablerenal function. GFR                           Date: 07/12/2021Value: 22.8*       Ref range: >60                Status: Final              Comment: >60 mL/min/1.73m2 EGFR, calc. for ages 25 and older using theMDRD formula (not corrected for weight), is valid for stablerenal function. Calcium                                       Date: 07/12/2021Value: 8.8         Ref range: 8.5 - 9.9 mg/dL    Status: FinalTotal Protein                                 Date: 07/12/2021Value: 6.1*        Ref range: 6.3 - 8.0 g/dL     Status: FinalAlbumin                                       Date: 07/12/2021Value: 3.4*        Ref range: 3.5 - 4.6 g/dL     Status: FinalTotal Bilirubin                               Date: 07/12/2021Value: 0.7         Ref range: 0.2 - 0.7 mg/dL    Status: FinalAlkaline Phosphatase                          Date: 07/12/2021Value: 121*        Ref range: 35 - 104 U/L       Status: FinalALT                                           Date: 07/12/2021Value: 22          Ref range: 0 - 41 U/L         Status: FinalAST                                           Date: 07/12/2021Value: 16          Ref range: 0 - 40 U/L         Status: FinalGlobulin                                      Date: 07/12/2021Value: 2.7         Ref range: 2.3 - 3.5 g/dL     Status: FinalVentricular Rate                              Date: 07/12/2021Value: 116         Ref range: BPM                Status: FinalAtrial Rate                                   Date: 07/12/2021Value: 116         Ref range: BPM Status: FinalP-R Interval                                  Date: 07/12/2021Value: 180         Ref range: ms                 Status: FinalQRS Duration                                  Date: 07/12/2021Value: 88          Ref range: ms                 Status: FinalQ-T Interval                                  Date: 07/12/2021Value: 338         Ref range: ms                 Status: FinalQTc Calculation (Bazett)                      Date: 07/12/2021Value: 469         Ref range: ms                 Status: FinalP Axis                                        Date: 07/12/2021Value: 50          Ref range: degrees            Status: FinalR Axis                                        Date: 07/12/2021Value: 8           Ref range: degrees            Status: FinalT Axis                                        Date: 07/12/2021Value: 92          Ref range: degrees            Status: FinalPro-BNP                                       Date: 07/12/2021Value: 12,575      Ref range: pg/mL              Status: Final              Comment: NT-pro BNP ACUTE Interpretive Guidelines:      Age        Cutoff for Heart Failure   Less than 50 yrs   450 pg/mL   50-75 yrs           900 pg/mL   Greater than 75 yrs  1800 pg/mLNT-pro BNP NON-ACUTE Interpretive Guidelines:    Age                 Reference Range  Less than 74 yrs   0-125 pg/mL  Greater than 74 yrs   0-450 pg/mLOther possible causes of an elevated NT-proBNP include:cardiac ischemia, acute coronary syndrome, COPD, pneumonia,atrial fibrillation, pulmonary emboli, pulmonary hypertension,pericarditisReference:GERSON Victoria, et al. NT-proBNP testing for diagnosis andshort-term prognosis in acute destabilized HF: an internationalpooled analysis of 1256 patients.   Heart Journal.2006;27:[id number]-AQ                                         Date: 07/12/2021Value: 6.4         Ref range: 0.0 - 6.7 ng/mL    Status: FinalCK-MB Index                                   Date: 07/12/2021Value: 3.1 Ref range: 0.0 - 3.5 %        Status: FinalTroponin                                      Date: 07/12/2021Value: 0.036*      Ref range: 0.000 - 0.010 ng*  Status: Final              Comment: Methodology by Troponin T. Troponin                                      Date: 07/12/2021Value: 0.044*      Ref range: 0.000 - 0.010 ng*  Status: Final              Comment: Methodology by Troponin T.Magnesium                                     Date: 07/13/2021Value: 2.0         Ref range: 1.7 - 2.4 mg/dL    Status: FinalCholesterol, Total                            Date: 07/13/2021Value: 143         Ref range: 0 - 199 mg/dL      Status: Final              Comment: ATP III Cholesterol classification is Desirable. Triglycerides                                 Date: 07/13/2021Value: 88          Ref range: 0 - 150 mg/dL      Status: Final              Comment: ATP III Triglycerides Classification is Normal.HDL                                           Date: 07/13/2021Value: 71*         Ref range: 40 - 59 mg/dL      Status: Final              Comment: ATP III HDL Cholesterol Classification is high. Expected Values:Males:    >55 = No Risk          35-55 = Moderate Risk          <35 = High RiskFemales:  >65 = No Risk          45-65 = Moderate Risk          <45 = High RiskNCEP Guidelines:   Third Report May 2001>59 = negative risk factor for CHD<40 = major risk factor for CHDLDL Calculated                                Date: 07/13/2021Value: 54          Ref range: 0 - 129 mg/dL      Status: Final              Comment: ATP III LDL Classification is Optimal.WBC                                           Date: 07/13/2021Value: 8.8         Ref range: 4.8 - 10.8 K/uL    Status: FinalRBC                                           Date: 07/13/2021Value: 4.19*       Ref range: 4.70 - 6.10 M/uL   Status: FinalHemoglobin                                    Date: 07/13/2021Value: 8.6*        Ref range: 14.0 - 18.0 g/dL   Status: FinalHematocrit Date: 07/13/2021Value: 27.3*       Ref range: 42.0 - 52.0 %      Status: FinalMCV                                           Date: 07/13/2021Value: 65.0*       Ref range: 80.0 - 100.0 fL    Status: [facility]                                           Date: 07/13/2021Value: 20.6*       Ref range: 27.0 - 31.3 pg     Status: [address]                                          Date: 07/13/2021Value: 31.7*       Ref range: 33.0 - 37.0 %      Status: FinalRDW                                           Date: 07/13/2021Value: 19.7*       Ref range: 11.5 - 14.5 %      Status: FinalPlatelets                                     Date: 07/13/2021Value: 429*        Ref range: 130 - 400 K/uL     Status: FinalVentricular Rate                              Date: 07/13/2021Value: 104         Ref range: BPM                Status: FinalAtrial Rate                                   Date: 07/13/2021Value: 104         Ref range: BPM                Status: FinalP-R Interval                                  Date: 07/13/2021Value: 194         Ref range: ms                 Status: FinalQRS Duration                                  Date: 07/13/2021Value: 90          Ref range: ms                 Status: FinalQ-T Interval                                  Date: 07/13/2021Value: 358         Ref range: ms                 Status: FinalQTc Calculation (Bazett)                      Date: 07/13/2021Value: 470         Ref range: ms                 Status: FinalP Axis                                        Date: 07/13/2021Value: 48          Ref range: degrees            Status: FinalR Axis                                        Date: 07/13/2021Value: -17         Ref range: degrees            Status: FinalT Axis                                        Date: 07/13/2021Value: 86          Ref range: degrees            Status: FinalSodium                                        Date: 07/14/2021Value: 141         Ref range: 135 - 144 mEq/L    Status: FinalPotassium                                     Date: 07/14/2021Value: 3.8         Ref range: 3.4 - 4.9 mEq/L    Status: FinalChloride                                      Date: 07/14/2021Value: 106         Ref range: 95 - 107 mEq/L     Status: FinalCO2                                           Date: 07/14/2021Value: 21          Ref range: 20 - 31 mEq/L      Status: FinalAnion Gap                                     Date: 07/14/2021Value: 14          Ref range: 9 - 15 mEq/L       Status: FinalGlucose                                       Date: 07/14/2021Value: 92          Ref range: 70 - 99 mg/dL      Status: FinalBUN                                           Date: 07/14/2021Value: 38*         Ref range: 6 - 20 mg/dL       Status: FinalCREATININE                                    Date: 07/14/2021Value: 3.83*       Ref range: 0.70 - 1.20 mg/dL  Status: FinalGFR Non-                      Date: 07/14/2021Value: 16.9*       Ref range: >60                Status: Final              Comment: >60 mL/min/1.73m2 EGFR, calc. for ages 25 and older using theMDRD formula (not corrected for weight), is valid for stablerenal function. GFR                           Date: 07/14/2021Value: 20.4*       Ref range: >60                Status: Final              Comment: >60 mL/min/1.73m2 EGFR, calc. for ages 25 and older using theMDRD formula (not corrected for weight), is valid for stablerenal function. Calcium                                       Date: 07/14/2021Value: 8.9         Ref range: 8.5 - 9.9 mg/dL    Status: [address] [facility]                                           Date: 07/14/2021Value: 6.3         Ref range: 4.8 - 10.8 K/uL    Status: FinalRBC                                           Date: 07/14/2021Value: 3.95*       Ref range: 4.70 - 6.10 M/uL   Status: FinalHemoglobin                                    Date: 07/14/2021Value: 8.3*        Ref range: 14.0 - 18.0 g/dL   Status: FinalHematocrit                                    Date: 07/14/2021Value: 26.3*       Ref range: 42.0 - 52.0 %      Status: FinalMCV                                           Date: 07/14/2021Value: 66.6*       Ref range: 80.0 - 100.0 fL    Status: [facility]                                           Date: 07/14/2021Value: 21.1*       Ref range: 27.0 - 31.3 pg     Status: [address]                                          Date: 07/14/2021Value: 31.7*       Ref range: 33.0 - 37.0 %      Status: FinalRDW                                           Date: 07/14/2021Value: 19.7*       Ref range: 11.5 - 14.5 %      Status: FinalPlatelets                                     Date: 07/14/2021Value: 418*        Ref range: 130 - 400 K/uL     Status: Final------------    Radiology last 7 days:  XR CHEST PORTABLEResult Date: 7/12/2021The findings are worrisome for infiltrate, atelectasis in both lung bases and a left pleural effusion.       [unfilled]    Discharge Medications    Current Discharge Medication ListSTART taking these medicationstorsemide (DEMADEX) 20 MG tabletTake 2 tablets by mouth dailyQty: 60 tablet Refills: 1    Current Discharge Medication List    Current Discharge Medication ListCONTINUE these medications which have NOT CHANGEDivabradine (CORLANOR) 5 MG TABS tabletTake 1 tablet by mouth 2 times daily (with meals)Qty: 60 tablet Refills: 3carvedilol (COREG) 25 MG tabletTake 1 tablet by mouth 2 times daily HOLD for SBP<100 or HR<60Qty: 60 tablet Refills: 3isosorbide mononitrate (IMDUR) 120 MG extended release tabletTake 1 tablet by mouth dailyQty: 30 tablet Refills: 3hydrALAZINE (APRESOLINE) 100 MG tabletTake 1 tablet by mouth 3 times dailyQty: 90 tablet Refills: 3nicotine (NICODERM CQ) 21 MG/24HRPlace 1 patch onto the skin dailyQty: 30 patch Refills: 3aspirin 81 MG EC tabletTake 1 tablet by mouth dailyQty: 30 tablet Refills: 3    Current Discharge Medication ListSTOP taking these medicationscefUROXime (CEFTIN) 500 MG tabletComments:Reason for Stopping:furosemide (LASIX) 40 MG tabletComments:Reason for Stopping:amLODIPine (NORVASC) 10 MG tabletComments:Reason for Stopping:cloNIDine (CATAPRES) 0.2 MG tabletComments:Reason for Stopping:    Time Spent on Discharge:3E] minutes were spent in patient examination, evaluation, counseling as well as medication reconciliation, prescriptions for required medications, discharge plan, and follow up.     Electronically signed by Vida Manriquez DO on 7/14/21 at 1:27 PM EDT

## 2021-07-15 ENCOUNTER — CARE COORDINATION (OUTPATIENT)
Dept: CASE MANAGEMENT | Age: 51
End: 2021-07-15

## 2021-07-15 NOTE — CARE COORDINATION
[facility] Transitions Initial Follow Up Call    Call within 2 business days of discharge: Yes    Patient: Maldonado Rachel Patient : 1970   MRN: [id number]  Reason for Admission: 2021 - 2021 Hypertensive emergency, CHF, Lasix noncompliance. Discharge Date: 21 RARS: Readmission Risk Score: 17  NR  CT    Last Discharge [facility]       Complaint Diagnosis Description Type Department Provider    21 Shortness of Breath Orthopnea . .. ED to Hosp-Admission (Discharged) (ADMITTED) MLOZ1W Yan Palomo DO; Em Celis. .. Initial CT outreach. Left Hippa compliant VM w/ appt reminder.   Will need ACM fu.      Thuy Ward MD (Internal Medicine) on 2021; 11:15AM      Care Transitions 24 Hour Call    Care Transitions Interventions         Follow Up  Future Appointments   Date Time Provider Perez Ness   2021 11:15 AM Alejandrina Grier MD [address] [place] [address]

## 2021-07-16 ENCOUNTER — CARE COORDINATION (OUTPATIENT)
Dept: CASE MANAGEMENT | Age: 51
End: 2021-07-16

## 2021-07-16 DIAGNOSIS — I50.43 CHF (CONGESTIVE HEART FAILURE), NYHA CLASS I, ACUTE ON CHRONIC, COMBINED (HCC): Primary | ICD-10-CM

## 2021-07-16 NOTE — CARE COORDINATION
[facility] Transitions Initial Follow Up Call    Call within 2 business days of discharge: Yes    Patient: Jillian Moreland Patient : 1970   MRN: [id number]  Reason for Admission: 2021 - 2021 Hypertensive emergency, CHF, Med noncompliance. Discharge Date: 21 RARS: Readmission Risk Score: 17  NR  CT    Last Discharge [facility]       Complaint Diagnosis Description Type Department Provider    21 Shortness of Breath Orthopnea . .. ED to Hosp-Admission (Discharged) (ADMITTED) MLOZ1W Yan Palomo DO; Prince Ortega .. Keena Leon MD (Internal Medicine) on 2021; 11:15AM    Transitions of Care Initial Call    Was this an external facility discharge? No Discharge Facility:     Challenges to be reviewed by the provider   Additional needs identified to be addressed with provider: No  none             Method of communication with provider : none      Advance Care Planning:   Does patient have an Advance Directive: reviewed and current. Was this a readmission? No  Patient stated reason for admission: Wes Anglin reports today's SBP was 142. States has/taking torsemide 40 mg daily as directed. Enc med compliance for HTN and fluid control, v/u. Denies any HA, dizziness, SOB, or edema events. Denies any home or med needs at this time. Patients top risk factors for readmission: medical condition-HF, HTN, Med noncompliance. Care Transition Nurse (CTN) contacted the patient by telephone to perform post hospital discharge assessment. Verified name and  with patient as identifiers. Provided introduction to self, and explanation of the CTN role. CTN reviewed discharge instructions, medical action plan and red flags with patient who verbalized understanding. Patient given an opportunity to ask questions and does not have any further questions or concerns at this time. Were discharge instructions available to patient? Yes.  Reviewed appropriate site of care based on symptoms and resources available to patient including: When to call 911. The patient agrees to contact the PCP office for questions related to their healthcare. Medication reconciliation was performed with patient, who verbalizes understanding of administration of home medications. Advised obtaining a 90-day supply of all daily and as-needed medications. CTN provided contact information. Plan for follow-up call in 5-7 days based on severity of symptoms and risk factors.   Plan for next call: symptom management-FO and BP fu    Care Transitions 24 Hour Call    Do you have any ongoing symptoms?: No  Do you have a copy of your discharge instructions?: Yes  Do you have all of your prescriptions and are they filled?: Yes  Have you scheduled your follow up appointment?: Yes  Were you discharged with any Home Care or Post Acute Services: No  Do you feel like you have everything you need to keep you well at home?: Yes  Care Transitions Interventions         Follow Up  Future Appointments   Date Time Provider Perez Ness   7/19/2021 11:15 AM Lisandro Abreu  [facility], [address] [place] [address]

## 2021-07-23 ENCOUNTER — CARE COORDINATION (OUTPATIENT)
Dept: CASE MANAGEMENT | Age: 51
End: 2021-07-23

## 2021-08-02 ENCOUNTER — CARE COORDINATION (OUTPATIENT)
Dept: CASE MANAGEMENT | Age: 51
End: 2021-08-02

## 2021-08-02 NOTE — CARE COORDINATION
[facility] Transitions Follow Up Call    2021    Patient: Evy Home  Patient : 1970   MRN: <[id number]>  Reason for Admission: Hypertensive urgency    CHF, Lasix noncompliance. J  Discharge Date: 21 RARS: Readmission Risk Score: 17         Spoke with: SUBSEQUENT CALL . No answer. Left HIPPA compliant message to return call to this writer. Care Transitions Subsequent and Final Call    Subsequent and Final Calls  Care Transitions Interventions  Other Interventions: Follow Up  No future appointments.     Arliss Phalen, LPN Jonel Kitchen LPN Care Transitions Nurse   [phone]

## 2021-08-04 ENCOUNTER — CARE COORDINATION (OUTPATIENT)
Dept: CASE MANAGEMENT | Age: 51
End: 2021-08-04

## 2021-08-04 NOTE — CARE COORDINATION
[facility] Transitions Follow Up Call    2021    Patient: Candi Draper  Patient : 1970   MRN: <[id number]>  Reason for Admission: Orthopnea  Discharge Date: 21 RARS: Readmission Risk Score: 17       Attempt #2 to contact patient for transitions call. Contact information left to  requesting call back at the earliest convenience. Follow Up  No future appointments.     Princess Kessler RN

## 2021-08-12 ENCOUNTER — CARE COORDINATION (OUTPATIENT)
Dept: CASE MANAGEMENT | Age: 51
End: 2021-08-12

## 2021-08-12 NOTE — CARE COORDINATION
[facility] Transitions Follow Up Call    2021    Patient: Fadumo Henry  Patient : 1970   MRN: <[id number]>  Reason for Admission: 2021 - 2021 Hypertensive emergency, CHF, Med noncompliance  Discharge Date: 21 RARS: Readmission Risk Score: 16         Spoke with: Called to speak with patient for update with transition of care. Left HIPPA compliant voice message with contact information [phone] for a call  Back with an update. Care Transitions Subsequent and Final Call    Subsequent and Final Calls  Care Transitions Interventions  Other Interventions: Follow Up  No future appointments.     Pam Brown LPN

## 2021-08-28 ENCOUNTER — HOSPITAL ENCOUNTER (EMERGENCY)
Age: 51
Discharge: HOME | End: 2021-08-28
Payer: MEDICAID

## 2021-08-28 ENCOUNTER — APPOINTMENT (OUTPATIENT)
Dept: GENERAL RADIOLOGY | Age: 51
End: 2021-08-28
Payer: MEDICAID

## 2021-08-28 VITALS
DIASTOLIC BLOOD PRESSURE: 106 MMHG | BODY MASS INDEX: 27.36 KG/M2 | RESPIRATION RATE: 18 BRPM | OXYGEN SATURATION: 100 % | TEMPERATURE: 98.3 F | HEIGHT: 72 IN | HEART RATE: 92 BPM | WEIGHT: 202 LBS | SYSTOLIC BLOOD PRESSURE: 149 MMHG

## 2021-08-28 DIAGNOSIS — J44.1 COPD EXACERBATION (HCC): Primary | ICD-10-CM

## 2021-08-28 LAB
ALBUMIN SERPL-MCNC: 3.4 G/DL (ref 3.5–4.6)
ALP BLD-CCNC: 104 U/L (ref 35–104)
ALT SERPL-CCNC: 18 U/L (ref 0–41)
ANION GAP SERPL CALCULATED.3IONS-SCNC: 11 MEQ/L (ref 9–15)
ANISOCYTOSIS: ABNORMAL
APTT: 35.1 SEC (ref 24.4–36.8)
AST SERPL-CCNC: 15 U/L (ref 0–40)
BASOPHILS ABSOLUTE: 0 K/UL (ref 0–0.2)
BASOPHILS RELATIVE PERCENT: 0.3 %
BILIRUB SERPL-MCNC: 0.7 MG/DL (ref 0.2–0.7)
BUN BLDV-MCNC: 46 MG/DL (ref 6–20)
CALCIUM SERPL-MCNC: 8.6 MG/DL (ref 8.5–9.9)
CHLORIDE BLD-SCNC: 108 MEQ/L (ref 95–107)
CO2: 17 MEQ/L (ref 20–31)
CREAT SERPL-MCNC: 3.85 MG/DL (ref 0.7–1.2)
EOSINOPHILS ABSOLUTE: 0.1 K/UL (ref 0–0.7)
EOSINOPHILS RELATIVE PERCENT: 0.8 %
GFR AFRICAN AMERICAN: 20.3
GFR NON-AFRICAN AMERICAN: 16.7
GLOBULIN: 2.8 G/DL (ref 2.3–3.5)
GLUCOSE BLD-MCNC: 111 MG/DL (ref 70–99)
HCT VFR BLD CALC: 26.2 % (ref 42–52)
HEMOGLOBIN: 8.4 G/DL (ref 14–18)
INR BLD: 1.2
LYMPHOCYTES ABSOLUTE: 0.3 K/UL (ref 1–4.8)
LYMPHOCYTES RELATIVE PERCENT: 3.3 %
MAGNESIUM: 2.3 MG/DL (ref 1.7–2.4)
MCH RBC QN AUTO: 20.8 PG (ref 27–31.3)
MCHC RBC AUTO-ENTMCNC: 32.1 % (ref 33–37)
MCV RBC AUTO: 64.8 FL (ref 80–100)
MICROCYTES: ABNORMAL
MONOCYTES ABSOLUTE: 0.7 K/UL (ref 0.2–0.8)
MONOCYTES RELATIVE PERCENT: 8.7 %
NEUTROPHILS ABSOLUTE: 6.8 K/UL (ref 1.4–6.5)
NEUTROPHILS RELATIVE PERCENT: 86.9 %
PDW BLD-RTO: 18.3 % (ref 11.5–14.5)
PLATELET # BLD: 404 K/UL (ref 130–400)
PLATELET SLIDE REVIEW: ABNORMAL
POTASSIUM SERPL-SCNC: 3.7 MEQ/L (ref 3.4–4.9)
PRO-BNP: NORMAL PG/ML
PROTHROMBIN TIME: 15.4 SEC (ref 12.3–14.9)
RBC # BLD: 4.04 M/UL (ref 4.7–6.1)
SODIUM BLD-SCNC: 136 MEQ/L (ref 135–144)
TOTAL PROTEIN: 6.2 G/DL (ref 6.3–8)
TROPONIN: 0.02 NG/ML (ref 0–0.01)
WBC # BLD: 7.8 K/UL (ref 4.8–10.8)

## 2021-08-28 PROCEDURE — 83735 ASSAY OF MAGNESIUM: CPT

## 2021-08-28 PROCEDURE — 80053 COMPREHEN METABOLIC PANEL: CPT

## 2021-08-28 PROCEDURE — 83880 ASSAY OF NATRIURETIC PEPTIDE: CPT

## 2021-08-28 PROCEDURE — 85730 THROMBOPLASTIN TIME PARTIAL: CPT

## 2021-08-28 PROCEDURE — 71045 X-RAY EXAM CHEST 1 VIEW: CPT

## 2021-08-28 PROCEDURE — 85025 COMPLETE CBC W/AUTO DIFF WBC: CPT

## 2021-08-28 PROCEDURE — 85610 PROTHROMBIN TIME: CPT

## 2021-08-28 PROCEDURE — 93005 ELECTROCARDIOGRAM TRACING: CPT

## 2021-08-28 PROCEDURE — 84484 ASSAY OF TROPONIN QUANT: CPT

## 2021-08-28 PROCEDURE — 99282 EMERGENCY DEPT VISIT SF MDM: CPT

## 2021-08-28 PROCEDURE — 36415 COLL VENOUS BLD VENIPUNCTURE: CPT

## 2021-08-28 RX ORDER — METHYLPREDNISOLONE 4 MG/1
TABLET ORAL
Qty: 1 KIT | Refills: 0 | Status: SHIPPED | OUTPATIENT
Start: 2021-08-28 | End: 2021-09-03

## 2021-08-28 ASSESSMENT — ENCOUNTER SYMPTOMS
CONSTIPATION: 0
EYE PAIN: 0
ABDOMINAL PAIN: 0
COLOR CHANGE: 0
COUGH: 0
WHEEZING: 0
BACK PAIN: 0
TROUBLE SWALLOWING: 0
DIARRHEA: 0
NAUSEA: 0
VOMITING: 0
EYE REDNESS: 0
SHORTNESS OF BREATH: 1
EYE DISCHARGE: 0
SORE THROAT: 0
RHINORRHEA: 0
BLOOD IN STOOL: 0

## 2021-08-28 NOTE — ED PROVIDER NOTES
arthralgias, back pain and myalgias. Skin: Negative for color change, rash and wound. Neurological: Negative for dizziness, syncope, weakness, light-headedness and headaches. Psychiatric/Behavioral: Negative for behavioral problems. All other systems reviewed and are negative. Except as noted above the remainder of the review of systems was reviewed and negative. PAST MEDICAL HISTORY     Past Medical History:   Diagnosis Date    Abnormal EKG 2019    Cardiomyopathy ([facility] [address] [facility]     per echo 2019    Chronic combined systolic and diastolic CHF (congestive heart failure) ([facility] [address] 2019    ETOH abuse     Hypertension     Tobacco abuse      Past Surgical History:   Procedure Laterality Date    HERNIA REPAIR Right 2/10/2021    RIGHT INGUINAL HERNIA REPAIR WITH MESH performed by Jan Cho MD at [address] [place] [address] History     Socioeconomic History    Marital status: Single     Spouse name: None    Number of children: None    Years of education: None    Highest education level: None   Occupational History    None   Tobacco Use    Smoking status: Current Every Day Smoker     Packs/day: 1.00     Types: Cigarettes     Last attempt to quit: 12/3/2020     Years since quittin.7    Smokeless tobacco: Never Used    Tobacco comment: currently smoking only couple cigarettes daily   Substance and Sexual Activity    Alcohol use: Yes    Drug use: Never    Sexual activity: None   Other Topics Concern    None   Social History Narrative    None     Social Determinants of Health     Financial Resource Strain: Low Risk     Difficulty of Paying Living Expenses: Not hard at all   Food Insecurity: No Food Insecurity    Worried About Running Out of Food in the Last Year: Never true    Ginette of Food in the Last Year: Never true   Transportation Needs: No Transportation Needs    Lack of Transportation (Medical): No    Lack of Transportation (Non-Medical):  No   Physical Activity:     Days of Exercise per Week:     Minutes of Exercise per Session:    Stress:     Feeling of Stress :    Social Connections:     Frequency of Communication with Friends and Family:     Frequency of Social Gatherings with Friends and Family:     Attends Synagogue Services:     Active Member of Clubs or Organizations:     Attends Club or Organization Meetings:     Marital Status:    Intimate Partner Violence:     Fear of Current or Ex-Partner:     Emotionally Abused:     Physically Abused:     Sexually Abused:        SCREENINGS             PHYSICAL EXAM    (up to 7 for level 4, 8 or more for level 5)     ED Triage Vitals [08/28/21 0648]   BP Temp Temp Source Pulse Resp SpO2 Height Weight   119/86 98.3 °F (36.8 °C) Oral 90 18 100 % 6' (1.829 m) 202 lb (91.6 kg)       Physical Exam  Vitals and nursing note reviewed. Constitutional:       General: He is not in acute distress. Appearance: He is well-developed. He is not diaphoretic. HENT:      Head: Normocephalic and atraumatic. Nose: Nose normal.   Eyes:      Conjunctiva/sclera: Conjunctivae normal.      Pupils: Pupils are equal, round, and reactive to light. Cardiovascular:      Rate and Rhythm: Normal rate and regular rhythm. Heart sounds: Normal heart sounds. Pulmonary:      Effort: Pulmonary effort is normal. No tachypnea or accessory muscle usage. Breath sounds: Normal breath sounds. Abdominal:      General: Bowel sounds are normal.      Palpations: Abdomen is soft. Tenderness: There is no abdominal tenderness. Musculoskeletal:      Cervical back: Normal range of motion and neck supple. Skin:     General: Skin is warm and dry. Capillary Refill: Capillary refill takes less than 2 seconds. Findings: No rash. Neurological:      Mental Status: He is alert and oriented to person, place, and time. Cranial Nerves: No cranial nerve deficit.    Psychiatric:         Behavior: Behavior normal.         RESULTS     EKG: All EKG's are interpreted by the Emergency Department Physician who either signs or Co-signsthis chart in the absence of a cardiologist.    Normal sinus rhythm rate of 88 bpm, no ST elevations, QT of 406    RADIOLOGY:   Non-plain filmimages such as CT, Ultrasound and MRI are read by the radiologist. Plain radiographic images are visualized and preliminarily interpreted by the emergency physician with the below findings:        Interpretation per the Radiologist below, if available at the time ofthis note:    XR CHEST PORTABLE   Final Result   STABLE CARDIOMEGALY, LEFT PLEURAL EFFUSION, BILATERAL LOWER LUNG ATELECTASIS/PNEUMONIA.             ED BEDSIDE ULTRASOUND:   Performed by ED Physician - none    LABS:  Labs Reviewed   COMPREHENSIVE METABOLIC PANEL - Abnormal; Notable for the following components:       Result Value    Chloride 108 (*)     CO2 17 (*)     Glucose 111 (*)     BUN 46 (*)     CREATININE 3.85 (*)     GFR Non- 16.7 (*)     GFR  20.3 (*)     Total Protein 6.2 (*)     Albumin 3.4 (*)     All other components within normal limits   CBC WITH AUTO DIFFERENTIAL - Abnormal; Notable for the following components:    RBC 4.04 (*)     Hemoglobin 8.4 (*)     Hematocrit 26.2 (*)     MCV 64.8 (*)     MCH 20.8 (*)     MCHC 32.1 (*)     RDW 18.3 (*)     Platelets 201 (*)     Neutrophils Absolute 6.8 (*)     Lymphocytes Absolute 0.3 (*)     All other components within normal limits   TROPONIN - Abnormal; Notable for the following components:    Troponin 0.023 (*)     All other components within normal limits    Narrative:     CALL  Rosas  [facility] tel. [phone],  Troponin results called to and read back by Myriam Armstrong, 08/28/2021 08:10, by  Robyn Mckeon - Abnormal; Notable for the following components:    Protime 15.4 (*)     All other components within normal limits   MAGNESIUM   APTT   BRAIN NATRIURETIC PEPTIDE    Narrative:     CALL  Rosas  [facility] tel. [phone],  Troponin results called to and read back by Myriam Armstrong, 08/28/2021 08:10, by  [facility]       All other labs were within normal range or not returned as of this dictation. EMERGENCY DEPARTMENT COURSE and DIFFERENTIAL DIAGNOSIS/MDM:   Vitals:    Vitals:    08/28/21 0700 08/28/21 0714 08/28/21 0730 08/28/21 0815   BP: (!) 133/97 (!) 133/97 (!) 141/99 (!) 141/100   Pulse:   86 85   Resp:       Temp:       TempSrc:       SpO2: 98%  100% 99%   Weight:       Height:                MDM   Patient is a 61-year-old male presenting the ER with a chief complaint of shortness of breath. Hemodynamically stable nontoxic-appearing. Cardiac work-up initiated and patient will be reevaluated. Small left-sided pleural effusion and bilateral lower atelectasis noted on patient's x-ray. Lab work consistent with patient's chronic kidney disease and no acute new findings. His edema is not terrible and his oxygen is 99% on room air. He is instructed to see Dr. Moises Hartley, cardiology, for follow-up outpatient. It is likely COPD exacerbation versus CHF exacerbation. He is currently stable to go home and he is given a Medrol Dosepak to help with his shortness of breath. Instructed to come back to the ER if he worsens in any way or any new symptoms develop. He is discharged in stable condition with stable vitals. CRITICAL CARE TIME       CONSULTS:  None    PROCEDURES:  Unless otherwise noted below, none     Procedures    FINAL IMPRESSION      1. COPD exacerbation ([facility] [address]          DISPOSITION/PLAN   DISPOSITION        PATIENT REFERRED TO:  Ene Birch MD  [address] [place] [address]  [phone]    Schedule an appointment as soon as possible for a visit in 1 day        DISCHARGE MEDICATIONS:  New Prescriptions    METHYLPREDNISOLONE (MEDROL, MATTHEW,) 4 MG TABLET    Take by mouth.           (Please notethat portions of this note were completed with a voice recognition program.  Efforts were made to edit the dictations but occasionally words are mis-transcribed.)    Nadine Lund, APRN - CNP (electronically signed)  Attending Emergency Physician          [facility], APRN - CNP  08/28/21 1080

## 2021-08-28 NOTE — ED TRIAGE NOTES
The patient arrived to the e.d. with a complaint that he has been short of breath for the past 4 days and complains that his legs have been swelling since this morning.

## 2021-08-31 LAB
EKG ATRIAL RATE: 88 BPM
EKG P AXIS: 51 DEGREES
EKG P-R INTERVAL: 202 MS
EKG Q-T INTERVAL: 406 MS
EKG QRS DURATION: 96 MS
EKG QTC CALCULATION (BAZETT): 491 MS
EKG R AXIS: -5 DEGREES
EKG T AXIS: 147 DEGREES
EKG VENTRICULAR RATE: 88 BPM

## 2022-01-15 ENCOUNTER — HOSPITAL ENCOUNTER (INPATIENT)
Age: 52
LOS: 7 days | Discharge: HOME | DRG: 179 | End: 2022-01-22
Attending: STUDENT IN AN ORGANIZED HEALTH CARE EDUCATION/TRAINING PROGRAM | Admitting: FAMILY MEDICINE
Payer: MEDICAID

## 2022-01-15 ENCOUNTER — APPOINTMENT (OUTPATIENT)
Dept: GENERAL RADIOLOGY | Age: 52
DRG: 179 | End: 2022-01-15
Payer: MEDICAID

## 2022-01-15 DIAGNOSIS — N18.5 CHRONIC RENAL FAILURE, STAGE 5 (HCC): ICD-10-CM

## 2022-01-15 DIAGNOSIS — D63.8 ANEMIA OF CHRONIC DISEASE: ICD-10-CM

## 2022-01-15 DIAGNOSIS — I50.1 PULMONARY EDEMA CARDIAC CAUSE (HCC): Primary | ICD-10-CM

## 2022-01-15 DIAGNOSIS — I10 UNCONTROLLED HYPERTENSION: ICD-10-CM

## 2022-01-15 PROBLEM — I50.21 ACUTE SYSTOLIC HEART FAILURE (HCC): Status: ACTIVE | Noted: 2022-01-15

## 2022-01-15 LAB
ALBUMIN SERPL-MCNC: 3.6 G/DL (ref 3.5–4.6)
ALP BLD-CCNC: 143 U/L (ref 35–104)
ALT SERPL-CCNC: 37 U/L (ref 0–41)
AMPHETAMINE SCREEN, URINE: NORMAL
ANION GAP SERPL CALCULATED.3IONS-SCNC: 16 MEQ/L (ref 9–15)
ANISOCYTOSIS: ABNORMAL
APTT: 29.8 SEC (ref 24.4–36.8)
AST SERPL-CCNC: 24 U/L (ref 0–40)
BACTERIA: NEGATIVE /HPF
BARBITURATE SCREEN URINE: NORMAL
BASOPHILS ABSOLUTE: 0.1 K/UL (ref 0–0.2)
BASOPHILS RELATIVE PERCENT: 0.9 %
BENZODIAZEPINE SCREEN, URINE: NORMAL
BILIRUB SERPL-MCNC: 0.5 MG/DL (ref 0.2–0.7)
BILIRUBIN URINE: NEGATIVE
BLOOD, URINE: ABNORMAL
BUN BLDV-MCNC: 61 MG/DL (ref 6–20)
C-REACTIVE PROTEIN, HIGH SENSITIVITY: 11.3 MG/L (ref 0–5)
CALCIUM SERPL-MCNC: 8.8 MG/DL (ref 8.5–9.9)
CANNABINOID SCREEN URINE: NORMAL
CHLORIDE BLD-SCNC: 106 MEQ/L (ref 95–107)
CLARITY: CLEAR
CO2: 21 MEQ/L (ref 20–31)
COCAINE METABOLITE SCREEN URINE: NORMAL
COLOR: YELLOW
CREAT SERPL-MCNC: 4.6 MG/DL (ref 0.7–1.2)
EOSINOPHILS ABSOLUTE: 0.1 K/UL (ref 0–0.7)
EOSINOPHILS RELATIVE PERCENT: 2.3 %
EPITHELIAL CELLS, UA: NORMAL /HPF (ref 0–5)
GFR AFRICAN AMERICAN: 16.5
GFR NON-AFRICAN AMERICAN: 13.6
GLOBULIN: 3.1 G/DL (ref 2.3–3.5)
GLUCOSE BLD-MCNC: 96 MG/DL (ref 70–99)
GLUCOSE URINE: NEGATIVE MG/DL
HCT VFR BLD CALC: 26.1 % (ref 42–52)
HEMOGLOBIN: 8.1 G/DL (ref 14–18)
HYALINE CASTS: NORMAL /HPF (ref 0–5)
HYPOCHROMIA: ABNORMAL
INR BLD: 1.2
KETONES, URINE: NEGATIVE MG/DL
LEUKOCYTE ESTERASE, URINE: NEGATIVE
LYMPHOCYTES ABSOLUTE: 0.5 K/UL (ref 1–4.8)
LYMPHOCYTES RELATIVE PERCENT: 8.2 %
Lab: NORMAL
MAGNESIUM: 2.2 MG/DL (ref 1.7–2.4)
MCH RBC QN AUTO: 19.8 PG (ref 27–31.3)
MCHC RBC AUTO-ENTMCNC: 31.2 % (ref 33–37)
MCV RBC AUTO: 63.4 FL (ref 80–100)
METHADONE SCREEN, URINE: NORMAL
MICROCYTES: ABNORMAL
MONOCYTES ABSOLUTE: 0.5 K/UL (ref 0.2–0.8)
MONOCYTES RELATIVE PERCENT: 9.2 %
NEUTROPHILS ABSOLUTE: 4.4 K/UL (ref 1.4–6.5)
NEUTROPHILS RELATIVE PERCENT: 79.4 %
NITRITE, URINE: NEGATIVE
OPIATE SCREEN URINE: NORMAL
OXYCODONE URINE: NORMAL
PDW BLD-RTO: 19.1 % (ref 11.5–14.5)
PH UA: 6.5 (ref 5–9)
PHENCYCLIDINE SCREEN URINE: NORMAL
PLATELET # BLD: 354 K/UL (ref 130–400)
POTASSIUM SERPL-SCNC: 3.6 MEQ/L (ref 3.4–4.9)
PRO-BNP: NORMAL PG/ML
PROCALCITONIN: 0.19 NG/ML (ref 0–0.15)
PROPOXYPHENE SCREEN: NORMAL
PROTEIN UA: >=300 MG/DL
PROTHROMBIN TIME: 15.1 SEC (ref 12.3–14.9)
RBC # BLD: 4.12 M/UL (ref 4.7–6.1)
RBC UA: NORMAL /HPF (ref 0–5)
SARS-COV-2, NAAT: NOT DETECTED
SODIUM BLD-SCNC: 143 MEQ/L (ref 135–144)
SPECIFIC GRAVITY UA: 1.01 (ref 1–1.03)
TOTAL CK: 164 U/L (ref 0–190)
TOTAL PROTEIN: 6.7 G/DL (ref 6.3–8)
TROPONIN: 0.04 NG/ML (ref 0–0.01)
TSH SERPL DL<=0.05 MIU/L-ACNC: 0.79 UIU/ML (ref 0.44–3.86)
URINE REFLEX TO CULTURE: ABNORMAL
UROBILINOGEN, URINE: 1 E.U./DL
WBC # BLD: 5.6 K/UL (ref 4.8–10.8)
WBC UA: NORMAL /HPF (ref 0–5)

## 2022-01-15 PROCEDURE — 81001 URINALYSIS AUTO W/SCOPE: CPT

## 2022-01-15 PROCEDURE — 82550 ASSAY OF CK (CPK): CPT

## 2022-01-15 PROCEDURE — 84443 ASSAY THYROID STIM HORMONE: CPT

## 2022-01-15 PROCEDURE — 96374 THER/PROPH/DIAG INJ IV PUSH: CPT

## 2022-01-15 PROCEDURE — 84484 ASSAY OF TROPONIN QUANT: CPT

## 2022-01-15 PROCEDURE — 99285 EMERGENCY DEPT VISIT HI MDM: CPT

## 2022-01-15 PROCEDURE — 6360000002 HC RX W HCPCS: Performed by: FAMILY MEDICINE

## 2022-01-15 PROCEDURE — 85025 COMPLETE CBC W/AUTO DIFF WBC: CPT

## 2022-01-15 PROCEDURE — 6360000002 HC RX W HCPCS: Performed by: STUDENT IN AN ORGANIZED HEALTH CARE EDUCATION/TRAINING PROGRAM

## 2022-01-15 PROCEDURE — 80053 COMPREHEN METABOLIC PANEL: CPT

## 2022-01-15 PROCEDURE — 2060000000 HC ICU INTERMEDIATE R&B

## 2022-01-15 PROCEDURE — 71046 X-RAY EXAM CHEST 2 VIEWS: CPT

## 2022-01-15 PROCEDURE — 83735 ASSAY OF MAGNESIUM: CPT

## 2022-01-15 PROCEDURE — 83880 ASSAY OF NATRIURETIC PEPTIDE: CPT

## 2022-01-15 PROCEDURE — 84145 PROCALCITONIN (PCT): CPT

## 2022-01-15 PROCEDURE — 83550 IRON BINDING TEST: CPT

## 2022-01-15 PROCEDURE — 99255 IP/OBS CONSLTJ NEW/EST HI 80: CPT | Performed by: INTERNAL MEDICINE

## 2022-01-15 PROCEDURE — 6370000000 HC RX 637 (ALT 250 FOR IP): Performed by: INTERNAL MEDICINE

## 2022-01-15 PROCEDURE — 80307 DRUG TEST PRSMV CHEM ANLYZR: CPT

## 2022-01-15 PROCEDURE — 86141 C-REACTIVE PROTEIN HS: CPT

## 2022-01-15 PROCEDURE — 6370000000 HC RX 637 (ALT 250 FOR IP): Performed by: FAMILY MEDICINE

## 2022-01-15 PROCEDURE — 83540 ASSAY OF IRON: CPT

## 2022-01-15 PROCEDURE — 87635 SARS-COV-2 COVID-19 AMP PRB: CPT

## 2022-01-15 PROCEDURE — 96375 TX/PRO/DX INJ NEW DRUG ADDON: CPT

## 2022-01-15 PROCEDURE — 93005 ELECTROCARDIOGRAM TRACING: CPT | Performed by: STUDENT IN AN ORGANIZED HEALTH CARE EDUCATION/TRAINING PROGRAM

## 2022-01-15 PROCEDURE — 85730 THROMBOPLASTIN TIME PARTIAL: CPT

## 2022-01-15 PROCEDURE — 6370000000 HC RX 637 (ALT 250 FOR IP): Performed by: STUDENT IN AN ORGANIZED HEALTH CARE EDUCATION/TRAINING PROGRAM

## 2022-01-15 PROCEDURE — 36415 COLL VENOUS BLD VENIPUNCTURE: CPT

## 2022-01-15 PROCEDURE — 85610 PROTHROMBIN TIME: CPT

## 2022-01-15 RX ORDER — HYDRALAZINE HYDROCHLORIDE 50 MG/1
50 TABLET, FILM COATED ORAL EVERY 8 HOURS SCHEDULED
Status: DISCONTINUED | OUTPATIENT
Start: 2022-01-15 | End: 2022-01-19

## 2022-01-15 RX ORDER — SODIUM CHLORIDE 0.9 % (FLUSH) 0.9 %
5-40 SYRINGE (ML) INJECTION PRN
Status: DISCONTINUED | OUTPATIENT
Start: 2022-01-15 | End: 2022-01-22 | Stop reason: HOSPADM

## 2022-01-15 RX ORDER — CARVEDILOL 12.5 MG/1
12.5 TABLET ORAL 2 TIMES DAILY
Status: DISCONTINUED | OUTPATIENT
Start: 2022-01-15 | End: 2022-01-22 | Stop reason: HOSPADM

## 2022-01-15 RX ORDER — METOLAZONE 5 MG/1
10 TABLET ORAL DAILY
Status: DISCONTINUED | OUTPATIENT
Start: 2022-01-15 | End: 2022-01-19

## 2022-01-15 RX ORDER — ACETAMINOPHEN 650 MG/1
650 SUPPOSITORY RECTAL EVERY 6 HOURS PRN
Status: DISCONTINUED | OUTPATIENT
Start: 2022-01-15 | End: 2022-01-22 | Stop reason: HOSPADM

## 2022-01-15 RX ORDER — HYDRALAZINE HYDROCHLORIDE 20 MG/ML
20 INJECTION INTRAMUSCULAR; INTRAVENOUS ONCE
Status: COMPLETED | OUTPATIENT
Start: 2022-01-15 | End: 2022-01-15

## 2022-01-15 RX ORDER — TORSEMIDE 20 MG/1
20 TABLET ORAL DAILY
Status: DISCONTINUED | OUTPATIENT
Start: 2022-01-15 | End: 2022-01-19

## 2022-01-15 RX ORDER — SODIUM CHLORIDE 9 MG/ML
25 INJECTION, SOLUTION INTRAVENOUS PRN
Status: DISCONTINUED | OUTPATIENT
Start: 2022-01-15 | End: 2022-01-22 | Stop reason: HOSPADM

## 2022-01-15 RX ORDER — FUROSEMIDE 10 MG/ML
20 INJECTION INTRAMUSCULAR; INTRAVENOUS ONCE
Status: COMPLETED | OUTPATIENT
Start: 2022-01-15 | End: 2022-01-15

## 2022-01-15 RX ORDER — ONDANSETRON 2 MG/ML
4 INJECTION INTRAMUSCULAR; INTRAVENOUS EVERY 6 HOURS PRN
Status: DISCONTINUED | OUTPATIENT
Start: 2022-01-15 | End: 2022-01-22 | Stop reason: HOSPADM

## 2022-01-15 RX ORDER — LANOLIN ALCOHOL/MO/W.PET/CERES
400 CREAM (GRAM) TOPICAL DAILY
Status: DISCONTINUED | OUTPATIENT
Start: 2022-01-15 | End: 2022-01-22 | Stop reason: HOSPADM

## 2022-01-15 RX ORDER — ONDANSETRON 4 MG/1
4 TABLET, ORALLY DISINTEGRATING ORAL EVERY 8 HOURS PRN
Status: DISCONTINUED | OUTPATIENT
Start: 2022-01-15 | End: 2022-01-22 | Stop reason: HOSPADM

## 2022-01-15 RX ORDER — ACETAMINOPHEN 325 MG/1
650 TABLET ORAL EVERY 6 HOURS PRN
Status: DISCONTINUED | OUTPATIENT
Start: 2022-01-15 | End: 2022-01-22 | Stop reason: HOSPADM

## 2022-01-15 RX ORDER — SODIUM CHLORIDE 0.9 % (FLUSH) 0.9 %
5-40 SYRINGE (ML) INJECTION EVERY 12 HOURS SCHEDULED
Status: DISCONTINUED | OUTPATIENT
Start: 2022-01-15 | End: 2022-01-22 | Stop reason: HOSPADM

## 2022-01-15 RX ORDER — ISOSORBIDE MONONITRATE 120 MG/1
120 TABLET, EXTENDED RELEASE ORAL DAILY
Status: DISCONTINUED | OUTPATIENT
Start: 2022-01-15 | End: 2022-01-19

## 2022-01-15 RX ORDER — FUROSEMIDE 10 MG/ML
40 INJECTION INTRAMUSCULAR; INTRAVENOUS 2 TIMES DAILY
Status: DISCONTINUED | OUTPATIENT
Start: 2022-01-15 | End: 2022-01-17

## 2022-01-15 RX ORDER — HEPARIN SODIUM 5000 [USP'U]/ML
5000 INJECTION, SOLUTION INTRAVENOUS; SUBCUTANEOUS EVERY 8 HOURS SCHEDULED
Status: DISCONTINUED | OUTPATIENT
Start: 2022-01-15 | End: 2022-01-22 | Stop reason: HOSPADM

## 2022-01-15 RX ORDER — POLYETHYLENE GLYCOL 3350 17 G/17G
17 POWDER, FOR SOLUTION ORAL DAILY PRN
Status: DISCONTINUED | OUTPATIENT
Start: 2022-01-15 | End: 2022-01-22 | Stop reason: HOSPADM

## 2022-01-15 RX ADMIN — Medication 650 MG: at 20:56

## 2022-01-15 RX ADMIN — HEPARIN SODIUM 5000 UNITS: 5000 INJECTION INTRAVENOUS; SUBCUTANEOUS at 14:36

## 2022-01-15 RX ADMIN — CARVEDILOL 12.5 MG: 12.5 TABLET, FILM COATED ORAL at 19:35

## 2022-01-15 RX ADMIN — METOLAZONE 10 MG: 5 TABLET ORAL at 16:21

## 2022-01-15 RX ADMIN — HYDRALAZINE HYDROCHLORIDE 20 MG: 20 INJECTION INTRAMUSCULAR; INTRAVENOUS at 14:10

## 2022-01-15 RX ADMIN — ISOSORBIDE MONONITRATE 120 MG: 120 TABLET ORAL at 16:22

## 2022-01-15 RX ADMIN — HYDRALAZINE HYDROCHLORIDE 20 MG: 20 INJECTION, SOLUTION INTRAMUSCULAR; INTRAVENOUS at 15:29

## 2022-01-15 RX ADMIN — FUROSEMIDE 40 MG: 10 INJECTION, SOLUTION INTRAMUSCULAR; INTRAVENOUS at 19:35

## 2022-01-15 RX ADMIN — FUROSEMIDE 20 MG: 10 INJECTION, SOLUTION INTRAMUSCULAR; INTRAVENOUS at 11:00

## 2022-01-15 RX ADMIN — TORSEMIDE 20 MG: 20 TABLET ORAL at 16:21

## 2022-01-15 RX ADMIN — Medication 400 MG: at 17:56

## 2022-01-15 RX ADMIN — HYDRALAZINE HYDROCHLORIDE 50 MG: 50 TABLET, FILM COATED ORAL at 16:21

## 2022-01-15 RX ADMIN — HYDRALAZINE HYDROCHLORIDE 50 MG: 50 TABLET, FILM COATED ORAL at 22:39

## 2022-01-15 ASSESSMENT — ENCOUNTER SYMPTOMS
BACK PAIN: 0
DIARRHEA: 0
COLOR CHANGE: 0
TROUBLE SWALLOWING: 0
SHORTNESS OF BREATH: 1
APNEA: 0
CONSTIPATION: 0
COUGH: 1
CHEST TIGHTNESS: 0
COUGH: 0
NAUSEA: 0
RHINORRHEA: 0
ABDOMINAL PAIN: 0
EYE REDNESS: 0
VOMITING: 0
SINUS PRESSURE: 0
WHEEZING: 0

## 2022-01-15 ASSESSMENT — PAIN SCALES - GENERAL
PAINLEVEL_OUTOF10: 0
PAINLEVEL_OUTOF10: 10

## 2022-01-15 NOTE — CARE COORDINATION
[facility] Case Management Initial Discharge Assessment    Met with Patient to discuss discharge plan. PCP: Sourav Fry MD                                Date of Last Visit: 1 month    If no PCP, list provided? N/A    Discharge Planning    Living Arrangements: independently at home    Who do you live with? With someone    Who helps you with your care:  self    If lives at home:     Do you have any barriers navigating in your home? no    Patient can perform ADL? Yes    Current Services (outpatient and in home) :  None    Dialysis: No    Is transportation available to get to your appointments? Yes    DME Equipment:  no    Respiratory equipment: None    Respiratory provider:  no     Pharmacy:  yes - [facility]    Consult with Medication Assistance Program?  No      Patient agreeable to [facility]? No    Patient agreeable to SNF/Rehab? No    Other discharge needs identified? N/A    Does Patient Have a High-Risk for Readmission Diagnosis (CHF, PN, MI, COPD)? Yes    If Yes,     Consult with pulmonologist? No   Consult with cardiologist? Yes   Cardiac Rehab referral if EF <35%? No   Consult with Pharmacy for medication assessment prior to discharge? No   Consult with Behavioral health to aid in depression, anxiety, or coping issues? No   Palliative Care Consult? No   Pulmonary Rehab order for COPD, PN, and CHF (if EF > 35%)? No    Does patient have a reliable scale and know how to read it (for CHF)? chf nurse is consulted*   Nutrition consult for CHF? Yes   Respiratory therapy consult that includes bedside instruction on administration of nebulizers and/or inhalers, and assessment of oxygen and equipment needs in the home? No    Initial Discharge Plan? (Note: please see concurrent daily documentation for any updates after initial note). Cm to assess for further d/c needs and referrals.     Readmission Risk              Risk of Unplanned Readmission:  17         Electronically signed by Yahaira Rosas on 1/15/2022 at 5:59 PM

## 2022-01-15 NOTE — ED TRIAGE NOTES
Patient arrives via EMS for complaints of elevated blood pressure at home, despite taking his medications as prescribed. Patient called his PCP and was instructed to go to ED. Patient denies pain, but c/o shortness of breath. Patient reports he takes a water pill for his shortness of breath. No distress noted on arrival. Skin warm and dry. Respirations equal and unlabored, slightly tachypneic.

## 2022-01-15 NOTE — CONSULTS
Cardiology Consult Note  Patient: Kevyn De Leon  Unit/Bed: 19/19  YOB: 1970  MRN: [id number]  Acct: [de-identified]   Admitting Diagnosis: Acute systolic heart failure ([address] [I50.21]  Date:  1/15/2022  Hospital Day: 0      Chief Complaint:  Shortness of breath    Recent of this consult:  Evaluation of heart failure    History of Present Illness:  81-year-old male -American well-known to our service as patient follows in our cardiology clinic with Dr. Keturah Baron with history of nonischemic cardiomyopathy EF 35 to 40% by TTE on 4/22/2021, hypertension, tobacco use, CKD, alcohol abuse and history of noncompliance who came to the hospital for shortness of breath    Patient reports that shortness of breath started today  Denies chest pain shortness of breath  Denies changing diet  States that he is compliant with all his medications    EKG showing sinus tachycardia without active signs of ischemia  CKD worsening at 4.6 up from 3.85 August 2021  Troponins 0.037 which have been chronically detectable at level  BNP 25,000 up from 16,000 on August 2021  Chest x-ray showing bilateral pulmonary congestion    Latest coronary angiogram 11/4/2020   showing normal coronaries    Latest transthoracic echocardiogram 4/22/2021 EF 35 to 40%    Of note patient had an admission on 7/14/2021 for hypertension urgency and decompensated heart failure due to medication noncompliance    Allergies   Allergen Reactions    Lipitor [Atorvastatin] Other (See Comments)     Coughing     Lisinopril        Current Facility-Administered Medications   Medication Dose Route Frequency Provider Last Rate Last Admin    sodium chloride flush 0.9 % injection 5-40 mL  5-40 mL IntraVENous 2 times per day Paige Rooney MD        sodium chloride flush 0.9 % injection 5-40 mL  5-40 mL IntraVENous PRN Paige Rooney MD        0.9 % sodium chloride infusion  25 mL IntraVENous PRN Paige Rooney MD        ondansetron (ZOFRAN-ODT) disintegrating tablet 4 mg  4 mg Oral Q8H PRN Andreas Cooks, MD        Or    ondansetron [facility]) injection 4 mg  4 mg IntraVENous Q6H PRN Andreas Cooks, MD        polyethylene glycol [facility]) packet 17 g  17 g Oral Daily PRN Andreas Cooks, MD        acetaminophen (TYLENOL) tablet 650 mg  650 mg Oral Q6H PRN Andreas Cooks, MD        Or    acetaminophen (TYLENOL) suppository 650 mg  650 mg Rectal Q6H PRN Andreas Cooks, MD        heparin (porcine) injection 5,000 Units  5,000 Units SubCUTAneous 3 times per day Andreas Cooks, MD   5,000 Units at 01/15/22 1436    furosemide (LASIX) injection 40 mg  40 mg IntraVENous BID Andreas Cooks, MD         Current Outpatient Medications   Medication Sig Dispense Refill    torsemide (DEMADEX) 20 MG tablet Take 2 tablets by mouth daily 60 tablet 1    ivabradine (CORLANOR) 5 MG TABS tablet Take 1 tablet by mouth 2 times daily (with meals) 60 tablet 3    carvedilol (COREG) 25 MG tablet Take 1 tablet by mouth 2 times daily HOLD for SBP<100 or HR<60 60 tablet 3    isosorbide mononitrate (IMDUR) 120 MG extended release tablet Take 1 tablet by mouth daily 30 tablet 3    hydrALAZINE (APRESOLINE) 100 MG tablet Take 1 tablet by mouth 3 times daily 90 tablet 3    nicotine (NICODERM CQ) 21 MG/24HR Place 1 patch onto the skin daily (Patient not taking: Reported on 7/16/2021) 30 patch 3    aspirin 81 MG EC tablet Take 1 tablet by mouth daily 30 tablet 3       PMHx:  Past Medical History:   Diagnosis Date    Abnormal EKG 9/27/2019    Acute kidney injury (BLUE) with acute tubular necrosis (ATN) (HCC)     Cardiomyopathy ([facility] [address]     per echo 8/2019    Chronic combined systolic and diastolic CHF (congestive heart failure) ([facility] [address] 9/27/2019    ETOH abuse     Hypertension     Tobacco abuse        PSHx:  Past Surgical History:   Procedure Laterality Date    HERNIA REPAIR Right 2/10/2021    RIGHT INGUINAL HERNIA REPAIR WITH MESH performed by Amador Kumar MD at [address] Hx:  Social History     Socioeconomic History    Marital status: Single     Spouse name: None    Number of children: None    Years of education: None    Highest education level: None   Occupational History    None   Tobacco Use    Smoking status: Current Every Day Smoker     Packs/day: 1.00     Types: Cigarettes     Last attempt to quit: 12/3/2020     Years since quittin.1    Smokeless tobacco: Never Used    Tobacco comment: currently smoking only couple cigarettes daily   Substance and Sexual Activity    Alcohol use: Yes    Drug use: Never    Sexual activity: None   Other Topics Concern    None   Social History Narrative    None     Social Determinants of Health     Financial Resource Strain:     Difficulty of Paying Living Expenses: Not on file   Food Insecurity:     Worried About Running Out of Food in the Last Year: Not on file    Ginette of Food in the Last Year: Not on file   Transportation Needs:     Lack of Transportation (Medical): Not on file    Lack of Transportation (Non-Medical):  Not on file   Physical Activity:     Days of Exercise per Week: Not on file    Minutes of Exercise per Session: Not on file   Stress:     Feeling of Stress : Not on file   Social Connections:     Frequency of Communication with Friends and Family: Not on file    Frequency of Social Gatherings with Friends and Family: Not on file    Attends Muslim Services: Not on file    Active Member of [address] [place] [address] or Organizations: Not on file    Attends Club or Organization Meetings: Not on file    Marital Status: Not on file   Intimate Partner Violence:     Fear of Current or Ex-Partner: Not on file    Emotionally Abused: Not on file    Physically Abused: Not on file    Sexually Abused: Not on file   Housing Stability:     Unable to Pay for Housing in the Last Year: Not on file    Number of [place] in the Last Year: Not on file    Unstable Housing in the Last Year: Not on file Family Hx:  Family History   Problem Relation Age of Onset    Coronary Art Dis Mother        Review of Systems:   Review of Systems   Constitutional: Negative for activity change, chills, diaphoresis and fever. HENT: Negative for congestion, ear pain, nosebleeds and rhinorrhea. Eyes: Negative for redness and visual disturbance. Respiratory: Positive for shortness of breath. Negative for apnea, cough, chest tightness and wheezing. Cardiovascular: Negative for chest pain, palpitations and leg swelling. Gastrointestinal: Negative for abdominal pain, constipation, diarrhea, nausea and vomiting. Genitourinary: Negative for difficulty urinating and dysuria. Musculoskeletal: Negative. Negative for joint swelling. Skin: Negative for color change, rash and wound. Neurological: Negative for dizziness, syncope, weakness, numbness and headaches. Psychiatric/Behavioral: Negative. Physical Examination:    BP (!) 164/101   Pulse 104   Temp 98.2 °F (36.8 °C) (Oral)   Resp 18   Ht 5' 9\" (1.753 m)   Wt 200 lb (90.7 kg)   SpO2 100%   BMI 29.53 kg/m²    Physical Exam  Vitals and nursing note reviewed. Constitutional:       Appearance: Normal appearance. HENT:      Head: Normocephalic and atraumatic. Mouth/Throat:      Mouth: Mucous membranes are moist.      Pharynx: Oropharynx is clear. Eyes:      Extraocular Movements: Extraocular movements intact. Conjunctiva/sclera: Conjunctivae normal.      Pupils: Pupils are equal, round, and reactive to light. Cardiovascular:      Rate and Rhythm: Regular rhythm. Tachycardia present. Pulses: Normal pulses. Heart sounds: Normal heart sounds. Pulmonary:      Effort: Pulmonary effort is normal.      Breath sounds: Rales present. Abdominal:      General: Abdomen is flat. Bowel sounds are normal.      Palpations: Abdomen is soft. Musculoskeletal:         General: Normal range of motion.       Cervical back: Normal range of motion and neck supple. Right lower leg: Edema present. Skin:     General: Skin is warm. Neurological:      General: No focal deficit present. Mental Status: He is alert and oriented to person, place, and time. Mental status is at baseline.    Psychiatric:         Mood and Affect: Mood normal.         LABS:  CBC:  Lab Results   Component Value Date    WBC 5.6 01/15/2022    RBC 4.12 01/15/2022    HGB 8.1 01/15/2022    HCT 26.1 01/15/2022    MCV 63.4 01/15/2022    MCH 19.8 01/15/2022    MCHC 31.2 01/15/2022    RDW 19.1 01/15/2022     01/15/2022     CBC with Differential:   Lab Results   Component Value Date    WBC 5.6 01/15/2022    RBC 4.12 01/15/2022    HGB 8.1 01/15/2022    HCT 26.1 01/15/2022     01/15/2022    MCV 63.4 01/15/2022    MCH 19.8 01/15/2022    MCHC 31.2 01/15/2022    RDW 19.1 01/15/2022    LYMPHOPCT 8.2 01/15/2022    MONOPCT 9.2 01/15/2022    BASOPCT 0.9 01/15/2022    MONOSABS 0.5 01/15/2022    LYMPHSABS 0.5 01/15/2022    EOSABS 0.1 01/15/2022    BASOSABS 0.1 01/15/2022     CMP:    Lab Results   Component Value Date     01/15/2022    K 3.6 01/15/2022     01/15/2022    CO2 21 01/15/2022    BUN 61 01/15/2022    CREATININE 4.60 01/15/2022    GFRAA 16.5 01/15/2022    LABGLOM 13.6 01/15/2022    GLUCOSE 96 01/15/2022    PROT 6.7 01/15/2022    LABALBU 3.6 01/15/2022    CALCIUM 8.8 01/15/2022    BILITOT 0.5 01/15/2022    ALKPHOS 143 01/15/2022    AST 24 01/15/2022    ALT 37 01/15/2022     BMP:    Lab Results   Component Value Date     01/15/2022    K 3.6 01/15/2022     01/15/2022    CO2 21 01/15/2022    BUN 61 01/15/2022    LABALBU 3.6 01/15/2022    CREATININE 4.60 01/15/2022    CALCIUM 8.8 01/15/2022    GFRAA 16.5 01/15/2022    LABGLOM 13.6 01/15/2022    GLUCOSE 96 01/15/2022     Magnesium:    Lab Results   Component Value Date    MG 2.2 01/15/2022     Troponin:    Lab Results   Component Value Date    TROPONINI 0.037 01/15/2022       Radiology:  XR CHEST (2 VW)    Result Date: 1/15/2022  EXAMINATION: XR CHEST (2 VW) CLINICAL HISTORY: TIGHTNESS OF BREATH. UNCONTROLLED HYPERTENSION. COMPARISONS: CHEST RADIOGRAPH AUGUST 28, 2021 FINDINGS: Osseous structures intact. Cardiopericardial silhouette enlarged and unchanged. Pulmonary vasculature indistinct. Ill-defined area of increased opacity obscures left lung base, with blunting left costophrenic angle. CARDIOMEGALY. INTERSTITIAL EDEMA. LEFT PLEURAL EFFUSION. LEFT LOWER LUNG ATELECTASIS/PNEUMONIA.       EKG: Sinus tachycardia without active signs of ischemia      Assessment:    Active Hospital Problems    Diagnosis Date Noted    Acute systolic heart failure ([facility] [address] [I50.21] 01/15/2022     Priority: Low     Acute on chronic heart failure with reduced ejection fraction EF 35 to 40% by TTE 2021. Patient admitted with a chest x-ray showing bilateral pulmonary congestion, BNP 25,000, patient with lower extremity edema  CKD.   Creatinine worsening currently at 4.6 up from 3.85 on 8/28/2021  History of hypertension  History of medication noncompliance  Latest coronary angiogram 11/4/2020   showing normal coronaries  Latest transthoracic echocardiogram 4/22/2021 EF 35 to 40%      Plan:  Continue telemetry  Continue diuresis with torsemide, goal in 24 hours at least 2 to 3 L negative  Continue Coreg 25 mg twice daily  Continue hydralazine  Continue Imdur  Strict ins and outs and daily weights  Please keep potassium between 4 and 5 and magnesium above 2  Fluid restriction to no more than 1 L/day  Follow-up echocardiogram  Patient should be considered for ICD placement if EF still reduced  Follow-up renal recommendations  We will continue to follow      Electronically signed by Ronna Sue MD on 1/15/2022 at 3:33 PM

## 2022-01-15 NOTE — ACP (ADVANCE CARE PLANNING)
Advance Care Planning     Advance Care Planning Activator (Inpatient)  Conversation Note      Date of ACP Conversation: 1/15/2022     Conversation Conducted with: Patient with Decision Making Capacity    ACP Activator: Joceline Garcia    Pt states that he does have a living will and that it is current with his wishes. Health Care Decision Maker:     Current Designated Health Care Decision Maker:     Primary Decision Maker: Jake Myron - Brother/Sister - [phone]    Secondary Decision Maker: fiona hansen - [facility] - [phone]    Care Preferences    Ventilation: \"If you were in your present state of health and suddenly became very ill and were unable to breathe on your own, what would your preference be about the use of a ventilator (breathing machine) if it were available to you? \"      Would the patient desire the use of ventilator (breathing machine)?: yes    \"If your health worsens and it becomes clear that your chance of recovery is unlikely, what would your preference be about the use of a ventilator (breathing machine) if it were available to you? \"     Would the patient desire the use of ventilator (breathing machine)?: No      Resuscitation  \"CPR works best to restart the heart when there is a sudden event, like a heart attack, in someone who is otherwise healthy. Unfortunately, CPR does not typically restart the heart for people who have serious health conditions or who are very sick. \"    \"In the event your heart stopped as a result of an underlying serious health condition, would you want attempts to be made to restart your heart (answer \"yes\" for attempt to resuscitate) or would you prefer a natural death (answer \"no\" for do not attempt to resuscitate)? \" yes       [x] Yes   [] No   Educated Patient / [facility] regarding differences between Advance Directives and portable DNR orders.     Length of ACP Conversation in minutes:  10  Conversation Outcomes:  [x] ACP discussion completed  [] Existing advance directive reviewed with patient; no changes to patient's previously recorded wishes  [] New Advance Directive completed  [] Portable Do Not Rescitate prepared for Provider review and signature  [] POLST/POST/MOLST/MOST prepared for Provider review and signature      Follow-up plan:    [] Schedule follow-up conversation to continue planning  [] Referred individual to Provider for additional questions/concerns   [] Advised patient/agent/surrogate to review completed ACP document and update if needed with changes in condition, patient preferences or care setting    [x] This note routed to one or more involved healthcare providers

## 2022-01-15 NOTE — ED NOTES
Bed: 19  Expected date: 1/15/22  Expected time:   Means of arrival:   Comments:  51M HA HTN  HX:HTN  172/113, 102HR, 98%RA, OT [address] Mikie Estrella RN  01/15/22 1029

## 2022-01-15 NOTE — ED PROVIDER NOTES
[facility] ED  eMERGENCY dEPARTMENT eNCOUnter      Pt Name: Suraj Lal  MRN: [id number]  [place] 1970  Date of evaluation: 1/15/2022  Provider: Zulma Garcia DO    CHIEF COMPLAINT       Chief Complaint   Patient presents with    Hypertension     sent by PCP for high blood pressure         HISTORY OF PRESENT ILLNESS   (Location/Symptom, Timing/Onset,Context/Setting, Quality, Duration, Modifying Factors, Severity)  Note limiting factors. Suraj Lal is a 54 y.o. male who presents to the emergency department with c/o evaded blood pressure at home and shortness of breath that started today. Patient mitts he has a history of chronic renal disease but is not on dialysis. Patient denies any missed doses of medications. Systolic pressure is in the 071L and diastolic pressures greater than 100. Patient's PCP advised the patient to come to the emergency room. The patient denies any fever or chills. Patient denies any nausea or vomiting. Patient states he gets short of breath when exerting himself. On physical exam the patient has 2+ pitting edema bilateral legs and has JVD and hepatojugular reflux both on exam.  Patient's lungs sounds are diminished without any wheezes. The history is provided by the patient. NursingNotes were reviewed. REVIEW OF SYSTEMS    (2-9 systems for level 4, 10 or more for level 5)     Review of Systems   Constitutional: Positive for activity change and fatigue. Negative for appetite change, chills, fever and unexpected weight change. HENT: Negative for drooling, ear pain, nosebleeds, sinus pressure and trouble swallowing. Respiratory: Positive for cough and shortness of breath. Negative for chest tightness. Cardiovascular: Positive for leg swelling. Negative for chest pain. Gastrointestinal: Negative for abdominal pain, diarrhea and vomiting. Endocrine: Negative for polydipsia and polyphagia.    Genitourinary: Negative for dysuria, flank pain and frequency. Musculoskeletal: Negative for back pain and myalgias. Skin: Negative for pallor and rash. Neurological: Negative for syncope, weakness and headaches. Hematological: Does not bruise/bleed easily. All other systems reviewed and are negative. Except as noted above the remainder of the review of systems was reviewed and negative.        PAST MEDICAL HISTORY     Past Medical History:   Diagnosis Date    Abnormal EKG 9/27/2019    Acute kidney injury (BLUE) with acute tubular necrosis (ATN) (HCC)     Cardiomyopathy ([facility] [address]     per echo 8/2019    Chronic combined systolic and diastolic CHF (congestive heart failure) ([facility] [address] 9/27/2019    ETOH abuse     Hypertension     Tobacco abuse          SURGICALHISTORY       Past Surgical History:   Procedure Laterality Date    HERNIA REPAIR Right 2/10/2021    RIGHT INGUINAL HERNIA REPAIR WITH MESH performed by Keshawn Velasquez MD at [address] [place] [address]       Previous Medications    ASPIRIN 81 MG EC TABLET    Take 1 tablet by mouth daily    CARVEDILOL (COREG) 25 MG TABLET    Take 1 tablet by mouth 2 times daily HOLD for SBP<100 or HR<60    HYDRALAZINE (APRESOLINE) 100 MG TABLET    Take 1 tablet by mouth 3 times daily    ISOSORBIDE MONONITRATE (IMDUR) 120 MG EXTENDED RELEASE TABLET    Take 1 tablet by mouth daily    IVABRADINE (CORLANOR) 5 MG TABS TABLET    Take 1 tablet by mouth 2 times daily (with meals)    NICOTINE (NICODERM CQ) 21 MG/24HR    Place 1 patch onto the skin daily    TORSEMIDE (DEMADEX) 20 MG TABLET    Take 2 tablets by mouth daily       ALLERGIES     Lipitor [atorvastatin] and Lisinopril    FAMILY HISTORY       Family History   Problem Relation Age of Onset    Coronary Art Dis Mother           SOCIAL HISTORY       Social History     Socioeconomic History    Marital status: Single     Spouse name: None    Number of children: None    Years of education: None    Highest education level: None   Occupational History  None   Tobacco Use    Smoking status: Current Every Day Smoker     Packs/day: 1.00     Types: Cigarettes     Last attempt to quit: 12/3/2020     Years since quittin.1    Smokeless tobacco: Never Used    Tobacco comment: currently smoking only couple cigarettes daily   Substance and Sexual Activity    Alcohol use: Yes    Drug use: Never    Sexual activity: None   Other Topics Concern    None   Social History Narrative    None     Social Determinants of Health     Financial Resource Strain:     Difficulty of Paying Living Expenses: Not on file   Food Insecurity:     Worried About Running Out of Food in the Last Year: Not on file    Ginette of Food in the Last Year: Not on file   Transportation Needs:     Lack of Transportation (Medical): Not on file    Lack of Transportation (Non-Medical):  Not on file   Physical Activity:     Days of Exercise per Week: Not on file    Minutes of Exercise per Session: Not on file   Stress:     Feeling of Stress : Not on file   Social Connections:     Frequency of Communication with Friends and Family: Not on file    Frequency of Social Gatherings with Friends and Family: Not on file    Attends Christianity Services: Not on file    Active Member of [address] [place] [address] [email or web address] or Organizations: Not on file    Attends Club or Organization Meetings: Not on file    Marital Status: Not on file   Intimate Partner Violence:     Fear of Current or Ex-Partner: Not on file    Emotionally Abused: Not on file    Physically Abused: Not on file    Sexually Abused: Not on file   Housing Stability:     Unable to Pay for Housing in the Last Year: Not on file    Number of [place] in the Last Year: Not on file    Unstable Housing in the Last Year: Not on file       SCREENINGS      @FLOW([id number])@      PHYSICAL EXAM    (up to 7 for level 4, 8 or more for level 5)     ED Triage Vitals [01/15/22 1038]   BP Temp Temp Source Pulse Resp SpO2 Height Weight   (!) 184/126 98.2 °F (36.8 °C) Oral 104 20 100 % 5' 9\" (1.753 m) 200 lb (90.7 kg)       Physical Exam  Vitals and nursing note reviewed. Constitutional:       General: He is awake. He is in acute distress. Appearance: Normal appearance. He is well-developed and normal weight. He is not ill-appearing, toxic-appearing or diaphoretic. Comments: No photophobia. No phonophobia. HENT:      Head: Normocephalic and atraumatic. No Jamison's sign. Right Ear: External ear normal.      Left Ear: External ear normal.      Nose: Nose normal. No congestion or rhinorrhea. Mouth/Throat:      Mouth: Mucous membranes are moist.      Pharynx: Oropharynx is clear. No oropharyngeal exudate or posterior oropharyngeal erythema. Eyes:      General: No scleral icterus. Right eye: No foreign body or discharge. Left eye: No discharge. Extraocular Movements: Extraocular movements intact. Conjunctiva/sclera: Conjunctivae normal.      Left eye: No exudate. Pupils: Pupils are equal, round, and reactive to light. Neck:      Thyroid: No thyroid mass. Vascular: Hepatojugular reflux and JVD present. Trachea: No tracheal deviation. Comments: No meningismus. Cardiovascular:      Rate and Rhythm: Regular rhythm. Tachycardia present. No extrasystoles are present. Pulses: Normal pulses. Radial pulses are 2+ on the right side and 2+ on the left side. Heart sounds: Normal heart sounds, S1 normal and S2 normal. Heart sounds not distant. No murmur heard. No friction rub. No gallop. Pulmonary:      Effort: Pulmonary effort is normal. No respiratory distress. Breath sounds: No stridor. Examination of the right-upper field reveals decreased breath sounds. Examination of the left-upper field reveals decreased breath sounds. Examination of the right-middle field reveals decreased breath sounds. Examination of the left-middle field reveals decreased breath sounds.  Examination of the right-lower field reveals rhonchi. Examination of the left-lower field reveals rhonchi. Decreased breath sounds and rhonchi present. No wheezing or rales. Chest:      Chest wall: No tenderness. Abdominal:      General: Abdomen is flat. Bowel sounds are normal. There is no distension. Palpations: Abdomen is soft. There is no mass. Tenderness: There is no abdominal tenderness. There is no right CVA tenderness, left CVA tenderness, guarding or rebound. Hernia: No hernia is present. Musculoskeletal:         General: No swelling, tenderness, deformity or signs of injury. Normal range of motion. Cervical back: Normal range of motion and neck supple. No rigidity. Right lower le+ Pitting Edema present. Left lower le+ Pitting Edema present. Lymphadenopathy:      Head:      Right side of head: No submental adenopathy. Left side of head: No submental adenopathy. Skin:     General: Skin is warm and dry. Capillary Refill: Capillary refill takes less than 2 seconds. Coloration: Skin is not jaundiced or pale. Findings: No bruising, erythema, lesion or rash. Neurological:      General: No focal deficit present. Mental Status: He is alert and oriented to person, place, and time. Mental status is at baseline. Cranial Nerves: No cranial nerve deficit. Sensory: No sensory deficit. Motor: No weakness. Coordination: Coordination normal.      Deep Tendon Reflexes: Reflexes are normal and symmetric. Psychiatric:         Mood and Affect: Mood normal.         Behavior: Behavior normal. Behavior is cooperative. Thought Content: Thought content normal.         Judgment: Judgment normal.         DIAGNOSTIC RESULTS     EKG: All EKG's are interpreted by the Emergency Department Physician who either signs or Co-signsthis chart in the absence of a cardiologist.    EKG: Sinus tachycardia 103 bpm.  Flattened T wave in III.   Prominent P wave consistent with P pulmonale best visualized in inferior leads. Slow R wave transition in precordial leads. LVH voltage best appreciated V3. QT interval is 370 ms. No PVCs. RADIOLOGY:   Non-plain filmimages such as CT, Ultrasound and MRI are read by the radiologist. Plain radiographic images are visualized and preliminarily interpreted by the emergency physician with the below findings:        Interpretation per the Radiologist below, if available at the time ofthis note:    XR CHEST (2 VW)   Final Result   CARDIOMEGALY. INTERSTITIAL EDEMA. LEFT PLEURAL EFFUSION. LEFT LOWER LUNG ATELECTASIS/PNEUMONIA.             ED BEDSIDE ULTRASOUND:   Performed by ED Physician - none    LABS:  Labs Reviewed   PROCALCITONIN - Abnormal; Notable for the following components:       Result Value    Procalcitonin 0.19 (*)     All other components within normal limits    Narrative:     [facility]  ED tel. [phone],  Trop results called to and read back by Dr Bob Horan, 01/15/2022 12:49, by [facility]   CBC WITH AUTO DIFFERENTIAL - Abnormal; Notable for the following components:    RBC 4.12 (*)     Hemoglobin 8.1 (*)     Hematocrit 26.1 (*)     MCV 63.4 (*)     MCH 19.8 (*)     MCHC 31.2 (*)     RDW 19.1 (*)     Lymphocytes Absolute 0.5 (*)     All other components within normal limits   COMPREHENSIVE METABOLIC PANEL - Abnormal; Notable for the following components:    Anion Gap 16 (*)     BUN 61 (*)     CREATININE 4.60 (*)     GFR Non- 13.6 (*)     GFR  16.5 (*)     Alkaline Phosphatase 143 (*)     All other components within normal limits    Narrative:     CALL  [facility]  [facility] tel. [phone],  Trop results called to and read back by Dr Bob Horan, 01/15/2022 12:49, by [facility]   HIGH SENSITIVITY CRP - Abnormal; Notable for the following components:    CRP High Sensitivity 11.3 (*)     All other components within normal limits   PROTIME-INR - Abnormal; Notable for the following components:    Protime 15.1 (*)     All other components within normal limits   TROPONIN - Abnormal; Notable for the following components:    Troponin 0.037 (*)     All other components within normal limits    Narrative:     Larrie Closs tel. [phone],  Trop results called to and read back by Dr Jae June, 01/15/2022 12:49, by [facility]   URINE RT REFLEX TO CULTURE - Abnormal; Notable for the following components:    Blood, Urine TRACE (*)     Protein, UA >=300 (*)     All other components within normal limits   COVID-19, RAPID   APTT   BRAIN NATRIURETIC PEPTIDE    Narrative:     Minor Closs tel. [phone],  Trop results called to and read back by Dr Jae June, 01/15/2022 12:49, by [facility]   CK    Narrative:     Minor Closs tel. [phone],  Trop results called to and read back by Dr Jae June, 01/15/2022 12:49, by [facility]   MAGNESIUM    Narrative:     Minor Closs tel. [phone],  Trop results called to and read back by Dr Jae June, 01/15/2022 12:49, by [facility]   TSH WITHOUT REFLEX    Narrative:     Minor Closs tel. [phone],  Trop results called to and read back by Dr Jae June, 01/15/2022 12:49, by [facility]       All other labs were within normal range or not returned as of this dictation. EMERGENCY DEPARTMENT COURSE and DIFFERENTIAL DIAGNOSIS/MDM:   Vitals:    Vitals:    01/15/22 1038 01/15/22 1145 01/15/22 1200 01/15/22 1358   BP: (!) 184/126 (!) 187/131  (!) 186/121   Pulse: 104 106 107 105   Resp: 20 23 27 16   Temp: 98.2 °F (36.8 °C)      TempSrc: Oral      SpO2: 100% 98% 97% 100%   Weight: 200 lb (90.7 kg)      Height: 5' 9\" (1.753 m)              MDM  IV, O2, cardiac monitor and continuous pulse ox are in place. Patient is hypertensive very took his Coreg. He is on 25 mg 2 times a day. Patient was ordered IV hydralazine for blood pressure reduction. Chest x-ray shows pulmonary edema. Patient was ordered IV Lasix. The patient did not take his IV furosemide.   Patient states he has a history of stage IV renal disease however today it appears that the patient has stage V renal disease and may require dialysis. A cardiology consult was obtained with Dr. Chika Woody. Due to the renal dysfunction he will consult only. The case was discussed with Dr. Patrick Escobar to hospitalist and he is excepted the patient to his service. EKG shows no acute injury pattern. I do not believe the patient to be an AMI. I do not believe thinning this patient's blood is going to be of any benefit to him. I do feel that the patient is in acute pulm edema due to hypertensive heart disease and diuresis and blood pressure management is what will benefit him. Patient's elevated troponin is at or less than his baseline  Case was discussed with the hospitalist, Dr. Dash Ellis who accepted the patient  CRITICAL CARE TIME   Total Critical Care time was 44 minutes, excluding separately reportableprocedures. There was a high probability of clinicallysignificant/life threatening deterioration in the patient's condition which required my urgent intervention. CONSULTS:  IP CONSULT TO HOSPITALIST  IP CONSULT TO CARDIOLOGY    PROCEDURES:  Unless otherwise noted below, none     Procedures    FINAL IMPRESSION      1. Pulmonary edema cardiac cause ([address]    2. Uncontrolled hypertension    3. Chronic renal failure, stage 5 (HCC)    4. Anemia of chronic disease          DISPOSITION/PLAN   DISPOSITION Decision To Admit 01/15/2022 02:04:49 PM      PATIENT REFERRED TO:  No follow-up provider specified.     DISCHARGE MEDICATIONS:  New Prescriptions    No medications on file          (Please note that portions of this note were completed with a voice recognition program.  Efforts were made to edit the dictations but occasionally words are mis-transcribed.)    52 Aysha Ward DO (electronically signed)  Attending Emergency Physician          52 Aysha Ward DO  01/15/22 1011

## 2022-01-16 LAB
ANION GAP SERPL CALCULATED.3IONS-SCNC: 14 MEQ/L (ref 9–15)
BUN BLDV-MCNC: 61 MG/DL (ref 6–20)
CALCIUM SERPL-MCNC: 8.8 MG/DL (ref 8.5–9.9)
CHLORIDE BLD-SCNC: 101 MEQ/L (ref 95–107)
CHOLESTEROL, TOTAL: 144 MG/DL (ref 0–199)
CO2: 22 MEQ/L (ref 20–31)
CREAT SERPL-MCNC: 5.13 MG/DL (ref 0.7–1.2)
GFR AFRICAN AMERICAN: 14.5
GFR NON-AFRICAN AMERICAN: 12
GLUCOSE BLD-MCNC: 100 MG/DL (ref 70–99)
HDLC SERPL-MCNC: 67 MG/DL (ref 40–59)
LDL CHOLESTEROL CALCULATED: 61 MG/DL (ref 0–129)
MAGNESIUM: 2.4 MG/DL (ref 1.7–2.4)
POTASSIUM SERPL-SCNC: 3.4 MEQ/L (ref 3.4–4.9)
SODIUM BLD-SCNC: 137 MEQ/L (ref 135–144)
TRIGL SERPL-MCNC: 79 MG/DL (ref 0–150)

## 2022-01-16 PROCEDURE — 6360000002 HC RX W HCPCS: Performed by: INTERNAL MEDICINE

## 2022-01-16 PROCEDURE — 6370000000 HC RX 637 (ALT 250 FOR IP): Performed by: NURSE PRACTITIONER

## 2022-01-16 PROCEDURE — 99233 SBSQ HOSP IP/OBS HIGH 50: CPT | Performed by: INTERNAL MEDICINE

## 2022-01-16 PROCEDURE — 80061 LIPID PANEL: CPT

## 2022-01-16 PROCEDURE — 36415 COLL VENOUS BLD VENIPUNCTURE: CPT

## 2022-01-16 PROCEDURE — 83735 ASSAY OF MAGNESIUM: CPT

## 2022-01-16 PROCEDURE — 2060000000 HC ICU INTERMEDIATE R&B

## 2022-01-16 PROCEDURE — 6370000000 HC RX 637 (ALT 250 FOR IP): Performed by: FAMILY MEDICINE

## 2022-01-16 PROCEDURE — 2580000003 HC RX 258: Performed by: FAMILY MEDICINE

## 2022-01-16 PROCEDURE — 6370000000 HC RX 637 (ALT 250 FOR IP): Performed by: INTERNAL MEDICINE

## 2022-01-16 PROCEDURE — 80048 BASIC METABOLIC PNL TOTAL CA: CPT

## 2022-01-16 PROCEDURE — 6360000002 HC RX W HCPCS: Performed by: FAMILY MEDICINE

## 2022-01-16 RX ORDER — HYDROCODONE BITARTRATE AND ACETAMINOPHEN 5; 325 MG/1; MG/1
1 TABLET ORAL ONCE
Status: COMPLETED | OUTPATIENT
Start: 2022-01-16 | End: 2022-01-16

## 2022-01-16 RX ADMIN — FUROSEMIDE 40 MG: 10 INJECTION, SOLUTION INTRAMUSCULAR; INTRAVENOUS at 09:07

## 2022-01-16 RX ADMIN — Medication 650 MG: at 05:12

## 2022-01-16 RX ADMIN — HYDROCODONE BITARTRATE AND ACETAMINOPHEN 1 TABLET: 5; 325 TABLET ORAL at 09:19

## 2022-01-16 RX ADMIN — CARVEDILOL 12.5 MG: 12.5 TABLET, FILM COATED ORAL at 17:57

## 2022-01-16 RX ADMIN — ISOSORBIDE MONONITRATE 120 MG: 120 TABLET ORAL at 09:17

## 2022-01-16 RX ADMIN — Medication 650 MG: at 18:32

## 2022-01-16 RX ADMIN — TORSEMIDE 20 MG: 20 TABLET ORAL at 09:18

## 2022-01-16 RX ADMIN — METOLAZONE 10 MG: 5 TABLET ORAL at 09:19

## 2022-01-16 RX ADMIN — HEPARIN SODIUM 5000 UNITS: 5000 INJECTION INTRAVENOUS; SUBCUTANEOUS at 21:01

## 2022-01-16 RX ADMIN — HYDRALAZINE HYDROCHLORIDE 50 MG: 50 TABLET, FILM COATED ORAL at 06:24

## 2022-01-16 RX ADMIN — Medication 10 ML: at 09:09

## 2022-01-16 RX ADMIN — CARVEDILOL 12.5 MG: 12.5 TABLET, FILM COATED ORAL at 09:16

## 2022-01-16 RX ADMIN — FUROSEMIDE 40 MG: 10 INJECTION, SOLUTION INTRAMUSCULAR; INTRAVENOUS at 17:57

## 2022-01-16 RX ADMIN — Medication 650 MG: at 12:25

## 2022-01-16 RX ADMIN — HYDRALAZINE HYDROCHLORIDE 50 MG: 50 TABLET, FILM COATED ORAL at 14:32

## 2022-01-16 RX ADMIN — HEPARIN SODIUM 5000 UNITS: 5000 INJECTION INTRAVENOUS; SUBCUTANEOUS at 14:32

## 2022-01-16 RX ADMIN — HYDROMORPHONE HYDROCHLORIDE 1 MG: 1 INJECTION, SOLUTION INTRAMUSCULAR; INTRAVENOUS; SUBCUTANEOUS at 18:56

## 2022-01-16 RX ADMIN — HEPARIN SODIUM 5000 UNITS: 5000 INJECTION INTRAVENOUS; SUBCUTANEOUS at 06:29

## 2022-01-16 RX ADMIN — HYDROMORPHONE HYDROCHLORIDE 0.5 MG: 1 INJECTION, SOLUTION INTRAMUSCULAR; INTRAVENOUS; SUBCUTANEOUS at 20:09

## 2022-01-16 RX ADMIN — HYDRALAZINE HYDROCHLORIDE 50 MG: 50 TABLET, FILM COATED ORAL at 21:02

## 2022-01-16 RX ADMIN — Medication 10 ML: at 20:09

## 2022-01-16 ASSESSMENT — ENCOUNTER SYMPTOMS
WHEEZING: 0
DIARRHEA: 0
VOMITING: 0
CONSTIPATION: 0
RHINORRHEA: 0
SHORTNESS OF BREATH: 1
COUGH: 0
COLOR CHANGE: 0
ABDOMINAL PAIN: 0
APNEA: 0
NAUSEA: 0
CHEST TIGHTNESS: 0
EYE REDNESS: 0

## 2022-01-16 ASSESSMENT — PAIN SCALES - GENERAL
PAINLEVEL_OUTOF10: 5
PAINLEVEL_OUTOF10: 10
PAINLEVEL_OUTOF10: 7
PAINLEVEL_OUTOF10: 0
PAINLEVEL_OUTOF10: 9
PAINLEVEL_OUTOF10: 8
PAINLEVEL_OUTOF10: 10
PAINLEVEL_OUTOF10: 8
PAINLEVEL_OUTOF10: 2

## 2022-01-16 NOTE — FLOWSHEET NOTE
@6671 patient yelling out in pain stated that it feels as if something is breaking/tearing in his RLE rates pain 8-9/10 ROM WFL attempting to ambulate encouraged to rest in bed denies chest pain denies SOB Dr Dash Ellis made aware new orders given for dilaudid x1 with effectiveness patient expressing relief of pain also ordered ultrasound doppler of RLE 2+ pitting edema noted to BLE will give report to oncoming nurse patient resting in bed at this time with eyes closed no s/s distress noted

## 2022-01-16 NOTE — FLOWSHEET NOTE
Patient arrived to unit from ER @0101 verbal report received from [facility] prior to arrival patient a&ox4 vss denies pain or discomfort resp even/unlbored on RA orientated to unit/room/environment call light within reach no s/s distress noted head to toe assessment completed

## 2022-01-16 NOTE — ED NOTES
Second attempt made to call report to 1W. Nurse unavailable and will return phone call to ED Zone 2.      Jimi Mello RN  01/16/22 8371

## 2022-01-16 NOTE — PROGRESS NOTES
Cardiology Progress Note  Patient: Rocael Michael  Unit/Bed: W364/E282-93  YOB: 1970  MRN: [id number]  Acct: [de-identified]   Admitting Diagnosis: Acute systolic heart failure (HCC) [I50.21]  Pulmonary edema cardiac cause (HCC) [I50.1]  Anemia of chronic disease [D63.8]  Uncontrolled hypertension [I10]  Chronic renal failure, stage 5 ([address] [N18.5]  Date:  1/15/2022  Hospital Day: 1      Chief Complaint:  Shortness of breath    Recent of this consult:  Evaluation of heart failure    History of Present Illness:  22-year-old male -American well-known to our service as patient follows in our cardiology clinic with Dr. Jude Luna with history of nonischemic cardiomyopathy EF 35 to 40% by TTE on 4/22/2021, hypertension, tobacco use, CKD, alcohol abuse and history of noncompliance who came to the hospital for shortness of breath    Patient reports that shortness of breath started today  Denies chest pain shortness of breath  Denies changing diet  States that he is compliant with all his medications    EKG showing sinus tachycardia without active signs of ischemia  CKD worsening at 4.6 up from 3.85 August 2021  Troponins 0.037 which have been chronically detectable at level  BNP 25,000 up from 16,000 on August 2021  Chest x-ray showing bilateral pulmonary congestion    Latest coronary angiogram 11/4/2020   showing normal coronaries    Latest transthoracic echocardiogram 4/22/2021 EF 35 to 40%    Of note patient had an admission on 7/14/2021 for hypertension urgency and decompensated heart failure due to medication noncompliance    1/16/22  Shortness of breath improving  Patient was net -3.7 L since admission      Allergies   Allergen Reactions    Lipitor [Atorvastatin] Other (See Comments)     Coughing     Lisinopril        Current Facility-Administered Medications   Medication Dose Route Frequency Provider Last Rate Last Admin    sodium chloride flush 0.9 % injection 5-40 mL  5-40 mL IntraVENous 2 times per day Oziel Cox MD   10 mL at 01/16/22 0909    sodium chloride flush 0.9 % injection 5-40 mL  5-40 mL IntraVENous PRN Oziel Cox MD        0.9 % sodium chloride infusion  25 mL IntraVENous PRN Oziel Cox MD        ondansetron (ZOFRAN-ODT) disintegrating tablet 4 mg  4 mg Oral Q8H PRN Oziel Cox MD        Or    ondansetron [facility]) injection 4 mg  4 mg IntraVENous Q6H PRN Oziel Cox MD        polyethylene glycol [facility]) packet 17 g  17 g Oral Daily PRN Oziel Cox MD        acetaminophen (TYLENOL) tablet 650 mg  650 mg Oral Q6H PRN Oziel Cox MD   650 mg at 01/16/22 1225    Or    acetaminophen (TYLENOL) suppository 650 mg  650 mg Rectal Q6H PRN Oziel Cox MD        heparin (porcine) injection 5,000 Units  5,000 Units SubCUTAneous 3 times per day Oziel Cox MD   5,000 Units at 01/16/22 0629    furosemide (LASIX) injection 40 mg  40 mg IntraVENous BID Oziel Cox MD   40 mg at 01/16/22 4851    torsemide (DEMADEX) tablet 20 mg  20 mg Oral Daily Ida Lerma MD   20 mg at 01/16/22 8321    metOLazone (ZAROXOLYN) tablet 10 mg  10 mg Oral Daily Ida Lerma MD   10 mg at 01/16/22 0919    isosorbide mononitrate (IMDUR) extended release tablet 120 mg  120 mg Oral Daily Ida Lerma MD   120 mg at 01/16/22 0734    hydrALAZINE (APRESOLINE) tablet 50 mg  50 mg Oral 3 times per day Ida Lerma MD   50 mg at 01/16/22 9673    carvedilol (COREG) tablet 12.5 mg  12.5 mg Oral BID Ida Lerma MD   12.5 mg at 01/16/22 5615    magnesium oxide (MAG-OX) tablet 400 mg  400 mg Oral Daily Zbigniew Valderrama DO   400 mg at 01/15/22 1756       PMHx:  Past Medical History:   Diagnosis Date    Abnormal EKG 9/27/2019    Acute kidney injury (BLUE) with acute tubular necrosis (ATN) [facility]     Cardiomyopathy ([facility] [facility]     per echo 8/2019    Chronic combined systolic and diastolic CHF (congestive heart failure) ([facility] [address] 9/27/2019    ETOH abuse     Hypertension     Tobacco abuse        PSHx:  Past Surgical History:   Procedure Laterality Date    HERNIA REPAIR Right 2/10/2021    RIGHT INGUINAL HERNIA REPAIR WITH MESH performed by Annie Guerrero MD at [place] [address] Hx:  Social History     Socioeconomic History    Marital status: Single     Spouse name: None    Number of children: None    Years of education: None    Highest education level: None   Occupational History    None   Tobacco Use    Smoking status: Current Every Day Smoker     Packs/day: 1.00     Types: Cigarettes     Last attempt to quit: 12/3/2020     Years since quittin.1    Smokeless tobacco: Never Used    Tobacco comment: currently smoking only couple cigarettes daily   Substance and Sexual Activity    Alcohol use: Yes    Drug use: Never    Sexual activity: None   Other Topics Concern    None   Social History Narrative    None     Social Determinants of Health     Financial Resource Strain:     Difficulty of Paying Living Expenses: Not on file   Food Insecurity:     Worried About Running Out of Food in the Last Year: Not on file    Ginette of Food in the Last Year: Not on file   Transportation Needs:     Lack of Transportation (Medical): Not on file    Lack of Transportation (Non-Medical):  Not on file   Physical Activity:     Days of Exercise per Week: Not on file    Minutes of Exercise per Session: Not on file   Stress:     Feeling of Stress : Not on file   Social Connections:     Frequency of Communication with Friends and Family: Not on file    Frequency of Social Gatherings with Friends and Family: Not on file    Attends Zoroastrianism Services: Not on file    Active Member of Clubs or Organizations: Not on file    Attends Club or Organization Meetings: Not on file    Marital Status: Not on file   Intimate Partner Violence:     Fear of Current or Ex-Partner: Not on file    Emotionally Abused: Not on file    Physically Abused: Not on file   Lluvia Urias Sexually Abused: Not on file   Housing Stability:     Unable to Pay for Housing in the Last Year: Not on file    Number of Places Lived in the Last Year: Not on file    Unstable Housing in the Last Year: Not on file       Family Hx:  Family History   Problem Relation Age of Onset    Coronary Art Dis Mother        Review of Systems:   Review of Systems   Constitutional: Negative for activity change, chills, diaphoresis and fever. HENT: Negative for congestion, ear pain, nosebleeds and rhinorrhea. Eyes: Negative for redness and visual disturbance. Respiratory: Positive for shortness of breath. Negative for apnea, cough, chest tightness and wheezing. Cardiovascular: Negative for chest pain, palpitations and leg swelling. Gastrointestinal: Negative for abdominal pain, constipation, diarrhea, nausea and vomiting. Genitourinary: Negative for difficulty urinating and dysuria. Musculoskeletal: Negative. Negative for joint swelling. Skin: Negative for color change, rash and wound. Neurological: Negative for dizziness, syncope, weakness, numbness and headaches. Psychiatric/Behavioral: Negative. Physical Examination:    BP (!) 146/80   Pulse 95   Temp 98.2 °F (36.8 °C) (Oral)   Resp 20   Ht 5' 9\" (1.753 m)   Wt 200 lb (90.7 kg)   SpO2 98%   BMI 29.53 kg/m²    Physical Exam  Vitals and nursing note reviewed. Constitutional:       Appearance: Normal appearance. HENT:      Head: Normocephalic and atraumatic. Mouth/Throat:      Mouth: Mucous membranes are moist.      Pharynx: Oropharynx is clear. Eyes:      Extraocular Movements: Extraocular movements intact. Conjunctiva/sclera: Conjunctivae normal.      Pupils: Pupils are equal, round, and reactive to light. Cardiovascular:      Rate and Rhythm: Regular rhythm. Tachycardia present. Pulses: Normal pulses. Heart sounds: Normal heart sounds.    Pulmonary:      Effort: Pulmonary effort is normal.      Breath sounds: Rales present. Abdominal:      General: Abdomen is flat. Bowel sounds are normal.      Palpations: Abdomen is soft. Musculoskeletal:         General: Normal range of motion. Cervical back: Normal range of motion and neck supple. Right lower leg: Edema present. Skin:     General: Skin is warm. Neurological:      General: No focal deficit present. Mental Status: He is alert and oriented to person, place, and time. Mental status is at baseline.    Psychiatric:         Mood and Affect: Mood normal.         LABS:  CBC:  Lab Results   Component Value Date    WBC 5.6 01/15/2022    RBC 4.12 01/15/2022    HGB 8.1 01/15/2022    HCT 26.1 01/15/2022    MCV 63.4 01/15/2022    MCH 19.8 01/15/2022    MCHC 31.2 01/15/2022    RDW 19.1 01/15/2022     01/15/2022     CBC with Differential:   Lab Results   Component Value Date    WBC 5.6 01/15/2022    RBC 4.12 01/15/2022    HGB 8.1 01/15/2022    HCT 26.1 01/15/2022     01/15/2022    MCV 63.4 01/15/2022    MCH 19.8 01/15/2022    MCHC 31.2 01/15/2022    RDW 19.1 01/15/2022    LYMPHOPCT 8.2 01/15/2022    MONOPCT 9.2 01/15/2022    BASOPCT 0.9 01/15/2022    MONOSABS 0.5 01/15/2022    LYMPHSABS 0.5 01/15/2022    EOSABS 0.1 01/15/2022    BASOSABS 0.1 01/15/2022     CMP:    Lab Results   Component Value Date     01/16/2022    K 3.4 01/16/2022     01/16/2022    CO2 22 01/16/2022    BUN 61 01/16/2022    CREATININE 5.13 01/16/2022    GFRAA 14.5 01/16/2022    LABGLOM 12.0 01/16/2022    GLUCOSE 100 01/16/2022    PROT 6.7 01/15/2022    LABALBU 3.6 01/15/2022    CALCIUM 8.8 01/16/2022    BILITOT 0.5 01/15/2022    ALKPHOS 143 01/15/2022    AST 24 01/15/2022    ALT 37 01/15/2022     BMP:    Lab Results   Component Value Date     01/16/2022    K 3.4 01/16/2022     01/16/2022    CO2 22 01/16/2022    BUN 61 01/16/2022    LABALBU 3.6 01/15/2022    CREATININE 5.13 01/16/2022    CALCIUM 8.8 01/16/2022    GFRAA 14.5 01/16/2022    LABGLOM 12.0 01/16/2022 GLUCOSE 100 01/16/2022     Magnesium:    Lab Results   Component Value Date    MG 2.4 01/16/2022     Troponin:    Lab Results   Component Value Date    TROPONINI 0.037 01/15/2022       Radiology:  XR CHEST (2 VW)    Result Date: 1/15/2022  EXAMINATION: XR CHEST (2 VW) CLINICAL HISTORY: TIGHTNESS OF BREATH. UNCONTROLLED HYPERTENSION. COMPARISONS: CHEST RADIOGRAPH AUGUST 28, 2021 FINDINGS: Osseous structures intact. Cardiopericardial silhouette enlarged and unchanged. Pulmonary vasculature indistinct. Ill-defined area of increased opacity obscures left lung base, with blunting left costophrenic angle. CARDIOMEGALY. INTERSTITIAL EDEMA. LEFT PLEURAL EFFUSION. LEFT LOWER LUNG ATELECTASIS/PNEUMONIA.       EKG: Sinus tachycardia without active signs of ischemia      Assessment:    Active Hospital Problems    Diagnosis Date Noted    Acute systolic heart failure ([facility] [address] [I50.21] 01/15/2022     Priority: Low     Acute on chronic heart failure with reduced ejection fraction EF 35 to 40% by TTE 2021. Patient admitted with a chest x-ray showing bilateral pulmonary congestion, BNP 25,000, patient with lower extremity edema. Reason of this heart failure decompensation was related to the fact that patient was skipping doses of diuretic secondary to leg cramping  CKD. Creatinine worsening currently at 4.6 up from 3.85 on 8/28/2021  History of hypertension  History of medication noncompliance  Latest coronary angiogram 11/4/2020   showing normal coronaries  Latest transthoracic echocardiogram 4/22/2021 EF 35 to 40%      Plan:  Continue telemetry  Continue diuresis with torsemide, metolazone goal in 24 hours at least 2 to 3 L negative  Continue Coreg 25 mg twice daily  Continue hydralazine and Imdur for LV dysfunction.   No ACE or ARB secondary to CKD  Strict ins and outs and daily weights  Please keep potassium between 4 and 5 and magnesium above 2  Fluid restriction to no more than 1 L/day  Follow-up echocardiogram  Patient should be considered for ICD placement if EF still reduced  Follow-up renal recommendations  We will continue to follow      Electronically signed by Attila Alvarez MD on 1/16/2022 at 12:52 PM

## 2022-01-16 NOTE — ED NOTES
Attempt made to call report to 1W.   Nurse unavailable and will return call to ED Zone 2     Gemma Melvin RN  01/16/22 5221

## 2022-01-16 NOTE — H&P
Hospital Medicine  History and Physical    Patient:  Alexus Metcalf  MRN: [id number]    CHIEF COMPLAINT:    Chief Complaint   Patient presents with    Hypertension     sent by PCP for high blood pressure       History Obtained From:  patient  Primary Care Physician: Anat Fletcher MD    HISTORY OF PRESENT ILLNESS:   The patient is a 54 y.o. male who presents with a several day history of shortness of breath with bilateral leg swelling. He has a known hx of severe systolic heart failure. He noted sob progressively worsened until arrival.  He denies cp, n/v.    Past Medical History:      Diagnosis Date    Abnormal EKG 9/27/2019    Acute kidney injury (BLUE) with acute tubular necrosis (ATN) (HCC)     Cardiomy[facility] [address]     per echo 8/2019    Chronic combined systolic and diastolic CHF (congestive heart failure) ([facility] [address] 9/27/2019    ETOH abuse     Hypertension     Tobacco abuse        Past Surgical History:      Procedure Laterality Date    HERNIA REPAIR Right 2/10/2021    RIGHT INGUINAL HERNIA REPAIR WITH MESH [facility] by Amador Kumar MD [facility]       Medications Prior to Admission:    Prior to Admission medications    Medication Sig Start Date End Date Taking?  Authorizing Provider   torsemide (DEMADEX) 20 MG tablet Take 2 tablets by mouth daily 7/14/21 9/12/21  Chad Arreaga MD   ivabradine (CORLANOR) 5 MG TABS tablet Take 1 tablet by mouth 2 times daily (with meals) 5/6/21   MICAH Burnett   carvedilol (COREG) 25 MG tablet Take 1 tablet by mouth 2 times daily HOLD for SBP<100 or HR<60 1/19/21   MICAH Burnett   isosorbide mononitrate (IMDUR) 120 MG extended release tablet Take 1 tablet by mouth daily 12/1/20   MICAH Burnett   hydrALAZINE (APRESOLINE) 100 MG tablet Take 1 tablet by mouth 3 times daily 11/17/20   MICAH Burnett   nicotine (NICODERM CQ) 21 MG/24HR Place 1 patch onto the skin daily  Patient not taking: Reported on 7/16/2021 11/8/20   Alek MAYBERRY Holiday, DO   aspirin 81 MG EC tablet Take 1 tablet by mouth daily 8/29/19   Katie Rodriguez, DO       Allergies:  Lipitor [atorvastatin] and Lisinopril    Social History:   TOBACCO:   reports that he has been smoking cigarettes. He has been smoking about 1.00 pack per day. He has never used smokeless tobacco.  ETOH:   reports current alcohol use. Family History:       Problem Relation Age of Onset    Coronary Art Dis Mother        REVIEW OF SYSTEMS:  Ten systems reviewed and negative except for as above. Physical Exam:    Vitals: BP (!) 138/98   Pulse 94   Temp 98.2 °F (36.8 °C) (Oral)   Resp 18   Ht 5' 9\" (1.753 m)   Wt 200 lb (90.7 kg)   SpO2 100%   BMI 29.53 kg/m²   Constitutional: alert, appears stated age and cooperative  Skin: Skin color, texture, turgor normal. No rashes or lesions  Eyes:Eye: Normal external eye, conjunctiva, JASMEET. ENT: Head: Normocephalic, no lesions, without obvious abnormality. Neck: no adenopathy, no carotid bruit, no JVD, supple, symmetrical, trachea midline and thyroid not enlarged, symmetric, no tenderness/mass/nodules  Respiratory: rales throughout  Cardiovascular: regular rate and rhythm, S1, S2 normal, no murmur, click, rub or gallop  Gastrointestinal: soft, non-tender; bowel sounds normal; no masses,  no organomegaly  Genitourinary: Deferred  Musculoskeletal:extremities normal, atraumatic, no cyanosis, 2+ BLE pitting edema  Neurologic: Mental status AAOx3 No facial asymmetry or droop. Normal muscle strength b/l. CN II-XII grossly intact.       Recent Labs     01/15/22  1103   WBC 5.6   HGB 8.1*        Recent Labs     01/15/22  1100 01/16/22  0524    137   K 3.6 3.4    101   CO2 21 22   BUN 61* 61*   CREATININE 4.60* 5.13*   GLUCOSE 96 100*   AST 24  --    ALT 37  --    BILITOT 0.5  --    ALKPHOS 143*  --      Troponin T:   Recent Labs     01/15/22  1100   TROPONINI 0.037*     INR:   Recent Labs     01/15/22  1100   INR 1.2 URINALYSIS:  Recent Labs     01/15/22  1115   COLORU Yellow   PHUR 6.5   WBCUA 0-2   RBCUA 0-2   BACTERIA Negative   CLARITYU Clear   SPECGRAV 1.011   LEUKOCYTESUR Negative   UROBILINOGEN 1.0   BILIRUBINUR Negative   BLOODU TRACE*   GLUCOSEU Negative     -----------------------------------------------------------------   XR CHEST (2 VW)    Result Date: 1/15/2022  EXAMINATION: XR CHEST (2 VW) CLINICAL HISTORY: TIGHTNESS OF BREATH. UNCONTROLLED HYPERTENSION. COMPARISONS: CHEST RADIOGRAPH AUGUST 28, 2021 FINDINGS: Osseous structures intact. Cardiopericardial silhouette enlarged and unchanged. Pulmonary vasculature indistinct. Ill-defined area of increased opacity obscures left lung base, with blunting left costophrenic angle. CARDIOMEGALY. INTERSTITIAL EDEMA. LEFT PLEURAL EFFUSION. LEFT LOWER LUNG ATELECTASIS/PNEUMONIA. Assessment and Plan   1. Acute systolic heart failure exacerbation  1. IV lasix, cardiology consult, echo  2. Acute hypoxic respiratory failure  1. Supportive o2, wean as able  3. Hx HTN  4.  DVT proph    Patient Active Problem List   Diagnosis Code    Hypertensive urgency I16.0    Cardiomyopathy ([address] O79.1    Systolic and diastolic CHF, acute ([facility]) I50.41    BLUE (acute kidney injury) ([address] N17.9    Abnormal EKG R94.31    Chronic combined systolic and diastolic CHF (congestive heart failure) ([facility]) I50.42    Acute decompensated heart failure ([facility]) I50.9    Chest pain R07.9    Pulmonary edema, acute ([facility]) J81.0    NSTEMI (non-ST elevated myocardial infarction) ([address] I21.4    Inguinal hernia of right side without obstruction or gangrene K40.90    CHF (congestive heart failure), NYHA class I, acute on chronic, combined ([address] M42.15    Systolic congestive heart failure ([address] Q36.05    Acute systolic heart failure (HCC) Jodi Arce MD, MD  Admitting Hospitalist    Emergency Contact:

## 2022-01-16 NOTE — ED NOTES
Lab called nursing to report iron collected was in incorrect tube. Advised lab patient is admitted but in a hold bed until floor bed is available. Lab will come to zone2 to draw missing labs.      Spencer Houston RN  01/16/22 4955

## 2022-01-17 ENCOUNTER — APPOINTMENT (OUTPATIENT)
Dept: ULTRASOUND IMAGING | Age: 52
DRG: 179 | End: 2022-01-17
Payer: MEDICAID

## 2022-01-17 LAB
ANION GAP SERPL CALCULATED.3IONS-SCNC: 21 MEQ/L (ref 9–15)
BUN BLDV-MCNC: 59 MG/DL (ref 6–20)
CALCIUM SERPL-MCNC: 8.7 MG/DL (ref 8.5–9.9)
CHLORIDE BLD-SCNC: 93 MEQ/L (ref 95–107)
CO2: 22 MEQ/L (ref 20–31)
CREAT SERPL-MCNC: 5.47 MG/DL (ref 0.7–1.2)
EKG ATRIAL RATE: 103 BPM
EKG P AXIS: 58 DEGREES
EKG P-R INTERVAL: 188 MS
EKG Q-T INTERVAL: 370 MS
EKG QRS DURATION: 88 MS
EKG QTC CALCULATION (BAZETT): 484 MS
EKG R AXIS: -20 DEGREES
EKG T AXIS: 64 DEGREES
EKG VENTRICULAR RATE: 103 BPM
GFR AFRICAN AMERICAN: 13.5
GFR NON-AFRICAN AMERICAN: 11.1
GLUCOSE BLD-MCNC: 135 MG/DL (ref 70–99)
MAGNESIUM: 2.3 MG/DL (ref 1.7–2.4)
POTASSIUM SERPL-SCNC: 2.9 MEQ/L (ref 3.4–4.9)
POTASSIUM SERPL-SCNC: 3.8 MEQ/L (ref 3.4–4.9)
SODIUM BLD-SCNC: 136 MEQ/L (ref 135–144)

## 2022-01-17 PROCEDURE — 6370000000 HC RX 637 (ALT 250 FOR IP): Performed by: INTERNAL MEDICINE

## 2022-01-17 PROCEDURE — 99233 SBSQ HOSP IP/OBS HIGH 50: CPT | Performed by: INTERNAL MEDICINE

## 2022-01-17 PROCEDURE — 83735 ASSAY OF MAGNESIUM: CPT

## 2022-01-17 PROCEDURE — 93971 EXTREMITY STUDY: CPT

## 2022-01-17 PROCEDURE — 6360000002 HC RX W HCPCS: Performed by: FAMILY MEDICINE

## 2022-01-17 PROCEDURE — 6370000000 HC RX 637 (ALT 250 FOR IP): Performed by: STUDENT IN AN ORGANIZED HEALTH CARE EDUCATION/TRAINING PROGRAM

## 2022-01-17 PROCEDURE — 93010 ELECTROCARDIOGRAM REPORT: CPT | Performed by: INTERNAL MEDICINE

## 2022-01-17 PROCEDURE — 2060000000 HC ICU INTERMEDIATE R&B

## 2022-01-17 PROCEDURE — 6370000000 HC RX 637 (ALT 250 FOR IP): Performed by: FAMILY MEDICINE

## 2022-01-17 PROCEDURE — 6360000002 HC RX W HCPCS: Performed by: INTERNAL MEDICINE

## 2022-01-17 PROCEDURE — 80048 BASIC METABOLIC PNL TOTAL CA: CPT

## 2022-01-17 PROCEDURE — 36415 COLL VENOUS BLD VENIPUNCTURE: CPT

## 2022-01-17 PROCEDURE — 84132 ASSAY OF SERUM POTASSIUM: CPT

## 2022-01-17 PROCEDURE — 2580000003 HC RX 258: Performed by: FAMILY MEDICINE

## 2022-01-17 RX ORDER — POTASSIUM CHLORIDE 7.45 MG/ML
10 INJECTION INTRAVENOUS PRN
Status: DISCONTINUED | OUTPATIENT
Start: 2022-01-17 | End: 2022-01-22 | Stop reason: HOSPADM

## 2022-01-17 RX ORDER — POTASSIUM CHLORIDE 20 MEQ/1
40 TABLET, EXTENDED RELEASE ORAL PRN
Status: DISCONTINUED | OUTPATIENT
Start: 2022-01-17 | End: 2022-01-22 | Stop reason: HOSPADM

## 2022-01-17 RX ORDER — POTASSIUM CHLORIDE 20 MEQ/1
40 TABLET, EXTENDED RELEASE ORAL ONCE
Status: COMPLETED | OUTPATIENT
Start: 2022-01-17 | End: 2022-01-17

## 2022-01-17 RX ORDER — POTASSIUM BICARBONATE 25 MEQ/1
50 TABLET, EFFERVESCENT ORAL PRN
Status: DISCONTINUED | OUTPATIENT
Start: 2022-01-17 | End: 2022-01-22 | Stop reason: HOSPADM

## 2022-01-17 RX ADMIN — Medication 650 MG: at 05:00

## 2022-01-17 RX ADMIN — TORSEMIDE 20 MG: 20 TABLET ORAL at 10:21

## 2022-01-17 RX ADMIN — METOLAZONE 10 MG: 5 TABLET ORAL at 10:20

## 2022-01-17 RX ADMIN — Medication 10 ML: at 10:40

## 2022-01-17 RX ADMIN — CARVEDILOL 12.5 MG: 12.5 TABLET, FILM COATED ORAL at 10:21

## 2022-01-17 RX ADMIN — FUROSEMIDE 40 MG: 10 INJECTION, SOLUTION INTRAMUSCULAR; INTRAVENOUS at 10:21

## 2022-01-17 RX ADMIN — POTASSIUM CHLORIDE 10 MEQ: 7.46 INJECTION, SOLUTION INTRAVENOUS at 12:19

## 2022-01-17 RX ADMIN — HEPARIN SODIUM 5000 UNITS: 5000 INJECTION INTRAVENOUS; SUBCUTANEOUS at 21:01

## 2022-01-17 RX ADMIN — Medication 400 MG: at 10:20

## 2022-01-17 RX ADMIN — HYDRALAZINE HYDROCHLORIDE 50 MG: 50 TABLET, FILM COATED ORAL at 05:00

## 2022-01-17 RX ADMIN — HEPARIN SODIUM 5000 UNITS: 5000 INJECTION INTRAVENOUS; SUBCUTANEOUS at 16:09

## 2022-01-17 RX ADMIN — ISOSORBIDE MONONITRATE 120 MG: 120 TABLET ORAL at 10:31

## 2022-01-17 RX ADMIN — CARVEDILOL 12.5 MG: 12.5 TABLET, FILM COATED ORAL at 16:09

## 2022-01-17 RX ADMIN — Medication 10 ML: at 21:01

## 2022-01-17 RX ADMIN — POTASSIUM CHLORIDE 10 MEQ: 7.46 INJECTION, SOLUTION INTRAVENOUS at 10:22

## 2022-01-17 RX ADMIN — POTASSIUM CHLORIDE 40 MEQ: 1500 TABLET, EXTENDED RELEASE ORAL at 12:04

## 2022-01-17 ASSESSMENT — ENCOUNTER SYMPTOMS
WHEEZING: 0
DIARRHEA: 0
COUGH: 0
CHEST TIGHTNESS: 0
COLOR CHANGE: 0
CONSTIPATION: 0
NAUSEA: 0
ABDOMINAL PAIN: 0
APNEA: 0
EYE REDNESS: 0
VOMITING: 0
RHINORRHEA: 0
SHORTNESS OF BREATH: 1

## 2022-01-17 ASSESSMENT — PAIN SCALES - GENERAL
PAINLEVEL_OUTOF10: 7
PAINLEVEL_OUTOF10: 10

## 2022-01-17 NOTE — CARE COORDINATION
Definition of CHF discussed with patient. I have seen him in the past.  Symptoms of heart failure and decompensation reviewed. Weight gain of >3 #, edema, difficulty breathing, cough, issues with appetite, fatigue, or difficulty with sleep. Causes of CHF reviewed. CAD, MI, HTN, valve dz., infection,  ETOH or drug abuse, or genetic problems. Importance of daily weight and B/P monitoring discussed. Pt to use a calender or notebook to record daily weight and call physician immediately with 3# weight gain. Low sodium diet and fluid intake discussed. Pt taught about a fluid restriction and advised to discuss this with the cardiologist prior to limiting oral intake. Shown how to read labels for sodium levels, recommended food list provided. He reports eating ramen noodles. Informed of high sodium content. Importance of following their physician's orders for medication administration stressed. Importance of flu and pneumonia vaccinations reinforced. Common CHF medications reviewed as well as avoiding certain other meds (decongestants, NSAIDS)  Instructed to discuss activity recommendations with physician. Pt. denies smoking. Sample CHF weight documentation form provided. CHF Zones discussed. Importance of staying in \"green\" area stressed. Pt verbalized understanding to call MD ASAP when he reaches the yellow zone, and to call 911 when reaching the red zone. Booklet and zone pamphlet provided to the pt. Patient denies any further questions at this time.    Electronically signed by Catherine Lindsay RN on 1/17/2022 at 4:25 PM

## 2022-01-17 NOTE — PROGRESS NOTES
Hospitalist Progress Note      PCP: Sonny Bolden MD    Date of Admission: 1/15/2022    Chief Complaint:    Chief Complaint   Patient presents with    Hypertension     sent by PCP for high blood pressure     Subjective:  Patients breathing is started to feel better; feels 50% of his normal.  12 point ROS negative other than mentioned above     Medications:  Reviewed    Infusion Medications    sodium chloride       Scheduled Medications    sodium chloride flush  5-40 mL IntraVENous 2 times per day    heparin (porcine)  5,000 Units SubCUTAneous 3 times per day    torsemide  20 mg Oral Daily    metOLazone  10 mg Oral Daily    isosorbide mononitrate  120 mg Oral Daily    hydrALAZINE  50 mg Oral 3 times per day    carvedilol  12.5 mg Oral BID    magnesium oxide  400 mg Oral Daily     PRN Meds: potassium chloride **OR** potassium alternative oral replacement **OR** potassium chloride, sodium chloride flush, sodium chloride, ondansetron **OR** ondansetron, polyethylene glycol, acetaminophen **OR** acetaminophen      Intake/Output Summary (Last 24 hours) at 1/17/2022 1455  Last data filed at 1/17/2022 1210  Gross per 24 hour   Intake 240 ml   Output 5350 ml   Net -5110 ml     Exam:    BP (!) 125/92   Pulse 88   Temp 98.2 °F (36.8 °C) (Oral)   Resp 18   Ht 5' 9\" (1.753 m)   Wt 200 lb (90.7 kg)   SpO2 94%   BMI 29.53 kg/m²     General appearance: No apparent distress, appears stated age and cooperative. HEENT: Conjunctivae/corneas clear. Neck: Trachea midline. Respiratory:  Normal respiratory effort. Clear to auscultation  Cardiovascular: Regular rate and rhythm  Abdomen: Soft, non-tender, non-distended with normal bowel sounds. Musculoskeletal: No clubbing, cyanosis or edema bilaterally.    Neuro: Non Focal.   Capillary Refill: Brisk,< 3 seconds   Peripheral Pulses: +2 palpable, equal bilaterally     Labs:   Recent Labs     01/15/22  1103   WBC 5.6   HGB 8.1*   HCT 26.1*        Recent Labs 01/15/22  1100 01/16/22  0524 01/17/22  0531    137 136   K 3.6 3.4 2.9*    101 93*   CO2 21 22 22   BUN 61* 61* 59*   CREATININE 4.60* 5.13* 5.47*   CALCIUM 8.8 8.8 8.7     Recent Labs     01/15/22  1100   AST 24   ALT 37   BILITOT 0.5   ALKPHOS 143*     Recent Labs     01/15/22  1100   INR 1.2     Recent Labs     01/15/22  1100   CKTOTAL 164   TROPONINI 0.037*     Urinalysis:      Lab Results   Component Value Date    NITRU Negative 01/15/2022    WBCUA 0-2 01/15/2022    BACTERIA Negative 01/15/2022    RBCUA 0-2 01/15/2022    BLOODU TRACE 01/15/2022    SPECGRAV 1.011 01/15/2022    GLUCOSEU Negative 01/15/2022     Radiology:  US DUP LOWER EXTREMITY RIGHT JESU   Final Result   NO SONOGRAPHIC EVIDENCE OF DEEP VENOUS THROMBOSIS WITHIN THE RIGHT LOWER EXTREMITY. XR CHEST (2 VW)   Final Result   CARDIOMEGALY. INTERSTITIAL EDEMA. LEFT PLEURAL EFFUSION. LEFT LOWER LUNG ATELECTASIS/PNEUMONIA. Assessment/Plan:    #Acute on chronic combined systolic and diastolic CHF     - continue diuresis per cardiology; stop IV and continue torsemide and metolazone     - may need ICD per cardiology     - continue meds per cardiology recommendations    #BLUE on CKD      - consult nephrology for their opinion and assistance    #HTN    - continue home meds    Active Hospital Problems    Diagnosis Date Noted    Acute systolic heart failure ([facility] [address] [I50.21] 01/15/2022     Additional work up or/and treatment plan may be added today or then after based on clinical progression. I am managing a portion of pt care. Some medical issues are handled by other specialists. Additional work up and treatment should be done in out pt setting by pt PCP and other out pt providers. In addition to examining and evaluating pt, I spent additional time explaining care, normal and abnormal findings, and treatment plan. All of pt questions were answered. Counseling, diet and education were  provided.  Case will be discussed with nursing staff when appropriate. Family will be updated if and when appropriate. Diet: ADULT DIET; Regular;  Low Sodium (2 gm)    Code Status: Full Code    PT/OT Eval     Electronically signed by Leighton Mack MD on 1/17/2022 at 2:55 PM

## 2022-01-17 NOTE — PROGRESS NOTES
Hospitalist Daily Progress Note  Name: Meg Gannon  Age: 54 y.o. Gender: male  CodeStatus: Full Code  Allergies: Lipitor [Atorvastatin]  Lisinopril    Chief Complaint:Hypertension (sent by PCP for high blood pressure)      Primary Care Provider: Pretty Mcghee MD    InpatientTreatment Team: Treatment Team: Attending Provider: Terell Kraft MD; Consulting Physician: Attila Alvarez MD; Registered Nurse: Kimani Perry RN; Registered Nurse: Subha Lamas RN    Admission Date: 1/15/2022      Subjective: No chest pain, sob, nausea. One episode of severe right leg pain. Physical Exam  Vitals and nursing note reviewed. Constitutional:       Appearance: Normal appearance. Cardiovascular:      Rate and Rhythm: Normal rate and regular rhythm. Pulmonary:      Effort: Pulmonary effort is normal.      Breath sounds: Normal breath sounds. Abdominal:      General: Bowel sounds are normal.      Palpations: Abdomen is soft. Musculoskeletal:      Right lower leg: Edema present. Left lower leg: Edema present. Skin:     General: Skin is warm and dry. Neurological:      Mental Status: He is alert and oriented to person, place, and time. Mental status is at baseline.          Medications:  Reviewed    Infusion Medications:    sodium chloride       Scheduled Medications:    sodium chloride flush  5-40 mL IntraVENous 2 times per day    heparin (porcine)  5,000 Units SubCUTAneous 3 times per day    furosemide  40 mg IntraVENous BID    torsemide  20 mg Oral Daily    metOLazone  10 mg Oral Daily    isosorbide mononitrate  120 mg Oral Daily    hydrALAZINE  50 mg Oral 3 times per day    carvedilol  12.5 mg Oral BID    magnesium oxide  400 mg Oral Daily     PRN Meds: sodium chloride flush, sodium chloride, ondansetron **OR** ondansetron, polyethylene glycol, acetaminophen **OR** acetaminophen    Labs:   Recent Labs     01/15/22  1103   WBC 5.6   HGB 8.1*   HCT 26.1*        Recent Labs reconstruction, and/or weight based dosing when appropriate to reduce radiation dose to as low as reasonably achievable. PORTABLE CHEST      COMPARISON: August 28, 2019. HISTORY: cp/sob    TECHNIQUE: AP view      FINDINGS:  Patchy airspace disease is seen in the right upper, mid and lower lung fields. It is also seen in the left upper and left mid lung field. The left costophrenic angle is blunted. The cardiac silhouette is borderline. IMPRESSION: Pulmonary edema versus pneumonia. CXR      2-view: Results for orders placed during the hospital encounter of 01/15/22    XR CHEST (2 VW)    Narrative  EXAMINATION: XR CHEST (2 VW)    CLINICAL HISTORY: TIGHTNESS OF BREATH. UNCONTROLLED HYPERTENSION. COMPARISONS: CHEST RADIOGRAPH AUGUST 28, 2021    FINDINGS: Osseous structures intact. Cardiopericardial silhouette enlarged and unchanged. Pulmonary vasculature indistinct. Ill-defined area of increased opacity obscures left lung base, with blunting left costophrenic angle. Impression  CARDIOMEGALY. INTERSTITIAL EDEMA. LEFT PLEURAL EFFUSION. LEFT LOWER LUNG ATELECTASIS/PNEUMONIA. Results for orders placed during the hospital encounter of 04/22/21    XR CHEST (2 VW)    Narrative  EXAMINATION: Chest x-ray, 2 view    HISTORY: Pleural effusion    TECHNIQUE: Frontal and lateral views of the chest    COMPARISON: Portable chest radiograph from April 22, 2020    FINDINGS:    Cardiomediastinal silhouette is within normal limits. Interval improvement but persistence of small left pleural effusion. No pneumothorax. Small left lower lobe consolidation. No acute osseous abnormality. Impression  Interval improvement but persistence of small left pleural effusion. Left lower lobe infiltrate and/or atelectasis.        Portable: Results for orders placed during the hospital encounter of 08/28/21    XR CHEST PORTABLE    Narrative  EXAMINATION: XR CHEST PORTABLE    CLINICAL HISTORY: CHEST PAIN    COMPARISONS: [employer] 12, 2021    FINDINGS: Osseous structures intact. Cardiopericardial silhouette is enlarged and unchanged. Homogeneous area increased opacity left lung base obscuring left diaphragm, left lower lung, with blunting left costophrenic angle. Ill-defined areas increase  opacity found right lower lung. Impression  STABLE CARDIOMEGALY, LEFT PLEURAL EFFUSION, BILATERAL LOWER LUNG ATELECTASIS/PNEUMONIA. Echo No results found for this or any previous visit. Assessment/Plan:    Active Hospital Problems    Diagnosis Date Noted    Acute systolic heart failure ([facility] [address] [I50.21] 01/15/2022     1. Acute systolic heart failure exacerbation  1. IV lasix, cardiology consult, echo  2. 1/16 - cont lasix, torsemide restarted  2. Leg pain, right  1. IV pain med, u/s rle r/o dvt  3. Acute hypoxic respiratory failure  1. Supportive o2, wean as able  4. Hx HTN  5.  DVT proph      Electronically signed by Enid Clifford MD on 1/16/2022 at 7:43 PM

## 2022-01-17 NOTE — PROGRESS NOTES
Cardiology Progress Note  Patient: Dionicio Streeter  Unit/Bed: Z928/K119-08  YOB: 1970  MRN: [id number]  Acct: [de-identified]   Admitting Diagnosis: Acute systolic heart failure (HCC) [I50.21]  Pulmonary edema cardiac cause (HCC) [I50.1]  Anemia of chronic disease [D63.8]  Uncontrolled hypertension [I10]  Chronic renal failure, stage 5 ([facility] [address] [N18.5]  Date:  1/15/2022  Hospital Day: 2      Chief Complaint:  Shortness of breath    Recent of this consult:  Evaluation of heart failure    History of Present Illness:  44-year-old male -American well-known to our service as patient follows in our cardiology clinic with Dr. Jaime Li with history of nonischemic cardiomyopathy EF 35 to 40% by TTE on 4/22/2021, hypertension, tobacco use, CKD, alcohol abuse and history of noncompliance who came to the hospital for shortness of breath    Patient reports that shortness of breath started today  Denies chest pain shortness of breath  Denies changing diet  States that he is compliant with all his medications    EKG showing sinus tachycardia without active signs of ischemia  CKD worsening at 4.6 up from 3.85 August 2021  Troponins 0.037 which have been chronically detectable at level  BNP 25,000 up from 16,000 on August 2021  Chest x-ray showing bilateral pulmonary congestion    Latest coronary angiogram 11/4/2020   showing normal coronaries    Latest transthoracic echocardiogram 4/22/2021 EF 35 to 40%    Of note patient had an admission on 7/14/2021 for hypertension urgency and decompensated heart failure due to medication noncompliance    1/17/22  Patient laying in bed comfortably  Denies chest pain or shortness of breath  Patient was net -4 L last 24 hours  From cardiology standpoint patient is close to be discharged, possibly 1 more day  Creatinine trending up currently 5.47 from 4.6 at admission      1/16/22  Shortness of breath improving  Patient was net -3.7 L since admission      Allergies   Allergen Reactions  Lipitor [Atorvastatin] Other (See Comments)     Coughing     Lisinopril        Current Facility-Administered Medications   Medication Dose Route Frequency Provider Last Rate Last Admin    potassium chloride (KLOR-CON M) extended release tablet 40 mEq  40 mEq Oral PRN [employer], DO        Or    potassium bicarbonate (K-LYTE) disintegrating tablet 50 mEq  50 mEq Oral PRN [employer], DO        Or    potassium chloride 10 mEq/100 mL IVPB (Peripheral Line)  10 mEq IntraVENous PRN [employer],  mL/hr at 01/17/22 1219 10 mEq at 01/17/22 1219    sodium chloride flush 0.9 % injection 5-40 mL  5-40 mL IntraVENous 2 times per day Oziel Cox MD   10 mL at 01/17/22 1040    sodium chloride flush 0.9 % injection 5-40 mL  5-40 mL IntraVENous PRN Oziel Cox MD        0.9 % sodium chloride infusion  25 mL IntraVENous PRN Oziel Cox MD        ondansetron (ZOFRAN-ODT) disintegrating tablet 4 mg  4 mg Oral Q8H PRN Oziel Cox MD        Or    ondansetron [facility]) injection 4 mg  4 mg IntraVENous Q6H PRN Oziel Cox MD        polyethylene glycol [facility]) packet 17 g  17 g Oral Daily PRN Oziel Cox MD        acetaminophen (TYLENOL) tablet 650 mg  650 mg Oral Q6H PRN Oziel Cox MD   650 mg at 01/17/22 0500    Or    acetaminophen (TYLENOL) suppository 650 mg  650 mg Rectal Q6H PRN Oziel Cox MD        heparin (porcine) injection 5,000 Units  5,000 Units SubCUTAneous 3 times per day Oziel Cox MD   5,000 Units at 01/16/22 2101    torsemide (DEMADEX) tablet 20 mg  20 mg Oral Daily Ida Lerma MD   20 mg at 01/17/22 1021    metOLazone (ZAROXOLYN) tablet 10 mg  10 mg Oral Daily Ida Lerma MD   10 mg at 01/17/22 1020    isosorbide mononitrate (IMDUR) extended release tablet 120 mg  120 mg Oral Daily Ida Lerma MD   120 mg at 01/17/22 1031    hydrALAZINE (APRESOLINE) tablet 50 mg  50 mg Oral 3 times per day Ida Lerma MD   50 mg at 22 0500    carvedilol (COREG) tablet 12.5 mg  12.5 mg Oral BID Pedro Dawson MD   12.5 mg at 22 1021    magnesium oxide (MAG-OX) tablet 400 mg  400 mg Oral Daily Zbigniew Valderrama DO   400 mg at 22 1020       PMHx:  Past Medical History:   Diagnosis Date    Abnormal EKG 2019    Acute kidney injury (BLUE) with acute tubular necrosis (ATN) ([facility] [address]     Cardiomyopathy ([facility] [address]     per echo 2019    Chronic combined systolic and diastolic CHF (congestive heart failure) ([facility] [address] 2019    ETOH abuse     Hypertension     Tobacco abuse        PSHx:  Past Surgical History:   Procedure Laterality Date    HERNIA REPAIR Right 2/10/2021    RIGHT INGUINAL HERNIA REPAIR WITH MESH performed by Jeannine Mace MD at [place] [address] Hx:  Social History     Socioeconomic History    Marital status: Single     Spouse name: None    Number of children: None    Years of education: None    Highest education level: None   Occupational History    None   Tobacco Use    Smoking status: Current Every Day Smoker     Packs/day: 1.00     Types: Cigarettes     Last attempt to quit: 12/3/2020     Years since quittin.1    Smokeless tobacco: Never Used    Tobacco comment: currently smoking only couple cigarettes daily   Substance and Sexual Activity    Alcohol use: Yes    Drug use: Never    Sexual activity: None   Other Topics Concern    None   Social History Narrative    None     Social Determinants of Health     Financial Resource Strain:     Difficulty of Paying Living Expenses: Not on file   Food Insecurity:     Worried About Running Out of Food in the Last Year: Not on file    Ginette of Food in the Last Year: Not on file   Transportation Needs:     Lack of Transportation (Medical): Not on file    Lack of Transportation (Non-Medical):  Not on file   Physical Activity:     Days of Exercise per Week: Not on file    Minutes of Exercise per Session: Not on file   Stress:     Feeling of Stress : Not on file   Social Connections:     Frequency of Communication with Friends and Family: Not on file    Frequency of Social Gatherings with Friends and Family: Not on file    Attends Mandaeism Services: Not on file    Active Member of [address] [place] [address] [employer] or Organizations: Not on file    Attends Club or Organization Meetings: Not on file    Marital Status: Not on file   Intimate Partner Violence:     Fear of Current or Ex-Partner: Not on file    Emotionally Abused: Not on file    Physically Abused: Not on file    Sexually Abused: Not on file   Housing Stability:     Unable to Pay for Housing in the Last Year: Not on file    Number of [place] in the Last Year: Not on file    Unstable Housing in the Last Year: Not on file       Family Hx:  Family History   Problem Relation Age of Onset    Coronary Art Dis Mother        Review of Systems:   Review of Systems   Constitutional: Negative for activity change, chills, diaphoresis and fever. HENT: Negative for congestion, ear pain, nosebleeds and rhinorrhea. Eyes: Negative for redness and visual disturbance. Respiratory: Positive for shortness of breath. Negative for apnea, cough, chest tightness and wheezing. Cardiovascular: Negative for chest pain, palpitations and leg swelling. Gastrointestinal: Negative for abdominal pain, constipation, diarrhea, nausea and vomiting. Genitourinary: Negative for difficulty urinating and dysuria. Musculoskeletal: Negative. Negative for joint swelling. Skin: Negative for color change, rash and wound. Neurological: Negative for dizziness, syncope, weakness, numbness and headaches. Psychiatric/Behavioral: Negative. Physical Examination:    BP (!) 125/92   Pulse 88   Temp 98.2 °F (36.8 °C) (Oral)   Resp 18   Ht 5' 9\" (1.753 m)   Wt 200 lb (90.7 kg)   SpO2 94%   BMI 29.53 kg/m²    Physical Exam  Vitals and nursing note reviewed. Constitutional:       Appearance: Normal appearance.    HENT: Head: Normocephalic and atraumatic. Mouth/Throat:      Mouth: Mucous membranes are moist.      Pharynx: Oropharynx is clear. Eyes:      Extraocular Movements: Extraocular movements intact. Conjunctiva/sclera: Conjunctivae normal.      Pupils: Pupils are equal, round, and reactive to light. Cardiovascular:      Rate and Rhythm: Regular rhythm. Tachycardia present. Pulses: Normal pulses. Heart sounds: Normal heart sounds. Pulmonary:      Effort: Pulmonary effort is normal.      Breath sounds: Rales present. Abdominal:      General: Abdomen is flat. Bowel sounds are normal.      Palpations: Abdomen is soft. Musculoskeletal:         General: Normal range of motion. Cervical back: Normal range of motion and neck supple. Right lower leg: Edema present. Skin:     General: Skin is warm. Neurological:      General: No focal deficit present. Mental Status: He is alert and oriented to person, place, and time. Mental status is at baseline.    Psychiatric:         Mood and Affect: Mood normal.         LABS:  CBC:  Lab Results   Component Value Date    WBC 5.6 01/15/2022    RBC 4.12 01/15/2022    HGB 8.1 01/15/2022    HCT 26.1 01/15/2022    MCV 63.4 01/15/2022    MCH 19.8 01/15/2022    MCHC 31.2 01/15/2022    RDW 19.1 01/15/2022     01/15/2022     CBC with Differential:   Lab Results   Component Value Date    WBC 5.6 01/15/2022    RBC 4.12 01/15/2022    HGB 8.1 01/15/2022    HCT 26.1 01/15/2022     01/15/2022    MCV 63.4 01/15/2022    MCH 19.8 01/15/2022    MCHC 31.2 01/15/2022    RDW 19.1 01/15/2022    LYMPHOPCT 8.2 01/15/2022    MONOPCT 9.2 01/15/2022    BASOPCT 0.9 01/15/2022    MONOSABS 0.5 01/15/2022    LYMPHSABS 0.5 01/15/2022    EOSABS 0.1 01/15/2022    BASOSABS 0.1 01/15/2022     CMP:    Lab Results   Component Value Date     01/17/2022    K 2.9 01/17/2022    CL 93 01/17/2022    CO2 22 01/17/2022    BUN 59 01/17/2022    CREATININE 5.47 01/17/2022 GFRAA 13.5 01/17/2022    LABGLOM 11.1 01/17/2022    GLUCOSE 135 01/17/2022    PROT 6.7 01/15/2022    LABALBU 3.6 01/15/2022    CALCIUM 8.7 01/17/2022    BILITOT 0.5 01/15/2022    ALKPHOS 143 01/15/2022    AST 24 01/15/2022    ALT 37 01/15/2022     BMP:    Lab Results   Component Value Date     01/17/2022    K 2.9 01/17/2022    CL 93 01/17/2022    CO2 22 01/17/2022    BUN 59 01/17/2022    LABALBU 3.6 01/15/2022    CREATININE 5.47 01/17/2022    CALCIUM 8.7 01/17/2022    GFRAA 13.5 01/17/2022    LABGLOM 11.1 01/17/2022    GLUCOSE 135 01/17/2022     Magnesium:    Lab Results   Component Value Date    MG 2.3 01/17/2022     Troponin:    Lab Results   Component Value Date    TROPONINI 0.037 01/15/2022       Radiology:  XR CHEST (2 VW)    Result Date: 1/15/2022  EXAMINATION: XR CHEST (2 VW) CLINICAL HISTORY: TIGHTNESS OF BREATH. UNCONTROLLED HYPERTENSION. COMPARISONS: CHEST RADIOGRAPH AUGUST 28, 2021 FINDINGS: Osseous structures intact. Cardiopericardial silhouette enlarged and unchanged. Pulmonary vasculature indistinct. Ill-defined area of increased opacity obscures left lung base, with blunting left costophrenic angle. CARDIOMEGALY. INTERSTITIAL EDEMA. LEFT PLEURAL EFFUSION. LEFT LOWER LUNG ATELECTASIS/PNEUMONIA.       EKG: Sinus tachycardia without active signs of ischemia    Assessment:    Active Hospital Problems    Diagnosis Date Noted    Acute systolic heart failure ([facility] [address] [I50.21] 01/15/2022     Priority: Low     Acute on chronic heart failure with reduced ejection fraction EF 35 to 40% by TTE 2021. Patient admitted with a chest x-ray showing bilateral pulmonary congestion, BNP 25,000, patient with lower extremity edema. Reason of this heart failure decompensation was related to the fact that patient was skipping doses of diuretic secondary to leg cramping  CKD.   Creatinine worsening currently at 4.6 up from 3.85 on 8/28/2021  History of hypertension  History of medication noncompliance    Latest coronary angiogram 11/4/2020   showing normal coronaries  Latest transthoracic echocardiogram 4/22/2021 EF 35 to 40%      Plan:  Continue telemetry  Continue diuresis with torsemide, metolazone goal in 24 hours at least 2 to 3 L negative  Continue Coreg 25 mg twice daily  Continue hydralazine and Imdur for LV dysfunction.   No ACE or ARB secondary to CKD  Strict ins and outs and daily weights  Please keep potassium between 4 and 5 and magnesium above 2  Fluid restriction to no more than 1 L/day  Follow-up echocardiogram  Patient should be considered for ICD placement if EF still reduced  Follow-up renal recommendations  We will continue to follow      Electronically signed by Attila Alvarez MD on 1/17/2022 at 3:51 PM

## 2022-01-18 ENCOUNTER — APPOINTMENT (OUTPATIENT)
Dept: CARDIAC CATH/INVASIVE PROCEDURES | Age: 52
DRG: 179 | End: 2022-01-18
Payer: MEDICAID

## 2022-01-18 ENCOUNTER — APPOINTMENT (OUTPATIENT)
Dept: CT IMAGING | Age: 52
DRG: 179 | End: 2022-01-18
Payer: MEDICAID

## 2022-01-18 LAB
ALBUMIN SERPL-MCNC: 3.8 G/DL (ref 3.5–4.6)
ALP BLD-CCNC: 124 U/L (ref 35–104)
ALT SERPL-CCNC: 31 U/L (ref 0–41)
ANION GAP SERPL CALCULATED.3IONS-SCNC: 17 MEQ/L (ref 9–15)
ANION GAP SERPL CALCULATED.3IONS-SCNC: 18 MEQ/L (ref 9–15)
ANION GAP SERPL CALCULATED.3IONS-SCNC: 19 MEQ/L (ref 9–15)
AST SERPL-CCNC: 24 U/L (ref 0–40)
BASOPHILS ABSOLUTE: 0.1 K/UL (ref 0–0.2)
BASOPHILS RELATIVE PERCENT: 1 %
BILIRUB SERPL-MCNC: 0.4 MG/DL (ref 0.2–0.7)
BUN BLDV-MCNC: 63 MG/DL (ref 6–20)
BUN BLDV-MCNC: 66 MG/DL (ref 6–20)
BUN BLDV-MCNC: 67 MG/DL (ref 6–20)
CALCIUM SERPL-MCNC: 8.6 MG/DL (ref 8.5–9.9)
CALCIUM SERPL-MCNC: 8.8 MG/DL (ref 8.5–9.9)
CALCIUM SERPL-MCNC: 9 MG/DL (ref 8.5–9.9)
CHLORIDE BLD-SCNC: 90 MEQ/L (ref 95–107)
CHLORIDE BLD-SCNC: 91 MEQ/L (ref 95–107)
CHLORIDE BLD-SCNC: 92 MEQ/L (ref 95–107)
CO2: 24 MEQ/L (ref 20–31)
CO2: 26 MEQ/L (ref 20–31)
CO2: 27 MEQ/L (ref 20–31)
CREAT SERPL-MCNC: 5.95 MG/DL (ref 0.7–1.2)
CREAT SERPL-MCNC: 5.95 MG/DL (ref 0.7–1.2)
CREAT SERPL-MCNC: 6.19 MG/DL (ref 0.7–1.2)
D DIMER: 1.16 MG/L FEU (ref 0–0.5)
EOSINOPHILS ABSOLUTE: 0.1 K/UL (ref 0–0.7)
EOSINOPHILS RELATIVE PERCENT: 2 %
GFR AFRICAN AMERICAN: 11.7
GFR AFRICAN AMERICAN: 12.2
GFR AFRICAN AMERICAN: 12.2
GFR NON-AFRICAN AMERICAN: 10.1
GFR NON-AFRICAN AMERICAN: 10.1
GFR NON-AFRICAN AMERICAN: 9.7
GLOBULIN: 3.4 G/DL (ref 2.3–3.5)
GLUCOSE BLD-MCNC: 73 MG/DL (ref 70–99)
GLUCOSE BLD-MCNC: 82 MG/DL (ref 70–99)
GLUCOSE BLD-MCNC: 93 MG/DL (ref 70–99)
HCT VFR BLD CALC: 28.6 % (ref 42–52)
HEMOGLOBIN: 8.9 G/DL (ref 14–18)
IRON SATURATION: 9 % (ref 20–55)
IRON: 31 UG/DL (ref 59–158)
LACTIC ACID: 4.2 MMOL/L (ref 0.5–2.2)
LIPASE: 61 U/L (ref 12–95)
LV EF: 20 %
LVEF MODALITY: NORMAL
LYMPHOCYTES ABSOLUTE: 0.9 K/UL (ref 1–4.8)
LYMPHOCYTES RELATIVE PERCENT: 12.1 %
MAGNESIUM: 2.7 MG/DL (ref 1.7–2.4)
MCH RBC QN AUTO: 19.5 PG (ref 27–31.3)
MCHC RBC AUTO-ENTMCNC: 31.3 % (ref 33–37)
MCV RBC AUTO: 62.2 FL (ref 80–100)
MONOCYTES ABSOLUTE: 1.4 K/UL (ref 0.2–0.8)
MONOCYTES RELATIVE PERCENT: 20 %
NEUTROPHILS ABSOLUTE: 4.6 K/UL (ref 1.4–6.5)
NEUTROPHILS RELATIVE PERCENT: 64.9 %
PDW BLD-RTO: 19.3 % (ref 11.5–14.5)
PLATELET # BLD: 405 K/UL (ref 130–400)
POTASSIUM REFLEX MAGNESIUM: 3.8 MEQ/L (ref 3.4–4.9)
POTASSIUM SERPL-SCNC: 3.3 MEQ/L (ref 3.4–4.9)
POTASSIUM SERPL-SCNC: 3.9 MEQ/L (ref 3.4–4.9)
PRO-BNP: 6331 PG/ML
RBC # BLD: 4.59 M/UL (ref 4.7–6.1)
SODIUM BLD-SCNC: 133 MEQ/L (ref 135–144)
SODIUM BLD-SCNC: 135 MEQ/L (ref 135–144)
SODIUM BLD-SCNC: 136 MEQ/L (ref 135–144)
TOTAL IRON BINDING CAPACITY: 341 UG/DL (ref 250–450)
TOTAL PROTEIN: 7.2 G/DL (ref 6.3–8)
TROPONIN: 0.04 NG/ML (ref 0–0.01)
UNSATURATED IRON BINDING CAPACITY: 310 UG/DL (ref 112–347)
WBC # BLD: 7.1 K/UL (ref 4.8–10.8)

## 2022-01-18 PROCEDURE — 36415 COLL VENOUS BLD VENIPUNCTURE: CPT

## 2022-01-18 PROCEDURE — 6370000000 HC RX 637 (ALT 250 FOR IP): Performed by: STUDENT IN AN ORGANIZED HEALTH CARE EDUCATION/TRAINING PROGRAM

## 2022-01-18 PROCEDURE — 6360000002 HC RX W HCPCS: Performed by: FAMILY MEDICINE

## 2022-01-18 PROCEDURE — 74177 CT ABD & PELVIS W/CONTRAST: CPT

## 2022-01-18 PROCEDURE — 93005 ELECTROCARDIOGRAM TRACING: CPT | Performed by: INTERNAL MEDICINE

## 2022-01-18 PROCEDURE — 85025 COMPLETE CBC W/AUTO DIFF WBC: CPT

## 2022-01-18 PROCEDURE — 6370000000 HC RX 637 (ALT 250 FOR IP): Performed by: INTERNAL MEDICINE

## 2022-01-18 PROCEDURE — 80053 COMPREHEN METABOLIC PANEL: CPT

## 2022-01-18 PROCEDURE — 6360000002 HC RX W HCPCS

## 2022-01-18 PROCEDURE — 83690 ASSAY OF LIPASE: CPT

## 2022-01-18 PROCEDURE — 93306 TTE W/DOPPLER COMPLETE: CPT

## 2022-01-18 PROCEDURE — 83735 ASSAY OF MAGNESIUM: CPT

## 2022-01-18 PROCEDURE — 83880 ASSAY OF NATRIURETIC PEPTIDE: CPT

## 2022-01-18 PROCEDURE — 85379 FIBRIN DEGRADATION QUANT: CPT

## 2022-01-18 PROCEDURE — 99233 SBSQ HOSP IP/OBS HIGH 50: CPT | Performed by: INTERNAL MEDICINE

## 2022-01-18 PROCEDURE — 2580000003 HC RX 258: Performed by: STUDENT IN AN ORGANIZED HEALTH CARE EDUCATION/TRAINING PROGRAM

## 2022-01-18 PROCEDURE — 2580000003 HC RX 258: Performed by: FAMILY MEDICINE

## 2022-01-18 PROCEDURE — 2060000000 HC ICU INTERMEDIATE R&B

## 2022-01-18 PROCEDURE — 84484 ASSAY OF TROPONIN QUANT: CPT

## 2022-01-18 PROCEDURE — 2500000003 HC RX 250 WO HCPCS

## 2022-01-18 PROCEDURE — 6360000004 HC RX CONTRAST MEDICATION: Performed by: INTERNAL MEDICINE

## 2022-01-18 PROCEDURE — 83605 ASSAY OF LACTIC ACID: CPT

## 2022-01-18 PROCEDURE — 71275 CT ANGIOGRAPHY CHEST: CPT

## 2022-01-18 PROCEDURE — 6370000000 HC RX 637 (ALT 250 FOR IP): Performed by: FAMILY MEDICINE

## 2022-01-18 RX ORDER — SODIUM CHLORIDE 9 MG/ML
INJECTION, SOLUTION INTRAVENOUS CONTINUOUS
Status: DISCONTINUED | OUTPATIENT
Start: 2022-01-18 | End: 2022-01-19

## 2022-01-18 RX ADMIN — CARVEDILOL 12.5 MG: 12.5 TABLET, FILM COATED ORAL at 09:54

## 2022-01-18 RX ADMIN — POTASSIUM CHLORIDE 40 MEQ: 1500 TABLET, EXTENDED RELEASE ORAL at 09:54

## 2022-01-18 RX ADMIN — IOPAMIDOL 100 ML: 612 INJECTION, SOLUTION INTRAVENOUS at 11:47

## 2022-01-18 RX ADMIN — SODIUM CHLORIDE: 9 INJECTION, SOLUTION INTRAVENOUS at 18:08

## 2022-01-18 RX ADMIN — ISOSORBIDE MONONITRATE 120 MG: 120 TABLET ORAL at 09:54

## 2022-01-18 RX ADMIN — CARVEDILOL 12.5 MG: 12.5 TABLET, FILM COATED ORAL at 19:55

## 2022-01-18 RX ADMIN — HEPARIN SODIUM 5000 UNITS: 5000 INJECTION INTRAVENOUS; SUBCUTANEOUS at 05:56

## 2022-01-18 RX ADMIN — HEPARIN SODIUM 5000 UNITS: 5000 INJECTION INTRAVENOUS; SUBCUTANEOUS at 15:12

## 2022-01-18 RX ADMIN — Medication 10 ML: at 10:09

## 2022-01-18 RX ADMIN — TORSEMIDE 20 MG: 20 TABLET ORAL at 09:58

## 2022-01-18 RX ADMIN — Medication 400 MG: at 09:54

## 2022-01-18 RX ADMIN — HEPARIN SODIUM 5000 UNITS: 5000 INJECTION INTRAVENOUS; SUBCUTANEOUS at 21:42

## 2022-01-18 RX ADMIN — Medication 10 ML: at 21:43

## 2022-01-18 RX ADMIN — Medication 650 MG: at 10:07

## 2022-01-18 ASSESSMENT — PAIN SCALES - GENERAL: PAINLEVEL_OUTOF10: 5

## 2022-01-18 NOTE — FLOWSHEET NOTE
Patient became diaphoretic and c/o c/p and weakness/and nausea. Dr. Lars Hinson on unit and notified. Dr. Lars Hinson saw patient. Rapid called. Dr. Sho Perdue, attending hospitalist to room. Patient to CT scan. Then plan for cath lab holding. IV levofed started at 4 mcg at 1118.

## 2022-01-18 NOTE — PROGRESS NOTES
Full note is to follow. Dilated non ischemic CM, with gradually deteriorating EF from 35% in 04/21 to 20% 01/22. Hypertensive, now with clear evidence of significantly deteriorating renal Fx. Today he got IV contrast for CT scan. The pt is high risk for malignant and life threatening dysrhythmias, and he is a candidate for ICD. His ECG shows normal QRS duration. He is in limited medical CHF ttt partly because of his deteriorating Renal Fx. PMH: HTN, alcohol abuse, non compliance, chronic smoking. Above was discussed with the Pt and With Dr Corwin Maradiaga, Pt will need ICD soon, the procedure with all possible risks and complications were discussed with Pt, he understands, questions were answered, will provide with reading material, will wait for the Nephrology in-put, will find the right time and day to proceed with ICD.

## 2022-01-18 NOTE — PROGRESS NOTES
Spiritual Care Services     Summary of Visit:  Initial visit. Patient is known to this writer from previous visits. He has a [address] of  document in [address] [place] [address] Rd which lists his brother Bhupendra Ross as his primary decision maker. Patient is resting in bed. He is hoping to be released soon! He seems unclear as to what happened to him and why he is hospitalized. This writer provided encouragement to him. Spiritual Assessment/Intervention/Outcomes:    Encounter Summary  Services provided to[de-identified] Patient  [facility]  Support System: Family members,Significant other  Place of Scientology: None  Continue Visiting: No  Complexity of Encounter: Low  Length of Encounter: 15 minutes  Spiritual Assessment Completed: Yes  Advance Care Planning: Yes  Routine  Type: Initial  Assessment: Calm,Approachable,Passive  Intervention: Explored feelings, thoughts, concerns,Nurtured hope,Discussed illness/injury and it's impact  Outcome: Acceptance,Expressed gratitude,Engaged in conversation,Receptive                          Values / Beliefs  Do you have any ethnic, cultural, sacramental, or spiritual Christianity needs you would like us to be aware of while you are in the hospital?: No    Care Plan:    No follow up unless requested. Spiritual Care Services   Electronically signed by Maryjo Thapa on 1/18/22 at 10:48 AM EST     To reach a  for emotional and spiritual support, place an [facility] consult request.   If a  is needed immediately, dial 0 and ask to page the on-call .

## 2022-01-18 NOTE — PROGRESS NOTES
Nutrition Note    Consult recieved for CHF diet education.   Pt off unit at time of visit, will attempt to follow up for education prior to discharge    Electronically signed by Monica Catrwright RD, LD on 1/18/22 at 1:38 PM EST

## 2022-01-18 NOTE — PLAN OF CARE
Brief Note:  Consult acknowledged. Attempted to see patient but was off the floor for imaging. Consulted for BLUE on CKD stage IV. Prior baseline Scr ~ ~3.6-3.8 w/ eGFR low 20s and down to 4.6-4.8 w/ eGFR 16-17 ml/lmin in 11/21 when patient was seen at [facility]. Due to CRS. Function at baseline on presentation and has worsened during admission possibly in setting of diuresis (has had significant UOP daily, torsemide, lasix and metolazone now on hold). Also called today for rapid due to hypotension requiring pressors, chest pain and ECG changes.  S/p CTA C/A/P.     - risk of contrast induced nephropathy ~25% w/ only slight risk of needing dialysis however more than double the risk of DANNA and ~10 risk of dialysis if pt was hypotensive at the time, risk reduction w/ IVFs which patient is already on  - if needs another load of contrast would ideally wait 1-2 days however can get sooner if deemed necessary  - will monitor for functional improvement and necessity of RRT   - would continue IVFs for today as in addition to risk reduction pt was likely already in fluid depleted state prior to initiation  - full consult to follow    Leatha Powell MD  Nephrology

## 2022-01-18 NOTE — CONSULTS
[address]                      [address], [address] [facility]                                  CONSULTATION    PATIENT NAME: Adali Durbin                     :        1970  MED REC NO:   [id number]                            ROOM:       [id number]  ACCOUNT NO:   [de-identified]                           ADMIT DATE: 01/15/2022  PROVIDER:     Josseline Dudley MD    CONSULT DATE:  2022    HISTORY OF PRESENT ILLNESS:  This is a 79-year-old gentleman who is  admitted to the hospital few days ago with increased blood pressure, he  measures it frequently, blood pressure was elevated and decided to come  to the emergency room. I have seen this gentleman in the past with  dilated nonischemic cardiomyopathy and ICD was recommended. The patient  failed to show up for his appointment as an outpatient. Part of the workup this time included echo repeat which showed ejection  fraction of 20%. In 2021, it was 35-40%, gradually the ejection  fraction has been going down. There is an echo done in 2021, which  was lower than 35%. The patient has symptoms of CHF decompensation, increased shortness of  breath, fatigue, and tiredness. There are episodes of palpitation that  he reports. He said at this time he felt presyncopal.  There is no  history of malignant dysrhythmias otherwise. He was found to have significantly abnormal renal function. Repeatedly,  medications had to be stopped because of the significant worsening of  the renal functions. At one point, he developed shortness of breath and  chest discomfort, dropped his blood pressure, concerns about PE was  raised up and CT scan was ordered and done earlier today, which is going  probably to make his renal function even worse. The gentleman had history of alcohol abuse. He is a chronic smoker. The mother  of weakness of the heart muscle and she did not have an  ICD. Denies drug abuse.     There is also complaints about several days history of shortness of  breath, bilateral leg swelling. He is known to have abnormal renal functions. He is known to have  chronic combined systolic and diastolic CHF as early as . He is  known to have history alcohol and smoking abuse and history of  hypertension. PAST SURGICAL HISTORY:  Significant for hernia repair in 2021. FAMILY HISTORY:  Mother  of heart disease. SOCIAL HISTORY:  As mentioned above. MEDICATIONS:  Prior to admission, he was supposed to be on Demadex,  Ivabradine b.i.d., Carvedilol 25 mg p.o. b.i.d., isosorbide mononitrate,  hydralazine, nicotine patch, and aspirin. ALLERGIES:  He is known to be allergic to LISINOPRIL and ATORVASTATIN. REVIEW OF SYSTEMS:  Reviewing other systems basically noncontributory. PHYSICAL EXAMINATION:  GENERAL:  Alert, oriented, cooperative gentleman in no distress. VITAL SIGNS:  For me are stable now. He had earlier event of  hypotension. Monitor was observed and there is no malignant  dysrhythmias. NECK:  JVP is prominent. CHEST:  Decreased air entry bilaterally. CARDIOVASCULAR:  Distant heart sounds. ABDOMEN:  No hepatosplenomegaly. EXTREMITIES:  There is no pedal edema. LABORATORY DATA:  Initially, his hemoglobin was 8.1. BUN was 61,  creatinine 4.6, and gradually went up to 5. 13. Troponin went out of the  normal.    IMPRESSION:  Dilated nonischemic cardiomyopathy with ejection fraction  significantly lower than 35%, now in the 20% range, gradually despite  the best tolerated medical therapy with his abnormal renal functions a  lot has to be DCd and dose adjusted because of that. The gentleman has  hypertension and probably hypertensive renal disease which also may be  adding to his systolic dysfunction. The gentleman is definitely high  risk for malignant dysrhythmias.   We have discussed that in the past and  we have been discussing ICD in the past.  This gentleman is very  noncompliant. The case was discussed with Dr. Graham Garcia. I am concerned about his  gradually increasing renal functions. Once cleared by Dr. Graham Garcia and  the nephrologist, we will make plans for ICD. The procedure of  implanting the ICD with all the possible risks and complications were  discussed with the patient. Questions were answered.   I will provide  him with reading material.        Liv Cyr MD    D: 01/18/2022 15:19:13       T: 01/18/2022 18:32:13     [email or web address]  Job#: [id number]     Doc#: [id number]    CC:

## 2022-01-18 NOTE — PROGRESS NOTES
Cardiology Progress Note  Patient: Carla Shook  Unit/Bed: O384/W177-51  YOB: 1970  MRN: [id number]  Acct: [de-identified]   Admitting Diagnosis: Acute systolic heart failure (HCC) [I50.21]  Pulmonary edema cardiac cause (HCC) [I50.1]  Anemia of chronic disease [D63.8]  Uncontrolled hypertension [I10]  Chronic renal failure, stage 5 ([facility] [address] [N18.5]  Date:  1/15/2022  Hospital Day: 3      Chief Complaint:  Shortness of breath    Recent of this consult:  Evaluation of heart failure    History of Present Illness:  60-year-old male -American well-known to our service as patient follows in our cardiology clinic with Dr. Sonya Lake with history of nonischemic cardiomyopathy EF 35 to 40% by TTE on 4/22/2021, hypertension, tobacco use, CKD, alcohol abuse and history of noncompliance who came to the hospital for shortness of breath    Patient reports that shortness of breath started today  Denies chest pain shortness of breath  Denies changing diet  States that he is compliant with all his medications    EKG showing sinus tachycardia without active signs of ischemia  CKD worsening at 4.6 up from 3.85 August 2021  Troponins 0.037 which have been chronically detectable at level  BNP 25,000 up from 16,000 on August 2021  Chest x-ray showing bilateral pulmonary congestion    Latest coronary angiogram 11/4/2020   showing normal coronaries    Latest transthoracic echocardiogram 4/22/2021 EF 35 to 40%    Of note patient had an admission on 7/14/2021 for hypertension urgency and decompensated heart failure due to medication noncompliance    1/18/22  This morning pt complaining of abdominal pain and then chest pressure  Pt diaphoretic  ECG showing TWI in lateral leads   Pt also noted hypotensive in the 80s  Rapid response called  PT was started on IV fluids and levo  With improvement of BP  Pt reported that chest pain was improving  Concern for PE vs ACS  Pt will go for CT of the chest stat and if negative fpr PE we will perfrorm a diagnostic angiogram for possible ACS      1/17/22  Patient laying in bed comfortably  Denies chest pain or shortness of breath  Patient was net -4 L last 24 hours  From cardiology standpoint patient is close to be discharged, possibly 1 more day  Creatinine trending up currently 5.47 from 4.6 at admission      1/16/22  Shortness of breath improving  Patient was net -3.7 L since admission      Allergies   Allergen Reactions    Lipitor [Atorvastatin] Other (See Comments)     Coughing     Lisinopril        Current Facility-Administered Medications   Medication Dose Route Frequency Provider Last Rate Last Admin    potassium chloride (KLOR-CON M) extended release tablet 40 mEq  40 mEq Oral PRN Lexine Roam, DO   40 mEq at 01/18/22 7267    Or    potassium bicarbonate (K-LYTE) disintegrating tablet 50 mEq  50 mEq Oral PRN Lexine Roam, DO        Or    potassium chloride 10 mEq/100 mL IVPB (Peripheral Line)  10 mEq IntraVENous PRN Lexine Roam,  mL/hr at 01/17/22 1219 10 mEq at 01/17/22 1219    sodium chloride flush 0.9 % injection 5-40 mL  5-40 mL IntraVENous 2 times per day Rosemary Armstrong MD   10 mL at 01/18/22 1009    sodium chloride flush 0.9 % injection 5-40 mL  5-40 mL IntraVENous PRN Rosemary Armstrong MD        0.9 % sodium chloride infusion  25 mL IntraVENous PRN Rosemary Armstrong MD        ondansetron (ZOFRAN-ODT) disintegrating tablet 4 mg  4 mg Oral Q8H PRN Rosemary Armstrong MD        Or    [facility]ansetron [facility]) injection 4 mg  4 mg IntraVENous Q6H PRN Rosemary Armstrong MD        polyethylene glycol [facility]) packet 17 g  17 g Oral Daily PRN Rosemary Armstrong MD        acetaminophen (TYLENOL) tablet 650 mg  650 mg Oral Q6H PRN Rosemary Armstrong MD   650 mg at 01/18/22 1007    Or    acetaminophen (TYLENOL) suppository 650 mg  650 mg Rectal Q6H PRN Rosemary Armstrong MD        heparin (porcine) injection 5,000 Units  5,000 Units SubCUTAneous 3 times per day Kayla Lucero Markus Batres MD   5,000 Units at 22 0556    torsemide (DEMADEX) tablet 20 mg  20 mg Oral Daily Nellie Garcia MD   20 mg at 22 1418    metOLazone (ZAROXOLYN) tablet 10 mg  10 mg Oral Daily Nellie Garcia MD   10 mg at 22 1020    isosorbide mononitrate (IMDUR) extended release tablet 120 mg  120 mg Oral Daily Nellie Garcia MD   120 mg at 22 0954    [Held by provider] hydrALAZINE (APRESOLINE) tablet 50 mg  50 mg Oral 3 times per day Ryder Linares MD   50 mg at 22 0500    carvedilol (COREG) tablet 12.5 mg  12.5 mg Oral BID Nellie Garcia MD   12.5 mg at 22 0954    magnesium oxide (MAG-OX) tablet 400 mg  400 mg Oral Daily Zbigniew Valderrama DO   400 mg at 22 0954       PMHx:  Past Medical History:   Diagnosis Date    Abnormal EKG 2019    Acute kidney injury (BLUE) with acute tubular necrosis (ATN) ([facility] [address]     Cardiomyopathy ([facility] [address]     per echo 2019    Chronic combined systolic and diastolic CHF (congestive heart failure) ([facility] [address] 2019    ETOH abuse     Hypertension     Tobacco abuse        PSHx:  Past Surgical History:   Procedure Laterality Date    HERNIA REPAIR Right 2/10/2021    RIGHT INGUINAL HERNIA REPAIR WITH MESH performed by Charla Deal MD at [place] [address] Hx:  Social History     Socioeconomic History    Marital status: Single     Spouse name: None    Number of children: None    Years of education: None    Highest education level: None   Occupational History    None   Tobacco Use    Smoking status: Current Every Day Smoker     Packs/day: 1.00     Types: Cigarettes     Last attempt to quit: 12/3/2020     Years since quittin.1    Smokeless tobacco: Never Used    Tobacco comment: currently smoking only couple cigarettes daily   Substance and Sexual Activity    Alcohol use:  Yes    Drug use: Never    Sexual activity: None   Other Topics Concern    None   Social History Narrative    None     Social Determinants of Health Financial Resource Strain:     Difficulty of Paying Living Expenses: Not on file   Food Insecurity:     Worried About Running Out of Food in the Last Year: Not on file    Ginette of Food in the Last Year: Not on file   Transportation Needs:     Lack of Transportation (Medical): Not on file    Lack of Transportation (Non-Medical): Not on file   Physical Activity:     Days of Exercise per Week: Not on file    Minutes of Exercise per Session: Not on file   Stress:     Feeling of Stress : Not on file   Social Connections:     Frequency of Communication with Friends and Family: Not on file    Frequency of Social Gatherings with Friends and Family: Not on file    Attends Latter day Services: Not on file    Active Member of [address] [place] [address] [employer] or Organizations: Not on file    Attends Club or Organization Meetings: Not on file    Marital Status: Not on file   Intimate Partner Violence:     Fear of Current or Ex-Partner: Not on file    Emotionally Abused: Not on file    Physically Abused: Not on file    Sexually Abused: Not on file   Housing Stability:     Unable to Pay for Housing in the Last Year: Not on file    Number of [place] in the Last Year: Not on file    Unstable Housing in the Last Year: Not on file       Family Hx:  Family History   Problem Relation Age of Onset    Coronary Art Dis Mother      Physical Examination:    BP (!) 141/93   Pulse 94   Temp 98.8 °F (37.1 °C) (Oral)   Resp 18   Ht 5' 9\" (1.753 m)   Wt 200 lb (90.7 kg)   SpO2 98%   BMI 29.53 kg/m²    Physical Exam  Vitals and nursing note reviewed. Constitutional:       General: He is in acute distress. Appearance: Normal appearance. He is diaphoretic. HENT:      Head: Normocephalic and atraumatic. Mouth/Throat:      Mouth: Mucous membranes are moist.      Pharynx: Oropharynx is clear. Eyes:      Extraocular Movements: Extraocular movements intact.       Conjunctiva/sclera: Conjunctivae normal.      Pupils: Pupils are equal, round, and reactive to light. Cardiovascular:      Rate and Rhythm: Regular rhythm. Tachycardia present. Pulses: Normal pulses. Heart sounds: Normal heart sounds. Pulmonary:      Effort: Pulmonary effort is normal.      Breath sounds: Rales present. Abdominal:      General: Abdomen is flat. Bowel sounds are normal.      Palpations: Abdomen is soft. Musculoskeletal:         General: Normal range of motion. Cervical back: Normal range of motion and neck supple. Right lower leg: Edema present. Skin:     General: Skin is warm. Neurological:      General: No focal deficit present. Mental Status: He is alert and oriented to person, place, and time. Mental status is at baseline.    Psychiatric:         Mood and Affect: Mood normal.         LABS:  CBC:  Lab Results   Component Value Date    WBC 7.1 01/18/2022    RBC 4.59 01/18/2022    HGB 8.9 01/18/2022    HCT 28.6 01/18/2022    MCV 62.2 01/18/2022    MCH 19.5 01/18/2022    MCHC 31.3 01/18/2022    RDW 19.3 01/18/2022     01/18/2022     CBC with Differential:   Lab Results   Component Value Date    WBC 7.1 01/18/2022    RBC 4.59 01/18/2022    HGB 8.9 01/18/2022    HCT 28.6 01/18/2022     01/18/2022    MCV 62.2 01/18/2022    MCH 19.5 01/18/2022    MCHC 31.3 01/18/2022    RDW 19.3 01/18/2022    LYMPHOPCT 12.1 01/18/2022    MONOPCT 20.0 01/18/2022    BASOPCT 1.0 01/18/2022    MONOSABS 1.4 01/18/2022    LYMPHSABS 0.9 01/18/2022    EOSABS 0.1 01/18/2022    BASOSABS 0.1 01/18/2022     CMP:    Lab Results   Component Value Date     01/18/2022    K 3.3 01/18/2022    CL 90 01/18/2022    CO2 26 01/18/2022    BUN 67 01/18/2022    CREATININE 5.95 01/18/2022    GFRAA 12.2 01/18/2022    LABGLOM 10.1 01/18/2022    GLUCOSE 82 01/18/2022    PROT 6.7 01/15/2022    LABALBU 3.6 01/15/2022    CALCIUM 9.0 01/18/2022    BILITOT 0.5 01/15/2022    ALKPHOS 143 01/15/2022    AST 24 01/15/2022    ALT 37 01/15/2022     BMP:    Lab Results Component Value Date     01/18/2022    K 3.3 01/18/2022    CL 90 01/18/2022    CO2 26 01/18/2022    BUN 67 01/18/2022    LABALBU 3.6 01/15/2022    CREATININE 5.95 01/18/2022    CALCIUM 9.0 01/18/2022    GFRAA 12.2 01/18/2022    LABGLOM 10.1 01/18/2022    GLUCOSE 82 01/18/2022     Magnesium:    Lab Results   Component Value Date    MG 2.7 01/18/2022     Troponin:    Lab Results   Component Value Date    TROPONINI 0.037 01/15/2022       Radiology:  XR CHEST (2 VW)    Result Date: 1/15/2022  EXAMINATION: XR CHEST (2 VW) CLINICAL HISTORY: TIGHTNESS OF BREATH. UNCONTROLLED HYPERTENSION. COMPARISONS: CHEST RADIOGRAPH AUGUST 28, 2021 FINDINGS: Osseous structures intact. Cardiopericardial silhouette enlarged and unchanged. Pulmonary vasculature indistinct. Ill-defined area of increased opacity obscures left lung base, with blunting left costophrenic angle. CARDIOMEGALY. INTERSTITIAL EDEMA. LEFT PLEURAL EFFUSION. LEFT LOWER LUNG ATELECTASIS/PNEUMONIA.       EKG: Sinus tachycardia without active signs of ischemia    Assessment:    Active Hospital Problems    Diagnosis Date Noted    Acute systolic heart failure ([facility] [address] [I50.21] 01/15/2022     Priority: Low     Sudden onset of abdominal and chest pain. Unclear etiology, stat CT chest and abd and possible coronary angiogram after   Acute on chronic heart failure with reduced ejection fraction EF 35 to 40% by TTE 2021. Patient admitted with a chest x-ray showing bilateral pulmonary congestion, BNP 25,000, patient with lower extremity edema. Reason of this heart failure decompensation was related to the fact that patient was skipping doses of diuretic secondary to leg cramping  CKD.   Creatinine worsening currently at 4.6 up from 3.85 on 8/28/2021  History of hypertension  History of medication noncompliance    Latest coronary angiogram 11/4/2020   showing normal coronaries  Latest transthoracic echocardiogram 4/22/2021 EF 35 to 40%  ANYA 1/18/22 EF 20%      Plan:  Continue telemetry  Stop metolazone, start lasix po maintenance   Hold Coreg 25 mg twice daily give hypotentensio  Hold hydralazine and Imdur for LV dysfunction give hypotentensio.   No ACE or ARB secondary to CKD  Strict ins and outs and daily weights  Please keep potassium between 4 and 5 and magnesium above 2  Fluid restriction to no more than 1 L/day  Patient should be considered for ICD placement if EF still reduced  Follow-up renal recommendations  We will continue to follow      Electronically signed by Nellie Garcia MD on 1/18/2022 at 11:26 AM

## 2022-01-18 NOTE — FLOWSHEET NOTE
Patient returned to room vs stable 127/87 HR 84. Dr Katalina Gonzalez with critical d-dimer. And OK for patient to resume diet.

## 2022-01-18 NOTE — PROGRESS NOTES
Hospitalist Progress Note      PCP: Keyur Womack MD    Date of Admission: 1/15/2022    Chief Complaint:    Chief Complaint   Patient presents with    Hypertension     sent by PCP for high blood pressure     Patient was seen as a rapid response this morning. On arrival Dr. Jessica Ramirez was bedside. Reported the patient was hypotensive and complaining of abdominal pain; the patient now states the pain is in his chest, radiating to his neck and he was diaphoretic. HIs abdominal exam was benign. EKG was done by Dr. Jessica Ramirez bedside with new T wave inversions but no significant ST elevation. The differential included PE vs NSTEMI so Dr Jessica Ramirez requested that we order a CTA of the chest and abdomen/pelvis with the plan of going to the cath lab if negative. Prior to this Dr Jessica Ramirez did inform the patient of the risk of renal failure due to this in the setting of his BLUE and the patient agreed. Stat labs were also drawn. Accompanied patient to CT and then to cath lab holding area where I discussed the images with radiology; they did not see any acute PE or acute process in the abdomen/pelvis that would explain what was going on. The patient received IVF and was able to come off of levophed; troponin 0.044 which is relatively stable given his renal insuffiencey and his last troponin being 0.037 so Dr Jessica Ramirez deferred need for [facility] at this time. Over 60 minutes of critical care time were spent on the care of this patient. Subjective:  Patient was seen this morning as above. At that time he was complaining of chest pain radiating to his neck and was anxious. By the end he was calm and comfortable.   12 point ROS negative other than mentioned above     Medications:  Reviewed    Infusion Medications    sodium chloride 75 mL/hr at 01/18/22 1207    sodium chloride       Scheduled Medications    sodium chloride flush  5-40 mL IntraVENous 2 times per day    heparin (porcine)  5,000 Units SubCUTAneous 3 times per day    [Held by provider] torsemide  20 mg Oral Daily    [Held by provider] metOLazone  10 mg Oral Daily    isosorbide mononitrate  120 mg Oral Daily    [Held by provider] hydrALAZINE  50 mg Oral 3 times per day    carvedilol  12.5 mg Oral BID    magnesium oxide  400 mg Oral Daily     PRN Meds: potassium chloride **OR** potassium alternative oral replacement **OR** potassium chloride, sodium chloride flush, sodium chloride, ondansetron **OR** ondansetron, polyethylene glycol, acetaminophen **OR** acetaminophen      Intake/Output Summary (Last 24 hours) at 1/18/2022 1419  Last data filed at 1/18/2022 0600  Gross per 24 hour   Intake --   Output 2000 ml   Net -2000 ml     Exam:    BP (!) 129/93   Pulse 83   Temp 98.8 °F (37.1 °C) (Oral)   Resp 16   Ht 5' 9\" (1.753 m)   Wt 200 lb (90.7 kg)   SpO2 98%   BMI 29.53 kg/m²     General appearance: No apparent distress, appears stated age and cooperative. HEENT: Conjunctivae/corneas clear. Neck: Trachea midline. Respiratory:  Normal respiratory effort. Clear to auscultation  Cardiovascular: Regular rate and rhythm  Abdomen: Soft, non-tender, non-distended with normal bowel sounds. Musculoskeletal: No clubbing, cyanosis or edema bilaterally. Neuro: Non Focal.   Capillary Refill: Brisk,< 3 seconds   Peripheral Pulses: +2 palpable, equal bilaterally     Labs:   Recent Labs     01/18/22  0552   WBC 7.1   HGB 8.9*   HCT 28.6*   *     Recent Labs     01/17/22  0531 01/17/22  0531 01/17/22  1655 01/18/22  0552 01/18/22  1118     --   --  135 136   K 2.9*   < > 3.8 3.3* 3.8   CL 93*  --   --  90* 91*   CO2 22  --   --  26 27   BUN 59*  --   --  67* 63*   CREATININE 5.47*  --   --  5.95* 6.19*   CALCIUM 8.7  --   --  9.0 8.8    < > = values in this interval not displayed. Recent Labs     01/18/22  1118   AST 24   ALT 31   BILITOT 0.4   ALKPHOS 124*     No results for input(s): INR in the last 72 hours.   Recent Labs     01/18/22  1118   TROPONINI 0.044* Urinalysis:      Lab Results   Component Value Date    NITRU Negative 01/15/2022    WBCUA 0-2 01/15/2022    BACTERIA Negative 01/15/2022    RBCUA 0-2 01/15/2022    BLOODU TRACE 01/15/2022    SPECGRAV 1.011 01/15/2022    GLUCOSEU Negative 01/15/2022     Radiology:  CTA CHEST W WO CONTRAST   Final Result      Borderline cardiomegaly. Small to moderate left pleural effusion. Dependent subsegmental lower lobe groundglass opacity, most likely reflecting interstitial edema. Other infectious or inflammatory etiologies, including covid 19 pneumonia, less likely. Dilatation ascending thoracic aorta. CT OF THE ABDOMEN AND PELVIS WITH INTRAVENOUS CONTRAST MEDIUM. FINDINGS:         Liver:  Normal in size, and shape. 6 mm cystlike area caudal, posterior segment right hepatic lobe, too small to further characterize. Bile Ducts:  Normal in caliber. Gallbladder:  No stones or wall thickening. Pancreas:  Normal without masses, cysts, ductal dilatation or calcification. Spleen:  Normal in size without masses or calcifications. No splenules. Kidneys:  Normal in size and enhancement. No hydronephrosis, or stones. 9 mm cystlike area posterior mid to lower pole left kidney. Adrenals:  Normal.       Small bowel:  Normal in caliber. Appendix:  Located within right inguinal canal, normal in appearance. Colon:  Normal in caliber. Diverticular change, ascending colon. Peritoneum:  No ascites, free air, or fluid collections. Vessels: Aorta normal in course and caliber. Portal vein, splenic vein, superior mesenteric vein are patent. Lymph nodes:        Retroperitoneal:  No enlarged retroperitoneal lymph nodes. Mesenteric:  No enlarged mesenteric lymph nodes. Pelvic: No enlarged pelvic lymph nodes. Ureters: Normal in course and caliber. No calcifications. Bladder: No wall thickening.        Reproductive organs: No pelvic masses. Abdominal Wall: Bowel no longer identified within right inguinal canal. However, dilated appendix demonstrated within right inguinal canal. No CT evidence of obstruction or incarceration. 11 mm fat-containing periumbilical anterior abdominal wall defect. Bones:  No bone lesions. No degenerative changes. No post operative changes. IMPRESSION:      Bowel no longer identified within right inguinal canal. However, appendix now visualized within right inguinal canal. No CT evidence of incarceration or obstruction. Diverticulosis, ascending colon. All CT scans at this facility use dose modulation, iterative reconstruction, and/or weight based dosing when appropriate to reduce radiation dose to as low as reasonably achievable. CT ABDOMEN PELVIS W IV CONTRAST Additional Contrast? Radiologist Recommendation   Final Result      Borderline cardiomegaly. Small to moderate left pleural effusion. Dependent subsegmental lower lobe groundglass opacity, most likely reflecting interstitial edema. Other infectious or inflammatory etiologies, including covid 19 pneumonia, less likely. Dilatation ascending thoracic aorta. CT OF THE ABDOMEN AND PELVIS WITH INTRAVENOUS CONTRAST MEDIUM. FINDINGS:         Liver:  Normal in size, and shape. 6 mm cystlike area caudal, posterior segment right hepatic lobe, too small to further characterize. Bile Ducts:  Normal in caliber. Gallbladder:  No stones or wall thickening. Pancreas:  Normal without masses, cysts, ductal dilatation or calcification. Spleen:  Normal in size without masses or calcifications. No splenules. Kidneys:  Normal in size and enhancement. No hydronephrosis, or stones. 9 mm cystlike area posterior mid to lower pole left kidney. Adrenals:  Normal.       Small bowel:  Normal in caliber. Appendix:  Located within right inguinal canal, normal in appearance. Colon:  Normal in caliber. Diverticular change, ascending colon. Peritoneum:  No ascites, free air, or fluid collections. Vessels: Aorta normal in course and caliber. Portal vein, splenic vein, superior mesenteric vein are patent. Lymph nodes:        Retroperitoneal:  No enlarged retroperitoneal lymph nodes. Mesenteric:  No enlarged mesenteric lymph nodes. Pelvic: No enlarged pelvic lymph nodes. Ureters: Normal in course and caliber. No calcifications. Bladder: No wall thickening. Reproductive organs: No pelvic masses. Abdominal Wall: Bowel no longer identified within right inguinal canal. However, dilated appendix demonstrated within right inguinal canal. No CT evidence of obstruction or incarceration. 11 mm fat-containing periumbilical anterior abdominal wall defect. Bones:  No bone lesions. No degenerative changes. No post operative changes. IMPRESSION:      Bowel no longer identified within right inguinal canal. However, appendix now visualized within right inguinal canal. No CT evidence of incarceration or obstruction. Diverticulosis, ascending colon. All CT scans at this facility use dose modulation, iterative reconstruction, and/or weight based dosing when appropriate to reduce radiation dose to as low as reasonably achievable. US DUP LOWER EXTREMITY RIGHT JESU   Final Result   NO SONOGRAPHIC EVIDENCE OF DEEP VENOUS THROMBOSIS WITHIN THE RIGHT LOWER EXTREMITY. XR CHEST (2 VW)   Final Result   CARDIOMEGALY. INTERSTITIAL EDEMA. LEFT PLEURAL EFFUSION. LEFT LOWER LUNG ATELECTASIS/PNEUMONIA.         Assessment/Plan:    #Acute on chronic combined systolic and diastolic CHF     - Likely on the dry side per cardiology which resulted in todays presentation; plan for EP consult for possible ICD    #BLUE on CKD      - consult nephrology for their opinion and assistance; will likely worsen now that he has had contrast; patient was discussed the risk benefit by cardiology prior to him going to CT scan    #HTN    - continue home meds    Active Hospital Problems    Diagnosis Date Noted    Acute systolic heart failure ([facility] [address] [I50.21] 01/15/2022     Additional work up or/and treatment plan may be added today or then after based on clinical progression. I am managing a portion of pt care. Some medical issues are handled by other specialists. Additional work up and treatment should be done in out pt setting by pt PCP and other out pt providers. In addition to examining and evaluating pt, I spent additional time explaining care, normal and abnormal findings, and treatment plan. All of pt questions were answered. Counseling, diet and education were  provided. Case will be discussed with nursing staff when appropriate. Family will be updated if and when appropriate. Diet: ADULT DIET; Regular;  Low Sodium (2 gm)    Code Status: Full Code    PT/OT Eval     Electronically signed by Angie Mcgowan MD on 1/18/2022 at 2:19 PM

## 2022-01-18 NOTE — CONSULTS
[facility]  [facility]           Reason for Consult:  BLUE on CKD  Requesting Physician:  Dr. Miguel Amor    Chief Complaint:  hypertension  History Obtained From:  patient, electronic medical record    History of Present Ilness:    54 y.o. male with history s/f combined HFrEF w/ DD, CKD stage IV, HTN, non-compliance who presented for hypertension. Pt had been worsening SOB and BLE swelling as well. Nephrology consulted for BLUE on CKD. Notably pt was seen at [facility] in 11/21 for the same as well. Presented w/ function at baseline (prior baseline Scr ~3.6-3.8 w/ eGFR low 20s then down to 4.6-4.8 w/ eGFR 16-17 ml/lmin in 11/21 when patient was seen at [facility]. However worsened w/ diuresis, was on lasix 40 mg BID, torsemide 20 mg QD and metolazone QD. Lasix and thiazide stopped however torsemide continued. Yesterday pt developed hypotension, chest pain and diaphoresis requiring rapid response. Was on pressors transiently, also on fluids as well. Seen by Cardiology and EP. Got CTA C/A/P which didn't show any PE. BP remains low and pt getting NS bolus when seen this AM. Remains on some cardiac medications, others have been held. Scr remains elevated at 5.9. Has had some improvement in BP    Past Medical History:        Diagnosis Date    Abnormal EKG 9/27/2019    Acute kidney injury (BLUE) with acute tubular necrosis (ATN) (HCC)     Cardiomyopathy ([facility] [address]     per echo 8/2019    Chronic combined systolic and diastolic CHF (congestive heart failure) ([facility] [address] 9/27/2019    ETOH abuse     Hypertension     Tobacco abuse        Past Surgical History:        Procedure Laterality Date    HERNIA REPAIR Right 2/10/2021    RIGHT INGUINAL HERNIA REPAIR WITH MESH performed by Mary Banks MD at [address] [place] [address] Medications:    No current facility-administered medications on file prior to encounter.      Current Outpatient Medications on File Prior to Encounter   Medication Sig Dispense Refill    torsemide (DEMADEX) 20 MG tablet Take 2 tablets by mouth daily 60 tablet 1    ivabradine (CORLANOR) 5 MG TABS tablet Take 1 tablet by mouth 2 times daily (with meals) 60 tablet 3    carvedilol (COREG) 25 MG tablet Take 1 tablet by mouth 2 times daily HOLD for SBP<100 or HR<60 60 tablet 3    isosorbide mononitrate (IMDUR) 120 MG extended release tablet Take 1 tablet by mouth daily 30 tablet 3    hydrALAZINE (APRESOLINE) 100 MG tablet Take 1 tablet by mouth 3 times daily 90 tablet 3    nicotine (NICODERM CQ) 21 MG/24HR Place 1 patch onto the skin daily (Patient not taking: Reported on 2021) 30 patch 3    aspirin 81 MG EC tablet Take 1 tablet by mouth daily 30 tablet 3       Allergies:  Lipitor [atorvastatin] and Lisinopril    Social History:    Social History     Socioeconomic History    Marital status: Single     Spouse name: Not on file    Number of children: Not on file    Years of education: Not on file    Highest education level: Not on file   Occupational History    Not on file   Tobacco Use    Smoking status: Current Every Day Smoker     Packs/day: 1.00     Types: Cigarettes     Last attempt to quit: 12/3/2020     Years since quittin.1    Smokeless tobacco: Never Used    Tobacco comment: currently smoking only couple cigarettes daily   Substance and Sexual Activity    Alcohol use: Yes    Drug use: Never    Sexual activity: Not on file   Other Topics Concern    Not on file   Social History Narrative    Not on file     Social Determinants of Health     Financial Resource Strain:     Difficulty of Paying Living Expenses: Not on file   Food Insecurity:     Worried About Running Out of Food in the Last Year: Not on file    Ginette of Food in the Last Year: Not on file   Transportation Needs:     Lack of Transportation (Medical): Not on file    Lack of Transportation (Non-Medical):  Not on file   Physical Activity:     Days of Exercise per Week: Not on file    Minutes of Exercise per Session: Not on file Stress:     Feeling of Stress : Not on file   Social Connections:     Frequency of Communication with Friends and Family: Not on file    Frequency of Social Gatherings with Friends and Family: Not on file    Attends Yazidi Services: Not on file    Active Member of Clubs or Organizations: Not on file    Attends Club or Organization Meetings: Not on file    Marital Status: Not on file   Intimate Partner Violence:     Fear of Current or Ex-Partner: Not on file    Emotionally Abused: Not on file    Physically Abused: Not on file    Sexually Abused: Not on file   Housing Stability:     Unable to Pay for Housing in the Last Year: Not on file    Number of Places Lived in the Last Year: Not on file    Unstable Housing in the Last Year: Not on file       Family History:   Family History   Problem Relation Age of Onset    Coronary Art Dis Mother        Review of Systems:   Positives in bold  Constitutional: fever, chills, fatigue, malaise   HENT:  rhinorrhea, sinus pain, sore throat, epistaxis    Eyes:  photophobia, visual disturbance, eye redness  Respiratory: shortness of breath, cough, hemoptysis    Cardiovascular: chest pain, palpitations, orthopnea, leg swelling   Gastrointestinal: abdominal pain, nausea, vomiting, diarrhea, constipation   Endocrine: polydipsia, polyphagia, cold intolerance, heat intolerance  Genitourinary: dysuria, flank pain, frequency, urgency   Hematologic: easy bruising, easy bleeding  Musculoskeletal: back pain, neck pain, myalgias, arthalgias  Neurological: syncope, lightheadedness, dizziness, seizures, tremors, weakness  Psychiatric/Behavioral: anxiety, depression, hallucinations  Skin: rash, itching    Physical exam:   Constitutional:  VITALS:  BP (!) 129/93   Pulse 83   Temp 98.8 °F (37.1 °C) (Oral)   Resp 16   Ht 5' 9\" (1.753 m)   Wt 200 lb (90.7 kg)   SpO2 98%   BMI 29.53 kg/m²     General: alert, in no apparent distress  HEENT: normocephalic, atraumatic, anicteric  Neck: supple, no mass  Lungs: non-labored respirations, clear to auscultation bilaterally  Heart: regular rate and rhythm, no murmurs or rubs  Abdomen: soft, non-tender, non-distended  MSK: no joint swelling or tenderness  Ext: no cyanosis, no peripheral edema  Neuro: alert and oriented, no gross abnormalities  Psych: normal mood and affect    Data/  CBC:   Lab Results   Component Value Date    WBC 7.1 01/18/2022    RBC 4.59 01/18/2022    HGB 8.9 01/18/2022    HCT 28.6 01/18/2022    MCV 62.2 01/18/2022    MCH 19.5 01/18/2022    MCHC 31.3 01/18/2022    RDW 19.3 01/18/2022     01/18/2022     BMP:    Lab Results   Component Value Date     01/18/2022    K 3.8 01/18/2022    CL 91 01/18/2022    CO2 27 01/18/2022    BUN 63 01/18/2022    LABALBU 3.8 01/18/2022    CREATININE 6.19 01/18/2022    CALCIUM 8.8 01/18/2022    GFRAA 11.7 01/18/2022    LABGLOM 9.7 01/18/2022    GLUCOSE 73 01/18/2022     Echocardiogram complete 2D with doppler with color    Result Date: 1/18/2022  Transthoracic Echocardiography Report (TTE)  Demographics   Patient Name    Tomasa Medina Gender               Male   Patient Number  [id number]       Race                 [facility]   Visit Number    [id number]      Room Number          Q285   Corporate ID                   Date of Study        01/18/2022   Accession       [id number]     Referring Physician  Number   Date of Birth   1970     Sonographer          Rizwana Salter   Age             54 year(s)     Interpreting         [facility])                                 Physician            Cardiology                                                      Lloyd Cee MD  Procedure Type of Study   TTE procedure:ECHO COMPLETE 2D W/DOP W/COLOR. Procedure Date Date: 01/18/2022 Start: 07:40 AM Study Location: Portable Technical Quality: Adequate visualization Indications:Congestive heart failure.  Patient Status: Routine Height: 69 inches Weight: 200 pounds BSA: 2.07 m^2 BMI: 29.53 kg/m^2 BP: 119/73 mmHg  Conclusions   Summary  Left ventricular ejection fraction is visually estimated at 20%. Pseudonormal filling pattern noted. LV mild Dilated   Signature   ----------------------------------------------------------------  Electronically signed by Soila Márquez MD(Interpreting  physician) on 01/18/2022 08:24 AM  ----------------------------------------------------------------   Findings  Left Ventricle Left ventricular ejection fraction is visually estimated at 20%. Pseudonormal filling pattern noted. LV mild Dilated Right Ventricle Normal right ventricle structure and function. Normal right ventricle systolic pressure. Left Atrium Normal left atrium. Right Atrium Normal right atrium. Mitral Valve Normal mitral valve structure and function. Tricuspid Valve Normal tricuspid valve structure and function. Aortic Valve Normal aortic valve structure and function. Pulmonic Valve The pulmonic valve was not well visualized . Pericardial Effusion No evidence of pericardial effusion. Pleural Effusion No evidence of pleural effusion. Aorta \ Miscellaneous The aorta is within normal limits. M-Mode Measurements (cm)   LVIDd: 5.11 cm                         LVIDs: 4.59 cm  IVSd: 1.84 cm                          IVSs: 2.35 cm  LVPWd: 1.45 cm                         LVPWs: 1.88 cm  Rt. Vent.  Dimension: 2.39 cm           AO Root Dimension: 2.31 cm                                         ACS: 3.87 cm                                         LVOT: 2.47 cm  Doppler Measurements:   AV Velocity:0.03 m/s                   MV Peak E-Wave: 0.99 m/s  AV Peak Gradient: 5.94 mmHg  AV Mean Gradient: 3.12 mmHg            MV P1/2t: 30.1 msec  AV Area (Continuity):3.24 cm^2         MVA by PHT7.3 cm^2  TR Velocity:1.97 m/s  TR Gradient:15.49 mmHg  Valves  Mitral Valve   Peak E-Wave: 0.99 m/s              Peak Gradient: 3.89 mmHg  P1/2t: 30.1 msec   Area (PHT): 7.3 cm^2   Aortic Valve Peak Velocity: 1.22 m/s                Mean Velocity: 0.83 m/s  Peak Gradient: 5.94 mmHg               Mean Gradient: 3.12 mmHg  Area (continuity): 3.24 cm^2  AV VTI: 19.57 cm   Cusp Separation: 3.87 cm   Tricuspid Valve   TR Velocity: 1.97 m/s              TR Gradient: 15.49 mmHg   LVOT   Peak Velocity: 1 m/s                 Mean Velocity: 0.59 m/s  Peak Gradient: 3.32 mmHg             Mean Gradient: 1.62 mmHg  LVOT Diameter: 2.47 cm               LVOT VTI: 13.23 cm  Structures  Left Atrium   LA Volume/Index: 66.64 ml /32 m^2             LA Area: 15.92 cm^2   Left Ventricle   Diastolic Dimension: 5.92 cm          Systolic Dimension: 7.51 cm  Septum Diastolic: 5.93 cm             Septum Systolic: 2.71 cm  PW Diastolic: 4.11 cm                 PW Systolic: 3.12 cm                                        FS: 10.2 %  LV EDV/LV EDV Index: 124.34 ml/60 m^2 LV ESV/LV ESV Index: 96.77 ml/47 m^2  EF Calculated: 22.2 %   LVOT Diameter: 2.47 cm   Right Ventricle   Diastolic Dimension: 1.80 cm  Aorta/ Miscellaneous Aorta   Aortic Root: 2.31 cm  LVOT Diameter: 2.47 cm      XR CHEST (2 VW)    Result Date: 1/15/2022  EXAMINATION: XR CHEST (2 VW) CLINICAL HISTORY: TIGHTNESS OF BREATH. UNCONTROLLED HYPERTENSION. COMPARISONS: CHEST RADIOGRAPH AUGUST 28, 2021 FINDINGS: Osseous structures intact. Cardiopericardial silhouette enlarged and unchanged. Pulmonary vasculature indistinct. Ill-defined area of increased opacity obscures left lung base, with blunting left costophrenic angle. CARDIOMEGALY. INTERSTITIAL EDEMA. LEFT PLEURAL EFFUSION. LEFT LOWER LUNG ATELECTASIS/PNEUMONIA. CTA CHEST W WO CONTRAST    Result Date: 1/18/2022  CTA of the chest, with CT of the abdomen and pelvis, with intravenous contrast medium. HISTORY: Shortness of breath, chest pain, abdominal pain.   TECHNICAL FACTORS: CT imaging of the chest, abdomen, and pelvis, was obtained and formatted as 5 mm contiguous axial images from the thoracic inlet through the section coronal MIP 3D reconstructions were performed on a separate workstation. Intravenous contrast medium:  100 ml of Isovue-300 Comparison:  CT abdomen pelvis, January 12, 2021. CTA OF THE CHEST WITH INTRAVENOUS CONTRAST MEDIUM. FINDINGS: Pulmonary arteries: No intraluminal filling defects. Thoracic aorta: AP and transverse dimension, ascending thoracic aorta, measures 4.1 x 4.2 cm. Cardiac Size: Upper limits of normal. Pericardial effusion: None. Right lung: No nodules, masses, pleural effusion, pneumothorax. Dependent subsegmental groundglass opacity, right lower lobe. Left lung: No nodules, masses, pneumothorax. Small to moderate left pleural effusion. Dependent subsegmental groundglass opacity, left lower lobe. Lymph nodes: No hilar, mediastinal, or axillary lymph node enlargement. Musculoskeletal:No osteoblastic, and no osteolytic lesions. Borderline cardiomegaly. Small to moderate left pleural effusion. Dependent subsegmental lower lobe groundglass opacity, most likely reflecting interstitial edema. Other infectious or inflammatory etiologies, including covid 19 pneumonia, less likely. Dilatation ascending thoracic aorta. CT OF THE ABDOMEN AND PELVIS WITH INTRAVENOUS CONTRAST MEDIUM. FINDINGS: Liver:  Normal in size, and shape. 6 mm cystlike area caudal, posterior segment right hepatic lobe, too small to further characterize. Bile Ducts:  Normal in caliber. Gallbladder:  No stones or wall thickening. Pancreas:  Normal without masses, cysts, ductal dilatation or calcification. Spleen:  Normal in size without masses or calcifications. No splenules. Kidneys:  Normal in size and enhancement. No hydronephrosis, or stones. 9 mm cystlike area posterior mid to lower pole left kidney. Adrenals:  Normal. Small bowel:  Normal in caliber. Appendix:  Located within right inguinal canal, normal in appearance. Colon:  Normal in caliber. Diverticular change, ascending colon.  Peritoneum:  No ascites, free air, or fluid collections. Vessels: Aorta normal in course and caliber. Portal vein, splenic vein, superior mesenteric vein are patent. Lymph nodes:  Retroperitoneal:  No enlarged retroperitoneal lymph nodes. Mesenteric:  No enlarged mesenteric lymph nodes. Pelvic: No enlarged pelvic lymph nodes. Ureters: Normal in course and caliber. No calcifications. Bladder: No wall thickening. Reproductive organs: No pelvic masses. Abdominal Wall: Bowel no longer identified within right inguinal canal. However, dilated appendix demonstrated within right inguinal canal. No CT evidence of obstruction or incarceration. 11 mm fat-containing periumbilical anterior abdominal wall defect. Bones:  No bone lesions. No degenerative changes. No post operative changes. IMPRESSION: Bowel no longer identified within right inguinal canal. However, appendix now visualized within right inguinal canal. No CT evidence of incarceration or obstruction. Diverticulosis, ascending colon. All CT scans at this facility use dose modulation, iterative reconstruction, and/or weight based dosing when appropriate to reduce radiation dose to as low as reasonably achievable. US DUP LOWER EXTREMITY RIGHT JESU    Result Date: 1/17/2022  VENOUS DUPLEX ULTRASOUND OF THE RIGHT LOWER EXTREMITY CLINICAL HISTORY:  edema, pain COMPARISON:  NONE AVAILABLE TECHNIQUE:  Sonographic imaging of the deep venous system of the right lower extremity was performed by a registered sonographer and the images were submitted for interpretation. FINDINGS:  The common femoral, superficial femoral, and popliteal veins demonstrate normal color flow, augmentation, and compressibility. No venous duplex ultrasound evidence of DVT in the right lower extremity. NO SONOGRAPHIC EVIDENCE OF DEEP VENOUS THROMBOSIS WITHIN THE RIGHT LOWER EXTREMITY. Assessment:  54 y.o. male with history s/f combined HFrEF w/ DD, CKD stage IV, HTN, non-compliance who presented for hypertension.  Pt had been worsening SOB and BLE swelling as well. Nephrology consulted for BLUE on CKD. 1. BLUE on CKD stage IV: most likely 2/2 overdiuresis, had significant diuresis w/ lasix, torsemide and thiazide, CKD 2/2 HTN and CRS, prior baseline Scr ~ Scr ~3.6-3.8 w/ eGFR low 20s then down to 4.6-4.8 w/ eGFR 16-17 ml/lmin in 11/21, pt of mine  2. Fluid overload: was on lasix, metolazone and thiazide, LVEF 20%  3. Hypotension  4. Hypermagnesemia  5. Anemia: tsat low    Plan:  - at risk of ending up on RRT due to multiple insults including contrast and significant hypotension, however currently no acute indication, remains non-oliguric, discussed w/ patient   - cardiology managing anti-hypertensives  - agree w/ gentle boluses or hydration PRN  - ok for ICD placement from renal standpoint   - agree w/ stopping torsemide as well, though EF low no current reason for diuresis   - checking ferritin, will give IV iron, likely will need more doses      Thank you for the consultation. Will continue to follow  Please do not hesitate to call with questions.     Brent Glasgow MD, MD

## 2022-01-19 LAB
ANION GAP SERPL CALCULATED.3IONS-SCNC: 19 MEQ/L (ref 9–15)
ANISOCYTOSIS: ABNORMAL
BASOPHILS ABSOLUTE: 0.1 K/UL (ref 0–0.2)
BASOPHILS RELATIVE PERCENT: 0.8 %
BUN BLDV-MCNC: 64 MG/DL (ref 6–20)
CALCIUM SERPL-MCNC: 8.8 MG/DL (ref 8.5–9.9)
CHLORIDE BLD-SCNC: 95 MEQ/L (ref 95–107)
CO2: 23 MEQ/L (ref 20–31)
CREAT SERPL-MCNC: 5.98 MG/DL (ref 0.7–1.2)
EOSINOPHILS ABSOLUTE: 0.1 K/UL (ref 0–0.7)
EOSINOPHILS RELATIVE PERCENT: 2 %
GFR AFRICAN AMERICAN: 12.2
GFR NON-AFRICAN AMERICAN: 10.1
GLUCOSE BLD-MCNC: 100 MG/DL (ref 70–99)
HCT VFR BLD CALC: 28.2 % (ref 42–52)
HEMOGLOBIN: 8.9 G/DL (ref 14–18)
HYPOCHROMIA: ABNORMAL
LYMPHOCYTES ABSOLUTE: 0.6 K/UL (ref 1–4.8)
LYMPHOCYTES RELATIVE PERCENT: 9 %
MAGNESIUM: 2.6 MG/DL (ref 1.7–2.4)
MCH RBC QN AUTO: 19.8 PG (ref 27–31.3)
MCHC RBC AUTO-ENTMCNC: 31.5 % (ref 33–37)
MCV RBC AUTO: 62.8 FL (ref 80–100)
MICROCYTES: ABNORMAL
MONOCYTES ABSOLUTE: 1.2 K/UL (ref 0.2–0.8)
MONOCYTES RELATIVE PERCENT: 18.8 %
NEUTROPHILS ABSOLUTE: 4.6 K/UL (ref 1.4–6.5)
NEUTROPHILS RELATIVE PERCENT: 69.4 %
PDW BLD-RTO: 19 % (ref 11.5–14.5)
PLATELET # BLD: 356 K/UL (ref 130–400)
POTASSIUM SERPL-SCNC: 3.9 MEQ/L (ref 3.4–4.9)
RBC # BLD: 4.48 M/UL (ref 4.7–6.1)
SODIUM BLD-SCNC: 137 MEQ/L (ref 135–144)
WBC # BLD: 6.6 K/UL (ref 4.8–10.8)

## 2022-01-19 PROCEDURE — 99233 SBSQ HOSP IP/OBS HIGH 50: CPT | Performed by: INTERNAL MEDICINE

## 2022-01-19 PROCEDURE — 2060000000 HC ICU INTERMEDIATE R&B

## 2022-01-19 PROCEDURE — 6370000000 HC RX 637 (ALT 250 FOR IP): Performed by: STUDENT IN AN ORGANIZED HEALTH CARE EDUCATION/TRAINING PROGRAM

## 2022-01-19 PROCEDURE — 80048 BASIC METABOLIC PNL TOTAL CA: CPT

## 2022-01-19 PROCEDURE — 2580000003 HC RX 258: Performed by: STUDENT IN AN ORGANIZED HEALTH CARE EDUCATION/TRAINING PROGRAM

## 2022-01-19 PROCEDURE — 6360000002 HC RX W HCPCS: Performed by: STUDENT IN AN ORGANIZED HEALTH CARE EDUCATION/TRAINING PROGRAM

## 2022-01-19 PROCEDURE — 36415 COLL VENOUS BLD VENIPUNCTURE: CPT

## 2022-01-19 PROCEDURE — 2580000003 HC RX 258: Performed by: INTERNAL MEDICINE

## 2022-01-19 PROCEDURE — 6360000002 HC RX W HCPCS: Performed by: FAMILY MEDICINE

## 2022-01-19 PROCEDURE — 2580000003 HC RX 258: Performed by: FAMILY MEDICINE

## 2022-01-19 PROCEDURE — 85025 COMPLETE CBC W/AUTO DIFF WBC: CPT

## 2022-01-19 PROCEDURE — 83735 ASSAY OF MAGNESIUM: CPT

## 2022-01-19 PROCEDURE — 2700000000 HC OXYGEN THERAPY PER DAY

## 2022-01-19 PROCEDURE — 6370000000 HC RX 637 (ALT 250 FOR IP): Performed by: INTERNAL MEDICINE

## 2022-01-19 RX ORDER — HYDRALAZINE HYDROCHLORIDE 10 MG/1
10 TABLET, FILM COATED ORAL EVERY 8 HOURS SCHEDULED
Status: DISCONTINUED | OUTPATIENT
Start: 2022-01-19 | End: 2022-01-20

## 2022-01-19 RX ORDER — 0.9 % SODIUM CHLORIDE 0.9 %
250 INTRAVENOUS SOLUTION INTRAVENOUS ONCE
Status: COMPLETED | OUTPATIENT
Start: 2022-01-19 | End: 2022-01-19

## 2022-01-19 RX ORDER — ISOSORBIDE MONONITRATE 30 MG/1
30 TABLET, EXTENDED RELEASE ORAL DAILY
Status: DISCONTINUED | OUTPATIENT
Start: 2022-01-20 | End: 2022-01-20

## 2022-01-19 RX ADMIN — Medication 10 ML: at 09:16

## 2022-01-19 RX ADMIN — HEPARIN SODIUM 5000 UNITS: 5000 INJECTION INTRAVENOUS; SUBCUTANEOUS at 22:44

## 2022-01-19 RX ADMIN — SODIUM CHLORIDE 250 ML: 9 INJECTION, SOLUTION INTRAVENOUS at 12:29

## 2022-01-19 RX ADMIN — Medication 10 ML: at 22:27

## 2022-01-19 RX ADMIN — ISOSORBIDE MONONITRATE 120 MG: 120 TABLET ORAL at 09:16

## 2022-01-19 RX ADMIN — Medication 400 MG: at 09:16

## 2022-01-19 RX ADMIN — CARVEDILOL 12.5 MG: 12.5 TABLET, FILM COATED ORAL at 09:16

## 2022-01-19 RX ADMIN — HEPARIN SODIUM 5000 UNITS: 5000 INJECTION INTRAVENOUS; SUBCUTANEOUS at 06:34

## 2022-01-19 RX ADMIN — CARVEDILOL 12.5 MG: 12.5 TABLET, FILM COATED ORAL at 18:32

## 2022-01-19 RX ADMIN — HYDRALAZINE HYDROCHLORIDE 10 MG: 10 TABLET, FILM COATED ORAL at 22:44

## 2022-01-19 RX ADMIN — HYDRALAZINE HYDROCHLORIDE 10 MG: 10 TABLET, FILM COATED ORAL at 17:00

## 2022-01-19 RX ADMIN — SODIUM CHLORIDE: 9 INJECTION, SOLUTION INTRAVENOUS at 13:44

## 2022-01-19 RX ADMIN — IRON SUCROSE 200 MG: 20 INJECTION, SOLUTION INTRAVENOUS at 22:27

## 2022-01-19 RX ADMIN — HEPARIN SODIUM 5000 UNITS: 5000 INJECTION INTRAVENOUS; SUBCUTANEOUS at 13:54

## 2022-01-19 ASSESSMENT — PAIN SCALES - GENERAL: PAINLEVEL_OUTOF10: 0

## 2022-01-19 NOTE — PROGRESS NOTES
Hospitalist Progress Note      PCP: Mauro Sicard, MD    Date of Admission: 1/15/2022    Chief Complaint:    Chief Complaint   Patient presents with    Hypertension     sent by PCP for high blood pressure       Subjective:  Patient feels a bit tired and his hypotensive; no chest pain or pressure. 12 point ROS negative other than mentioned above     Medications:  Reviewed    Infusion Medications    sodium chloride 50 mL/hr at 01/19/22 1355    sodium chloride       Scheduled Medications    sodium chloride flush  5-40 mL IntraVENous 2 times per day    heparin (porcine)  5,000 Units SubCUTAneous 3 times per day    [Held by provider] torsemide  20 mg Oral Daily    [Held by provider] metOLazone  10 mg Oral Daily    isosorbide mononitrate  120 mg Oral Daily    [Held by provider] hydrALAZINE  50 mg Oral 3 times per day    carvedilol  12.5 mg Oral BID    magnesium oxide  400 mg Oral Daily     PRN Meds: potassium chloride **OR** potassium alternative oral replacement **OR** potassium chloride, sodium chloride flush, sodium chloride, ondansetron **OR** ondansetron, polyethylene glycol, acetaminophen **OR** acetaminophen      Intake/Output Summary (Last 24 hours) at 1/19/2022 1405  Last data filed at 1/19/2022 1355  Gross per 24 hour   Intake 2249.31 ml   Output 2950 ml   Net -700.69 ml     Exam:    /84   Pulse 86   Temp 98.6 °F (37 °C) (Oral)   Resp 16   Ht 5' 9\" (1.753 m)   Wt 200 lb (90.7 kg)   SpO2 100%   BMI 29.53 kg/m²     General appearance: No apparent distress, appears stated age and cooperative. HEENT: Conjunctivae/corneas clear. Neck: Trachea midline. Respiratory:  Normal respiratory effort. Clear to auscultation  Cardiovascular: Regular rate and rhythm  Abdomen: Soft, non-tender, non-distended with normal bowel sounds. Musculoskeletal: No clubbing, cyanosis or edema bilaterally.    Neuro: Non Focal.   Capillary Refill: Brisk,< 3 seconds   Peripheral Pulses: +2 palpable, equal bilaterally     Labs:   Recent Labs     01/18/22  0552 01/19/22  0534   WBC 7.1 6.6   HGB 8.9* 8.9*   HCT 28.6* 28.2*   * 356     Recent Labs     01/18/22  1118 01/18/22  2115 01/19/22  0534    133* 137   K 3.8 3.9 3.9   CL 91* 92* 95   CO2 27 24 23   BUN 63* 66* 64*   CREATININE 6.19* 5.95* 5.98*   CALCIUM 8.8 8.6 8.8     Recent Labs     01/18/22  1118   AST 24   ALT 31   BILITOT 0.4   ALKPHOS 124*     No results for input(s): INR in the last 72 hours. Recent Labs     01/18/22  1118   TROPONINI 0.044*     Urinalysis:      Lab Results   Component Value Date    NITRU Negative 01/15/2022    WBCUA 0-2 01/15/2022    BACTERIA Negative 01/15/2022    RBCUA 0-2 01/15/2022    BLOODU TRACE 01/15/2022    SPECGRAV 1.011 01/15/2022    GLUCOSEU Negative 01/15/2022     Radiology:  CTA CHEST W WO CONTRAST   Final Result      Borderline cardiomegaly. Small to moderate left pleural effusion. Dependent subsegmental lower lobe groundglass opacity, most likely reflecting interstitial edema. Other infectious or inflammatory etiologies, including covid 19 pneumonia, less likely. Dilatation ascending thoracic aorta. CT OF THE ABDOMEN AND PELVIS WITH INTRAVENOUS CONTRAST MEDIUM. FINDINGS:         Liver:  Normal in size, and shape. 6 mm cystlike area caudal, posterior segment right hepatic lobe, too small to further characterize. Bile Ducts:  Normal in caliber. Gallbladder:  No stones or wall thickening. Pancreas:  Normal without masses, cysts, ductal dilatation or calcification. Spleen:  Normal in size without masses or calcifications. No splenules. Kidneys:  Normal in size and enhancement. No hydronephrosis, or stones. 9 mm cystlike area posterior mid to lower pole left kidney. Adrenals:  Normal.       Small bowel:  Normal in caliber. Appendix:  Located within right inguinal canal, normal in appearance. Colon:  Normal in caliber.  Diverticular change, ascending colon. Peritoneum:  No ascites, free air, or fluid collections. Vessels: Aorta normal in course and caliber. Portal vein, splenic vein, superior mesenteric vein are patent. Lymph nodes:        Retroperitoneal:  No enlarged retroperitoneal lymph nodes. Mesenteric:  No enlarged mesenteric lymph nodes. Pelvic: No enlarged pelvic lymph nodes. Ureters: Normal in course and caliber. No calcifications. Bladder: No wall thickening. Reproductive organs: No pelvic masses. Abdominal Wall: Bowel no longer identified within right inguinal canal. However, dilated appendix demonstrated within right inguinal canal. No CT evidence of obstruction or incarceration. 11 mm fat-containing periumbilical anterior abdominal wall defect. Bones:  No bone lesions. No degenerative changes. No post operative changes. IMPRESSION:      Bowel no longer identified within right inguinal canal. However, appendix now visualized within right inguinal canal. No CT evidence of incarceration or obstruction. Diverticulosis, ascending colon. All CT scans at this facility use dose modulation, iterative reconstruction, and/or weight based dosing when appropriate to reduce radiation dose to as low as reasonably achievable. CT ABDOMEN PELVIS W IV CONTRAST Additional Contrast? Radiologist Recommendation   Final Result      Borderline cardiomegaly. Small to moderate left pleural effusion. Dependent subsegmental lower lobe groundglass opacity, most likely reflecting interstitial edema. Other infectious or inflammatory etiologies, including covid 19 pneumonia, less likely. Dilatation ascending thoracic aorta. CT OF THE ABDOMEN AND PELVIS WITH INTRAVENOUS CONTRAST MEDIUM. FINDINGS:         Liver:  Normal in size, and shape.  6 mm cystlike area caudal, posterior segment right hepatic lobe, too small to further characterize. Bile Ducts:  Normal in caliber. Gallbladder:  No stones or wall thickening. Pancreas:  Normal without masses, cysts, ductal dilatation or calcification. Spleen:  Normal in size without masses or calcifications. No splenules. Kidneys:  Normal in size and enhancement. No hydronephrosis, or stones. 9 mm cystlike area posterior mid to lower pole left kidney. Adrenals:  Normal.       Small bowel:  Normal in caliber. Appendix:  Located within right inguinal canal, normal in appearance. Colon:  Normal in caliber. Diverticular change, ascending colon. Peritoneum:  No ascites, free air, or fluid collections. Vessels: Aorta normal in course and caliber. Portal vein, splenic vein, superior mesenteric vein are patent. Lymph nodes:        Retroperitoneal:  No enlarged retroperitoneal lymph nodes. Mesenteric:  No enlarged mesenteric lymph nodes. Pelvic: No enlarged pelvic lymph nodes. Ureters: Normal in course and caliber. No calcifications. Bladder: No wall thickening. Reproductive organs: No pelvic masses. Abdominal Wall: Bowel no longer identified within right inguinal canal. However, dilated appendix demonstrated within right inguinal canal. No CT evidence of obstruction or incarceration. 11 mm fat-containing periumbilical anterior abdominal wall defect. Bones:  No bone lesions. No degenerative changes. No post operative changes. IMPRESSION:      Bowel no longer identified within right inguinal canal. However, appendix now visualized within right inguinal canal. No CT evidence of incarceration or obstruction. Diverticulosis, ascending colon. All CT scans at this facility use dose modulation, iterative reconstruction, and/or weight based dosing when appropriate to reduce radiation dose to as low as reasonably achievable. US DUP LOWER EXTREMITY RIGHT JESU   Final Result   NO SONOGRAPHIC EVIDENCE OF DEEP VENOUS THROMBOSIS WITHIN THE RIGHT LOWER EXTREMITY. XR CHEST (2 VW)   Final Result   CARDIOMEGALY. INTERSTITIAL EDEMA. LEFT PLEURAL EFFUSION. LEFT LOWER LUNG ATELECTASIS/PNEUMONIA. Assessment/Plan:    #Acute on chronic combined systolic and diastolic CHF     - Likely on the dry side per cardiology which resulted in todays presentation; 250cc bolus ordered; plan for ICD    #BLUE on CKD      - consult nephrology for their opinion and assistance and advice given contrast ordered by cardiology yesterday for CTA as well as the potential need for more for cardiac procedures    #HTN    - continue home meds; holding hydralazine due to hypotension    Active Hospital Problems    Diagnosis Date Noted    Acute systolic heart failure ([facility] [address] [I50.21] 01/15/2022     Additional work up or/and treatment plan may be added today or then after based on clinical progression. I am managing a portion of pt care. Some medical issues are handled by other specialists. Additional work up and treatment should be done in out pt setting by pt PCP and other out pt providers. In addition to examining and evaluating pt, I spent additional time explaining care, normal and abnormal findings, and treatment plan. All of pt questions were answered. Counseling, diet and education were  provided. Case will be discussed with nursing staff when appropriate. Family will be updated if and when appropriate. Diet: ADULT DIET; Regular;  Low Sodium (2 gm)    Code Status: Full Code    PT/OT Eval     Electronically signed by Sabina Lake MD on 1/19/2022 at 2:05 PM

## 2022-01-19 NOTE — PROGRESS NOTES
Night shift assessment completed at 1950. Patient resting in bed at this time. NSR on monitor.   Lungs are clear bilaterally. SATS 98% on RA. A/Ox4, independant. 20 RT AC NS Locked.  Vital signs stable  With most recent 132/91 (104) BP NSR w/rate 82. PM medication provided.  No signs of any acute distress noted. Call light remains in reach.  Respirations are even and unlabored at this time.  Nightly hourly rounding on patient preformed.        0508 Patient resting in bed eyes closed. No s/s of distress noted.   Respirations are even and unlabored

## 2022-01-19 NOTE — PROGRESS NOTES
Cardiology Progress Note  Patient: Denise Lopes  Unit/Bed: Y081/P875-17  YOB: 1970  MRN: [id number]  Acct: [de-identified]   Admitting Diagnosis: Acute systolic heart failure (HCC) [I50.21]  Pulmonary edema cardiac cause (HCC) [I50.1]  Anemia of chronic disease [D63.8]  Uncontrolled hypertension [I10]  Chronic renal failure, stage 5 ([address] [N18.5]  Date:  1/15/2022  Hospital Day: 4      Chief Complaint:  Shortness of breath    Recent of this consult:  Evaluation of heart failure    History of Present Illness:  24-year-old male -American well-known to our service as patient follows in our cardiology clinic with Dr. Cindy Torres with history of nonischemic cardiomyopathy EF 35 to 40% by TTE on 4/22/2021, hypertension, tobacco use, CKD, alcohol abuse and history of noncompliance who came to the hospital for shortness of breath    Patient reports that shortness of breath started today  Denies chest pain shortness of breath  Denies changing diet  States that he is compliant with all his medications    EKG showing sinus tachycardia without active signs of ischemia  CKD worsening at 4.6 up from 3.85 August 2021  Troponins 0.037 which have been chronically detectable at level  BNP 25,000 up from 16,000 on August 2021  Chest x-ray showing bilateral pulmonary congestion    Latest coronary angiogram 11/4/2020   showing normal coronaries    Latest transthoracic echocardiogram 4/22/2021 EF 35 to 40%    Of note patient had an admission on 7/14/2021 for hypertension urgency and decompensated heart failure due to medication noncompliance    1/19/22  Patient doing well denies chest pain or shortness of breath  This morning blood pressure dropped after Coreg, in the high 70s  Patient asymptomatic, 250 cc NS bolus given with improvement of his blood pressure  We will make adjustments to his cardiomyopathy medications, we will lower the dosesand we will ask nursing staff to spread medications to reduce further drop in blood pressure    1/18/22  This morning pt complaining of abdominal pain and then chest pressure  Pt diaphoretic  ECG showing TWI in lateral leads   Pt also noted hypotensive in the 80s  Rapid response called  PT was started on IV fluids and levo  With improvement of BP  Pt reported that chest pain was improving  Concern for PE vs ACS  Pt will go for CT of the chest stat and if negative fpr PE we will perfrorm a diagnostic angiogram for possible ACS      1/17/22  Patient laying in bed comfortably  Denies chest pain or shortness of breath  Patient was net -4 L last 24 hours  From cardiology standpoint patient is close to be discharged, possibly 1 more day  Creatinine trending up currently 5.47 from 4.6 at admission      1/16/22  Shortness of breath improving  Patient was net -3.7 L since admission      Allergies   Allergen Reactions    Lipitor [Atorvastatin] Other (See Comments)     Coughing     Lisinopril        Current Facility-Administered Medications   Medication Dose Route Frequency Provider Last Rate Last Admin    [START ON 1/20/2022] isosorbide mononitrate (IMDUR) extended release tablet 30 mg  30 mg Oral Daily Daniel Heredia MD        hydrALAZINE (APRESOLINE) tablet 10 mg  10 mg Oral 3 times per day Daniel Heredia MD        0.9 % sodium chloride infusion   IntraVENous Continuous Lang Ellsworth MD 50 mL/hr at 01/19/22 1355 Rate Verify at 01/19/22 1355    potassium chloride (KLOR-CON M) extended release tablet 40 mEq  40 mEq Oral PRN Mae Crown, DO   40 mEq at 01/18/22 0954    Or    potassium bicarbonate (K-LYTE) disintegrating tablet 50 mEq  50 mEq Oral PRN Mae Crown, DO        Or    potassium chloride 10 mEq/100 mL IVPB (Peripheral Line)  10 mEq IntraVENous PRN Mae Crown, DO   Stopped at 01/17/22 1424    sodium chloride flush 0.9 % injection 5-40 mL  5-40 mL IntraVENous 2 times per day Charlette Parikh MD   10 mL at 01/19/22 0916    sodium chloride flush 0.9 % injection 5-40 mL 5-40 mL IntraVENous PRN Magda Contreras MD        0.9 % sodium chloride infusion  25 mL IntraVENous PRN Magda Contreras MD        ondansetron (ZOFRAN-ODT) disintegrating tablet 4 mg  4 mg Oral Q8H PRN Magda Contreras MD        Or    ondansetron [facility]) injection 4 mg  4 mg IntraVENous Q6H PRN Magda Contreras MD        polyethylene glycol [facility]) packet 17 g  17 g Oral Daily PRN Magda Contreras MD        acetaminophen (TYLENOL) tablet 650 mg  650 mg Oral Q6H PRN Magda Contreras MD   650 mg at 22 1007    Or    acetaminophen (TYLENOL) suppository 650 mg  650 mg Rectal Q6H PRN Magda Contreras MD        heparin (porcine) injection 5,000 Units  5,000 Units SubCUTAneous 3 times per day Magda Contreras MD   5,000 Units at 22 1354    carvedilol (COREG) tablet 12.5 mg  12.5 mg Oral BID Annabella Paris MD   12.5 mg at 22 0916    magnesium oxide (MAG-OX) tablet 400 mg  400 mg Oral Daily Zbigniew Valderrama DO   400 mg at 22 0333       PMHx:  Past Medical History:   Diagnosis Date    Abnormal EKG 2019    Acute kidney injury (BLUE) with acute tubular necrosis (ATN) ([facility] [address]     Cardiomyopathy ([facility] [address]     per echo 2019    Chronic combined systolic and diastolic CHF (congestive heart failure) ([facility] [address] 2019    ETOH abuse     Hypertension     Tobacco abuse        PSHx:  Past Surgical History:   Procedure Laterality Date    HERNIA REPAIR Right 2/10/2021    RIGHT INGUINAL HERNIA REPAIR WITH MESH performed by Zandra Oliver MD at [place] [id number] Hx:  Social History     Socioeconomic History    Marital status: Single     Spouse name: None    Number of children: None    Years of education: None    Highest education level: None   Occupational History    None   Tobacco Use    Smoking status: Current Every Day Smoker     Packs/day: 1.00     Types: Cigarettes     Last attempt to quit: 12/3/2020     Years since quittin.1    Smokeless tobacco: Never Used   [facility] Tobacco comment: currently smoking only couple cigarettes daily   Substance and Sexual Activity    Alcohol use: Yes    Drug use: Never    Sexual activity: None   Other Topics Concern    None   Social History Narrative    None     Social Determinants of Health     Financial Resource Strain:     Difficulty of Paying Living Expenses: Not on file   Food Insecurity:     Worried About Running Out of Food in the Last Year: Not on file    Ginette of Food in the Last Year: Not on file   Transportation Needs:     Lack of Transportation (Medical): Not on file    Lack of Transportation (Non-Medical): Not on file   Physical Activity:     Days of Exercise per Week: Not on file    Minutes of Exercise per Session: Not on file   Stress:     Feeling of Stress : Not on file   Social Connections:     Frequency of Communication with Friends and Family: Not on file    Frequency of Social Gatherings with Friends and Family: Not on file    Attends Protestant Services: Not on file    Active Member of [address] [place] [address] or Organizations: Not on file    Attends Club or Organization Meetings: Not on file    Marital Status: Not on file   Intimate Partner Violence:     Fear of Current or Ex-Partner: Not on file    Emotionally Abused: Not on file    Physically Abused: Not on file    Sexually Abused: Not on file   Housing Stability:     Unable to Pay for Housing in the Last Year: Not on file    Number of [place] in the Last Year: Not on file    Unstable Housing in the Last Year: Not on file       Family Hx:  Family History   Problem Relation Age of Onset    Coronary Art Dis Mother      Physical Examination:    /84   Pulse 86   Temp 98.6 °F (37 °C) (Oral)   Resp 16   Ht 5' 9\" (1.753 m)   Wt 200 lb (90.7 kg)   SpO2 100%   BMI 29.53 kg/m²    Physical Exam  Vitals and nursing note reviewed. Constitutional:       General: He is in acute distress. Appearance: Normal appearance. He is diaphoretic.    HENT:      Head: Normocephalic and atraumatic. Mouth/Throat:      Mouth: Mucous membranes are moist.      Pharynx: Oropharynx is clear. Eyes:      Extraocular Movements: Extraocular movements intact. Conjunctiva/sclera: Conjunctivae normal.      Pupils: Pupils are equal, round, and reactive to light. Cardiovascular:      Rate and Rhythm: Regular rhythm. Tachycardia present. Pulses: Normal pulses. Heart sounds: Normal heart sounds. Pulmonary:      Effort: Pulmonary effort is normal.      Breath sounds: Rales present. Abdominal:      General: Abdomen is flat. Bowel sounds are normal.      Palpations: Abdomen is soft. Musculoskeletal:         General: Normal range of motion. Cervical back: Normal range of motion and neck supple. Right lower leg: Edema present. Skin:     General: Skin is warm. Neurological:      General: No focal deficit present. Mental Status: He is alert and oriented to person, place, and time. Mental status is at baseline.    Psychiatric:         Mood and Affect: Mood normal.         LABS:  CBC:  Lab Results   Component Value Date    WBC 6.6 01/19/2022    RBC 4.48 01/19/2022    HGB 8.9 01/19/2022    HCT 28.2 01/19/2022    MCV 62.8 01/19/2022    MCH 19.8 01/19/2022    MCHC 31.5 01/19/2022    RDW 19.0 01/19/2022     01/19/2022     CBC with Differential:   Lab Results   Component Value Date    WBC 6.6 01/19/2022    RBC 4.48 01/19/2022    HGB 8.9 01/19/2022    HCT 28.2 01/19/2022     01/19/2022    MCV 62.8 01/19/2022    MCH 19.8 01/19/2022    MCHC 31.5 01/19/2022    RDW 19.0 01/19/2022    LYMPHOPCT 9.0 01/19/2022    MONOPCT 18.8 01/19/2022    BASOPCT 0.8 01/19/2022    MONOSABS 1.2 01/19/2022    LYMPHSABS 0.6 01/19/2022    EOSABS 0.1 01/19/2022    BASOSABS 0.1 01/19/2022     CMP:    Lab Results   Component Value Date     01/19/2022    K 3.9 01/19/2022    K 3.8 01/18/2022    CL 95 01/19/2022    CO2 23 01/19/2022    BUN 64 01/19/2022    CREATININE 5.98 01/19/2022    GFRAA 12.2 01/19/2022    LABGLOM 10.1 01/19/2022    GLUCOSE 100 01/19/2022    PROT 7.2 01/18/2022    LABALBU 3.8 01/18/2022    CALCIUM 8.8 01/19/2022    BILITOT 0.4 01/18/2022    ALKPHOS 124 01/18/2022    AST 24 01/18/2022    ALT 31 01/18/2022     BMP:    Lab Results   Component Value Date     01/19/2022    K 3.9 01/19/2022    K 3.8 01/18/2022    CL 95 01/19/2022    CO2 23 01/19/2022    BUN 64 01/19/2022    LABALBU 3.8 01/18/2022    CREATININE 5.98 01/19/2022    CALCIUM 8.8 01/19/2022    GFRAA 12.2 01/19/2022    LABGLOM 10.1 01/19/2022    GLUCOSE 100 01/19/2022     Magnesium:    Lab Results   Component Value Date    MG 2.6 01/19/2022     Troponin:    Lab Results   Component Value Date    TROPONINI 0.044 01/18/2022       Radiology:  XR CHEST (2 VW)    Result Date: 1/15/2022  EXAMINATION: XR CHEST (2 VW) CLINICAL HISTORY: TIGHTNESS OF BREATH. UNCONTROLLED HYPERTENSION. COMPARISONS: CHEST RADIOGRAPH AUGUST 28, 2021 FINDINGS: Osseous structures intact. Cardiopericardial silhouette enlarged and unchanged. Pulmonary vasculature indistinct. Ill-defined area of increased opacity obscures left lung base, with blunting left costophrenic angle. CARDIOMEGALY. INTERSTITIAL EDEMA. LEFT PLEURAL EFFUSION. LEFT LOWER LUNG ATELECTASIS/PNEUMONIA.       EKG: Sinus tachycardia without active signs of ischemia    Assessment:    Active Hospital Problems    Diagnosis Date Noted    Acute systolic heart failure ([facility] [address] [I50.21] 01/15/2022     Priority: Low     Sudden onset of abdominal and chest pain. Unclear etiology, stat CT chest and abd and possible coronary angiogram after   Acute on chronic heart failure with reduced ejection fraction EF 35 to 40% by TTE 2021. Patient admitted with a chest x-ray showing bilateral pulmonary congestion, BNP 25,000, patient with lower extremity edema.   Reason of this heart failure decompensation was related to the fact that patient was skipping doses of diuretic secondary to leg cramping  CKD.   Creatinine worsening currently at 4.6 up from 3.85 on 8/28/2021  History of hypertension  History of medication noncompliance    Latest coronary angiogram 11/4/2020   showing normal coronaries  Latest transthoracic echocardiogram 4/22/2021 EF 35 to 40%  ANYA 1/18/22 EF 20%      Plan:  Continue telemetry  Stop diuretics  Reduce Coreg from 25 mg twice a day to 3.125 mg twice a day  Reduce hydralazine from 50 mg 3 times daily to 10 mg 3 times daily, reduce Imdur from 120 mg daily to 30 mg daily  Plan for ICD on Friday with Dr. Munguia Hurley  Strict ins and outs and daily weights  Please keep potassium between 4 and 5 and magnesium above 2  Fluid restriction to no more than 1 L/day  Patient should be considered for ICD placement if EF still reduced  Follow-up renal recommendations  We will continue to follow      Electronically signed by Len Hyman MD on 1/19/2022 at 3:07 PM

## 2022-01-20 LAB
ANION GAP SERPL CALCULATED.3IONS-SCNC: 18 MEQ/L (ref 9–15)
BASOPHILS ABSOLUTE: 0 K/UL (ref 0–0.2)
BASOPHILS RELATIVE PERCENT: 0.8 %
BUN BLDV-MCNC: 64 MG/DL (ref 6–20)
CALCIUM SERPL-MCNC: 9 MG/DL (ref 8.5–9.9)
CHLORIDE BLD-SCNC: 94 MEQ/L (ref 95–107)
CO2: 24 MEQ/L (ref 20–31)
CREAT SERPL-MCNC: 5.98 MG/DL (ref 0.7–1.2)
EKG ATRIAL RATE: 78 BPM
EKG ATRIAL RATE: 79 BPM
EKG ATRIAL RATE: 80 BPM
EKG ATRIAL RATE: 80 BPM
EKG P AXIS: 46 DEGREES
EKG P AXIS: 59 DEGREES
EKG P AXIS: 59 DEGREES
EKG P AXIS: 64 DEGREES
EKG P-R INTERVAL: 194 MS
EKG P-R INTERVAL: 204 MS
EKG P-R INTERVAL: 206 MS
EKG P-R INTERVAL: 212 MS
EKG Q-T INTERVAL: 418 MS
EKG Q-T INTERVAL: 424 MS
EKG Q-T INTERVAL: 428 MS
EKG Q-T INTERVAL: 430 MS
EKG QRS DURATION: 88 MS
EKG QRS DURATION: 90 MS
EKG QRS DURATION: 92 MS
EKG QRS DURATION: 92 MS
EKG QTC CALCULATION (BAZETT): 476 MS
EKG QTC CALCULATION (BAZETT): 486 MS
EKG QTC CALCULATION (BAZETT): 493 MS
EKG QTC CALCULATION (BAZETT): 495 MS
EKG R AXIS: -15 DEGREES
EKG R AXIS: 13 DEGREES
EKG R AXIS: 2 DEGREES
EKG R AXIS: 37 DEGREES
EKG T AXIS: 155 DEGREES
EKG T AXIS: 181 DEGREES
EKG T AXIS: 258 DEGREES
EKG T AXIS: 94 DEGREES
EKG VENTRICULAR RATE: 78 BPM
EKG VENTRICULAR RATE: 79 BPM
EKG VENTRICULAR RATE: 80 BPM
EKG VENTRICULAR RATE: 80 BPM
EOSINOPHILS ABSOLUTE: 0.2 K/UL (ref 0–0.7)
EOSINOPHILS RELATIVE PERCENT: 2.7 %
GFR AFRICAN AMERICAN: 12.2
GFR NON-AFRICAN AMERICAN: 10.1
GLUCOSE BLD-MCNC: 143 MG/DL (ref 70–99)
HCT VFR BLD CALC: 28.7 % (ref 42–52)
HEMOGLOBIN: 8.8 G/DL (ref 14–18)
LYMPHOCYTES ABSOLUTE: 0.6 K/UL (ref 1–4.8)
LYMPHOCYTES RELATIVE PERCENT: 9.2 %
MAGNESIUM: 2.7 MG/DL (ref 1.7–2.4)
MCH RBC QN AUTO: 19.2 PG (ref 27–31.3)
MCHC RBC AUTO-ENTMCNC: 30.6 % (ref 33–37)
MCV RBC AUTO: 62.8 FL (ref 80–100)
MONOCYTES ABSOLUTE: 0.7 K/UL (ref 0.2–0.8)
MONOCYTES RELATIVE PERCENT: 11.2 %
NEUTROPHILS ABSOLUTE: 4.6 K/UL (ref 1.4–6.5)
NEUTROPHILS RELATIVE PERCENT: 76.1 %
PDW BLD-RTO: 18.8 % (ref 11.5–14.5)
PLATELET # BLD: 329 K/UL (ref 130–400)
POTASSIUM SERPL-SCNC: 3.5 MEQ/L (ref 3.4–4.9)
RBC # BLD: 4.57 M/UL (ref 4.7–6.1)
SODIUM BLD-SCNC: 136 MEQ/L (ref 135–144)
WBC # BLD: 6.1 K/UL (ref 4.8–10.8)

## 2022-01-20 PROCEDURE — 6360000002 HC RX W HCPCS: Performed by: FAMILY MEDICINE

## 2022-01-20 PROCEDURE — 99233 SBSQ HOSP IP/OBS HIGH 50: CPT | Performed by: INTERNAL MEDICINE

## 2022-01-20 PROCEDURE — 6370000000 HC RX 637 (ALT 250 FOR IP): Performed by: INTERNAL MEDICINE

## 2022-01-20 PROCEDURE — 6360000002 HC RX W HCPCS: Performed by: STUDENT IN AN ORGANIZED HEALTH CARE EDUCATION/TRAINING PROGRAM

## 2022-01-20 PROCEDURE — 2060000000 HC ICU INTERMEDIATE R&B

## 2022-01-20 PROCEDURE — 93010 ELECTROCARDIOGRAM REPORT: CPT | Performed by: INTERNAL MEDICINE

## 2022-01-20 PROCEDURE — 2580000003 HC RX 258: Performed by: STUDENT IN AN ORGANIZED HEALTH CARE EDUCATION/TRAINING PROGRAM

## 2022-01-20 PROCEDURE — 85025 COMPLETE CBC W/AUTO DIFF WBC: CPT

## 2022-01-20 PROCEDURE — 83735 ASSAY OF MAGNESIUM: CPT

## 2022-01-20 PROCEDURE — 2580000003 HC RX 258: Performed by: FAMILY MEDICINE

## 2022-01-20 PROCEDURE — 36415 COLL VENOUS BLD VENIPUNCTURE: CPT

## 2022-01-20 PROCEDURE — 80048 BASIC METABOLIC PNL TOTAL CA: CPT

## 2022-01-20 RX ORDER — ISOSORBIDE MONONITRATE 30 MG/1
30 TABLET, EXTENDED RELEASE ORAL DAILY
Status: DISCONTINUED | OUTPATIENT
Start: 2022-01-21 | End: 2022-01-22 | Stop reason: HOSPADM

## 2022-01-20 RX ORDER — HYDRALAZINE HYDROCHLORIDE 10 MG/1
10 TABLET, FILM COATED ORAL EVERY 8 HOURS SCHEDULED
Status: DISCONTINUED | OUTPATIENT
Start: 2022-01-20 | End: 2022-01-22 | Stop reason: HOSPADM

## 2022-01-20 RX ORDER — ISOSORBIDE MONONITRATE 30 MG/1
30 TABLET, EXTENDED RELEASE ORAL DAILY
Status: DISCONTINUED | OUTPATIENT
Start: 2022-01-20 | End: 2022-01-20

## 2022-01-20 RX ADMIN — HEPARIN SODIUM 5000 UNITS: 5000 INJECTION INTRAVENOUS; SUBCUTANEOUS at 06:45

## 2022-01-20 RX ADMIN — Medication 10 ML: at 20:29

## 2022-01-20 RX ADMIN — CARVEDILOL 12.5 MG: 12.5 TABLET, FILM COATED ORAL at 20:15

## 2022-01-20 RX ADMIN — HYDRALAZINE HYDROCHLORIDE 10 MG: 10 TABLET, FILM COATED ORAL at 06:20

## 2022-01-20 RX ADMIN — IRON SUCROSE 200 MG: 20 INJECTION, SOLUTION INTRAVENOUS at 20:21

## 2022-01-20 RX ADMIN — ISOSORBIDE MONONITRATE 30 MG: 30 TABLET, EXTENDED RELEASE ORAL at 09:58

## 2022-01-20 RX ADMIN — Medication 10 ML: at 09:58

## 2022-01-20 RX ADMIN — HYDRALAZINE HYDROCHLORIDE 10 MG: 10 TABLET, FILM COATED ORAL at 20:29

## 2022-01-20 RX ADMIN — CARVEDILOL 12.5 MG: 12.5 TABLET, FILM COATED ORAL at 09:56

## 2022-01-20 NOTE — PROGRESS NOTES
MUSIC THERAPY NOTE    Found pt sitting on side of bed. Pt welcomed music therapy and expressed a love for RnB music. MT played \"My Girl\" while pt played an egg shaker. Pt then requested his favorite song, Sheryl Gomez. \"    During the live presentations of both songs, pt mood was raised AEB singing, instrument play and smiles. Will follow until dc for coping and mood.

## 2022-01-20 NOTE — PROGRESS NOTES
Cardiology Progress Note  Patient: Suraj Lal  Unit/Bed: X663/P556-72  YOB: 1970  MRN: [id number]  Acct: [de-identified]   Admitting Diagnosis: Acute systolic heart failure (HCC) [I50.21]  Pulmonary edema cardiac cause (HCC) [I50.1]  Anemia of chronic disease [D63.8]  Uncontrolled hypertension [I10]  Chronic renal failure, stage 5 ([address] [N18.5]  Date:  1/15/2022  Hospital Day: 5      Chief Complaint:  Shortness of breath    Recent of this consult:  Evaluation of heart failure    History of Present Illness:  68-year-old male -American well-known to our service as patient follows in our cardiology clinic with Dr. Abril Barron with history of nonischemic cardiomyopathy EF 35 to 40% by TTE on 4/22/2021, hypertension, tobacco use, CKD, alcohol abuse and history of noncompliance who came to the hospital for shortness of breath    Patient reports that shortness of breath started today  Denies chest pain shortness of breath  Denies changing diet  States that he is compliant with all his medications    EKG showing sinus tachycardia without active signs of ischemia  CKD worsening at 4.6 up from 3.85 August 2021  Troponins 0.037 which have been chronically detectable at level  BNP 25,000 up from 16,000 on August 2021  Chest x-ray showing bilateral pulmonary congestion    Latest coronary angiogram 11/4/2020   showing normal coronaries    Latest transthoracic echocardiogram 4/22/2021 EF 35 to 40%    Of note patient had an admission on 7/14/2021 for hypertension urgency and decompensated heart failure due to medication noncompliance    1/20/2022  Patient laying in bed comfortably  Denies chest pain or shortness of breath  Blood pressure is better now  Continue with cardiomyopathy medications at reduced dose  Plan for ICD tomorrow morning, will keep patient n.p.o. after midnight    1/19/22  Patient doing well denies chest pain or shortness of breath  This morning blood pressure dropped after Coreg, in the high 70s  Patient asymptomatic, 250 cc NS bolus given with improvement of his blood pressure  We will make adjustments to his cardiomyopathy medications, we will lower the dosesand we will ask nursing staff to spread medications to reduce further drop in blood pressure    1/18/22  This morning pt complaining of abdominal pain and then chest pressure  Pt diaphoretic  ECG showing TWI in lateral leads   Pt also noted hypotensive in the 80s  Rapid response called  PT was started on IV fluids and levo  With improvement of BP  Pt reported that chest pain was improving  Concern for PE vs ACS  Pt will go for CT of the chest stat and if negative fpr PE we will perfrorm a diagnostic angiogram for possible ACS      1/17/22  Patient laying in bed comfortably  Denies chest pain or shortness of breath  Patient was net -4 L last 24 hours  From cardiology standpoint patient is close to be discharged, possibly 1 more day  Creatinine trending up currently 5.47 from 4.6 at admission      1/16/22  Shortness of breath improving  Patient was net -3.7 L since admission      Allergies   Allergen Reactions    Lipitor [Atorvastatin] Other (See Comments)     Coughing     Lisinopril        Current Facility-Administered Medications   Medication Dose Route Frequency Provider Last Rate Last Admin    isosorbide mononitrate (IMDUR) extended release tablet 30 mg  30 mg Oral Daily Tala Lyle MD   30 mg at 01/20/22 9984    hydrALAZINE (APRESOLINE) tablet 10 mg  10 mg Oral 3 times per day Tala Lyle MD   10 mg at 01/20/22 0620    iron sucrose (VENOFER) 200 mg in sodium chloride 0.9 % 100 mL IVPB  200 mg IntraVENous Q24H Alisa Nickerson MD   Stopped at 01/19/22 2344    potassium chloride (KLOR-CON M) extended release tablet 40 mEq  40 mEq Oral PRN Pitney Jason, DO   40 mEq at 01/18/22 0954    Or    potassium bicarbonate (K-LYTE) disintegrating tablet 50 mEq  50 mEq Oral PRN Pitney Jason, DO        Or    potassium chloride 10 mEq/100 mL IVPB (Peripheral Line)  10 mEq IntraVENous PRN Lorenzo Barrow DO   Stopped at 01/17/22 1424    sodium chloride flush 0.9 % injection 5-40 mL  5-40 mL IntraVENous 2 times per day Denise Castle MD   10 mL at 01/20/22 0958    sodium chloride flush 0.9 % injection 5-40 mL  5-40 mL IntraVENous PRN Denise Castle MD        0.9 % sodium chloride infusion  25 mL IntraVENous PRN Denise Castle MD        ondansetron (ZOFRAN-ODT) disintegrating tablet 4 mg  4 mg Oral Q8H PRN Denise Castle MD        Or    [facility]ans[facility]ron [facility] injection 4 mg  4 mg IntraVENous Q6H PRN Denise Castle MD        polyethylene glycol [facility]) packet 17 g  17 g Oral Daily PRN Denise Castle MD        acetaminophen (TYLENOL) tablet 650 mg  650 mg Oral Q6H PRN Denise Castle MD   650 mg at 01/18/22 1007    Or    acetaminophen (TYLENOL) suppository 650 mg  650 mg Rectal Q6H PRN Denise Castle MD        heparin (porcine) injection 5,000 Units  5,000 Units SubCUTAneous 3 times per day Denise Castle MD   5,000 Units at 01/20/22 0645    carvedilol (COREG) tablet 12.5 mg  12.5 mg Oral BID Denise Jamison MD   12.5 mg at 01/20/22 1322    magnesium oxide (MAG-OX) tablet 400 mg  400 mg Oral Daily Zbigniew Valderrama DO   400 mg at 01/19/22 4349       PMHx:  Past Medical History:   Diagnosis Date    Abnormal EKG 9/27/2019    Acute kidney injury (BLUE) with acute tubular necrosis (ATN) [facility]     Cardiomyopathy ([facility] [facility]     per echo 8/2019    Chronic combined systolic and diastolic CHF (congestive heart failure) ([facility] [address] 9/27/2019    ETOH abuse     Hypertension     Tobacco abuse        PSHx:  Past Surgical History:   Procedure Laterality Date    HERNIA REPAIR Right 2/10/2021    RIGHT INGUINAL HERNIA REPAIR WITH MESH performed by Monica Lance MD at [place] [address] Hx:  Social History     Socioeconomic History    Marital status: Single     Spouse name: None    Number of children: None    Years of education: None    Highest education level: None   Occupational History    None   Tobacco Use    Smoking status: Current Every Day Smoker     Packs/day: 1.00     Types: Cigarettes     Last attempt to quit: 12/3/2020     Years since quittin.1    Smokeless tobacco: Never Used    Tobacco comment: currently smoking only couple cigarettes daily   Substance and Sexual Activity    Alcohol use: Yes    Drug use: Never    Sexual activity: None   Other Topics Concern    None   Social History Narrative    None     Social Determinants of Health     Financial Resource Strain:     Difficulty of Paying Living Expenses: Not on file   Food Insecurity:     Worried About Running Out of Food in the Last Year: Not on file    Ginette of Food in the Last Year: Not on file   Transportation Needs:     Lack of Transportation (Medical): Not on file    Lack of Transportation (Non-Medical):  Not on file   Physical Activity:     Days of Exercise per Week: Not on file    Minutes of Exercise per Session: Not on file   Stress:     Feeling of Stress : Not on file   Social Connections:     Frequency of Communication with Friends and Family: Not on file    Frequency of Social Gatherings with Friends and Family: Not on file    Attends Zoroastrian Services: Not on file    Active Member of [address] [place] [address] fl3ur or Organizations: Not on file    Attends Club or Organization Meetings: Not on file    Marital Status: Not on file   Intimate Partner Violence:     Fear of Current or Ex-Partner: Not on file    Emotionally Abused: Not on file    Physically Abused: Not on file    Sexually Abused: Not on file   Housing Stability:     Unable to Pay for Housing in the Last Year: Not on file    Number of [place] in the Last Year: Not on file    Unstable Housing in the Last Year: Not on file       Family Hx:  Family History   Problem Relation Age of Onset    Coronary Art Dis Mother      Physical Examination:    BP (!) 134/92   Pulse 87   Temp 98.4 °F (36.9 °C) (Oral)   Resp 18   Ht 5' 9\" (1.753 m)   Wt 200 lb (90.7 kg)   SpO2 100%   BMI 29.53 kg/m²    Physical Exam  Vitals and nursing note reviewed. Constitutional:       Appearance: Normal appearance. HENT:      Head: Normocephalic and atraumatic. Mouth/Throat:      Mouth: Mucous membranes are moist.      Pharynx: Oropharynx is clear. Eyes:      Extraocular Movements: Extraocular movements intact. Conjunctiva/sclera: Conjunctivae normal.      Pupils: Pupils are equal, round, and reactive to light. Cardiovascular:      Rate and Rhythm: Normal rate and regular rhythm. Pulses: Normal pulses. Heart sounds: Normal heart sounds. Pulmonary:      Effort: Pulmonary effort is normal.   Abdominal:      General: Abdomen is flat. Bowel sounds are normal.      Palpations: Abdomen is soft. Musculoskeletal:         General: Normal range of motion. Cervical back: Normal range of motion and neck supple. Skin:     General: Skin is warm. Neurological:      General: No focal deficit present. Mental Status: He is alert and oriented to person, place, and time. Mental status is at baseline.    Psychiatric:         Mood and Affect: Mood normal.         LABS:  CBC:  Lab Results   Component Value Date    WBC 6.1 01/20/2022    RBC 4.57 01/20/2022    HGB 8.8 01/20/2022    HCT 28.7 01/20/2022    MCV 62.8 01/20/2022    MCH 19.2 01/20/2022    MCHC 30.6 01/20/2022    RDW 18.8 01/20/2022     01/20/2022     CBC with Differential:   Lab Results   Component Value Date    WBC 6.1 01/20/2022    RBC 4.57 01/20/2022    HGB 8.8 01/20/2022    HCT 28.7 01/20/2022     01/20/2022    MCV 62.8 01/20/2022    MCH 19.2 01/20/2022    MCHC 30.6 01/20/2022    RDW 18.8 01/20/2022    LYMPHOPCT 9.2 01/20/2022    MONOPCT 11.2 01/20/2022    BASOPCT 0.8 01/20/2022    MONOSABS 0.7 01/20/2022    LYMPHSABS 0.6 01/20/2022    EOSABS 0.2 01/20/2022    BASOSABS 0.0 01/20/2022     CMP:    Lab Results   Component Value Date     01/20/2022    K 3.5 01/20/2022    K 3.8 01/18/2022    CL 94 01/20/2022    CO2 24 01/20/2022    BUN 64 01/20/2022    CREATININE 5.98 01/20/2022    GFRAA 12.2 01/20/2022    LABGLOM 10.1 01/20/2022    GLUCOSE 143 01/20/2022    PROT 7.2 01/18/2022    LABALBU 3.8 01/18/2022    CALCIUM 9.0 01/20/2022    BILITOT 0.4 01/18/2022    ALKPHOS 124 01/18/2022    AST 24 01/18/2022    ALT 31 01/18/2022     BMP:    Lab Results   Component Value Date     01/20/2022    K 3.5 01/20/2022    K 3.8 01/18/2022    CL 94 01/20/2022    CO2 24 01/20/2022    BUN 64 01/20/2022    LABALBU 3.8 01/18/2022    CREATININE 5.98 01/20/2022    CALCIUM 9.0 01/20/2022    GFRAA 12.2 01/20/2022    LABGLOM 10.1 01/20/2022    GLUCOSE 143 01/20/2022     Magnesium:    Lab Results   Component Value Date    MG 2.7 01/20/2022     Troponin:    Lab Results   Component Value Date    TROPONINI 0.044 01/18/2022       Radiology:  XR CHEST (2 VW)    Result Date: 1/15/2022  EXAMINATION: XR CHEST (2 VW) CLINICAL HISTORY: TIGHTNESS OF BREATH. UNCONTROLLED HYPERTENSION. COMPARISONS: CHEST RADIOGRAPH AUGUST 28, 2021 FINDINGS: Osseous structures intact. Cardiopericardial silhouette enlarged and unchanged. Pulmonary vasculature indistinct. Ill-defined area of increased opacity obscures left lung base, with blunting left costophrenic angle. CARDIOMEGALY. INTERSTITIAL EDEMA. LEFT PLEURAL EFFUSION. LEFT LOWER LUNG ATELECTASIS/PNEUMONIA.       EKG: Sinus tachycardia without active signs of ischemia    Assessment:    Active Hospital Problems    Diagnosis Date Noted    Acute systolic heart failure ([address] [I50.21] 01/15/2022     Priority: Low     Sudden onset of abdominal and chest pain. Unclear etiology, stat CT chest and abd and possible coronary angiogram after   Acute on chronic heart failure with reduced ejection fraction EF 35 to 40% by TTE 2021.   Patient admitted with a chest x-ray showing bilateral pulmonary congestion, BNP 25,000, patient with lower extremity edema. Reason of this heart failure decompensation was related to the fact that patient was skipping doses of diuretic secondary to leg cramping  CKD.   Creatinine worsening currently at 4.6 up from 3.85 on 8/28/2021  History of hypertension  History of medication noncompliance    Latest coronary angiogram 11/4/2020   showing normal coronaries  Latest transthoracic echocardiogram 4/22/2021 EF 35 to 40%  ANYA 1/18/22 EF 20%      Plan:  Continue telemetry  Stop diuretics  Continue Coreg 3.125 mg p.o. twice daily  Continue hydralazine 10 mg p.o. 3 times daily  Continue Imdur 30 mg p.o. daily  Plan for ICD on Friday with Dr. Darius Sebastian  Strict ins and outs and daily weights  Please keep potassium between 4 and 5 and magnesium above 2  Please keep patient n.p.o. after midnight      Electronically signed by Mason Keyes MD on 1/20/2022 at 10:11 AM

## 2022-01-20 NOTE — PROGRESS NOTES
Renal Progress Note    Assessment and Plan:    54 y.o. male with history s/f combined HFrEF w/ DD, CKD stage IV, HTN, non-compliance who presented for hypertension. Pt had been worsening SOB and BLE swelling as well. Nephrology consulted for BLUE on CKD.     1. BLUE on CKD stage IV: most likely 2/2 overdiuresis, had significant diuresis w/ lasix, torsemide and thiazide, CKD 2/2 HTN and CRS, prior baseline Scr ~ Scr ~3.6-3.8 w/ eGFR low 20s then down to 4.6-4.8 w/ eGFR 16-17 ml/lmin in 11/21, pt of [place]  2. Fluid overload: was on lasix, metolazone and thiazide, LVEF 20%  3. Hypotension  4. Hypermagnesemia  5. Anemia: tsat low     Plan:  - at risk of ending up on RRT due to multiple insults including contrast and significant hypotension, however currently no acute indication, remains non-oliguric, discussed w/ patient   - cardiology managing anti-hypertensives  - iron for anemia  - ok for ICD placement from renal standpoint   - agree w/ stopping torsemide as well, though EF low no current reason for diuresis         Patient Active Problem List:     Hypertensive urgency     Cardiomyopathy ([address]     Systolic and diastolic CHF, acute ([address]     BLUE (acute kidney injury) ([address]     Abnormal EKG     Chronic combined systolic and diastolic CHF (congestive heart failure) ([address]     Acute decompensated heart failure ([address]     Chest pain     Pulmonary edema, acute ([address]     NSTEMI (non-ST elevated myocardial infarction) ([address]     Inguinal hernia of right side without obstruction or gangrene     CHF (congestive heart failure), NYHA class I, acute on chronic, combined ([address]     Systolic congestive heart failure ([address]     Acute systolic heart failure ([address]      Subjective:   Admit Date: 1/15/2022    Interval History: pt for aicd tomorrow. No sob. Doesn't want to have to do hd.   Swelling better      Medications:   Scheduled Meds:   isosorbide mononitrate  30 mg Oral Daily    hydrALAZINE  10 mg Oral 3 times per day    iron sucrose  200 mg IntraVENous Q24H    sodium chloride flush  5-40 mL IntraVENous 2 times per day    [Held by provider] heparin (porcine)  5,000 Units SubCUTAneous 3 times per day    carvedilol  12.5 mg Oral BID    magnesium oxide  400 mg Oral Daily     Continuous Infusions:   sodium chloride         CBC:   Recent Labs     01/19/22  0534 01/20/22  0622   WBC 6.6 6.1   HGB 8.9* 8.8*    329     CMP:    Recent Labs     01/18/22  2115 01/19/22  0534 01/20/22  0622   * 137 136   K 3.9 3.9 3.5   CL 92* 95 94*   CO2 24 23 24   BUN 66* 64* 64*   CREATININE 5.95* 5.98* 5.98*   GLUCOSE 93 100* 143*   CALCIUM 8.6 8.8 9.0   LABGLOM 10.1* 10.1* 10.1*     Troponin:   Recent Labs     01/18/22  1118   TROPONINI 0.044*     BNP: No results for input(s): BNP in the last 72 hours. INR: No results for input(s): INR in the last 72 hours. Lipids:   Recent Labs     01/18/22  1118   LIPASE 61     Liver:   Recent Labs     01/18/22  1118   AST 24   ALT 31   ALKPHOS 124*   PROT 7.2   LABALBU 3.8   BILITOT 0.4     Iron:  No results for input(s): IRONS, FERRITIN in the last 72 hours. Invalid input(s): LABIRONS  Urinalysis: No results for input(s): UA in the last 72 hours. Objective:   Vitals: /86   Pulse 88   Temp 98.4 °F (36.9 °C) (Oral)   Resp 18   Ht 5' 9\" (1.753 m)   Wt 200 lb (90.7 kg)   SpO2 100%   BMI 29.53 kg/m²    Wt Readings from Last 3 Encounters:   01/15/22 200 lb (90.7 kg)   08/28/21 202 lb (91.6 kg)   07/13/21 198 lb 3.2 oz (89.9 kg)      24HR INTAKE/OUTPUT:      Intake/Output Summary (Last 24 hours) at 1/20/2022 1518  Last data filed at 1/20/2022 0956  Gross per 24 hour   Intake 579.54 ml   Output 2150 ml   Net -1570.46 ml       Constitutional:  Alert, awake, no apparent distress   Skin:normal, no rash  HEENT:sclera anicteric.   Head atraumatic normocephalic  Neck:supple with no thyromegally  Cardiovascular:  S1, S2 without m/r/g   Respiratory:  CTA B without w/r/r   Abdomen: +bs, soft, nt  Ext: no LE edema  Musculoskeletal:Intact  Neuro:Alert and oriented with no deficit      Electronically signed by Michael Lin MD on 1/20/2022 at 3:18 PM

## 2022-01-20 NOTE — PROGRESS NOTES
Hospitalist Progress Note      PCP: Beverley Alvarado MD    Date of Admission: 1/15/2022    Chief Complaint:    Chief Complaint   Patient presents with    Hypertension     sent by PCP for high blood pressure       Subjective:  Patient feels a bit tired but feels well otherwise. 12 point ROS negative other than mentioned above     Medications:  Reviewed    Infusion Medications    sodium chloride       Scheduled Medications    isosorbide mononitrate  30 mg Oral Daily    hydrALAZINE  10 mg Oral 3 times per day    iron sucrose  200 mg IntraVENous Q24H    sodium chloride flush  5-40 mL IntraVENous 2 times per day    heparin (porcine)  5,000 Units SubCUTAneous 3 times per day    carvedilol  12.5 mg Oral BID    magnesium oxide  400 mg Oral Daily     PRN Meds: potassium chloride **OR** potassium alternative oral replacement **OR** potassium chloride, sodium chloride flush, sodium chloride, ondansetron **OR** ondansetron, polyethylene glycol, acetaminophen **OR** acetaminophen      Intake/Output Summary (Last 24 hours) at 1/20/2022 1455  Last data filed at 1/20/2022 0956  Gross per 24 hour   Intake 579.54 ml   Output 2150 ml   Net -1570.46 ml     Exam:    /86   Pulse 88   Temp 98.4 °F (36.9 °C) (Oral)   Resp 18   Ht 5' 9\" (1.753 m)   Wt 200 lb (90.7 kg)   SpO2 100%   BMI 29.53 kg/m²     General appearance: No apparent distress, appears stated age and cooperative. HEENT: Conjunctivae/corneas clear. Neck: Trachea midline. Respiratory:  Normal respiratory effort. Clear to auscultation  Cardiovascular: Regular rate and rhythm  Abdomen: Soft, non-tender, non-distended with normal bowel sounds. Musculoskeletal: No clubbing, cyanosis or edema bilaterally.    Neuro: Non Focal.   Capillary Refill: Brisk,< 3 seconds   Peripheral Pulses: +2 palpable, equal bilaterally     Labs:   Recent Labs     01/18/22  0552 01/19/22  0534 01/20/22  0622   WBC 7.1 6.6 6.1   HGB 8.9* 8.9* 8.8*   HCT 28.6* 28.2* 28.7*   PLT 405* [facility]     01/18/22  2115 01/19/22  0534 01/20/22  0622   * 137 136   K 3.9 3.9 3.5   CL 92* 95 94*   CO2 24 23 24   BUN 66* 64* 64*   CREATININE 5.95* 5.98* 5.98*   CALCIUM 8.6 8.8 9.0     Recent Labs     01/18/22  1118   AST 24   ALT 31   BILITOT 0.4   ALKPHOS 124*     No results for input(s): INR in the last 72 hours. Recent Labs     01/18/22  1118   TROPONINI 0.044*     Urinalysis:      Lab Results   Component Value Date    NITRU Negative 01/15/2022    WBCUA 0-2 01/15/2022    BACTERIA Negative 01/15/2022    RBCUA 0-2 01/15/2022    BLOODU TRACE 01/15/2022    SPECGRAV 1.011 01/15/2022    GLUCOSEU Negative 01/15/2022     Radiology:  CTA CHEST W WO CONTRAST   Final Result      Borderline cardiomegaly. Small to moderate left pleural effusion. Dependent subsegmental lower lobe groundglass opacity, most likely reflecting interstitial edema. Other infectious or inflammatory etiologies, including covid 19 pneumonia, less likely. Dilatation ascending thoracic aorta. CT OF THE ABDOMEN AND PELVIS WITH INTRAVENOUS CONTRAST MEDIUM. FINDINGS:         Liver:  Normal in size, and shape. 6 mm cystlike area caudal, posterior segment right hepatic lobe, too small to further characterize. Bile Ducts:  Normal in caliber. Gallbladder:  No stones or wall thickening. Pancreas:  Normal without masses, cysts, ductal dilatation or calcification. Spleen:  Normal in size without masses or calcifications. No splenules. Kidneys:  Normal in size and enhancement. No hydronephrosis, or stones. 9 mm cystlike area posterior mid to lower pole left kidney. Adrenals:  Normal.       Small bowel:  Normal in caliber. Appendix:  Located within right inguinal canal, normal in appearance. Colon:  Normal in caliber. Diverticular change, ascending colon. Peritoneum:  No ascites, free air, or fluid collections. Vessels:   Aorta normal in course and caliber. Portal vein, splenic vein, superior mesenteric vein are patent. Lymph nodes:        Retroperitoneal:  No enlarged retroperitoneal lymph nodes. Mesenteric:  No enlarged mesenteric lymph nodes. Pelvic: No enlarged pelvic lymph nodes. Ureters: Normal in course and caliber. No calcifications. Bladder: No wall thickening. Reproductive organs: No pelvic masses. Abdominal Wall: Bowel no longer identified within right inguinal canal. However, dilated appendix demonstrated within right inguinal canal. No CT evidence of obstruction or incarceration. 11 mm fat-containing periumbilical anterior abdominal wall defect. Bones:  No bone lesions. No degenerative changes. No post operative changes. IMPRESSION:      Bowel no longer identified within right inguinal canal. However, appendix now visualized within right inguinal canal. No CT evidence of incarceration or obstruction. Diverticulosis, ascending colon. All CT scans at this facility use dose modulation, iterative reconstruction, and/or weight based dosing when appropriate to reduce radiation dose to as low as reasonably achievable. CT ABDOMEN PELVIS W IV CONTRAST Additional Contrast? Radiologist Recommendation   Final Result      Borderline cardiomegaly. Small to moderate left pleural effusion. Dependent subsegmental lower lobe groundglass opacity, most likely reflecting interstitial edema. Other infectious or inflammatory etiologies, including covid 19 pneumonia, less likely. Dilatation ascending thoracic aorta. CT OF THE ABDOMEN AND PELVIS WITH INTRAVENOUS CONTRAST MEDIUM. FINDINGS:         Liver:  Normal in size, and shape. 6 mm cystlike area caudal, posterior segment right hepatic lobe, too small to further characterize. Bile Ducts:  Normal in caliber.        Gallbladder:  No stones or wall thickening. Pancreas:  Normal without masses, cysts, ductal dilatation or calcification. Spleen:  Normal in size without masses or calcifications. No splenules. Kidneys:  Normal in size and enhancement. No hydronephrosis, or stones. 9 mm cystlike area posterior mid to lower pole left kidney. Adrenals:  Normal.       Small bowel:  Normal in caliber. Appendix:  Located within right inguinal canal, normal in appearance. Colon:  Normal in caliber. Diverticular change, ascending colon. Peritoneum:  No ascites, free air, or fluid collections. Vessels: Aorta normal in course and caliber. Portal vein, splenic vein, superior mesenteric vein are patent. Lymph nodes:        Retroperitoneal:  No enlarged retroperitoneal lymph nodes. Mesenteric:  No enlarged mesenteric lymph nodes. Pelvic: No enlarged pelvic lymph nodes. Ureters: Normal in course and caliber. No calcifications. Bladder: No wall thickening. Reproductive organs: No pelvic masses. Abdominal Wall: Bowel no longer identified within right inguinal canal. However, dilated appendix demonstrated within right inguinal canal. No CT evidence of obstruction or incarceration. 11 mm fat-containing periumbilical anterior abdominal wall defect. Bones:  No bone lesions. No degenerative changes. No post operative changes. IMPRESSION:      Bowel no longer identified within right inguinal canal. However, appendix now visualized within right inguinal canal. No CT evidence of incarceration or obstruction. Diverticulosis, ascending colon. All CT scans at this facility use dose modulation, iterative reconstruction, and/or weight based dosing when appropriate to reduce radiation dose to as low as reasonably achievable.                                                                    US DUP LOWER EXTREMITY RIGHT JESU   Final Result   NO SONOGRAPHIC EVIDENCE OF DEEP VENOUS THROMBOSIS WITHIN THE RIGHT LOWER EXTREMITY. XR CHEST (2 VW)   Final Result   CARDIOMEGALY. INTERSTITIAL EDEMA. LEFT PLEURAL EFFUSION. LEFT LOWER LUNG ATELECTASIS/PNEUMONIA. Assessment/Plan:    #Acute on chronic combined systolic and diastolic CHF     - continue meds per cardiology; patient is laying flat without respiratory distress; plan for AICD tomorrow    #BLUE on CKD      - consult nephrology for their opinion and assistance and advice given contrast ordered by cardiology yesterday for CTA as well as the potential need for more for cardiac procedures; Cr has been stable    #HTN    - continue meds per cardiology recommendations    C/Lisy Zambrano 1106 Problems    Diagnosis Date Noted    Acute systolic heart failure ([facility] [address] [I50.21] 01/15/2022     Additional work up or/and treatment plan may be added today or then after based on clinical progression. I am managing a portion of pt care. Some medical issues are handled by other specialists. Additional work up and treatment should be done in out pt setting by pt PCP and other out pt providers. In addition to examining and evaluating pt, I spent additional time explaining care, normal and abnormal findings, and treatment plan. All of pt questions were answered. Counseling, diet and education were  provided. Case will be discussed with nursing staff when appropriate. Family will be updated if and when appropriate. Diet: ADULT DIET; Regular;  Low Sodium (2 gm)    Code Status: Full Code    PT/OT Eval     Electronically signed by Jennifer Lopez MD on 1/20/2022 at 2:55 PM

## 2022-01-21 ENCOUNTER — APPOINTMENT (OUTPATIENT)
Dept: GENERAL RADIOLOGY | Age: 52
DRG: 179 | End: 2022-01-21
Payer: MEDICAID

## 2022-01-21 LAB
ANION GAP SERPL CALCULATED.3IONS-SCNC: 16 MEQ/L (ref 9–15)
BASOPHILS ABSOLUTE: 0 K/UL (ref 0–0.2)
BASOPHILS RELATIVE PERCENT: 0.7 %
BUN BLDV-MCNC: 63 MG/DL (ref 6–20)
CALCIUM SERPL-MCNC: 9.9 MG/DL (ref 8.5–9.9)
CHLORIDE BLD-SCNC: 92 MEQ/L (ref 95–107)
CO2: 27 MEQ/L (ref 20–31)
CREAT SERPL-MCNC: 5.99 MG/DL (ref 0.7–1.2)
EOSINOPHILS ABSOLUTE: 0.2 K/UL (ref 0–0.7)
EOSINOPHILS RELATIVE PERCENT: 2.6 %
GFR AFRICAN AMERICAN: 12.1
GFR NON-AFRICAN AMERICAN: 10
GLUCOSE BLD-MCNC: 104 MG/DL (ref 70–99)
HCT VFR BLD CALC: 31.2 % (ref 42–52)
HEMOGLOBIN: 9.6 G/DL (ref 14–18)
LYMPHOCYTES ABSOLUTE: 0.5 K/UL (ref 1–4.8)
LYMPHOCYTES RELATIVE PERCENT: 8.8 %
MCH RBC QN AUTO: 19.5 PG (ref 27–31.3)
MCHC RBC AUTO-ENTMCNC: 30.8 % (ref 33–37)
MCV RBC AUTO: 63.4 FL (ref 80–100)
MONOCYTES ABSOLUTE: 1 K/UL (ref 0.2–0.8)
MONOCYTES RELATIVE PERCENT: 17 %
NEUTROPHILS ABSOLUTE: 4.3 K/UL (ref 1.4–6.5)
NEUTROPHILS RELATIVE PERCENT: 70.9 %
PDW BLD-RTO: 18.8 % (ref 11.5–14.5)
PLATELET # BLD: 388 K/UL (ref 130–400)
POTASSIUM SERPL-SCNC: 4 MEQ/L (ref 3.4–4.9)
RBC # BLD: 4.92 M/UL (ref 4.7–6.1)
SODIUM BLD-SCNC: 135 MEQ/L (ref 135–144)
WBC # BLD: 6 K/UL (ref 4.8–10.8)

## 2022-01-21 PROCEDURE — 71045 X-RAY EXAM CHEST 1 VIEW: CPT

## 2022-01-21 PROCEDURE — C1721 AICD, DUAL CHAMBER: HCPCS

## 2022-01-21 PROCEDURE — 2580000003 HC RX 258

## 2022-01-21 PROCEDURE — 2580000003 HC RX 258: Performed by: SPECIALIST

## 2022-01-21 PROCEDURE — 6360000002 HC RX W HCPCS

## 2022-01-21 PROCEDURE — C1898 LEAD, PMKR, OTHER THAN TRANS: HCPCS

## 2022-01-21 PROCEDURE — 2709999900 HC NON-CHARGEABLE SUPPLY

## 2022-01-21 PROCEDURE — 2500000003 HC RX 250 WO HCPCS

## 2022-01-21 PROCEDURE — 02HK3KZ INSERTION OF DEFIBRILLATOR LEAD INTO RIGHT VENTRICLE, PERCUTANEOUS APPROACH: ICD-10-PCS | Performed by: SPECIALIST

## 2022-01-21 PROCEDURE — 36415 COLL VENOUS BLD VENIPUNCTURE: CPT

## 2022-01-21 PROCEDURE — C1894 INTRO/SHEATH, NON-LASER: HCPCS

## 2022-01-21 PROCEDURE — 6370000000 HC RX 637 (ALT 250 FOR IP): Performed by: SPECIALIST

## 2022-01-21 PROCEDURE — 2780000010 HC IMPLANT OTHER

## 2022-01-21 PROCEDURE — 99233 SBSQ HOSP IP/OBS HIGH 50: CPT | Performed by: INTERNAL MEDICINE

## 2022-01-21 PROCEDURE — 2580000003 HC RX 258: Performed by: FAMILY MEDICINE

## 2022-01-21 PROCEDURE — 6360000002 HC RX W HCPCS: Performed by: SPECIALIST

## 2022-01-21 PROCEDURE — C1892 INTRO/SHEATH,FIXED,PEEL-AWAY: HCPCS

## 2022-01-21 PROCEDURE — 2060000000 HC ICU INTERMEDIATE R&B

## 2022-01-21 PROCEDURE — 80048 BASIC METABOLIC PNL TOTAL CA: CPT

## 2022-01-21 PROCEDURE — C1777 LEAD, AICD, ENDO SINGLE COIL: HCPCS

## 2022-01-21 PROCEDURE — 85025 COMPLETE CBC W/AUTO DIFF WBC: CPT

## 2022-01-21 PROCEDURE — 02H63KZ INSERTION OF DEFIBRILLATOR LEAD INTO RIGHT ATRIUM, PERCUTANEOUS APPROACH: ICD-10-PCS | Performed by: SPECIALIST

## 2022-01-21 PROCEDURE — 6370000000 HC RX 637 (ALT 250 FOR IP): Performed by: STUDENT IN AN ORGANIZED HEALTH CARE EDUCATION/TRAINING PROGRAM

## 2022-01-21 PROCEDURE — 0JH608Z INSERTION OF DEFIBRILLATOR GENERATOR INTO CHEST SUBCUTANEOUS TISSUE AND FASCIA, OPEN APPROACH: ICD-10-PCS | Performed by: SPECIALIST

## 2022-01-21 PROCEDURE — 33249 INSJ/RPLCMT DEFIB W/LEAD(S): CPT

## 2022-01-21 PROCEDURE — 6370000000 HC RX 637 (ALT 250 FOR IP): Performed by: INTERNAL MEDICINE

## 2022-01-21 RX ORDER — ACETAMINOPHEN 325 MG/1
650 TABLET ORAL EVERY 4 HOURS PRN
Status: DISCONTINUED | OUTPATIENT
Start: 2022-01-21 | End: 2022-01-22 | Stop reason: HOSPADM

## 2022-01-21 RX ORDER — DEXTROSE AND SODIUM CHLORIDE 5; .45 G/100ML; G/100ML
INJECTION, SOLUTION INTRAVENOUS CONTINUOUS
Status: DISCONTINUED | OUTPATIENT
Start: 2022-01-21 | End: 2022-01-22 | Stop reason: HOSPADM

## 2022-01-21 RX ORDER — HYDROCODONE BITARTRATE AND ACETAMINOPHEN 5; 325 MG/1; MG/1
1 TABLET ORAL EVERY 4 HOURS PRN
Status: DISCONTINUED | OUTPATIENT
Start: 2022-01-21 | End: 2022-01-22 | Stop reason: HOSPADM

## 2022-01-21 RX ORDER — HYDROCODONE BITARTRATE AND ACETAMINOPHEN 5; 325 MG/1; MG/1
2 TABLET ORAL EVERY 4 HOURS PRN
Status: DISCONTINUED | OUTPATIENT
Start: 2022-01-21 | End: 2022-01-22 | Stop reason: HOSPADM

## 2022-01-21 RX ADMIN — ISOSORBIDE MONONITRATE 30 MG: 30 TABLET, EXTENDED RELEASE ORAL at 22:08

## 2022-01-21 RX ADMIN — DEXTROSE AND SODIUM CHLORIDE: 5; 450 INJECTION, SOLUTION INTRAVENOUS at 11:01

## 2022-01-21 RX ADMIN — HYDROCODONE BITARTRATE AND ACETAMINOPHEN 1 TABLET: 5; 325 TABLET ORAL at 22:04

## 2022-01-21 RX ADMIN — VANCOMYCIN HYDROCHLORIDE 1000 MG: 1 INJECTION, POWDER, LYOPHILIZED, FOR SOLUTION INTRAVENOUS at 13:10

## 2022-01-21 RX ADMIN — HYDRALAZINE HYDROCHLORIDE 10 MG: 10 TABLET, FILM COATED ORAL at 22:03

## 2022-01-21 RX ADMIN — CARVEDILOL 12.5 MG: 12.5 TABLET, FILM COATED ORAL at 09:52

## 2022-01-21 RX ADMIN — Medication 8 ML: at 09:47

## 2022-01-21 RX ADMIN — GENTAMICIN SULFATE: 40 INJECTION, SOLUTION INTRAMUSCULAR; INTRAVENOUS at 16:40

## 2022-01-21 RX ADMIN — Medication 400 MG: at 09:52

## 2022-01-21 RX ADMIN — CARVEDILOL 12.5 MG: 12.5 TABLET, FILM COATED ORAL at 22:02

## 2022-01-21 RX ADMIN — Medication 10 ML: at 22:04

## 2022-01-21 ASSESSMENT — PAIN DESCRIPTION - LOCATION: LOCATION: SHOULDER

## 2022-01-21 ASSESSMENT — PAIN DESCRIPTION - PAIN TYPE: TYPE: ACUTE PAIN;SURGICAL PAIN

## 2022-01-21 ASSESSMENT — PAIN SCALES - GENERAL: PAINLEVEL_OUTOF10: 6

## 2022-01-21 ASSESSMENT — PAIN DESCRIPTION - ORIENTATION: ORIENTATION: LEFT

## 2022-01-21 NOTE — FLOWSHEET NOTE
0900  Subjective:   Daily Progress Note: 1/21/2022 1:35 PM    Scheduled Meds:   vancomycin  1,000 mg IntraVENous Once    irrigation builder   Irrigation Once    hydrALAZINE  10 mg Oral 3 times per day    isosorbide mononitrate  30 mg Oral Daily    sodium chloride flush  5-40 mL IntraVENous 2 times per day    [Held by provider] heparin (porcine)  5,000 Units SubCUTAneous 3 times per day    carvedilol  12.5 mg Oral BID    magnesium oxide  400 mg Oral Daily     Continuous Infusions:   dextrose 5 % and 0.45 % NaCl 100 mL/hr at 01/21/22 1101    sodium chloride       PRN Meds:potassium chloride **OR** potassium alternative oral replacement **OR** potassium chloride, sodium chloride flush, sodium chloride, ondansetron **OR** ondansetron, polyethylene glycol, acetaminophen **OR** acetaminophen        Objective:     Vitals reviewed. I/O last 3 completed shifts: In: 599.5 [P.O.:480; I.V.:10; IV Piggyback:109.5]  Out: 2500 [Urine:2500]  I/O this shift:  In: 106.7 [I.V.:6.7; IV Piggyback:100]  Out: -             Assessment:    0900    Am nursing  assessment completed. Pt :  Ambulatory around nursing unit             Alert and oriented. Breakfast: NPO for procedure  RESP:        Even and non labored       Lung sounds:       clear                          Oxygen: room air  Complaints of: none  Pain: denies  IV: SL  TELE: [id number]  Dressings:   Precautions:              Falls:  35      Radhames: 0  Chart and meds reviewed. Plan for today: pt for aicd insertion today  Call light in reach. [phone] returned to one [address] [place] [address] Pacemaker site BELINDA Alvarez on.  .Electronically signed by Carol Ann Rubio RN on 1/21/2022 at 7:49 PM        Plan:     Data Review  CBC:   Lab Results   Component Value Date    WBC 6.0 01/21/2022    RBC 4.92 01/21/2022     BMP:   Lab Results   Component Value Date    GLUCOSE 104 01/21/2022    CO2 27 01/21/2022    BUN 63 01/21/2022    CREATININE 5.99 01/21/2022    CALCIUM 9.9 01/21/2022 Coagulation:   Lab Results   Component Value Date    INR 1.2 01/15/2022    APTT 29.8 01/15/2022     1300 pt to procedure area per wheelchair.  Electronically signed by Peyton Renae RN on 1/21/2022 at 1:38 PM

## 2022-01-21 NOTE — PROGRESS NOTES
Chest x ray done at bedside. Left subclavian dressing dry and intact no bleeding or hematoma.   Dr. Guadarrama All reviewed results with patient

## 2022-01-21 NOTE — PROGRESS NOTES
Hospitalist Progress Note      PCP: Reginaldo Hale MD    Date of Admission: 1/15/2022    Chief Complaint:    Chief Complaint   Patient presents with    Hypertension     sent by PCP for high blood pressure       Subjective:  Patient feels good today and is in better spirits. 12 point ROS negative other than mentioned above     Medications:  Reviewed    Infusion Medications    dextrose 5 % and 0.45 % NaCl 100 mL/hr at 01/21/22 1101    sodium chloride       Scheduled Medications    irrigation builder   Irrigation Once    hydrALAZINE  10 mg Oral 3 times per day    isosorbide mononitrate  30 mg Oral Daily    sodium chloride flush  5-40 mL IntraVENous 2 times per day    [Held by provider] heparin (porcine)  5,000 Units SubCUTAneous 3 times per day    carvedilol  12.5 mg Oral BID    magnesium oxide  400 mg Oral Daily     PRN Meds: potassium chloride **OR** potassium alternative oral replacement **OR** potassium chloride, sodium chloride flush, sodium chloride, ondansetron **OR** ondansetron, polyethylene glycol, acetaminophen **OR** acetaminophen      Intake/Output Summary (Last 24 hours) at 1/21/2022 1411  Last data filed at 1/21/2022 1105  Gross per 24 hour   Intake 586.67 ml   Output 950 ml   Net -363.33 ml     Exam:    /89   Pulse 74   Temp 97 °F (36.1 °C) (Temporal)   Resp 18   Ht 5' 9\" (1.753 m)   Wt 200 lb (90.7 kg)   SpO2 100%   BMI 29.53 kg/m²     General appearance: No apparent distress, appears stated age and cooperative. HEENT: Conjunctivae/corneas clear. Neck: Trachea midline. Respiratory:  Normal respiratory effort. Clear to auscultation  Cardiovascular: Regular rate and rhythm  Abdomen: Soft, non-tender, non-distended with normal bowel sounds. Musculoskeletal: No clubbing, cyanosis or edema bilaterally.    Neuro: Non Focal.   Capillary Refill: Brisk,< 3 seconds   Peripheral Pulses: +2 palpable, equal bilaterally     Labs:   Recent Labs     01/19/22  0534 01/20/22  8882 01/21/22  1113   WBC 6.6 6.1 6.0   HGB 8.9* 8.8* 9.6*   HCT 28.2* 28.7* 31.2*    329 388     Recent Labs     01/19/22  0534 01/20/22  0622 01/21/22  1113    136 135   K 3.9 3.5 4.0   CL 95 94* 92*   CO2 23 24 27   BUN 64* 64* 63*   CREATININE 5.98* 5.98* 5.99*   CALCIUM 8.8 9.0 9.9     No results for input(s): AST, ALT, BILIDIR, BILITOT, ALKPHOS in the last 72 hours. No results for input(s): INR in the last 72 hours. No results for input(s): Ken Bares in the last 72 hours. Urinalysis:      Lab Results   Component Value Date    NITRU Negative 01/15/2022    WBCUA 0-2 01/15/2022    BACTERIA Negative 01/15/2022    RBCUA 0-2 01/15/2022    BLOODU TRACE 01/15/2022    SPECGRAV 1.011 01/15/2022    GLUCOSEU Negative 01/15/2022     Radiology:  CTA CHEST W WO CONTRAST   Final Result      Borderline cardiomegaly. Small to moderate left pleural effusion. Dependent subsegmental lower lobe groundglass opacity, most likely reflecting interstitial edema. Other infectious or inflammatory etiologies, including covid 19 pneumonia, less likely. Dilatation ascending thoracic aorta. CT OF THE ABDOMEN AND PELVIS WITH INTRAVENOUS CONTRAST MEDIUM. FINDINGS:         Liver:  Normal in size, and shape. 6 mm cystlike area caudal, posterior segment right hepatic lobe, too small to further characterize. Bile Ducts:  Normal in caliber. Gallbladder:  No stones or wall thickening. Pancreas:  Normal without masses, cysts, ductal dilatation or calcification. Spleen:  Normal in size without masses or calcifications. No splenules. Kidneys:  Normal in size and enhancement. No hydronephrosis, or stones. 9 mm cystlike area posterior mid to lower pole left kidney. Adrenals:  Normal.       Small bowel:  Normal in caliber. Appendix:  Located within right inguinal canal, normal in appearance. Colon:  Normal in caliber. Diverticular change, ascending colon. Peritoneum:  No ascites, free air, or fluid collections. Vessels: Aorta normal in course and caliber. Portal vein, splenic vein, superior mesenteric vein are patent. Lymph nodes:        Retroperitoneal:  No enlarged retroperitoneal lymph nodes. Mesenteric:  No enlarged mesenteric lymph nodes. Pelvic: No enlarged pelvic lymph nodes. Ureters: Normal in course and caliber. No calcifications. Bladder: No wall thickening. Reproductive organs: No pelvic masses. Abdominal Wall: Bowel no longer identified within right inguinal canal. However, dilated appendix demonstrated within right inguinal canal. No CT evidence of obstruction or incarceration. 11 mm fat-containing periumbilical anterior abdominal wall defect. Bones:  No bone lesions. No degenerative changes. No post operative changes. IMPRESSION:      Bowel no longer identified within right inguinal canal. However, appendix now visualized within right inguinal canal. No CT evidence of incarceration or obstruction. Diverticulosis, ascending colon. All CT scans at this facility use dose modulation, iterative reconstruction, and/or weight based dosing when appropriate to reduce radiation dose to as low as reasonably achievable. CT ABDOMEN PELVIS W IV CONTRAST Additional Contrast? Radiologist Recommendation   Final Result      Borderline cardiomegaly. Small to moderate left pleural effusion. Dependent subsegmental lower lobe groundglass opacity, most likely reflecting interstitial edema. Other infectious or inflammatory etiologies, including covid 19 pneumonia, less likely. Dilatation ascending thoracic aorta. CT OF THE ABDOMEN AND PELVIS WITH INTRAVENOUS CONTRAST MEDIUM. FINDINGS:         Liver:  Normal in size, and shape.  6 mm cystlike area caudal, posterior segment right hepatic lobe, too small to further characterize. Bile Ducts:  Normal in caliber. Gallbladder:  No stones or wall thickening. Pancreas:  Normal without masses, cysts, ductal dilatation or calcification. Spleen:  Normal in size without masses or calcifications. No splenules. Kidneys:  Normal in size and enhancement. No hydronephrosis, or stones. 9 mm cystlike area posterior mid to lower pole left kidney. Adrenals:  Normal.       Small bowel:  Normal in caliber. Appendix:  Located within right inguinal canal, normal in appearance. Colon:  Normal in caliber. Diverticular change, ascending colon. Peritoneum:  No ascites, free air, or fluid collections. Vessels: Aorta normal in course and caliber. Portal vein, splenic vein, superior mesenteric vein are patent. Lymph nodes:        Retroperitoneal:  No enlarged retroperitoneal lymph nodes. Mesenteric:  No enlarged mesenteric lymph nodes. Pelvic: No enlarged pelvic lymph nodes. Ureters: Normal in course and caliber. No calcifications. Bladder: No wall thickening. Reproductive organs: No pelvic masses. Abdominal Wall: Bowel no longer identified within right inguinal canal. However, dilated appendix demonstrated within right inguinal canal. No CT evidence of obstruction or incarceration. 11 mm fat-containing periumbilical anterior abdominal wall defect. Bones:  No bone lesions. No degenerative changes. No post operative changes. IMPRESSION:      Bowel no longer identified within right inguinal canal. However, appendix now visualized within right inguinal canal. No CT evidence of incarceration or obstruction. Diverticulosis, ascending colon. All CT scans at this facility use dose modulation, iterative reconstruction, and/or weight based dosing when appropriate to reduce radiation dose to as low as reasonably achievable. US DUP LOWER EXTREMITY RIGHT JESU   Final Result   NO SONOGRAPHIC EVIDENCE OF DEEP VENOUS THROMBOSIS WITHIN THE RIGHT LOWER EXTREMITY. XR CHEST (2 VW)   Final Result   CARDIOMEGALY. INTERSTITIAL EDEMA. LEFT PLEURAL EFFUSION. LEFT LOWER LUNG ATELECTASIS/PNEUMONIA. Assessment/Plan:    #Acute on chronic combined systolic and diastolic CHF     - continue meds per cardiology; patient is laying flat without respiratory distress; plan for AICD today    #BLUE on CKD      - consult nephrology for their opinion and assistance and advice given contrast ordered by cardiology yesterday for CTA as well as the potential need for more for cardiac procedures; Cr has been stable    #HTN    - continue meds per cardiology recommendations    C/Lisy Zambrano 1106 Problems    Diagnosis Date Noted    Acute systolic heart failure ([facility] [address] [I50.21] 01/15/2022     Additional work up or/and treatment plan may be added today or then after based on clinical progression. I am managing a portion of pt care. Some medical issues are handled by other specialists. Additional work up and treatment should be done in out pt setting by pt PCP and other out pt providers. In addition to examining and evaluating pt, I spent additional time explaining care, normal and abnormal findings, and treatment plan. All of pt questions were answered. Counseling, diet and education were  provided. Case will be discussed with nursing staff when appropriate. Family will be updated if and when appropriate.       Diet: Diet NPO    Code Status: Full Code    PT/OT Eval     Electronically signed by Maldonado Zayas MD on 1/21/2022 at 2:11 PM

## 2022-01-21 NOTE — PROGRESS NOTES
Cardiology Progress Note  Patient: Negar Peterson  Unit/Bed: J274/X032-85  YOB: 1970  MRN: [id number]  Acct: [de-identified]   Admitting Diagnosis: Acute systolic heart failure (HCC) [I50.21]  Pulmonary edema cardiac cause (HCC) [I50.1]  Anemia of chronic disease [D63.8]  Uncontrolled hypertension [I10]  Chronic renal failure, stage 5 ([address] [N18.5]  Date:  1/15/2022  Hospital Day: 6      Chief Complaint:  Shortness of breath    Recent of this consult:  Evaluation of heart failure    History of Present Illness:  72-year-old male -American well-known to our service as patient follows in our cardiology clinic with Dr. Daryle Moss with history of nonischemic cardiomyopathy EF 35 to 40% by TTE on 4/22/2021, hypertension, tobacco use, CKD, alcohol abuse and history of noncompliance who came to the hospital for shortness of breath    Patient reports that shortness of breath started today  Denies chest pain shortness of breath  Denies changing diet  States that he is compliant with all his medications    EKG showing sinus tachycardia without active signs of ischemia  CKD worsening at 4.6 up from 3.85 August 2021  Troponins 0.037 which have been chronically detectable at level  BNP 25,000 up from 16,000 on August 2021  Chest x-ray showing bilateral pulmonary congestion    Latest coronary angiogram 11/4/2020   showing normal coronaries    Latest transthoracic echocardiogram 4/22/2021 EF 35 to 40%    Of note patient had an admission on 7/14/2021 for hypertension urgency and decompensated heart failure due to medication noncompliance    1/21/22  Plan for ICD placement today  Patient tolerating cardiomyopathy medications better Medications are given staggered    1/20/2022  Patient laying in bed comfortably  Denies chest pain or shortness of breath  Blood pressure is better now  Continue with cardiomyopathy medications at reduced dose  Plan for ICD tomorrow morning, will keep patient n.p.o. after midnight    1/19/22  Patient doing well denies chest pain or shortness of breath  This morning blood pressure dropped after Coreg, in the high 70s  Patient asymptomatic, 250 cc NS bolus given with improvement of his blood pressure  We will make adjustments to his cardiomyopathy medications, we will lower the dosesand we will ask nursing staff to spread medications to reduce further drop in blood pressure    1/18/22  This morning pt complaining of abdominal pain and then chest pressure  Pt diaphoretic  ECG showing TWI in lateral leads   Pt also noted hypotensive in the 80s  Rapid response called  PT was started on IV fluids and levo  With improvement of BP  Pt reported that chest pain was improving  Concern for PE vs ACS  Pt will go for CT of the chest stat and if negative fpr PE we will perfrorm a diagnostic angiogram for possible ACS      1/17/22  Patient laying in bed comfortably  Denies chest pain or shortness of breath  Patient was net -4 L last 24 hours  From cardiology standpoint patient is close to be discharged, possibly 1 more day  Creatinine trending up currently 5.47 from 4.6 at admission      1/16/22  Shortness of breath improving  Patient was net -3.7 L since admission      Allergies   Allergen Reactions    Lipitor [Atorvastatin] Other (See Comments)     Coughing     Lisinopril        Current Facility-Administered Medications   Medication Dose Route Frequency Provider Last Rate Last Admin    dextrose 5 % and 0.45 % sodium chloride infusion   IntraVENous Continuous Jori Etienne  mL/hr at 01/21/22 1101 New Bag at 01/21/22 1101    hydrALAZINE (APRESOLINE) tablet 10 mg  10 mg Oral 3 times per day Rich Hodges MD   10 mg at 01/20/22 2029    isosorbide mononitrate (IMDUR) extended release tablet 30 mg  30 mg Oral Daily Rich Hodges MD        potassium chloride [facility]) extended release tablet 40 mEq  40 mEq Oral PRN Jose Mccall, DO   40 mEq at 01/18/22 0954    Or    potassium bicarbonate (K-LYTE) disintegrating tablet 50 mEq  50 mEq Oral PRN Pitney Jason, DO        Or    potassium chloride 10 mEq/100 mL IVPB (Peripheral Line)  10 mEq IntraVENous PRN Pitney Jason, DO   Stopped at 01/17/22 1424    sodium chloride flush 0.9 % injection 5-40 mL  5-40 mL IntraVENous 2 times per day Beatriz Verde MD   8 mL at 01/21/22 0947    sodium chloride flush 0.9 % injection 5-40 mL  5-40 mL IntraVENous PRN Beatriz Verde MD        0.9 % sodium chloride infusion  25 mL IntraVENous PRN Beatriz Verde MD        ondansetron (ZOFRAN-ODT) disintegrating tablet 4 mg  4 mg Oral Q8H PRN Beatriz Verde MD        Or    [facility]dansetron [facility] injection 4 mg  4 mg IntraVENous Q6H PRN Beatriz Verde MD        polyethylene glycol [facility]) packet 17 g  17 g Oral Daily PRN Beatriz Verde MD        acetaminophen (TYLENOL) tablet 650 mg  650 mg Oral Q6H PRN Beatriz Verde MD   650 mg at 01/18/22 1007    Or    acetaminophen (TYLENOL) suppository 650 mg  650 mg Rectal Q6H PRN Beatriz Verde MD       [facility] by provider] heparin (porcine) injection 5,000 Units  5,000 Units SubCUTAneous 3 times per day Beatriz Verde MD   5,000 Units at 01/20/22 0645    carvedilol (COREG) tablet 12.5 mg  12.5 mg Oral BID Zeinab Ashford MD   12.5 mg at 01/21/22 6369    magnesium oxide (MAG-OX) tablet 400 mg  400 mg Oral Daily Zbigniew Valderrama DO   400 mg at 01/21/22 2951       PMHx:  Past Medical History:   Diagnosis Date    Abnormal EKG 9/27/2019    Acute kidney injury (BLUE) with acute tubular necrosis (ATN) (HCC)     Cardiomyopathy ([facility]     per echo 8/2019    Chronic combined systolic and diastolic CHF (congestive heart failure) ([facility] 9/27/2019    ETOH abuse     Hypertension     Tobacco abuse        PSHx:  Past Surgical History:   Procedure Laterality Date    HERNIA REPAIR Right 2/10/2021    RIGHT INGUINAL HERNIA REPAIR WITH MESH performed by Alphonso Klinefelter, MD at [place] [address] Hx:  Social History     Socioeconomic History    Marital status: Single     Spouse name: None    Number of children: None    Years of education: None    Highest education level: None   Occupational History    None   Tobacco Use    Smoking status: Current Every Day Smoker     Packs/day: 1.00     Types: Cigarettes     Last attempt to quit: 12/3/2020     Years since quittin.1    Smokeless tobacco: Never Used    Tobacco comment: currently smoking only couple cigarettes daily   Substance and Sexual Activity    Alcohol use: Yes    Drug use: Never    Sexual activity: None   Other Topics Concern    None   Social History Narrative    None     Social Determinants of Health     Financial Resource Strain:     Difficulty of Paying Living Expenses: Not on file   Food Insecurity:     Worried About Running Out of Food in the Last Year: Not on file    Ginette of Food in the Last Year: Not on file   Transportation Needs:     Lack of Transportation (Medical): Not on file    Lack of Transportation (Non-Medical):  Not on file   Physical Activity:     Days of Exercise per Week: Not on file    Minutes of Exercise per Session: Not on file   Stress:     Feeling of Stress : Not on file   Social Connections:     Frequency of Communication with Friends and Family: Not on file    Frequency of Social Gatherings with Friends and Family: Not on file    Attends Yazidism Services: Not on file    Active Member of [address] [place] [address] or Organizations: Not on file    Attends Club or Organization Meetings: Not on file    Marital Status: Not on file   Intimate Partner Violence:     Fear of Current or Ex-Partner: Not on file    Emotionally Abused: Not on file    Physically Abused: Not on file    Sexually Abused: Not on file   Housing Stability:     Unable to Pay for Housing in the Last Year: Not on file    Number of [place] in the Last Year: Not on file    Unstable Housing in the Last Year: Not on file       Family Hx:  Family History   Problem Relation Age of Onset    Coronary Art Dis Mother      Physical Examination:    /89   Pulse 74   Temp 97 °F (36.1 °C) (Temporal)   Resp 18   Ht 5' 9\" (1.753 m)   Wt 200 lb (90.7 kg)   SpO2 100%   BMI 29.53 kg/m²    Physical Exam  Vitals and nursing note reviewed. Constitutional:       Appearance: Normal appearance. HENT:      Head: Normocephalic and atraumatic. Mouth/Throat:      Mouth: Mucous membranes are moist.      Pharynx: Oropharynx is clear. Eyes:      Extraocular Movements: Extraocular movements intact. Conjunctiva/sclera: Conjunctivae normal.      Pupils: Pupils are equal, round, and reactive to light. Cardiovascular:      Rate and Rhythm: Normal rate and regular rhythm. Pulses: Normal pulses. Heart sounds: Normal heart sounds. Pulmonary:      Effort: Pulmonary effort is normal.   Abdominal:      General: Abdomen is flat. Bowel sounds are normal.      Palpations: Abdomen is soft. Musculoskeletal:         General: Normal range of motion. Cervical back: Normal range of motion and neck supple. Skin:     General: Skin is warm. Neurological:      General: No focal deficit present. Mental Status: He is alert and oriented to person, place, and time. Mental status is at baseline.    Psychiatric:         Mood and Affect: Mood normal.         LABS:  CBC:  Lab Results   Component Value Date    WBC 6.0 01/21/2022    RBC 4.92 01/21/2022    HGB 9.6 01/21/2022    HCT 31.2 01/21/2022    MCV 63.4 01/21/2022    MCH 19.5 01/21/2022    MCHC 30.8 01/21/2022    RDW 18.8 01/21/2022     01/21/2022     CBC with Differential:   Lab Results   Component Value Date    WBC 6.0 01/21/2022    RBC 4.92 01/21/2022    HGB 9.6 01/21/2022    HCT 31.2 01/21/2022     01/21/2022    MCV 63.4 01/21/2022    MCH 19.5 01/21/2022    MCHC 30.8 01/21/2022    RDW 18.8 01/21/2022    LYMPHOPCT 8.8 01/21/2022    MONOPCT 17.0 01/21/2022    BASOPCT 0.7 01/21/2022 MONOSABS 1.0 01/21/2022    LYMPHSABS 0.5 01/21/2022    EOSABS 0.2 01/21/2022    BASOSABS 0.0 01/21/2022     CMP:    Lab Results   Component Value Date     01/21/2022    K 4.0 01/21/2022    K 3.8 01/18/2022    CL 92 01/21/2022    CO2 27 01/21/2022    BUN 63 01/21/2022    CREATININE 5.99 01/21/2022    GFRAA 12.1 01/21/2022    LABGLOM 10.0 01/21/2022    GLUCOSE 104 01/21/2022    PROT 7.2 01/18/2022    LABALBU 3.8 01/18/2022    CALCIUM 9.9 01/21/2022    BILITOT 0.4 01/18/2022    ALKPHOS 124 01/18/2022    AST 24 01/18/2022    ALT 31 01/18/2022     BMP:    Lab Results   Component Value Date     01/21/2022    K 4.0 01/21/2022    K 3.8 01/18/2022    CL 92 01/21/2022    CO2 27 01/21/2022    BUN 63 01/21/2022    LABALBU 3.8 01/18/2022    CREATININE 5.99 01/21/2022    CALCIUM 9.9 01/21/2022    GFRAA 12.1 01/21/2022    LABGLOM 10.0 01/21/2022    GLUCOSE 104 01/21/2022     Magnesium:    Lab Results   Component Value Date    MG 2.7 01/20/2022     Troponin:    Lab Results   Component Value Date    TROPONINI 0.044 01/18/2022       Radiology:  XR CHEST (2 VW)    Result Date: 1/15/2022  EXAMINATION: XR CHEST (2 VW) CLINICAL HISTORY: TIGHTNESS OF BREATH. UNCONTROLLED HYPERTENSION. COMPARISONS: CHEST RADIOGRAPH AUGUST 28, 2021 FINDINGS: Osseous structures intact. Cardiopericardial silhouette enlarged and unchanged. Pulmonary vasculature indistinct. Ill-defined area of increased opacity obscures left lung base, with blunting left costophrenic angle. CARDIOMEGALY. INTERSTITIAL EDEMA. LEFT PLEURAL EFFUSION. LEFT LOWER LUNG ATELECTASIS/PNEUMONIA.       EKG: Sinus tachycardia without active signs of ischemia    Assessment:    Active Hospital Problems    Diagnosis Date Noted    Acute systolic heart failure ([facility] [I50.21] 01/15/2022     Priority: Low     Sudden onset of abdominal and chest pain 1/18/22.  Ct chest negative for PE, likely related to overdiuresis  Acute on chronic heart failure with reduced ejection fraction EF 35 to 40% by TTE 2021. Patient admitted with a chest x-ray showing bilateral pulmonary congestion, BNP 25,000, patient with lower extremity edema. Reason of this heart failure decompensation was related to the fact that patient was skipping doses of diuretic secondary to leg cramping  CKD.   Creatinine stable at 5.9  History of hypertension  History of medication noncompliance    Latest coronary angiogram 11/4/2020   showing normal coronaries  Latest transthoracic echocardiogram 4/22/2021 EF 35 to 40%  TTE 1/18/22 EF 20%      Plan:  Continue telemetry  Stop diuretics, restart PO diuretics before discharge  Continue Coreg 3.125 mg p.o. twice daily  Continue hydralazine 10 mg p.o. 3 times daily  Continue Imdur 30 mg p.o. daily  Plan for ICD today with Dr Lisa Gannon  Strict ins and outs and daily weights  Please keep potassium between 4 and 5 and magnesium above 2      Electronically signed by Zita Campbell MD on 1/21/2022 at 5:21 PM

## 2022-01-21 NOTE — PROGRESS NOTES
Patient having second thoughts about procedure scheduled for 1/21/22. Dr. Meagan Juarez at bedside with patient and answered patient's questions and spoke with patient's sister as well over the phone at bedside. Patient seems to be content at this time after conversation with Dr. Meagan Juarez.     Electronically signed by Kole Kong RN on 1/20/2022 at 11:57 PM

## 2022-01-21 NOTE — OP NOTE
[address]                      [facility], [address] [facility]                                OPERATIVE REPORT    PATIENT NAME: Nikole Rodriguez                     :        1970  MED REC NO:   [id number]                            ROOM:       [id number]  ACCOUNT NO:   [de-identified]                           ADMIT DATE: 01/15/2022  PROVIDER:     Chay Bailey MD    DATE OF PROCEDURE:  2022    PREOPERATIVE DIAGNOSIS:  Dilated nonischemic cardiomyopathy with  ejection fraction persistently lower than 35% despite the best tolerated  medical therapy. The patient is high risk for malignant dysrhythmias. POSTOPERATIVE DIAGNOSIS:  Dilated nonischemic cardiomyopathy with  ejection fraction persistently lower than 35% despite the best tolerated  medical therapy. The patient is high risk for malignant dysrhythmias. Alternative options of therapy without ICD were discussed with the  patient. He wanted to proceed with the ICD. PROCEDURE:  Dual-chamber ICD implant. DESCRIPTION OF PROCEDURE:  The patient was brought to the EP lab in the  postabsorptive state. Vitals were stable. Informed consent was  obtained. The left hemithorax was prepared and draped in the usual  manner. The left infraclavicular fossa was infiltrated with 2%  Xylocaine. Incision was made two fingerbreadths below the left clavicle  and deepened down to the pectoralis fascia. Pocket was made by  dissection. Hemostasis was secured. Using the Seldinger technique in a  caudal view of 35 degrees, the left cephalic axillary venous system was  captured two separate times. Two separate guidewires were advanced  under direct fluoroscopy to the junction of the superior vena cava and  right atrium. Number 7-Norwegian and 9-Norwegian dilators and introducers  were advanced over one of the guidewires at a time. The dilators and  the guidewires were removed.   RV lead model number [id number], serial  number of [id number]  Job#: [id number]     Doc#: [id number]    CC:   MD Maldonado Orantes MD [place]  MD Nena Gomez

## 2022-01-21 NOTE — PROGRESS NOTES
Pt arrived pre/post cath from [address] [place] [address] via wheelchair. Pt alert and oriented. Consent verified. Pt prepped for procedure.

## 2022-01-21 NOTE — PROGRESS NOTES
Nutrition Education    Consult recieved for CHF diet education. Pt off unit at time of visit, will attempt to follow up for education prior to discharge. Noted pt has received the CHF teaching booklet from the care transition RN, which includes the necessary diet information, RDN name and phone number written down for pt to call with questions.     Electronically signed by Javad Villanueva RD, LD on 1/21/22 at 3:38 PM EST

## 2022-01-21 NOTE — PROGRESS NOTES
Renal Progress Note    Assessment and Plan:    54 y.o. male with history s/f combined HFrEF w/ DD, CKD stage IV, HTN, non-compliance who presented for hypertension. Pt had been worsening SOB and BLE swelling as well. Nephrology consulted for BLUE on CKD.     1. BLUE on CKD stage IV: most likely 2/2 overdiuresis, had significant diuresis w/ lasix, torsemide and thiazide, CKD 2/2 HTN and CRS, prior baseline Scr ~ Scr ~3.6-3.8 w/ eGFR low 20s then down to 4.6-4.8 w/ eGFR 16-17 ml/lmin in 11/21, pt of [place]  2. Fluid overload: was on lasix, metolazone and thiazide, LVEF 20%  3. Hypotension  4. Hypermagnesemia  5. Anemia: tsat low     Plan:  - at risk of ending up on RRT due to multiple insults including contrast and significant hypotension, however currently no acute indication, remains non-oliguric, discussed w/ patient   - cardiology managing anti-hypertensives  - iron for anemia  - ok for ICD placement from renal standpoint   - agree w/ stopping torsemide as well, though EF low no current reason for diuresis         Patient Active Problem List:     Hypertensive urgency     Cardiomyopathy ([address]     Systolic and diastolic CHF, acute ([address]     BLUE (acute kidney injury) ([address]     Abnormal EKG     Chronic combined systolic and diastolic CHF (congestive heart failure) ([address]     Acute decompensated heart failure ([address]     Chest pain     Pulmonary edema, acute ([address]     NSTEMI (non-ST elevated myocardial infarction) ([address]     Inguinal hernia of right side without obstruction or gangrene     CHF (congestive heart failure), NYHA class I, acute on chronic, combined ([address]     Systolic congestive heart failure ([address]     Acute systolic heart failure ([address]      Subjective:   Admit Date: 1/15/2022    Interval History: pt going for aicd now. On ivf as was npo.         Medications:   Scheduled Meds:   vancomycin  1,000 mg IntraVENous Once    irrigation builder   Irrigation Once    hydrALAZINE  10 mg Oral 3 times per day    isosorbide mononitrate  30 mg Oral Daily    sodium chloride flush  5-40 mL IntraVENous 2 times per day    [Held by provider] heparin (porcine)  5,000 Units SubCUTAneous 3 times per day    carvedilol  12.5 mg Oral BID    magnesium oxide  400 mg Oral Daily     Continuous Infusions:   dextrose 5 % and 0.45 % NaCl 100 mL/hr at 01/21/22 1101    sodium chloride         CBC:   Recent Labs     01/20/22  0622 01/21/22  1113   WBC 6.1 6.0   HGB 8.8* 9.6*    388     CMP:    Recent Labs     01/19/22  0534 01/20/22  0622 01/21/22  1113    136 135   K 3.9 3.5 4.0   CL 95 94* 92*   CO2 23 24 27   BUN 64* 64* 63*   CREATININE 5.98* 5.98* 5.99*   GLUCOSE 100* 143* 104*   CALCIUM 8.8 9.0 9.9   LABGLOM 10.1* 10.1* 10.0*     Troponin:   No results for input(s): TROPONINI in the last 72 hours. BNP: No results for input(s): BNP in the last 72 hours. INR: No results for input(s): INR in the last 72 hours. Lipids:   No results for input(s): CHOL, LDLDIRECT, TRIG, HDL, AMYLASE, LIPASE in the last 72 hours. Liver:   No results for input(s): AST, ALT, ALKPHOS, PROT, LABALBU, BILITOT in the last 72 hours. Invalid input(s): BILDIR  Iron:  No results for input(s): IRONS, FERRITIN in the last 72 hours. Invalid input(s): LABIRONS  Urinalysis: No results for input(s): UA in the last 72 hours.     Objective:   Vitals: BP (!) 126/91   Pulse 80   Temp 98.6 °F (37 °C) (Oral)   Resp 18   Ht 5' 9\" (1.753 m)   Wt 200 lb (90.7 kg)   SpO2 99%   BMI 29.53 kg/m²    Wt Readings from Last 3 Encounters:   01/15/22 200 lb (90.7 kg)   08/28/21 202 lb (91.6 kg)   07/13/21 198 lb 3.2 oz (89.9 kg)      24HR INTAKE/OUTPUT:      Intake/Output Summary (Last 24 hours) at 1/21/2022 1253  Last data filed at 1/21/2022 1105  Gross per 24 hour   Intake 586.67 ml   Output 950 ml   Net -363.33 ml       Constitutional:  Alert, awake, no apparent distress         Electronically signed by Nick Velázquez MD on 1/21/2022 at 12:53 PM

## 2022-01-21 NOTE — PROGRESS NOTES
Arrived to pre/post from the cath lab and report from [facility]. Left subclavian dressing is dry and intact with no bleeding or hematoma. Attached to monitor and vitals are stable. Sling to left arm. Portable chest x ray released to be done at bedside.   Patient is resting comfortably and sipping on fluids without difficulty

## 2022-01-22 ENCOUNTER — APPOINTMENT (OUTPATIENT)
Dept: GENERAL RADIOLOGY | Age: 52
DRG: 179 | End: 2022-01-22
Payer: MEDICAID

## 2022-01-22 VITALS
WEIGHT: 203.04 LBS | TEMPERATURE: 98.1 F | OXYGEN SATURATION: 100 % | RESPIRATION RATE: 18 BRPM | DIASTOLIC BLOOD PRESSURE: 78 MMHG | HEART RATE: 45 BPM | SYSTOLIC BLOOD PRESSURE: 138 MMHG | BODY MASS INDEX: 30.07 KG/M2 | HEIGHT: 69 IN

## 2022-01-22 LAB
ANION GAP SERPL CALCULATED.3IONS-SCNC: 18 MEQ/L (ref 9–15)
ANISOCYTOSIS: ABNORMAL
BASOPHILS ABSOLUTE: 0.1 K/UL (ref 0–0.2)
BASOPHILS RELATIVE PERCENT: 1 %
BUN BLDV-MCNC: 63 MG/DL (ref 6–20)
CALCIUM SERPL-MCNC: 9.3 MG/DL (ref 8.5–9.9)
CHLORIDE BLD-SCNC: 94 MEQ/L (ref 95–107)
CO2: 25 MEQ/L (ref 20–31)
CREAT SERPL-MCNC: 5.94 MG/DL (ref 0.7–1.2)
EOSINOPHILS ABSOLUTE: 0.3 K/UL (ref 0–0.7)
EOSINOPHILS RELATIVE PERCENT: 4 %
GFR AFRICAN AMERICAN: 12.3
GFR NON-AFRICAN AMERICAN: 10.1
GLUCOSE BLD-MCNC: 97 MG/DL (ref 70–99)
HCT VFR BLD CALC: 28 % (ref 42–52)
HEMOGLOBIN: 8.7 G/DL (ref 14–18)
HYPOCHROMIA: ABNORMAL
LYMPHOCYTES ABSOLUTE: 0.2 K/UL (ref 1–4.8)
LYMPHOCYTES RELATIVE PERCENT: 3 %
MCH RBC QN AUTO: 19.8 PG (ref 27–31.3)
MCHC RBC AUTO-ENTMCNC: 31.1 % (ref 33–37)
MCV RBC AUTO: 63.5 FL (ref 80–100)
MICROCYTES: ABNORMAL
MONOCYTES ABSOLUTE: 0.9 K/UL (ref 0.2–0.8)
MONOCYTES RELATIVE PERCENT: 11.3 %
NEUTROPHILS ABSOLUTE: 6.4 K/UL (ref 1.4–6.5)
NEUTROPHILS RELATIVE PERCENT: 81 %
PDW BLD-RTO: 18.5 % (ref 11.5–14.5)
PLATELET # BLD: 320 K/UL (ref 130–400)
PLATELET SLIDE REVIEW: NORMAL
POTASSIUM REFLEX MAGNESIUM: 4 MEQ/L (ref 3.4–4.9)
RBC # BLD: 4.42 M/UL (ref 4.7–6.1)
SODIUM BLD-SCNC: 137 MEQ/L (ref 135–144)
WBC # BLD: 7.9 K/UL (ref 4.8–10.8)

## 2022-01-22 PROCEDURE — 6370000000 HC RX 637 (ALT 250 FOR IP): Performed by: INTERNAL MEDICINE

## 2022-01-22 PROCEDURE — 6370000000 HC RX 637 (ALT 250 FOR IP): Performed by: FAMILY MEDICINE

## 2022-01-22 PROCEDURE — 85025 COMPLETE CBC W/AUTO DIFF WBC: CPT

## 2022-01-22 PROCEDURE — 36415 COLL VENOUS BLD VENIPUNCTURE: CPT

## 2022-01-22 PROCEDURE — 99232 SBSQ HOSP IP/OBS MODERATE 35: CPT | Performed by: INTERNAL MEDICINE

## 2022-01-22 PROCEDURE — 80048 BASIC METABOLIC PNL TOTAL CA: CPT

## 2022-01-22 PROCEDURE — 2580000003 HC RX 258: Performed by: FAMILY MEDICINE

## 2022-01-22 PROCEDURE — 6370000000 HC RX 637 (ALT 250 FOR IP): Performed by: STUDENT IN AN ORGANIZED HEALTH CARE EDUCATION/TRAINING PROGRAM

## 2022-01-22 PROCEDURE — 71046 X-RAY EXAM CHEST 2 VIEWS: CPT

## 2022-01-22 RX ORDER — ISOSORBIDE MONONITRATE 30 MG/1
30 TABLET, EXTENDED RELEASE ORAL DAILY
Qty: 30 TABLET | Refills: 3 | Status: ON HOLD | OUTPATIENT
Start: 2022-01-22 | End: 2022-04-15 | Stop reason: HOSPADM

## 2022-01-22 RX ADMIN — HYDRALAZINE HYDROCHLORIDE 10 MG: 10 TABLET, FILM COATED ORAL at 13:11

## 2022-01-22 RX ADMIN — CARVEDILOL 12.5 MG: 12.5 TABLET, FILM COATED ORAL at 09:34

## 2022-01-22 RX ADMIN — HYDRALAZINE HYDROCHLORIDE 10 MG: 10 TABLET, FILM COATED ORAL at 04:47

## 2022-01-22 RX ADMIN — Medication 10 ML: at 09:35

## 2022-01-22 RX ADMIN — Medication 650 MG: at 15:35

## 2022-01-22 RX ADMIN — Medication 400 MG: at 09:34

## 2022-01-22 ASSESSMENT — PAIN SCALES - GENERAL
PAINLEVEL_OUTOF10: 0
PAINLEVEL_OUTOF10: 4

## 2022-01-22 NOTE — DISCHARGE INSTR - ACTIVITY
Follow post op defibrillator instructions. NO lifting your left arm above shoulder level. NO lifting, pushing or pulling with your left arm. Use the arm sling at night.      Please do NOT return to work for at least 2 weeks at which point you will follow up with cardiology and they will reassess

## 2022-01-22 NOTE — DISCHARGE SUMMARY
Hospital Medicine Discharge Summary    Alexus Metcalf  :  1970  MRN:  [id number]    Admit date:  1/15/2022  Discharge date:  2022    Admitting Physician:  Andreas Cooks, MD  Primary Care Physician:  Anat Fletcher MD    This patient was seen during a global health during the COVID-19 pandemic and its resultant significant effects on the delivery of emergency care. This includes but is not limited to the following: significant ED boarding, hospital overcrowding,  limited bed availability (floor and especially ICU), limited resources (personnel, supplies, testing, services etc). While every and all efforts are made to delivery the highest quality care in a prompt way there may or may not be significant delays in care as a result. Discharge Diagnoses:    Acute on chronic combined systolic and diastolic CHF; BLUE on CKD    Chief Complaint   Patient presents with    Hypertension     sent by PCP for high blood pressure     Hospital Course: The patient presented with Acute on chronic combined systolic and diastolic CHF. He was diuresed but had hypotension due to over diuresis and required some more fluid. His renal function has been stable close to 6 and was evaluated by renal and the patients is appropriate for follow up. He had an AICD placed and his medications were adjusted by cardiology prior to discharge. Exam on discharge:   /88   Pulse 85   Temp 98.4 °F (36.9 °C) (Oral)   Resp 18   Ht 5' 9\" (1.753 m)   Wt 203 lb 0.7 oz (92.1 kg)   SpO2 100%   BMI 29.98 kg/m²   General appearance: No apparent distress, appears stated age and cooperative. HEENT: Conjunctivae/corneas clear. Neck: Trachea midline. Respiratory:  Normal respiratory effort. Clear to auscultation  Cardiovascular: Regular rate and rhythm  Abdomen: Soft, non-tender, non-distended with normal bowel sounds. Musculoskeletal: No clubbing, cyanosis or edema bilaterally.    Neuro: Non Focal.   Capillary Refill: Brisk,< 3 seconds   Peripheral Pulses: +2 palpable, equal bilaterally      Patient was seen by the following consultants   Consults:  IP CONSULT TO HOSPITALIST  IP CONSULT TO CARDIOLOGY  IP CONSULT TO HEART FAILURE NURSE/COORDINATOR  IP CONSULT TO DIETITIAN  IP CONSULT TO CARDIOLOGY  IP CONSULT TO NEPHROLOGY  IP CONSULT TO NEPHROLOGY  IP CONSULT TO NEPHROLOGY  IP CONSULT TO NEPHROLOGY    Significant Diagnostic Studies:    Refer to chart     Please refer to chart if no studies are shown here    XR CHEST (2 VW)    Result Date: 1/15/2022  EXAMINATION: XR CHEST (2 VW) CLINICAL HISTORY: TIGHTNESS OF BREATH. UNCONTROLLED HYPERTENSION. COMPARISONS: CHEST RADIOGRAPH AUGUST 28, 2021 FINDINGS: Osseous structures intact. Cardiopericardial silhouette enlarged and unchanged. Pulmonary vasculature indistinct. Ill-defined area of increased opacity obscures left lung base, with blunting left costophrenic angle. CARDIOMEGALY. INTERSTITIAL EDEMA. LEFT PLEURAL EFFUSION. LEFT LOWER LUNG ATELECTASIS/PNEUMONIA.       Discharge Medications:         Medication List      CONTINUE taking these medications    nicotine 21 MG/24HR  Commonly known as: [address] [facility] 1 patch onto the skin daily        ASK your doctor about these medications    aspirin 81 MG EC tablet  Take 1 tablet by mouth daily     carvedilol 25 MG tablet  Commonly known as: COREG  Take 1 tablet by mouth 2 times daily HOLD for SBP<100 or HR<60     Corlanor 5 MG Tabs tablet  Generic drug: ivabradine  Take 1 tablet by mouth 2 times daily (with meals)     hydrALAZINE 100 MG tablet  Commonly known as: APRESOLINE  Take 1 tablet by mouth 3 times daily     isosorbide mononitrate 120 MG extended release tablet  Commonly known as: IMDUR  Take 1 tablet by mouth daily     torsemide 20 MG tablet  Commonly known as: DEMADEX  Take 2 tablets by mouth daily            Disposition:   If discharged to Home, Any [place] [id number] needs that were indicated and/or required as been addressed and set up by Social Work.     Condition at discharge: good     Activity: activity as tolerated    Total time taken for discharging this patient: 40 minutes. Greater than 70% of time was spent focused exclusively on this patient. Time was taken to review chart, discuss plans with consultants, reconciling medications, discussing plan answering questions with patient.      Signed:  Jeferson Jean MD  1/22/2022, 1:03 PM  ----------------------------------------------------------------------------------------------------------------------    Meg Gannon

## 2022-01-22 NOTE — PROGRESS NOTES
Cardiology Progress Note  Patient: Grzegorz Alicea  Unit/Bed: R803/X534-84  YOB: 1970  MRN: [id number]  Acct: [de-identified]   Admitting Diagnosis: Acute systolic heart failure (HCC) [I50.21]  Pulmonary edema cardiac cause (HCC) [I50.1]  Anemia of chronic disease [D63.8]  Uncontrolled hypertension [I10]  Chronic renal failure, stage 5 ([address] [N18.5]  Date:  1/15/2022  Hospital Day: 7      Chief Complaint:  Shortness of breath    Recent of this consult:  Evaluation of heart failure    History of Present Illness:  82-year-old male -American well-known to our service as patient follows in our cardiology clinic with Dr. Corin Blair with history of nonischemic cardiomyopathy EF 35 to 40% by TTE on 4/22/2021, hypertension, tobacco use, CKD, alcohol abuse and history of noncompliance who came to the hospital for shortness of breath    Patient reports that shortness of breath started today  Denies chest pain shortness of breath  Denies changing diet  States that he is compliant with all his medications    EKG showing sinus tachycardia without active signs of ischemia  CKD worsening at 4.6 up from 3.85 August 2021  Troponins 0.037 which have been chronically detectable at level  BNP 25,000 up from 16,000 on August 2021  Chest x-ray showing bilateral pulmonary congestion    Latest coronary angiogram 11/4/2020   showing normal coronaries    Latest transthoracic echocardiogram 4/22/2021 EF 35 to 40%    Of note patient had an admission on 7/14/2021 for hypertension urgency and decompensated heart failure due to medication noncompliance    1/22/2022: Patient is up in the chair denies any angina or CHF type symptoms. Status post AICD and AICD site looks normal without any evidence of hematoma.   Status post AICD check which was completely normal.    1/21/22  Plan for ICD placement today  Patient tolerating cardiomyopathy medications better Medications are given staggered    1/20/2022  Patient laying in bed comfortably  Denies chest pain or shortness of breath  Blood pressure is better now  Continue with cardiomyopathy medications at reduced dose  Plan for ICD tomorrow morning, will keep patient n.p.o. after midnight    1/19/22  Patient doing well denies chest pain or shortness of breath  This morning blood pressure dropped after Coreg, in the high 70s  Patient asymptomatic, 250 cc NS bolus given with improvement of his blood pressure  We will make adjustments to his cardiomyopathy medications, we will lower the dosesand we will ask nursing staff to spread medications to reduce further drop in blood pressure    1/18/22  This morning pt complaining of abdominal pain and then chest pressure  Pt diaphoretic  ECG showing TWI in lateral leads   Pt also noted hypotensive in the 80s  Rapid response called  PT was started on IV fluids and levo  With improvement of BP  Pt reported that chest pain was improving  Concern for PE vs ACS  Pt will go for CT of the chest stat and if negative fpr PE we will perfrorm a diagnostic angiogram for possible ACS      1/17/22  Patient laying in bed comfortably  Denies chest pain or shortness of breath  Patient was net -4 L last 24 hours  From cardiology standpoint patient is close to be discharged, possibly 1 more day  Creatinine trending up currently 5.47 from 4.6 at admission      1/16/22  Shortness of breath improving  Patient was net -3.7 L since admission      Allergies   Allergen Reactions    Lipitor [Atorvastatin] Other (See Comments)     Coughing     Lisinopril        Current Facility-Administered Medications   Medication Dose Route Frequency Provider Last Rate Last Admin    dextrose 5 % and 0.45 % sodium chloride infusion   IntraVENous Continuous Jori Etienne  mL/hr at 01/21/22 1101 New Bag at 01/21/22 1101    acetaminophen (TYLENOL) tablet 650 mg  650 mg Oral Q4H PRN Jori Etienne MD        HYDROcodone-acetaminophen (NORCO) 5-325 MG per tablet 1 tablet  1 tablet Oral Q4H PRN Jori Etienne MD   1 tablet at 01/21/22 2204    Or    HYDROcodone-acetaminophen (NORCO) 5-325 MG per tablet 2 tablet  2 tablet Oral Q4H PRN Jori Etienne MD        hydrALAZINE (APRESOLINE) tablet 10 mg  10 mg Oral 3 times per day Juan Clarke MD   10 mg at 01/22/22 1311    isosorbide mononitrate (IMDUR) extended release tablet 30 mg  30 mg Oral Daily Juan Clarke MD   30 mg at 01/21/22 2208    potassium chloride (KLOR-CON M) extended release tablet 40 mEq  40 mEq Oral PRN Lance Guevara DO   40 mEq at 01/18/22 1446    Or    potassium bicarbonate (K-LYTE) disintegrating tablet 50 mEq  50 mEq Oral PRN Lance Guevara DO        Or    potassium chloride 10 mEq/100 mL IVPB (Peripheral Line)  10 mEq IntraVENous PRN Lance Guevara DO   Stopped at 01/17/22 1424    sodium chloride flush 0.9 % injection 5-40 mL  5-40 mL IntraVENous 2 times per day Lisa Romero MD   10 mL at 01/22/22 0935    sodium chloride flush 0.9 % injection 5-40 mL  5-40 mL IntraVENous PRN Lisa Romero MD        0.9 % sodium chloride infusion  25 mL IntraVENous PRN Lisa Romero MD        ondansetron (ZOFRAN-ODT) disintegrating tablet 4 mg  4 mg Oral Q8H PRN Lisa Romero MD        Or    ondansetron [facility]) injection 4 mg  4 mg IntraVENous Q6H PRN Prmamta Romero MD        polyethylene glycol [facility]) packet 17 g  17 g Oral Daily PRN Lisa Romero MD        acetaminophen (TYLENOL) tablet 650 mg  650 mg Oral Q6H PRN Lisa Romero MD   650 mg at 01/18/22 1007    Or    acetaminophen (TYLENOL) suppository 650 mg  650 mg Rectal Q6H PRN Prmamta Romero MD        [Held by provider] heparin (porcine) injection 5,000 Units  5,000 Units SubCUTAneous 3 times per day Prmamta Romero MD   5,000 Units at 01/20/22 0645    carvedilol (COREG) tablet 12.5 mg  12.5 mg Oral BID Juan Clarke MD   12.5 mg at 01/22/22 0934    magnesium oxide (MAG-OX) tablet 400 mg  400 mg Oral Daily Nba GRADY Romito, DO   400 mg at 22 1369       PMHx:  Past Medical History:   Diagnosis Date    Abnormal EKG 2019    Acute kidney injury (BLUE) with acute tubular necrosis (ATN) (HCC)     Cardiomyopathy (HCC)     per echo 2019    Chronic combined systolic and diastolic CHF (congestive heart failure) ([facility] [address] 2019    ETOH abuse     Hypertension     Tobacco abuse        PSHx:  Past Surgical History:   Procedure Laterality Date    HERNIA REPAIR Right 2/10/2021    RIGHT INGUINAL HERNIA REPAIR WITH MESH performed by Jeannine Mace MD at [place] [address] Hx:  Social History     Socioeconomic History    Marital status: Single     Spouse name: None    Number of children: None    Years of education: None    Highest education level: None   Occupational History    None   Tobacco Use    Smoking status: Current Every Day Smoker     Packs/day: 1.00     Types: Cigarettes     Last attempt to quit: 12/3/2020     Years since quittin.1    Smokeless tobacco: Never Used    Tobacco comment: currently smoking only couple cigarettes daily   Substance and Sexual Activity    Alcohol use: Yes    Drug use: Never    Sexual activity: None   Other Topics Concern    None   Social History Narrative    None     Social Determinants of Health     Financial Resource Strain:     Difficulty of Paying Living Expenses: Not on file   Food Insecurity:     Worried About Running Out of Food in the Last Year: Not on file    Ginette of Food in the Last Year: Not on file   Transportation Needs:     Lack of Transportation (Medical): Not on file    Lack of Transportation (Non-Medical):  Not on file   Physical Activity:     Days of Exercise per Week: Not on file    Minutes of Exercise per Session: Not on file   Stress:     Feeling of Stress : Not on file   Social Connections:     Frequency of Communication with Friends and Family: Not on file    Frequency of Social Gatherings with Friends and Family: Not on file   [facility] Attends [employer] Services: Not on file    Active Member of Clubs or Organizations: Not on file    Attends Club or Organization Meetings: Not on file    Marital Status: Not on file   Intimate Partner Violence:     Fear of Current or Ex-Partner: Not on file    Emotionally Abused: Not on file    Physically Abused: Not on file    Sexually Abused: Not on file   Housing Stability:     Unable to Pay for Housing in the Last Year: Not on file    Number of [place] in the Last Year: Not on file    Unstable Housing in the Last Year: Not on file       Family Hx:  Family History   Problem Relation Age of Onset    Coronary Art Dis Mother      Physical Examination:    /88   Pulse 85   Temp 98.4 °F (36.9 °C) (Oral)   Resp 18   Ht 5' 9\" (1.753 m)   Wt 203 lb 0.7 oz (92.1 kg)   SpO2 100%   BMI 29.98 kg/m²    Physical Exam  Vitals and nursing note reviewed. Constitutional:       Appearance: Normal appearance. HENT:      Head: Normocephalic and atraumatic. Mouth/Throat:      Mouth: Mucous membranes are moist.      Pharynx: Oropharynx is clear. Eyes:      Extraocular Movements: Extraocular movements intact. Conjunctiva/sclera: Conjunctivae normal.      Pupils: Pupils are equal, round, and reactive to light. Cardiovascular:      Rate and Rhythm: Normal rate and regular rhythm. Pulses: Normal pulses. Heart sounds: Normal heart sounds. Pulmonary:      Effort: Pulmonary effort is normal.   Abdominal:      General: Abdomen is flat. Bowel sounds are normal.      Palpations: Abdomen is soft. Musculoskeletal:         General: Normal range of motion. Cervical back: Normal range of motion and neck supple. Skin:     General: Skin is warm. Neurological:      General: No focal deficit present. Mental Status: He is alert and oriented to person, place, and time. Mental status is at baseline.    Psychiatric:         Mood and Affect: Mood normal.         LABS:  CBC:  Lab Results Component Value Date    WBC 7.9 01/22/2022    RBC 4.42 01/22/2022    HGB 8.7 01/22/2022    HCT 28.0 01/22/2022    MCV 63.5 01/22/2022    MCH 19.8 01/22/2022    MCHC 31.1 01/22/2022    RDW 18.5 01/22/2022     01/22/2022     CBC with Differential:   Lab Results   Component Value Date    WBC 7.9 01/22/2022    RBC 4.42 01/22/2022    HGB 8.7 01/22/2022    HCT 28.0 01/22/2022     01/22/2022    MCV 63.5 01/22/2022    MCH 19.8 01/22/2022    MCHC 31.1 01/22/2022    RDW 18.5 01/22/2022    LYMPHOPCT 6.4 01/22/2022    MONOPCT 15.9 01/22/2022    BASOPCT 0.5 01/22/2022    MONOSABS 1.3 01/22/2022    LYMPHSABS 0.5 01/22/2022    EOSABS 0.2 01/22/2022    BASOSABS 0.0 01/22/2022     CMP:    Lab Results   Component Value Date     01/22/2022    K 4.0 01/22/2022    CL 94 01/22/2022    CO2 25 01/22/2022    BUN 63 01/22/2022    CREATININE 5.94 01/22/2022    GFRAA 12.3 01/22/2022    LABGLOM 10.1 01/22/2022    GLUCOSE 97 01/22/2022    PROT 7.2 01/18/2022    LABALBU 3.8 01/18/2022    CALCIUM 9.3 01/22/2022    BILITOT 0.4 01/18/2022    ALKPHOS 124 01/18/2022    AST 24 01/18/2022    ALT 31 01/18/2022     BMP:    Lab Results   Component Value Date     01/22/2022    K 4.0 01/22/2022    CL 94 01/22/2022    CO2 25 01/22/2022    BUN 63 01/22/2022    LABALBU 3.8 01/18/2022    CREATININE 5.94 01/22/2022    CALCIUM 9.3 01/22/2022    GFRAA 12.3 01/22/2022    LABGLOM 10.1 01/22/2022    GLUCOSE 97 01/22/2022     Magnesium:    Lab Results   Component Value Date    MG 2.7 01/20/2022     Troponin:    Lab Results   Component Value Date    TROPONINI 0.044 01/18/2022       Radiology:  XR CHEST (2 VW)    Result Date: 1/15/2022  EXAMINATION: XR CHEST (2 VW) CLINICAL HISTORY: TIGHTNESS OF BREATH. UNCONTROLLED HYPERTENSION. COMPARISONS: CHEST RADIOGRAPH AUGUST 28, 2021 FINDINGS: Osseous structures intact. Cardiopericardial silhouette enlarged and unchanged. Pulmonary vasculature indistinct.  Ill-defined area of increased opacity obscures left lung base, with blunting left costophrenic angle. CARDIOMEGALY. INTERSTITIAL EDEMA. LEFT PLEURAL EFFUSION. LEFT LOWER LUNG ATELECTASIS/PNEUMONIA.       EKG: Sinus tachycardia without active signs of ischemia    Assessment:    Active Hospital Problems    Diagnosis Date Noted    Acute systolic heart failure ([facility] [address] [I50.21] 01/15/2022     Priority: Low     Sudden onset of abdominal and chest pain 1/18/22. Ct chest negative for PE, likely related to overdiuresis  Acute on chronic heart failure with reduced ejection fraction EF 35 to 40% by TTE 2021. Patient admitted with a chest x-ray showing bilateral pulmonary congestion, BNP 25,000, patient with lower extremity edema. Reason of this heart failure decompensation was related to the fact that patient was skipping doses of diuretic secondary to leg cramping  CKD. Creatinine stable at 5.9  History of hypertension  History of medication noncompliance  Latest coronary angiogram 11/4/2020   showing normal coronaries  Latest transthoracic echocardiogram 4/22/2021 EF 35 to 40%  TTE 1/18/22 EF 20%  History of nonischemic cardiomyopathy ejection fraction of 20% status post AICD-[employer]      Plan:   restart PO diuretics before discharge  Continue Coreg 3.125 mg p.o. twice daily  Continue hydralazine 10 mg p.o. 3 times daily  Continue Imdur 30 mg p.o. daily  Strict ins and outs and daily weights  Please keep potassium between 4 and 5 and magnesium above 2  Stable for discharge from cardiology standpoint we will follow-up in CHF clinic as well as with me in the office.       Electronically signed by Juan Casas DO on 1/22/2022 at 1:19 PM

## 2022-01-22 NOTE — FLOWSHEET NOTE
1554- Discharge instructions given to patient. Patient requesting a  Work release form. Dr. Barbara Slaughter will be notified.

## 2022-01-22 NOTE — DISCHARGE INSTR - DIET
Good nutrition is important when healing from an illness, injury, or surgery. Follow any nutrition recommendations given to you during your hospital stay. If you were given an oral nutrition supplement while in the hospital, continue to take this supplement at home. You can take it with meals, in-between meals, and/or before bedtime. These supplements can be purchased at most local grocery stores, pharmacies, and chain [employer]-stores. If you have any questions about your diet or nutrition, call the hospital and ask for the dietitian.       **  2 gm Low Sodium**

## 2022-01-24 ENCOUNTER — TELEPHONE (OUTPATIENT)
Dept: CARDIOLOGY CLINIC | Age: 52
End: 2022-01-24

## 2022-01-24 NOTE — TELEPHONE ENCOUNTER
----- Message from Austin Garza DO sent at 1/22/2022  1:23 PM EST -----  Please make appointement to see Binh Argueta in one week and me in 4-6 weeks.

## 2022-03-01 RX ORDER — CARVEDILOL 25 MG/1
TABLET ORAL
Qty: 60 TABLET | Refills: 1 | Status: SHIPPED | OUTPATIENT
Start: 2022-03-01 | End: 2022-06-28 | Stop reason: SDUPTHER

## 2022-03-01 NOTE — TELEPHONE ENCOUNTER
Please approve or deny this refill request. The order is pended. Thank you.     LOV 5/6/2021    Next Visit Date:  Future Appointments   Date Time Provider Perez Ness   3/4/2022 10:30 AM DO Ya Cowan         VERBAL ORDER RECEIVED WITH READ BACK

## 2022-03-27 ASSESSMENT — ENCOUNTER SYMPTOMS
WHEEZING: 0
EYE DISCHARGE: 0
EYE ITCHING: 0
BACK PAIN: 0
VOMITING: 0
NAUSEA: 0
ABDOMINAL PAIN: 0
EYE REDNESS: 0
DIARRHEA: 0
RECTAL PAIN: 0
COLOR CHANGE: 0
PHOTOPHOBIA: 0
BLOOD IN STOOL: 0
SINUS PRESSURE: 0
SINUS PAIN: 0
FACIAL SWELLING: 0
TROUBLE SWALLOWING: 0
COUGH: 0
APNEA: 0
RHINORRHEA: 0
SHORTNESS OF BREATH: 0
VOICE CHANGE: 0
ABDOMINAL DISTENTION: 0
CHEST TIGHTNESS: 0
SORE THROAT: 0
CONSTIPATION: 0
EYE PAIN: 0

## 2022-03-27 NOTE — PROGRESS NOTES
Subjective:      Patient ID: Ronaldo Cortes is a 54 y.o. male who presents today for:  Chief Complaint   Patient presents with    Hypertension    Chest Pain     pt states he gets chest pain if he lifts something       77-year-old male with history of hypertension, acute kidney injury, and cardiomyopathy presents for follow-up visit. Hypertension: The patient is presently compliantCoreg and Imdur. He is also receiving Lasix 40 mg orally daily  The patient is presently in need of a refill for hydralazine 100 mg 3 times daily to control his blood pressure. His blood pressure remains uncontrolled at this time. Regarding his cardiomyopathy  carvedilol 25 mg twice daily, Imdur 30 mg daily, hydralazine 50 tid. Nicotine abuse: The patient previously achieved smoking cessation. At present he denies polyuria,  Polydipsia, constitutional, sinus, visual, cardiopulmonary, gastrointestinal, immunologic/hematologic, musculoskeletal, neurologic,dermatologic, or psychiatric complaints.       Past Medical History:   Diagnosis Date    Abnormal EKG 2019    Acute kidney injury (BLUE) with acute tubular necrosis (ATN) (HCC)     Cardiomyopathy (HCC)     per echo 2019    Chronic combined systolic and diastolic CHF (congestive heart failure) ([facility] [address] 2019    ETOH abuse     Hypertension     Tobacco abuse      Past Surgical History:   Procedure Laterality Date    HERNIA REPAIR Right 2/10/2021    RIGHT INGUINAL HERNIA REPAIR WITH MESH performed by Charla Deal MD at [address] [place] [address] History     Socioeconomic History    Marital status: Single     Spouse name: Not on file    Number of children: Not on file    Years of education: Not on file    Highest education level: Not on file   Occupational History    Not on file   Tobacco Use    Smoking status: Current Every Day Smoker     Packs/day: 1.00     Types: Cigarettes     Last attempt to quit: 12/3/2020     Years since quittin.3    Smokeless tobacco: Never Used    Tobacco comment: currently smoking only couple cigarettes daily   Substance and Sexual Activity    Alcohol use: Yes    Drug use: Never    Sexual activity: Not on file   Other Topics Concern    Not on file   Social History Narrative    Not on file     Social Determinants of Health     Financial Resource Strain: Low Risk     Difficulty of Paying Living Expenses: Not hard at all   Food Insecurity: No Food Insecurity    Worried About Running Out of Food in the Last Year: Never true    [address] in the Last Year: Never true   Transportation Needs:     Lack of Transportation (Medical): Not on file    Lack of Transportation (Non-Medical):  Not on file   Physical Activity:     Days of Exercise per Week: Not on file    Minutes of Exercise per Session: Not on file   Stress:     Feeling of Stress : Not on file   Social Connections:     Frequency of Communication with Friends and Family: Not on file    Frequency of Social Gatherings with Friends and Family: Not on file    Attends Anabaptist Services: Not on file    Active Member of [address] [place] [address] or Organizations: Not on file    Attends Club or Organization Meetings: Not on file    Marital Status: Not on file   Intimate Partner Violence:     Fear of Current or Ex-Partner: Not on file    Emotionally Abused: Not on file    Physically Abused: Not on file    Sexually Abused: Not on file   Housing Stability:     Unable to Pay for Housing in the Last Year: Not on file    Number of [place] in the Last Year: Not on file    Unstable Housing in the Last Year: Not on file     Family History   Problem Relation Age of Onset    Coronary Art Dis Mother      Allergies   Allergen Reactions    Lipitor [Atorvastatin] Other (See Comments)     Coughing     Lisinopril      Current Outpatient Medications on File Prior to Visit   Medication Sig Dispense Refill    carvedilol (COREG) 25 MG tablet take 1 tablet by mouth twice a day HOLD FOR SBP<100 OR HR <60 60 tablet 1    isosorbide mononitrate (IMDUR) 30 MG extended release tablet Take 1 tablet by mouth daily 30 tablet 3    torsemide (DEMADEX) 20 MG tablet Take 2 tablets by mouth daily 60 tablet 1    ivabradine (CORLANOR) 5 MG TABS tablet Take 1 tablet by mouth 2 times daily (with meals) 60 tablet 3    nicotine (NICODERM CQ) 21 MG/24HR Place 1 patch onto the skin daily (Patient not taking: Reported on 7/16/2021) 30 patch 3     No current facility-administered medications on file prior to visit. Review of Systems   Constitutional: Negative for chills, diaphoresis, fatigue and fever. HENT: Negative for congestion, dental problem, drooling, ear discharge, ear pain, facial swelling, hearing loss, mouth sores, nosebleeds, postnasal drip, rhinorrhea, sinus pressure, sinus pain, sneezing, sore throat, tinnitus, trouble swallowing and voice change. Eyes: Negative for photophobia, pain, discharge, redness, itching and visual disturbance. Respiratory: Negative for apnea, cough, chest tightness, shortness of breath and wheezing. Cardiovascular: Negative for chest pain, palpitations and leg swelling. Gastrointestinal: Negative for abdominal distention, abdominal pain, blood in stool, constipation, diarrhea, nausea, rectal pain and vomiting. Endocrine: Negative for cold intolerance, heat intolerance, polydipsia, polyphagia and polyuria. Genitourinary: Negative for decreased urine volume, difficulty urinating, dysuria, flank pain, frequency, genital sores, hematuria and urgency. Musculoskeletal: Negative for arthralgias, back pain, gait problem, joint swelling, myalgias, neck pain and neck stiffness. Skin: Negative for color change, rash and wound. Allergic/Immunologic: Negative for environmental allergies and food allergies.    Neurological: Negative for dizziness, tremors, seizures, syncope, facial asymmetry, speech difficulty, weakness, light-headedness, numbness and headaches. Hematological: Negative for adenopathy. Does not bruise/bleed easily. Psychiatric/Behavioral: Negative for agitation, confusion, decreased concentration, hallucinations, self-injury, sleep disturbance and suicidal ideas. The patient is not nervous/anxious. Objective:   BP (!) 160/100   Pulse 102   Resp 16   Ht 6' (1.829 m)   Wt 209 lb (94.8 kg)   SpO2 97%   BMI 28.35 kg/m²     Physical Exam  Constitutional:       General: He is not in acute distress. Appearance: He is well-developed. HENT:      Head: Normocephalic. Right Ear: External ear normal.      Left Ear: External ear normal.   Eyes:      Conjunctiva/sclera: Conjunctivae normal.   Neck:      Trachea: No tracheal deviation. Cardiovascular:      Rate and Rhythm: Normal rate and regular rhythm. Heart sounds: Normal heart sounds. Pulmonary:      Effort: Pulmonary effort is normal. No respiratory distress. Breath sounds: Normal breath sounds. No wheezing or rales. Chest:      Chest wall: No tenderness. Abdominal:      General: Bowel sounds are normal. There is no distension. Palpations: Abdomen is soft. There is no mass. Tenderness: There is no abdominal tenderness. There is no guarding. Musculoskeletal:         General: No tenderness or deformity. Cervical back: Neck supple. Skin:     General: Skin is warm and dry. Coloration: Skin is not pale. Findings: No erythema or rash. Neurological:      Mental Status: He is alert and oriented to person, place, and time. Psychiatric:         Thought Content: Thought content normal.         Judgment: Judgment normal.         Assessment:       Diagnosis Orders   1. Chest pain, unspecified type  EKG 12 lead    EKG 12 lead   2. Cardiomyopathy, unspecified type ([address]     3. Essential hypertension           Plan:      Luiz Littlejohn was seen today for follow-up.     Diagnoses and all orders for this visit:          Essential hypertension-we will refill the patient's  hydralazine  100 mg po tid, carvedilol 25 mg bid, lasix 40 mg po daily ,   imdur 30 mg daily . Cardiomyopathy, unspecified type (HCC)-continue Coreg 5 mg twice daily, hydralazine 100 mg orally 3 times daily and Imdur 30 mg orally daily. CKD: Continue to monitor renal function . Avoid NSAIDS. Return in about 15 days (around 4/14/2022). Jeana Mckee MD    Please note, this report has been partially produced using speech recognition software  and may cause  and /or contain errors related to that system including grammar, punctuation and spelling as well as words and phrases that may seem inappropriate. If there are questions or concerns please feel free to contact me to clarify.

## 2022-03-30 ENCOUNTER — OFFICE VISIT (OUTPATIENT)
Dept: FAMILY MEDICINE CLINIC | Age: 52
End: 2022-03-30
Payer: MEDICAID

## 2022-03-30 VITALS
HEIGHT: 72 IN | RESPIRATION RATE: 16 BRPM | SYSTOLIC BLOOD PRESSURE: 160 MMHG | WEIGHT: 209 LBS | HEART RATE: 102 BPM | DIASTOLIC BLOOD PRESSURE: 100 MMHG | OXYGEN SATURATION: 97 % | BODY MASS INDEX: 28.31 KG/M2

## 2022-03-30 DIAGNOSIS — I10 ESSENTIAL HYPERTENSION: ICD-10-CM

## 2022-03-30 DIAGNOSIS — I42.9 CARDIOMYOPATHY, UNSPECIFIED TYPE (HCC): ICD-10-CM

## 2022-03-30 DIAGNOSIS — R07.9 CHEST PAIN, UNSPECIFIED TYPE: Primary | ICD-10-CM

## 2022-03-30 PROCEDURE — G8484 FLU IMMUNIZE NO ADMIN: HCPCS | Performed by: INTERNAL MEDICINE

## 2022-03-30 PROCEDURE — 93000 ELECTROCARDIOGRAM COMPLETE: CPT | Performed by: INTERNAL MEDICINE

## 2022-03-30 PROCEDURE — 99214 OFFICE O/P EST MOD 30 MIN: CPT | Performed by: INTERNAL MEDICINE

## 2022-03-30 PROCEDURE — 4004F PT TOBACCO SCREEN RCVD TLK: CPT | Performed by: INTERNAL MEDICINE

## 2022-03-30 PROCEDURE — G8428 CUR MEDS NOT DOCUMENT: HCPCS | Performed by: INTERNAL MEDICINE

## 2022-03-30 PROCEDURE — G8417 CALC BMI ABV UP PARAM F/U: HCPCS | Performed by: INTERNAL MEDICINE

## 2022-03-30 RX ORDER — HYDRALAZINE HYDROCHLORIDE 100 MG/1
100 TABLET, FILM COATED ORAL 3 TIMES DAILY
Qty: 90 TABLET | Refills: 3 | Status: ON HOLD | OUTPATIENT
Start: 2022-03-30 | End: 2022-04-15 | Stop reason: HOSPADM

## 2022-03-30 SDOH — ECONOMIC STABILITY: FOOD INSECURITY: WITHIN THE PAST 12 MONTHS, THE FOOD YOU BOUGHT JUST DIDN'T LAST AND YOU DIDN'T HAVE MONEY TO GET MORE.: NEVER TRUE

## 2022-03-30 SDOH — ECONOMIC STABILITY: FOOD INSECURITY: WITHIN THE PAST 12 MONTHS, YOU WORRIED THAT YOUR FOOD WOULD RUN OUT BEFORE YOU GOT MONEY TO BUY MORE.: NEVER TRUE

## 2022-03-30 ASSESSMENT — PATIENT HEALTH QUESTIONNAIRE - PHQ9
2. FEELING DOWN, DEPRESSED OR HOPELESS: 1
SUM OF ALL RESPONSES TO PHQ QUESTIONS 1-9: 2
SUM OF ALL RESPONSES TO PHQ QUESTIONS 1-9: 2
SUM OF ALL RESPONSES TO PHQ9 QUESTIONS 1 & 2: 2
1. LITTLE INTEREST OR PLEASURE IN DOING THINGS: 1
SUM OF ALL RESPONSES TO PHQ QUESTIONS 1-9: 2
SUM OF ALL RESPONSES TO PHQ QUESTIONS 1-9: 2

## 2022-03-30 ASSESSMENT — SOCIAL DETERMINANTS OF HEALTH (SDOH): HOW HARD IS IT FOR YOU TO PAY FOR THE VERY BASICS LIKE FOOD, HOUSING, MEDICAL CARE, AND HEATING?: NOT HARD AT ALL

## 2022-04-11 ENCOUNTER — APPOINTMENT (OUTPATIENT)
Dept: GENERAL RADIOLOGY | Age: 52
DRG: 194 | End: 2022-04-11
Payer: MEDICAID

## 2022-04-11 ENCOUNTER — HOSPITAL ENCOUNTER (INPATIENT)
Age: 52
LOS: 4 days | Discharge: HOME | DRG: 194 | End: 2022-04-15
Attending: STUDENT IN AN ORGANIZED HEALTH CARE EDUCATION/TRAINING PROGRAM | Admitting: INTERNAL MEDICINE
Payer: MEDICAID

## 2022-04-11 DIAGNOSIS — R07.9 CHEST PAIN, UNSPECIFIED TYPE: Primary | ICD-10-CM

## 2022-04-11 DIAGNOSIS — I50.9 ACUTE DECOMPENSATED HEART FAILURE (HCC): ICD-10-CM

## 2022-04-11 DIAGNOSIS — R06.02 SHORTNESS OF BREATH: ICD-10-CM

## 2022-04-11 PROBLEM — I50.43 ACUTE ON CHRONIC COMBINED SYSTOLIC (CONGESTIVE) AND DIASTOLIC (CONGESTIVE) HEART FAILURE (HCC): Status: ACTIVE | Noted: 2022-04-11

## 2022-04-11 LAB
ALBUMIN SERPL-MCNC: 3.3 G/DL (ref 3.5–4.6)
ALP BLD-CCNC: 111 U/L (ref 35–104)
ALT SERPL-CCNC: 14 U/L (ref 0–41)
ANION GAP SERPL CALCULATED.3IONS-SCNC: 17 MEQ/L (ref 9–15)
ANISOCYTOSIS: ABNORMAL
AST SERPL-CCNC: 16 U/L (ref 0–40)
BASOPHILS ABSOLUTE: 0.1 K/UL (ref 0–0.2)
BASOPHILS RELATIVE PERCENT: 0.8 %
BILIRUB SERPL-MCNC: 0.5 MG/DL (ref 0.2–0.7)
BUN BLDV-MCNC: 70 MG/DL (ref 6–20)
CALCIUM SERPL-MCNC: 8.2 MG/DL (ref 8.5–9.9)
CHLORIDE BLD-SCNC: 101 MEQ/L (ref 95–107)
CO2: 16 MEQ/L (ref 20–31)
CREAT SERPL-MCNC: 7.03 MG/DL (ref 0.7–1.2)
EKG ATRIAL RATE: 96 BPM
EKG P AXIS: 60 DEGREES
EKG P-R INTERVAL: 208 MS
EKG Q-T INTERVAL: 398 MS
EKG QRS DURATION: 102 MS
EKG QTC CALCULATION (BAZETT): 502 MS
EKG R AXIS: -37 DEGREES
EKG T AXIS: 132 DEGREES
EKG VENTRICULAR RATE: 96 BPM
EOSINOPHILS ABSOLUTE: 0.2 K/UL (ref 0–0.7)
EOSINOPHILS RELATIVE PERCENT: 2.5 %
GFR AFRICAN AMERICAN: 10.1
GFR NON-AFRICAN AMERICAN: 8.3
GLOBULIN: 2.9 G/DL (ref 2.3–3.5)
GLUCOSE BLD-MCNC: 103 MG/DL (ref 70–99)
HCT VFR BLD CALC: 28.2 % (ref 42–52)
HEMOGLOBIN: 9.2 G/DL (ref 14–18)
HEPATITIS B SURFACE ANTIGEN INTERPRETATION: NORMAL
HYPOCHROMIA: ABNORMAL
INR BLD: 1.1
LACTIC ACID: 0.6 MMOL/L (ref 0.5–2.2)
LYMPHOCYTES ABSOLUTE: 0.5 K/UL (ref 1–4.8)
LYMPHOCYTES RELATIVE PERCENT: 6 %
MAGNESIUM: 2.5 MG/DL (ref 1.7–2.4)
MCH RBC QN AUTO: 22.8 PG (ref 27–31.3)
MCHC RBC AUTO-ENTMCNC: 32.6 % (ref 33–37)
MCV RBC AUTO: 69.8 FL (ref 80–100)
MICROCYTES: ABNORMAL
MONOCYTES ABSOLUTE: 0.8 K/UL (ref 0.2–0.8)
MONOCYTES RELATIVE PERCENT: 9.4 %
NEUTROPHILS ABSOLUTE: 7.3 K/UL (ref 1.4–6.5)
NEUTROPHILS RELATIVE PERCENT: 81.3 %
PDW BLD-RTO: 21.2 % (ref 11.5–14.5)
PLATELET # BLD: 347 K/UL (ref 130–400)
PLATELET SLIDE REVIEW: NORMAL
POIKILOCYTES: ABNORMAL
POTASSIUM SERPL-SCNC: 3.6 MEQ/L (ref 3.4–4.9)
PRO-BNP: NORMAL PG/ML
PROTHROMBIN TIME: 14.2 SEC (ref 12.3–14.9)
RBC # BLD: 4.04 M/UL (ref 4.7–6.1)
SLIDE REVIEW: ABNORMAL
SODIUM BLD-SCNC: 134 MEQ/L (ref 135–144)
TOTAL PROTEIN: 6.2 G/DL (ref 6.3–8)
TROPONIN: 0.05 NG/ML (ref 0–0.01)
TROPONIN: 0.06 NG/ML (ref 0–0.01)
TROPONIN: 0.06 NG/ML (ref 0–0.01)
TSH REFLEX: 1.32 UIU/ML (ref 0.44–3.86)
WBC # BLD: 8.9 K/UL (ref 4.8–10.8)

## 2022-04-11 PROCEDURE — 6360000002 HC RX W HCPCS: Performed by: INTERNAL MEDICINE

## 2022-04-11 PROCEDURE — 83735 ASSAY OF MAGNESIUM: CPT

## 2022-04-11 PROCEDURE — 6370000000 HC RX 637 (ALT 250 FOR IP): Performed by: STUDENT IN AN ORGANIZED HEALTH CARE EDUCATION/TRAINING PROGRAM

## 2022-04-11 PROCEDURE — 6370000000 HC RX 637 (ALT 250 FOR IP): Performed by: INTERNAL MEDICINE

## 2022-04-11 PROCEDURE — 99284 EMERGENCY DEPT VISIT MOD MDM: CPT

## 2022-04-11 PROCEDURE — 36415 COLL VENOUS BLD VENIPUNCTURE: CPT

## 2022-04-11 PROCEDURE — 84443 ASSAY THYROID STIM HORMONE: CPT

## 2022-04-11 PROCEDURE — 80053 COMPREHEN METABOLIC PANEL: CPT

## 2022-04-11 PROCEDURE — 96365 THER/PROPH/DIAG IV INF INIT: CPT

## 2022-04-11 PROCEDURE — 87340 HEPATITIS B SURFACE AG IA: CPT

## 2022-04-11 PROCEDURE — 96366 THER/PROPH/DIAG IV INF ADDON: CPT

## 2022-04-11 PROCEDURE — 2060000000 HC ICU INTERMEDIATE R&B

## 2022-04-11 PROCEDURE — 83880 ASSAY OF NATRIURETIC PEPTIDE: CPT

## 2022-04-11 PROCEDURE — 85025 COMPLETE CBC W/AUTO DIFF WBC: CPT

## 2022-04-11 PROCEDURE — 99223 1ST HOSP IP/OBS HIGH 75: CPT | Performed by: INTERNAL MEDICINE

## 2022-04-11 PROCEDURE — 6360000002 HC RX W HCPCS: Performed by: STUDENT IN AN ORGANIZED HEALTH CARE EDUCATION/TRAINING PROGRAM

## 2022-04-11 PROCEDURE — 83605 ASSAY OF LACTIC ACID: CPT

## 2022-04-11 PROCEDURE — 71045 X-RAY EXAM CHEST 1 VIEW: CPT

## 2022-04-11 PROCEDURE — 2580000003 HC RX 258: Performed by: INTERNAL MEDICINE

## 2022-04-11 PROCEDURE — 93005 ELECTROCARDIOGRAM TRACING: CPT | Performed by: STUDENT IN AN ORGANIZED HEALTH CARE EDUCATION/TRAINING PROGRAM

## 2022-04-11 PROCEDURE — 85610 PROTHROMBIN TIME: CPT

## 2022-04-11 PROCEDURE — 84484 ASSAY OF TROPONIN QUANT: CPT

## 2022-04-11 RX ORDER — CARVEDILOL 25 MG/1
25 TABLET ORAL 2 TIMES DAILY WITH MEALS
Status: DISCONTINUED | OUTPATIENT
Start: 2022-04-11 | End: 2022-04-14

## 2022-04-11 RX ORDER — SODIUM CHLORIDE 9 MG/ML
INJECTION, SOLUTION INTRAVENOUS PRN
Status: DISCONTINUED | OUTPATIENT
Start: 2022-04-11 | End: 2022-04-15 | Stop reason: HOSPADM

## 2022-04-11 RX ORDER — NITROGLYCERIN 0.4 MG/1
0.4 TABLET SUBLINGUAL EVERY 5 MIN PRN
Status: DISCONTINUED | OUTPATIENT
Start: 2022-04-11 | End: 2022-04-15 | Stop reason: HOSPADM

## 2022-04-11 RX ORDER — ACETAMINOPHEN 650 MG/1
650 SUPPOSITORY RECTAL EVERY 6 HOURS PRN
Status: DISCONTINUED | OUTPATIENT
Start: 2022-04-11 | End: 2022-04-15 | Stop reason: HOSPADM

## 2022-04-11 RX ORDER — ACETAMINOPHEN 325 MG/1
650 TABLET ORAL EVERY 6 HOURS PRN
Status: DISCONTINUED | OUTPATIENT
Start: 2022-04-11 | End: 2022-04-15 | Stop reason: HOSPADM

## 2022-04-11 RX ORDER — FUROSEMIDE 10 MG/ML
40 INJECTION INTRAMUSCULAR; INTRAVENOUS ONCE
Status: COMPLETED | OUTPATIENT
Start: 2022-04-11 | End: 2022-04-11

## 2022-04-11 RX ORDER — HEPARIN SODIUM 1000 [USP'U]/ML
1000 INJECTION, SOLUTION INTRAVENOUS; SUBCUTANEOUS PRN
Status: DISCONTINUED | OUTPATIENT
Start: 2022-04-11 | End: 2022-04-15 | Stop reason: HOSPADM

## 2022-04-11 RX ORDER — POTASSIUM CHLORIDE 20 MEQ/1
40 TABLET, EXTENDED RELEASE ORAL ONCE
Status: COMPLETED | OUTPATIENT
Start: 2022-04-11 | End: 2022-04-11

## 2022-04-11 RX ORDER — ONDANSETRON 4 MG/1
4 TABLET, ORALLY DISINTEGRATING ORAL EVERY 8 HOURS PRN
Status: DISCONTINUED | OUTPATIENT
Start: 2022-04-11 | End: 2022-04-15 | Stop reason: HOSPADM

## 2022-04-11 RX ORDER — ACETAMINOPHEN 500 MG
1000 TABLET ORAL ONCE
Status: COMPLETED | OUTPATIENT
Start: 2022-04-11 | End: 2022-04-11

## 2022-04-11 RX ORDER — HEPARIN SODIUM 5000 [USP'U]/ML
5000 INJECTION, SOLUTION INTRAVENOUS; SUBCUTANEOUS EVERY 8 HOURS SCHEDULED
Status: DISCONTINUED | OUTPATIENT
Start: 2022-04-11 | End: 2022-04-15 | Stop reason: HOSPADM

## 2022-04-11 RX ORDER — TORSEMIDE 20 MG/1
20 TABLET ORAL DAILY
Status: ON HOLD | COMMUNITY
End: 2022-04-15 | Stop reason: SDUPTHER

## 2022-04-11 RX ORDER — SODIUM CHLORIDE 0.9 % (FLUSH) 0.9 %
5-40 SYRINGE (ML) INJECTION EVERY 12 HOURS SCHEDULED
Status: DISCONTINUED | OUTPATIENT
Start: 2022-04-11 | End: 2022-04-15 | Stop reason: HOSPADM

## 2022-04-11 RX ORDER — FUROSEMIDE 10 MG/ML
40 INJECTION INTRAMUSCULAR; INTRAVENOUS
Status: DISCONTINUED | OUTPATIENT
Start: 2022-04-11 | End: 2022-04-14

## 2022-04-11 RX ORDER — CHOLECALCIFEROL (VITAMIN D3) 10 MCG
1 TABLET ORAL DAILY
Status: DISCONTINUED | OUTPATIENT
Start: 2022-04-11 | End: 2022-04-15 | Stop reason: HOSPADM

## 2022-04-11 RX ORDER — POLYETHYLENE GLYCOL 3350 17 G/17G
17 POWDER, FOR SOLUTION ORAL DAILY PRN
Status: DISCONTINUED | OUTPATIENT
Start: 2022-04-11 | End: 2022-04-15 | Stop reason: HOSPADM

## 2022-04-11 RX ORDER — MAGNESIUM SULFATE IN WATER 40 MG/ML
2000 INJECTION, SOLUTION INTRAVENOUS ONCE
Status: COMPLETED | OUTPATIENT
Start: 2022-04-11 | End: 2022-04-11

## 2022-04-11 RX ORDER — SODIUM CHLORIDE 0.9 % (FLUSH) 0.9 %
5-40 SYRINGE (ML) INJECTION PRN
Status: DISCONTINUED | OUTPATIENT
Start: 2022-04-11 | End: 2022-04-15 | Stop reason: HOSPADM

## 2022-04-11 RX ORDER — HEPARIN SODIUM 1000 [USP'U]/ML
2000 INJECTION, SOLUTION INTRAVENOUS; SUBCUTANEOUS PRN
Status: DISCONTINUED | OUTPATIENT
Start: 2022-04-11 | End: 2022-04-15 | Stop reason: HOSPADM

## 2022-04-11 RX ORDER — ONDANSETRON 2 MG/ML
4 INJECTION INTRAMUSCULAR; INTRAVENOUS EVERY 6 HOURS PRN
Status: DISCONTINUED | OUTPATIENT
Start: 2022-04-11 | End: 2022-04-15 | Stop reason: HOSPADM

## 2022-04-11 RX ORDER — ISOSORBIDE MONONITRATE 30 MG/1
30 TABLET, EXTENDED RELEASE ORAL DAILY
Status: DISCONTINUED | OUTPATIENT
Start: 2022-04-11 | End: 2022-04-14

## 2022-04-11 RX ORDER — HYDRALAZINE HYDROCHLORIDE 50 MG/1
100 TABLET, FILM COATED ORAL 3 TIMES DAILY
Status: DISCONTINUED | OUTPATIENT
Start: 2022-04-11 | End: 2022-04-14

## 2022-04-11 RX ORDER — SODIUM BICARBONATE 650 MG/1
650 TABLET ORAL 3 TIMES DAILY
Status: DISCONTINUED | OUTPATIENT
Start: 2022-04-11 | End: 2022-04-14

## 2022-04-11 RX ORDER — ALBUMIN (HUMAN) 12.5 G/50ML
25 SOLUTION INTRAVENOUS DAILY PRN
Status: DISCONTINUED | OUTPATIENT
Start: 2022-04-11 | End: 2022-04-15 | Stop reason: HOSPADM

## 2022-04-11 RX ADMIN — FUROSEMIDE 40 MG: 10 INJECTION, SOLUTION INTRAMUSCULAR; INTRAVENOUS at 14:59

## 2022-04-11 RX ADMIN — NEPHROCAP 1 MG: 1 CAP ORAL at 15:04

## 2022-04-11 RX ADMIN — NITROGLYCERIN 0.4 MG: 0.4 TABLET, ORALLY DISINTEGRATING SUBLINGUAL at 05:45

## 2022-04-11 RX ADMIN — ISOSORBIDE MONONITRATE 30 MG: 30 TABLET, EXTENDED RELEASE ORAL at 10:14

## 2022-04-11 RX ADMIN — ACETAMINOPHEN 1000 MG: 500 TABLET ORAL at 05:45

## 2022-04-11 RX ADMIN — Medication 10 ML: at 21:06

## 2022-04-11 RX ADMIN — SODIUM BICARBONATE 650 MG: 650 TABLET ORAL at 10:14

## 2022-04-11 RX ADMIN — CARVEDILOL 25 MG: 25 TABLET, FILM COATED ORAL at 18:17

## 2022-04-11 RX ADMIN — MAGNESIUM SULFATE HEPTAHYDRATE 2000 MG: 40 INJECTION, SOLUTION INTRAVENOUS at 05:45

## 2022-04-11 RX ADMIN — POTASSIUM CHLORIDE 40 MEQ: 20 TABLET, EXTENDED RELEASE ORAL at 08:44

## 2022-04-11 RX ADMIN — HYDRALAZINE HYDROCHLORIDE 100 MG: 50 TABLET, FILM COATED ORAL at 10:14

## 2022-04-11 RX ADMIN — HEPARIN SODIUM 5000 UNITS: 5000 INJECTION INTRAVENOUS; SUBCUTANEOUS at 10:13

## 2022-04-11 RX ADMIN — SODIUM BICARBONATE 650 MG: 650 TABLET ORAL at 21:03

## 2022-04-11 RX ADMIN — CARVEDILOL 25 MG: 25 TABLET, FILM COATED ORAL at 10:14

## 2022-04-11 RX ADMIN — HYDRALAZINE HYDROCHLORIDE 100 MG: 50 TABLET, FILM COATED ORAL at 14:59

## 2022-04-11 RX ADMIN — SODIUM BICARBONATE 650 MG: 650 TABLET ORAL at 15:04

## 2022-04-11 RX ADMIN — Medication 10 ML: at 10:15

## 2022-04-11 RX ADMIN — FUROSEMIDE 40 MG: 10 INJECTION, SOLUTION INTRAMUSCULAR; INTRAVENOUS at 08:44

## 2022-04-11 ASSESSMENT — PAIN - FUNCTIONAL ASSESSMENT: PAIN_FUNCTIONAL_ASSESSMENT: 0-10

## 2022-04-11 ASSESSMENT — ENCOUNTER SYMPTOMS
GASTROINTESTINAL NEGATIVE: 1
WHEEZING: 0
BLOOD IN STOOL: 0
ABDOMINAL PAIN: 0
COUGH: 0
VOMITING: 0
BACK PAIN: 0
EYES NEGATIVE: 1
RHINORRHEA: 0
STRIDOR: 0
SORE THROAT: 0
DIARRHEA: 0
NAUSEA: 0
EYE DISCHARGE: 0
SHORTNESS OF BREATH: 1
EYE PAIN: 0
ABDOMINAL DISTENTION: 0
CHEST TIGHTNESS: 0
CHEST TIGHTNESS: 1

## 2022-04-11 ASSESSMENT — PAIN SCALES - GENERAL
PAINLEVEL_OUTOF10: 6
PAINLEVEL_OUTOF10: 6

## 2022-04-11 ASSESSMENT — PAIN DESCRIPTION - LOCATION: LOCATION: CHEST

## 2022-04-11 ASSESSMENT — PAIN DESCRIPTION - ONSET: ONSET: SUDDEN

## 2022-04-11 ASSESSMENT — PAIN DESCRIPTION - PROGRESSION: CLINICAL_PROGRESSION: NOT CHANGED

## 2022-04-11 ASSESSMENT — PAIN DESCRIPTION - PAIN TYPE: TYPE: ACUTE PAIN

## 2022-04-11 NOTE — CONSULTS
[facility]. Nephrology  Consult Note           Reason for Consult: Worsening kidney function  Requesting Physician:  Dr. Paz Blanc    Chief Complaint: Increasing shortness of breath and chest pain  History Obtained From:  patient, electronic medical record    History of Present Ilness:    54y.o. year old male admitted with increasing shortness of breath and chest pain. Patient has CKD stage IV-V followed by Dr. Wyatt Nunez. Last creatinine was in the mid fives. 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 Had full serologic work-up that was negative. Has not had kidney biopsy. Has not been compliant with follow-up. Creatinine now over 7 with increasing shortness of breath. He is agreeable to starting dialysis. Has some slight swelling of the legs. No trouble urinating. Denies any NSAID use. I talked to him about peritoneal dialysis but patient states that he has a friend who is on dialysis and he told him not to do the home one. Maintained on torsemide and Corlanor. Ejection fraction is 20%    Past Medical History:        Diagnosis Date    Abnormal EKG 9/27/2019    Acute kidney injury (BLUE) with acute tubular necrosis (ATN) (HCC)     Cardiomyopathy ([address]     per echo 8/2019    Chronic combined systolic and diastolic CHF (congestive heart failure) ([facility] [address] 9/27/2019    ETOH abuse     Hypertension     Tobacco abuse        Past Surgical History:        Procedure Laterality Date    CARDIAC PACEMAKER PLACEMENT      HERNIA REPAIR Right 2/10/2021    RIGHT INGUINAL HERNIA REPAIR WITH MESH performed by Nicolle Sherman MD at [address] [place] [address] Medications:    No current facility-administered medications on file prior to encounter.      Current Outpatient Medications on File Prior to Encounter   Medication Sig Dispense Refill    torsemide (DEMADEX) 20 MG tablet Take 20 mg by mouth daily Pt states that he takes 4 tabs lately      hydrALAZINE (APRESOLINE) 100 MG tablet Take 1 tablet by mouth 3 times daily 90 tablet 3    carvedilol (COREG) 25 MG tablet take 1 tablet by mouth twice a day HOLD FOR SBP<100 OR HR <60 60 tablet 1    isosorbide mononitrate (IMDUR) 30 MG extended release tablet Take 1 tablet by mouth daily 30 tablet 3    torsemide (DEMADEX) 20 MG tablet Take 2 tablets by mouth daily 60 tablet 1    ivabradine (CORLANOR) 5 MG TABS tablet Take 1 tablet by mouth 2 times daily (with meals) 60 tablet 3    nicotine (NICODERM CQ) 21 MG/24HR Place 1 patch onto the skin daily 30 patch 3       Allergies:  Lipitor [atorvastatin] and Lisinopril    Social History:    Social History     Socioeconomic History    Marital status: Single     Spouse name: Not on file    Number of children: Not on file    Years of education: Not on file    Highest education level: Not on file   Occupational History    Not on file   Tobacco Use    Smoking status: Current Every Day Smoker     Packs/day: 1.00     Types: Cigarettes     Last attempt to quit: 12/3/2020     Years since quittin.3    Smokeless tobacco: Never Used    Tobacco comment: currently smoking only couple cigarettes daily   Substance and Sexual Activity    Alcohol use: Yes    Drug use: Never    Sexual activity: Not on file   Other Topics Concern    Not on file   Social History Narrative    Not on file     Social Determinants of Health     Financial Resource Strain: Low Risk     Difficulty of Paying Living Expenses: Not hard at all   Food Insecurity: No Food Insecurity    Worried About Running Out of Food in the Last Year: Never true    [address] in the Last Year: Never true   Transportation Needs:     Lack of Transportation (Medical): Not on file    Lack of Transportation (Non-Medical):  Not on file   Physical Activity:     Days of Exercise per Week: Not on file    Minutes of Exercise per Session: Not on file   Stress:     Feeling of Stress : Not on file   Social Connections:     Frequency of Communication with Friends and Family: Not on file    Frequency of Social Gatherings with Friends and Family: Not on file    Attends Pentecostal Services: Not on file    Active Member of Clubs or Organizations: Not on file    Attends Club or Organization Meetings: Not on file    Marital Status: Not on file   Intimate Partner Violence:     Fear of Current or Ex-Partner: Not on file    Emotionally Abused: Not on file    Physically Abused: Not on file    Sexually Abused: Not on file   Housing Stability:     Unable to Pay for Housing in the Last Year: Not on file    Number of [place] in the Last Year: Not on file    Unstable Housing in the Last Year: Not on file       Family History:   Family History   Problem Relation Age of Onset    Coronary Art Dis Mother        Review of Systems:   Review of Systems   Constitutional: Negative for activity change. HENT: Positive for congestion. Eyes: Negative for discharge. Respiratory: Positive for shortness of breath. Cardiovascular: Positive for chest pain. Gastrointestinal: Negative for abdominal distention. Endocrine: Negative for cold intolerance. Genitourinary: Negative for difficulty urinating. Musculoskeletal: Negative for arthralgias. Allergic/Immunologic: Negative for environmental allergies. Neurological: Negative for dizziness. Hematological: Negative for adenopathy. Psychiatric/Behavioral: Negative for agitation. Physical exam:   Constitutional:  VITALS:  BP (!) 155/109   Pulse 93   Temp 97.7 °F (36.5 °C) (Oral)   Resp 18   Ht 6' (1.829 m)   Wt 207 lb 9.6 oz (94.2 kg)   SpO2 100%   BMI 28.16 kg/m²   Gen: alert, awake, nad  Skin: no rash, turgor wnl  Heent:  eomi, mmm  Neck: no bruits or jvd noted, thyroid normal  Lungs:  Normal expansion. Clear to auscultation. No rales, rhonchi, or wheezing. Heart:  Heart sounds are normal.  Regular rate and rhythm without murmur, gallop or rub.   Abdomen:  +bs, soft, nt, nd, no hepatosplenomegaly Extremities: trace edema  Psychiatric: mood and affect appropriate; judgement and insight intact  Musculoskeletal:  Rom, muscular strength intact; digits, nails normal    Data/  CBC:   Lab Results   Component Value Date    WBC 8.9 04/11/2022    RBC 4.04 04/11/2022    HGB 9.2 04/11/2022    HCT 28.2 04/11/2022    MCV 69.8 04/11/2022    MCH 22.8 04/11/2022    MCHC 32.6 04/11/2022    RDW 21.2 04/11/2022     04/11/2022     BMP:    Lab Results   Component Value Date     04/11/2022    K 3.6 04/11/2022    K 4.0 01/22/2022     04/11/2022    CO2 16 04/11/2022    BUN 70 04/11/2022    LABALBU 3.3 04/11/2022    CREATININE 7.03 04/11/2022    CALCIUM 8.2 04/11/2022    GFRAA 10.1 04/11/2022    LABGLOM 8.3 04/11/2022    GLUCOSE 103 04/11/2022     XR CHEST PORTABLE    Result Date: 4/11/2022  EXAMINATION: Portable AP ERECT view of the chest. CLINICAL HISTORY:  CP , pacer/ defibrillator placement DATE: 4/11/2022 4:45 AM COMPARISONS: 1/22/2022 FINDINGS: The heart is mildly enlarged. An AICD overlies the left hemithorax and has a lead extending into the right atrium and second lead extending into the right ventricle. There is minimal central vascular congestion present. No consolidating infiltrate. No pleural effusion or pneumothorax. The bony thorax is unremarkable. BORDERLINE CARDIAC ENLARGEMENT WITH MINIMAL CENTRAL VESSEL CONGESTION. Assessment/  42-year-old man with CKD stage V proteinuric with negative serologic work-up. Has congestive heart failure with an EF of 20% with worsening kidney function. Also with hypertensive kidney disease as well. Slight anemia. With his GFR as low as it is now and shortness of breath he will need to start dialysis. Went over this with him in detail and he is agreeable. Explained that this would require having PermCath placed first and then would start dialysis. I also discussed home dialysis with him but he does not think this is an option right now.   Will ask for social work to set up at the nearest [employer] [facility] [employer] unit aware he lives and will be followed by Dr. Bentley Yen there. We will add renal vitamin. If Cardiology wants to start [facility], okay with this. Will send hepatitis panel etc.        Thank you for the consultation. Please do not hesitate to call with questions.     Giovanni Kaufman MD

## 2022-04-11 NOTE — PLAN OF CARE
Pt educated on breathing techniques for SOB. Pt also educated on the importance of reporting chest pain and general pain immediately to nursing staff. Pt also educated on the importance of medication compliance and knowing what the different medications are used for. Pt expressed verbal understanding. Pt is prescribed new medications this hospital stay. Educated on what the new medications are used for.

## 2022-04-11 NOTE — PROGRESS NOTES
Pt arrived to unit, room 187 at approx 0900 from ER via cart. Pt walked to room bed without difficulty. Pt oriented to room. He denies having any chest pain, none noted. Assessment completed. Skin assessment completed by two RNs including myself and Rupinder Whitten. Pts skin intact, axillary moles noted. Pts bilat lungs clear on RA. Reviewed pts home medications at this time. Pt brought breakfast by dietary staff. No needs stated by the pt at this time. Call light in reach, safety maintained.

## 2022-04-11 NOTE — ED PROVIDER NOTES
[address] [facility] ED  eMERGENCY dEPARTMENT eNCOUnter      Pt Name: Estrella Laguna  MRN: [id number]  [place] 1970  Date of evaluation: 4/11/2022  Provider: Emil Roberson MD      HISTORY OF PRESENT ILLNESS      Chief Complaint   Patient presents with    Chest Pain       The history is provided by the Patient. Estrella Laguna is a 54 y.o. male with a PMH clinically significant for HTN, CKD, HFrEF, CAD and Chronic Etoh abuse presenting to the ED via EMS c/o chest pain associated with shortness of breath, lightheadedness and dizziness with symptoms slowly worsening over the past 3 to 4 days. States that shortness of breath has been increasing. Worse with exertion and lying flat. Also stating intermittent lightheaded and dizzy with this. States chest pain only really started last night. Characterizes pain as pressure-like sensation over his diffuse chest.  No radiation of the pain. Has had similar symptoms in the past due to his heart failure. Denies any associated headache, hemoptysis, abdominal pain, N/V/D/C, urinary symptoms. States he has been taking his medications as indicated. Also states he did take his blood pressure medications just prior to arrival and that they did help with his chest pain. Denies preceding exertion. Per Chart Review: Most recent admission to the hospital in 1/2022 appreciated. Admitted for acute on chronic combined systolic and diastolic heart failure. Improved with diuresis. AICD placed at that time. Creatinine noted to be stable with baseline levels, approximately creatinine of 6. Discharge weight of 203 pounds (92.1Kg)    REVIEW OF SYSTEMS       Review of Systems   Constitutional: Positive for fatigue. Negative for chills and fever. HENT: Negative for rhinorrhea and sore throat. Eyes: Negative for pain and visual disturbance. Respiratory: Positive for chest tightness and shortness of breath. Negative for cough.     Cardiovascular: Positive for chest pain. Negative for palpitations and leg swelling. Gastrointestinal: Negative for abdominal pain, diarrhea, nausea and vomiting. Genitourinary: Negative for dysuria. Musculoskeletal: Negative for back pain and neck pain. Skin: Negative for rash. Neurological: Positive for dizziness and light-headedness. Negative for weakness, numbness and headaches. PAST MEDICAL HISTORY     Past Medical History:   Diagnosis Date    Abnormal EKG 2019    Acute kidney injury (BLUE) with acute tubular necrosis (ATN) (HCC)     Cardiomyopathy (HCC)     per echo 2019    Chronic combined systolic and diastolic CHF (congestive heart failure) ([facility] [address] 2019    ETOH abuse     Hypertension     Tobacco abuse        SURGICAL HISTORY       Past Surgical History:   Procedure Laterality Date    CARDIAC PACEMAKER PLACEMENT      HERNIA REPAIR Right 2/10/2021    RIGHT INGUINAL HERNIA REPAIR WITH MESH performed by Jan Cho MD at [address] [place] [address] HISTORY       Family History   Problem Relation Age of Onset    Coronary Art Dis Mother        SOCIAL HISTORY       Social History     Socioeconomic History    Marital status: Single     Spouse name: None    Number of children: None    Years of education: None    Highest education level: None   Occupational History    None   Tobacco Use    Smoking status: Current Every Day Smoker     Packs/day: 1.00     Types: Cigarettes     Last attempt to quit: 12/3/2020     Years since quittin.3    Smokeless tobacco: Never Used    Tobacco comment: currently smoking only couple cigarettes daily   Substance and Sexual Activity    Alcohol use:  Yes    Drug use: Never    Sexual activity: None   Other Topics Concern    None   Social History Narrative    None     Social Determinants of Health     Financial Resource Strain: Low Risk     Difficulty of Paying Living Expenses: Not hard at all   Food Insecurity: No Food Insecurity    Worried About [facility] in Constitutional:       General: He is not in acute distress. Appearance: He is not toxic-appearing or diaphoretic. HENT:      Head: Normocephalic and atraumatic. Mouth/Throat:      Mouth: Mucous membranes are moist.      Pharynx: Oropharynx is clear. Eyes:      Extraocular Movements: Extraocular movements intact. Pupils: Pupils are equal, round, and reactive to light. Neck:      Vascular: JVD present. Cardiovascular:      Rate and Rhythm: Normal rate and regular rhythm. Pulses: Normal pulses. Heart sounds: Gallop present. S3 sounds present. Pulmonary:      Effort: No respiratory distress. Breath sounds: Rales present. Abdominal:      General: There is no distension. Palpations: Abdomen is soft. Tenderness: There is no abdominal tenderness. There is no right CVA tenderness, left CVA tenderness, guarding or rebound. Musculoskeletal:         General: No tenderness. Cervical back: Normal range of motion and neck supple. Right lower leg: No edema. Left lower leg: No edema. Skin:     General: Skin is warm and dry. Capillary Refill: Capillary refill takes less than 2 seconds. Neurological:      General: No focal deficit present. Mental Status: He is alert and oriented to person, place, and time.    Psychiatric:         Mood and Affect: Mood normal.         Behavior: Behavior normal.         MDM:   Chart Reviewed: PMH and additional information as noted in HPI obtained from chart review    Vitals:    Vitals:    04/11/22 0426 04/11/22 0430   BP: (!) 140/108 (!) 136/100   Pulse: 92 94   Resp: 16 22   Temp: 98.3 °F (36.8 °C)    SpO2: 99% 98%   Weight: 202 lb (91.6 kg)        PROCEDURES:  Unless otherwise noted below, none  Procedures    LABS:  Labs Reviewed   COMPREHENSIVE METABOLIC PANEL - Abnormal; Notable for the following components:       Result Value    Sodium 134 (*)     CO2 16 (*)     Anion Gap 17 (*)     Glucose 103 (*)     BUN 70 (*) CREATININE 7.03 (*)     GFR Non- 8.3 (*)     GFR  10.1 (*)     Calcium 8.2 (*)     Total Protein 6.2 (*)     Albumin 3.3 (*)     Alkaline Phosphatase 111 (*)     All other components within normal limits    Narrative:     CALL  [facility]  [facility] tel. [phone],  Crea results called to and read back by [facility], 04/11/2022 06:25, by  Mitul Fraga   MAGNESIUM - Abnormal; Notable for the following components:    Magnesium 2.5 (*)     All other components within normal limits    Narrative:     CALL  [facility]  [facility] tel. [phone],  Crea results called to and read back by [facility], 04/11/2022 06:25, by  GISSEL   CBC WITH AUTO DIFFERENTIAL - Abnormal; Notable for the following components:    RBC 4.04 (*)     Hemoglobin 9.2 (*)     Hematocrit 28.2 (*)     MCV 69.8 (*)     MCH 22.8 (*)     MCHC 32.6 (*)     RDW 21.2 (*)     All other components within normal limits   TROPONIN - Abnormal; Notable for the following components:    Troponin 0.058 (*)     All other components within normal limits    Narrative:     Kaitlyn Moore tel. [phone],  Trop results called to and read back by [facility], 04/11/2022 06:26, by  Dafne Sharif results called to and read back by [facility], 04/11/2022 06:25, by  Tavo Libratone   TSH WITH REFLEX       XR CHEST PORTABLE    (Results Pending)       ED Course as of 04/11/22 0840   Mon Apr 11, 2022   0612 Pro-BNP: 31,296  Significantly elevated above most recent baseline. [NA]   0612 Hemoglobin Quant(!): 9.2  Anemia consistent with baseline. CBC otherwise largely unremarkable [NA]   0612 Lactic Acid: 0.6 [NA]   0612 XR CHEST PORTABLE  Chest x-ray showing mild central pulmonary venous congestion. Cardiomegaly consistent with baseline. No other acute cardiopulmonary abnormalities. [NA]   [id number] EKG 12 Lead  EKG showing normal sinus rhythm, rate of 96 bpm.  Left axis deviation. LVH. Left atrial enlargement. Prolonged QTC.   Nonspecific ST-T wave abnormalities largely consistent with previous. [NA]   [id number] Troponin(!!): 0.058  Troponin increased from most recent baseline. [NA]   [id number] Creatinine(!!): 7.03  Mildly elevated above most recent baseline. [NA]   0634 CO2(!): 16 [NA]   0634 Anion Gap(!): 17 [NA]      ED Course User Index  [NA] Bar Husain MD       54 y.o. male with a PMH clinically significant for HTN, CKD, HFrEF, CAD and Chronic Etoh abuse presenting to the ED via EMS c/o chest pain associated with shortness of breath, lightheadedness and dizziness with symptoms slowly worsening over the past 3 to 4 days. Upon initial evaluation, Pt mildly hypertensive, but otherwise Afebrile, HDS and in NAD. PE as noted above. Labs, , EKG, and Imaging as noted above. Given findings, clinical presentation most likely consistent w/ possible unstable angina in addition to acute decompensated heart failure exacerbation with mild pulmonary edema and evidence of worsening heart failure with elevated BNP, troponin. No acute ischemic changes on EKG, largely unchanged from previous. Also noted significant worsening in baseline CKD. Will therefore be admitted to medicine for further evaluation management with cardiology consulting. Patient discussed with both Dr. Calos Diop and Dr. Kole Oglesby. Pt was administered   Medications   nitroGLYCERIN (NITROSTAT) SL tablet 0.4 mg (0.4 mg SubLINGual Given 4/11/22 0545)   furosemide (LASIX) injection 40 mg (has no administration in time range)   potassium chloride (KLOR-CON M) extended release tablet 40 mEq (has no administration in time range)   magnesium sulfate 2000 mg in 50 mL IVPB premix (2,000 mg IntraVENous New Bag 4/11/22 0545)   acetaminophen (TYLENOL) tablet 1,000 mg (1,000 mg Oral Given 4/11/22 0545)       Plan: Admit to IM with cardiology consulting for further evaluation and management. Report given to Dr. Kole Oglesby. and Patient understanding and amenable to the POC.     CRITICAL CARE TIME   Total CriticalCare time was 0 minutes, excluding separately reportable procedures. There was a high probability of clinically significant/life threatening deterioration in the patient's condition which required my urgent intervention. FINAL IMPRESSION      1. Chest pain, unspecified type    2. Shortness of breath    3.  Acute decompensated heart failure [facility])          DISPOSITION/PLAN   DISPOSITION Admitted 04/11/2022 07:52:42 AM      Current Discharge Medication List           MD Siria Campos MD  04/11/22 9582

## 2022-04-11 NOTE — H&P
Magalie Pacheco MD   carvedilol (COREG) 25 MG tablet take 1 tablet by mouth twice a day HOLD FOR SBP<100 OR HR <60 3/1/22   MICAH Oliveira   isosorbide mononitrate (IMDUR) 30 MG extended release tablet Take 1 tablet by mouth daily 1/22/22   Yan MABYERRY Holiday, DO   torsemide (DEMADEX) 20 MG tablet Take 2 tablets by mouth daily 7/14/21 9/12/21  Lang Ellsworth MD   ivabradine (CORLANOR) 5 MG TABS tablet Take 1 tablet by mouth 2 times daily (with meals) 5/6/21   IMCAH Oliveira   nicotine (NICODERM CQ) 21 MG/24HR Place 1 patch onto the skin daily  Patient not taking: Reported on 7/16/2021 11/8/20   Yan JEFE Laddiday, DO       Allergies:  Lipitor [atorvastatin] and Lisinopril    Social History:   TOBACCO:   reports that he has been smoking cigarettes. He has been smoking about 1.00 pack per day. He has never used smokeless tobacco.  ETOH:   reports current alcohol use. Family History:       Problem Relation Age of Onset    Coronary Art Dis Mother        REVIEW OF SYSTEMS:  Ten systems reviewed and negative except for stated in HPI    Physical Exam:    Vitals: BP (!) 136/100   Pulse 94   Temp 98.3 °F (36.8 °C)   Resp 22   Wt 202 lb (91.6 kg)   SpO2 98%   BMI 27.40 kg/m²   General appearance: alert, appears stated age and cooperative. NAD. He is slow to answer questions but eventually responds appropriately  Skin: Skin color, texture, turgor normal. No rashes or lesions  HEENT: eomi, perrla. MMM  Neck: JVP 7 cm H2O  Lungs: CTA bilaterally. No wheeze   Heart: RRR, no murmur or gallp  Abdomen: soft, nontender. Bsx4. No masses or organomegaly  Extremities: no edema, redness, or tenderness in calves.  Cap refill <2s  Neurologic: No focal deficits     Recent Labs     04/11/22  0445   WBC 8.9   HGB 9.2*        Recent Labs     04/11/22  0445   *   K 3.6      CO2 16*   BUN 70*   CREATININE 7.03*   GLUCOSE 103*   AST 16   ALT 14   BILITOT 0.5   ALKPHOS 111*     Troponin T:   Recent Labs 04/11/22  0445   TROPONINI 0.058*       ABGs: No results found for: PHART, PO2ART, BGQ9MWU  INR: No results for input(s): INR in the last 72 hours. URINALYSIS:No results for input(s): NITRITE, COLORU, PHUR, LABCAST, WBCUA, RBCUA, MUCUS, TRICHOMONAS, YEAST, BACTERIA, CLARITYU, SPECGRAV, LEUKOCYTESUR, UROBILINOGEN, BILIRUBINUR, BLOODU, GLUCOSEU, AMORPHOUS in the last 72 hours. Invalid input(s): Ba Altman  -----------------------------------------------------------------   XR CHEST PORTABLE    Result Date: 4/11/2022  EXAMINATION: Portable AP ERECT view of the chest. CLINICAL HISTORY:  CP , pacer/ defibrillator placement DATE: 4/11/2022 4:45 AM COMPARISONS: 1/22/2022 FINDINGS: The heart is mildly enlarged. An AICD overlies the left hemithorax and has a lead extending into the right atrium and second lead extending into the right ventricle. There is minimal central vascular congestion present. No consolidating infiltrate. No pleural effusion or pneumothorax. The bony thorax is unremarkable. BORDERLINE CARDIAC ENLARGEMENT WITH MINIMAL CENTRAL VESSEL CONGESTION. Assessment and Plan     47-year-old male with chronic combined heart failure (EF 20%), former tobacco and alcohol use, hypertension, CKD 5 presents from home after experiencing pressure-like chest pain and having dyspnea with exertion earlier in the day. 1.  Acute decompensation of chronic combined heart failure in the setting of worsening CKD 5  -IV diuresis  -Continue home cardiac medications  -Consult with cardiology and nephrology.   -Trend troponin    Elevated troponin suspect supply/demand mismatch  Hypertension  CKD 5: Start bicarbonate tablets  History of alcohol use disorder  History of tobacco use disorder    Subcutaneous heparin prophylaxis  Full code    45 minutes in total care time.      Kate Vergara DO  Admitting Hospitalist

## 2022-04-11 NOTE — CONSULTS
Activity    Alcohol use: Yes    Drug use: Never    Sexual activity: Not on file   Other Topics Concern    Not on file   Social History Narrative    Not on file     Social Determinants of Health     Financial Resource Strain: Low Risk     Difficulty of Paying Living Expenses: Not hard at all   Food Insecurity: No Food Insecurity    Worried About Running Out of Food in the Last Year: Never true    [address] in the Last Year: Never true   Transportation Needs:     Lack of Transportation (Medical): Not on file    Lack of Transportation (Non-Medical):  Not on file   Physical Activity:     Days of Exercise per Week: Not on file    Minutes of Exercise per Session: Not on file   Stress:     Feeling of Stress : Not on file   Social Connections:     Frequency of Communication with Friends and Family: Not on file    Frequency of Social Gatherings with Friends and Family: Not on file    Attends Muslim Services: Not on file    Active Member of [address] [place] [address] or Organizations: Not on file    Attends Club or Organization Meetings: Not on file    Marital Status: Not on file   Intimate Partner Violence:     Fear of Current or Ex-Partner: Not on file    Emotionally Abused: Not on file    Physically Abused: Not on file    Sexually Abused: Not on file   Housing Stability:     Unable to Pay for Housing in the Last Year: Not on file    Number of [place] in the Last Year: Not on file    Unstable Housing in the Last Year: Not on file      [] Unable to obtain due to ventilated and/ or neurologic status      Home Medications:      Medications Prior to Admission: torsemide (DEMADEX) 20 MG tablet, Take 20 mg by mouth daily Pt states that he takes 4 tabs lately  hydrALAZINE (APRESOLINE) 100 MG tablet, Take 1 tablet by mouth 3 times daily  carvedilol (COREG) 25 MG tablet, take 1 tablet by mouth twice a day HOLD FOR SBP<100 OR HR <60  isosorbide mononitrate (IMDUR) 30 MG extended release tablet, Take 1 tablet by mouth daily  torsemide (DEMADEX) 20 MG tablet, Take 2 tablets by mouth daily  ivabradine (CORLANOR) 5 MG TABS tablet, Take 1 tablet by mouth 2 times daily (with meals)  nicotine (NICODERM CQ) 21 MG/24HR, Place 1 patch onto the skin daily    Current Hospital Medications:     Scheduled Meds:   carvedilol  25 mg Oral BID WC    hydrALAZINE  100 mg Oral TID    isosorbide mononitrate  30 mg Oral Daily    ivabradine  5 mg Oral BID WC    sodium chloride flush  5-40 mL IntraVENous 2 times per day    heparin (porcine)  5,000 Units SubCUTAneous 3 times per day    furosemide  40 mg IntraVENous BID AC    sodium bicarbonate  650 mg Oral TID     Continuous Infusions:   sodium chloride       PRN Meds:.nitroGLYCERIN, sodium chloride flush, sodium chloride, ondansetron **OR** ondansetron, polyethylene glycol, acetaminophen **OR** acetaminophen  .  sodium chloride          Allergies: Allergies   Allergen Reactions    Lipitor [Atorvastatin] Other (See Comments)     Coughing     Lisinopril         Review of Systems:       Review of Systems   Constitutional: Negative. Negative for diaphoresis and fatigue. HENT: Negative. Eyes: Negative. Respiratory: Positive for shortness of breath. Negative for cough, chest tightness, wheezing and stridor. Cardiovascular: Positive for chest pain. Negative for palpitations and leg swelling. Gastrointestinal: Negative. Negative for blood in stool and nausea. Genitourinary: Negative. Musculoskeletal: Negative. Skin: Negative. Neurological: Negative. Negative for dizziness, syncope, weakness and light-headedness. Hematological: Negative. Psychiatric/Behavioral: Negative. Objective Findings:     Vitals:BP (!) 155/109   Pulse 93   Temp 97.7 °F (36.5 °C) (Oral)   Resp 18   Wt 202 lb (91.6 kg)   SpO2 100%   BMI 27.40 kg/m²      Physical Examination:    Physical Exam   Constitutional: He appears healthy. No distress.    HENT:   Normal cephalic and Atraumatic   Eyes: Pupils are equal, round, and reactive to light. Neck: Thyroid normal. No JVD present. No neck adenopathy. No thyromegaly present. Cardiovascular: Normal rate, regular rhythm, intact distal pulses and normal pulses. Murmur heard. Pulmonary/Chest: Effort normal and breath sounds normal. He has no wheezes. He has no rales. He exhibits no tenderness. Abdominal: Soft. Bowel sounds are normal. There is no abdominal tenderness. Musculoskeletal:         General: No tenderness or edema. Normal range of motion. Cervical back: Normal range of motion and neck supple. Neurological: He is alert and oriented to person, place, and time. Skin: Skin is warm. No cyanosis. Nails show no clubbing. Results/ Medications reviewed 4/11/2022, 9:55 AM     Laboratory, Microbiology, Pathology, Radiology, Cardiology, Medications and Transcriptions reviewed  Scheduled Meds:   carvedilol  25 mg Oral BID WC    hydrALAZINE  100 mg Oral TID    isosorbide mononitrate  30 mg Oral Daily    ivabradine  5 mg Oral BID WC    sodium chloride flush  5-40 mL IntraVENous 2 times per day    heparin (porcine)  5,000 Units SubCUTAneous 3 times per day    furosemide  40 mg IntraVENous BID AC    sodium bicarbonate  650 mg Oral TID     Continuous Infusions:   sodium chloride         Recent Labs     04/11/22 0445   WBC 8.9   HGB 9.2*   HCT 28.2*   MCV 69.8*        Recent Labs     04/11/22 0445   *   K 3.6      CO2 16*   BUN 70*   CREATININE 7.03*     Recent Labs     04/11/22 0445   AST 16   ALT 14   BILITOT 0.5   ALKPHOS 111*     No results for input(s): LIPASE, AMYLASE in the last 72 hours. Recent Labs     04/11/22  0445   PROT 6.2*     XR CHEST PORTABLE    Result Date: 4/11/2022  EXAMINATION: Portable AP ERECT view of the chest. CLINICAL HISTORY:  CP , pacer/ defibrillator placement DATE: 4/11/2022 4:45 AM COMPARISONS: 1/22/2022 FINDINGS: The heart is mildly enlarged.  An AICD overlies the left hemithorax and has a lead extending into the right atrium and second lead extending into the right ventricle. There is minimal central vascular congestion present. No consolidating infiltrate. No pleural effusion or pneumothorax. The bony thorax is unremarkable. BORDERLINE CARDIAC ENLARGEMENT WITH MINIMAL CENTRAL VESSEL CONGESTION. Active Hospital Problems    Diagnosis Date Noted    Acute on chronic combined systolic (congestive) and diastolic (congestive) heart failure ([facility] [address] [I50.43] 04/11/2022     Priority: Low         Impression/Plan:   1. CP- Noncardiac  2. SOB - Etiology not clear sats are 100% on RA. He is not volume overloaded and does not appear to be decompensated fromhis chronic Combined HF. Once he starts HD we will like to cahnge him to [facility] [place] [facility]. cotninue CV protective meds. 3. No CAD  4. NICM s/p ICD. Thank you for allowing us to participate in the care of this patient. Will continue to follow. Please call if questions or concerns arise.     Electronically signed by Cinthia Crockett MD on 4/11/2022 at 9:55 AM

## 2022-04-12 ENCOUNTER — HOSPITAL ENCOUNTER (INPATIENT)
Dept: INTERVENTIONAL RADIOLOGY/VASCULAR | Age: 52
Discharge: HOME | DRG: 194 | End: 2022-04-14
Payer: MEDICAID

## 2022-04-12 VITALS — HEART RATE: 87 BPM | SYSTOLIC BLOOD PRESSURE: 101 MMHG | OXYGEN SATURATION: 96 % | DIASTOLIC BLOOD PRESSURE: 67 MMHG

## 2022-04-12 LAB
ANION GAP SERPL CALCULATED.3IONS-SCNC: 16 MEQ/L (ref 9–15)
BASOPHILS ABSOLUTE: 0 K/UL (ref 0–0.2)
BASOPHILS RELATIVE PERCENT: 0.5 %
BUN BLDV-MCNC: 73 MG/DL (ref 6–20)
CALCIUM SERPL-MCNC: 8.1 MG/DL (ref 8.5–9.9)
CHLORIDE BLD-SCNC: 106 MEQ/L (ref 95–107)
CO2: 16 MEQ/L (ref 20–31)
CREAT SERPL-MCNC: 7.29 MG/DL (ref 0.7–1.2)
EOSINOPHILS ABSOLUTE: 0.2 K/UL (ref 0–0.7)
EOSINOPHILS RELATIVE PERCENT: 3.2 %
GFR AFRICAN AMERICAN: 9.7
GFR NON-AFRICAN AMERICAN: 8
GLUCOSE BLD-MCNC: 100 MG/DL (ref 70–99)
HCT VFR BLD CALC: 29.6 % (ref 42–52)
HEMOGLOBIN: 9.4 G/DL (ref 14–18)
LYMPHOCYTES ABSOLUTE: 0.5 K/UL (ref 1–4.8)
LYMPHOCYTES RELATIVE PERCENT: 7.3 %
MAGNESIUM: 2.9 MG/DL (ref 1.7–2.4)
MCH RBC QN AUTO: 22.3 PG (ref 27–31.3)
MCHC RBC AUTO-ENTMCNC: 31.6 % (ref 33–37)
MCV RBC AUTO: 70.5 FL (ref 80–100)
MONOCYTES ABSOLUTE: 0.7 K/UL (ref 0.2–0.8)
MONOCYTES RELATIVE PERCENT: 11.8 %
NEUTROPHILS ABSOLUTE: 4.9 K/UL (ref 1.4–6.5)
NEUTROPHILS RELATIVE PERCENT: 77.2 %
PDW BLD-RTO: 21.4 % (ref 11.5–14.5)
PLATELET # BLD: 324 K/UL (ref 130–400)
POTASSIUM SERPL-SCNC: 3.8 MEQ/L (ref 3.4–4.9)
RBC # BLD: 4.2 M/UL (ref 4.7–6.1)
SODIUM BLD-SCNC: 138 MEQ/L (ref 135–144)
WBC # BLD: 6.3 K/UL (ref 4.8–10.8)

## 2022-04-12 PROCEDURE — 6370000000 HC RX 637 (ALT 250 FOR IP): Performed by: INTERNAL MEDICINE

## 2022-04-12 PROCEDURE — 99233 SBSQ HOSP IP/OBS HIGH 50: CPT | Performed by: INTERNAL MEDICINE

## 2022-04-12 PROCEDURE — 2060000000 HC ICU INTERMEDIATE R&B

## 2022-04-12 PROCEDURE — 36558 INSERT TUNNELED CV CATH: CPT | Performed by: RADIOLOGY

## 2022-04-12 PROCEDURE — 2500000003 HC RX 250 WO HCPCS: Performed by: RADIOLOGY

## 2022-04-12 PROCEDURE — 36558 INSERT TUNNELED CV CATH: CPT

## 2022-04-12 PROCEDURE — 77001 FLUOROGUIDE FOR VEIN DEVICE: CPT

## 2022-04-12 PROCEDURE — 80048 BASIC METABOLIC PNL TOTAL CA: CPT

## 2022-04-12 PROCEDURE — 2500000003 HC RX 250 WO HCPCS

## 2022-04-12 PROCEDURE — 83735 ASSAY OF MAGNESIUM: CPT

## 2022-04-12 PROCEDURE — 99254 IP/OBS CNSLTJ NEW/EST MOD 60: CPT | Performed by: RADIOLOGY

## 2022-04-12 PROCEDURE — A4217 STERILE WATER/SALINE, 500 ML: HCPCS | Performed by: RADIOLOGY

## 2022-04-12 PROCEDURE — 2709999900 IR TUNNELED CVC PLACE WO SQ PORT/PUMP > 5 YEARS

## 2022-04-12 PROCEDURE — 0JH63XZ INSERTION OF TUNNELED VASCULAR ACCESS DEVICE INTO CHEST SUBCUTANEOUS TISSUE AND FASCIA, PERCUTANEOUS APPROACH: ICD-10-PCS | Performed by: RADIOLOGY

## 2022-04-12 PROCEDURE — 02H633Z INSERTION OF INFUSION DEVICE INTO RIGHT ATRIUM, PERCUTANEOUS APPROACH: ICD-10-PCS | Performed by: RADIOLOGY

## 2022-04-12 PROCEDURE — 8010000000 HC HEMODIALYSIS ACUTE INPT

## 2022-04-12 PROCEDURE — 6360000002 HC RX W HCPCS: Performed by: INTERNAL MEDICINE

## 2022-04-12 PROCEDURE — 77001 FLUOROGUIDE FOR VEIN DEVICE: CPT | Performed by: RADIOLOGY

## 2022-04-12 PROCEDURE — 76937 US GUIDE VASCULAR ACCESS: CPT | Performed by: RADIOLOGY

## 2022-04-12 PROCEDURE — 76937 US GUIDE VASCULAR ACCESS: CPT

## 2022-04-12 PROCEDURE — 2580000003 HC RX 258: Performed by: INTERNAL MEDICINE

## 2022-04-12 PROCEDURE — 36415 COLL VENOUS BLD VENIPUNCTURE: CPT

## 2022-04-12 PROCEDURE — 5A1D70Z PERFORMANCE OF URINARY FILTRATION, INTERMITTENT, LESS THAN 6 HOURS PER DAY: ICD-10-PCS | Performed by: INTERNAL MEDICINE

## 2022-04-12 PROCEDURE — 2580000003 HC RX 258: Performed by: RADIOLOGY

## 2022-04-12 PROCEDURE — 85025 COMPLETE CBC W/AUTO DIFF WBC: CPT

## 2022-04-12 PROCEDURE — 6360000002 HC RX W HCPCS: Performed by: RADIOLOGY

## 2022-04-12 RX ORDER — HEPARIN SODIUM 1000 [USP'U]/ML
INJECTION, SOLUTION INTRAVENOUS; SUBCUTANEOUS
Status: COMPLETED | OUTPATIENT
Start: 2022-04-12 | End: 2022-04-12

## 2022-04-12 RX ORDER — MAGNESIUM HYDROXIDE 1200 MG/15ML
LIQUID ORAL CONTINUOUS PRN
Status: COMPLETED | OUTPATIENT
Start: 2022-04-12 | End: 2022-04-12

## 2022-04-12 RX ORDER — LIDOCAINE HYDROCHLORIDE 20 MG/ML
INJECTION, SOLUTION INFILTRATION; PERINEURAL
Status: COMPLETED | OUTPATIENT
Start: 2022-04-12 | End: 2022-04-12

## 2022-04-12 RX ADMIN — SACUBITRIL AND VALSARTAN 1 TABLET: 24; 26 TABLET, FILM COATED ORAL at 21:55

## 2022-04-12 RX ADMIN — HEPARIN SODIUM 4200 UNITS: 1000 INJECTION INTRAVENOUS; SUBCUTANEOUS at 14:08

## 2022-04-12 RX ADMIN — SACUBITRIL AND VALSARTAN 1 TABLET: 24; 26 TABLET, FILM COATED ORAL at 09:38

## 2022-04-12 RX ADMIN — HYDRALAZINE HYDROCHLORIDE 100 MG: 50 TABLET, FILM COATED ORAL at 09:38

## 2022-04-12 RX ADMIN — Medication 8 ML: at 09:39

## 2022-04-12 RX ADMIN — ACETAMINOPHEN 650 MG: 325 TABLET ORAL at 18:22

## 2022-04-12 RX ADMIN — HYDRALAZINE HYDROCHLORIDE 100 MG: 50 TABLET, FILM COATED ORAL at 21:55

## 2022-04-12 RX ADMIN — FUROSEMIDE 40 MG: 10 INJECTION, SOLUTION INTRAMUSCULAR; INTRAVENOUS at 05:34

## 2022-04-12 RX ADMIN — LIDOCAINE HYDROCHLORIDE 5 ML: 20 INJECTION, SOLUTION INFILTRATION; PERINEURAL at 13:52

## 2022-04-12 RX ADMIN — Medication 10 ML: at 21:55

## 2022-04-12 RX ADMIN — SODIUM CHLORIDE 500 ML: 900 IRRIGANT IRRIGATION at 13:44

## 2022-04-12 RX ADMIN — NEPHROCAP 1 MG: 1 CAP ORAL at 09:38

## 2022-04-12 RX ADMIN — SODIUM BICARBONATE 650 MG: 650 TABLET ORAL at 21:55

## 2022-04-12 RX ADMIN — ACETAMINOPHEN 650 MG: 325 TABLET ORAL at 23:16

## 2022-04-12 RX ADMIN — CARVEDILOL 25 MG: 25 TABLET, FILM COATED ORAL at 09:38

## 2022-04-12 RX ADMIN — SODIUM BICARBONATE 650 MG: 650 TABLET ORAL at 09:38

## 2022-04-12 RX ADMIN — ISOSORBIDE MONONITRATE 30 MG: 30 TABLET, EXTENDED RELEASE ORAL at 09:38

## 2022-04-12 RX ADMIN — FUROSEMIDE 40 MG: 10 INJECTION, SOLUTION INTRAMUSCULAR; INTRAVENOUS at 21:55

## 2022-04-12 RX ADMIN — CARVEDILOL 25 MG: 25 TABLET, FILM COATED ORAL at 18:22

## 2022-04-12 ASSESSMENT — PAIN SCALES - GENERAL
PAINLEVEL_OUTOF10: 7
PAINLEVEL_OUTOF10: 6
PAINLEVEL_OUTOF10: 0

## 2022-04-12 ASSESSMENT — ENCOUNTER SYMPTOMS
VOMITING: 0
DIARRHEA: 0
STRIDOR: 0
CHEST TIGHTNESS: 1
SHORTNESS OF BREATH: 1
NAUSEA: 0
WHEEZING: 0
PHOTOPHOBIA: 0
COUGH: 0
BACK PAIN: 0
ABDOMINAL PAIN: 0
GASTROINTESTINAL NEGATIVE: 1
CHEST TIGHTNESS: 0
BLOOD IN STOOL: 0
EYES NEGATIVE: 1
CONSTIPATION: 0

## 2022-04-12 ASSESSMENT — PAIN DESCRIPTION - ORIENTATION: ORIENTATION: RIGHT;UPPER

## 2022-04-12 ASSESSMENT — PAIN DESCRIPTION - PAIN TYPE: TYPE: ACUTE PAIN

## 2022-04-12 ASSESSMENT — PAIN DESCRIPTION - LOCATION: LOCATION: CHEST

## 2022-04-12 NOTE — FLOWSHEET NOTE
0900Am nursing  assessment completed. Pt :    Awake and resting in bed           Alert and oriented. x4 Breakfast: completed  RESP:      Even and non labored         Lung sounds:           clear                      Oxygen: room air  Complaints of: denies  Pain: denies  IV: SL  TELE: NSR  Dressings:   Precautions:              Falls: 35        Radhames: 22  Chart and meds reviewed. Noted Labs: Bun 73 cr 7.29 h 9.4 trop 0.051  Plan for today: pt for placement of dialysis catheter and hemodialysis. Call light in reach. NOTES:  1300 pt to [facility] for dialysis catheter placement. Electronically signed by Martina Block RN on 4/12/2022 at 12:58 PM     1800 returned to [facility] after dialysis. Electronically signed by Martina Block RN on 4/12/2022 at [place] [id number] set up with dinner. Medicated with prn tylenol for discomfort at vas cath site. Ice pack applied for comfort. Electronically signed by Martina Block RN on 4/12/2022 at 6:33 PM    1924 Dr Chelsy Bryson updated. Pt does not have CORLANOR at home. Has not had filled since July. Unavailable per pharmacy.  Electronically signed by Martina Block RN on 4/12/2022 at 7:25 PM

## 2022-04-12 NOTE — PROGRESS NOTES
Hospitalist Progress Note      PCP: Jessy Elizabeth MD    Date of Admission: 4/11/2022    Chief Complaint:    Chief Complaint   Patient presents with    Chest Pain     Subjective:  Patient is feeling better; no longer short of breath; legs are back to normal.  12 point ROS negative other than mentioned above     Medications:  Reviewed    Infusion Medications    sodium chloride       Scheduled Medications    sacubitril-valsartan  1 tablet Oral BID    carvedilol  25 mg Oral BID WC    hydrALAZINE  100 mg Oral TID    isosorbide mononitrate  30 mg Oral Daily    ivabradine  5 mg Oral BID WC    sodium chloride flush  5-40 mL IntraVENous 2 times per day    [Held by provider] heparin (porcine)  5,000 Units SubCUTAneous 3 times per day    furosemide  40 mg IntraVENous BID AC    sodium bicarbonate  650 mg Oral TID    b complex-C-folic acid  1 capsule Oral Daily     PRN Meds: nitroGLYCERIN, sodium chloride flush, sodium chloride, ondansetron **OR** ondansetron, polyethylene glycol, acetaminophen **OR** acetaminophen, albumin human, heparin (porcine), heparin (porcine)      Intake/Output Summary (Last 24 hours) at 4/12/2022 1151  Last data filed at 4/12/2022 0800  Gross per 24 hour   Intake 780 ml   Output --   Net 780 ml     Exam:    /78   Pulse 90   Temp 97.7 °F (36.5 °C) (Oral)   Resp 18   Ht 6' (1.829 m)   Wt 204 lb 8 oz (92.8 kg)   SpO2 99%   BMI 27.74 kg/m²     General appearance: No apparent distress, appears stated age and cooperative. HEENT: Conjunctivae/corneas clear. Neck: Trachea midline. Respiratory:  Normal respiratory effort. Clear to auscultation  Cardiovascular: Regular rate and rhythm  Abdomen: Soft, non-tender, non-distended with normal bowel sounds. Musculoskeletal: No clubbing, cyanosis or edema bilaterally.   Neuro: Non Focal.   Capillary Refill: Brisk,< 3 seconds   Peripheral Pulses: +2 palpable, equal bilaterally     Labs:   Recent Labs     04/11/22  0445 04/12/22  0530   WBC 8.9 6.3   HGB 9.2* 9.4*   HCT 28.2* 29.6*    324     Recent Labs     04/11/22  0445 04/12/22  0530   * 138   K 3.6 3.8    106   CO2 16* 16*   BUN 70* 73*   CREATININE 7.03* 7.29*   CALCIUM 8.2* 8.1*     Recent Labs     04/11/22  0445   AST 16   ALT 14   BILITOT 0.5   ALKPHOS 111*     Recent Labs     04/11/22  1112   INR 1.1     Recent Labs     04/11/22  0445 04/11/22  0915 04/11/22  1455   TROPONINI 0.058* 0.061* 0.051*     Urinalysis:      Lab Results   Component Value Date    NITRU Negative 01/15/2022    WBCUA 0-2 01/15/2022    BACTERIA Negative 01/15/2022    RBCUA 0-2 01/15/2022    BLOODU TRACE 01/15/2022    SPECGRAV 1.011 01/15/2022    GLUCOSEU Negative 01/15/2022     Radiology:  XR CHEST PORTABLE   Final Result   BORDERLINE CARDIAC ENLARGEMENT WITH MINIMAL CENTRAL VESSEL CONGESTION. IR TUNNELED CVC PLACE WO SQ PORT/PUMP > 5 YEARS    (Results Pending)     Assessment/Plan:    63-year-old male with chronic combined heart failure (EF 20%), former tobacco and alcohol use, hypertension, CKD 5 presents from home after experiencing pressure-like chest pain and having dyspnea with exertion earlier in the day.     1. Acute decompensation of chronic combined heart failure in the setting of worsening CKD 5  - Plan for dialysis; catheter to be placed today     Elevated troponin suspect supply/demand mismatch - evaluated by cardiology  Hypertension - improved  History of alcohol use disorder  History of tobacco use disorder     Subcutaneous heparin prophylaxis    Active Hospital Problems    Diagnosis Date Noted    Acute on chronic combined systolic (congestive) and diastolic (congestive) heart failure ([facility] [address] [I50.43] 04/11/2022     Additional work up or/and treatment plan may be added today or then after based on clinical progression. I am managing a portion of pt care. Some medical issues are handled by other specialists.  Additional work up and treatment should be done in out pt setting by pt PCP and other out pt providers. In addition to examining and evaluating pt, I spent additional time explaining care, normal and abnormal findings, and treatment plan. All of pt questions were answered. Counseling, diet and education were  provided. Case will be discussed with nursing staff when appropriate. Family will be updated if and when appropriate. Diet: ADULT DIET; Regular;  Low Sodium (2 gm)    Code Status: Full Code    Electronically signed by Eben Jaramillo MD on 4/12/2022 at 11:51 AM

## 2022-04-12 NOTE — PROGRESS NOTES
Renal Progress Note    Assessment and Plan:        62-year-old man with CKD stage V proteinuric with negative serologic work-up. Has congestive heart failure with an EF of 20% with worsening kidney function. Also with hypertensive kidney disease as well. Slight anemia. With his GFR as low as it is now and shortness of breath he will need to start dialysis. Went over this with him in detail and he is agreeable. Explained that this would require having PermCath placed first and then would start dialysis. I also discussed home dialysis with him but he does not think this is an option right now. Will ask for social work to set up at the nearest [employer] unit aware he lives and will be followed by Dr. Delilah Johns there. We will add renal vitamin. If Cardiology wants to start [facility], okay with this. Will send hepatitis panel etc.    - HD next 2 days  - set up outpt hd      Patient Active Problem List:     Hypertensive urgency     Cardiomyopathy ([address]     Systolic and diastolic CHF, acute (HCC)     BLUE (acute kidney injury) ([address]     Abnormal EKG     Chronic combined systolic and diastolic CHF (congestive heart failure) ([address]     Acute decompensated heart failure (HCC)     Chest pain     Pulmonary edema, acute (HCC)     NSTEMI (non-ST elevated myocardial infarction) ([address]     Inguinal hernia of right side without obstruction or gangrene     CHF (congestive heart failure), NYHA class I, acute on chronic, combined (HCC)     Systolic congestive heart failure (HCC)     Acute systolic heart failure ([address]     Acute on chronic combined systolic (congestive) and diastolic (congestive) heart failure ([address]      Subjective:   Admit Date: 4/11/2022    Interval History: pt to start hd. Feels ok. No cp. No sob.   No fevers      Medications:   Scheduled Meds:   carvedilol  25 mg Oral BID WC    hydrALAZINE  100 mg Oral TID    isosorbide mononitrate  30 mg Oral Daily    ivabradine  5 mg Oral BID WC    sodium chloride flush  5-40 mL IntraVENous 2 times per day    [Held by provider] heparin (porcine)  5,000 Units SubCUTAneous 3 times per day    furosemide  40 mg IntraVENous BID AC    sodium bicarbonate  650 mg Oral TID    b complex-C-folic acid  1 capsule Oral Daily     Continuous Infusions:   sodium chloride         CBC:   Recent Labs     04/11/22  0445 04/12/22  0530   WBC 8.9 6.3   HGB 9.2* 9.4*    324     CMP:    Recent Labs     04/11/22 0445 04/12/22  0530   * 138   K 3.6 3.8    106   CO2 16* 16*   BUN 70* 73*   CREATININE 7.03* 7.29*   GLUCOSE 103* 100*   CALCIUM 8.2* 8.1*   LABGLOM 8.3* 8.0*     Troponin:   Recent Labs     04/11/22  1455   TROPONINI 0.051*     BNP: No results for input(s): BNP in the last 72 hours. INR:   Recent Labs     04/11/22  1112   INR 1.1     Lipids: No results for input(s): CHOL, LDLDIRECT, TRIG, HDL, AMYLASE, LIPASE in the last 72 hours. Liver:   Recent Labs     04/11/22 0445   AST 16   ALT 14   ALKPHOS 111*   PROT 6.2*   LABALBU 3.3*   BILITOT 0.5     Iron:  No results for input(s): IRONS, FERRITIN in the last 72 hours. Invalid input(s): LABIRONS  Urinalysis: No results for input(s): UA in the last 72 hours. Objective:   Vitals: BP (!) 144/95   Pulse 93   Temp 97.2 °F (36.2 °C) (Oral)   Resp 18   Ht 6' (1.829 m)   Wt 207 lb 9.6 oz (94.2 kg)   SpO2 100%   BMI 28.16 kg/m²    Wt Readings from Last 3 Encounters:   04/11/22 207 lb 9.6 oz (94.2 kg)   03/30/22 209 lb (94.8 kg)   01/22/22 203 lb 0.7 oz (92.1 kg)      24HR INTAKE/OUTPUT:      Intake/Output Summary (Last 24 hours) at 4/12/2022 2502  Last data filed at 4/12/2022 0606  Gross per 24 hour   Intake 360 ml   Output --   Net 360 ml       Constitutional:  Alert, awake, no apparent distress   Skin:normal, no rash  HEENT:sclera anicteric.   Head atraumatic normocephalic  Neck:supple with no thyromegally  Cardiovascular:  S1, S2 without m/r/g   Respiratory:  CTA B without w/r/r   Abdomen: +bs, soft, nt  Ext: no LE edema  Musculoskeletal:Intact  Neuro:Alert and oriented with no deficit      Electronically signed by Ronak Carbajal MD on 4/12/2022 at 8:32 AM

## 2022-04-12 NOTE — CARE COORDINATION
I met with Mr. Alva [facility] to review CHF. Patient had been compliant with weights at home. His weight was stable at about 202 #. He states that he is watching his sodium intake as well. Patient is to start dialysis today.    Electronically signed by Fide Edwards RN on 4/12/2022 at 11:11 AM

## 2022-04-12 NOTE — CONSULTS
CC:   Chief Complaint   Patient presents with    Chest Pain        HPI: Patient is a pleasant 72-year-old gentleman with a history of chronic combined heart failure, former tobacco use, alcohol use, hypertension, chronic kidney disease stage V. Patient presented from home experiencing chest tightness, pain, and shortness of breath. Upon admission patient was diagnosed with decompensation of heart failure and worsening chronic kidney disease now requiring dialysis. Interventional etiology was consulted for placement of a tunneled dialysis catheter, and evaluation of percutaneous AV fistula creation. Family History   Problem Relation Age of Onset    Coronary Art Dis Mother        Past Surgical History:   Procedure Laterality Date    CARDIAC PACEMAKER PLACEMENT      DIALYSIS CATHETER INSERTION Right 2022    15.5 F x 19 cm SYMETREX LONG TERM HEMODIALYSIS CATHETER    HERNIA REPAIR Right 02/10/2021    RIGHT INGUINAL HERNIA REPAIR WITH MESH performed by Loan Chong MD at [facility]        Past Medical History:   Diagnosis Date    Abnormal EKG 2019    Acute kidney injury (BLUE) with acute tubular necrosis (ATN) (HCC)     AICD (automatic cardioverter/defibrillator) present     Cardiomyopathy ([address]     per echo 2019    Chronic combined systolic and diastolic CHF (congestive heart failure) ([address] 2019    ETOH abuse     Hypertension     Tobacco abuse        Social History     Socioeconomic History    Marital status: Single     Spouse name: None    Number of children: None    Years of education: None    Highest education level: None   Occupational History    None   Tobacco Use    Smoking status: Current Every Day Smoker     Packs/day: 1.00     Types: Cigarettes     Last attempt to quit: 12/3/2020     Years since quittin.3    Smokeless tobacco: Never Used    Tobacco comment: currently smoking only couple cigarettes daily   Substance and Sexual Activity    Alcohol use:  Yes    Drug use: Never    Sexual activity: None   Other Topics Concern    None   Social History Narrative    None     Social Determinants of Health     Financial Resource Strain: Low Risk     Difficulty of Paying Living Expenses: Not hard at all   Food Insecurity: No Food Insecurity    Worried About Running Out of Food in the Last Year: Never true    Ginette of Food in the Last Year: Never true   Transportation Needs:     Lack of Transportation (Medical): Not on file    Lack of Transportation (Non-Medical): Not on file   Physical Activity:     Days of Exercise per Week: Not on file    Minutes of Exercise per Session: Not on file   Stress:     Feeling of Stress : Not on file   Social Connections:     Frequency of Communication with Friends and Family: Not on file    Frequency of Social Gatherings with Friends and Family: Not on file    Attends Adventism Services: Not on file    Active Member of [address] [place] [address] [employer] or Organizations: Not on file    Attends Club or Organization Meetings: Not on file    Marital Status: Not on file   Intimate Partner Violence:     Fear of Current or Ex-Partner: Not on file    Emotionally Abused: Not on file    Physically Abused: Not on file    Sexually Abused: Not on file   Housing Stability:     Unable to Pay for Housing in the Last Year: Not on file    Number of [place] in the Last Year: Not on file    Unstable Housing in the Last Year: Not on file       Allergies   Allergen Reactions    Lipitor [Atorvastatin] Other (See Comments)     Coughing     Lisinopril        No current facility-administered medications on file prior to encounter.      Current Outpatient Medications on File Prior to Encounter   Medication Sig Dispense Refill    torsemide (DEMADEX) 20 MG tablet Take 20 mg by mouth daily Pt states that he takes 4 tabs lately      hydrALAZINE (APRESOLINE) 100 MG tablet Take 1 tablet by mouth 3 times daily 90 tablet 3    carvedilol (COREG) 25 MG tablet take 1 tablet by mouth twice a day HOLD FOR SBP<100 OR HR <60 60 tablet 1    isosorbide mononitrate (IMDUR) 30 MG extended release tablet Take 1 tablet by mouth daily 30 tablet 3    torsemide (DEMADEX) 20 MG tablet Take 2 tablets by mouth daily 60 tablet 1    ivabradine (CORLANOR) 5 MG TABS tablet Take 1 tablet by mouth 2 times daily (with meals) 60 tablet 3    nicotine (NICODERM CQ) 21 MG/24HR Place 1 patch onto the skin daily 30 patch 3       Review of Systems   Constitutional: Negative. Negative for chills, diaphoresis and fever. HENT: Negative. Negative for congestion, ear pain, hearing loss and tinnitus. Eyes: Negative. Negative for photophobia. Respiratory: Positive for chest tightness and shortness of breath. Negative for cough and wheezing. Cardiovascular: Positive for chest pain. Negative for palpitations and leg swelling. Gastrointestinal: Negative. Negative for abdominal pain, constipation, diarrhea, nausea and vomiting. Genitourinary: Negative. Negative for dysuria, flank pain, frequency, hematuria and urgency. Musculoskeletal: Negative. Negative for back pain and neck pain. Skin: Negative. Negative for rash. Allergic/Immunologic: Negative for environmental allergies. Neurological: Negative. Negative for dizziness, tremors, weakness and headaches. Hematological: Does not bruise/bleed easily. Psychiatric/Behavioral: Negative. Negative for hallucinations and suicidal ideas. The patient is not nervous/anxious. OBJECTIVE:  /60   Pulse 81   Temp 97.2 °F (36.2 °C)   Resp 18   Ht 6' (1.829 m)   Wt 204 lb 8 oz (92.8 kg)   SpO2 97%   BMI 27.74 kg/m²     Physical Exam  Constitutional:       General: He is not in acute distress. Appearance: He is well-developed. He is not diaphoretic. HENT:      Head: Normocephalic and atraumatic. Nose: Nose normal.      Mouth/Throat:      Pharynx: No oropharyngeal exudate. Eyes:      General: No scleral icterus.         Right eye: No discharge. Left eye: No discharge. Conjunctiva/sclera: Conjunctivae normal.   Neck:      Thyroid: No thyromegaly. Vascular: JVD present. Trachea: No tracheal deviation. Cardiovascular:      Rate and Rhythm: Normal rate and regular rhythm. Heart sounds: Normal heart sounds. No murmur heard. No friction rub. No gallop. Pulmonary:      Effort: No respiratory distress. Breath sounds: No stridor. No wheezing or rales. Chest:      Chest wall: No tenderness. Abdominal:      General: Bowel sounds are normal. There is no distension. Palpations: Abdomen is soft. There is no mass. Tenderness: There is no abdominal tenderness. There is no guarding or rebound. Hernia: No hernia is present. Musculoskeletal:         General: No tenderness or deformity. Cervical back: Neck supple. Skin:     General: Skin is dry. Coloration: Skin is not pale. Findings: No erythema or rash. Neurological:      Mental Status: He is alert and oriented to person, place, and time. Cranial Nerves: No cranial nerve deficit. Psychiatric:         Behavior: Behavior normal.         Thought Content: Thought content normal.         Judgment: Judgment normal.           XR CHEST PORTABLE    Result Date: 4/11/2022  EXAMINATION: Portable AP ERECT view of the chest. CLINICAL HISTORY:  CP , pacer/ defibrillator placement DATE: 4/11/2022 4:45 AM COMPARISONS: 1/22/2022 FINDINGS: The heart is mildly enlarged. An AICD overlies the left hemithorax and has a lead extending into the right atrium and second lead extending into the right ventricle. There is minimal central vascular congestion present. No consolidating infiltrate. No pleural effusion or pneumothorax. The bony thorax is unremarkable. BORDERLINE CARDIAC ENLARGEMENT WITH MINIMAL CENTRAL VESSEL CONGESTION.         ASSESSMENT ANDPLAN:    Assessment: Patient admitted with acute worsening of congestive heart failure as well as decompensation of stage V chronic kidney disease now requiring dialysis. Plan:  1. Placement of a tunneled right internal jugular vein dialysis catheter under ultrasound and fluoroscopy guidance. 2.  Evaluate and plan for percutaneous AV fistula creation in the arm using [employer] Servicesin[employer]. 3.  Perform ultrasound evaluation of bilateral upper extremity arteries and veins to evaluate for candidacy of fistula creation.     Rhea Hoffman MD, Gar[facility] Solomon

## 2022-04-12 NOTE — CARE COORDINATION
04/12/22    From: Home, independent. Admit: CHF + renal failure. PMH: Chronic combined heart failure, former tobacco use, alcohol use, hypertension, chronic kidney disease stage V. Anticipated Discharge Disposition: Home, may need OP HD. Patient Mobility or PT/OT ordered: SADIE    Consults: Renal, cardiology, Titus Santana. Covid result &/or vacc status: Vaccinated. Barriers to Discharge:  - Pending dialysis recommendations, had cath inserted 4/12. Assessments: Needs CMI - off floor.

## 2022-04-12 NOTE — PROGRESS NOTES
Pt kasie first 2.5hr hd tx well. Rt IJ Tunneled cath functioning well. Fluid off=800ml. See chart for HD flow sheets.

## 2022-04-12 NOTE — PLAN OF CARE
Problem: Breathing Pattern - Ineffective:  Goal: Ability to achieve and maintain a regular respiratory rate will improve  Description: Ability to achieve and maintain a regular respiratory rate will improve  Outcome: Ongoing     Problem: Pain:  Goal: Pain level will decrease  Description: Pain level will decrease  Outcome: Ongoing  Goal: Control of acute pain  Description: Control of acute pain  Outcome: Ongoing  Goal: Control of chronic pain  Description: Control of chronic pain  Outcome: Ongoing  Goal: Patient's pain/discomfort is manageable  Description: Patient's pain/discomfort is manageable  Outcome: Ongoing     Problem: Infection:  Goal: Will remain free from infection  Description: Will remain free from infection  Outcome: Ongoing     Problem: Daily Care:  Goal: Daily care needs are met  Description: Daily care needs are met  Outcome: Ongoing     Problem: Discharge Planning:  Goal: Patients continuum of care needs are met  Description: Patients continuum of care needs are met  Outcome: Ongoing

## 2022-04-12 NOTE — OR NURSING
NO SEDATION    1330 Patient assisted to IR table in supine position. Consent verified for Tunneled HD Permcath insertion. Patient placed onto the monitor, VSS.      1341 R neck vessels assessed for patency using US guidance. R neck IJ area cleansed generously with Chloroprep and full body drape applied. [address] performed for insertion of tunneled hemodialysis catheter. 56 Dr. Bindu Joya administered 2% lidocaine to  IJ site with US guidance. 1354  5 fr MP set used to access R IJ site with US guidance. Guidewire inserted through introducer. 1355  Symetrex long term dilators 12 F and 14 F used prior to 12 F introducer sheath inserted. 1358 Additional 2% lidocaine given along tunneled area. 1400  Symetrex  15.5 F by 19 cm HD catheter ( Lot #  [id number]   Exp  12/29/2026)  tunneled and inserted through sheath. 1402 Peel away sheath and wire removed. 1405 Both blue and red ports aspirate and inject appropriately per Dr. Bindu Joya.    1406 R IJ site dressed with vicryl and Skin glue. Catheter site dressed with CHG tegarderm. 1407 2.1 ml Heparin instilled into red and blue ports. 1414 Report given to [facility]. Call to Dialysis as patient is to transport there after procedure. [address] ozzing from catheter site, pressure being held by [facility] tech. .  Pt tolerated procedure well. No ectopy noted on monitor. VSS. Pt. was given support and reassurance throughout procedure. [address] has stopped, pt assisted off table onto cart. Transport here to take patient to go to dialysis at this time.

## 2022-04-13 ENCOUNTER — APPOINTMENT (OUTPATIENT)
Dept: ULTRASOUND IMAGING | Age: 52
DRG: 194 | End: 2022-04-13
Payer: MEDICAID

## 2022-04-13 LAB
ANION GAP SERPL CALCULATED.3IONS-SCNC: 14 MEQ/L (ref 9–15)
BUN BLDV-MCNC: 49 MG/DL (ref 6–20)
CALCIUM SERPL-MCNC: 8.1 MG/DL (ref 8.5–9.9)
CHLORIDE BLD-SCNC: 100 MEQ/L (ref 95–107)
CO2: 24 MEQ/L (ref 20–31)
CREAT SERPL-MCNC: 5.78 MG/DL (ref 0.7–1.2)
GFR AFRICAN AMERICAN: 12.6
GFR NON-AFRICAN AMERICAN: 10.4
GLUCOSE BLD-MCNC: 95 MG/DL (ref 70–99)
HEPATITIS B SURFACE ANTIGEN INTERPRETATION: NORMAL
HEPATITIS C ANTIBODY: NONREACTIVE
MAGNESIUM: 2.4 MG/DL (ref 1.7–2.4)
POTASSIUM SERPL-SCNC: 3.4 MEQ/L (ref 3.4–4.9)
SODIUM BLD-SCNC: 138 MEQ/L (ref 135–144)

## 2022-04-13 PROCEDURE — 86803 HEPATITIS C AB TEST: CPT

## 2022-04-13 PROCEDURE — 6360000002 HC RX W HCPCS: Performed by: INTERNAL MEDICINE

## 2022-04-13 PROCEDURE — 8010000000 HC HEMODIALYSIS ACUTE INPT

## 2022-04-13 PROCEDURE — 6370000000 HC RX 637 (ALT 250 FOR IP): Performed by: INTERNAL MEDICINE

## 2022-04-13 PROCEDURE — 87340 HEPATITIS B SURFACE AG IA: CPT

## 2022-04-13 PROCEDURE — 36415 COLL VENOUS BLD VENIPUNCTURE: CPT

## 2022-04-13 PROCEDURE — 86704 HEP B CORE ANTIBODY TOTAL: CPT

## 2022-04-13 PROCEDURE — 83735 ASSAY OF MAGNESIUM: CPT

## 2022-04-13 PROCEDURE — 99232 SBSQ HOSP IP/OBS MODERATE 35: CPT | Performed by: INTERNAL MEDICINE

## 2022-04-13 PROCEDURE — 80048 BASIC METABOLIC PNL TOTAL CA: CPT

## 2022-04-13 PROCEDURE — 2060000000 HC ICU INTERMEDIATE R&B

## 2022-04-13 PROCEDURE — 93985 DUP-SCAN HEMO COMPL BI STD: CPT | Performed by: RADIOLOGY

## 2022-04-13 PROCEDURE — 93985 DUP-SCAN HEMO COMPL BI STD: CPT

## 2022-04-13 PROCEDURE — 2580000003 HC RX 258: Performed by: INTERNAL MEDICINE

## 2022-04-13 RX ORDER — HEPARIN SODIUM 1000 [USP'U]/ML
1000 INJECTION, SOLUTION INTRAVENOUS; SUBCUTANEOUS PRN
Status: DISCONTINUED | OUTPATIENT
Start: 2022-04-13 | End: 2022-04-15 | Stop reason: HOSPADM

## 2022-04-13 RX ADMIN — FUROSEMIDE 40 MG: 10 INJECTION, SOLUTION INTRAMUSCULAR; INTRAVENOUS at 06:18

## 2022-04-13 RX ADMIN — SACUBITRIL AND VALSARTAN 1 TABLET: 24; 26 TABLET, FILM COATED ORAL at 21:22

## 2022-04-13 RX ADMIN — HEPARIN SODIUM 1000 UNITS: 1000 INJECTION, SOLUTION INTRAVENOUS; SUBCUTANEOUS at 15:23

## 2022-04-13 RX ADMIN — SODIUM BICARBONATE 650 MG: 650 TABLET ORAL at 17:46

## 2022-04-13 RX ADMIN — HEPARIN SODIUM 2000 UNITS: 1000 INJECTION, SOLUTION INTRAVENOUS; SUBCUTANEOUS at 14:21

## 2022-04-13 RX ADMIN — Medication 10 ML: at 21:26

## 2022-04-13 RX ADMIN — ISOSORBIDE MONONITRATE 30 MG: 30 TABLET, EXTENDED RELEASE ORAL at 09:57

## 2022-04-13 RX ADMIN — SODIUM BICARBONATE 650 MG: 650 TABLET ORAL at 09:57

## 2022-04-13 RX ADMIN — NEPHROCAP 1 MG: 1 CAP ORAL at 09:57

## 2022-04-13 RX ADMIN — HEPARIN SODIUM 1000 UNITS: 1000 INJECTION, SOLUTION INTRAVENOUS; SUBCUTANEOUS at 15:22

## 2022-04-13 RX ADMIN — CARVEDILOL 25 MG: 25 TABLET, FILM COATED ORAL at 17:46

## 2022-04-13 RX ADMIN — Medication 8 ML: at 13:11

## 2022-04-13 RX ADMIN — SACUBITRIL AND VALSARTAN 1 TABLET: 24; 26 TABLET, FILM COATED ORAL at 09:57

## 2022-04-13 RX ADMIN — ACETAMINOPHEN 650 MG: 325 TABLET ORAL at 21:22

## 2022-04-13 RX ADMIN — SODIUM BICARBONATE 650 MG: 650 TABLET ORAL at 21:22

## 2022-04-13 ASSESSMENT — PAIN DESCRIPTION - PROGRESSION
CLINICAL_PROGRESSION: NOT CHANGED

## 2022-04-13 ASSESSMENT — ENCOUNTER SYMPTOMS
COUGH: 0
WHEEZING: 0
SHORTNESS OF BREATH: 1
STRIDOR: 0
EYES NEGATIVE: 1
NAUSEA: 0
CHEST TIGHTNESS: 0
GASTROINTESTINAL NEGATIVE: 1
BLOOD IN STOOL: 0

## 2022-04-13 ASSESSMENT — PAIN DESCRIPTION - DESCRIPTORS: DESCRIPTORS: ACHING

## 2022-04-13 ASSESSMENT — PAIN SCALES - GENERAL
PAINLEVEL_OUTOF10: 5
PAINLEVEL_OUTOF10: 0

## 2022-04-13 ASSESSMENT — PAIN DESCRIPTION - PAIN TYPE: TYPE: ACUTE PAIN

## 2022-04-13 ASSESSMENT — PAIN DESCRIPTION - LOCATION: LOCATION: HEAD

## 2022-04-13 NOTE — PROGRESS NOTES
Nutrition Education    Pt stated that he tries to avoid alot of salt, but does eat some high sodium foods (ie. .chips, hot dogs, canned soup, etc). Pt interested in low sodium diet information. · Verbally reviewed information with Patient  · Educated on CHF Low Sodium diet. · Written educational materials provided. · Contact name and number provided. · Refer to Patient Education activity for more details.     Electronically signed by Jt Torres RD, KOFI on 4/13/22 at 4:03 PM EDT

## 2022-04-13 NOTE — PROGRESS NOTES
Hospitalist Progress Note      PCP: Jeanie Gallagher MD    Date of Admission: 4/11/2022    Chief Complaint:    Chief Complaint   Patient presents with    Chest Pain     Subjective:  Patient is feeling tired today. 12 point ROS negative other than mentioned above     Medications:  Reviewed    Infusion Medications    sodium chloride       Scheduled Medications    sacubitril-valsartan  1 tablet Oral BID    carvedilol  25 mg Oral BID WC    hydrALAZINE  100 mg Oral TID    isosorbide mononitrate  30 mg Oral Daily    ivabradine  5 mg Oral BID WC    sodium chloride flush  5-40 mL IntraVENous 2 times per day    [Held by provider] heparin (porcine)  5,000 Units SubCUTAneous 3 times per day    furosemide  40 mg IntraVENous BID AC    sodium bicarbonate  650 mg Oral TID    b complex-C-folic acid  1 capsule Oral Daily     PRN Meds: heparin (porcine), nitroGLYCERIN, sodium chloride flush, sodium chloride, ondansetron **OR** ondansetron, polyethylene glycol, acetaminophen **OR** acetaminophen, albumin human, heparin (porcine), heparin (porcine)      Intake/Output Summary (Last 24 hours) at 4/13/2022 1332  Last data filed at 4/13/2022 0800  Gross per 24 hour   Intake 120 ml   Output 750 ml   Net -630 ml     Exam:    BP (!) 89/55   Pulse 83   Temp 98.1 °F (36.7 °C)   Resp 16   Ht 6' (1.829 m)   Wt 209 lb 6.4 oz (95 kg)   SpO2 98%   BMI 28.40 kg/m²     General appearance: No apparent distress, appears stated age and cooperative. HEENT: Conjunctivae/corneas clear. Neck: Trachea midline. Respiratory:  Normal respiratory effort. Clear to auscultation  Cardiovascular: Regular rate and rhythm  Abdomen: Soft, non-tender, non-distended with normal bowel sounds. Musculoskeletal: No clubbing, cyanosis or edema bilaterally.   Neuro: Non Focal.   Capillary Refill: Brisk,< 3 seconds   Peripheral Pulses: +2 palpable, equal bilaterally     Labs:   Recent Labs     04/11/22  0445 04/12/22  0530   WBC 8.9 6.3   HGB 9.2* 9.4* HCT 28.2* 29.6*    324     Recent Labs     04/11/22  0445 04/12/22  0530 04/13/22  0558   * 138 138   K 3.6 3.8 3.4    106 100   CO2 16* 16* 24   BUN 70* 73* 49*   CREATININE 7.03* 7.29* 5.78*   CALCIUM 8.2* 8.1* 8.1*     Recent Labs     04/11/22  0445   AST 16   ALT 14   BILITOT 0.5   ALKPHOS 111*     Recent Labs     04/11/22  1112   INR 1.1     Recent Labs     04/11/22  0445 04/11/22  0915 04/11/22  1455   TROPONINI 0.058* 0.061* 0.051*     Urinalysis:      Lab Results   Component Value Date    NITRU Negative 01/15/2022    WBCUA 0-2 01/15/2022    BACTERIA Negative 01/15/2022    RBCUA 0-2 01/15/2022    BLOODU TRACE 01/15/2022    SPECGRAV 1.011 01/15/2022    GLUCOSEU Negative 01/15/2022     Radiology:  XR CHEST PORTABLE   Final Result   BORDERLINE CARDIAC ENLARGEMENT WITH MINIMAL CENTRAL VESSEL CONGESTION. IR TUNNELED CVC PLACE WO SQ PORT/PUMP > 5 YEARS    (Results Pending)   IR FLUORO GUIDED CVA DEVICE PLMT/REPLACE/REMOVAL    (Results Pending)   IR ULTRASOUND GUIDANCE VASCULAR ACCESS    (Results Pending)   US HEMODIALYSIS BILATERAL DUPLEX STUDY    (Results Pending)     Assessment/Plan:    72-year-old male with chronic combined heart failure (EF 20%), former tobacco and alcohol use, hypertension, CKD 5 presents from home after experiencing pressure-like chest pain and having dyspnea with exertion earlier in the day.     1.   Acute decompensation of chronic combined heart failure in the setting of worsening CKD 5  - Continue dialysis     Elevated troponin suspect supply/demand mismatch - evaluated by cardiology  Hypertension - improved  History of alcohol use disorder  History of tobacco use disorder     Subcutaneous heparin prophylaxis    Active Hospital Problems    Diagnosis Date Noted    Acute on chronic combined systolic (congestive) and diastolic (congestive) heart failure ([facility] [address] [I50.43] 04/11/2022     Additional work up or/and treatment plan may be added today or then after based on clinical progression. I am managing a portion of pt care. Some medical issues are handled by other specialists. Additional work up and treatment should be done in out pt setting by pt PCP and other out pt providers. In addition to examining and evaluating pt, I spent additional time explaining care, normal and abnormal findings, and treatment plan. All of pt questions were answered. Counseling, diet and education were  provided. Case will be discussed with nursing staff when appropriate. Family will be updated if and when appropriate. Diet: ADULT DIET; Regular;  Low Sodium (2 gm)    Code Status: Full Code    Electronically signed by Donte Pace MD on 4/13/2022 at 1:32 PM

## 2022-04-13 NOTE — FLOWSHEET NOTE
04/13/22 1720   Observations & Evaluations   Level of Consciousness Alert (0)   Oriented X 3   Heart Rhythm Regular   Respiratory Quality/Effort Unlabored   O2 Device None (Room air)   Bilateral Breath Sounds Diminished   Skin Color Appropriate for ethnicity   Skin Condition/Temp Warm;Dry   Abdomen Inspection Soft   Edema None   Vital Signs   /77   Temp 98.4 °F (36.9 °C)   Pulse 89   Resp 18   Weight 206 lb 2.1 oz (93.5 kg)   Weight Method Bed scale   Percent Weight Change -1.58   Post-Hemodialysis Assessment   Post-Treatment Procedures Blood returned;Catheter capped, clamped and heparinized x 2 ports   Machine Disinfection Process Acid/Vinegar Clean;Heat Disinfect; Exterior Machine Disinfection   Rinseback Volume (ml) 200 ml   Total Liters Processed (l/min) 51.5 l/min   Dialyzer Clearance Lightly streaked   Duration of Treatment (minutes) 180 minutes   Heparin amount administered during treatment (units) 2000 units   Hemodialysis Intake (ml) 400 ml   Hemodialysis Output (ml) 1900 ml   NET Removed (ml) 1500 ml   Tolerated Treatment Good

## 2022-04-13 NOTE — FLOWSHEET NOTE
Pt to ultrasound and then dialysis via bed. Breakfast completed. Electronically signed by Yanely Mccollum RN on 4/13/2022 at 8:46 AM    1000 pt returned to room while awaiting dialysis treatment. Resting quietly. No complaints. 1400 pt to dialysis via bed. Electronically signed by Yanely Mccollum RN on 4/13/2022 at 3:09 PM     [phone] returned from dialysis. Ambulatory in room. Set up with dinner. No complaints. Electronically signed by Yanely Mccollum RN on 4/13/2022 at 5:54 PM    1933 Secure message sent to Dr Shawna Ambriz about BP and pt complaints of dizzy after coreg. States he will review meds in am before am med pass. Pt updated.  Electronically signed by Yanely Mccollum RN on 4/13/2022 at 7:34 PM

## 2022-04-13 NOTE — PROGRESS NOTES
Progress Note  Patient: Jillian [facility]  Unit/Bed: Z422/S603-82  YOB: 1970  MRN: [id number]  Acct: [de-identified]   Admitting Diagnosis: Shortness of breath [R06.02]  Acute on chronic combined systolic (congestive) and diastolic (congestive) heart failure (HCC) [I50.43]  Acute decompensated heart failure ([address] [I50.9]  Chest pain, unspecified type [R07.9]  Admit Date:  2022  Hospital Day: 2    Chief Complaint: CP    Histories:  Past Medical History:   Diagnosis Date    Abnormal EKG 2019    Acute kidney injury (BLUE) with acute tubular necrosis (ATN) ([address]     AICD (automatic cardioverter/defibrillator) present     Cardiomyopathy ([address]     per echo 2019    Chronic combined systolic and diastolic CHF (congestive heart failure) ([address] 2019    ETOH abuse     Hypertension     Tobacco abuse      Past Surgical History:   Procedure Laterality Date    CARDIAC PACEMAKER PLACEMENT      DIALYSIS CATHETER INSERTION Right 2022    15.5 F x 19 cm SYMETREX LONG TERM HEMODIALYSIS CATHETER    HERNIA REPAIR Right 02/10/2021    RIGHT INGUINAL HERNIA REPAIR WITH MESH performed by Kim Paul MD at [address] History   Problem Relation Age of Onset    Coronary Art Dis Mother      Social History     Socioeconomic History    Marital status: Single     Spouse name: None    Number of children: None    Years of education: None    Highest education level: None   Occupational History    None   Tobacco Use    Smoking status: Current Every Day Smoker     Packs/day: 1.00     Types: Cigarettes     Last attempt to quit: 12/3/2020     Years since quittin.3    Smokeless tobacco: Never Used    Tobacco comment: currently smoking only couple cigarettes daily   Substance and Sexual Activity    Alcohol use:  Yes    Drug use: Never    Sexual activity: None   Other Topics Concern    None   Social History Narrative    None     Social Determinants of Health     Financial Resource Strain: Low Risk     Difficulty of Paying Living Expenses: Not hard at all   Food Insecurity: No Food Insecurity    Worried About Running Out of Food in the Last Year: Never true    Ginette of Food in the Last Year: Never true   Transportation Needs:     Lack of Transportation (Medical): Not on file    Lack of Transportation (Non-Medical): Not on file   Physical Activity:     Days of Exercise per Week: Not on file    Minutes of Exercise per Session: Not on file   Stress:     Feeling of Stress : Not on file   Social Connections:     Frequency of Communication with Friends and Family: Not on file    Frequency of Social Gatherings with Friends and Family: Not on file    Attends [employer] Services: Not on file    Active Member of [address] [place] [address] [employer] or Organizations: Not on file    Attends Club or Organization Meetings: Not on file    Marital Status: Not on file   Intimate Partner Violence:     Fear of Current or Ex-Partner: Not on file    Emotionally Abused: Not on file    Physically Abused: Not on file    Sexually Abused: Not on file   Housing Stability:     Unable to Pay for Housing in the Last Year: Not on file    Number of [place] in the Last Year: Not on file    Unstable Housing in the Last Year: Not on file       Subjective/HPI   Had 1st HD yesterday. Did well feels better. No cp    EKG: SR 75        Review of Systems:   Review of Systems   Constitutional: Negative. Negative for diaphoresis and fatigue. HENT: Negative. Eyes: Negative. Respiratory: Positive for shortness of breath. Negative for cough, chest tightness, wheezing and stridor. Cardiovascular: Negative. Negative for chest pain, palpitations and leg swelling. Gastrointestinal: Negative. Negative for blood in stool and nausea. Genitourinary: Negative. Musculoskeletal: Negative. Skin: Negative. Neurological: Negative. Negative for dizziness, syncope, weakness and light-headedness. Hematological: Negative. Psychiatric/Behavioral: Negative. Physical Examination:    /76   Pulse 83   Temp 95 °F (35 °C)   Resp 16   Ht 6' (1.829 m)   Wt 209 lb 6.4 oz (95 kg)   SpO2 100%   BMI 28.40 kg/m²    Physical Exam   Constitutional: He appears healthy. No distress. HENT:   Normal cephalic and Atraumatic   Eyes: Pupils are equal, round, and reactive to light. Neck: Thyroid normal. No JVD present. No neck adenopathy. No thyromegaly present. Cardiovascular: Normal rate, regular rhythm, normal heart sounds, intact distal pulses and normal pulses. Pulmonary/Chest: Effort normal and breath sounds normal. He has no wheezes. He has no rales. He exhibits no tenderness. Abdominal: Soft. Bowel sounds are normal. There is no abdominal tenderness. Musculoskeletal:         General: No tenderness or edema. Normal range of motion. Cervical back: Normal range of motion and neck supple. Neurological: He is alert and oriented to person, place, and time. Skin: Skin is warm. No cyanosis. Nails show no clubbing.        LABS:  CBC:   Lab Results   Component Value Date    WBC 6.3 04/12/2022    RBC 4.20 04/12/2022    HGB 9.4 04/12/2022    HCT 29.6 04/12/2022    MCV 70.5 04/12/2022    MCH 22.3 04/12/2022    MCHC 31.6 04/12/2022    RDW 21.4 04/12/2022     04/12/2022     CBC with Differential:    Lab Results   Component Value Date    WBC 6.3 04/12/2022    RBC 4.20 04/12/2022    HGB 9.4 04/12/2022    HCT 29.6 04/12/2022     04/12/2022    MCV 70.5 04/12/2022    MCH 22.3 04/12/2022    MCHC 31.6 04/12/2022    RDW 21.4 04/12/2022    LYMPHOPCT 7.3 04/12/2022    MONOPCT 11.8 04/12/2022    BASOPCT 0.5 04/12/2022    MONOSABS 0.7 04/12/2022    LYMPHSABS 0.5 04/12/2022    EOSABS 0.2 04/12/2022    BASOSABS 0.0 04/12/2022     CMP:    Lab Results   Component Value Date     04/13/2022    K 3.4 04/13/2022    K 4.0 01/22/2022     04/13/2022    CO2 24 04/13/2022    BUN 49 04/13/2022    CREATININE 5.78 04/13/2022    GFRAA 12.6 04/13/2022    LABGLOM 10.4 04/13/2022    GLUCOSE 95 04/13/2022    PROT 6.2 04/11/2022    LABALBU 3.3 04/11/2022    CALCIUM 8.1 04/13/2022    BILITOT 0.5 04/11/2022    ALKPHOS 111 04/11/2022    AST 16 04/11/2022    ALT 14 04/11/2022     BMP:    Lab Results   Component Value Date     04/13/2022    K 3.4 04/13/2022    K 4.0 01/22/2022     04/13/2022    CO2 24 04/13/2022    BUN 49 04/13/2022    LABALBU 3.3 04/11/2022    CREATININE 5.78 04/13/2022    CALCIUM 8.1 04/13/2022    GFRAA 12.6 04/13/2022    LABGLOM 10.4 04/13/2022    GLUCOSE 95 04/13/2022     Magnesium:    Lab Results   Component Value Date    MG 2.4 04/13/2022     Troponin:    Lab Results   Component Value Date    TROPONINI 0.051 04/11/2022        Active Hospital Problems    Diagnosis Date Noted    Acute on chronic combined systolic (congestive) and diastolic (congestive) heart failure ([facility] [address] [I50.43] 04/11/2022     Priority: Low        Assessment/Plan:  1. CP- Noncardiac  2. SOB - Etiology not clear sats are 100% on RA. He is not volume overloaded and does not appear to be decompensated from his chronic Combined HF. We will start Entresto. 3. CKD- HD again today  4. No CAD  5. NICM s/p ICD.           Electronically signed by Damon Freed MD on 4/13/2022 at 10:18 AM

## 2022-04-13 NOTE — PROGRESS NOTES
Nephrology Progress Note    Assessment:  ESRD  Anemia  OHD Hypertension      Plan: dialysis today hepatitis profile ordered  One unit rranged ok to discharge    Patient Active Problem List:     Hypertensive urgency     Cardiomyopathy ([facility] [address]     Systolic and diastolic CHF, acute (HCC)     BLUE (acute kidney injury) ([facility] [address]     Abnormal EKG     Chronic combined systolic and diastolic CHF (congestive heart failure) ([facility] [address]     Acute decompensated heart failure (HCC)     Chest pain     Pulmonary edema, acute ([facility])     NSTEMI (non-ST elevated myocardial infarction) ([facility] [address]     Inguinal hernia of right side without obstruction or gangrene     CHF (congestive heart failure), NYHA class I, acute on chronic, combined (HCC)     Systolic congestive heart failure (HCC)     Acute systolic heart failure (HCC)     Acute on chronic combined systolic (congestive) and diastolic (congestive) heart failure (HCC)      Subjective:  Admit Date: 4/11/2022    Interval History: up and about in room    Medications:  Scheduled Meds:   sacubitril-valsartan  1 tablet Oral BID    carvedilol  25 mg Oral BID WC    hydrALAZINE  100 mg Oral TID    isosorbide mononitrate  30 mg Oral Daily    ivabradine  5 mg Oral BID WC    sodium chloride flush  5-40 mL IntraVENous 2 times per day    [Held by provider] heparin (porcine)  5,000 Units SubCUTAneous 3 times per day    furosemide  40 mg IntraVENous BID AC    sodium bicarbonate  650 mg Oral TID    b complex-C-folic acid  1 capsule Oral Daily     Continuous Infusions:   sodium chloride         CBC:   Recent Labs     04/11/22  0445 04/12/22  0530   WBC 8.9 6.3   HGB 9.2* 9.4*    324     CMP:    Recent Labs     04/11/22  0445 04/12/22  0530 04/13/22  0558   * 138 138   K 3.6 3.8 3.4    106 100   CO2 16* 16* 24   BUN 70* 73* 49*   CREATININE 7.03* 7.29* 5.78*   GLUCOSE 103* 100* 95   CALCIUM 8.2* 8.1* 8.1*   LABGLOM 8.3* 8.0* 10.4*     Troponin:   Recent Labs     04/11/22  1455   TROPONINI 0.051*     BNP: No results for input(s): BNP in the last 72 hours. INR:   Recent Labs     04/11/22  1112   INR 1.1     Lipids: No results for input(s): CHOL, LDLDIRECT, TRIG, HDL, AMYLASE, LIPASE in the last 72 hours. Liver:   Recent Labs     04/11/22  0445   AST 16   ALT 14   ALKPHOS 111*   PROT 6.2*   LABALBU 3.3*   BILITOT 0.5     Iron:  No results for input(s): IRONS, FERRITIN in the last 72 hours. Invalid input(s): LABIRONS  Urinalysis: No results for input(s): UA in the last 72 hours.     Objective:  Vitals: /76   Pulse 83   Temp 95 °F (35 °C)   Resp 16   Ht 6' (1.829 m)   Wt 209 lb 6.4 oz (95 kg)   SpO2 100%   BMI 28.40 kg/m²    Wt Readings from Last 3 Encounters:   04/13/22 209 lb 6.4 oz (95 kg)   03/30/22 209 lb (94.8 kg)   01/22/22 203 lb 0.7 oz (92.1 kg)      24HR INTAKE/OUTPUT:      Intake/Output Summary (Last 24 hours) at 4/13/2022 5515  Last data filed at 4/13/2022 8798  Gross per 24 hour   Intake 220 ml   Output 975 ml   Net -755 ml       General: alert, in no apparent distress  HEENT: normocephalic, atraumatic, anicteric  Neck: supple, no mass  Lungs: non-labored respirations, clear to auscultation bilaterally  Heart: regular rate and rhythm, no murmurs or rubs  Abdomen: soft, non-tender, non-distended  Ext: no cyanosis, no peripheral edema  Neuro: alert and oriented, no gross abnormalities  Psych: normal mood and affect  Skin: no rash      Electronically signed by Sofia Layne DO, MD

## 2022-04-14 ENCOUNTER — TELEPHONE (OUTPATIENT)
Dept: INTERVENTIONAL RADIOLOGY/VASCULAR | Age: 52
End: 2022-04-14

## 2022-04-14 DIAGNOSIS — N17.9 ACUTE RENAL FAILURE SUPERIMPOSED ON STAGE 4 CHRONIC KIDNEY DISEASE, UNSPECIFIED ACUTE RENAL FAILURE TYPE (HCC): Primary | ICD-10-CM

## 2022-04-14 DIAGNOSIS — N18.4 ACUTE RENAL FAILURE SUPERIMPOSED ON STAGE 4 CHRONIC KIDNEY DISEASE, UNSPECIFIED ACUTE RENAL FAILURE TYPE (HCC): Primary | ICD-10-CM

## 2022-04-14 LAB
ANION GAP SERPL CALCULATED.3IONS-SCNC: 15 MEQ/L (ref 9–15)
BUN BLDV-MCNC: 37 MG/DL (ref 6–20)
CALCIUM SERPL-MCNC: 8.4 MG/DL (ref 8.5–9.9)
CHLORIDE BLD-SCNC: 99 MEQ/L (ref 95–107)
CO2: 25 MEQ/L (ref 20–31)
CREAT SERPL-MCNC: 5.22 MG/DL (ref 0.7–1.2)
GFR AFRICAN AMERICAN: 14.2
GFR NON-AFRICAN AMERICAN: 11.8
GLUCOSE BLD-MCNC: 88 MG/DL (ref 70–99)
HEPATITIS B CORE TOTAL ANTIBODY: NEGATIVE
MAGNESIUM: 2.3 MG/DL (ref 1.7–2.4)
POTASSIUM SERPL-SCNC: 3.5 MEQ/L (ref 3.4–4.9)
SODIUM BLD-SCNC: 139 MEQ/L (ref 135–144)

## 2022-04-14 PROCEDURE — 99233 SBSQ HOSP IP/OBS HIGH 50: CPT | Performed by: INTERNAL MEDICINE

## 2022-04-14 PROCEDURE — 6370000000 HC RX 637 (ALT 250 FOR IP): Performed by: INTERNAL MEDICINE

## 2022-04-14 PROCEDURE — 2580000003 HC RX 258: Performed by: INTERNAL MEDICINE

## 2022-04-14 PROCEDURE — 83735 ASSAY OF MAGNESIUM: CPT

## 2022-04-14 PROCEDURE — 2060000000 HC ICU INTERMEDIATE R&B

## 2022-04-14 PROCEDURE — 80048 BASIC METABOLIC PNL TOTAL CA: CPT

## 2022-04-14 PROCEDURE — 36415 COLL VENOUS BLD VENIPUNCTURE: CPT

## 2022-04-14 RX ORDER — CARVEDILOL 12.5 MG/1
12.5 TABLET ORAL 2 TIMES DAILY WITH MEALS
Status: DISCONTINUED | OUTPATIENT
Start: 2022-04-14 | End: 2022-04-15 | Stop reason: HOSPADM

## 2022-04-14 RX ORDER — FUROSEMIDE 80 MG
80 TABLET ORAL DAILY
Status: DISCONTINUED | OUTPATIENT
Start: 2022-04-14 | End: 2022-04-14

## 2022-04-14 RX ADMIN — CARVEDILOL 12.5 MG: 12.5 TABLET, FILM COATED ORAL at 18:40

## 2022-04-14 RX ADMIN — NEPHROCAP 1 MG: 1 CAP ORAL at 10:07

## 2022-04-14 RX ADMIN — SACUBITRIL AND VALSARTAN 1 TABLET: 24; 26 TABLET, FILM COATED ORAL at 12:28

## 2022-04-14 RX ADMIN — Medication 5 ML: at 21:54

## 2022-04-14 ASSESSMENT — ENCOUNTER SYMPTOMS
WHEEZING: 0
COUGH: 0
NAUSEA: 0
GASTROINTESTINAL NEGATIVE: 1
EYES NEGATIVE: 1
CHEST TIGHTNESS: 0
BLOOD IN STOOL: 0
STRIDOR: 0

## 2022-04-14 ASSESSMENT — PAIN SCALES - GENERAL: PAINLEVEL_OUTOF10: 0

## 2022-04-14 NOTE — PROGRESS NOTES
Hospitalist Progress Note      PCP: Alda Flowers MD    Date of Admission: 4/11/2022    Chief Complaint:    Chief Complaint   Patient presents with    Chest Pain     Subjective:  Patient is feeling well today. 12 point ROS negative other than mentioned above     Medications:  Reviewed    Infusion Medications    sodium chloride       Scheduled Medications    carvedilol  12.5 mg Oral BID WC    sacubitril-valsartan  1 tablet Oral BID    ivabradine  5 mg Oral BID WC    sodium chloride flush  5-40 mL IntraVENous 2 times per day    [Held by provider] heparin (porcine)  5,000 Units SubCUTAneous 3 times per day    b complex-C-folic acid  1 capsule Oral Daily     PRN Meds: heparin (porcine), nitroGLYCERIN, sodium chloride flush, sodium chloride, ondansetron **OR** ondansetron, polyethylene glycol, acetaminophen **OR** acetaminophen, albumin human, heparin (porcine), heparin (porcine)      Intake/Output Summary (Last 24 hours) at 4/14/2022 1225  Last data filed at 4/13/2022 1720  Gross per 24 hour   Intake 640 ml   Output 1900 ml   Net -1260 ml     Exam:    BP 85/62   Pulse 82   Temp 98.2 °F (36.8 °C) (Oral)   Resp 16   Ht 6' (1.829 m)   Wt 203 lb (92.1 kg)   SpO2 97%   BMI 27.53 kg/m²     General appearance: No apparent distress, appears stated age and cooperative. HEENT: Conjunctivae/corneas clear. Neck: Trachea midline. Respiratory:  Normal respiratory effort. Clear to auscultation  Cardiovascular: Regular rate and rhythm  Abdomen: Soft, non-tender, non-distended with normal bowel sounds. Musculoskeletal: No clubbing, cyanosis or edema bilaterally.   Neuro: Non Focal.   Capillary Refill: Brisk,< 3 seconds   Peripheral Pulses: +2 palpable, equal bilaterally     Labs:   Recent Labs     04/12/22  0530   WBC 6.3   HGB 9.4*   HCT 29.6*        Recent Labs     04/12/22  0530 04/13/22  0558 04/14/22  0623    138 139   K 3.8 3.4 3.5    100 99   CO2 16* 24 25   BUN 73* 49* 37*   CREATININE 7.29* 5.78* 5.22*   CALCIUM 8.1* 8.1* 8.4*     No results for input(s): AST, ALT, BILIDIR, BILITOT, ALKPHOS in the last 72 hours. No results for input(s): INR in the last 72 hours. Recent Labs     04/11/22  1455   TROPONINI 0.051*     Urinalysis:      Lab Results   Component Value Date    NITRU Negative 01/15/2022    WBCUA 0-2 01/15/2022    BACTERIA Negative 01/15/2022    RBCUA 0-2 01/15/2022    BLOODU TRACE 01/15/2022    SPECGRAV 1.011 01/15/2022    GLUCOSEU Negative 01/15/2022     Radiology:  IR TUNNELED CVC PLACE WO SQ PORT/PUMP > 5 YEARS   Final Result      1. Successful placement of a central venous catheter with subcutaneous tunnel in the internal jugular vein for dialysis. Radiation dose to the patient was: 8.02 mGy      Additional clinical data: Acute on chronic kidney disease      Procedure:   1. Ultrasound guidance for vascular access. The ultrasound image of the blood vessel was saved to PACS. 2.   Fluoroscopic guidance for placement of a central line. 3.   Placement of a central venous line with subcutaneous tunnel using the right internal jugular vein. Body of Report: Informed and written consent was obtained from the patient following discussion of risks, benefits and alternatives to this procedure. The was patient placed supine on the angiographic table. The patient's neck and chest were then prepped and draped in    normal sterile fashion. A small amount of local lidocaine anesthesia was injected subcutaneously. Ultrasound was used to study the jugular vein we intended to use prior to accessing it. The vein was patent. The ultrasound image of the blood vessel was saved to PACS. Using ultrasound access, puncture was made of the right internal jugular vein using    a 21 GA needle. A wire was advanced into the IVC, a short incision was made at the puncture site and serial dilatation performed.  A 16 Welsh peel-away sheath was then advanced into the superior vena cava/right atrial junction. Next an incision was made at the site of the subcutaneous tunnel, approximately 4 cm caudal to the venous access site. A subcutaneous tunnel was created from the tunnel site to the venous access site in a retrograde fashion along with the 15.5 Malawian    dialysis catheter. The data Dacron cuff was inserted through the tunnel and placed subcutaneously. The catheter was then advanced through the peel-away sheath, and sheath removed. The tip of the catheter lies at the SVC/right atrial junction. The line    flushes and aspirates appropriately. The catheter lines were both flushed with 10 cc of normal saline, and then blocked with 100 units/cc heparin. The catheter was sutured to the skin with nylon suture, and sterile bandaging was placed. IR FLUORO GUIDED CVA DEVICE PLMT/REPLACE/REMOVAL   Final Result      1. Successful placement of a central venous catheter with subcutaneous tunnel in the internal jugular vein for dialysis. Radiation dose to the patient was: 8.02 mGy      Additional clinical data: Acute on chronic kidney disease      Procedure:   1. Ultrasound guidance for vascular access. The ultrasound image of the blood vessel was saved to PACS. 2.   Fluoroscopic guidance for placement of a central line. 3.   Placement of a central venous line with subcutaneous tunnel using the right internal jugular vein. Body of Report: Informed and written consent was obtained from the patient following discussion of risks, benefits and alternatives to this procedure. The was patient placed supine on the angiographic table. The patient's neck and chest were then prepped and draped in    normal sterile fashion. A small amount of local lidocaine anesthesia was injected subcutaneously. Ultrasound was used to study the jugular vein we intended to use prior to accessing it. The vein was patent.   The ultrasound image of the blood vessel was saved to PACS. Using ultrasound access, puncture was made of the right internal jugular vein using    a 21 GA needle. A wire was advanced into the IVC, a short incision was made at the puncture site and serial dilatation performed. A 16 Kyrgyz peel-away sheath was then advanced into the superior vena cava/right atrial junction. Next an incision was made at the site of the subcutaneous tunnel, approximately 4 cm caudal to the venous access site. A subcutaneous tunnel was created from the tunnel site to the venous access site in a retrograde fashion along with the 15.5 Maltese    dialysis catheter. The data Dacron cuff was inserted through the tunnel and placed subcutaneously. The catheter was then advanced through the peel-away sheath, and sheath removed. The tip of the catheter lies at the SVC/right atrial junction. The line    flushes and aspirates appropriately. The catheter lines were both flushed with 10 cc of normal saline, and then blocked with 100 units/cc heparin. The catheter was sutured to the skin with nylon suture, and sterile bandaging was placed. IR ULTRASOUND GUIDANCE VASCULAR ACCESS   Final Result      1. Successful placement of a central venous catheter with subcutaneous tunnel in the internal jugular vein for dialysis. Radiation dose to the patient was: 8.02 mGy      Additional clinical data: Acute on chronic kidney disease      Procedure:   1. Ultrasound guidance for vascular access. The ultrasound image of the blood vessel was saved to PACS. 2.   Fluoroscopic guidance for placement of a central line. 3.   Placement of a central venous line with subcutaneous tunnel using the right internal jugular vein. Body of Report: Informed and written consent was obtained from the patient following discussion of risks, benefits and alternatives to this procedure. The was patient placed supine on the angiographic table.   The patient's neck and chest were then prepped and draped in    normal sterile fashion. A small amount of local lidocaine anesthesia was injected subcutaneously. Ultrasound was used to study the jugular vein we intended to use prior to accessing it. The vein was patent. The ultrasound image of the blood vessel was saved to PACS. Using ultrasound access, puncture was made of the right internal jugular vein using    a 21 GA needle. A wire was advanced into the IVC, a short incision was made at the puncture site and serial dilatation performed. A 16 Northern Irish peel-away sheath was then advanced into the superior vena cava/right atrial junction. Next an incision was made at the site of the subcutaneous tunnel, approximately 4 cm caudal to the venous access site. A subcutaneous tunnel was created from the tunnel site to the venous access site in a retrograde fashion along with the 15.5 Gabonese    dialysis catheter. The data Dacron cuff was inserted through the tunnel and placed subcutaneously. The catheter was then advanced through the peel-away sheath, and sheath removed. The tip of the catheter lies at the SVC/right atrial junction. The line    flushes and aspirates appropriately. The catheter lines were both flushed with 10 cc of normal saline, and then blocked with 100 units/cc heparin. The catheter was sutured to the skin with nylon suture, and sterile bandaging was placed. XR CHEST PORTABLE   Final Result   BORDERLINE CARDIAC ENLARGEMENT WITH MINIMAL CENTRAL VESSEL CONGESTION. US HEMODIALYSIS BILATERAL DUPLEX STUDY    (Results Pending)     Assessment/Plan:    61-year-old male with chronic combined heart failure (EF 20%), former tobacco and alcohol use, hypertension, CKD 5 presents from home after experiencing pressure-like chest pain and having dyspnea with exertion earlier in the day.     1.   Acute decompensation of chronic combined heart failure in the setting of worsening CKD 5  - Continue dialysis; ok for discharge once diaylsis is set up     Elevated troponin suspect supply/demand mismatch - evaluated by cardiology  Hypertension - improved; meds adjusted by cardiology  History of alcohol use disorder  History of tobacco use disorder     Subcutaneous heparin prophylaxis    Active Hospital Problems    Diagnosis Date Noted    Acute on chronic combined systolic (congestive) and diastolic (congestive) heart failure ([facility] [I50.43] 04/11/2022     Additional work up or/and treatment plan may be added today or then after based on clinical progression. I am managing a portion of pt care. Some medical issues are handled by other specialists. Additional work up and treatment should be done in out pt setting by pt PCP and other out pt providers. In addition to examining and evaluating pt, I spent additional time explaining care, normal and abnormal findings, and treatment plan. All of pt questions were answered. Counseling, diet and education were  provided. Case will be discussed with nursing staff when appropriate. Family will be updated if and when appropriate. Diet: ADULT DIET; Regular;  Low Sodium (2 gm)    Code Status: Full Code    Electronically signed by Sergio Robbins MD on 4/14/2022 at 12:25 PM

## 2022-04-14 NOTE — PROGRESS NOTES
Renal Progress Note    Assessment and Plan:        46-year-old man with CKD stage V proteinuric with negative serologic work-up. Has congestive heart failure with an EF of 20% with worsening kidney function. Also with hypertensive kidney disease as well. Slight anemia. With his GFR as low as it is now and shortness of breath he will need to start dialysis. Went over this with him in detail and he is agreeable. Explained that this would require having PermCath placed first and then would start dialysis. I also discussed home dialysis with him but he does not think this is an option right now. Will ask for social work to set up at the nearest [facility] unit aware he lives and will be followed by Dr. Tim Byrd there. We will add renal vitamin. Cardiology added entresto. bp was low. Hydralazine dc'd. Had hd Tuesday and wednesday    - set up outpt hd  - decrease coreg  - next hd tomorrow. If dc is set up and he is tthsa, would be ok with d/c today  - d/c nahco3      Patient Active Problem List:     Hypertensive urgency     Cardiomyopathy ([address]     Systolic and diastolic CHF, acute (HCC)     BLUE (acute kidney injury) ([address]     Abnormal EKG     Chronic combined systolic and diastolic CHF (congestive heart failure) ([address]     Acute decompensated heart failure ([address]     Chest pain     Pulmonary edema, acute ([address]     NSTEMI (non-ST elevated myocardial infarction) ([address]     Inguinal hernia of right side without obstruction or gangrene     CHF (congestive heart failure), NYHA class I, acute on chronic, combined (HCC)     Systolic congestive heart failure (HCC)     Acute systolic heart failure ([address]     Acute on chronic combined systolic (congestive) and diastolic (congestive) heart failure ([address]      Subjective:   Admit Date: 4/11/2022    Interval History: pt to start hd. Feels ok. No cp. No sob.   No fevers      Medications:   Scheduled Meds:   furosemide  80 mg Oral Daily    carvedilol  12.5 mg Oral BID WC  sacubitril-valsartan  1 tablet Oral BID    ivabradine  5 mg Oral BID WC    sodium chloride flush  5-40 mL IntraVENous 2 times per day    [Held by provider] heparin (porcine)  5,000 Units SubCUTAneous 3 times per day    sodium bicarbonate  650 mg Oral TID    b complex-C-folic acid  1 capsule Oral Daily     Continuous Infusions:   sodium chloride         CBC:   Recent Labs     04/12/22  0530   WBC 6.3   HGB 9.4*        CMP:    Recent Labs     04/12/22  0530 04/13/22  0558 04/14/22  0623    138 139   K 3.8 3.4 3.5    100 99   CO2 16* 24 25   BUN 73* 49* 37*   CREATININE 7.29* 5.78* 5.22*   GLUCOSE 100* 95 88   CALCIUM 8.1* 8.1* 8.4*   LABGLOM 8.0* 10.4* 11.8*     Troponin:   Recent Labs     04/11/22  1455   TROPONINI 0.051*     BNP: No results for input(s): BNP in the last 72 hours. INR:   Recent Labs     04/11/22  1112   INR 1.1     Lipids: No results for input(s): CHOL, LDLDIRECT, TRIG, HDL, AMYLASE, LIPASE in the last 72 hours. Liver:   No results for input(s): AST, ALT, ALKPHOS, PROT, LABALBU, BILITOT in the last 72 hours. Invalid input(s): BILDIR  Iron:  No results for input(s): IRONS, FERRITIN in the last 72 hours. Invalid input(s): LABIRONS  Urinalysis: No results for input(s): UA in the last 72 hours. Objective:   Vitals: BP 92/77   Pulse 91   Temp 98.2 °F (36.8 °C) (Oral)   Resp 16   Ht 6' (1.829 m)   Wt 203 lb (92.1 kg)   SpO2 97%   BMI 27.53 kg/m²    Wt Readings from Last 3 Encounters:   04/14/22 203 lb (92.1 kg)   03/30/22 209 lb (94.8 kg)   01/22/22 203 lb 0.7 oz (92.1 kg)      24HR INTAKE/OUTPUT:      Intake/Output Summary (Last 24 hours) at 4/14/2022 0835  Last data filed at 4/13/2022 1720  Gross per 24 hour   Intake 640 ml   Output 1900 ml   Net -1260 ml       Constitutional:  Alert, awake, no apparent distress   Skin:normal, no rash  HEENT:sclera anicteric.   Head atraumatic normocephalic  Neck:supple with no thyromegally  Cardiovascular:  S1, S2 without m/r/g   Respiratory:  CTA B without w/r/r   Abdomen: +bs, soft, nt  Ext: no LE edema  Musculoskeletal:Intact  Neuro:Alert and oriented with no deficit      Electronically signed by Suri Brooks MD on 4/14/2022 at 8:35 AM

## 2022-04-14 NOTE — PROGRESS NOTES
Progress Note  Patient: Cielo Aponte  Unit/Bed: T481/B778-69  YOB: 1970  MRN: [id number]  Acct: [de-identified]   Admitting Diagnosis: Shortness of breath [R06.02]  Acute on chronic combined systolic (congestive) and diastolic (congestive) heart failure (HCC) [I50.43]  Acute decompensated heart failure ([address] [I50.9]  Chest pain, unspecified type [R07.9]  Admit Date:  2022  Hospital Day: 3    Chief Complaint: CP    Histories:  Past Medical History:   Diagnosis Date    Abnormal EKG 2019    Acute kidney injury (BLUE) with acute tubular necrosis (ATN) ([address]     AICD (automatic cardioverter/defibrillator) present     Cardiomyopathy ([address]     per echo 2019    Chronic combined systolic and diastolic CHF (congestive heart failure) ([address] 2019    ETOH abuse     Hypertension     Tobacco abuse      Past Surgical History:   Procedure Laterality Date    CARDIAC PACEMAKER PLACEMENT      DIALYSIS CATHETER INSERTION Right 2022    15.5 F x 19 cm SYMETREX LONG TERM HEMODIALYSIS CATHETER    HERNIA REPAIR Right 02/10/2021    RIGHT INGUINAL HERNIA REPAIR WITH MESH performed by Alek Block MD at [address] History   Problem Relation Age of Onset    Coronary Art Dis Mother      Social History     Socioeconomic History    Marital status: Single     Spouse name: None    Number of children: None    Years of education: None    Highest education level: None   Occupational History    None   Tobacco Use    Smoking status: Current Every Day Smoker     Packs/day: 1.00     Types: Cigarettes     Last attempt to quit: 12/3/2020     Years since quittin.3    Smokeless tobacco: Never Used    Tobacco comment: currently smoking only couple cigarettes daily   Substance and Sexual Activity    Alcohol use:  Yes    Drug use: Never    Sexual activity: None   Other Topics Concern    None   Social History Narrative    None     Social Determinants of Health     Financial Resource Strain: Low Risk     Difficulty of Paying Living Expenses: Not hard at all   Food Insecurity: No Food Insecurity    Worried About Running Out of Food in the Last Year: Never true    Ginette of Food in the Last Year: Never true   Transportation Needs:     Lack of Transportation (Medical): Not on file    Lack of Transportation (Non-Medical): Not on file   Physical Activity:     Days of Exercise per Week: Not on file    Minutes of Exercise per Session: Not on file   Stress:     Feeling of Stress : Not on file   Social Connections:     Frequency of Communication with Friends and Family: Not on file    Frequency of Social Gatherings with Friends and Family: Not on file    Attends Roman [employer] Services: Not on file    Active Member of [address] [place] [address] [employer] or Organizations: Not on file    Attends Club or Organization Meetings: Not on file    Marital Status: Not on file   Intimate Partner Violence:     Fear of Current or Ex-Partner: Not on file    Emotionally Abused: Not on file    Physically Abused: Not on file    Sexually Abused: Not on file   Housing Stability:     Unable to Pay for Housing in the Last Year: Not on file    Number of [place] in the Last Year: Not on file    Unstable Housing in the Last Year: Not on file       Subjective/HPI   Had 2nd HD yesterday. Did well feels better. No cp  Still makes urine. BP dropped significantly after HD. EKG: SR 80-90        Review of Systems:   Review of Systems   Constitutional: Negative. Negative for diaphoresis and fatigue. HENT: Negative. Eyes: Negative. Respiratory: Negative for cough, chest tightness, wheezing and stridor. Cardiovascular: Negative. Negative for chest pain, palpitations and leg swelling. Gastrointestinal: Negative. Negative for blood in stool and nausea. Genitourinary: Negative. Musculoskeletal: Negative. Skin: Negative. Neurological: Negative. Negative for dizziness, syncope, weakness and light-headedness. Hematological: Negative. Psychiatric/Behavioral: Negative. Physical Examination:    BP 96/69   Pulse 91   Temp 98.1 °F (36.7 °C)   Resp 18   Ht 6' (1.829 m)   Wt 203 lb (92.1 kg)   SpO2 100%   BMI 27.53 kg/m²    Physical Exam   Constitutional: He appears healthy. No distress. HENT:   Normal cephalic and Atraumatic   Eyes: Pupils are equal, round, and reactive to light. Neck: Thyroid normal. No JVD present. No neck adenopathy. No thyromegaly present. Cardiovascular: Normal rate, regular rhythm, normal heart sounds, intact distal pulses and normal pulses. Pulmonary/Chest: Effort normal and breath sounds normal. He has no wheezes. He has no rales. He exhibits no tenderness. Abdominal: Soft. Bowel sounds are normal. There is no abdominal tenderness. Musculoskeletal:         General: No tenderness or edema. Normal range of motion. Cervical back: Normal range of motion and neck supple. Neurological: He is alert and oriented to person, place, and time. Skin: Skin is warm. No cyanosis. Nails show no clubbing.        LABS:  CBC:   Lab Results   Component Value Date    WBC 6.3 04/12/2022    RBC 4.20 04/12/2022    HGB 9.4 04/12/2022    HCT 29.6 04/12/2022    MCV 70.5 04/12/2022    MCH 22.3 04/12/2022    MCHC 31.6 04/12/2022    RDW 21.4 04/12/2022     04/12/2022     CBC with Differential:    Lab Results   Component Value Date    WBC 6.3 04/12/2022    RBC 4.20 04/12/2022    HGB 9.4 04/12/2022    HCT 29.6 04/12/2022     04/12/2022    MCV 70.5 04/12/2022    MCH 22.3 04/12/2022    MCHC 31.6 04/12/2022    RDW 21.4 04/12/2022    LYMPHOPCT 7.3 04/12/2022    MONOPCT 11.8 04/12/2022    BASOPCT 0.5 04/12/2022    MONOSABS 0.7 04/12/2022    LYMPHSABS 0.5 04/12/2022    EOSABS 0.2 04/12/2022    BASOSABS 0.0 04/12/2022     CMP:    Lab Results   Component Value Date     04/14/2022    K 3.5 04/14/2022    K 4.0 01/22/2022    CL 99 04/14/2022    CO2 25 04/14/2022    BUN 37 04/14/2022 CREATININE 5.22 04/14/2022    GFRAA 14.2 04/14/2022    LABGLOM 11.8 04/14/2022    GLUCOSE 88 04/14/2022    PROT 6.2 04/11/2022    LABALBU 3.3 04/11/2022    CALCIUM 8.4 04/14/2022    BILITOT 0.5 04/11/2022    ALKPHOS 111 04/11/2022    AST 16 04/11/2022    ALT 14 04/11/2022     BMP:    Lab Results   Component Value Date     04/14/2022    K 3.5 04/14/2022    K 4.0 01/22/2022    CL 99 04/14/2022    CO2 25 04/14/2022    BUN 37 04/14/2022    LABALBU 3.3 04/11/2022    CREATININE 5.22 04/14/2022    CALCIUM 8.4 04/14/2022    GFRAA 14.2 04/14/2022    LABGLOM 11.8 04/14/2022    GLUCOSE 88 04/14/2022     Magnesium:    Lab Results   Component Value Date    MG 2.3 04/14/2022     Troponin:    Lab Results   Component Value Date    TROPONINI 0.051 04/11/2022        Active Hospital Problems    Diagnosis Date Noted    Acute on chronic combined systolic (congestive) and diastolic (congestive) heart failure ([facility] [address] [I50.43] 04/11/2022     Priority: Low        Assessment/Plan:  1. CP- Noncardiac  2. Chronic combined HF- LVEF 20%. compensated. Added Entresto 3 days ago. Continue BB and Lasix as he is still making urine. 3. BP- low due to HD and pt likely noncompliant with meds at home. Dc Imdur and Hydralazine. 4. CKD- HD again today  5. No CAD  6. NICM s/p ICD. 7. Discussed with care Coordinator- pt probably will dc tomorrow due to out pt HD issues.           Electronically signed by Nnamdi Wilkerson MD on 4/14/2022 at 7:38 AM

## 2022-04-14 NOTE — TELEPHONE ENCOUNTER
FAXED PROCEDURE INSTRUCTIONS TO [facility]   >  YOUR PROCEDURE IS SCHEDULED ON: 5/16/2022 @ 9:00 am  >  You will need to arrive at 8:00 am from home and check in at the [facility] Check In desk.   >  Do not eat or drink after midnight.    >  Make arrangements for transportation, as you should not drive immediately after.

## 2022-04-15 VITALS
BODY MASS INDEX: 27.63 KG/M2 | DIASTOLIC BLOOD PRESSURE: 74 MMHG | RESPIRATION RATE: 16 BRPM | TEMPERATURE: 97.9 F | HEIGHT: 72 IN | HEART RATE: 67 BPM | OXYGEN SATURATION: 98 % | SYSTOLIC BLOOD PRESSURE: 118 MMHG | WEIGHT: 204 LBS

## 2022-04-15 LAB
ANION GAP SERPL CALCULATED.3IONS-SCNC: 17 MEQ/L (ref 9–15)
BUN BLDV-MCNC: 45 MG/DL (ref 6–20)
CALCIUM SERPL-MCNC: 8.7 MG/DL (ref 8.5–9.9)
CHLORIDE BLD-SCNC: 100 MEQ/L (ref 95–107)
CO2: 23 MEQ/L (ref 20–31)
CREAT SERPL-MCNC: 6.14 MG/DL (ref 0.7–1.2)
GFR AFRICAN AMERICAN: 11.8
GFR NON-AFRICAN AMERICAN: 9.7
GLUCOSE BLD-MCNC: 89 MG/DL (ref 70–99)
MAGNESIUM: 2.5 MG/DL (ref 1.7–2.4)
POTASSIUM SERPL-SCNC: 3.5 MEQ/L (ref 3.4–4.9)
SODIUM BLD-SCNC: 140 MEQ/L (ref 135–144)

## 2022-04-15 PROCEDURE — 99233 SBSQ HOSP IP/OBS HIGH 50: CPT | Performed by: INTERNAL MEDICINE

## 2022-04-15 PROCEDURE — 90935 HEMODIALYSIS ONE EVALUATION: CPT

## 2022-04-15 PROCEDURE — 36415 COLL VENOUS BLD VENIPUNCTURE: CPT

## 2022-04-15 PROCEDURE — 83735 ASSAY OF MAGNESIUM: CPT

## 2022-04-15 PROCEDURE — 80048 BASIC METABOLIC PNL TOTAL CA: CPT

## 2022-04-15 RX ORDER — TORSEMIDE 20 MG/1
20 TABLET ORAL 2 TIMES DAILY
Qty: 30 TABLET | Refills: 3 | Status: SHIPPED | OUTPATIENT
Start: 2022-04-15 | End: 2022-07-08

## 2022-04-15 ASSESSMENT — ENCOUNTER SYMPTOMS
BLOOD IN STOOL: 0
EYES NEGATIVE: 1
NAUSEA: 0
STRIDOR: 0
GASTROINTESTINAL NEGATIVE: 1
WHEEZING: 0
CHEST TIGHTNESS: 0
COUGH: 0

## 2022-04-15 ASSESSMENT — PAIN SCALES - GENERAL
PAINLEVEL_OUTOF10: 0
PAINLEVEL_OUTOF10: 0

## 2022-04-15 NOTE — CARE COORDINATION
Voicemail left for Nani at [facility] ([phone] ext. [phone]) to schedule dialysis. Hepatitis panel was also faxed to [phone].

## 2022-04-15 NOTE — DISCHARGE SUMMARY
Hospital Medicine Discharge Summary    Simona Moreno  :  1970  MRN:  [id number]    Admit date:  2022  Discharge date:  4/15/2022    Admitting Physician:  Rudy Xiao DO  Primary Care Physician:  Pb Harrison MD    Discharge Diagnoses:    Acute decompensation of chronic combined heart failure in the setting of worsening CKD 5    Chief Complaint   Patient presents with    Chest Pain     Hospital Course:   72-year-old male with chronic combined heart failure (EF 20%), former tobacco and alcohol use, hypertension, CKD 5 presents from home after experiencing pressure-like chest pain and having dyspnea with exertion earlier in the day. He was found to have Acute decompensation of chronic combined heart failure in the setting of worsening CKD 5. He was evaluated by cardiology and nephrology; dialysis catheter was placed and the patient was started on HD with improvement. Exam on discharge:   /74   Pulse 67   Temp 97.9 °F (36.6 °C)   Resp 16   Ht 6' (1.829 m)   Wt 204 lb (92.5 kg)   SpO2 98%   BMI 27.67 kg/m²   General appearance: No apparent distress, appears stated age and cooperative. HEENT: Conjunctivae/corneas clear. Neck: Trachea midline. Respiratory:  Normal respiratory effort. Clear to auscultation  Cardiovascular: Regular rate and rhythm  Abdomen: Soft, non-tender, non-distended with normal bowel sounds. Musculoskeletal: No clubbing, cyanosis or edema bilaterally.   Neuro: Non Focal.   Capillary Refill: Brisk,< 3 seconds   Peripheral Pulses: +2 palpable, equal bilaterally     Patient was seen by the following consultants   Consults:  IP CONSULT TO HEART FAILURE NURSE/COORDINATOR  IP CONSULT TO DIETITIAN  IP CONSULT TO CARDIOLOGY  IP CONSULT TO NEPHROLOGY  IP CONSULT TO INTERVENTIONAL RADIOLOGY  IP CONSULT TO SOCIAL WORK    Significant Diagnostic Studies:    Refer to chart     Please refer to chart if no studies are shown here    IR FLUORO GUIDED CVA DEVICE PLMT/REPLACE/REMOVAL    Result Date: 4/14/2022  1. Successful placement of a central venous catheter with subcutaneous tunnel in the internal jugular vein for dialysis. Radiation dose to the patient was: 8.02 mGy Additional clinical data: Acute on chronic kidney disease Procedure: 1. Ultrasound guidance for vascular access. The ultrasound image of the blood vessel was saved to PACS. 2.   Fluoroscopic guidance for placement of a central line. 3.   Placement of a central venous line with subcutaneous tunnel using the right internal jugular vein. Body of Report: Informed and written consent was obtained from the patient following discussion of risks, benefits and alternatives to this procedure. The was patient placed supine on the angiographic table. The patient's neck and chest were then prepped and draped in  normal sterile fashion. A small amount of local lidocaine anesthesia was injected subcutaneously. Ultrasound was used to study the jugular vein we intended to use prior to accessing it. The vein was patent. The ultrasound image of the blood vessel was saved to PACS. Using ultrasound access, puncture was made of the right internal jugular vein using  a 21 GA needle. A wire was advanced into the IVC, a short incision was made at the puncture site and serial dilatation performed. A 16 Syriac peel-away sheath was then advanced into the superior vena cava/right atrial junction. Next an incision was made at the site of the subcutaneous tunnel, approximately 4 cm caudal to the venous access site. A subcutaneous tunnel was created from the tunnel site to the venous access site in a retrograde fashion along with the 15.5 Mauritian dialysis catheter. The data Dacron cuff was inserted through the tunnel and placed subcutaneously. The catheter was then advanced through the peel-away sheath, and sheath removed. The tip of the catheter lies at the SVC/right atrial junction.   The line flushes and aspirates appropriately. The catheter lines were both flushed with 10 cc of normal saline, and then blocked with 100 units/cc heparin. The catheter was sutured to the skin with nylon suture, and sterile bandaging was placed. IR TUNNELED CVC PLACE WO SQ PORT/PUMP > 5 YEARS    Result Date: 4/14/2022  1. Successful placement of a central venous catheter with subcutaneous tunnel in the internal jugular vein for dialysis. Radiation dose to the patient was: 8.02 mGy Additional clinical data: Acute on chronic kidney disease Procedure: 1. Ultrasound guidance for vascular access. The ultrasound image of the blood vessel was saved to PACS. 2.   Fluoroscopic guidance for placement of a central line. 3.   Placement of a central venous line with subcutaneous tunnel using the right internal jugular vein. Body of Report: Informed and written consent was obtained from the patient following discussion of risks, benefits and alternatives to this procedure. The was patient placed supine on the angiographic table. The patient's neck and chest were then prepped and draped in  normal sterile fashion. A small amount of local lidocaine anesthesia was injected subcutaneously. Ultrasound was used to study the jugular vein we intended to use prior to accessing it. The vein was patent. The ultrasound image of the blood vessel was saved to PACS. Using ultrasound access, puncture was made of the right internal jugular vein using  a 21 GA needle. A wire was advanced into the IVC, a short incision was made at the puncture site and serial dilatation performed. A 16 Sinhala peel-away sheath was then advanced into the superior vena cava/right atrial junction. Next an incision was made at the site of the subcutaneous tunnel, approximately 4 cm caudal to the venous access site. A subcutaneous tunnel was created from the tunnel site to the venous access site in a retrograde fashion along with the 15.5 Taiwanese dialysis catheter.  The data Dacron cuff was inserted through the tunnel and placed subcutaneously. The catheter was then advanced through the peel-away sheath, and sheath removed. The tip of the catheter lies at the SVC/right atrial junction. The line flushes and aspirates appropriately. The catheter lines were both flushed with 10 cc of normal saline, and then blocked with 100 units/cc heparin. The catheter was sutured to the skin with nylon suture, and sterile bandaging was placed. IR ULTRASOUND GUIDANCE VASCULAR ACCESS    Result Date: 4/14/2022  1. Successful placement of a central venous catheter with subcutaneous tunnel in the internal jugular vein for dialysis. Radiation dose to the patient was: 8.02 mGy Additional clinical data: Acute on chronic kidney disease Procedure: 1. Ultrasound guidance for vascular access. The ultrasound image of the blood vessel was saved to PACS. 2.   Fluoroscopic guidance for placement of a central line. 3.   Placement of a central venous line with subcutaneous tunnel using the right internal jugular vein. Body of Report: Informed and written consent was obtained from the patient following discussion of risks, benefits and alternatives to this procedure. The was patient placed supine on the angiographic table. The patient's neck and chest were then prepped and draped in  normal sterile fashion. A small amount of local lidocaine anesthesia was injected subcutaneously. Ultrasound was used to study the jugular vein we intended to use prior to accessing it. The vein was patent. The ultrasound image of the blood vessel was saved to PACS. Using ultrasound access, puncture was made of the right internal jugular vein using  a 21 GA needle. A wire was advanced into the IVC, a short incision was made at the puncture site and serial dilatation performed. A 16 Uruguayan peel-away sheath was then advanced into the superior vena cava/right atrial junction.  Next an incision was made at the site of the subcutaneous tunnel, approximately 4 cm caudal to the venous access site. A subcutaneous tunnel was created from the tunnel site to the venous access site in a retrograde fashion along with the 15.5 Danish dialysis catheter. The data Dacron cuff was inserted through the tunnel and placed subcutaneously. The catheter was then advanced through the peel-away sheath, and sheath removed. The tip of the catheter lies at the SVC/right atrial junction. The line flushes and aspirates appropriately. The catheter lines were both flushed with 10 cc of normal saline, and then blocked with 100 units/cc heparin. The catheter was sutured to the skin with nylon suture, and sterile bandaging was placed. Discharge Medications:         Medication List      START taking these medications    sacubitril-valsartan 24-26 MG per tablet  Commonly known as: ENTRESTO  Take 1 tablet by mouth 2 times daily        CONTINUE taking these medications    carvedilol 25 MG tablet  Commonly known as: COREG  take 1 tablet by mouth twice a day HOLD FOR SBP<100 OR HR <60     Corlanor 5 MG Tabs tablet  Generic drug: ivabradine  Take 1 tablet by mouth 2 times daily (with meals)     hydrALAZINE 100 MG tablet  Commonly known as: APRESOLINE  Take 1 tablet by mouth 3 times daily     isosorbide mononitrate 30 MG extended release tablet  Commonly known as: IMDUR  Take 1 tablet by mouth daily     * torsemide 20 MG tablet  Commonly known as: DEMADEX     * torsemide 20 MG tablet  Commonly known as: DEMADEX  Take 2 tablets by mouth daily         * This list has 2 medication(s) that are the same as other medications prescribed for you. Read the directions carefully, and ask your doctor or other care provider to review them with you.             STOP taking these medications    nicotine 21 MG/24HR  Commonly known as: Annalisa Page           Where to Get Your Medications      You can get these medications from any pharmacy    Bring a paper prescription for each of these medications  · sacubitril-valsartan 24-26 MG per tablet         Disposition:   If discharged to Home, Any [place] [address] needs that were indicated and/or required as been addressed and set up by Social Work. Condition at discharge: good     Activity: activity as tolerated    Total time taken for discharging this patient: 40 minutes. Greater than 70% of time was spent focused exclusively on this patient. Time was taken to review chart, discuss plans with consultants, reconciling medications, discussing plan answering questions with patient.      Signed:  Cece Mckee MD  4/15/2022, 12:58 PM  ----------------------------------------------------------------------------------------------------------------------    Santiago Le

## 2022-04-15 NOTE — CARE COORDINATION
Received fax from [address] at [facility] containing patient's dialysis schedule. Patient will be MWF at 5:30 PM with his first treatment on 4/20/2022. Patient is scheduled at [facility] location ([address] [facility]).

## 2022-04-15 NOTE — PROGRESS NOTES
Progress Note  Patient: Nancy Loza  Unit/Bed: Z240/A584-63  YOB: 1970  MRN: [id number]  Acct: [de-identified]   Admitting Diagnosis: Shortness of breath [R06.02]  Acute on chronic combined systolic (congestive) and diastolic (congestive) heart failure (HCC) [I50.43]  Acute decompensated heart failure ([address] [I50.9]  Chest pain, unspecified type [R07.9]  Admit Date:  2022  Hospital Day: 4    Chief Complaint: CP    Subjective: The patient would like to go home today. Normal sinus rhythm on telemetry. Status post hemodialysis.     Histories:  Past Medical History:   Diagnosis Date    Abnormal EKG 2019    Acute kidney injury (BLUE) with acute tubular necrosis (ATN) ([facility]     AICD (automatic cardioverter/defibrillator) present     Cardiomyopathy ([address]     per echo 2019    Chronic combined systolic and diastolic CHF (congestive heart failure) ([address] 2019    ETOH abuse     Hypertension     Tobacco abuse      Past Surgical History:   Procedure Laterality Date    CARDIAC PACEMAKER PLACEMENT      DIALYSIS CATHETER INSERTION Right 2022    15.5 F x 19 cm SYMETREX LONG TERM HEMODIALYSIS CATHETER    HERNIA REPAIR Right 02/10/2021    RIGHT INGUINAL HERNIA REPAIR WITH MESH performed by Galileo Santos MD at [address] [place] [address] IR TUNNELED CATHETER PLACEMENT GREATER THAN 5 YEARS  2022    IR TUNNELED CATHETER PLACEMENT GREATER THAN 5 YEARS 2022 [facility] SPECIAL PROCEDURE     Family History   Problem Relation Age of Onset    Coronary Art Dis Mother      Social History     Socioeconomic History    Marital status: Single     Spouse name: None    Number of children: None    Years of education: None    Highest education level: None   Occupational History    None   Tobacco Use    Smoking status: Current Every Day Smoker     Packs/day: 1.00     Types: Cigarettes     Last attempt to quit: 12/3/2020     Years since quittin.3    Smokeless tobacco: Never Used    Tobacco comment: currently smoking only couple cigarettes daily   Substance and Sexual Activity    Alcohol use: Yes    Drug use: Never    Sexual activity: None   Other Topics Concern    None   Social History Narrative    None     Social Determinants of Health     Financial Resource Strain: Low Risk     Difficulty of Paying Living Expenses: Not hard at all   Food Insecurity: No Food Insecurity    Worried About Running Out of Food in the Last Year: Never true    Ginette of Food in the Last Year: Never true   Transportation Needs:     Lack of Transportation (Medical): Not on file    Lack of Transportation (Non-Medical): Not on file   Physical Activity:     Days of Exercise per Week: Not on file    Minutes of Exercise per Session: Not on file   Stress:     Feeling of Stress : Not on file   Social Connections:     Frequency of Communication with Friends and Family: Not on file    Frequency of Social Gatherings with Friends and Family: Not on file    Attends [employer] Services: Not on file    Active Member of [address] [place] [address] or Organizations: Not on file    Attends Club or Organization Meetings: Not on file    Marital Status: Not on file   Intimate Partner Violence:     Fear of Current or Ex-Partner: Not on file    Emotionally Abused: Not on file    Physically Abused: Not on file    Sexually Abused: Not on file   Housing Stability:     Unable to Pay for Housing in the Last Year: Not on file    Number of [place] in the Last Year: Not on file    Unstable Housing in the Last Year: Not on file           Review of Systems:   Review of Systems   Constitutional: Negative. Negative for diaphoresis and fatigue. HENT: Negative. Eyes: Negative. Respiratory: Negative for cough, chest tightness, wheezing and stridor. Cardiovascular: Negative. Negative for chest pain, palpitations and leg swelling. Gastrointestinal: Negative. Negative for blood in stool and nausea. Genitourinary: Negative.     Musculoskeletal: Negative. Skin: Negative. Neurological: Negative. Negative for dizziness, syncope, weakness and light-headedness. Hematological: Negative. Psychiatric/Behavioral: Negative. Physical Examination:    /74   Pulse 67   Temp 97.9 °F (36.6 °C)   Resp 16   Ht 6' (1.829 m)   Wt 204 lb (92.5 kg)   SpO2 98%   BMI 27.67 kg/m²    Physical Exam   Constitutional: He appears healthy. No distress. HENT:   Normal cephalic and Atraumatic   Eyes: Pupils are equal, round, and reactive to light. Neck: Thyroid normal. No JVD present. No neck adenopathy. No thyromegaly present. Cardiovascular: Normal rate, regular rhythm, normal heart sounds, intact distal pulses and normal pulses. Pulmonary/Chest: Effort normal and breath sounds normal. He has no wheezes. He has no rales. He exhibits no tenderness. Abdominal: Soft. Bowel sounds are normal. There is no abdominal tenderness. Musculoskeletal:         General: No tenderness or edema. Normal range of motion. Cervical back: Normal range of motion and neck supple. Neurological: He is alert and oriented to person, place, and time. Skin: Skin is warm. No cyanosis. Nails show no clubbing.        LABS:  CBC:   Lab Results   Component Value Date    WBC 6.3 04/12/2022    RBC 4.20 04/12/2022    HGB 9.4 04/12/2022    HCT 29.6 04/12/2022    MCV 70.5 04/12/2022    MCH 22.3 04/12/2022    MCHC 31.6 04/12/2022    RDW 21.4 04/12/2022     04/12/2022     CBC with Differential:    Lab Results   Component Value Date    WBC 6.3 04/12/2022    RBC 4.20 04/12/2022    HGB 9.4 04/12/2022    HCT 29.6 04/12/2022     04/12/2022    MCV 70.5 04/12/2022    MCH 22.3 04/12/2022    MCHC 31.6 04/12/2022    RDW 21.4 04/12/2022    LYMPHOPCT 7.3 04/12/2022    MONOPCT 11.8 04/12/2022    BASOPCT 0.5 04/12/2022    MONOSABS 0.7 04/12/2022    LYMPHSABS 0.5 04/12/2022    EOSABS 0.2 04/12/2022    BASOSABS 0.0 04/12/2022     CMP:    Lab Results   Component Value Date  04/15/2022    K 3.5 04/15/2022    K 4.0 01/22/2022     04/15/2022    CO2 23 04/15/2022    BUN 45 04/15/2022    CREATININE 6.14 04/15/2022    GFRAA 11.8 04/15/2022    LABGLOM 9.7 04/15/2022    GLUCOSE 89 04/15/2022    PROT 6.2 04/11/2022    LABALBU 3.3 04/11/2022    CALCIUM 8.7 04/15/2022    BILITOT 0.5 04/11/2022    ALKPHOS 111 04/11/2022    AST 16 04/11/2022    ALT 14 04/11/2022     BMP:    Lab Results   Component Value Date     04/15/2022    K 3.5 04/15/2022    K 4.0 01/22/2022     04/15/2022    CO2 23 04/15/2022    BUN 45 04/15/2022    LABALBU 3.3 04/11/2022    CREATININE 6.14 04/15/2022    CALCIUM 8.7 04/15/2022    GFRAA 11.8 04/15/2022    LABGLOM 9.7 04/15/2022    GLUCOSE 89 04/15/2022     Magnesium:    Lab Results   Component Value Date    MG 2.5 04/15/2022     Troponin:    Lab Results   Component Value Date    TROPONINI 0.051 04/11/2022        Active Hospital Problems    Diagnosis Date Noted    Acute on chronic combined systolic (congestive) and diastolic (congestive) heart failure ([facility] [I50.43] 04/11/2022     Priority: Low        Assessment/Plan:  1. CP- Noncardiac  2. Chronic combined HF- LVEF 20%. compensated. On Entresto. Continue BB and Lasix as he is still making urine. 3. BP- low due to HD and pt likely noncompliant with meds at home. Imdur and Hydralazine discontinued. 4. CKD-end-stage renal disease hemodialysis dependent  5. No CAD  6. NICM s/p ICD.    7. Stable for discharge from cardiology standpoint         Electronically signed by Janet Rees DO on 4/15/2022 at 3:32 PM

## 2022-04-15 NOTE — PROGRESS NOTES
Nephrology Progress Note    Assessment:  ESRDX  OHD systolic /diastolic HF  Anemia        Plan: dialysis today  Following discharge soon    Patient Active Problem List:     Hypertensive urgency     Cardiomyopathy ([facility]     Systolic and diastolic CHF, acute ([facility])     BLUE (acute kidney injury) ([facility]     Abnormal EKG     Chronic combined systolic and diastolic CHF (congestive heart failure) ([facility]     Acute decompensated heart failure (HCC)     Chest pain     Pulmonary edema, acute ([facility])     NSTEMI (non-ST elevated myocardial infarction) ([facility]     Inguinal hernia of right side without obstruction or gangrene     CHF (congestive heart failure), NYHA class I, acute on chronic, combined (HCC)     Systolic congestive heart failure (HCC)     Acute systolic heart failure (HCC)     Acute on chronic combined systolic (congestive) and diastolic (congestive) heart failure (HCC)      Subjective:  Admit Date: 4/11/2022    Interval History: no issues    Medications:  Scheduled Meds:   carvedilol  12.5 mg Oral BID WC    sacubitril-valsartan  1 tablet Oral BID    ivabradine  5 mg Oral BID WC    sodium chloride flush  5-40 mL IntraVENous 2 times per day    [Held by provider] heparin (porcine)  5,000 Units SubCUTAneous 3 times per day    b complex-C-folic acid  1 capsule Oral Daily     Continuous Infusions:   sodium chloride         CBC: No results for input(s): WBC, HGB, PLT in the last 72 hours. CMP:    Recent Labs     04/13/22  0558 04/14/22  0623 04/15/22  0553    139 140   K 3.4 3.5 3.5    99 100   CO2 24 25 23   BUN 49* 37* 45*   CREATININE 5.78* 5.22* 6.14*   GLUCOSE 95 88 89   CALCIUM 8.1* 8.4* 8.7   LABGLOM 10.4* 11.8* 9.7*     Troponin: No results for input(s): TROPONINI in the last 72 hours. BNP: No results for input(s): BNP in the last 72 hours. INR: No results for input(s): INR in the last 72 hours.   Lipids: No results for input(s): CHOL, LDLDIRECT, TRIG, HDL, AMYLASE, LIPASE in the last 72 hours.  Liver: No results for input(s): AST, ALT, ALKPHOS, PROT, LABALBU, BILITOT in the last 72 hours. Invalid input(s): BILDIR  Iron:  No results for input(s): IRONS, FERRITIN in the last 72 hours. Invalid input(s): LABIRONS  Urinalysis: No results for input(s): UA in the last 72 hours.     Objective:  Vitals: /77   Pulse 88   Temp 98.1 °F (36.7 °C)   Resp 16   Ht 6' (1.829 m)   Wt 204 lb (92.5 kg)   SpO2 100%   BMI 27.67 kg/m²    Wt Readings from Last 3 Encounters:   04/15/22 204 lb (92.5 kg)   03/30/22 209 lb (94.8 kg)   01/22/22 203 lb 0.7 oz (92.1 kg)      24HR INTAKE/OUTPUT:      Intake/Output Summary (Last 24 hours) at 4/15/2022 2417  Last data filed at 4/15/2022 0631  Gross per 24 hour   Intake 580 ml   Output 1000 ml   Net -420 ml       General: alert, in no apparent distress  HEENT: normocephalic, atraumatic, anicteric  Neck: supple, no mass  Lungs: non-labored respirations, clear to auscultation bilaterally  Heart: regular rate and rhythm, no murmurs or rubs  Abdomen: soft, non-tender, non-distended  Ext: no cyanosis, no peripheral edema  Neuro: alert and oriented, no gross abnormalities  Psych: normal mood and affect  Skin: no rash      Electronically signed by Ferd Bosworth, DO, MD

## 2022-04-19 ENCOUNTER — OFFICE VISIT (OUTPATIENT)
Dept: FAMILY MEDICINE CLINIC | Age: 52
End: 2022-04-19
Payer: MEDICAID

## 2022-04-19 VITALS
TEMPERATURE: 97.7 F | HEART RATE: 91 BPM | SYSTOLIC BLOOD PRESSURE: 118 MMHG | OXYGEN SATURATION: 99 % | WEIGHT: 207 LBS | DIASTOLIC BLOOD PRESSURE: 80 MMHG | HEIGHT: 72 IN | BODY MASS INDEX: 28.04 KG/M2

## 2022-04-19 DIAGNOSIS — N17.9 AKI (ACUTE KIDNEY INJURY) (HCC): ICD-10-CM

## 2022-04-19 DIAGNOSIS — I50.43 CHF (CONGESTIVE HEART FAILURE), NYHA CLASS I, ACUTE ON CHRONIC, COMBINED (HCC): Primary | ICD-10-CM

## 2022-04-19 DIAGNOSIS — Z09 HOSPITAL DISCHARGE FOLLOW-UP: ICD-10-CM

## 2022-04-19 PROCEDURE — 1111F DSCHRG MED/CURRENT MED MERGE: CPT | Performed by: NURSE PRACTITIONER

## 2022-04-19 PROCEDURE — 1036F TOBACCO NON-USER: CPT | Performed by: NURSE PRACTITIONER

## 2022-04-19 PROCEDURE — 99214 OFFICE O/P EST MOD 30 MIN: CPT | Performed by: NURSE PRACTITIONER

## 2022-04-19 PROCEDURE — G8417 CALC BMI ABV UP PARAM F/U: HCPCS | Performed by: NURSE PRACTITIONER

## 2022-04-19 PROCEDURE — G8427 DOCREV CUR MEDS BY ELIG CLIN: HCPCS | Performed by: NURSE PRACTITIONER

## 2022-04-19 ASSESSMENT — ENCOUNTER SYMPTOMS
NAUSEA: 0
SHORTNESS OF BREATH: 0
CHEST TIGHTNESS: 0
ABDOMINAL PAIN: 0
TROUBLE SWALLOWING: 0
ABDOMINAL DISTENTION: 0
DIARRHEA: 0
VOMITING: 0
SORE THROAT: 0

## 2022-04-19 NOTE — PROGRESS NOTES
Post-Discharge Transitional Care Follow Up      Reid Brunson   YOB: 1970    Date of Office Visit:  4/19/2022  Date of Hospital Admission: 4/11/22  Date of Hospital Discharge: 4/15/22  Readmission Risk Score (high >=14%. Medium >=10%):Readmission Risk Score: 18.2 ( )      Care management risk score Rising risk (score 2-5) and Complex Care (Scores >=6): 5     Non face to face  following discharge, date last encounter closed (first attempt may have been earlier): *No documented post hospital discharge outreach found in the last 14 days     Call initiated 2 business days of discharge: *No response recorded in the last 14 days     CHF (congestive heart failure), NYHA class I, acute on chronic, combined ([address]  BLUE (acute kidney injury) [facility])  Hospital discharge follow-up  -     [place] DISCHARGE MEDS RECONCILED W/ CURRENT OUTPATIENT MED LIST  Needs to  Entresto from pharmacy  Call if no available  F/U cardiology 4/21    Medical Decision Making: moderate complexity  Return in about 4 weeks (around 5/17/2022) for PCP follow-up. Subjective:   HPI    Inpatient course: Discharge summary reviewed- see chart.     Interval history/Current status: stable    Patient Active Problem List   Diagnosis    Hypertensive urgency    Cardiomyopathy ([address]    Systolic and diastolic CHF, acute ([address]    BLUE (acute kidney injury) ([address]    Abnormal EKG    Chronic combined systolic and diastolic CHF (congestive heart failure) ([address]    Acute decompensated heart failure ([address]    Chest pain    Pulmonary edema, acute ([address]    NSTEMI (non-ST elevated myocardial infarction) ([address]    Inguinal hernia of right side without obstruction or gangrene    CHF (congestive heart failure), NYHA class I, acute on chronic, combined ([address]    Systolic congestive heart failure (HCC)    Acute systolic heart failure ([address]    Acute on chronic combined systolic (congestive) and diastolic (congestive) heart failure ([address] Medications listed as ordered at the time of discharge from hospital     Medication List          Accurate as of April 19, 2022 11:59 PM. If you have any questions, ask your nurse or doctor. CONTINUE taking these medications    carvedilol 25 MG tablet  Commonly known as: COREG  take 1 tablet by mouth twice a day HOLD FOR SBP<100 OR HR <60     torsemide 20 MG tablet  Commonly known as: DEMADEX  Take 1 tablet by mouth in the morning and at bedtime Pt states that he takes 4 tabs lately             Medications marked \"taking\" at this time  No outpatient medications have been marked as taking for the 4/19/22 encounter (Office Visit) with JULIO CESAR Avina CNP. Medications patient taking as of now reconciled against medications ordered at time of hospital discharge: Yes    Review of Systems   Constitutional: Negative for activity change, chills, diaphoresis, fatigue, fever and unexpected weight change. HENT: Negative for congestion, sore throat and trouble swallowing. Eyes: Negative for visual disturbance. Respiratory: Negative for chest tightness and shortness of breath. Cardiovascular: Negative for chest pain, palpitations and leg swelling. Gastrointestinal: Negative for abdominal distention, abdominal pain, diarrhea, nausea and vomiting. Genitourinary: Negative for difficulty urinating and enuresis. Musculoskeletal: Negative for arthralgias, gait problem and myalgias. Skin: Negative for wound. Neurological: Negative for dizziness, syncope and light-headedness. Psychiatric/Behavioral: Negative. Negative for sleep disturbance. Objective:    /80 (Site: Right Upper Arm, Position: Sitting, Cuff Size: Medium Adult)   Pulse 91   Temp 97.7 °F (36.5 °C) (Temporal)   Ht 6' (1.829 m)   Wt 207 lb (93.9 kg)   SpO2 99%   BMI 28.07 kg/m²   Physical Exam  Constitutional:       General: He is not in acute distress. Appearance: Normal appearance.  He is well-developed. He is not diaphoretic. HENT:      Head: Normocephalic and atraumatic. Mouth/Throat:      Mouth: Mucous membranes are moist.   Eyes:      General: No scleral icterus. Conjunctiva/sclera: Conjunctivae normal.   Cardiovascular:      Rate and Rhythm: Normal rate and regular rhythm. Heart sounds: Normal heart sounds. No murmur heard. Pulmonary:      Effort: Pulmonary effort is normal. No respiratory distress. Breath sounds: Normal breath sounds. No stridor. No wheezing or rales. Chest:      Chest wall: No tenderness. Abdominal:      General: Bowel sounds are normal. There is no distension. Palpations: Abdomen is soft. Tenderness: There is no abdominal tenderness. There is no guarding or rebound. Musculoskeletal:      Cervical back: Neck supple. Right lower leg: No edema. Left lower leg: No edema. Skin:     General: Skin is warm and dry. Findings: No erythema. Neurological:      General: No focal deficit present. Mental Status: He is alert and oriented to person, place, and time. Psychiatric:         Mood and Affect: Mood normal.         Behavior: Behavior normal.         An electronic signature was used to authenticate this note.   --Rajendra Dickson, APRN - CNP

## 2022-04-21 ENCOUNTER — OFFICE VISIT (OUTPATIENT)
Dept: CARDIOLOGY CLINIC | Age: 52
End: 2022-04-21
Payer: MEDICAID

## 2022-04-21 ENCOUNTER — CARE COORDINATION (OUTPATIENT)
Dept: CARE COORDINATION | Age: 52
End: 2022-04-21

## 2022-04-21 VITALS
OXYGEN SATURATION: 99 % | BODY MASS INDEX: 28.17 KG/M2 | HEIGHT: 72 IN | SYSTOLIC BLOOD PRESSURE: 110 MMHG | RESPIRATION RATE: 18 BRPM | DIASTOLIC BLOOD PRESSURE: 68 MMHG | WEIGHT: 208 LBS | HEART RATE: 87 BPM

## 2022-04-21 DIAGNOSIS — I50.22 CHRONIC SYSTOLIC CHF (CONGESTIVE HEART FAILURE), NYHA CLASS 1 (HCC): ICD-10-CM

## 2022-04-21 DIAGNOSIS — I42.8 NONISCHEMIC CARDIOMYOPATHY (HCC): ICD-10-CM

## 2022-04-21 DIAGNOSIS — N18.6 END-STAGE RENAL DISEASE ON HEMODIALYSIS (HCC): ICD-10-CM

## 2022-04-21 DIAGNOSIS — I38 VALVULAR HEART DISEASE: ICD-10-CM

## 2022-04-21 DIAGNOSIS — I10 HYPERTENSION, UNSPECIFIED TYPE: ICD-10-CM

## 2022-04-21 DIAGNOSIS — Z91.199 HISTORY OF NONCOMPLIANCE WITH MEDICAL TREATMENT: ICD-10-CM

## 2022-04-21 DIAGNOSIS — F10.11 HISTORY OF ALCOHOL ABUSE: ICD-10-CM

## 2022-04-21 DIAGNOSIS — Z95.810 AICD (AUTOMATIC CARDIOVERTER/DEFIBRILLATOR) PRESENT: ICD-10-CM

## 2022-04-21 DIAGNOSIS — Z99.2 END-STAGE RENAL DISEASE ON HEMODIALYSIS (HCC): ICD-10-CM

## 2022-04-21 PROCEDURE — 1111F DSCHRG MED/CURRENT MED MERGE: CPT | Performed by: PHYSICIAN ASSISTANT

## 2022-04-21 PROCEDURE — 1036F TOBACCO NON-USER: CPT | Performed by: PHYSICIAN ASSISTANT

## 2022-04-21 PROCEDURE — G8427 DOCREV CUR MEDS BY ELIG CLIN: HCPCS | Performed by: PHYSICIAN ASSISTANT

## 2022-04-21 PROCEDURE — 99214 OFFICE O/P EST MOD 30 MIN: CPT | Performed by: PHYSICIAN ASSISTANT

## 2022-04-21 PROCEDURE — G8417 CALC BMI ABV UP PARAM F/U: HCPCS | Performed by: PHYSICIAN ASSISTANT

## 2022-04-21 RX ORDER — IVABRADINE 5 MG/1
5 TABLET, FILM COATED ORAL 2 TIMES DAILY WITH MEALS
Qty: 60 TABLET | Refills: 3 | Status: ON HOLD | OUTPATIENT
Start: 2022-04-21 | End: 2022-09-06 | Stop reason: HOSPADM

## 2022-04-21 ASSESSMENT — ENCOUNTER SYMPTOMS
BLOOD IN STOOL: 0
COLOR CHANGE: 0
NAUSEA: 0
COUGH: 0
ABDOMINAL DISTENTION: 0
VOMITING: 0
ABDOMINAL PAIN: 0
SHORTNESS OF BREATH: 0
CHEST TIGHTNESS: 0

## 2022-04-21 NOTE — PATIENT INSTRUCTIONS
-ADD Corlanor 5mg PO twice daily---samples provided and script sent to pharmacy  -START Entresto 24/26mg PO twice daily--script [facility] for [facility]-up today      Call to schedule follow-up with Dr. Gildardo Kemp for ICD checks    Continue dialysis treatments on Monday/Wednesday/Friday      -Check daily weight every morning and notify CHF clinic if gaining more than 3 pounds in 2 days    -Check blood pressure twice daily in a.m. and p.m. prior to taking medications and keep log of blood pressure trends to review at next office visit  Notify office if BP running low with  mmHg or below prior to taking medications  Notify office if BP running high with  mmHg or above or if DBP 85 mmHg or above    -Recommend 2000 mg daily sodium restriction    -Recommend 1.5 liter (48 ounces) daily fluid restriction

## 2022-04-21 NOTE — PROGRESS NOTES
Patient: Evy Rock  YOB: 1970  MRN: [id number]    Chief Complaint:  Chief Complaint   Patient presents with    Congestive Heart Failure     systolic    Dizziness     occasional    Fatigue         Subjective/HPI        4/21/22: Here for follow-up of systolic congestive heart failure and nonischemic cardiomyopathy. Patient has been admitted multiple times to [facility] for acute decompensated heart failure since his last office visit with me in May 2021. Most recently, he was admitted on 4/11/2022 with acute on chronic heart failure exacerbation as well as BLUE on CKD. He underwent dialysis catheter placement and was initiated on hemodialysis during this admission. Cardiac/CHF medications were adjusted during admission. BP meds were decreased due to hypotension and he was initiated on Entresto 24/26 mg p.o. twice daily. He was set up for outpatient hemodialysis and was eventually discharged home in stable condition on 4/15/2022. Since discharge, patient states he is doing well without any CHF symptoms. He started his first dialysis session yesterday and is planned to continue dialysis on Monday/Wednesday/Friday. When he was discharged from the hospital, he was discharged on Coreg 25 mg p.o. twice daily, Demadex 20 mg p.o. twice daily and Entresto 24/26 mg p.o. twice daily. He states that he filled his prescription for Coreg and Demadex but Ileana Ruggiero was not filled as he needs a prior authorization. Prior authorization will be completed today and 1 month supply will be sent to [id number] [facility] outpatient pharmacy so he can  his first month of Entresto with 30-day free trial card today. He denies shortness of breath or dyspnea exertion, chest pain, palpitations, diaphoresis, paroxysmal nocturnal dyspnea, orthopnea, lower extremity edema, dizziness, lightheadedness, syncope, fever or chills.     Patient has a  and he states that she prepares all of his food and make sure he is on a low-salt diet. He reports being compliant with fluid restriction of 48 ounces daily. He previously drank a significant amount of alcohol but states he has not used any alcohol over the past month. He is status post ICD implant with Dr. Ye Humphreys on 1/21/2022 for nonischemic cardiomyopathy but states that he has not had any follow-up appointments in the office since that time as transportation is an issue for him. He has been advised to call and schedule follow-up as soon as possible as he will need continued outpatient follow-up for ICD checks. Most recent labs reviewed and documented below  BMP on 4/15/2022: Sodium 140, potassium 3.5, chloride 100, total CO2 23, BUN elevated 45, creatinine elevated 6.14, GFR low at 11.8, glucose 89  Magnesium level on 4/15/2022: 2.5  CBC on 4/12/2022: WBC 6.3, hemoglobin low at 9.4, hematocrit low at 29.6, platelets 820  proBNP on 4/11/2022: 31,296 compared to 6331 on 1/18/2022  TSH on 4/11/2022: 1.320      S/p AICD implant on 1/21/22 with Dr. Ye Humphreys    Echocardiogram 1/15/22:   Conclusions   Summary   Left ventricular ejection fraction is visually estimated at 20%. Pseudonormal filling pattern noted. LV mild Dilated      Signature   ----------------------------------------------------------------   Electronically signed by Ysabel Yuen MD(Interpreting   physician) on 01/18/2022 08:24 AM      Vital signs are stable in office today. Blood pressure 110/68, heart rate 87, pulse ox 99% on room air. Weight is 208 pounds compared to 197 pounds at last office visit on 5/6/2021. 5/6/21: Here for follow-up of chronic systolic congestive heart failure with recent acute exacerbation requiring hospital admission on 4/22/2021. Patient had presented to ER at that time due to recently worsening shortness of breath over 1 week prior to presentation with symptoms of orthopnea and mild lower extremity edema. proBNP elevated at 17,660. Troponin mildly elevated.   Chest x-ray had revealed increased opacity in the left lung base which may represent atelectasis, infiltrate or small effusion. Also on presentation he had significantly elevated blood pressures. He was initiated on IV diuretics and BP medications were further optimized for improved BP management. Pulmonology was consulted regarding shortness of breath complaints as well as underlying COPD. Nephrology was consulted regarding BLUE on CKD. GI was consulted regarding worsening anemia with hemoglobin in January 2013 0.7 and hemoglobin on presentation of 7.9. He clinically improved following optimization of blood pressure and heart failure medications. He was stable for discharge home on 4/25/2021. Since discharge, patient states he has been doing well overall with no new or worsening heart failure symptoms. He denies shortness of breath or dyspnea on exertion, chest pain, palpitations, diaphoresis, paroxysmal nocturnal dyspnea, orthopnea, lower extremity edema, syncope, fever or chills. He does report feeling drowsy after taking his medications. He is not checking routine blood pressures at home and is not checking daily weights. He continues to be noncompliant with sodium and fluid restriction. He continues to drink alcohol but reports only drinking 4 beers per week. He states he is only smoking 1 to 2 cigarettes/day. He brings with him all of his medication bottles today. I have personally reviewed all of his medications and there is significant discrepancies but in between these current meds and what he had previously been taking. He has numerous old medication bottles with fill dates in 2019 and 2020. He uses [facility] on [facility] and pharmacy will be called to clarify most recent medication dispensing. Recent labs reviewed and documented below.   BMP from 4/25/2021: Sodium 139, potassium 3.7, chloride 103, total CO2 24, BUN 42, creatinine elevated 3.73, GFR low at 21.0, glucose 90  Hemoglobin and hematocrit on 4/24/2021: 8.2 and 25.5  Hemoccult stool from 4/23/2021: Positive  CBC from 4/23/2021: WBC 7.8, hemoglobin 7.9, hematocrit 23.9, platelets 673  Fasting lipid panel 4/23/2021: Total cholesterol 108, triglycerides 73, HDL 53, LDL 40  proBNP on 4/23/2021: 11,296 compared to [place] 73 on 4/22/2021      Blood pressure elevated in office today at 152/106 in the right arm 154/106 in the left arm, heart rate tachycardic at 103, pulse ox 100% on room air. Weight is 197 pounds compared to 202 pounds at last office visit on 1/19/2021. Echocardiogram 4/22/21:  Summary   Normal mitral valve structure and function. 2+ MR   Normal tricuspid valve structure and function. 2+ TR   RVSP 62 mmHg   Left ventricular ejection fraction is visually estimated at 35-40%. Pseudonormal filling pattern noted. Large pleural effusion. Signature      ----------------------------------------------------------------   Electronically signed by Donna Leon MD(Interpreting   physician) on 04/22/2021 11:44 AM   ----------------------------------------------------------------     Echocardiogram 8/27/20:  Conclusions   Summary   Normal aortic valve structure and function. Trace AR   Normal tricuspid valve structure and function. trace TR   RVSP 25 mmHg   Left ventricular ejection fraction is visually estimated at 25-30% with   Global Hypokinesis. Pseudonormal filling pattern noted. Signature   ----------------------------------------------------------------   Electronically signed by Donna Leon MD(Interpreting   physician) on 08/27/2020 03:28 PM      [facility] 11/4/2020:  Procedure Summary  Normal coronaries. LVEDP=29mmhg  EF of 20%. Recommendations  Aggressive risk factor management.   Maximize medical therapy   Angiographic Findings   Cardiac Arteries and Lesion Findings  LMCA: Normal.  LAD: Normal.  LCx: Normal.  RCA: Normal.  Dominance: Right  LV function assessed as:Abnormal.  Ejection Fraction    - Method: LV gram. EF%: 20          1/19/21: Here for follow-up of chronic systolic congestive heart failure, nonischemic cardiomyopathy and uncontrolled hypertension. Carvedilol 12.5 mg p.o. twice daily was added at last office visit as patient was not taking as previously thought and blood pressure remained uncontrolled. Patient states that he is actually been taking Coreg 25 mg twice daily instead of cutting the tablet in half twice a day. Blood pressure is somewhat improved but remains uncontrolled. He denies any new or worsening heart failure symptoms. States that his lower extremity edema is actually improved since last visit. Denies chest pain, shortness of breath or dyspnea exertion, nausea, vomiting, dizziness, lightheadedness, diaphoresis, palpitations, paroxysmal nocturnal dyspnea, orthopnea, lower extremity edema, syncope, fever or chills. Recent labs from 1/12/2021 reviewed and documented below  BMP 1/12/2021: Sodium 142, potassium 3.5, chloride 102, total CO2 25, BUN elevated 35, creatinine elevated at 3.17, GFR low at 25.4, glucose 86--stable renal function when compared to prior labs from 1/2/21  proBNP 1/12/2021: 5876 compared to 9972 on 11/17/2020    Vital signs stable in office today. Blood pressure elevated but improved from prior at 153/99 on the right and 157/102 on the left. Heart rate tachycardic at 113, pulse ox 99% on room air. Weight is 202 pounds compared to 205 pounds at last visit on 1/12/2021.    1/12/21: Patient here for follow-up of systolic congestive heart failure and nonischemic cardiomyopathy. Patient is a very difficult historian and is often uncertain with his medications. He brought in all the medications that he is currently taking which include both a 5 and 10 mg dose of amlodipine, 60 and 120 mg dose of Imdur, hydralazine 100 mg p.o. 3 times daily, aspirin 81 mg p.o. daily and torsemide 50 mg p.o. daily.   Per our medicine list and per patient's report he believed he had been taking Coreg 25 mg twice a day however this is not one of his current medication bottles and he reports that he must not be taking it. I had added Corlanor 5 mg p.o. twice daily at last visit however this was not approved by his insurance as he apparently was not on a beta-blocker and patient failed to come  samples. Blood pressure remains uncontrolled currently. He states that he had some worsening lower extremity edema yesterday which improved after taking his torsemide. He denies chest pain, shortness of breath, nausea, vomiting, dizziness, lightheadedness, diaphoresis, palpitations, paroxysmal nocturnal dyspnea, orthopnea, lower extremity edema, syncope, fever or chills. Patient has been educated at length regarding importance of compliance with medications as prescribed. I went through patient's medication bottles with him and marked the ones that he should be taking. He verbalizes understanding. Most recent labs reviewed and documented below. CBC 1/2/21: WBC 10.8, hemoglobin 13.7, hematocrit 39.8, platelets 802  CMP 6/6/83: Sodium 139, potassium low at 3.1, chloride 100, total CO2 of 24, BUN elevated at 33, creatinine elevated at 3.19, GFR low at 25.2, glucose 110  proBNP 11/17/2020: 9972    Blood pressure elevated in office today at 156/104 on the left and 150/99 on the right. Heart rate tachycardic at 110, pulse ox 99% on room air. Weight is 205 pounds which is the same weight from last visit on 11/17/2020.      12/1/20 (virtual visit): For follow-up of nonischemic cardiomyopathy and systolic congestive heart failure. Was seen for initial CHF clinic evaluation on 11/17/2020. BP at last office visit was uncontrolled with systolic in the 201P and diastolic above 358 and BP meds were titrated. Hydralazine was increased to 100 mg p.o. 3 times daily and Imdur was increased to 60 mg p.o. daily.   Patient states that he has been doing well overall since last visit but has had fluctuating blood pressures. He will occasionally get systolic pressures in the 130s but more consistently between 150s and 170s range. He states his diastolic blood pressure is never below 100. He was advised to check blood pressure while on the phone with me today and he was hypertensive with initial blood pressure of 193/121 and repeat blood pressure 189/122. He states he had already taken him 1 dose of hydralazine and his Imdur but had not yet not yet taken his carvedilol which he was advised to do so while on the phone with me. He is asymptomatic despite this elevated blood pressure with no neurologic complaints. He does report experiencing shortness of breath and some dizziness earlier today while working outside shoveling snow. He has been advised to avoid any type of strenuous activity or any further snow shoveling. He denies chest pain, nausea, vomiting, diaphoresis, paroxysmal nocturnal dyspnea, orthopnea, lower extremity edema, syncope, fever or chills. He is weighing himself every day and his weight has been stable with current weight of 194 pounds compared to 205 pounds at last visit.     Recent labs reviewed and documented below.   BMP 11/17/2020: Sodium 139, potassium 3.7, chloride 107, total CO2 of 23, BUN elevated 27, creatinine elevated 2.82, GFR low at 29.1, glucose 78--renal function stable when compared to prior  proBNP 11/17/2020: 9972 compared to 13,817 on 11/3/2020.     Weight: 194 pounds  BP today: 193/121 during virtual appt and repeat 189/122  HR: 115     Renal duplex US 8/27/2019:  Impression    NO EVIDENCE OF RENAL ARTERY STENOSIS NOTED.         11/17/20 CHF visit #1: This is a pleasant 53-year-old -American male with past medical history significant for nonischemic cardiomyopathy with severely reduced LV function and ejection fraction of 20%, normal coronary arteries per cardiac catheterization 11/3/2020, abnormal EKG, hypertension, tobacco abuse, history of alcohol abuse, chronic kidney disease and medical noncompliance who presents for initial CHF clinic evaluation. Patient originally presented to the emergency room on 11/3/2020 with complaints of shortness of breath and lower extremity edema. His initial cardiac enzyme was mildly elevated and proBNP elevated at 18,817. Has baseline CKD with creatinine of 2.91 on presentation. He was optimized on heart failure medications. Nephrology was consulted for evaluation of CKD and fluid/diuretic management. Given non-STEMI, severely reduced LV systolic function and risk factors for CAD, he underwent cardiac catheterization on 11/4/2020 which revealed normal coronary arteries and LVEDP of 29 mmHg. Previous echocardiogram completed in August 2020 showed severely reduced LV systolic function with EF of 25 to 30%. He was diuresed aggressively during admission and symptoms improved. He was eventually transitioned back from IV Lasix to torsemide 50 mg p.o. daily and subsequently discharged home in stable condition on 11/7/2020. Since discharge, patient states he has been doing well overall. No new or worsening heart failure symptoms. He states he is compliant with all of his medications. He does report eating diet high in sodium content and has been educated on the importance of low-sodium diet and fluid restriction. He denies any complaint of shortness of breath or significant dyspnea on exertion but does report mild shortness of breath with moderate to strenuous activity. He denies chest pain, palpitations, diaphoresis, paroxysmal nocturnal dyspnea, orthopnea, lower extremity edema, syncope, fever or chills. He does experience an occasionally productive cough. Recent labs reviewed and documented below.   BMP 11/7/2020: Sodium 139, potassium 3.5, chloride 98, total CO2 26, BUN elevated 33, creatinine elevated 3.06, GFR low at 26.5, glucose 90  CBC 11/7/2020: WBC is 6.8, hemoglobin 14.6, hematocrit 43.1, platelets elevated 006. Magnesium 11/6/2020: 2.2    EKG 11/3/20: SR 95, 1st degree AVB,  Deep T wave inversion V3-V6, mild ST depression with T wave inversion leads I and aVL, QTc 497ms    Blood pressure elevated in office today at 184/118 on the right and 175/120 on the left but patient is completely asymptomatic. Heart rate 60 bpm, pulse ox 100% on room air. Weight is 205 pounds      [facility] 11/3/20:  Conclusions  Procedure Summary  Normal coronaries. LVEDP=29mmhg  EF of 20%. Recommendations  Aggressive risk factor management. Maximize medical therapy   Angiographic Findings   Cardiac Arteries and Lesion Findings  LMCA: Normal.  LAD: Normal.  LCx: Normal.  RCA: Normal.  Dominance: Right  LV function assessed as:Abnormal.  Ejection Fraction    - Method: LV gram. EF%: 20.      Echocardiogram 8/25/20:   Conclusions   Summary   Normal aortic valve structure and function. Trace AR   Normal tricuspid valve structure and function. trace TR   RVSP 25 mmHg   Left ventricular ejection fraction is visually estimated at 25-30% with   Global Hypokinesis. Pseudonormal filling pattern noted. Signature   ----------------------------------------------------------------   Electronically signed by Virginia Dubin, MD(Interpreting   physician) on 08/27/2020 03:28 PM   ----------------------------------------------------------------    Echocardiogram 8/26/19:  Conclusions   Summary   Normal mitral valve structure and function. Mild MR   Normal aortic valve structure and function. Mild AR   Left ventricular ejection fraction is visually estimated at 40%. Restrictive filling pattern.    Moderate CLVH   Mild dilated Ao Root   Signature   ----------------------------------------------------------------   Electronically signed by Virginia Dubin, MD(Interpreting   physician) on 08/26/2019 04:44 PM   ----------------------------------------------------------------    PMHx:  HTN  CKD  NICMP EF 20% per echo 8/2020 and per [address] SPECIAL PROCEDURE       Social History     Socioeconomic History    Marital status: Single     Spouse name: None    Number of children: None    Years of education: None    Highest education level: None   Occupational History    None   Tobacco Use    Smoking status: Former Smoker     Packs/day: 1.00     Types: Cigarettes     Quit date: 12/3/2020     Years since quittin.3    Smokeless tobacco: Never Used    Tobacco comment: currently smoking only couple cigarettes daily   Substance and Sexual Activity    Alcohol use: Yes    Drug use: Never    Sexual activity: None   Other Topics Concern    None   Social History Narrative    None     Social Determinants of Health     Financial Resource Strain: Low Risk     Difficulty of Paying Living Expenses: Not hard at all   Food Insecurity: No Food Insecurity    Worried About Running Out of Food in the Last Year: Never true    [place] of Food in the Last Year: Never true   Transportation Needs:     Lack of Transportation (Medical): Not on file    Lack of Transportation (Non-Medical):  Not on file   Physical Activity:     Days of Exercise per Week: Not on file    Minutes of Exercise per Session: Not on file   Stress:     Feeling of Stress : Not on file   Social Connections:     Frequency of Communication with Friends and Family: Not on file    Frequency of Social Gatherings with Friends and Family: Not on file    Attends [employer] Services: Not on file    Active Member of [address] [place] [address] or Organizations: Not on file    Attends Club or Organization Meetings: Not on file    Marital Status: Not on file   Intimate Partner Violence:     Fear of Current or Ex-Partner: Not on file    Emotionally Abused: Not on file    Physically Abused: Not on file    Sexually Abused: Not on file   Housing Stability:     Unable to Pay for Housing in the Last Year: Not on file    Number of [place] in the Last Year: Not on file    Unstable Housing in the Last Year: Not on file       Family History   Problem Relation Age of Onset    Coronary Art Dis Mother          Review of Systems:   Review of Systems   Constitutional: Positive for fatigue. Negative for activity change, chills, fever and unexpected weight change. HENT: Negative for congestion. Respiratory: Negative for cough, chest tightness and shortness of breath. Cardiovascular: Negative for chest pain, palpitations and leg swelling. No orthopnea recently; No PND   Gastrointestinal: Negative for abdominal distention, abdominal pain, blood in stool, nausea and vomiting. Genitourinary: Negative for difficulty urinating and hematuria. Musculoskeletal: Negative for arthralgias and myalgias. Skin: Negative for color change, pallor and rash. Neurological: Negative for dizziness, syncope and light-headedness. Psychiatric/Behavioral: Negative for agitation and behavioral problems. Physical Examination:    /68 (Site: Left Upper Arm, Position: Sitting, Cuff Size: Large Adult)   Pulse 87   Resp 18   Ht 6' (1.829 m)   Wt 208 lb (94.3 kg)   SpO2 99%   BMI 28.21 kg/m²    Physical Exam  Constitutional:       General: He is not in acute distress. Appearance: Normal appearance. HENT:      Head: Normocephalic and atraumatic. Cardiovascular:      Rate and Rhythm: Normal rate and regular rhythm. Pulmonary:      Effort: Pulmonary effort is normal. No respiratory distress. Breath sounds: No wheezing, rhonchi or rales. Abdominal:      Palpations: Abdomen is soft. Tenderness: There is no abdominal tenderness. Musculoskeletal:         General: Normal range of motion. Cervical back: Normal range of motion and neck supple. Right lower leg: No edema. Left lower leg: No edema. Skin:     General: Skin is warm and dry. Neurological:      General: No focal deficit present. Mental Status: He is alert and oriented to person, place, and time. Cranial Nerves:  No cranial nerve deficit. Psychiatric:         Mood and Affect: Mood normal.         Behavior: Behavior normal.         LABS:  CBC:   Lab Results   Component Value Date    WBC 6.3 04/12/2022    RBC 4.20 04/12/2022    HGB 9.4 04/12/2022    HCT 29.6 04/12/2022    MCV 70.5 04/12/2022    MCH 22.3 04/12/2022    MCHC 31.6 04/12/2022    RDW 21.4 04/12/2022     04/12/2022     Lipids:  Lab Results   Component Value Date    CHOL 144 01/16/2022    CHOL 143 07/13/2021    CHOL 174 05/06/2021     Lab Results   Component Value Date    TRIG 79 01/16/2022    TRIG 88 07/13/2021    TRIG 98 05/06/2021     Lab Results   Component Value Date    HDL 67 (H) 01/16/2022    HDL 71 (H) 07/13/2021    HDL 87 (H) 05/06/2021     Lab Results   Component Value Date    LDLCALC 61 01/16/2022    LDLCALC 54 07/13/2021    LDLCALC 67 05/06/2021     No results found for: LABVLDL, VLDL  No results found for: CHOLHDLRATIO  CMP:    Lab Results   Component Value Date     04/15/2022    K 3.5 04/15/2022    K 4.0 01/22/2022     04/15/2022    CO2 23 04/15/2022    BUN 45 04/15/2022    CREATININE 6.14 04/15/2022    GFRAA 11.8 04/15/2022    LABGLOM 9.7 04/15/2022    GLUCOSE 89 04/15/2022    PROT 6.2 04/11/2022    LABALBU 3.3 04/11/2022    CALCIUM 8.7 04/15/2022    BILITOT 0.5 04/11/2022    ALKPHOS 111 04/11/2022    AST 16 04/11/2022    ALT 14 04/11/2022     BMP:    Lab Results   Component Value Date     04/15/2022    K 3.5 04/15/2022    K 4.0 01/22/2022     04/15/2022    CO2 23 04/15/2022    BUN 45 04/15/2022    LABALBU 3.3 04/11/2022    CREATININE 6.14 04/15/2022    CALCIUM 8.7 04/15/2022    GFRAA 11.8 04/15/2022    LABGLOM 9.7 04/15/2022    GLUCOSE 89 04/15/2022     Magnesium:    Lab Results   Component Value Date    MG 2.5 04/15/2022     TSH:  Lab Results   Component Value Date    TSH 0.789 01/15/2022     . result  No results for input(s): PROBNP in the last 72 hours.   No results for input(s): INR in the last 72 hours. Patient Active Problem List   Diagnosis    Hypertensive urgency    Cardiomyopathy ([facility]    [facility]st[facility] diastolic [facility], [facility]    BLUE (acute kidney injury) ([facility]    Abnormal EKG    Chronic combined systolic and diastolic CHF (congestive heart failure) ([facility]    Acute decompensated heart failure ([facility]    Chest pain    Pulmonary edema, acute ([facility]    NSTEMI (non-ST elevated myocardial infarction) ([facility]    Inguinal hernia of right side without obstruction or gangrene    CHF (congestive heart failure), NYHA class I, acute on chronic, combined ([facility]    Systolic congestive heart failure (HCC)    Acute systolic heart failure ([facility]    Acute on chronic combined systolic (congestive) and diastolic (congestive) heart failure ([facility]       Assessment/Plan:     Diagnosis Orders   1. Chronic systolic CHF (congestive heart failure), NYHA class 1 ([facility]      Compensated currently. Corlanor added to further optimize HR   2. Nonischemic cardiomyopathy ([facility]      EF 20% per echo 1/15/22   3. AICD (automatic cardioverter/defibrillator) present      s/p ICD implant on 1/21/22 with Dr. Melanie Sinha   4. Hypertension, unspecified type      BP low during recent hospitalization and BP meds adjusted. BP stable today. Continue current meds   5. Valvular heart disease      2+ MR/TR per echo on 4/22/21 but no MR/TR noted on echo 1/15/22   6. End-stage renal disease on hemodialysis ([facility]      On dialysis M/W/F   7. History of alcohol abuse      No current alcohol use   8. History of noncompliance with medical treatment         -Maximize medical therapy--Coreg 25mg PO BID, demadex 20mg PO BID, Entresto 24/26mg PO BID--pt to fill script today, add Corlanor 5mg PO BID for improved heart rate management as heart rate remains suboptimally controlled despite maximal dose of Coreg  -Entresto was not filled following hospital discharge as needed a prior authorization.   Prior authorization will be completed by office staff today.  Will provide patient with 30-day free trial offer for Aminata Mcduffie and have him fill at [address] following visit today and long-term prescription will be sent to his [facility] on [address] [facility] failure appears compensated at this time. No overt signs of fluid overload  -Cardiac/<2 gram sodium diet recommended  -Recommend 1500mL daily fluid restriction   -Tobacco cessation recommended  -Alcohol cessation recommended--patient states he has not used alcohol in the past month  -Instructed patient to weigh self daily every morning upon waking and keep log book of daily weights. Notify office if gaining more than 3 pounds in 48 hours. -Recommend routine BP monitoring at home. Advised checking BP twice daily in AM and PM and keep a log of blood pressure trends to discuss at next visit.  Advised patient to notify CHF clinic if persistent hypertension with systolic blood pressure greater than 233 or diastolic blood pressure greater than 95.  -Advised patient to notify office immediately if experiencing any progressive SOB, orthopnea, PND, LE edema or weight gain  -Educated patient on importance of fluid and salt restriction as well as lifestyle modification. --States he has been compliant with sodium and fluid restriction  -Maintain routine outpatient follow-up with general cardiologist, Dr. Palomo--next appt 6/2/22  Echo 1/15/22: Severely reduced LV systolic function with EF 20%, LV mildly dilated, no significant valvular heart disease  Status post AICD implant with Dr. Delonte Cope on 1/21/22  Status post echocardiogram on 4/22/2021 which revealed EF of 35 to 40%.     -Maintain routine outpatient follow-up with electrophysiology, Dr. Delonte Cope for routine ICD checks  **Patient will be provided with office number to call and schedule follow-up appointment as he is not followed up outpatient with Dr. Delonte Cope since ICD implant on 1/21/2022  -Maintain follow-up with nephrology, Dr. Holden Wiggins  During recent admission from 4/11/2022-4/15/22 patient was initiated on hemodialysis. Plan for dialysis on Monday/Wednesday/Friday  -Maintain routine outpatient follow-up with PCP, Dr. Octavia LiF/U with CHF clinic in 3 months or sooner if needed          Counseling: The importance of daily weights, dietary sodium restriction, and contact with cardiology if weight is increased more than 3 lbs in any 48 hour period was stressed. The patient has been advised to contact us if theyexperience progressive SOB, orthopnea, PND or progressive edema.

## 2022-04-29 ENCOUNTER — CARE COORDINATION (OUTPATIENT)
Dept: CARE COORDINATION | Age: 52
End: 2022-04-29

## 2022-04-29 NOTE — CARE COORDINATION
Contacted Priya Morris and left voicemail regarding Dietitian referral. Left call back number and will follow up as appropriate.          [address] [facility], [address] [place] [address]

## 2022-04-29 NOTE — LETTER
4/29/2022    [address] [place] [id number]    Dear Reid Brunson,    I enjoyed speaking with you and wanted to send some additional information. Jamie Osborne MD and I will work together to ensure your needs are met and help you achieve your health goals. We are committed to walk with you on this journey and look forward to working with you. Please feel free to contact me with any questions or concerns. I am available by phone or for appointments at the office. You can reach me at [phone].       In good health,     Lisa Sin RN     Enclosures:    CHF zone

## 2022-04-29 NOTE — CARE COORDINATION
Ambulatory Care Coordination Note  4/29/2022  CM Risk Score: 5  Charlson 10 Year Mortality Risk Score: 10%     ACC: Darryl Luna RN    Summary Note: Patient contacted by phone for Care Coordination enrollment. Introduced myself and the Care Coordination program. Patient agrees to participate in program. Completed initial Care Coordination assessment. Reconciled home medications. Discussed Clinical Rx referral. Patient verbalizes declines referral. Discussed referral to [address] [place] [address]. Patient agreed to referral. Reviewed ACP documents. Health Care Decision Maker needs updated. Patient Advance Directive Living Will not on file. Referral to ACP. Instructed patient on availability of same day, next day, and Walk-In care. A referral to outpatient dietician for Dx Heart Failure sent by [facility]. Referral to ACP. A CC Welcome Letter with CHF Zone sheet printed and mailed to patients home address. Ambulatory Care Coordination Assessment    Care Coordination Protocol  Referral from Primary Care Provider: No  Week 1 - Initial Assessment     Do you have all of your prescriptions and are they filled?: Yes  Barriers to medication adherence: None  Are you able to afford your medications?: Yes  How often do you have trouble taking your medications the way you have been told to take them?: I always take them as prescribed. Do you have Home O2 Therapy?: No      Ability to seek help/take action for Emergent Urgent situations i.e. fire, crime, inclement weather or health crisis. : Independent  Ability to ambulate to restroom: Independent  Ability handle personal hygeine needs (bathing/dressing/grooming):  Independent  Ability to manage Medications: Needs Assistance  Ability to prepare Food Preparation: Needs Assistance  Ability to maintain home (clean home, laundry): Needs Assistance  Ability to drive and/or has transportation: Needs Assistance  Ability to do shopping: Needs Assistance  Ability to manage finances: Needs Assistance  Is patient able to live independently?: Yes     Current Housing: Private Residence        Per the Fall Risk Screening, did the patient have 2 or more falls or 1 fall with injury in the past year?: No     Frequent urination at night?: No  Do you use rails/bars?: No  Do you have a non-slip tub mat?: Yes     Are you experiencing loss of meaning?: Yes (Comment: Some days)  Are you experiencing loss of hope and peace?: Yes (Comment: Some days)     Thinking about your patient's physical health needs, are there any symptoms or problems (risk indicators) you are unsure about that require further investigation?: No identified areas of uncertainly or problems already being investigated   Are the patients physical health problems impacting on their mental well-being?: Mild impact on mental well-being e.g. \"\"feeling fed-up\"\", \"\"reduced enjoyment\"\"   Are there any problems with your patients lifestyle behaviors (alcohol, drugs, diet, exercise) that are impacting on physical or mental well-being?: Some mild concern of potential negative impact on well-being   Do you have any other concerns about your patients mental well-being?  How would you rate their severity and impact on the patient?: Mild problems - don't interfere with function   How would you rate their home environment in terms of safety and stability (including domestic violence, insecure housing, neighbor harassment)?: Consistently safe, supportive, stable, no identified problems   How do daily activities impact on the patient's well-being? (include current or anticipated unemployment, work, caregiving, access to transportation or other): Some general dissatisfaction but no concern   How would you rate their social network (family, work, friends)?: Good participation with social networks   How would you rate their financial resources (including ability to afford all required medical care)?: [address] insecure, some resource challenges   Philip shelton does the patient now understand their health and well-being (symptoms, signs or risk factors) and what they need to do to manage their health?: Little understanding which impacts on their ability to undertake better management   How well do you think your patient can engage in healthcare discussions? (Barriers include language, deafness, aphasia, alcohol or drug problems, learning difficulties, concentration): Adequate communication, with or without minor barriers   Do other services need to be involved to help this patient?: Other care/services not required at this time   Are current services involved with this patient well-coordinated? (Include coordination with other services you are now recommendation): All required care/services in place and well-coordinated   Suggested Interventions and Community Resources   Pharmacist: Robert   Registered Dietician: In Process   Social Work: In Process                  Prior to Admission medications    Medication Sig Start Date End Date Taking?  Authorizing Provider   sacubitril-valsartan (ENTRESTO) 24-26 MG per tablet Take 1 tablet by mouth 2 times daily 4/21/22   MICAH Darnell   ivabradine (CORLANOR) 5 MG TABS tablet Take 1 tablet by mouth 2 times daily (with meals) 4/21/22   MICAH Darnell   torsemide (DEMADEX) 20 MG tablet Take 1 tablet by mouth in the morning and at bedtime Pt states that he takes 4 tabs lately  Patient taking differently: Take 20 mg by mouth in the morning and at bedtime  4/15/22   Yan Palomo DO   carvedilol (COREG) 25 MG tablet take 1 tablet by mouth twice a day HOLD FOR SBP<100 OR HR <60 3/1/22   MICAH Darnell       Future Appointments   Date Time Provider Perez Ness   5/16/2022  8:45 AM [facility] PROCED[facility] ROOM [address] RAD   5/16/2022  9:00 AM [facility] CATH LAB RM [address] [facility]   5/19/2022  9:30 AM Kesha Britton MD [facility]   6/2/2022 12:45 PM Yan Padron DO [facility]   7/28/2022  9:00 AM MICAH Simon AGUSTIN CHF [facility] EMERGENCY [facility] AT [facility]     ,   Congestive Heart Failure Assessment    Are you currently restricting fluids?: 1800cc  Do you understand a low sodium diet?: No  Do you understand how to read food labels?: No  How many restaurant meals do you eat per week?: 0  Do you salt your food before tasting it?: No     No patient-reported symptoms      Symptoms:  CHF associated angina: Pos, CHF associated dyspnea on exertion: Pos, CHF associated weakness: Pos      Symptom course: stable  Patient-reported weight (lb): 206  Weight trend: stable     ,   General Assessment    Do you have any symptoms that are causing concern?: No     , Care Coordination Episodes    Type: Amb Care Management  Episode: COMPLEX CARE  Noted: 4/21/2022 and 5

## 2022-05-02 ENCOUNTER — PRE-PROCEDURE TELEPHONE (OUTPATIENT)
Dept: INTERVENTIONAL RADIOLOGY/VASCULAR | Age: 52
End: 2022-05-02

## 2022-05-02 ENCOUNTER — CARE COORDINATION (OUTPATIENT)
Dept: CARE COORDINATION | Age: 52
End: 2022-05-02

## 2022-05-02 NOTE — CARE COORDINATION
Received request for Advance Care Planning and to review Advance Directive with patient. Called and left message. Left phone number for return call.      Talia Doyle  [phone]

## 2022-05-02 NOTE — PATIENT INSTRUCTIONS
Call to patient to request that they arrive at 0730 on 5/16/22 for their Dr. Elena Huber apt. .Pt confirms he can come at the 0730 arrival time.  Electronically signed by Sumeet Kruse RN on 5/2/2022 at 2:02 PM

## 2022-05-04 ENCOUNTER — CARE COORDINATION (OUTPATIENT)
Dept: CARE COORDINATION | Age: 52
End: 2022-05-04

## 2022-05-04 NOTE — CARE COORDINATION
Phone call to patient once more to follow up with referral form Advance Care Planning and completing Advance Directive. Patient states that he would like to complete forms and request to have them mailed. Confirmed address and will put forms in mail. Informed patient that I will call back in 2 weeks to give him time to review forms. Will check in with patient for questions or concerns.

## 2022-05-05 ENCOUNTER — CARE COORDINATION (OUTPATIENT)
Dept: CARE COORDINATION | Age: 52
End: 2022-05-05

## 2022-05-05 NOTE — CARE COORDINATION
Contacted Rudolph Blount and left voicemail regarding Dietitian referral. Left call back number and will follow up as appropriate.          [address] [facility], [address] [place] [address]

## 2022-05-12 ENCOUNTER — CARE COORDINATION (OUTPATIENT)
Dept: CARE COORDINATION | Age: 52
End: 2022-05-12

## 2022-05-12 NOTE — CARE COORDINATION
Contacted Rashad Rubio and left voicemail regarding Dietitian referral. Left call back number. RD outreached 4/29, 5/5 and today 5/12- left VM all three outreaches. RD will continue to follow/assist with patient return call.        [address] [facility], [address] [facility]

## 2022-05-13 ENCOUNTER — CARE COORDINATION (OUTPATIENT)
Dept: CARE COORDINATION | Age: 52
End: 2022-05-13

## 2022-05-14 ENCOUNTER — HOSPITAL ENCOUNTER (EMERGENCY)
Age: 52
Discharge: HOME | End: 2022-05-14
Payer: MEDICAID

## 2022-05-14 ENCOUNTER — APPOINTMENT (OUTPATIENT)
Dept: GENERAL RADIOLOGY | Age: 52
End: 2022-05-14
Payer: MEDICAID

## 2022-05-14 VITALS
HEIGHT: 72 IN | HEART RATE: 79 BPM | RESPIRATION RATE: 26 BRPM | BODY MASS INDEX: 27.36 KG/M2 | TEMPERATURE: 97.8 F | OXYGEN SATURATION: 95 % | WEIGHT: 202 LBS | SYSTOLIC BLOOD PRESSURE: 116 MMHG | DIASTOLIC BLOOD PRESSURE: 76 MMHG

## 2022-05-14 DIAGNOSIS — Z99.2 STAGE 5 CHRONIC KIDNEY DISEASE ON CHRONIC DIALYSIS (HCC): ICD-10-CM

## 2022-05-14 DIAGNOSIS — E86.1 HYPOTENSION DUE TO HYPOVOLEMIA: Primary | ICD-10-CM

## 2022-05-14 DIAGNOSIS — I95.89 HYPOTENSION DUE TO HYPOVOLEMIA: Primary | ICD-10-CM

## 2022-05-14 DIAGNOSIS — N18.6 STAGE 5 CHRONIC KIDNEY DISEASE ON CHRONIC DIALYSIS (HCC): ICD-10-CM

## 2022-05-14 LAB
ALBUMIN SERPL-MCNC: 3.6 G/DL (ref 3.5–4.6)
ALP BLD-CCNC: 112 U/L (ref 35–104)
ALT SERPL-CCNC: 40 U/L (ref 0–41)
ANION GAP SERPL CALCULATED.3IONS-SCNC: 14 MEQ/L (ref 9–15)
AST SERPL-CCNC: 25 U/L (ref 0–40)
BASOPHILS ABSOLUTE: 0 K/UL (ref 0–0.2)
BASOPHILS RELATIVE PERCENT: 0.9 %
BILIRUB SERPL-MCNC: 0.3 MG/DL (ref 0.2–0.7)
BUN BLDV-MCNC: 32 MG/DL (ref 6–20)
CALCIUM SERPL-MCNC: 8.4 MG/DL (ref 8.5–9.9)
CHLORIDE BLD-SCNC: 99 MEQ/L (ref 95–107)
CO2: 26 MEQ/L (ref 20–31)
CREAT SERPL-MCNC: 6.51 MG/DL (ref 0.7–1.2)
EOSINOPHILS ABSOLUTE: 0.1 K/UL (ref 0–0.7)
EOSINOPHILS RELATIVE PERCENT: 1.9 %
GFR AFRICAN AMERICAN: 11
GFR NON-AFRICAN AMERICAN: 9.1
GLOBULIN: 2.9 G/DL (ref 2.3–3.5)
GLUCOSE BLD-MCNC: 112 MG/DL (ref 70–99)
HCT VFR BLD CALC: 35 % (ref 42–52)
HEMOGLOBIN: 11.3 G/DL (ref 14–18)
LACTIC ACID: 1.5 MMOL/L (ref 0.5–2.2)
LYMPHOCYTES ABSOLUTE: 0.4 K/UL (ref 1–4.8)
LYMPHOCYTES RELATIVE PERCENT: 8.2 %
MCH RBC QN AUTO: 24.3 PG (ref 27–31.3)
MCHC RBC AUTO-ENTMCNC: 32.3 % (ref 33–37)
MCV RBC AUTO: 75.2 FL (ref 80–100)
MONOCYTES ABSOLUTE: 1 K/UL (ref 0.2–0.8)
MONOCYTES RELATIVE PERCENT: 19.8 %
NEUTROPHILS ABSOLUTE: 3.6 K/UL (ref 1.4–6.5)
NEUTROPHILS RELATIVE PERCENT: 69.2 %
PDW BLD-RTO: 20.5 % (ref 11.5–14.5)
PLATELET # BLD: 232 K/UL (ref 130–400)
POTASSIUM SERPL-SCNC: 3.4 MEQ/L (ref 3.4–4.9)
PROCALCITONIN: 0.53 NG/ML (ref 0–0.15)
RBC # BLD: 4.66 M/UL (ref 4.7–6.1)
SODIUM BLD-SCNC: 139 MEQ/L (ref 135–144)
TOTAL CK: 103 U/L (ref 0–190)
TOTAL PROTEIN: 6.5 G/DL (ref 6.3–8)
TROPONIN: 0.02 NG/ML (ref 0–0.01)
WBC # BLD: 5.2 K/UL (ref 4.8–10.8)

## 2022-05-14 PROCEDURE — 82550 ASSAY OF CK (CPK): CPT

## 2022-05-14 PROCEDURE — 80053 COMPREHEN METABOLIC PANEL: CPT

## 2022-05-14 PROCEDURE — 36415 COLL VENOUS BLD VENIPUNCTURE: CPT

## 2022-05-14 PROCEDURE — 83605 ASSAY OF LACTIC ACID: CPT

## 2022-05-14 PROCEDURE — 93005 ELECTROCARDIOGRAM TRACING: CPT | Performed by: PHYSICIAN ASSISTANT

## 2022-05-14 PROCEDURE — 99285 EMERGENCY DEPT VISIT HI MDM: CPT

## 2022-05-14 PROCEDURE — 85025 COMPLETE CBC W/AUTO DIFF WBC: CPT

## 2022-05-14 PROCEDURE — 71045 X-RAY EXAM CHEST 1 VIEW: CPT

## 2022-05-14 PROCEDURE — 84484 ASSAY OF TROPONIN QUANT: CPT

## 2022-05-14 PROCEDURE — 84145 PROCALCITONIN (PCT): CPT

## 2022-05-14 PROCEDURE — 87040 BLOOD CULTURE FOR BACTERIA: CPT

## 2022-05-14 RX ORDER — 0.9 % SODIUM CHLORIDE 0.9 %
250 INTRAVENOUS SOLUTION INTRAVENOUS ONCE
Status: DISCONTINUED | OUTPATIENT
Start: 2022-05-14 | End: 2022-05-14

## 2022-05-14 ASSESSMENT — ENCOUNTER SYMPTOMS
EYE DISCHARGE: 0
RECTAL PAIN: 0
ABDOMINAL DISTENTION: 0
RHINORRHEA: 0
ABDOMINAL PAIN: 0
COLOR CHANGE: 0
SHORTNESS OF BREATH: 0
SORE THROAT: 0
DIARRHEA: 0
CONSTIPATION: 0

## 2022-05-14 ASSESSMENT — PAIN - FUNCTIONAL ASSESSMENT
PAIN_FUNCTIONAL_ASSESSMENT: NONE - DENIES PAIN
PAIN_FUNCTIONAL_ASSESSMENT: NONE - DENIES PAIN

## 2022-05-14 NOTE — ED TRIAGE NOTES
Pt arrived by EMS pt states that he was having chest discomfort on the Right side if chest  Pt states from dialysis port  Pt has defib on the left side of chest  Pt denies any pain at this time  Pt was hypotensive when EMS arrived   Pt states taken BP prior to medications.

## 2022-05-14 NOTE — ED PROVIDER NOTES
[address] [facility] ED  eMERGENCY dEPARTMENT eNCOUnter      Pt Name: Rashad Rubio  MRN: [id number]  [place] 1970  Date of evaluation: 5/14/2022  Provider: Steph Quinones PA-C    CHIEF COMPLAINT       Chief Complaint   Patient presents with    Other     chest discomfort where port is         HISTORY OF PRESENT ILLNESS   (Location/Symptom, Timing/Onset,Context/Setting, Quality, Duration, Modifying Factors, Severity)  Note limiting factors. Rashad Rubio is a 54 y.o. male who presents to the emergency department complaint of right-sided chest pain and a dialysis port, burning sensation to eyes, and low blood pressure, which she states were present this morning. He states blood pressure at home was right around 80 systolic, he states he just recently started dialysis about 1 month ago, currently has a dialysis pigtail in place receives dialysis every Monday Wednesday Friday, did attend his last session on Friday has not missed any appointments. He denies any chest pain at this time, he states now that his pressure has improved, his symptoms are now resolved. He has 0 pain, no cough, no shortness of breath, no nausea vomiting, no dizziness, no abdominal pain. Past medical history significant for hypertension, kidney disease, tobacco abuse, cardiomyopathy, alcohol abuse. HPI    NursingNotes were reviewed. REVIEW OF SYSTEMS    (2-9 systems for level 4, 10 or more for level 5)     Review of Systems   Constitutional: Negative for activity change and appetite change. HENT: Negative for congestion, ear discharge, ear pain, nosebleeds, rhinorrhea and sore throat. Eyes: Negative for discharge. Respiratory: Negative for shortness of breath. Cardiovascular: Positive for chest pain. Negative for palpitations and leg swelling. Gastrointestinal: Negative for abdominal distention, abdominal pain, constipation, diarrhea and rectal pain.    Genitourinary: Negative for difficulty urinating and dysuria. Musculoskeletal: Negative for arthralgias. Skin: Negative for color change. Neurological: Negative for dizziness, syncope, numbness and headaches. Psychiatric/Behavioral: Negative for agitation and confusion. Except as noted above the remainder of the review of systems was reviewed and negative.        PAST MEDICAL HISTORY     Past Medical History:   Diagnosis Date    Abnormal EKG 9/27/2019    Acute kidney injury (BLUE) with [facility] tubular necrosis (ATN) [facility])     AICD (automatic cardioverter/defibrillator) present     Cardiomyopathy ([facility] [address]     per echo 8/2019    Chronic combined systolic and diastolic CHF (congestive heart failure) ([facility] [address] 9/27/2019    ETOH abuse     Hypertension     Tobacco abuse          SURGICALHISTORY       Past Surgical History:   Procedure Laterality Date    CARDIAC PACEMAKER PLACEMENT      DIALYSIS CATHETER INSERTION Right 04/12/2022    15.5 F x 19 cm SYMETREX LONG TERM HEMODIALYSIS CATHETER    HERNIA REPAIR Right 02/10/2021    RIGHT INGUINAL HERNIA REPAIR WITH MESH performed by Marialuisa Rodríguez MD at [address] [place] [address] IR TUNNELED CATHETER PLACEMENT GREATER THAN 5 YEARS  4/12/2022    IR TUNNELED CATHETER PLACEMENT GREATER THAN 5 YEARS 4/12/2022 [facility] SPECIAL PROCEDURE         CURRENT MEDICATIONS       Previous Medications    CARVEDILOL (COREG) 25 MG TABLET    take 1 tablet by mouth twice a day HOLD FOR SBP<100 OR HR <60    IVABRADINE (CORLANOR) 5 MG TABS TABLET    Take 1 tablet by mouth 2 times daily (with meals)    SACUBITRIL-VALSARTAN (ENTRESTO) 24-26 MG PER TABLET    Take 1 tablet by mouth 2 times daily    TORSEMIDE (DEMADEX) 20 MG TABLET    Take 1 tablet by mouth in the morning and at bedtime Pt states that he takes 4 tabs lately       ALLERGIES     Lipitor [atorvastatin]    FAMILY HISTORY       Family History   Problem Relation Age of Onset    Coronary Art Dis Mother           SOCIAL HISTORY       Social History     Socioeconomic History    Marital status: Life Partner     Spouse name: Reese Cobian Number of children: None    Years of education: None    Highest education level: None   Occupational History    None   Tobacco Use    Smoking status: Former Smoker     Packs/day: 1.00     Types: Cigarettes     Quit date: 12/3/2020     Years since quittin.4    Smokeless tobacco: Never Used    Tobacco comment: currently smoking only couple cigarettes daily   Substance and Sexual Activity    Alcohol use: Yes    Drug use: Never    Sexual activity: None   Other Topics Concern    None   Social History Narrative    None     Social Determinants of Health     Financial Resource Strain: Low Risk     Difficulty of Paying Living Expenses: Not hard at all   Food Insecurity: No Food Insecurity    Worried About Running Out of Food in the Last Year: Never true    Ginette of Food in the Last Year: Never true   Transportation Needs:     Lack of Transportation (Medical): Not on file    Lack of Transportation (Non-Medical):  Not on file   Physical Activity:     Days of Exercise per Week: Not on file    Minutes of Exercise per Session: Not on file   Stress:     Feeling of Stress : Not on file   Social Connections:     Frequency of Communication with Friends and Family: Not on file    Frequency of Social Gatherings with Friends and Family: Not on file    Attends Jewish Services: Not on file    Active Member of [address] [place] [address] [employer] or Organizations: Not on file    Attends Club or Organization Meetings: Not on file    Marital Status: Not on file   Intimate Partner Violence:     Fear of Current or Ex-Partner: Not on file    Emotionally Abused: Not on file    Physically Abused: Not on file    Sexually Abused: Not on file   Housing Stability:     Unable to Pay for Housing in the Last Year: Not on file    Number of [place] in the Last Year: Not on file    Unstable Housing in the Last Year: Not on file       SCREENINGS    [place] Coma Scale  Eye Opening: Spontaneous  Best Verbal Response: Oriented  Best Motor Response: Obeys commands  Marty Coma Scale Score: 15 @FLOW(10500974)@      PHYSICAL EXAM    (up to 7 for level 4, 8 or more for level 5)     ED Triage Vitals [05/14/22 1213]   BP Temp Temp Source Pulse Resp SpO2 Height Weight   101/64 97.8 °F (36.6 °C) Oral 67 18 96 % 6' (1.829 m) 202 lb (91.6 kg)       Physical Exam  Vitals and nursing note reviewed. Constitutional:       General: He is not in acute distress. Appearance: Normal appearance. He is well-developed. He is not ill-appearing, toxic-appearing or diaphoretic. HENT:      Head: Normocephalic. Right Ear: Tympanic membrane normal.      Left Ear: Tympanic membrane normal.      Nose: No congestion. Mouth/Throat:      Mouth: Mucous membranes are moist.      Pharynx: No oropharyngeal exudate or posterior oropharyngeal erythema. Eyes:      Extraocular Movements: Extraocular movements intact. Conjunctiva/sclera: Conjunctivae normal.      Pupils: Pupils are equal, round, and reactive to light. Neck:      Vascular: No JVD. Trachea: No tracheal deviation. Cardiovascular:      Rate and Rhythm: Normal rate. Pulses: Normal pulses. Heart sounds: Normal heart sounds. No murmur heard. No friction rub. No gallop. Pulmonary:      Effort: Pulmonary effort is normal. No tachypnea, accessory muscle usage, respiratory distress or retractions. Breath sounds: Normal breath sounds. No stridor. No wheezing, rhonchi or rales. Comments: Lung sounds are clear in all fields, there is no wheezes rales or rhonchi, no excess muscle use, no retractions, room air saturation 96%. Dialysis pigtail in left anterior chest wall, there is no drainage or discharge around the insertion site, looks well, no erythema, no warmth, no pain on palpation. Chest:      Chest wall: No tenderness. Abdominal:      General: Abdomen is flat. Bowel sounds are normal. There is no distension or abdominal bruit. Palpations: There is no shifting dullness, fluid wave, hepatomegaly, splenomegaly, mass or pulsatile mass. Tenderness: There is no abdominal tenderness. There is no right CVA tenderness, left CVA tenderness, guarding or rebound. Negative signs include Aburto's sign, Rovsing's sign and McBurney's sign. Musculoskeletal:         General: No deformity. Normal range of motion. Cervical back: Normal range of motion and neck supple. No rigidity. Skin:     General: Skin is warm and dry. Capillary Refill: Capillary refill takes less than 2 seconds. Coloration: Skin is not jaundiced. Neurological:      General: No focal deficit present. Mental Status: He is alert and oriented to person, place, and time. Mental status is at baseline. Cranial Nerves: No cranial nerve deficit. Sensory: No sensory deficit. Motor: No weakness. Coordination: Coordination normal.   Psychiatric:         Mood and Affect: Mood normal.         DIAGNOSTIC RESULTS     EKG: All EKG's are interpreted by the Emergency Department Physician who either signs or Co-signsthis chart in the absence of a cardiologist.    EKG shows sinus rhythm with a first-degree AV block, incomplete right bundle branch block, 71 bpm there is T wave inversions in leads aVL, V4, V5, V6 no ventricular ectopy QTC is 486 ms. EKG similar to previous EKG of 4/11/2022.     RADIOLOGY:   Non-plain filmimages such as CT, Ultrasound and MRI are read by the radiologist. Plain radiographic images are visualized and preliminarily interpreted by the emergency physician with the below findings:        Interpretation per the Radiologist below, if available at the time ofthis note:    XR CHEST PORTABLE    (Results Pending)         ED BEDSIDE ULTRASOUND:   Performed by ED Physician - none    LABS:  Labs Reviewed   COMPREHENSIVE METABOLIC PANEL - Abnormal; Notable for the following components:       Result Value    Glucose 112 (*)     BUN 32 (*) CREATININE 6.51 (*)     GFR Non- 9.1 (*)     GFR  11.0 (*)     Calcium 8.4 (*)     Alkaline Phosphatase 112 (*)     All other components within normal limits    Narrative:     CALL  Rosas  [facility] tel. [phone],  Creatinine results called to and read back by Manolo Vuong, 05/14/2022  13:36, by Jarek Keller   CBC WITH AUTO DIFFERENTIAL - Abnormal; Notable for the following components:    RBC 4.66 (*)     Hemoglobin 11.3 (*)     Hematocrit 35.0 (*)     MCV 75.2 (*)     MCH 24.3 (*)     MCHC 32.3 (*)     RDW 20.5 (*)     Lymphocytes Absolute 0.4 (*)     Monocytes Absolute 1.0 (*)     All other components within normal limits   PROCALCITONIN - Abnormal; Notable for the following components:    Procalcitonin 0.53 (*)     All other components within normal limits    Narrative:     CALL  [place]  [facility] tel. [phone],  Troponin results called to and read back by Manloo Vuong, 05/14/2022 13:36,  by Jarek Keller  Creatinine results called to and read back by Manolo Vuong, 05/14/2022  13:36, by Jarek Keller   TROPONIN - Abnormal; Notable for the following components:    Troponin 0.022 (*)     All other components within normal limits    Narrative:     CALL  Ros[place]  [facility] tel. [phone],  Troponin results called to and read back by Manolo Vuong, 05/14/2022 13:36,  by Jarek Keller  Creatinine results called to and read back by Manolo Vuong, 05/14/2022  13:36, by Jarek Keller   CULTURE, BLOOD 1   CULTURE, BLOOD 2   LACTIC ACID   CK    Narrative:     Charmaine Melgar  [facility] tel. [phone],  Creatinine results called to and read back by Manolo Vuong, 05/14/2022  13:36, by Jarek Keller       All other labs were within normal range or not returned as of this dictation.     EMERGENCY DEPARTMENT COURSE and DIFFERENTIAL DIAGNOSIS/MDM:   Vitals:    Vitals:    05/14/22 1213 05/14/22 1215 05/14/22 1330 05/14/22 1345   BP: 101/64 101/64 103/69    Pulse: 67 69 68 66   Resp: 18 12 22    Temp: 97.8 °F (36.6 °C)      TempSrc: Oral      SpO2: 96% 96% 100% 92%   Weight: 202 lb (91.6 kg)      Height: 6' (1.829 m)             MDM  Number of Diagnoses or Management Options  Hypotension due to hypovolemia  Stage 5 chronic kidney disease on chronic dialysis [facility])  Diagnosis management comments: Patient presented to the emerge department for complaint of hypotension, he states while he was at home, he started having some right-sided chest pain and area where his dialysis pigtail catheter is in he denies any any shortness of breath, no nausea vomiting, no fevers, no cough, patient states he just recently started dialysis had a treatment done yesterday. He was given approximately 500 cc of IV fluids by EMS while in route to the hospital, his pressures did stabilize, and patient states he did feel much better at the time of arrival prior. Patient states all pain is gone at this time, he does not feel dizzy short of breath or have chest pain. Basic labs were obtained while in ED, white count is 5.2 thousand, procalcitonin is mildly elevated, in the area of PICC line insertion, I do not see any significant signs of infection, no erythema, no pain, no drainage or discharge. Lung sounds are clear, chest x-ray shows no acute pulmonary process. Patient is feeling much better at this time, he has no significant complaints, is requesting discharge home, I did discuss with him my concerns for elevated procalcitonin level concerns for infection, patient will return to the emergency department  immediately should he have any worsening symptoms, he was advised if he has any redness, fevers, drainage, or pain around the pigtail catheter, he should return immediately. Amount and/or Complexity of Data Reviewed  Decide to obtain previous medical records or to obtain history from someone other than the patient: yes        CRITICAL CARE TIME   Total Critical Care time was 0 minutes, excluding separately reportableprocedures.   There was a high probability of clinicallysignificant/life threatening deterioration in the patient's condition which required my urgent intervention. CONSULTS:  None    PROCEDURES:  Unless otherwise noted below, none     Procedures    FINAL IMPRESSION      1. Hypotension due to hypovolemia    2.  Stage 5 chronic kidney disease on chronic dialysis [facility])          DISPOSITION/PLAN   DISPOSITION        PATIENT REFERRED TO:  Amelia Case MD  [id number] [phone]    In 2 days        DISCHARGE MEDICATIONS:  New Prescriptions    No medications on file          (Please note that portions of this note were completed with a voice recognition program.  Efforts were made to edit the dictations but occasionally words are mis-transcribed.)    Zenaida Essex, PA-C (electronically signed)  Attending Emergency Physician         Zenaida Essex, PA-C  05/14/22 7962

## 2022-05-16 ENCOUNTER — HOSPITAL ENCOUNTER (OUTPATIENT)
Dept: INTERVENTIONAL RADIOLOGY/VASCULAR | Age: 52
Discharge: HOME | End: 2022-05-18
Payer: MEDICAID

## 2022-05-16 ENCOUNTER — TELEPHONE (OUTPATIENT)
Dept: OTHER | Facility: CLINIC | Age: 52
End: 2022-05-16

## 2022-05-16 ENCOUNTER — HOSPITAL ENCOUNTER (OUTPATIENT)
Dept: CARDIAC CATH/INVASIVE PROCEDURES | Age: 52
Discharge: HOME | End: 2022-05-16
Payer: MEDICAID

## 2022-05-16 VITALS
DIASTOLIC BLOOD PRESSURE: 94 MMHG | OXYGEN SATURATION: 98 % | HEART RATE: 94 BPM | SYSTOLIC BLOOD PRESSURE: 157 MMHG | RESPIRATION RATE: 17 BRPM

## 2022-05-16 DIAGNOSIS — N18.4 ACUTE RENAL FAILURE SUPERIMPOSED ON STAGE 4 CHRONIC KIDNEY DISEASE, UNSPECIFIED ACUTE RENAL FAILURE TYPE (HCC): ICD-10-CM

## 2022-05-16 DIAGNOSIS — N17.9 ACUTE RENAL FAILURE SUPERIMPOSED ON STAGE 4 CHRONIC KIDNEY DISEASE, UNSPECIFIED ACUTE RENAL FAILURE TYPE (HCC): ICD-10-CM

## 2022-05-16 LAB
EKG ATRIAL RATE: 71 BPM
EKG P AXIS: 19 DEGREES
EKG P-R INTERVAL: 224 MS
EKG Q-T INTERVAL: 448 MS
EKG QRS DURATION: 98 MS
EKG QTC CALCULATION (BAZETT): 486 MS
EKG R AXIS: -49 DEGREES
EKG T AXIS: 91 DEGREES
EKG VENTRICULAR RATE: 71 BPM

## 2022-05-16 PROCEDURE — 64415 NJX AA&/STRD BRCH PLXS IMG: CPT

## 2022-05-16 PROCEDURE — 76942 ECHO GUIDE FOR BIOPSY: CPT

## 2022-05-16 PROCEDURE — 6360000002 HC RX W HCPCS

## 2022-05-16 PROCEDURE — 2500000003 HC RX 250 WO HCPCS: Performed by: NURSE PRACTITIONER

## 2022-05-16 PROCEDURE — 2500000003 HC RX 250 WO HCPCS

## 2022-05-16 PROCEDURE — 2709999900 IR PERC ARTERIOVENOUS FISTULA CREATION

## 2022-05-16 PROCEDURE — 6360000004 HC RX CONTRAST MEDICATION: Performed by: RADIOLOGY

## 2022-05-16 PROCEDURE — 6370000000 HC RX 637 (ALT 250 FOR IP): Performed by: NURSE PRACTITIONER

## 2022-05-16 PROCEDURE — G2171 AVF USE MAGNETIC/ART/VEN: HCPCS

## 2022-05-16 PROCEDURE — 6360000002 HC RX W HCPCS: Performed by: RADIOLOGY

## 2022-05-16 PROCEDURE — 37799 UNLISTED PX VASCULAR SURGERY: CPT | Performed by: RADIOLOGY

## 2022-05-16 PROCEDURE — 76942 ECHO GUIDE FOR BIOPSY: CPT | Performed by: RADIOLOGY

## 2022-05-16 PROCEDURE — 6360000002 HC RX W HCPCS: Performed by: NURSE PRACTITIONER

## 2022-05-16 PROCEDURE — 64450 NJX AA&/STRD OTHER PN/BRANCH: CPT | Performed by: RADIOLOGY

## 2022-05-16 PROCEDURE — 2580000003 HC RX 258: Performed by: NURSE PRACTITIONER

## 2022-05-16 PROCEDURE — 93010 ELECTROCARDIOGRAM REPORT: CPT | Performed by: INTERNAL MEDICINE

## 2022-05-16 PROCEDURE — 2709999900 IR NERVE BLOCK PROCEDURE

## 2022-05-16 RX ORDER — IODIXANOL 320 MG/ML
INJECTION, SOLUTION INTRAVASCULAR
Status: COMPLETED | OUTPATIENT
Start: 2022-05-16 | End: 2022-05-16

## 2022-05-16 RX ORDER — HEPARIN SODIUM 1000 [USP'U]/ML
INJECTION, SOLUTION INTRAVENOUS; SUBCUTANEOUS
Status: COMPLETED | OUTPATIENT
Start: 2022-05-16 | End: 2022-05-16

## 2022-05-16 RX ORDER — MIDAZOLAM HYDROCHLORIDE 2 MG/2ML
0.5 INJECTION, SOLUTION INTRAMUSCULAR; INTRAVENOUS
Status: DISCONTINUED | OUTPATIENT
Start: 2022-05-16 | End: 2022-05-19 | Stop reason: HOSPADM

## 2022-05-16 RX ORDER — 0.9 % SODIUM CHLORIDE 0.9 %
250 INTRAVENOUS SOLUTION INTRAVENOUS
Status: DISCONTINUED | OUTPATIENT
Start: 2022-05-16 | End: 2022-05-19 | Stop reason: HOSPADM

## 2022-05-16 RX ORDER — FENTANYL CITRATE 50 UG/ML
50 INJECTION, SOLUTION INTRAMUSCULAR; INTRAVENOUS
Status: DISCONTINUED | OUTPATIENT
Start: 2022-05-16 | End: 2022-05-19 | Stop reason: HOSPADM

## 2022-05-16 RX ORDER — LIDOCAINE HYDROCHLORIDE 20 MG/ML
10 INJECTION, SOLUTION INFILTRATION; PERINEURAL
Status: DISCONTINUED | OUTPATIENT
Start: 2022-05-16 | End: 2022-05-19 | Stop reason: HOSPADM

## 2022-05-16 RX ORDER — HEPARIN SODIUM 1000 [USP'U]/ML
2500 INJECTION, SOLUTION INTRAVENOUS; SUBCUTANEOUS ONCE
Status: COMPLETED | OUTPATIENT
Start: 2022-05-16 | End: 2022-05-16

## 2022-05-16 RX ORDER — 0.9 % SODIUM CHLORIDE 0.9 %
1000 INTRAVENOUS SOLUTION INTRAVENOUS
Status: DISCONTINUED | OUTPATIENT
Start: 2022-05-16 | End: 2022-05-19 | Stop reason: HOSPADM

## 2022-05-16 RX ORDER — VERAPAMIL HYDROCHLORIDE 2.5 MG/ML
2.5 INJECTION, SOLUTION INTRAVENOUS ONCE
Status: COMPLETED | OUTPATIENT
Start: 2022-05-16 | End: 2022-05-16

## 2022-05-16 RX ADMIN — MIDAZOLAM HYDROCHLORIDE 1 MG: 1 INJECTION, SOLUTION INTRAMUSCULAR; INTRAVENOUS at 09:15

## 2022-05-16 RX ADMIN — FENTANYL CITRATE 100 MCG: 50 INJECTION, SOLUTION INTRAMUSCULAR; INTRAVENOUS at 09:15

## 2022-05-16 RX ADMIN — IODIXANOL 80 ML: 320 INJECTION, SOLUTION INTRAVASCULAR at 09:59

## 2022-05-16 RX ADMIN — HEPARIN SODIUM 1000 UNITS: 1000 INJECTION INTRAVENOUS; SUBCUTANEOUS at 09:39

## 2022-05-16 RX ADMIN — LIDOCAINE HYDROCHLORIDE 20 ML: 20 INJECTION, SOLUTION INFILTRATION; PERINEURAL at 09:19

## 2022-05-16 RX ADMIN — SODIUM CHLORIDE 1000 ML: 9 INJECTION, SOLUTION INTRAVENOUS at 09:39

## 2022-05-16 RX ADMIN — MIDAZOLAM HYDROCHLORIDE 1 MG: 1 INJECTION, SOLUTION INTRAMUSCULAR; INTRAVENOUS at 10:52

## 2022-05-16 RX ADMIN — HEPARIN SODIUM 2500 UNITS: 1000 INJECTION INTRAVENOUS; SUBCUTANEOUS at 10:34

## 2022-05-16 RX ADMIN — LIDOCAINE HYDROCHLORIDE 6 ML: 20 INJECTION, SOLUTION INFILTRATION; PERINEURAL at 09:54

## 2022-05-16 RX ADMIN — FENTANYL CITRATE 100 MCG: 50 INJECTION, SOLUTION INTRAMUSCULAR; INTRAVENOUS at 10:52

## 2022-05-16 RX ADMIN — Medication 0.2 MG: at 10:34

## 2022-05-16 RX ADMIN — NITROGLYCERIN 0.5 INCH: 20 OINTMENT TOPICAL at 08:40

## 2022-05-16 RX ADMIN — SODIUM CHLORIDE 250 ML: 9 INJECTION, SOLUTION INTRAVENOUS at 09:14

## 2022-05-16 RX ADMIN — VERAPAMIL HYDROCHLORIDE 2.5 MG: 2.5 INJECTION INTRAVENOUS at 10:34

## 2022-05-16 ASSESSMENT — ENCOUNTER SYMPTOMS
ABDOMINAL PAIN: 0
GASTROINTESTINAL NEGATIVE: 1
VOMITING: 0
DIARRHEA: 0
NAUSEA: 0
RESPIRATORY NEGATIVE: 1
WHEEZING: 0
SHORTNESS OF BREATH: 0
COUGH: 0
CONSTIPATION: 0
BACK PAIN: 0
PHOTOPHOBIA: 0
EYES NEGATIVE: 1

## 2022-05-16 ASSESSMENT — PAIN SCALES - GENERAL
PAINLEVEL_OUTOF10: 0

## 2022-05-16 NOTE — TELEPHONE ENCOUNTER
Nurse Access attempted to contact pt regarding ED follow up. Attempt unsuccessful. Voicemail left for pt to return call for further assistance.

## 2022-05-16 NOTE — FLOWSHEET NOTE
1130 - Pt brought back to CT holding via cart. A&Ox3. Denies pain or SOB. No distress noted. Pt set up and given urinal per request - voided 500cc clear yellow urine. VSS, on RA. Left arm venous and arterial access sites WNL. Dressings clean / dry / intact, no bleeding / drainage / swelling noted. Radial pulse palpable. Pt given liquids and snacks and tolerating well. Lunch tray ordered. Per Dr. Bryant Sebastian, patient to remain in CT holding for 3 hours for recovery. Pt's torrey Alva called and plans to be here at 1420 to pick him up.     1200 - Left arm access sites assessed and remain WNL. No issues noted. Pt watching TV and reports no complaints at this time. 1215 - Pt set up on cart with lunch tray. Left arm dressing sites remain clean / dry / intact with no bleeding / draining / swelling noted. 1300 - Pt dozing in bed. Dressings x 2 to left arm remain WNL. 1330 - Pt asleep on cart, no distress. Dressings to left arm clean / dry / intact. 1340 - Pt awake and asking for additional drinks. D/C instructions reviewed with patient and understanding indicated. Questions answered. Pt instructed on feeling the \"thrill\" of fistuala creation and arm sling applied. Pt again provided with urinal and voided 350cc clear yellow urine.

## 2022-05-16 NOTE — PRE SEDATION
Sedation Pre-Procedure Note    Patient Name: Rudolph Blount   YOB: 1970  Room/Bed: Room/bed info not found  Medical Record Number: [id number]  Date: 5/16/2022   Time: 8:49 AM       Indication:  Left arm percutaneous AV fistula creation    Consent: I have discussed with the patient and/or the patient representative the indication, alternatives, and the possible risks and/or complications of the planned procedure and the anesthesia methods. The patient and/or patient representative appear to understand and agree to proceed. Vital Signs: There were no vitals filed for this visit. Past Medical History:   has a past medical history of Abnormal EKG, Acute kidney injury (BLUE) with acute tubular necrosis (ATN) ([facility] [address] AICD (automatic cardioverter/defibrillator) present, Cardiomyopathy ([facility] [address] Chronic combined systolic and diastolic CHF (congestive heart failure) ([facility] [address] ETOH abuse, Hypertension, and Tobacco abuse. Past Surgical History:   has a past surgical history that includes hernia repair (Right, 02/10/2021); Cardiac pacemaker placement; Dialysis Catheter Insertion (Right, 04/12/2022); and IR TUNNELED CVC PLACE WO SQ PORT/PUMP > 5 YEARS (4/12/2022). Medications:   Scheduled Meds:    fat emulsion  250 mL IntraVENous Once    heparin (porcine)  2,500 Units IntraVENous Once    MLO NITROGLYCERIN 2 MG / 10 ML SYRINGE 0.2 mg  0.2 mg Injection Once    nitroglycerin  0.5 inch Topical Once    verapamil  2.5 mg Injection Once     Continuous Infusions:   PRN Meds: lidocaine, sodium chloride, sodium chloride, fentanNYL, lidocaine, midazolam  Home Meds:   Prior to Admission medications    Medication Sig Start Date End Date Taking?  Authorizing Provider   sacubitril-valsartan (ENTRESTO) 24-26 MG per tablet Take 1 tablet by mouth 2 times daily 4/21/22   MICAH Alanis   ivabradine [facility]) 5 MG TABS tablet Take 1 tablet by mouth 2 times daily (with meals) 4/21/22   MICAH Alanis torsemide (DEMADEX) 20 MG tablet Take 1 tablet by mouth in the morning and at bedtime Pt states that he takes 4 tabs lately  Patient taking differently: Take 20 mg by mouth in the morning and at bedtime  4/15/22   Yan MAYBERRY Holilda, DO   carvedilol (COREG) 25 MG tablet take 1 tablet by mouth twice a day HOLD FOR SBP<100 OR HR <60 3/1/22   MICAH Ricci     Coumadin Use Last 7 Days:  no  Antiplatelet drug therapy use last 7 days: no  Other anticoagulant use last 7 days: no  Additional Medication Information:  none      Pre-Sedation Documentation and Exam:   I have personally completed a history, physical exam & review of systems for this patient (see notes).     Mallampati Airway Assessment:  Mallampati Class II - (soft palate, fauces & uvula are visible)    Prior History of Anesthesia Complications:   none    ASA Classification:  Class 3 - A patient with severe systemic disease that limits activity but is not incapacitating    Sedation/ Anesthesia Plan:   intravenous sedation    Medications Planned:   midazolam (Versed) intravenously and fentanyl intravenously    Patient is an appropriate candidate for plan of sedation: yes    Electronically signed by Bryant Macdonald MD on 5/16/2022 at 8:49 AM

## 2022-05-16 NOTE — PROGRESS NOTES
Pt ambulated, gait steady, back to CT holding. Pt A&Ox4, respirations even and unlabored, skin WNL. Allergies, hx, meds reviewed with pt. Pt c/o teeth pain, not sure if cavity or infection per pt. Pt changed into hospital gown and connected to monitors. SR on monitor, VSS. Pt #20 IV started in right FA, GBR, flushed well. Pt confirmed NPO since 5/15/21 ~0830pm. Dr. Yahaira Chavez notified pt ready in CT holding. Pt resting comfortably, watching TV, call light within reach. Electronically signed by Dylan Parkinson RN on 5/16/2022 at 8:08 AM

## 2022-05-16 NOTE — FLOWSHEET NOTE
Patient getting ready to be discharged home. Patient states that he is having pain 10/10 in his [facility] area. Dr. Ronaldo Lopez notified of patient's pain. Patient otherwise stable. States he needs to go to dialysis today at 1500. Patient's left arm placed in sling, as ordered. Dr. Ronaldo Lopez states that he will come see patient. 1430- Patient states that he \"will be fine and that he doesn't want to wait for Dr. Ronaldo Lopez to come see him. \" Patient unhooked from monitor. IV removed, catheter intact. Dry Dressing applied. Dressings to both sites on left arm are clean, dry, and intact. Thrill able to be felt still, although continues to be faint. [id number]- Patient getting dressed independently. Dr. Ronaldo Lopez in seeing the patient now. Dr. Ronaldo Lopez encouraged patient to take Tylenol or Motrin for pain. Also told patient that if he is experiencing severe pain to notify the office so he can come in and assess the patient's pain. Patient verbalized understanding. 1442- Patient dressed, has all of his belongings and is being taken out for discharge home via wheelchair. Patient's friend Moris Washingtong to take him home.

## 2022-05-16 NOTE — H&P
CC: Acute on chronic renal failure     HPI:  Patient here with renal failure requiring dialysis for percutaneous left arm AVF creation. Family History   Problem Relation Age of Onset    Coronary Art Dis Mother        Past Surgical History:   Procedure Laterality Date    CARDIAC PACEMAKER PLACEMENT      DIALYSIS CATHETER INSERTION Right 2022    15.5 F x 19 cm SYMETREX LONG TERM HEMODIALYSIS CATHETER    HERNIA REPAIR Right 02/10/2021    RIGHT INGUINAL HERNIA REPAIR WITH MESH performed by Christina Mulligan MD at [address] [place] [address] IR TUNNELED CATHETER PLACEMENT GREATER THAN 5 YEARS  2022    IR TUNNELED CATHETER PLACEMENT GREATER THAN 5 YEARS 2022 [facility] SPECIAL PROCEDURE        Past Medical History:   Diagnosis Date    Abnormal EKG 2019    Acute kidney injury (BLUE) with acute tubular necrosis (ATN) (HCC)     AICD (automatic cardioverter/defibrillator) present     Cardiomyopathy ([address]     per echo 2019    Chronic combined systolic and diastolic CHF (congestive heart failure) ([address] 2019    ETOH abuse     Hypertension     Tobacco abuse        Social History     Socioeconomic History    Marital status: Life Partner     Spouse name: Paulie Yuan Number of children: None    Years of education: None    Highest education level: None   Occupational History    None   Tobacco Use    Smoking status: Former Smoker     Packs/day: 1.00     Types: Cigarettes     Quit date: 12/3/2020     Years since quittin.4    Smokeless tobacco: Never Used    Tobacco comment: currently smoking only couple cigarettes daily   Substance and Sexual Activity    Alcohol use:  Yes    Drug use: Never    Sexual activity: None   Other Topics Concern    None   Social History Narrative    None     Social Determinants of Health     Financial Resource Strain: Low Risk     Difficulty of Paying Living Expenses: Not hard at all   Food Insecurity: No Food Insecurity    Worried About Running Out of Food in the Last Year: Never true    Ran Out of Food in the Last Year: Never true   Transportation Needs:     Lack of Transportation (Medical): Not on file    Lack of Transportation (Non-Medical): Not on file   Physical Activity:     Days of Exercise per Week: Not on file    Minutes of Exercise per Session: Not on file   Stress:     Feeling of Stress : Not on file   Social Connections:     Frequency of Communication with Friends and Family: Not on file    Frequency of Social Gatherings with Friends and Family: Not on file    Attends Denominational Services: Not on file    Active Member of [address] [place] [address] [employer] or Organizations: Not on file    Attends Club or Organization Meetings: Not on file    Marital Status: Not on file   Intimate Partner Violence:     Fear of Current or Ex-Partner: Not on file    Emotionally Abused: Not on file    Physically Abused: Not on file    Sexually Abused: Not on file   Housing Stability:     Unable to Pay for Housing in the Last Year: Not on file    Number of [place] in the Last Year: Not on file    Unstable Housing in the Last Year: Not on file       Allergies   Allergen Reactions    Lipitor [Atorvastatin] Other (See Comments)     Coughing        Current Outpatient Medications on File Prior to Encounter   Medication Sig Dispense Refill    sacubitril-valsartan (ENTRESTO) 24-26 MG per tablet Take 1 tablet by mouth 2 times daily 60 tablet 2    ivabradine (CORLANOR) 5 MG TABS tablet Take 1 tablet by mouth 2 times daily (with meals) 60 tablet 3    torsemide (DEMADEX) 20 MG tablet Take 1 tablet by mouth in the morning and at bedtime Pt states that he takes 4 tabs lately (Patient taking differently: Take 20 mg by mouth in the morning and at bedtime ) 30 tablet 3    carvedilol (COREG) 25 MG tablet take 1 tablet by mouth twice a day HOLD FOR SBP<100 OR HR <60 60 tablet 1     No current facility-administered medications on file prior to encounter. Review of Systems   Constitutional: Negative. soft. There is no mass. Tenderness: There is no abdominal tenderness. There is no guarding or rebound. Hernia: No hernia is present. Musculoskeletal:         General: No tenderness or deformity. Cervical back: Neck supple. Skin:     General: Skin is dry. Coloration: Skin is not pale. Findings: No erythema or rash. Neurological:      Mental Status: He is alert and oriented to person, place, and time. Cranial Nerves: No cranial nerve deficit. Psychiatric:         Behavior: Behavior normal.         Thought Content: Thought content normal.         Judgment: Judgment normal.             XR CHEST PORTABLE    Result Date: 5/14/2022  XR CHEST PORTABLE : 5/14/2022 CLINICAL HISTORY:  right side chest pain at dialysis port . COMPARISON: None available TECHNIQUE: A portable upright AP radiograph of the chest was obtained. FINDINGS: Since the prior study, a right internal jugular approach hemodialysis catheter as then placed with its tip overlying the cavoatrial junction. There is no developing pulmonary infiltrate, cardiomegaly, sizable pleural effusion, vascular congestion, pneumothorax, or displaced fractures identified. A left subclavian AICD remains in expected position. NO EVIDENCE OF ACTIVE CARDIOPULMONARY DISEASE, BY PORTABLE CHEST RADIOGRAPHY.         ASSESSMENT ANDPLAN:    Assessment: Renal failure    Plan: Left arm AVF creation    Eunice Grier MD, Lyubov Wyatt

## 2022-05-16 NOTE — OR NURSING
access. 1034 Insertion of diluted cocktail (2500 units Heparin, 250 mcg Nitro, 2.5 mg verapamil) in 20 ml of heparnized NS through arterial sheath by Dr Mariaelena Avila. .    1042  Kumpe wire 4F x 40cm placed by DR Mariaelena Avila. 1044 Arterial WavelinQ catheter 4 Fr placed over the wire and moved into position under fluoro guidance. 1046 Venous WavelinQ catheter 4 Fr placed over the wire and moved into position. 1052 1 mg versed IV and 100 mg fentanyl IV given for conscious sedation. Bovie pen attached to venous catheter. Confirming alignment of arterial and venous WavelinQ catheter using fluoro guidance. 1053 Radiofrequency energy delivered by Dr Mariaelena Avila. Hand contrast given by Dr Mariaelena Avila to arterial site. to confirm fistula creation. 1055  Both venous and arterial catheters removed. Manual pressure being held over sites by NH and JS until hemostasis is achieved. 1105 Pressure no longer held at venous site. [id number] noted with palpation below the ac.    1120 Arterial site soft, no bleeding noted, continue manual pressure to arterial site,  Venous catheter removed and manual pressure applied until hemostasis is achieved. 1121 Hemostasis to access sites dressed with guaze and Tegaderm, no bleeding at this time. 5 Pt assisted off specials table and onto cart for transport to CT holding for recovery period. Report given to holding room RN. Pt to be d/c in 3 hours.     Contrast total: 80 ml    Fentanyl total: 200 mcg    Versed total : 2 ml

## 2022-05-17 ENCOUNTER — POST-OP TELEPHONE (OUTPATIENT)
Dept: INTERVENTIONAL RADIOLOGY/VASCULAR | Age: 52
End: 2022-05-17

## 2022-05-17 NOTE — FLOWSHEET NOTE
RN call to patient to follow up post-op, as pt had fist  on wavelinq fistula creation with Dr. Sharri Bustillos on 5/16/22. Pt reports 0/10 pain to site after taking tylenol. Pt states no difficulty with breathing. Pt confirms site and dressing is clean, dry, and intact. Pt had questions regarding dressing changes. Pt instructed that dressing may be removed tomorrow at dialysis and that after dressing is removed he can get site wet and bathe without completely submerging site until it is completely healed. Pt instructed to call back the radiology dept and request to speak with a nurse if any questions or concerns should develop at [phone].

## 2022-05-19 LAB
BLOOD CULTURE, ROUTINE: NORMAL
CULTURE, BLOOD 2: NORMAL

## 2022-05-20 ENCOUNTER — CARE COORDINATION (OUTPATIENT)
Dept: CARE COORDINATION | Age: 52
End: 2022-05-20

## 2022-05-20 NOTE — CARE COORDINATION
Phone call to patient for follow up to discuss Advance Directive. Inquired if patient received forms in mail. Inquired if patient had questions. Patient states he received information in mail. Patient report no questions. Patient shared that he would only like to list his significant other- Edith Dorantes and brother- Josias. Reviewed contact information for both parties. Patient states that he will work on filling out forms this upcoming week. Informed patient that this writer will call for second follow up. Remind patient that forms will need to be signed by 2 witness or notarized. Patient verbalized understanding.

## 2022-05-24 ENCOUNTER — CARE COORDINATION (OUTPATIENT)
Dept: CARE COORDINATION | Age: 52
End: 2022-05-24

## 2022-05-24 RX ORDER — CALCIUM ACETATE 667 MG/1
CAPSULE ORAL
COMMUNITY
Start: 2022-04-29

## 2022-05-24 NOTE — CARE COORDINATION
Luly Slaughter  May 24, 2022    Initial Referral Reason: CHF     Patient Care Team:  Alda Flowers MD as PCP - General (Internal Medicine)  Alda Flowers MD as PCP - [facility]  Екатерина Jiang DO as Cardiologist (Cardiology)  Farnaz Hodges RN as Ambulatory Care Manager  Terry Espino RD, LD as Dietitian (Dietitian Registered)    Past Medical History:    Current Outpatient Medications   Medication Sig Dispense Refill    calcium acetate (PHOSLO) 667 MG CAPS capsule TAKE 1 CAPSULE BY MOUTH THREE TIMES DAILY WITH MEALS      sacubitril-valsartan (ENTRESTO) 24-26 MG per tablet Take 1 tablet by mouth 2 times daily 60 tablet 2    ivabradine (CORLANOR) 5 MG TABS tablet Take 1 tablet by mouth 2 times daily (with meals) 60 tablet 3    torsemide (DEMADEX) 20 MG tablet Take 1 tablet by mouth in the morning and at bedtime Pt states that he takes 4 tabs lately (Patient taking differently: Take 20 mg by mouth in the morning and at bedtime ) 30 tablet 3    carvedilol (COREG) 25 MG tablet take 1 tablet by mouth twice a day HOLD FOR SBP<100 OR HR <60 60 tablet 1     No current facility-administered medications for this visit. Biochemical Data, Medical Tests and Procedures:    Lab Results   Component Value Date    LABA1C 5.0 08/27/2019     No results found for: EAG    Lab Results   Component Value Date    CHOL 144 01/16/2022    CHOL 143 07/13/2021    CHOL 174 05/06/2021     Lab Results   Component Value Date    TRIG 79 01/16/2022    TRIG 88 07/13/2021    TRIG 98 05/06/2021     Lab Results   Component Value Date    HDL 67 (H) 01/16/2022    HDL 71 (H) 07/13/2021    HDL 87 (H) 05/06/2021       Anthropometric Measurements:    Height: 72 inches (182.9 cm)  Weight: 202 lb (91.6 kg)  BMI: 27.40 (overweight)  IBW: 178 lb (80.9 kg) +-10%  %IBW: 113.5%     Physical Exam Findings:  Deferred    Nutrition Interview: ROSA called pt, explained reason for call and role in care.  RD offered to mail educational handouts to pt for review, pt accepted. RD verified address. RD explained the nutrition plan for heart failure usually limits the sodium from food and beverages to no more than 2000 mg per day. Discussed avoiding the salt shaker when eating/cooking. Explained how sodium is hidden in a lot of foods and the importance of reading food labels and paying close attention to sodium content per serving. Discussed draining/rinsing canned goods to decrease sodium content. Patient asked specifically about BBQ ribs- explained BBQ sauce is high in sodium. Discussed watching portion sizes and moderation. RD encouraged patient to read through the handouts once received in the mail and we will discuss information at follow up call. Nutrition Diagnosis:  #1 Problem Food and nutrition-related knowledge deficit       Etiology related to lack of prior nutrition related education        Signs/Symptoms as evidenced by referral for low sodium nutrition education     Nutrition Intervention:     Estimated Needs  cardiac diet providing 2200 kcals to promote wt maintenance ([address] based on CBW). Estimated daily Protein Needs: 73-92 g (based on 0.8-1.0 g/kg based on CBW)  Estimated daily Fluid Needs: per MD     #1 Nutrition Information: Provided patient with Meal Planner CHF and Heart Failure Nutrition Therapy handouts. For reinforcement of concepts discussed during nutrition interview. Follow Up: RD will call pt in 3 weeks to follow up and make sure pt received handouts in mail. RD will answer any nutrition related questions at this time.      [address] [facility], [facility]

## 2022-05-24 NOTE — PATIENT INSTRUCTIONS
Continue to take all medications as instructed. Do not let your medications run out. Reach out to ordering provider for refills when needed. Monitor weights as instructed Monday Wednesday and Friday and keep a log. Review CHF education and use a self assessment tools and chronic condition management. Follow-up with providers with symptom changes. Patient Education        Learning About Saving Energy When You Have a Chronic Condition  Introduction     Everyday tasks can be tiring when you have COPD, heart failure, or another long-term (chronic) condition. You may feel at times that you've lost your ability to live your life. But learning to conserve, or save, your energy canhelp you be less tired. Conserving your energy means finding ways of doing daily activities with as little effort as possible. With some small changes in the way you do things,you can get your tasks done more easily. Some treatments are available that might help. Pulmonary rehabilitation can teach you ways to breathe easier. Cardiac rehabilitation can help make your heart stronger. You also may want to see an occupational or physical therapist. The therapist can give you more tips on building strength and moving with lesseffort. What can you do to conserve your energy? Planning   Make a list of what you have to do every day. Group the tasks by location.  Do all the chores in one part of your house around the same time.  Go out for errands or do chores at the time of day when you have the most energy.  Plan rest periods into your day. Getting things done   Sit down as often as you can when you get dressed, do chores, or cook.  Use a cart with wheels to roll items, such as laundry, from one room to another.  Push or slide boxes or other large items instead of lifting them. Reaching and bending   Put things you use the most on shelves that are at the level of your waist or shoulder.    Use long-handled grabbers or other tools to reach items on a high shelf or to  things off the floor. Use long-handled dusters when you clean the house.  Use a raised toilet seat to avoid bending too far to sit or stand up. Eating   Try eating small, frequent meals instead of three larger meals so your stomach is never too full. A full stomach can push on the muscle that helps you breathe (your diaphragm) and make it harder to breathe.  If you get too tired to eat much, try to choose healthy foods that have more calories. Have a yogurt-and-fruit smoothie for breakfast. Put avocado on a sandwich. Or add cheese or peanut butter to snacks.  If you don't feel very hungry, try to eat first and drink water or other fluids later, after a meal. This can help keep you from losing weight. Sip small amounts of fluids if you need to drink while you eat. Having sex   Choose the time of day when you have more energy.  A wbbc-ft-qaga position for sex can be less tiring. Sometimes you may want to focus more on caressing. Watch closely for changes in your health, and be sure to contact your doctor ifyou have any problems. Where can you learn more? Go to https://Freak'n GeniuspeTRAKLOK.Flint Capital. org and sign in to your [employer] account. Enter H190 in the RoboDynamics box to learn more about \"Learning About Saving Energy When You Have a Chronic Condition. \"     If you do not have an account, please click on the \"Sign Up Now\" link. Current as of: July 6, 2021               Content Version: 13.2  © 2006-2022 Healthwise, Incorporated. Care instructions adapted under license by [facility] ([facility]). If you have questions about a medical condition or this instruction, always ask your healthcare professional. [place] [address] any warranty or liability for your use of this information.          Patient Education        Fluid Restriction: Care Instructions  Your Care Instructions     A buildup of fluid in the body can cause low sodium levels in the blood. It may also cause symptoms such as swelling and pain. Your doctor may suggest that you limit liquids, including foods that contain a lot of liquid. Limiting liquidsis called fluid restriction. Keeping track of the amount of fluids you take in may help you feel better. Your doctor will tell you how much fluid you can have in a day. Follow-up care is a key part of your treatment and safety. Be sure to make and go to all appointments, and call your doctor if you are having problems. It's also a good idea to know your test results and keep alist of the medicines you take. How can you care for yourself at home?  Find a way of tracking the fluids you take in that works for you. Here are two methods you can try:  ? Write down how much you drink throughout the day. ? Keep a container filled with the amount of liquid allowed for the day. As you drink liquids during the day, such as a 6-ounce cup of coffee, pour that same amount out of the container. When the container is empty, you've had your liquid for the day.  Count any foods that will melt (such as ice cream, gelatin, or flavored ice treats) or liquid foods (such as soup) as part of your fluids for the day. Also count the liquid in canned fruits and vegetables as part of your daily intake, or drain them well before serving.  Space your liquids throughout the day. Then you won't be tempted to drink more than the amount your doctor recommends.  To relieve thirst without taking in extra water, try chewing gum, sucking on hard candy (sugarless if you have diabetes), or rinsing your mouth with water and spitting it out. Where can you learn more? Go to https://Club Scene Networkradhaewmitesh.Cyto Wave Technologies. org and sign in to your Applied Micro[employer]ures account. Enter Z652 in the Penguin Computing box to learn more about \"Fluid Restriction: Care Instructions. \"     If you do not have an account, please click on the \"Sign Up Now\" link.   Current as of: January 10, 2097               NVWQWVR Version: 13.2  © 7459-5057 INBEP. Care instructions adapted under license by [facility] ([facility]). If you have questions about a medical condition or this instruction, always ask your healthcare professional. [address] any warranty or liability for your use of this information. Patient Education        Heart Failure: Care Instructions  Your Care Instructions     Heart failure occurs when your heart does not pump as much blood as the body needs. Failure does not mean that the heart has stopped pumping but rather that it is not pumping as well as it should. Over time, this causes fluid buildup in your lungs and other parts of your body. Fluid buildup can cause shortness of breath, fatigue, swollen ankles, and other problems. By taking medicines regularly, reducing sodium (salt) in your diet, checking your weight every day,and making lifestyle changes, you can feel better and live longer. Follow-up care is a key part of your treatment and safety. Be sure to make and go to all appointments, and call your doctor if you are having problems. It's also a good idea to know your test results and keep alist of the medicines you take. How can you care for yourself at home? Medicines     Be safe with medicines. Take your medicines exactly as prescribed. Call your doctor if you think you are having a problem with your medicine.      Do not take any vitamins, over-the-counter medicine, or herbal products without talking to your doctor first. Rickie Staciam not take ibuprofen (Advil or Motrin) and naproxen (Aleve) without talking to your doctor first. They could make your heart failure worse.      You may take some of the following medicine. ? Angiotensin-converting enzyme inhibitors (ACEIs) or angiotensin II receptor blockers (ARBs) reduce the heart's workload, lower blood pressure, and reduce swelling.  Taking an ACEI or ARB may lower your chance of needing to be hospitalized. ? Beta-blockers can slow heart rate, decrease blood pressure, and improve your condition. Taking a beta-blocker may lower your chance of needing to be hospitalized. ? Diuretics, also called water pills, reduce swelling. You will get more details on the specific medicines your doctor prescribes. Diet     Your doctor may suggest that you limit sodium. Your doctor can tell you how much sodium is right for you. An example is less than 3,000 mg a day. This includes all the salt you eat in cooking or in packaged foods. People get most of their sodium from processed foods. Fast food and restaurant meals also tend to be very high in sodium.      Ask your doctor how much liquid you can drink each day. You may have to limit liquids. Weight     Weigh yourself without clothing at the same time each day. Record your weight. Call your doctor if you have a sudden weight gain, such as more than 2 to 3 pounds in a day or 5 pounds in a week. (Your doctor may suggest a different range of weight gain.) A sudden weight gain may mean that your heart failure is getting worse. Activity level     Start light exercise (if your doctor says it is okay). Even if you can only do a small amount, exercise will help you get stronger, have more energy, and manage your weight and your stress. Walking is an easy way to get exercise. Start out by walking a little more than you did before. Bit by bit, increase the amount you walk.      When you exercise, watch for signs that your heart is working too hard. You are pushing yourself too hard if you cannot talk while you are exercising. If you become short of breath or dizzy or have chest pain, stop, sit down, and rest.      If you feel \"wiped out\" the day after you exercise, walk slower or for a shorter distance until you can work up to a better pace.      Get enough rest at night. Sleeping with 1 or 2 pillows under your upper body and head may help you breathe easier. Lifestyle changes     Do not smoke. Smoking can make a heart condition worse. If you need help quitting, talk to your doctor about stop-smoking programs and medicines. These can increase your chances of quitting for good. Quitting smoking may be the most important step you can take to protect your heart.      Limit alcohol to 2 drinks a day for men and 1 drink a day for women. Too much alcohol can cause health problems.      Avoid getting sick from colds and the flu. Get a pneumococcal vaccine shot. If you have had one before, ask your doctor whether you need another dose. Get a flu shot each year. If you must be around people with colds or the flu, wash your hands often. When should you call for help? Call 911  if you have symptoms of sudden heart failure such as:     You have severe trouble breathing.      You cough up pink, foamy mucus.      You have a new irregular or rapid heartbeat. Call your doctor now or seek immediate medical care if:     You have new or increased shortness of breath.      You are dizzy or lightheaded, or you feel like you may faint.      You have sudden weight gain, such as more than 2 to 3 pounds in a day or 5 pounds in a week. (Your doctor may suggest a different range of weight gain.)      You have increased swelling in your legs, ankles, or feet.      You are suddenly so tired or weak that you cannot do your usual activities. Watch closely for changes in your health, and be sure to contact your doctor ifyou develop new symptoms. Where can you learn more? Go to https://Sportmeetsezio.Visible Path. org and sign in to your [employer] account. Enter C529 in the Propagenix box to learn more about \"Heart Failure: Care Instructions. \"     If you do not have an account, please click on the \"Sign Up Now\" link. Current as of: January 10, 2022               Content Version: 13.2  © 0589-3874 Healthwise, Incorporated.    Care instructions adapted under license by [facility] ([facility]). If you have questions about a medical condition or this instruction, always ask your healthcare professional. [place] [address] any warranty or liability for your use of this information. Patient Education        Learning About Heart Failure Zones  What are heart failure zones? Heart failure zones give you an easy way to see changes in your heart failure symptoms. They also tell you when you need to get help. Check every day to seewhich zone you are in. Green zone. You are doing well. This is where you want to be.  Your weight is stable. It's not going up or down.  You breathe easily.  You are sleeping well. You are able to lie flat without shortness of breath.  You can do your usual activities. Yellow zone. Be careful. Your symptoms are changing. Call your doctor.  You have new or increased shortness of breath.  You are dizzy or lightheaded, or you feel like you may faint.  You have sudden weight gain, such as more than 2 to 3 pounds in a day or 5 pounds in a week. (Your doctor may suggest a different range of weight gain.)   You have increased swelling in your legs, ankles, or feet.  You are so tired or weak that you can't do your usual activities.  You are not sleeping well. Shortness of breath wakes you up at night. You need extra pillows. Red zone. This is an emergency. Call 911. You have symptoms of sudden heart failure. For example:   You have severe trouble breathing.  You cough up pink, foamy mucus.  You have a new irregular or fast heartbeat. You have symptoms of a heart attack. These may include:   Chest pain or pressure, or a strange feeling in the chest.   Sweating.  Shortness of breath.  Nausea or vomiting.  Pain, pressure, or a strange feeling in the back, neck, jaw, or upper belly or in one or both shoulders or arms.  Lightheadedness or sudden weakness.  A fast or irregular heartbeat.   If you have symptoms of a heart attack: After you call 911, the  may tell you to chew 1 adult-strength or 2 to 4 low-doseaspirin. Wait for an ambulance. Do not try to drive yourself. Follow-up care is a key part of your treatment and safety. Be sure to make and go to all appointments, and call your doctor if you are having problems. It's also a good idea to know your test results and keep alist of the medicines you take. Where can you learn more? Go to https://infibond.SVTC Technologies. org and sign in to your MakersKit account. Enter T174 in the ZenDeals box to learn more about \"Learning About Heart Failure Zones. \"     If you do not have an account, please click on the \"Sign Up Now\" link. Current as of: January 10, 2022               Content Version: 13.2  © 2006-2022 Conversation Media. Care instructions adapted under license by [facility] ([facility]). If you have questions about a medical condition or this instruction, always ask your healthcare professional. [place] [address] any warranty or liability for your use of this information. Patient Education        Avoiding Triggers With Heart Failure: Care Instructions  Your Care Instructions     Triggers are anything that make your heart failure flare up. A flare-up is also called \"sudden heart failure\" or \"acute heart failure. \" When you have a flare-up, fluid builds up in your lungs, and you have problems breathing. You might need to go to the hospital. By watching for changes in your condition andavoiding triggers, you can prevent heart failure flare-ups. Follow-up care is a key part of your treatment and safety. Be sure to make and go to all appointments, and call your doctor if you are having problems. It's also a good idea to know your test results and keep alist of the medicines you take. How can you care for yourself at home? Watch for changes in your weight and condition   Weigh yourself without clothing at the same time each day. Record your weight. Call your doctor if you have sudden weight gain, such as more than 2 to 3 pounds in a day or 5 pounds in a week. (Your doctor may suggest a different range of weight gain.) A sudden weight gain may mean that your heart failure is getting worse.  Keep a daily record of your symptoms. Write down any changes in how you feel, such as new shortness of breath, cough, or problems eating. Also record if your ankles are more swollen than usual and if you feel more tired than usual. Note anything that you ate or did that could have triggered these changes. Limit sodium  Sodium causes your body to hold on to extra water. This may cause your heart failure symptoms to get worse. People get most of their sodium from processedfoods. Fast food and restaurant meals also tend to be very high in sodium.  Your doctor may suggest that you limit sodium. Your doctor can tell you how much sodium is right for you. This includes limiting sodium in cooked and packaged foods.  Read food labels on cans and food packages. They tell you how much sodium you get in one serving. Check the serving size. If you eat more than one serving, you are getting more sodium.  Be aware that sodium can come in forms other than salt, including monosodium glutamate (MSG), sodium citrate, and sodium bicarbonate (baking soda). MSG is often added to Asian food. You can sometimes ask for food without MSG or salt.  Slowly reducing salt will help you adjust to the taste. Take the salt shaker off the table.  Flavor your food with garlic, lemon juice, onion, vinegar, herbs, and spices instead of salt. Do not use soy sauce, steak sauce, onion salt, garlic salt, mustard, or ketchup on your food, unless it is labeled \"low-sodium\" or \"low-salt. \"   Make your own salad dressings, sauces, and ketchup without adding salt.  Use fresh or frozen ingredients, instead of canned ones, whenever you can. Choose low-sodium canned goods.    Eat less processed food and food from restaurants, including fast food. Exercise as directed  Moderate, regular exercise is very good for your heart. It improves your blood flow and helps control your weight. But too much exercise can stress your heartand cause a heart failure flare-up.  Check with your doctor before you start an exercise program.   Walking is an easy way to get exercise. Start out slowly. Gradually increase the length and pace of your walk. Swimming, riding a bike, and using a treadmill are also good forms of exercise.  When you exercise, watch for signs that your heart is working too hard. You are pushing yourself too hard if you cannot talk while you are exercising. If you become short of breath or dizzy or have chest pain, stop, sit down, and rest.   Do not exercise when you do not feel well. Take medicines correctly   Take your medicines exactly as prescribed. Call your doctor if you think you are having a problem with your medicine.  Make a list of all the medicines you take. Include those prescribed to you by other doctors and any over-the-counter medicines, vitamins, or supplements you take. Take this list with you when you go to any doctor.  Take your medicines at the same time every day. It may help you to post a list of all the medicines you take every day and what time of day you take them.  Make taking your medicine as simple as you can. Plan times to take your medicines when you are doing other things, such as eating a meal or getting ready for bed. This will make it easier to remember to take your medicines.  Get organized. Use helpful tools, such as daily or weekly pill containers. When should you call for help? Call 911  if you have symptoms of sudden heart failure such as:     You have severe trouble breathing.      You cough up pink, foamy mucus.      You have a new irregular or rapid heartbeat.    Call your doctor now or seek immediate medical care if:     You have new or increased shortness of breath.      You are dizzy or lightheaded, or you feel like you may faint.      You have sudden weight gain, such as more than 2 to 3 pounds in a day or 5 pounds in a week. (Your doctor may suggest a different range of weight gain.)      You have increased swelling in your legs, ankles, or feet.      You are suddenly so tired or weak that you cannot do your usual activities. Watch closely for changes in your health, and be sure to contact your doctor ifyou develop new symptoms. Where can you learn more? Go to https://Modtipemodueb.GraphScience. org and sign in to your PatientPay Inc. account. Enter P272 in the Spriggle Kids box to learn more about \"Avoiding Triggers With Heart Failure: Care Instructions. \"     If you do not have an account, please click on the \"Sign Up Now\" link. Current as of: January 10, 2022               Content Version: 13.2  © 2006-2022 Fanitics. Care instructions adapted under license by [facility] ([facility]). If you have questions about a medical condition or this instruction, always ask your healthcare professional. [place] [address] any warranty or liability for your use of this information. Patient Education        Limiting Sodium With Heart Failure: Care Instructions  Your Care Instructions     Sodium causes your body to hold on to extra water. This may cause your heartfailure symptoms to get worse. Limiting sodium may help you feel better. People get most of their sodium from processed foods. Fast food and restaurant meals also tend to be very high in sodium. Your doctor may suggest that you limit sodium. Your doctor can tell you how much sodium is right for you. An example is less than 3,000 mg a day. This includes all the salt you eat incooked or packaged foods. Follow-up care is a key part of your treatment and safety.  Be sure to make and go to all appointments, and call your doctor if you are having problems. It's also a good idea to know your test results and keep alist of the medicines you take. How can you care for yourself at home? Read food labels   Read food labels on cans and food packages. The labels tell you how much sodium is in each serving. Make sure that you look at the serving size. If you eat more than the serving size, you have eaten more sodium than is listed for one serving.  Food labels also tell you the Percent Daily Value for sodium. Choose products with low Percent Daily Values for sodium.  Be aware that sodium can come in forms other than salt, including monosodium glutamate (MSG), sodium citrate, and sodium bicarbonate (baking soda). MSG is often added to Asian food. You can sometimes ask for food without MSG or salt. Buy low-sodium foods   Buy foods that are labeled \"unsalted\" (no salt added), \"sodium-free\" (less than 5 mg of sodium per serving), or \"low-sodium\" (140 mg or less of sodium per serving). A food labeled \"light sodium\" has less than half of the full-sodium version of that food. Foods labeled \"reduced-sodium\" may still have too much sodium.  Buy fresh vegetables or plain, frozen vegetables. Buy low-sodium versions of canned vegetables, soups, and other canned goods. Prepare low-sodium meals   Use less salt each day when cooking. Reducing salt in this way will help you adjust to the taste. Do not add salt after cooking. Take the salt shaker off the table.  Flavor your food with garlic, lemon juice, onion, vinegar, herbs, and spices instead of salt. Do not use soy sauce, steak sauce, onion salt, garlic salt, or ketchup on your food.  Make your own salad dressings, sauces, and ketchup without adding salt.  Use less salt (or none) when recipes call for it. You can often use half the salt a recipe calls for without losing flavor. Other dishes like rice, pasta, and grains do not need added salt.  Rinse canned vegetables.  This removes some--but not all--of the salt.   Avoid water that has a naturally high sodium content or that has been treated with water softeners, which add sodium. If you buy bottled water, read the label and choose a sodium-free brand. Avoid high-sodium foods, such as:   Smoked, cured, salted, and canned meat, fish, and poultry.  Ham, turner, hot dogs, and luncheon meats.  Regular, hard, and processed cheese and regular peanut butter.  Crackers with salted tops.  Frozen prepared meals.  Canned and dried soups, broths, and bouillon, unless labeled sodium-free or low-sodium.  Canned vegetables, unless labeled sodium-free or low-sodium.  Salted snack foods such as chips and pretzels.  Western Bebe fries, pizza, tacos, and other fast foods.  Pickles, olives, ketchup, and other condiments, especially soy sauce, unless labeled sodium-free or low-sodium. If you cannot cook for yourself   Have family members or friends help you, or have someone cook low-sodium meals.  Check with your local senior nutrition program to find out where meals are served and whether they offer a low-sodium option. You can often find these programs through your local health department or hospital.   Have meals delivered to your home. Most [facility] have a Meals on [employer]ENT. These programs provide one hot meal a day for older adults, delivered to their homes. Ask whether these meals are low-sodium. Let them know that you are on a low-sodium diet. Where can you learn more? Go to https://[employer] Therapeutics[facility].raksul. org and sign in to your Zel[employer] account. Enter A166 in the [facility] box to learn more about \"Limiting Sodium With Heart Failure: Care Instructions. \"     If you do not have an account, please click on the \"Sign Up Now\" link. Current as of: January 10, 2022               Content Version: 13.2  © 2074-4660 Healthwise, Incorporated. Care instructions adapted under license by [facility] ([facility]).  If you have questions about a medical condition or this instruction, always ask your healthcare professional. [place] [address] any warranty or liability for your use of this information. Patient Education        Managing Other Conditions When You Have Heart Failure: Care Instructions  Your Care Instructions     All the systems in your body rely on each other to work properly. Heart failure has effects all through your body that can lead to other problems, such as kidney disease. The reverse is also true. A condition like diabetes or lung disease can damage or stress your heart and cause heart failure. Managing any other problems can help reduce your heart's workload and make your heartfailure better. Conditions that commonly cause or occur along with heart failure include highblood pressure, diabetes, COPD, high cholesterol, kidney problems, and anemia. Follow-up care is a key part of your treatment and safety. Be sure to make and go to all appointments, and call your doctor if you are having problems. It's also a good idea to know your test results and keep alist of the medicines you take. How can you care for yourself at home? Steps to help with heart failure and other problems   Eat less salt (sodium). This helps keep fluid from building up. It may help you feel better. Limiting sodium can also help if you have high blood pressure or kidney disease.  Watch your fluid intake if your doctor tells you to. Reducing fluids can keep your sodium level in balance and may help you feel better.  Get regular exercise. Regular, moderate exercise, such as walking, helps your heart. It can also help lower your blood pressure, lower stress, and help you lose weight.  Lose weight if you are overweight. Losing weight can help you manage diabetes and lower your blood pressure and cholesterol level.  Stop smoking. Smoking stresses your lungs, interferes with healing, and can make heart failure worse.  Limit or avoid alcohol.  Excess alcohol can cause health problems such as increased blood pressure. Ask your doctor how much, if any, is safe.  If you think you might have a problem with alcohol or drug use, talk to your doctor. If your doctor has not set you up with a cardiac rehabilitation (rehab) program, talk to him or her about whether that is right for you. Cardiac rehabincludes exercise, help with diet and lifestyle changes, and emotional support. To stay as healthy as possible   Work closely with your doctor. Have all your tests, and go to all your appointments.  Take your medicines exactly as prescribed. You will take medicines to treat the other conditions you have along with heart failure. It can be hard to balance the treatment for all your conditions. You will need to have follow-up tests to make sure that all your medicines are working well together. Talk to your doctor if you have any problems with your medicine.  Keep all your doctors informed about your health problems and all the medicines you take for them. Medicines that can treat one condition may make another condition worse.  Talk to your doctor before you take any vitamins, over-the-counter drugs, or herbal products. Do not take aspirin, ibuprofen (Advil, Motrin), or naproxen (Aleve) unless you talk to your doctor first. They could make your heart failure and other problems worse. When should you call for help? Call 911  anytime you think you may need emergency care. For example, call if:     You have symptoms of sudden heart failure, such as:  ? Severe trouble breathing. ? Coughing up pink, foamy mucus. ? A new irregular or rapid heartbeat.      You have symptoms of a heart attack. These may include:  ? Chest pain or pressure, or a strange feeling in the chest.  ? Sweating. ? Shortness of breath. ? Nausea or vomiting. ? Pain, pressure, or a strange feeling in the back, neck, jaw, or upper belly or in one or both shoulders or arms.   ? Lightheadedness or sudden weakness. ? A fast or irregular heartbeat. After you call 911, the  may tell you to chew 1 adult-strength or 2 to 4 low-doseaspirin. Wait for an ambulance. Do not try to drive yourself. Call your doctor now or seek immediate medical care if:     You have new or increased shortness of breath.      You are dizzy or lightheaded, or you feel like you may faint.      You have sudden weight gain, such as more than 2 to 3 pounds in a day or 5 pounds in a week. (Your doctor may suggest a different range of weight gain.)      You have increased swelling in your legs, ankles, or feet.      You are suddenly so tired or weak that you cannot do your usual activities. Watch closely for changes in your health, and be sure to contact your doctor ifyou develop new symptoms. Where can you learn more? Go to https://[employer]. org and sign in to your [employer] account. Enter P052 in the [employer] box to learn more about \"Managing Other Conditions When You Have Heart Failure: Care Instructions. \"     If you do not have an account, please click on the \"Sign Up Now\" link. Current as of: January 10, 2022               Content Version: 13.2  © 2006-2022 Healthwise, Incorporated. Care instructions adapted under license by [facility] ([facility]). If you have questions about a medical condition or this instruction, always ask your healthcare professional. [place] [address] any warranty or liability for your use of this information. Patient Education        Learning About Self-Care for Heart Failure  What is self-care for heart failure? Heart failure usually gets worse over time. But there are many things you alexis to feel better, avoid the hospital, and live longer. Self-care means managing your health by doing certain things every day, like weighing yourself. It's about knowing which symptoms to watch for so you can avoid getting worse.  When you practice good self-care, you know when it's time to call your doctor and when your heart failure has turned into an emergency. The list below can help. Top 5 self-care tips for every day   1. Take your medicines as prescribed. This gives them the best chance of helping you. 2. Weigh yourself every day. This helps you watch for signs that you're getting worse. Weight gain may be a sign that your body is holding on to too much fluid. Weigh yourself at the same time each day, using the same scale, on a hard, flat surface. The best time is in the morning after you go to the bathroom and before you eat or drink anything. 3. Keep a daily record of your symptoms. Checking your symptoms helps you see what symptoms are normal for you and if they change or get worse. 4. Limit sodium. This helps keep fluid from building up and may help you feel better. Your doctor can tell you how much sodium is right for you. An example is less than 3,000 milligrams (mg) a day. Try limiting the salt you eat at home, and by watching for \"hidden\" sodium when you eat out or shop for food. 5. Try to exercise throughout the week. Exercise makes your heart stronger and can help you avoid symptoms. Walking is a great way to get exercise. If your doctor says it's safe, start out with some short walks. Then slowly build up to longer ones. Some people with heart failure also may need to limit how much fluid they drink each day. Ask your doctor if this is true for you and, if it is, how much fluidis safe for you to drink each day. When should you act? Try to become familiar with signs that mean your heart failure is gettingworse. If you need help, talk with your doctor about making a personal plan. Here are some things to watch for as you practice your daily self-care. [address] [place] [address] Drive doctor if:  Akilah Ann have sudden weight gain, such as more than 2 to 3 pounds in a day or 5 pounds in a week.  (Your doctor may suggest a different range of weight gain.)   You have new or worse swelling in your feet, ankles, or legs.  Your breathing gets worse. Activities that did not make you short of breath before are hard for you now.  Your breathing when you lie down is worse than usual, or you wake up at night needing to catch your breath. Be sure to make and go to all of your follow-up appointments. And it's always agood idea to call your doctor anytime you have a sudden change in symptoms. When is it an emergency? Sometimes the symptoms get worse very quickly. This is called sudden heartfailure. It causes fluid to build up in your lungs. Sudden heart failure is an emergency. If you have any of these symptoms, you need care right away. Call 911 if:    You have severe shortness of breath.  You have an irregular or fast heartbeat.  You cough up foamy, pink mucus. What else can you do to stay healthy? There are other things you can do to take care of your body and your heart. These things will help you feel better. And they will also reduce your risk ofheart attack and stroke.  Try to stay at a healthy weight. Eat a healthy diet with lots of fresh fruit, vegetables, and whole grains.  If you smoke, quit.  Limit the amount of alcohol you drink.  Keep high blood pressure and diabetes under control. If you need help, talk with your doctor. If your doctor has not set you up with a cardiac rehabilitation (rehab) program, talk to him or her about whether that is right for you. Cardiac rehabincludes exercise, help with diet and lifestyle changes, and emotional support. Also let your doctor know if:  Aldo Brown having trouble sleeping. Sleep is important to your well-being. It also helps your heart work the way it's supposed to. Your doctor can help you decide if you need treatment for sleep problems.  You're feeling sad or hopeless much of the time, or you are worried and anxious. Heart failure can be hard on your emotions. Treatment with counseling and medicine can help.  And when you feel better, you're more likely to take care of yourself.  You think you may have a problem with alcohol or drug use. You and your doctor can decide if you have a problem and what type of treatment might help you. Follow-up care is a key part of your treatment and safety. Be sure to make and go to all appointments, and call your doctor if you are having problems. It's also a good idea to know your test results and keep alist of the medicines you take. Where can you learn more? Go to https://[employer] Dynamics.[employer]. org and sign in to your [employer] account. Enter U284 in the [employer] box to learn more about \"Learning About Self-Care for Heart Failure. \"     If you do not have an account, please click on the \"Sign Up Now\" link. Current as of: January 10, 2022               Content Version: 13.2  © 3338-1352 Healthwise, Incorporated. Care instructions adapted under license by [facility] ([facility]). If you have questions about a medical condition or this instruction, always ask your healthcare professional. [place] [address] any warranty or liability for your use of this information.

## 2022-05-24 NOTE — CARE COORDINATION
Ambulatory Care Coordination Note  5/24/2022  CM Risk Score: 10  Charlson 10 Year Mortality Risk Score: 23%     ACC: Shai President, RN    Summary Note: ACM spoke to patient. Introduced self as new ACM. Reviewed current health status. Patient obtained a new hemodialysis fistula. Patient reports doing well. Patient receives hemodialysis Monday Wednesday Friday. ACM reviewed CHF signs and symptoms and education. Patient denied today any CP, SOB, edema, or falls. ACM sent additional education with AVS.  Patient reports compliant with all medications. Patient has follow-up appointments. Patient provided nutritionist contact information and encouraged to contact for nutritional eval.  ACM provided contact information. Patient had no care coordination needs today. Leatha Berg received counseling on the following healthy behaviors: SELF MANAGEMENT of chronic health conditions,with goal to improve quality of life and overall wellbeing. The patient is asked to make an attempt to improve diet and exercise patterns to aid in medical management. I have instructed Leatha Berg to complete a self tracking handout on Blood Pressures  and Weights and instructed them to bring it with them to his next appointment. Discussed use, benefit, and side effects of prescribed medications. Barriers to medication compliance addressed. All patient questions answered. Pt voiced understanding. The importance of daily weights, dietary sodium restriction, and contact with cardiology if weight is increased more than 3 lbs in any 48 hour period was stressed. The patient has been advised to contact us if they experience progressive SOB, orthopnea, PND or progressive edema.     Congestive Heart Failure Assessment    Are you currently restricting fluids?: 1800cc  Do you understand a low sodium diet?: No  Do you understand how to read food labels?: No  How many restaurant meals do you eat per week?: 0  Do you salt your food before tasting it?: No No patient-reported symptoms      Symptoms:  CHF associated angina: Neg, CHF associated dyspnea on exertion: Pos, CHF associated fatigue: Neg, CHF associated leg swelling: Neg, CHF associated orthostatic hypotension: Neg, CHF associated PND: Neg, CHF associated shortness of breath: Neg, CHF associated weakness: Neg      Symptom course: stable  Patient-reported weight (lb): 198  Weight trend: fluctuating minimally  Salt intake watch compared to last visit: stable         Lab Results     None          Care Coordination Interventions    Referral from Primary Care Provider: No  Suggested Interventions and Community Resources  Fall Risk Prevention: In Process  Palliative Care: Not Started  Pharmacist: Declined  Registered Dietician: Completed (Comment: CHF)  Social Work: Completed (Comment: ACP documents)  Other Services: Completed (Comment: walk in t/l )  Zone Management Tools: Completed (Comment: chf )         Goals Addressed                 This Visit's Progress     Conditions and Symptoms   Improving     I will schedule office visits, as directed by my provider. I will keep my appointment or reschedule if I have to cancel. I will notify my provider of any barriers to my plan of care. I will follow my Zone Management tool to seek urgent or emergent care. I will notify my provider of any symptoms that indicate a worsening of my condition. Barriers: lack of education  Plan for overcoming my barriers: ACM, dietician, PCP, social work  Confidence: 8/10  Anticipated Goal Completion Date: 10/29/2022        Yevgeniy Pagan Self Monitoring   Improving     Daily Weights - I will weight myself as directed - Monday, Wednesday, and Friday and write down weights  I will notify my provider of any increase in weight by 3 or more pounds in 2 days OR 5 or more pounds in a week.     None Recently Recorded    Barriers: lack of education  Plan for overcoming my barriers: Dialysis, ACM, dietician  Confidence: 10/10  Anticipated Goal Completion Date: 6/29/2022              Prior to Admission medications    Medication Sig Start Date End Date Taking?  Authorizing Provider   calcium acetate (PHOSLO) 667 MG CAPS capsule TAKE 1 CAPSULE BY MOUTH THREE TIMES DAILY WITH MEALS 4/29/22   Historical Provider, MD   sacubitril-valsartan (ENTRESTO) 24-26 MG per tablet Take 1 tablet by mouth 2 times daily 4/21/22 [address] [facility] MICAH Butler   ivabradine (CORLANOR) 5 MG TABS tablet Take 1 tablet by mouth 2 times daily (with meals) 4/21/22 [address] [facility] MICAH Butler   torsemide (DEMADEX) 20 MG tablet Take 1 tablet by mouth in the morning and at bedtime Pt states that he takes 4 tabs lately  Patient taking differently: Take 20 mg by mouth in the morning and at bedtime  4/15/22   Yan Palomo DO   carvedilol (COREG) 25 MG tablet take 1 tablet by mouth twice a day HOLD FOR SBP<100 OR HR <60 3/1/22   [address] MICAH Butler       Future Appointments   Date Time Provider Perez Ness   6/2/2022 12:45 PM DO Ya Colon [facility] EMERGENCY [facility]   6/17/2022 11:15 AM Jeanie Gallagher MD [facility]   7/28/2022  9:00 AM [address] [facility] MICAH Butler [facility] EMERGENCY [facility]

## 2022-06-02 ENCOUNTER — SCHEDULED TELEPHONE ENCOUNTER (OUTPATIENT)
Dept: CARDIOLOGY CLINIC | Age: 52
End: 2022-06-02
Payer: MEDICAID

## 2022-06-02 PROCEDURE — 99442 PR PHYS/QHP TELEPHONE EVALUATION 11-20 MIN: CPT | Performed by: INTERNAL MEDICINE

## 2022-06-02 NOTE — PROGRESS NOTES
CC: CHF    TELEHEALTH EVALUATION -- Audio/Visual (During RVMPG-33 public health emergency)    Due to COVID 19 outbreak, patient's office visit was converted to a virtual visit. Patient was contacted and agreed to proceed with a virtual visit via Telephone Visit  The risks and benefits of converting to a virtual visit were discussed in light of the current infectious disease epidemic. Patient also understood that insurance coverage and co-pays are up to their individual insurance plans. Pursuant to the emergency declaration under the [facility], [facility] waiver authority and the [employer] Act, this Virtual  Visit was conducted, with patient's consent, to reduce the patient's risk of exposure to COVID-19 and provide continuity of care for an established patient. Services were provided through a video synchronous discussion virtually to substitute for in-person clinic visit. Total Virtual Visit time spent:12 min. Please note this report has been partially produced using speech recognition software and may cause contain errors related to that system including grammar, punctuation and spelling as well as words and phrases that may seem inappropriate. If there are questions or concerns please feel free to contact me to clarify. 9-27-19: Patient presents for initial medical evaluation. Patient is followed on a regular basis by Dr. Pb Harrison MD. S/p recent hospitlzation for SOB, fatigue, CP, cough. Noted to have AD systolic HF with EF of 61%. HTN urgency and BLUE as well. Was drinking ETOH and smoking 2ppd. No other drugs. No previous cardiac hx. No hx of MI, CHF or arrhythmia. No hx of LHC or stress test. Lambert Blank hx of DM or HLP. EKG is abnormal. Renal function as of 9-23-19 is   Stage IV, Cr of 3.38.  Pt denies chest pain, dyspnea, dyspnea on exertion, change in exercise capacity, fatigue,  nausea, vomiting, diarrhea, constipation, motor weakness, insomnia, weight loss, syncope, dizziness, lightheadedness, palpitations, PND, orthopnea, or claudication at this time. BP and HR are ok. Compliant with meds. renal artery duplex US is negative   VQ scan with very small matched defects. 10-18-19: s/p normal nuclear stress test. EF of 50%. He would like to go back to work. Pt denies chest pain, dyspnea, dyspnea on exertion, change in exercise capacity, fatigue,  nausea, vomiting, diarrhea, constipation, motor weakness, insomnia, weight loss, syncope, dizziness, lightheadedness, palpitations, PND, orthopnea, or claudication. No nitro use. BP and hr are good. . No LE discoloration or ulcers. No LE edema. No CHF type symptoms. Lipid profile is normal. No recent hospitalization. No change in meds. Compliant with meds. Has CKD stage IV, Cr 2.79, GFR 25. No ETOH.     6-2-22: following with CHF clinic. ESRD on HD now 3 days a week. He is compliant with meds. Patient with history of cardiac catheterization on November 2020 with normal coronary arteries with ejection fraction of 20%. Patient with history of nonischemic cardiomyopathy status post AICD. Last ECHO in 1/2022 with EF of 20%. Grade II DD. Pt denies chest pain,  nausea, vomiting, diarrhea, constipation, motor weakness, insomnia, weight loss, syncope, dizziness, lightheadedness, palpitations, PND, orthopnea, or claudication. No sig. SOB now. No LE edema.            Patient Active Problem List   Diagnosis    Hypertensive urgency    Cardiomyopathy ([address]    Systolic and diastolic CHF, acute ([address]    BLUE (acute kidney injury) ([address]    Abnormal EKG    Chronic combined systolic and diastolic CHF (congestive heart failure) ([address]    Acute decompensated heart failure ([address]    Chest pain    Pulmonary edema, acute ([address]    NSTEMI (non-ST elevated myocardial infarction) ([address]    Inguinal hernia of right side without obstruction or gangrene    CHF (congestive heart failure), NYHA class I, acute on chronic, combined (HCC)    Systolic congestive heart failure (HCC)    Acute systolic heart failure (HCC)    Acute on chronic combined systolic (congestive) and diastolic (congestive) heart failure (HCC)       Past Surgical History:   Procedure Laterality Date    CARDIAC PACEMAKER PLACEMENT      DIALYSIS CATHETER INSERTION Right 2022    15.5 F x 19 cm SYMETREX LONG TERM HEMODIALYSIS CATHETER    HERNIA REPAIR Right 02/10/2021    RIGHT INGUINAL HERNIA REPAIR WITH MESH performed by Sierra Hall MD at [address] [facility]  [address]  2022    IR [address] ARTERIOVENOUS FISTULA CREATION 2022 [facility] SPECIAL PROCEDURE    IR TUNNELED CATHETER PLACEMENT GREATER THAN 5 YEARS  2022    IR TUNNELED CATHETER PLACEMENT GREATER THAN 5 YEARS 2022 [facility] SPECIAL PROCEDURE       Social History     Socioeconomic History    Marital status: Life Partner     Spouse name: Brent Isabel Number of children: Not on file    Years of education: Not on file    Highest education level: Not on file   Occupational History    Not on file   Tobacco Use    Smoking status: Former Smoker     Packs/day: 1.00     Types: Cigarettes     Quit date: 12/3/2020     Years since quittin.4    Smokeless tobacco: Never Used    Tobacco comment: currently smoking only couple cigarettes daily   Substance and Sexual Activity    Alcohol use: Yes    Drug use: Never    Sexual activity: Not on file   Other Topics Concern    Not on file   Social History Narrative    Not on file     Social Determinants of Health     Financial Resource Strain: Low Risk     Difficulty of Paying Living Expenses: Not hard at all   Food Insecurity: No Food Insecurity    Worried About Running Out of Food in the Last Year: Never true    [address] in the Last Year: Never true   Transportation Needs:     Lack of Transportation (Medical):  Not on file    Lack of Transportation (Non-Medical): Not on file   Physical Activity:     Days of Exercise per Week: Not on file    Minutes of Exercise per Session: Not on file   Stress:     Feeling of Stress : Not on file   Social Connections:     Frequency of Communication with Friends and Family: Not on file    Frequency of Social Gatherings with Friends and Family: Not on file    Attends Orthodoxy Services: Not on file    Active Member of [address] [place] [address] or Organizations: Not on file    Attends Club or Organization Meetings: Not on file    Marital Status: Not on file   Intimate Partner Violence:     Fear of Current or Ex-Partner: Not on file    Emotionally Abused: Not on file    Physically Abused: Not on file    Sexually Abused: Not on file   Housing Stability:     Unable to Pay for Housing in the Last Year: Not on file    Number of [place] in the Last Year: Not on file    Unstable Housing in the Last Year: Not on file       Family History   Problem Relation Age of Onset    Coronary Art Dis Mother        Current Outpatient Medications   Medication Sig Dispense Refill    calcium acetate (PHOSLO) 667 MG CAPS capsule TAKE 1 CAPSULE BY MOUTH THREE TIMES DAILY WITH MEALS      sacubitril-valsartan (ENTRESTO) 24-26 MG per tablet Take 1 tablet by mouth 2 times daily 60 tablet 2    ivabradine (CORLANOR) 5 MG TABS tablet Take 1 tablet by mouth 2 times daily (with meals) 60 tablet 3    torsemide (DEMADEX) 20 MG tablet Take 1 tablet by mouth in the morning and at bedtime Pt states that he takes 4 tabs lately (Patient taking differently: Take 20 mg by mouth in the morning and at bedtime ) 30 tablet 3    carvedilol (COREG) 25 MG tablet take 1 tablet by mouth twice a day HOLD FOR SBP<100 OR HR <60 60 tablet 1     No current facility-administered medications for this visit.        Lipitor [atorvastatin]    Review of Systems:  General ROS: negative  Psychological ROS: negative  Hematological and Lymphatic ROS: No history of blood clots or bleeding disorder. Respiratory ROS: no cough, shortness of breath, or wheezing  Cardiovascular ROS: no chest pain or dyspnea on exertion  Gastrointestinal ROS: no abdominal pain, change in bowel habits, or black or bloody stools  Genito-Urinary ROS: no dysuria, trouble voiding, or hematuria  Musculoskeletal ROS: negative  Neurological ROS: no TIA or stroke symptoms  Dermatological ROS: negative    VITALS:  There were no vitals taken for this visit. There is no height or weight on file to calculate BMI. PHYSICAL EXAMINATION:  [ INSTRUCTIONS:  \"[x]\" Indicates a positive item  \"[]\" Indicates a negative item  -- DELETE ALL ITEMS NOT EXAMINED]  [x] Alert  [x] Oriented to person/place/time    [x] No apparent distress  [] Toxic appearing    [] Face flushed appearing [x] Sclera clear  [] Lips are cyanotic      [x] Breathing appears normal  [] Appears tachypneic      [] Rash on visible skin    [] Cranial Nerves II-XII grossly intact    [] Motor grossly intact in visible upper extremities    [] Motor grossly intact in visible lower extremities    [x] Normal Mood  [] Anxious appearing    [] Depressed appearing  [] Confused appearing      [] Poor short term memory  [] Poor long term memory    [] OTHER:      Due to this being a TeleHealth encounter, evaluation of the following organ systems is limited: Vitals/Constitutional/EENT/Resp/CV/GI//MS/Neuro/Skin/Heme-Lymph-Imm. EKG: normal sinus rhythm, nonspecific ST and T waves changes, Q waves in inferior and lateral leads. No orders of the defined types were placed in this encounter. ASSESSMENT:     Diagnosis Orders   1. Hypertensive urgency     2. Systolic and diastolic CHF, acute (HCC)  NM MYOCARDIAL SPECT REST EXERCISE OR RX   3. Cardiomyopathy, unspecified type ([address]  NM MYOCARDIAL SPECT REST EXERCISE OR RX   4. Abnormal EKG  NM MYOCARDIAL SPECT REST EXERCISE OR RX   5. Chronic combined systolic and diastolic CHF (congestive heart failure) ([address] - ?  Related to HTN and ETOH. PLAN:     Patient will need to continue to follow up with you for their general medical care     As always, aggressive risk factor modification is strongly recommended. We should adhere to the JNC VIII guidelines for HTN management and the NCEP ATP III guidelines for LDL-C management. Cardiac diet is always recommended with low fat, cholesterol, calories and sodium. Continue medications at current doses. Recheck echo in future. Follow up with DR. Ellwsorth/nephro. Follow up in CHF clinic. Follow up with EP for AICD checks. Patient was advised and encouraged to check blood pressure at home or at a pharmacy, maintain a logbook, and also call us back if blood pressure are above the target ranges or if it is low. Patient clearly understands and agrees to the instructions. We will need to continue to monitor muscle and liver enzymes, BUN, CR, and electrolytes. The importance of daily weights, dietary sodium restriction, and contact with cardiology if weight is increased more than 3 lbs in any 48 hour period was stressed. The patient has been advised to contact us if they experience progressive SOB, orthopnea, PND or progressive edema. No nephrotoxic agents. Smoking cessation was strongly recommended    No ETOH.

## 2022-06-08 ENCOUNTER — CARE COORDINATION (OUTPATIENT)
Dept: CARE COORDINATION | Age: 52
End: 2022-06-08

## 2022-06-08 NOTE — CARE COORDINATION
Made an attempt to contact patient for follow up with Advance Directive. Left message, Left phone number for call back.      Tangela Mitchell, MSW  [phone]

## 2022-06-08 NOTE — CARE COORDINATION
Ambulatory Care Coordination Note  6/8/2022  CM Risk Score: 2  Charlson 10 Year Mortality Risk Score: 23%     ACC: Sofiya Christianson, JOSE M    Summary Note: ACM spoke to patient. He reports doing well overall. Patient is scheduled for dialysis later today. ACM reviewed all current appointments. Patient needed assistance changing PCP appointment. Patient's appointments need to be on Tuesdays and Thursdays. As he has dialysis on Monday Wednesday and Fridays. Patient uses a lift transportation for all rides. And needs time to arrange. Patient reports no increase shortness of breath no chest pain no edema no falls. Patient states CHF is stable. Patient reports weight is stable. ACM reviewed falls education. ACM reviewed CHF zones and previously sent CHF education. Overall patient states he is doing well. Patient had no current needs today. Tayler Peralta received counseling on the following healthy behaviors: SELF MANAGEMENT of chronic health conditions,with goal to improve quality of life and overall wellbeing. The patient is asked to make an attempt to improve diet and exercise patterns to aid in medical management. I have instructed Tayler Peralta to complete a self tracking handout on Blood Pressures  and Weights and instructed them to bring it with them to his next appointment. Discussed use, benefit, and side effects of prescribed medications. Barriers to medication compliance addressed. All patient questions answered. Pt voiced understanding. The importance of daily weights, dietary sodium restriction, and contact with cardiology if weight is increased more than 3 lbs in any 48 hour period was stressed. The patient has been advised to contact us if they experience progressive SOB, orthopnea, PND or progressive edema.     Congestive Heart Failure Assessment    Are you currently restricting fluids?: 1800cc  Do you understand a low sodium diet?: No  Do you understand how to read food labels?: No  How many restaurant meals do you eat per week?: 0  Do you salt your food before tasting it?: No     No patient-reported symptoms      Symptoms:  CHF associated angina: Neg, CHF associated dyspnea on exertion: Neg, CHF associated fatigue: Neg, CHF associated leg swelling: Neg, CHF associated orthostatic hypotension: Neg, CHF associated PND: Neg, CHF associated shortness of breath: Neg, CHF associated weakness: Neg      Symptom course: stable  Patient-reported weight (lb): 198 (Comment: per patient )  Weight trend: stable  Salt intake watch compared to last visit: stable         Care Coordination Interventions    Referral from Primary Care Provider: No  Suggested Interventions and Community Resources  Fall Risk Prevention: Completed  Palliative Care: Not Started  Pharmacist: Declined  Registered Dietician: Completed (Comment: CHF)  Social Work: Completed (Comment: ACP documents)  Other Services: Completed (Comment: walk in t/l )  Zone Management Tools: Completed (Comment: chf )         Goals Addressed                 This Visit's Progress     Conditions and Symptoms   Improving     I will schedule office visits, as directed by my provider. I will keep my appointment or reschedule if I have to cancel. I will notify my provider of any barriers to my plan of care. I will follow my Zone Management tool to seek urgent or emergent care. I will notify my provider of any symptoms that indicate a worsening of my condition. Barriers: lack of education  Plan for overcoming my barriers: ACM, dietician, PCP, social work  Confidence: 8/10  Anticipated Goal Completion Date: 10/29/2022        Boni Self Monitoring   Improving     Daily Weights - I will weight myself as directed - Monday, Wednesday, and Friday and write down weights  I will notify my provider of any increase in weight by 3 or more pounds in 2 days OR 5 or more pounds in a week.     None Recently Recorded    Barriers: lack of education  Plan for overcoming my barriers: Dialysis, ACM, dietician  Confidence: 10/10  Anticipated Goal Completion Date: 6/29/2022              Prior to Admission medications    Medication Sig Start Date End Date Taking?  Authorizing Provider   calcium acetate (PHOSLO) 667 MG CAPS capsule TAKE 1 CAPSULE BY MOUTH THREE TIMES DAILY WITH MEALS 4/29/22   Historical Provider, MD   sacubitril-valsartan (ENTRESTO) 24-26 MG per tablet Take 1 tablet by mouth 2 times daily 4/21/22   MICAH Portillo   ivabradine (CORLANOR) 5 MG TABS tablet Take 1 tablet by mouth 2 times daily (with meals) 4/21/22   MICAH Portillo   torsemide (DEMADEX) 20 MG tablet Take 1 tablet by mouth in the morning and at bedtime Pt states that he takes 4 tabs lately  Patient taking differently: Take 20 mg by mouth in the morning and at bedtime  4/15/22   Yan MAYBERRY Holiday, DO   carvedilol (COREG) 25 MG tablet take 1 tablet by mouth twice a day HOLD FOR SBP<100 OR HR <60 3/1/22   MICAH Portillo       Future Appointments   Date Time Provider Department Center   6/14/2022  8:00 AM [facility], APRN - CNP MLOX I [facility] EMERGENCY [facility] AT [place]   6/14/2022  8:00 AM DR [address]   6/28/2022  4:15 PM Ramonita Pillai MD MLOX [facility]   7/28/2022  9:00 AM MICAH Portillo AGUSTIN CHF [facility] EM[facility]GENCY [facility] AT [place]

## 2022-06-13 DIAGNOSIS — Z99.2 ESRD (END STAGE RENAL DISEASE) ON DIALYSIS (HCC): Primary | ICD-10-CM

## 2022-06-13 DIAGNOSIS — N18.6 ESRD (END STAGE RENAL DISEASE) ON DIALYSIS (HCC): Primary | ICD-10-CM

## 2022-06-13 DIAGNOSIS — Z98.890 S/P ARTERIOVENOUS (AV) FISTULA CREATION: ICD-10-CM

## 2022-06-14 ENCOUNTER — CARE COORDINATION (OUTPATIENT)
Dept: CARE COORDINATION | Age: 52
End: 2022-06-14

## 2022-06-14 ENCOUNTER — OFFICE VISIT (OUTPATIENT)
Dept: INTERVENTIONAL RADIOLOGY/VASCULAR | Age: 52
End: 2022-06-14
Payer: MEDICAID

## 2022-06-14 VITALS
BODY MASS INDEX: 27.4 KG/M2 | DIASTOLIC BLOOD PRESSURE: 77 MMHG | WEIGHT: 202 LBS | HEART RATE: 73 BPM | SYSTOLIC BLOOD PRESSURE: 116 MMHG

## 2022-06-14 DIAGNOSIS — N18.6 ESRD (END STAGE RENAL DISEASE) ON DIALYSIS (HCC): ICD-10-CM

## 2022-06-14 DIAGNOSIS — I42.8 NONISCHEMIC CARDIOMYOPATHY (HCC): ICD-10-CM

## 2022-06-14 DIAGNOSIS — Z99.2 ESRD (END STAGE RENAL DISEASE) ON DIALYSIS (HCC): ICD-10-CM

## 2022-06-14 DIAGNOSIS — Z98.890 S/P ARTERIOVENOUS (AV) FISTULA CREATION: ICD-10-CM

## 2022-06-14 DIAGNOSIS — I95.89 OTHER SPECIFIED HYPOTENSION: Primary | ICD-10-CM

## 2022-06-14 PROCEDURE — 3017F COLORECTAL CA SCREEN DOC REV: CPT | Performed by: NURSE PRACTITIONER

## 2022-06-14 PROCEDURE — 99215 OFFICE O/P EST HI 40 MIN: CPT | Performed by: NURSE PRACTITIONER

## 2022-06-14 PROCEDURE — 1036F TOBACCO NON-USER: CPT | Performed by: NURSE PRACTITIONER

## 2022-06-14 PROCEDURE — G8417 CALC BMI ABV UP PARAM F/U: HCPCS | Performed by: NURSE PRACTITIONER

## 2022-06-14 PROCEDURE — G8427 DOCREV CUR MEDS BY ELIG CLIN: HCPCS | Performed by: NURSE PRACTITIONER

## 2022-06-14 RX ORDER — HYDRALAZINE HYDROCHLORIDE 100 MG/1
TABLET, FILM COATED ORAL
COMMUNITY
Start: 2022-06-07 | End: 2022-06-28

## 2022-06-14 RX ORDER — HYDRALAZINE HYDROCHLORIDE 50 MG/1
TABLET, FILM COATED ORAL
Status: ON HOLD | COMMUNITY
Start: 2022-05-11 | End: 2022-09-06 | Stop reason: HOSPADM

## 2022-06-14 ASSESSMENT — ENCOUNTER SYMPTOMS
SHORTNESS OF BREATH: 0
EYES NEGATIVE: 1
TROUBLE SWALLOWING: 0
BACK PAIN: 0
RESPIRATORY NEGATIVE: 1
COUGH: 0
DIARRHEA: 0
ABDOMINAL PAIN: 0
SORE THROAT: 0
WHEEZING: 0
VOMITING: 0
COLOR CHANGE: 0
GASTROINTESTINAL NEGATIVE: 1
NAUSEA: 0

## 2022-06-14 NOTE — CARE COORDINATION
Kvngjoaquin Chicas  6/14/2022    Registered Dietitian Progress Note for Care Coordination    Assessment: Aliya Fuller is a 50 y.o. male. RD referred for CHF. RD spoke with patient for initial nutrition assessment on 5/24. RD called to follow up with pt today 6/14. Pt states he received the educational handouts in the mail. Reiterated the importance of following a low sodium diet and limiting sodium to no more than 2000 mg per day. Reiterated the importance of monitoring daily weights and notifying MD of sudden weight gain. Pt has no nutrition related questions at this time. No follow up scheduled at this time. RD encouraged patient to outreach in the future with any nutrition related questions or concerns. RD will follow/assist with patient return call.     [address], [address] [place] [address]

## 2022-06-14 NOTE — PROGRESS NOTES
VASCULAR MEDICINE AND INTERVENTIONAL RADIOLOGY DEPARTMENT:         Rashad Rubio, a male of 50 y.o. came to the office 6/14/2022. Chief Complaint   Patient presents with    Follow-up     2 wk wavelinq        6/14/2022: Rashad Rubio is here for 4 week US follow up evaluation S/P DEVINE endovascular wavelinq AV fistula creation done 5/16/2022 radial artery/radial vein CZ, here for maturation US follow up. He denies any adverse symptoms to DEVINE. He comes in this morning feeling week and tired stating he took his blood pressure medications. This happens every morning after taking them. Denies chest pain. Denies dyspnea.      Family History   Problem Relation Age of Onset    Coronary Art Dis Mother        Past Surgical History:   Procedure Laterality Date    CARDIAC PACEMAKER PLACEMENT      DIALYSIS CATHETER INSERTION Right 04/12/2022    15.5 F x 19 cm SYMETREX LONG TERM HEMODIALYSIS CATHETER    HERNIA REPAIR Right 02/10/2021    RIGHT INGUINAL HERNIA REPAIR WITH MESH performed by Jaspreet Rose MD at [address] [facility]  [address]  5/16/2022    IR [address] ARTERIOVENOUS FISTULA CREATION 5/16/2022 [facility] SPECIAL PROCEDURE    IR TUNNELED CATHETER PLACEMENT GREATER THAN 5 YEARS  4/12/2022    IR TUNNELED CATHETER PLACEMENT GREATER THAN 5 YEARS 4/12/2022 [facility] SPECIAL PROCEDURE        Past Medical History:   Diagnosis Date    Abnormal EKG 9/27/2019    Acute kidney injury (BLUE) with acute tubular necrosis (ATN) (HCC)     AICD (automatic cardioverter/defibrillator) present     Cardiomyopathy ([address]     per echo 8/2019    Chronic combined systolic and diastolic CHF (congestive heart failure) ([address] 9/27/2019    ETOH abuse     Hypertension     Tobacco abuse        Social History     Socioeconomic History    Marital status: Life Partner     Spouse name: German Mata Number of children: Not on file    Years of education: Not on file    Highest education level: Not on file   Occupational History    Not on file   Tobacco Use    Smoking status: Former Smoker     Packs/day: 1.00     Types: Cigarettes     Quit date: 12/3/2020     Years since quittin.5    Smokeless tobacco: Never Used    Tobacco comment: currently smoking only couple cigarettes daily   Substance and Sexual Activity    Alcohol use: Yes    Drug use: Never    Sexual activity: Not on file   Other Topics Concern    Not on file   Social History Narrative    Not on file     Social Determinants of Health     Financial Resource Strain: Low Risk     Difficulty of Paying Living Expenses: Not hard at all   Food Insecurity: No Food Insecurity    Worried About Running Out of Food in the Last Year: Never true    [address] in the Last Year: Never true   Transportation Needs:     Lack of Transportation (Medical): Not on file    Lack of Transportation (Non-Medical):  Not on file   Physical Activity:     Days of Exercise per Week: Not on file    Minutes of Exercise per Session: Not on file   Stress:     Feeling of Stress : Not on file   Social Connections:     Frequency of Communication with Friends and Family: Not on file    Frequency of Social Gatherings with Friends and Family: Not on file    Attends Spiritism Services: Not on file    Active Member of [address] [place] [address] [employer] or Organizations: Not on file    Attends Club or Organization Meetings: Not on file    Marital Status: Not on file   Intimate Partner Violence:     Fear of Current or Ex-Partner: Not on file    Emotionally Abused: Not on file    Physically Abused: Not on file    Sexually Abused: Not on file   Housing Stability:     Unable to Pay for Housing in the Last Year: Not on file    Number of [place] in the Last Year: Not on file    Unstable Housing in the Last Year: Not on file       Allergies   Allergen Reactions    Lipitor [Atorvastatin] Other (See Comments)     Coughing        Current Outpatient Medications on File Prior to Visit   Medication Sig Dispense Refill  hydrALAZINE (APRESOLINE) 100 MG tablet take 1 tablet by mouth twice a day      hydrALAZINE (APRESOLINE) 50 MG tablet take 1 tablet by mouth twice a day      calcium acetate (PHOSLO) 667 MG CAPS capsule TAKE 1 CAPSULE BY MOUTH THREE TIMES DAILY WITH MEALS      sacubitril-valsartan (ENTRESTO) 24-26 MG per tablet Take 1 tablet by mouth 2 times daily 60 tablet 2    ivabradine (CORLANOR) 5 MG TABS tablet Take 1 tablet by mouth 2 times daily (with meals) 60 tablet 3    torsemide (DEMADEX) 20 MG tablet Take 1 tablet by mouth in the morning and at bedtime Pt states that he takes 4 tabs lately (Patient taking differently: Take 20 mg by mouth in the morning and at bedtime ) 30 tablet 3    carvedilol (COREG) 25 MG tablet take 1 tablet by mouth twice a day HOLD FOR SBP<100 OR HR <60 60 tablet 1     No current facility-administered medications on file prior to visit. Review of Systems   Constitutional: Negative. Negative for chills, fatigue and fever. HENT: Negative. Negative for congestion, ear pain, sore throat and trouble swallowing. Eyes: Negative. Negative for visual disturbance. Respiratory: Negative. Negative for cough, shortness of breath and wheezing. Cardiovascular: Negative. Negative for chest pain, palpitations and leg swelling. Gastrointestinal: Negative. Negative for abdominal pain, diarrhea, nausea and vomiting. Endocrine: Negative. Genitourinary: Negative. Negative for difficulty urinating, dysuria and hematuria. Musculoskeletal: Negative. Negative for back pain. Skin: Negative. Negative for color change, rash and wound. Neurological: Positive for weakness (generalized after taking blood pressure meds this morning). Negative for dizziness, light-headedness, numbness and headaches. Hematological: Negative. Does not bruise/bleed easily. Psychiatric/Behavioral: Negative. The patient is not nervous/anxious.     All other systems reviewed and are negative. OBJECTIVE:  /77   Pulse 73   Wt 202 lb (91.6 kg)   BMI 27.40 kg/m²    Vitals:    06/14/22 0812 06/14/22 0901   BP: 89/68 116/77   Pulse: 78 73   Weight: 202 lb (91.6 kg)          Physical Exam  Constitutional:       General: He is not in acute distress. Appearance: Normal appearance. He is not ill-appearing. HENT:      Head: Normocephalic. Nose: No congestion. Cardiovascular:      Rate and Rhythm: Normal rate. Heart sounds: Normal heart sounds. Arteriovenous access: left arteriovenous access is present. Comments: LUE AV fistula with faint thrill and bruit  Pulmonary:      Effort: Pulmonary effort is normal.   Abdominal:      General: Bowel sounds are normal.      Palpations: Abdomen is soft. Musculoskeletal:         General: Normal range of motion. Cervical back: Normal range of motion. Right lower leg: No edema. Left lower leg: No edema. Skin:     General: Skin is warm and dry. Neurological:      Mental Status: He is alert and oriented to person, place, and time. Psychiatric:         Mood and Affect: Mood normal.         Behavior: Behavior normal.         5/16/2022:  Impression   1.  Successful left arm radial artery to radial vein percutaneous fistula creation. 2.  Follow-up evaluation in 2 weeks planned to evaluate for fistula maturation and usability for dialysis.       CLINICAL DATA:   End-stage kidney disease requiring dialysis       RADIATION DOSE: 53.01 mGy.       PROCEDURES PERFORMED:   1. Dialysis arteriovenous fistula creation in the arm. 2. Venogram of the arm by injection of contrast   3. Arteriogram of the arm by injection of contrast   4. Ultrasound-guided venous access, ultrasound images saved to PACS   5. Ultrasound guided arterial access, ultrasound images saved to PACS   6.  Creation of an arteriovenous fistula with radiofrequency ablation between radial artery and radial vein.              Electronically signed by Genesis Antunez Augie Ram MD(Interpreting   physician) on 01/18/2022 08:24 AM   ----------------------------------------------------------------      Findings     Left Ventricle  Left ventricular ejection fraction is visually estimated at 20%. Pseudonormal filling pattern noted. LV mild Dilated  Right Ventricle  Normal right ventricle structure and function. Normal right ventricle systolic pressure. Left Atrium  Normal left atrium. Right Atrium  Normal right atrium. Mitral Valve  Normal mitral valve structure and function. Tricuspid Valve  Normal tricuspid valve structure and function. Aortic Valve  Normal aortic valve structure and function. Pulmonic Valve  The pulmonic valve was not well visualized . Pericardial Effusion  No evidence of pericardial effusion. Pleural Effusion  No evidence of pleural effusion. Aorta \ Miscellaneous  The aorta is within normal limits.     M-Mode Measurements (cm)      LVIDd: 5.11 cm                         LVIDs: 4.59 cm   IVSd: 1.84 cm                          IVSs: 2.35 cm   LVPWd: 1.45 cm                         LVPWs: 1.88 cm   Rt. Vent.  Dimension: 2.39 cm           AO Root Dimension: 2.31 cm                                          ACS: 3.87 cm                                          LVOT: 2.47 cm     Doppler Measurements:      AV Velocity:0.03 m/s                   MV Peak E-Wave: 0.99 m/s   AV Peak Gradient: 5.94 mmHg   AV Mean Gradient: 3.12 mmHg            MV P1/2t: 30.1 msec   AV Area (Continuity):3.24 cm^2         MVA by PHT7.3 cm^2   TR Velocity:1.97 m/s   TR Gradient:15.49 mmHg     Valves      Mitral Valve      Peak E-Wave: 0.99 m/s              Peak Gradient: 3.89 mmHg   P1/2t: 30.1 msec      Area (PHT): 7.3 cm^2      Aortic Valve      Peak Velocity: 1.22 m/s                Mean Velocity: 0.83 m/s   Peak Gradient: 5.94 mmHg               Mean Gradient: 3.12 mmHg   Area (continuity): 3.24 cm^2   AV VTI: 19.57 cm      Cusp Separation: 3.87 cm      Tricuspid Valve      TR Velocity: 1.97 m/s              TR Gradient: 15.49 mmHg      LVOT      Peak Velocity: 1 m/s                 Mean Velocity: 0.59 m/s   Peak Gradient: 3.32 mmHg             Mean Gradient: 1.62 mmHg   LVOT Diameter: 2.47 cm               LVOT VTI: 13.23 cm     Structures      Left Atrium      LA Volume/Index: 66.64 ml /32 m^2             LA Area: 15.92 cm^2      Left Ventricle      Diastolic Dimension: 5.00 cm          Systolic Dimension: 2.91 cm   Septum Diastolic: 5.85 cm             Septum Systolic: 2.18 cm   PW Diastolic: 2.05 cm                 PW Systolic: 1.66 cm                                         FS: 10.2 %   LV EDV/LV EDV Index: 124.34 ml/60 m^2 LV ESV/LV ESV Index: 96.77 ml/47 m^2   EF Calculated: 22.2 %      LVOT Diameter: 2.47 cm      Right Ventricle      Diastolic Dimension: 9.59 cm     Aorta/ Miscellaneous  Aorta      Aortic Root: 2.31 cm   LVOT Diameter: 2.47 cm        Component 4 mo ago   Left Ventricular Ejection Fraction 20    LVEF MODALITY ECHO               Specimen Collected: 01/18/22 07:40 Last Resulted: 01/18/22               ASSESSMENT AND PLAN:  Chart review as noted of last inpatient note from Dr. Matilde Espino. Entire Medication list reviewed for pre operative examination. All blood pressure medications evaluated. Above procedure reviewed in order to prepare for US scan today. Above ECHOCARDIOGRAM reviewed. EF 20%. Diagnosis Orders   1. Other specified hypotension     2. ESRD (end stage renal disease) on dialysis ([address]     3. S/P arteriovenous (AV) fistula creation     4. Nonischemic cardiomyopathy ([address]           Plan:     No orders of the defined types were placed in this encounter. No orders of the defined types were placed in this encounter. --  Due to hypotension, unable to obtain adequate flow volumes on US. He will need to return for repeat US maturation scan and not take blood pressure medications prior to coming.  I was present during [address] scan for readings and results reviewed with patient. Dictation to be posted by Dr. Yanet Arriaga for final results. --  Spoke with Mary at [facility] and notified of hypotension and POC. --  Prior to leaving office, blood pressure was stabilized and patient was feeling he reports normal and in good health. Total time spent for this encounter: 50 minutes.     JULIO CESAR Bryant - CNP

## 2022-06-17 ENCOUNTER — CARE COORDINATION (OUTPATIENT)
Dept: CARE COORDINATION | Age: 52
End: 2022-06-17

## 2022-06-17 NOTE — CARE COORDINATION
Made and attempt to contact patient for follow up to discuss Advance Directive. Left message with reason for call. Left phone number for call back. JOJO Degroot  [phone]    Reviewed patient chart and there is [facility] scanned in from July 2021. Patient has brother and girlfriend listed.

## 2022-06-22 ENCOUNTER — OFFICE VISIT (OUTPATIENT)
Dept: INTERVENTIONAL RADIOLOGY/VASCULAR | Age: 52
End: 2022-06-22
Payer: MEDICAID

## 2022-06-22 VITALS
SYSTOLIC BLOOD PRESSURE: 118 MMHG | BODY MASS INDEX: 27.4 KG/M2 | HEART RATE: 79 BPM | DIASTOLIC BLOOD PRESSURE: 83 MMHG | WEIGHT: 202 LBS

## 2022-06-22 DIAGNOSIS — Z99.2 ESRD (END STAGE RENAL DISEASE) ON DIALYSIS (HCC): Primary | ICD-10-CM

## 2022-06-22 DIAGNOSIS — N18.6 ESRD (END STAGE RENAL DISEASE) ON DIALYSIS (HCC): Primary | ICD-10-CM

## 2022-06-22 DIAGNOSIS — T82.898A INADEQUATE FLOW OF DIALYSIS ARTERIOVENOUS FISTULA, INITIAL ENCOUNTER (HCC): Primary | ICD-10-CM

## 2022-06-22 DIAGNOSIS — Z99.2 ESRD (END STAGE RENAL DISEASE) ON DIALYSIS (HCC): ICD-10-CM

## 2022-06-22 DIAGNOSIS — Z98.890 S/P ARTERIOVENOUS (AV) FISTULA CREATION: ICD-10-CM

## 2022-06-22 DIAGNOSIS — N18.6 ESRD (END STAGE RENAL DISEASE) ON DIALYSIS (HCC): ICD-10-CM

## 2022-06-22 PROCEDURE — 3017F COLORECTAL CA SCREEN DOC REV: CPT | Performed by: NURSE PRACTITIONER

## 2022-06-22 PROCEDURE — 1036F TOBACCO NON-USER: CPT | Performed by: NURSE PRACTITIONER

## 2022-06-22 PROCEDURE — 99215 OFFICE O/P EST HI 40 MIN: CPT | Performed by: NURSE PRACTITIONER

## 2022-06-22 PROCEDURE — G8417 CALC BMI ABV UP PARAM F/U: HCPCS | Performed by: NURSE PRACTITIONER

## 2022-06-22 PROCEDURE — 99417 PROLNG OP E/M EACH 15 MIN: CPT | Performed by: NURSE PRACTITIONER

## 2022-06-22 PROCEDURE — G8427 DOCREV CUR MEDS BY ELIG CLIN: HCPCS | Performed by: NURSE PRACTITIONER

## 2022-06-22 ASSESSMENT — ENCOUNTER SYMPTOMS
GASTROINTESTINAL NEGATIVE: 1
SORE THROAT: 0
WHEEZING: 0
SHORTNESS OF BREATH: 0
COLOR CHANGE: 0
NAUSEA: 0
COUGH: 0
ABDOMINAL PAIN: 0
VOMITING: 0
EYES NEGATIVE: 1
BACK PAIN: 0
DIARRHEA: 0
RESPIRATORY NEGATIVE: 1
TROUBLE SWALLOWING: 0

## 2022-06-22 NOTE — PROGRESS NOTES
VASCULAR MEDICINE AND INTERVENTIONAL RADIOLOGY DEPARTMENT:         Fadumo Henry, a male of 50 y.o. came to the office 6/22/2022. Chief Complaint   Patient presents with    Post-Op Check     wavelinq     PROGRESS NOTE:     SUBJECTIVE:     6/22/2022: Fadumo Henry is S/P 5 weeks creation of percutaneous [place] LUE AV fistula done 5/16/2022 radial artery/radial vein CZ. He missed his two week US follow up in clinic. Was here last week for 4 week US follow up to evaluate AVF maturation and B/P too low to calculate flows. Returns for another maturation [address] and states meds were adjusted and is feeling much better. He denies any adverse symptoms to LUE. Denies chest pain. Denies dyspnea. 6/14/2022: Fadumo Henry is here for 4 week US follow up evaluation S/P LUE endovascular wavelinq AV fistula creation done 5/16/2022 radial artery/radial vein CZ, here for maturation US follow up. He denies any adverse symptoms to LUE. He comes in this morning feeling week and tired stating he took his blood pressure medications. This happens every morning after taking them. Denies chest pain. Denies dyspnea.      Family History   Problem Relation Age of Onset    Coronary Art Dis Mother        Past Surgical History:   Procedure Laterality Date    CARDIAC PACEMAKER PLACEMENT      DIALYSIS CATHETER INSERTION Right 04/12/2022    15.5 F x 19 cm SYMETREX LONG TERM HEMODIALYSIS CATHETER    HERNIA REPAIR Right 02/10/2021    RIGHT INGUINAL HERNIA REPAIR WITH MESH performed by Domitila Babb MD at [address] [facility]   5/16/2022    IR Gayathri  14. ARTERIOVENOUS FISTULA CREATION 5/16/2022 [facility] SPECIAL PROCEDURE    IR TUNNELED CATHETER PLACEMENT GREATER THAN 5 YEARS  4/12/2022    IR TUNNELED CATHETER PLACEMENT GREATER THAN 5 YEARS 4/12/2022 [facility] SPECIAL PROCEDURE        Past Medical History:   Diagnosis Date    Abnormal EKG 9/27/2019    Acute kidney injury (BLUE) with acute tubular necrosis (ATN) ([facility]     AICD (automatic cardioverter/defibrillator) present     Cardiomyopathy ([facility]     per echo 2019    Chronic combined systolic and diastolic CHF (congestive heart failure) ([facility] 2019    ETOH abuse     Hypertension     Tobacco abuse        Social History     Socioeconomic History    Marital status: Life Partner     Spouse name: Sebastien Atkins Number of children: Not on file    Years of education: Not on file    Highest education level: Not on file   Occupational History    Not on file   Tobacco Use    Smoking status: Former Smoker     Packs/day: 1.00     Types: Cigarettes     Quit date: 12/3/2020     Years since quittin.5    Smokeless tobacco: Never Used    Tobacco comment: currently smoking only couple cigarettes daily   Substance and Sexual Activity    Alcohol use: Yes    Drug use: Never    Sexual activity: Not on file   Other Topics Concern    Not on file   Social History Narrative    Not on file     Social Determinants of Health     Financial Resource Strain: Low Risk     Difficulty of Paying Living Expenses: Not hard at all   Food Insecurity: No Food Insecurity    Worried About Running Out of Food in the Last Year: Never true    [address] in the Last Year: Never true   Transportation Needs:     Lack of Transportation (Medical): Not on file    Lack of Transportation (Non-Medical):  Not on file   Physical Activity:     Days of Exercise per Week: Not on file    Minutes of Exercise per Session: Not on file   Stress:     Feeling of Stress : Not on file   Social Connections:     Frequency of Communication with Friends and Family: Not on file    Frequency of Social Gatherings with Friends and Family: Not on file    Attends Synagogue Services: Not on file    Active Member of Clubs or Organizations: Not on file    Attends Club or Organization Meetings: Not on file    Marital Status: Not on file   Intimate Partner Violence:     Fear of Current or Ex-Partner: Not on file    Emotionally Abused: Not on file    Physically Abused: Not on file    Sexually Abused: Not on file   Housing Stability:     Unable to Pay for Housing in the Last Year: Not on file    Number of [place] in the Last Year: Not on file    Unstable Housing in the Last Year: Not on file       Allergies   Allergen Reactions    Lipitor [Atorvastatin] Other (See Comments)     Coughing        Current Outpatient Medications on File Prior to Visit   Medication Sig Dispense Refill    hydrALAZINE (APRESOLINE) 100 MG tablet take 1 tablet by mouth twice a day      hydrALAZINE (APRESOLINE) 50 MG tablet take 1 tablet by mouth twice a day      calcium acetate (PHOSLO) 667 MG CAPS capsule TAKE 1 CAPSULE BY MOUTH THREE TIMES DAILY WITH MEALS      sacubitril-valsartan (ENTRESTO) 24-26 MG per tablet Take 1 tablet by mouth 2 times daily 60 tablet 2    ivabradine (CORLANOR) 5 MG TABS tablet Take 1 tablet by mouth 2 times daily (with meals) 60 tablet 3    torsemide (DEMADEX) 20 MG tablet Take 1 tablet by mouth in the morning and at bedtime Pt states that he takes 4 tabs lately (Patient taking differently: Take 20 mg by mouth in the morning and at bedtime ) 30 tablet 3    carvedilol (COREG) 25 MG tablet take 1 tablet by mouth twice a day HOLD FOR SBP<100 OR HR <60 60 tablet 1     No current facility-administered medications on file prior to visit. Review of Systems   Constitutional: Negative. Negative for chills, fatigue and fever. HENT: Negative. Negative for congestion, ear pain, sore throat and trouble swallowing. Eyes: Negative. Negative for visual disturbance. Respiratory: Negative. Negative for cough, shortness of breath and wheezing. Cardiovascular: Negative. Negative for chest pain, palpitations and leg swelling. Gastrointestinal: Negative. Negative for abdominal pain, diarrhea, nausea and vomiting. Endocrine: Negative.     Genitourinary: Negative for difficulty urinating, dysuria and hematuria. LUE AVF   Musculoskeletal: Negative. Negative for back pain. Skin: Negative. Negative for color change, rash and wound. Neurological: Negative for dizziness, weakness, light-headedness, numbness and headaches. Hematological: Negative. Does not bruise/bleed easily. Psychiatric/Behavioral: Negative. The patient is not nervous/anxious. All other systems reviewed and are negative. OBJECTIVE:  /83   Pulse 79   Wt 202 lb (91.6 kg)   BMI 27.40 kg/m²    Vitals:    06/22/22 1110   BP: 118/83   Pulse: 79   Weight: 202 lb (91.6 kg)         Physical Exam  Constitutional:       General: He is not in acute distress. Appearance: Normal appearance. He is not ill-appearing. HENT:      Head: Normocephalic. Nose: No congestion. Cardiovascular:      Rate and Rhythm: Normal rate. Heart sounds: Normal heart sounds. Arteriovenous access: left arteriovenous access is present. Comments: LUE AV fistula positive thrill and bruit  Pulmonary:      Effort: Pulmonary effort is normal.   Abdominal:      General: Bowel sounds are normal.      Palpations: Abdomen is soft. Musculoskeletal:         General: Normal range of motion. Cervical back: Normal range of motion. Right lower leg: No edema. Left lower leg: No edema. Skin:     General: Skin is warm and dry. Neurological:      Mental Status: He is alert and oriented to person, place, and time. Psychiatric:         Mood and Affect: Mood normal.         Behavior: Behavior normal.         5/16/2022:  Impression   1.  Successful left arm radial artery to radial vein percutaneous fistula creation. 2.  Follow-up evaluation in 2 weeks planned to evaluate for fistula maturation and usability for dialysis.       CLINICAL DATA:   End-stage kidney disease requiring dialysis       RADIATION DOSE: 53.01 mGy.       PROCEDURES PERFORMED:   1. Dialysis arteriovenous fistula creation in the arm.    2. Venogram of the arm by injection of contrast   3. Arteriogram of the arm by injection of contrast   4. Ultrasound-guided venous access, ultrasound images saved to PACS   5. Ultrasound guided arterial access, ultrasound images saved to PACS   6. Creation of an arteriovenous fistula with radiofrequency ablation between radial artery and radial vein.           6/14/2022:   Impression   1. Blood pressure and cardiac output were very low today due to patient taking all blood pressure medication prior to coming to appointment. Blood flow through the cephalic vein was too slow to accurately perform the study. Brachial artery blood flow was    also low consistent with hypotension. Patient was monitored until blood pressure was higher and patient was able to leaving our department. Study will be rescheduled in the near future. Patient instructed not to take all blood pressure medication at the    same time prior to coming to the appointment and to follow up with his primary care physician regarding blood pressure control.       COMPARISON:No prior studies are available for comparison.       DIAGNOSIS: End-stage kidney disease         ASSESSMENT AND PLAN:    Entire Medication list reviewed for pre operative examination. Above procedure reviewed in order to prepare for US scan today. Follow up [address] done in clinic today I was personally present for and evaluated with results discussed with patient. Dr. Jayy Kaur to post results. Diagnosis Orders   1. Inadequate flow of dialysis arteriovenous fistula, initial encounter ([facility])  US HEMODIALYSIS UNILATERAL DUPLEX STUDY    IR FISTULAGRAM    IR CONTRAST INJECTION  EXISTING CV ACCESS DEVICE EVALUATION W FLUORO    IR US HEMODIALYSIS ACCESS EVAL    IR EMBOLIZATION VENOUS   2.  ESRD (end stage renal disease) on dialysis ([facility])  US HEMODIALYSIS UNILATERAL DUPLEX STUDY    IR FISTULAGRAM    IR CONTRAST INJECTION  EXISTING CV ACCESS DEVICE EVALUATION W FLUORO    IR US HEMODIALYSIS ACCESS EVAL    IR EMBOLIZATION VENOUS   3. S/P arteriovenous (AV) fistula creation  US HEMODIALYSIS UNILATERAL DUPLEX STUDY    IR FISTULAGRAM    IR CONTRAST INJECTION  EXISTING CV ACCESS DEVICE EVALUATION W FLUORO    IR US HEMODIALYSIS ACCESS EVAL    IR EMBOLIZATION VENOUS       --  There is slow flow in both Cephalic and Basilic outflow veins with considerable flow in lateral brachial vein most likely taking away from outflow. Will plan for intervention to coil embolize Lateral brachial vein along with fistulagram evaluation to assist with maturation. Plan:     Orders Placed This Encounter   Procedures    US HEMODIALYSIS UNILATERAL DUPLEX STUDY     Standing Status:   Future     Standing Expiration Date:   6/24/2023     Order Specific Question:   Reason for exam:     Answer:   two week US follow up of fistulagram    IR FISTULAGRAM     Standing Status:   Future     Standing Expiration Date:   6/24/2023    IR CONTRAST INJECTION  EXISTING CV ACCESS DEVICE EVALUATION W FLUORO     Standing Status:   Future     Standing Expiration Date:   6/24/2023    IR US HEMODIALYSIS ACCESS EVAL     Standing Status:   Future     Standing Expiration Date:   6/24/2023    IR EMBOLIZATION VENOUS     Standing Status:   Future     Standing Expiration Date:   6/24/2023      No orders of the defined types were placed in this encounter. --Percutaneous endovascular AV fistulagram with possible venous coil embolization, PTA, and/or venous stenting to assist with AVF maturation. Plan is for Lateral Brachial Vein coil embolization. Procedure with risks including infection, bleeding, pain at site, DVT, thrombus and/or embolization, damage to vessels discussed with patient. He/she wishes to proceed. Procedure to be done with IV conscious sedation. --  2 week Follow up US scan in clinic to evaluate maturation. Total time spent for this encounter: 65 minutes.       Loretto Brittle, APRN - CNP

## 2022-06-24 ENCOUNTER — PREP FOR PROCEDURE (OUTPATIENT)
Dept: INTERVENTIONAL RADIOLOGY/VASCULAR | Age: 52
End: 2022-06-24

## 2022-06-24 ENCOUNTER — CARE COORDINATION (OUTPATIENT)
Dept: CARE COORDINATION | Age: 52
End: 2022-06-24

## 2022-06-24 RX ORDER — FENTANYL CITRATE 50 UG/ML
50 INJECTION, SOLUTION INTRAMUSCULAR; INTRAVENOUS
Status: CANCELLED | OUTPATIENT
Start: 2022-06-24

## 2022-06-24 RX ORDER — IODIXANOL 320 MG/ML
100 INJECTION, SOLUTION INTRAVASCULAR
Status: CANCELLED | OUTPATIENT
Start: 2022-06-24

## 2022-06-24 RX ORDER — 0.9 % SODIUM CHLORIDE 0.9 %
1000 INTRAVENOUS SOLUTION INTRAVENOUS
Status: CANCELLED | OUTPATIENT
Start: 2022-06-24

## 2022-06-24 RX ORDER — MIDAZOLAM HYDROCHLORIDE 1 MG/ML
0.5 INJECTION INTRAMUSCULAR; INTRAVENOUS
Status: CANCELLED | OUTPATIENT
Start: 2022-06-24

## 2022-06-24 RX ORDER — 0.9 % SODIUM CHLORIDE 0.9 %
250 INTRAVENOUS SOLUTION INTRAVENOUS
Status: CANCELLED | OUTPATIENT
Start: 2022-06-24

## 2022-06-24 RX ORDER — HEPARIN SODIUM 1000 [USP'U]/ML
6000 INJECTION, SOLUTION INTRAVENOUS; SUBCUTANEOUS ONCE
Status: CANCELLED | OUTPATIENT
Start: 2022-06-24 | End: 2022-06-24

## 2022-06-24 RX ORDER — HEPARIN SODIUM 1000 [USP'U]/ML
1000 INJECTION, SOLUTION INTRAVENOUS; SUBCUTANEOUS ONCE
Status: CANCELLED | OUTPATIENT
Start: 2022-06-24 | End: 2022-06-24

## 2022-06-24 RX ORDER — LIDOCAINE HYDROCHLORIDE 20 MG/ML
10 INJECTION, SOLUTION INFILTRATION; PERINEURAL
Status: CANCELLED | OUTPATIENT
Start: 2022-06-24

## 2022-06-24 NOTE — CARE COORDINATION
Call to patient to discuss Advance Direcrive. Informed patient that we have a HPOA form in his chart from July 2021. Informed patient that his brother and girlfriend Lina Smith was listed as contact. Patient reports that he would like to keep the previous form the same and does not want to complete a new Advance Directive. Will no longer follow patient as he has [address] forms completed and scanned in chart. Does not want to complete Living Will.      Will close referral.

## 2022-06-27 ENCOUNTER — TELEPHONE (OUTPATIENT)
Dept: INTERVENTIONAL RADIOLOGY/VASCULAR | Age: 52
End: 2022-06-27

## 2022-06-27 NOTE — TELEPHONE ENCOUNTER
PATIENT WILL BE OUT OF TOWN UNTIL 7/25/2022 - REQUESTED TO BE RESCHEDULED FROM 7/11/2022    PATIENT WAS GIVEN THIS INFORMATION VIA PHONE CONVERSATION - VOICED UNDERSTANDING  >  YOUR PROCEDURE IS SCHEDULED ON: 8/9/2022 at 1:00 pm  >  You will need to arrive at 12:00 pm from home and check in at the Diagnostic Imaging Check In desk.   >  Do not eat or drink after midnight.    >  Make arrangements for transportation, as you should not drive immediately after.  >  Take Coreg, Entresto and Hydralazine morning of procedure with a sip of water

## 2022-06-28 ENCOUNTER — OFFICE VISIT (OUTPATIENT)
Dept: FAMILY MEDICINE CLINIC | Age: 52
End: 2022-06-28
Payer: MEDICAID

## 2022-06-28 VITALS
SYSTOLIC BLOOD PRESSURE: 118 MMHG | HEIGHT: 72 IN | BODY MASS INDEX: 29.12 KG/M2 | RESPIRATION RATE: 14 BRPM | OXYGEN SATURATION: 97 % | HEART RATE: 86 BPM | DIASTOLIC BLOOD PRESSURE: 64 MMHG | WEIGHT: 215 LBS

## 2022-06-28 DIAGNOSIS — I10 ESSENTIAL HYPERTENSION: ICD-10-CM

## 2022-06-28 DIAGNOSIS — I50.43 CHF (CONGESTIVE HEART FAILURE), NYHA CLASS I, ACUTE ON CHRONIC, COMBINED (HCC): Primary | ICD-10-CM

## 2022-06-28 DIAGNOSIS — Z12.11 SCREENING FOR COLORECTAL CANCER: ICD-10-CM

## 2022-06-28 DIAGNOSIS — Z12.12 SCREENING FOR COLORECTAL CANCER: ICD-10-CM

## 2022-06-28 DIAGNOSIS — N18.6 ESRD (END STAGE RENAL DISEASE) (HCC): ICD-10-CM

## 2022-06-28 PROCEDURE — G8417 CALC BMI ABV UP PARAM F/U: HCPCS | Performed by: INTERNAL MEDICINE

## 2022-06-28 PROCEDURE — G8427 DOCREV CUR MEDS BY ELIG CLIN: HCPCS | Performed by: INTERNAL MEDICINE

## 2022-06-28 PROCEDURE — 1036F TOBACCO NON-USER: CPT | Performed by: INTERNAL MEDICINE

## 2022-06-28 PROCEDURE — 3017F COLORECTAL CA SCREEN DOC REV: CPT | Performed by: INTERNAL MEDICINE

## 2022-06-28 PROCEDURE — 99214 OFFICE O/P EST MOD 30 MIN: CPT | Performed by: INTERNAL MEDICINE

## 2022-06-28 RX ORDER — CARVEDILOL 25 MG/1
TABLET ORAL
Qty: 180 TABLET | Refills: 1 | Status: ON HOLD | OUTPATIENT
Start: 2022-06-28 | End: 2022-09-06 | Stop reason: HOSPADM

## 2022-06-28 ASSESSMENT — ENCOUNTER SYMPTOMS
EYE PAIN: 0
EYE ITCHING: 0
ABDOMINAL PAIN: 0
COUGH: 0
RHINORRHEA: 0
PHOTOPHOBIA: 0
COLOR CHANGE: 0
BACK PAIN: 0
DIARRHEA: 0
ABDOMINAL DISTENTION: 0
BLOOD IN STOOL: 0
APNEA: 0
VOICE CHANGE: 0
SORE THROAT: 0
SINUS PAIN: 0
RECTAL PAIN: 0
SINUS PRESSURE: 0
SHORTNESS OF BREATH: 0
EYE DISCHARGE: 0
WHEEZING: 0
TROUBLE SWALLOWING: 0
VOMITING: 0
NAUSEA: 0
EYE REDNESS: 0
CONSTIPATION: 0
CHEST TIGHTNESS: 0
FACIAL SWELLING: 0

## 2022-06-28 NOTE — PROGRESS NOTES
Subjective:      Patient ID: Maynor Rivero is a 50 y.o. male who presents today for:  No chief complaint on file. 44-year-old male with history of hypertension, acute kidney injury, and cardiomyopathy presents for follow-up visit. Hypertension: The patient is presently compliantCoreg and Imdur. He is also receiving Lasix 40 mg orally daily  The patient is presently in need of a refill for hydralazine 100 mg 3 times daily to control his blood pressure. His blood pressure remains uncontrolled at this time. Regarding his cardiomyopathy  carvedilol 25 mg twice daily, Imdur 30 mg daily, hydralazine 50 tid. Nicotine abuse: The patient previously achieved smoking cessation. At present he denies polyuria,  Polydipsia, constitutional, sinus, visual, cardiopulmonary, gastrointestinal, immunologic/hematologic, musculoskeletal, neurologic,dermatologic, or psychiatric complaints.       Past Medical History:   Diagnosis Date    Abnormal EKG 9/27/2019    Acute kidney injury (BLUE) with acute tubular necrosis (ATN) (HCC)     AICD (automatic cardioverter/defibrillator) present     Cardiomyopathy ([address]     per echo 8/2019    Chronic combined systolic and diastolic CHF (congestive heart failure) ([address] 9/27/2019    ETOH abuse     Hypertension     Tobacco abuse      Past Surgical History:   Procedure Laterality Date    CARDIAC PACEMAKER PLACEMENT      DIALYSIS CATHETER INSERTION Right 04/12/2022    15.5 F x 19 cm SYMETREX LONG TERM HEMODIALYSIS CATHETER    HERNIA REPAIR Right 02/10/2021    RIGHT INGUINAL HERNIA REPAIR WITH MESH performed by Ira Bloom MD at [facility]   5/16/2022     Corporate  5/16/2022 [facility] PROCEDURE    IR TUNNELED CATHETER PLACEMENT GREATER THAN 5 YEARS  4/12/2022    IR TUNNELED CATHETER PLACEMENT GREATER THAN 5 YEARS 4/12/2022 [facility] SPECIAL PROCEDURE     Social History     Socioeconomic History    Marital status: Life Partner     Spouse name: Chacorta Velez Number of children: Not on file    Years of education: Not on file    Highest education level: Not on file   Occupational History    Not on file   Tobacco Use    Smoking status: Former Smoker     Packs/day: 1.00     Types: Cigarettes     Quit date: 12/3/2020     Years since quittin.5    Smokeless tobacco: Never Used    Tobacco comment: currently smoking only couple cigarettes daily   Substance and Sexual Activity    Alcohol use: Yes    Drug use: Never    Sexual activity: Not on file   Other Topics Concern    Not on file   Social History Narrative    Not on file     Social Determinants of Health     Financial Resource Strain: Low Risk     Difficulty of Paying Living Expenses: Not hard at all   Food Insecurity: No Food Insecurity    Worried About Running Out of Food in the Last Year: Never true    [address] in the Last Year: Never true   Transportation Needs:     Lack of Transportation (Medical): Not on file    Lack of Transportation (Non-Medical):  Not on file   Physical Activity:     Days of Exercise per Week: Not on file    Minutes of Exercise per Session: Not on file   Stress:     Feeling of Stress : Not on file   Social Connections:     Frequency of Communication with Friends and Family: Not on file    Frequency of Social Gatherings with Friends and Family: Not on file    Attends Scientology Services: Not on file    Active Member of [address] [place] [address] or Organizations: Not on file    Attends Club or Organization Meetings: Not on file    Marital Status: Not on file   Intimate Partner Violence:     Fear of Current or Ex-Partner: Not on file    Emotionally Abused: Not on file    Physically Abused: Not on file    Sexually Abused: Not on file   Housing Stability:     Unable to Pay for Housing in the Last Year: Not on file    Number of [place] in the Last Year: Not on file    Unstable Housing in the Last Year: Not on file Family History   Problem Relation Age of Onset    Coronary Art Dis Mother      Allergies   Allergen Reactions    Lipitor [Atorvastatin] Other (See Comments)     Coughing      Current Outpatient Medications on File Prior to Visit   Medication Sig Dispense Refill    hydrALAZINE (APRESOLINE) 100 MG tablet take 1 tablet by mouth twice a day      hydrALAZINE (APRESOLINE) 50 MG tablet take 1 tablet by mouth twice a day      calcium acetate (PHOSLO) 667 MG CAPS capsule TAKE 1 CAPSULE BY MOUTH THREE TIMES DAILY WITH MEALS      sacubitril-valsartan (ENTRESTO) 24-26 MG per tablet Take 1 tablet by mouth 2 times daily 60 tablet 2    ivabradine (CORLANOR) 5 MG TABS tablet Take 1 tablet by mouth 2 times daily (with meals) 60 tablet 3    torsemide (DEMADEX) 20 MG tablet Take 1 tablet by mouth in the morning and at bedtime Pt states that he takes 4 tabs lately (Patient taking differently: Take 20 mg by mouth in the morning and at bedtime ) 30 tablet 3    carvedilol (COREG) 25 MG tablet take 1 tablet by mouth twice a day HOLD FOR SBP<100 OR HR <60 60 tablet 1     No current facility-administered medications on file prior to visit. Review of Systems   Constitutional: Negative for chills, diaphoresis, fatigue and fever. HENT: Negative for congestion, dental problem, drooling, ear discharge, ear pain, facial swelling, hearing loss, mouth sores, nosebleeds, postnasal drip, rhinorrhea, sinus pressure, sinus pain, sneezing, sore throat, tinnitus, trouble swallowing and voice change. Eyes: Negative for photophobia, pain, discharge, redness, itching and visual disturbance. Respiratory: Negative for apnea, cough, chest tightness, shortness of breath and wheezing. Cardiovascular: Negative for chest pain, palpitations and leg swelling. Gastrointestinal: Negative for abdominal distention, abdominal pain, blood in stool, constipation, diarrhea, nausea, rectal pain and vomiting.    Endocrine: Negative for cold Findings: No erythema or rash. Neurological:      Mental Status: He is alert and oriented to person, place, and time. Psychiatric:         Thought Content: Thought content normal.         Judgment: Judgment normal.         Assessment:       Diagnosis Orders   1. Need for hepatitis C screening test     2. Encounter for HIV (human immunodeficiency virus) test           Plan:      Leatha Berg was seen today for follow-up. Diagnoses and all orders for this visit:          Essential hypertension-we will refill the patient's  hydralazine  100 mg po tid, carvedilol 25 mg bid, lasix 40 mg po daily ,   imdur 30 mg daily . Cardiomyopathy, unspecified type (HCC)-continue Coreg 5 mg twice daily, hydralazine 100 mg orally 3 times daily and Imdur 30 mg orally daily. CKD: Continue to monitor renal function . Avoid NSAIDS. No follow-ups on file. Kat Bethea MD    Please note, this report has been partially produced using speech recognition software  and may cause  and /or contain errors related to that system including grammar, punctuation and spelling as well as words and phrases that may seem inappropriate. If there are questions or concerns please feel free to contact me to clarify.

## 2022-07-01 ENCOUNTER — TELEPHONE (OUTPATIENT)
Dept: FAMILY MEDICINE CLINIC | Age: 52
End: 2022-07-01

## 2022-07-01 NOTE — TELEPHONE ENCOUNTER
Attempted to contact the patient in regards to disability paperwork that we have here - it has been scanned into the media uncompleted and no completed copy scanned - inquiring if the patient still needed this completed or not - no answer - lmovm for the patient to call the office to let us know

## 2022-07-08 ENCOUNTER — CARE COORDINATION (OUTPATIENT)
Dept: CARE COORDINATION | Age: 52
End: 2022-07-08

## 2022-07-08 RX ORDER — TORSEMIDE 20 MG/1
TABLET ORAL
Qty: 180 TABLET | Refills: 2 | Status: ON HOLD | OUTPATIENT
Start: 2022-07-08 | End: 2022-09-06 | Stop reason: HOSPADM

## 2022-07-08 NOTE — CARE COORDINATION
ACM called patient. Left message requesting a return call for [facility] out reach. Contact information supplied.

## 2022-07-08 NOTE — TELEPHONE ENCOUNTER
Please approve or deny this refill request. The order is pended. Thank you.     LOV 6/2/2022    Next Visit Date:  Future Appointments   Date Time Provider Perez Thi   7/28/2022  9:00 AM Kain Espinoza, [place] AGUSTIN CHF [facility] EMERGENCY [facility] AT [place]   8/9/2022  1:00 PM [place] SPECIAL PROCEDURES ROOM 1 [facility] SP PROC [facility] Fac RAD   8/9/2022  1:00 PM [facility] CATH LAB RM [address] [facility]   8/23/2022  8:00 AM Farhan Bob, APRN - CNP [facility] RVI [facility] EMERGENCY [facility] AT [place]   8/23/2022  8:00 AM [address]   9/29/2022  4:00 PM Rebecca Bradley  [place], [place] [address]

## 2022-07-15 ENCOUNTER — CARE COORDINATION (OUTPATIENT)
Dept: CARE COORDINATION | Age: 52
End: 2022-07-15

## 2022-07-15 NOTE — TELEPHONE ENCOUNTER
Per patient he has been accepted for disability. He is not sure what paperwork you have. When he returns home end of month he will see if any additional paperwork is needed. Per patient his wife handles all his needs.

## 2022-07-15 NOTE — CARE COORDINATION
Ambulatory Care Coordination Note  7/15/2022  CM Risk Score: 10  Salbador Mortality Risk Score: [unfilled]    ACC: Rema Chirinos, RN    Summary Note: ACM spoke to patient. He is currently in [facility]. He is receiving his dialysis in [facility]. Patient states he is in [facility] because his wife's family is receiving a plaque of recognition of some sort. Per patient he is to be returning home at the end of the month. Patient's wife has set up all hemodialysis in the [facility] area. Patient reports doing well overall. Advised patient that office was attempting to contact him regarding disability paperwork at the office. Per patient he has been accepted for disability. He is not sure what paperwork ACM is speaking up. He will have his wife address this issue when they return. ACM advised patient he can have his wife call the office for additional clarification if need be. Patient denies today any CP, SOB, increased edema, or falls. Patient is actually having hemodialysis during this call  ACM reviewed CHF zones. Medication compliance and follow-up with all providers. Brooke Benitez received counseling on the following healthy behaviors: SELF MANAGEMENT of chronic health conditions,with goal to improve quality of life and overall wellbeing. The patient is asked to make an attempt to improve diet and exercise patterns to aid in medical management. I have instructed Brooke Benitez to complete a self tracking handout on Blood Pressures  and Weights and instructed them to bring it with them to his next appointment. Discussed use, benefit, and side effects of prescribed medications. Barriers to medication compliance addressed. All patient questions answered. Pt voiced understanding. The importance of daily weights, dietary sodium restriction, and contact with cardiology if weight is increased more than 3 lbs in any 48 hour period was stressed.  The patient has been advised to contact us if they experience progressive SOB, orthopnea, PND or progressive edema. Congestive Heart Failure Assessment    Are you currently restricting fluids?: 1800cc  Do you understand a low sodium diet?: No  Do you understand how to read food labels?: No  How many restaurant meals do you eat per week?: 0  Do you salt your food before tasting it?: No     No patient-reported symptoms      Symptoms:  CHF associated angina: Neg, CHF associated dyspnea on exertion: Pos, CHF associated fatigue: Pos, CHF associated leg swelling: Neg, CHF associated orthostatic hypotension: Neg, CHF associated PND: Neg, CHF associated shortness of breath: Neg, CHF associated weakness: Neg      Symptom course: stable  Patient-reported weight (lb): 212  Weight trend: stable  Salt intake watch compared to last visit: stable         Care Coordination Interventions    Referral from Primary Care Provider: No  Suggested Interventions and Community Resources  Fall Risk Prevention: Completed  Palliative Care: Not Started  Pharmacist: Declined  Registered Dietician: Completed (Comment: CHF)  Social Work: Completed (Comment: ACP documents)  Other Services: Completed (Comment: walk in t/l )  Zone Management Tools: Completed (Comment: chf )          Goals Addressed                   This Visit's Progress     Conditions and Symptoms   Improving     I will schedule office visits, as directed by my provider. I will keep my appointment or reschedule if I have to cancel. I will notify my provider of any barriers to my plan of care. I will follow my Zone Management tool to seek urgent or emergent care. I will notify my provider of any symptoms that indicate a worsening of my condition.     Barriers: lack of education  Plan for overcoming my barriers: ACM, dietician, PCP, social work  Confidence: 8/10  Anticipated Goal Completion Date: 10/29/2022         Self Monitoring   Improving     Daily Weights - I will weight myself as directed - Monday, Wednesday, and Friday and write down weights  I will notify my provider of any increase in weight by 3 or more pounds in 2 days OR 5 or more pounds in a week. None Recently Recorded    Barriers: lack of education  Plan for overcoming my barriers: Dialysis, ACM, dietician  Confidence: 10/10  Anticipated Goal Completion Date: 6/29/2022                Prior to Admission medications    Medication Sig Start Date End Date Taking?  Authorizing Provider   torsemide (DEMADEX) 20 MG tablet take 1 tablet by mouth every morning and take 1 tablet by mouth at bedtime 7/8/22   MICAH De Guzman   carvedilol (COREG) 25 MG tablet take 1 tablet by mouth twice a day HOLD FOR SBP<100 OR HR <60 6/28/22   Chacorta Jennings MD   hydrALAZINE (APRESOLINE) 50 MG tablet take 1 tablet by mouth twice a day 5/11/22   Historical Provider, MD   calcium acetate (PHOSLO) 667 MG CAPS capsule TAKE 1 CAPSULE BY MOUTH THREE TIMES DAILY WITH MEALS 4/29/22   Historical Provider, MD   sacubitril-valsartan (ENTRESTO) 24-26 MG per tablet Take 1 tablet by mouth 2 times daily 4/21/22   MICAH De Guzman   ivabradine (CORLANOR) 5 MG TABS tablet Take 1 tablet by mouth 2 times daily (with meals) 4/21/22   MICAH De Guzman       Future Appointments   Date Time Provider Perez Ness   7/28/2022  9:00 AM Page Rosen, [address] [facility] EMERGENCY [facility] AT [facility]   8/9/2022  1:00 PM [facility] SPECIAL PROCEDURES ROOM 1 [facility] SP PROC [facility] Fac RAD   8/9/2022  1:00 PM [facility] CATH LAB RM [facility] 69   8/23/2022  8:00 AM Farhan Bob, APRN - CNP [facility]   8/23/2022  8:00 AM DR Sunday [address]   9/29/2022  4:00 PM Chacorta Jennings  [place], [place] 7

## 2022-07-26 ENCOUNTER — HOSPITAL ENCOUNTER (EMERGENCY)
Age: 52
Discharge: HOME | End: 2022-07-27
Payer: MEDICARE

## 2022-07-26 DIAGNOSIS — S39.012A STRAIN OF LUMBAR REGION, INITIAL ENCOUNTER: Primary | ICD-10-CM

## 2022-07-26 PROCEDURE — 96372 THER/PROPH/DIAG INJ SC/IM: CPT

## 2022-07-26 PROCEDURE — 99284 EMERGENCY DEPT VISIT MOD MDM: CPT

## 2022-07-26 RX ORDER — KETOROLAC TROMETHAMINE 30 MG/ML
60 INJECTION, SOLUTION INTRAMUSCULAR; INTRAVENOUS ONCE
Status: COMPLETED | OUTPATIENT
Start: 2022-07-26 | End: 2022-07-27

## 2022-07-26 RX ORDER — PREDNISONE 20 MG/1
40 TABLET ORAL ONCE
Status: COMPLETED | OUTPATIENT
Start: 2022-07-26 | End: 2022-07-27

## 2022-07-26 RX ORDER — ACETAMINOPHEN 325 MG/1
650 TABLET ORAL ONCE
Status: COMPLETED | OUTPATIENT
Start: 2022-07-26 | End: 2022-07-27

## 2022-07-26 RX ORDER — METHOCARBAMOL 500 MG/1
1500 TABLET, FILM COATED ORAL ONCE
Status: COMPLETED | OUTPATIENT
Start: 2022-07-26 | End: 2022-07-27

## 2022-07-26 ASSESSMENT — PAIN - FUNCTIONAL ASSESSMENT: PAIN_FUNCTIONAL_ASSESSMENT: 0-10

## 2022-07-26 ASSESSMENT — PAIN DESCRIPTION - FREQUENCY: FREQUENCY: CONTINUOUS

## 2022-07-26 ASSESSMENT — PAIN DESCRIPTION - DESCRIPTORS: DESCRIPTORS: SPASM;SORE

## 2022-07-26 ASSESSMENT — PAIN SCALES - GENERAL: PAINLEVEL_OUTOF10: 10

## 2022-07-26 ASSESSMENT — PAIN DESCRIPTION - LOCATION: LOCATION: BACK

## 2022-07-26 ASSESSMENT — PAIN DESCRIPTION - PAIN TYPE: TYPE: ACUTE PAIN

## 2022-07-26 ASSESSMENT — PAIN DESCRIPTION - ORIENTATION: ORIENTATION: MID

## 2022-07-27 VITALS
TEMPERATURE: 100.2 F | RESPIRATION RATE: 17 BRPM | SYSTOLIC BLOOD PRESSURE: 112 MMHG | BODY MASS INDEX: 29.92 KG/M2 | HEART RATE: 83 BPM | HEIGHT: 69 IN | OXYGEN SATURATION: 98 % | WEIGHT: 202 LBS | DIASTOLIC BLOOD PRESSURE: 56 MMHG

## 2022-07-27 PROCEDURE — 6370000000 HC RX 637 (ALT 250 FOR IP): Performed by: STUDENT IN AN ORGANIZED HEALTH CARE EDUCATION/TRAINING PROGRAM

## 2022-07-27 PROCEDURE — 6360000002 HC RX W HCPCS: Performed by: STUDENT IN AN ORGANIZED HEALTH CARE EDUCATION/TRAINING PROGRAM

## 2022-07-27 RX ORDER — PREDNISONE 20 MG/1
40 TABLET ORAL DAILY
Qty: 10 TABLET | Refills: 0 | Status: SHIPPED | OUTPATIENT
Start: 2022-07-27 | End: 2022-08-01

## 2022-07-27 RX ORDER — CYCLOBENZAPRINE HCL 10 MG
10 TABLET ORAL NIGHTLY PRN
Qty: 10 TABLET | Refills: 0 | Status: SHIPPED | OUTPATIENT
Start: 2022-07-27 | End: 2022-08-06

## 2022-07-27 RX ADMIN — ACETAMINOPHEN 650 MG: 325 TABLET ORAL at 00:08

## 2022-07-27 RX ADMIN — PREDNISONE 40 MG: 20 TABLET ORAL at 00:08

## 2022-07-27 RX ADMIN — KETOROLAC TROMETHAMINE 60 MG: 30 INJECTION, SOLUTION INTRAMUSCULAR at 00:10

## 2022-07-27 RX ADMIN — METHOCARBAMOL 1500 MG: 500 TABLET ORAL at 00:09

## 2022-07-27 ASSESSMENT — PAIN SCALES - GENERAL: PAINLEVEL_OUTOF10: 10

## 2022-07-27 ASSESSMENT — PAIN - FUNCTIONAL ASSESSMENT: PAIN_FUNCTIONAL_ASSESSMENT: NONE - DENIES PAIN

## 2022-07-27 ASSESSMENT — ENCOUNTER SYMPTOMS
NAUSEA: 0
DIARRHEA: 0
SHORTNESS OF BREATH: 0
CHEST TIGHTNESS: 0
SORE THROAT: 0
EYE PAIN: 0
BACK PAIN: 1

## 2022-07-27 ASSESSMENT — PAIN DESCRIPTION - ORIENTATION: ORIENTATION: RIGHT;MID

## 2022-07-27 NOTE — ED PROVIDER NOTES
[address] [facility] ED  eMERGENCYdEPARTMENT eNCOUnter      Pt Name: Manuelito Mcfarland  MRN: [id number]  Bonny 1970  Date of evaluation: 7/26/2022  Reford FACUNDO Daly    CHIEF COMPLAINT           HPI  Manuelito Mcfarland is a 50 y.o. male presents with back pain. Patient reports sudden onset, severe, sharp, nondraining pain to the right lower back that began about a week ago and is worse with movement. Patient states he thinks this started because he lays down a lot. He denies bowel or bladder incontinence, saddle anesthesia, fever, foot drop, IV drug use, abdominal pain. ROS  Review of Systems   Constitutional:  Negative for chills, fatigue and fever. HENT:  Negative for ear pain, hearing loss and sore throat. Eyes:  Negative for pain and visual disturbance. Respiratory:  Negative for chest tightness and shortness of breath. Cardiovascular:  Negative for chest pain. Gastrointestinal:  Negative for diarrhea and nausea. Endocrine: Negative for cold intolerance. Genitourinary:  Negative for hematuria. Musculoskeletal:  Positive for back pain. Skin:  Negative for rash and wound. Neurological:  Negative for dizziness and headaches. Psychiatric/Behavioral:  Negative for behavioral problems and confusion. Except as noted above the remainder of the review of systems was reviewed and negative.        PAST MEDICAL HISTORY     Past Medical History:   Diagnosis Date    Abnormal EKG 09/27/2019    Acute kidney injury (BLUE) with acute tubular necrosis (ATN) (HCC)     AICD (automatic cardioverter/defibrillator) present     Cardiomyopathy ([address]     per echo 8/2019    Chronic combined systolic and diastolic CHF (congestive heart failure) ([address] 09/27/2019    D[address] patient ([facility] [address]     ETOH abuse     Hypertension     Tobacco abuse          SURGICAL HISTORY       Past Surgical History:   Procedure Laterality Date    CARDIAC PACEMAKER PLACEMENT      DIALYSIS CATHETER INSERTION Right hard at all   Food Insecurity: No Food Insecurity    Worried About [employer] in the Last Year: Never true    Ran Out of Food in the Last Year: Never true         PHYSICAL EXAM       ED Triage Vitals [07/26/22 2342]   BP Temp Temp Source Heart Rate Resp SpO2 Height Weight   123/74 100.2 °F (37.9 °C) Oral 89 17 100 % 5' 9\" (1.753 m) 202 lb (91.6 kg)       Physical Exam  Constitutional:       Appearance: Normal appearance. HENT:      Head: Normocephalic and atraumatic. Nose: Nose normal.      Mouth/Throat:      Mouth: Mucous membranes are moist.      Pharynx: No oropharyngeal exudate or posterior oropharyngeal erythema. Eyes:      Extraocular Movements: Extraocular movements intact. Conjunctiva/sclera: Conjunctivae normal.      Pupils: Pupils are equal, round, and reactive to light. Cardiovascular:      Rate and Rhythm: Normal rate and regular rhythm. Heart sounds: Normal heart sounds. Pulmonary:      Effort: Pulmonary effort is normal.      Breath sounds: Normal breath sounds. No wheezing or rhonchi. Abdominal:      General: Bowel sounds are normal.      Palpations: Abdomen is soft. Tenderness: There is no abdominal tenderness. There is no guarding. Musculoskeletal:         General: No deformity. Normal range of motion. Cervical back: Normal range of motion and neck supple. Back:    Skin:     General: Skin is warm and dry. Coloration: Skin is not jaundiced. Neurological:      General: No focal deficit present. Mental Status: He is alert and oriented to person, place, and time. Psychiatric:         Mood and Affect: Mood normal.         Behavior: Behavior normal.         MDM  This is a 73-year-old male presenting with low back pain. Patient is afebrile and hemodynamically stable. Likely lumbar strain. Patient given p.o. Tylenol, IM Toradol, p.o. Robaxin, p.o. prednisone.   Patient reevaluated and is sleeping comfortably, states his pain is all gone upon waking. He is agreeable to discharge with symptomatic medications. He will follow-up with his primary care doctor and has been change or worsen. FINAL IMPRESSION      1.  Strain of lumbar region, initial encounter          DISPOSITION/PLAN   DISPOSITION Decision To Discharge 07/27/2022 12:54:51 AM        DISCHARGE MEDICATIONS:  [unfilled]         Syed uQinn PA-C(electronically signed)  Attending Emergency Physician           Syed Quinn PA-C  07/27/22 1422

## 2022-07-27 NOTE — ED TRIAGE NOTES
PT C/O MID BACK PAIN X 1 WEEK, PT STATES PAIN IN MID BACK STARTED SUDDENLY, DENIES INJURIES. PT C/O STIFFNESS AND NOT ABLE TO MOVE. PT MOVES ALL EXT.  PER REQUEST, PT STATES HE FEELS NUMBNESS IN MID BACK. 0 PAIN MEDS TAKEN PTA PER PT.

## 2022-08-01 ENCOUNTER — TELEPHONE (OUTPATIENT)
Dept: INTERVENTIONAL RADIOLOGY/VASCULAR | Age: 52
End: 2022-08-01

## 2022-08-01 NOTE — TELEPHONE ENCOUNTER
PATIENT WAS GIVEN THIS INFORMATION  VIA PHONE CONVERSATION - VOICED UNDERSTANDING  >  YOUR PROCEDURE IS SCHEDULED ON: 08/3/22 @ 11  >  You will need to arrive at 10 and check in at the [address] In desk.   >  Do not eat or drink after midnight.    >  Make arrangements for transportation, as you should not drive immediately after.  >  Take coreg, entresto & hydralazine  morning of procedure with a sip of water  >

## 2022-08-03 ENCOUNTER — HOSPITAL ENCOUNTER (OUTPATIENT)
Dept: CARDIAC CATH/INVASIVE PROCEDURES | Age: 52
Discharge: HOME | End: 2022-08-03
Payer: MEDICARE

## 2022-08-03 ENCOUNTER — HOSPITAL ENCOUNTER (OUTPATIENT)
Dept: INTERVENTIONAL RADIOLOGY/VASCULAR | Age: 52
Discharge: HOME | End: 2022-08-05
Payer: MEDICARE

## 2022-08-03 VITALS
DIASTOLIC BLOOD PRESSURE: 72 MMHG | SYSTOLIC BLOOD PRESSURE: 158 MMHG | RESPIRATION RATE: 17 BRPM | HEIGHT: 69 IN | BODY MASS INDEX: 29.92 KG/M2 | HEART RATE: 90 BPM | WEIGHT: 202 LBS | OXYGEN SATURATION: 94 %

## 2022-08-03 DIAGNOSIS — N18.6 ESRD (END STAGE RENAL DISEASE) ON DIALYSIS (HCC): ICD-10-CM

## 2022-08-03 DIAGNOSIS — T82.898A INADEQUATE FLOW OF DIALYSIS ARTERIOVENOUS FISTULA, INITIAL ENCOUNTER (HCC): ICD-10-CM

## 2022-08-03 DIAGNOSIS — Z99.2 ESRD (END STAGE RENAL DISEASE) ON DIALYSIS (HCC): ICD-10-CM

## 2022-08-03 DIAGNOSIS — Z98.890 S/P ARTERIOVENOUS (AV) FISTULA CREATION: ICD-10-CM

## 2022-08-03 PROCEDURE — 2580000003 HC RX 258: Performed by: NURSE PRACTITIONER

## 2022-08-03 PROCEDURE — 76937 US GUIDE VASCULAR ACCESS: CPT | Performed by: RADIOLOGY

## 2022-08-03 PROCEDURE — 36901 INTRO CATH DIALYSIS CIRCUIT: CPT

## 2022-08-03 PROCEDURE — 6360000004 HC RX CONTRAST MEDICATION: Performed by: RADIOLOGY

## 2022-08-03 PROCEDURE — 36909 DIALYSIS CIRCUIT EMBOLJ: CPT | Performed by: RADIOLOGY

## 2022-08-03 PROCEDURE — C1894 INTRO/SHEATH, NON-LASER: HCPCS

## 2022-08-03 PROCEDURE — 93990 DOPPLER FLOW TESTING: CPT | Performed by: RADIOLOGY

## 2022-08-03 PROCEDURE — 99152 MOD SED SAME PHYS/QHP 5/>YRS: CPT | Performed by: RADIOLOGY

## 2022-08-03 PROCEDURE — C1889 IMPLANT/INSERT DEVICE, NOC: HCPCS

## 2022-08-03 PROCEDURE — 2500000003 HC RX 250 WO HCPCS: Performed by: NURSE PRACTITIONER

## 2022-08-03 PROCEDURE — 36901 INTRO CATH DIALYSIS CIRCUIT: CPT | Performed by: RADIOLOGY

## 2022-08-03 PROCEDURE — 36909 DIALYSIS CIRCUIT EMBOLJ: CPT

## 2022-08-03 PROCEDURE — 2709999900 IR US HEMODIALYSIS ACCESS EVAL

## 2022-08-03 PROCEDURE — 6360000002 HC RX W HCPCS: Performed by: NURSE PRACTITIONER

## 2022-08-03 PROCEDURE — 6360000002 HC RX W HCPCS

## 2022-08-03 RX ORDER — LIDOCAINE HYDROCHLORIDE 20 MG/ML
10 INJECTION, SOLUTION INFILTRATION; PERINEURAL
Status: DISCONTINUED | OUTPATIENT
Start: 2022-08-03 | End: 2022-08-06 | Stop reason: HOSPADM

## 2022-08-03 RX ORDER — IODIXANOL 320 MG/ML
100 INJECTION, SOLUTION INTRAVASCULAR
Status: DISCONTINUED | OUTPATIENT
Start: 2022-08-03 | End: 2022-08-06 | Stop reason: HOSPADM

## 2022-08-03 RX ORDER — HEPARIN SODIUM 1000 [USP'U]/ML
1000 INJECTION, SOLUTION INTRAVENOUS; SUBCUTANEOUS ONCE
Status: COMPLETED | OUTPATIENT
Start: 2022-08-03 | End: 2022-08-03

## 2022-08-03 RX ORDER — 0.9 % SODIUM CHLORIDE 0.9 %
1000 INTRAVENOUS SOLUTION INTRAVENOUS
Status: DISCONTINUED | OUTPATIENT
Start: 2022-08-03 | End: 2022-08-06 | Stop reason: HOSPADM

## 2022-08-03 RX ORDER — MIDAZOLAM HYDROCHLORIDE 2 MG/2ML
0.5 INJECTION, SOLUTION INTRAMUSCULAR; INTRAVENOUS
Status: DISCONTINUED | OUTPATIENT
Start: 2022-08-03 | End: 2022-08-06 | Stop reason: HOSPADM

## 2022-08-03 RX ORDER — FENTANYL CITRATE 50 UG/ML
50 INJECTION, SOLUTION INTRAMUSCULAR; INTRAVENOUS
Status: DISCONTINUED | OUTPATIENT
Start: 2022-08-03 | End: 2022-08-06 | Stop reason: HOSPADM

## 2022-08-03 RX ORDER — 0.9 % SODIUM CHLORIDE 0.9 %
250 INTRAVENOUS SOLUTION INTRAVENOUS
Status: DISCONTINUED | OUTPATIENT
Start: 2022-08-03 | End: 2022-08-06 | Stop reason: HOSPADM

## 2022-08-03 RX ORDER — HEPARIN SODIUM 1000 [USP'U]/ML
6000 INJECTION, SOLUTION INTRAVENOUS; SUBCUTANEOUS ONCE
Status: DISCONTINUED | OUTPATIENT
Start: 2022-08-03 | End: 2022-08-06 | Stop reason: HOSPADM

## 2022-08-03 RX ADMIN — SODIUM CHLORIDE 1000 ML: 9 INJECTION, SOLUTION INTRAVENOUS at 11:37

## 2022-08-03 RX ADMIN — FENTANYL CITRATE 50 MCG: 50 INJECTION, SOLUTION INTRAMUSCULAR; INTRAVENOUS at 11:52

## 2022-08-03 RX ADMIN — LIDOCAINE HYDROCHLORIDE 6 ML: 20 INJECTION, SOLUTION INFILTRATION; PERINEURAL at 12:05

## 2022-08-03 RX ADMIN — HEPARIN SODIUM 1000 UNITS: 1000 INJECTION INTRAVENOUS; SUBCUTANEOUS at 11:37

## 2022-08-03 RX ADMIN — IOPAMIDOL 80 ML: 612 INJECTION, SOLUTION INTRAVENOUS at 12:34

## 2022-08-03 RX ADMIN — MIDAZOLAM HYDROCHLORIDE 0.5 MG: 1 INJECTION, SOLUTION INTRAMUSCULAR; INTRAVENOUS at 11:52

## 2022-08-03 RX ADMIN — SODIUM CHLORIDE 250 ML: 9 INJECTION, SOLUTION INTRAVENOUS at 11:35

## 2022-08-03 ASSESSMENT — PAIN SCALES - GENERAL
PAINLEVEL_OUTOF10: 3
PAINLEVEL_OUTOF10: 3

## 2022-08-03 ASSESSMENT — PAIN DESCRIPTION - LOCATION: LOCATION: BACK

## 2022-08-03 NOTE — FLOWSHEET NOTE
7803 - Pt reports he has a friend who will be able to come pick his car up after he gets out of work. Pt will attempt to find a ride home after procedure, or will use taxi service offered by hospital in order to proceed with sedation administration. Pt out of street clothes and into gown independently. Resting on cart. VS obtained and stable, on RA. NSR on monitor. 8029 - Procedure reviewed and consent signed. Patient confirms left arm fistula for procedure. 6641 - IV #20 inserted LAC by LALY RN with no issues. Dr. Janis Craft paged that patient ready to be seen in CT holding. 1045 - Pt seen by Dr. Janis Craft in [address] holding, consent signed.

## 2022-08-03 NOTE — DISCHARGE INSTRUCTIONS
Interventional Radiology Fistulagram  Discharge Instructions        Activity:  Rest, avoid strenuous activity such as bending or lifting heavy objects. Diet:  Resume usual diet      Medication:     * Resume home medication unless otherwise instructed by your physician. * (If Applicable) If taking any blood thinners or Aspirin, speak with your physician before restarting them. * May take mild pain reliever, as needed, such as Acetaminophen or Ibuprofen. INSTRUCTIONS OR COMMENTS RELATIVE TO SPECIFIC EXAM PERFORMED:       - Dressing and sutures to be removed during dialysis. - May shower after dressing and sutures are removed (after dialysis). To dry, pat the site with a dry cloth. - Do not lift anything over 10 pounds for the next 24 - 48 hours. - Monitor the site for the next 24 - 48 hours. - For large amount of bleeding or drainage, Go to ER for evaluation.     - If any signs of infection (redness, swelling, drainage/pus) at the site, Go to ER for evaluation. IF YOU DEVELOP ANY OF THE FOLLOWING SYMPTOMS, CONTACT PHYSICIAN LISTED BELOW:     Physician performing procedure: DR. Kendra Bauer - [phone] or [phone], Ask to be put in contact with Dr. Aura Leal or Radiology Nurse. After hours contact ER. * Extreme pain that increases after leaving the hospital     * Active bleeding (refer to above instructions)     * Chills, fever above 100 degrees Fahrenheit and fatigue. * Weakness, dizziness, lightheadedness or fainting. If you are unable to contact the above physician and you feel you have a major problem, please go to the Emergency Room for treatment.

## 2022-08-03 NOTE — OR NURSING
Jessica Embolization Coil placed by Dr. Jennifer Mckeon (Lot# [id number] / Exp: 4/29/2027)    1228 - Additional contrast hand injected by Dr. Jennifer Mckeon and final images obtained. 1229 - Sheath removed and manual pressure applied at site by Rad tech - HEAVEN.     1239 - Pressure released and site assessed - oozing noted. Manual pressure resumed. 1249 -  Pressure released and no bleeding / oozing noted at site. Gauze and tegaderm dressing applied. Procedure complete. Patient tolerated well. VSS. Patient denies complaints at this time. 1252 - Pt assisted back onto cart and returned to CT holding for recovery.  Electronically signed by Swapna Naylor RN on 8/3/2022 at 12:52 PM      Total Sedation Given:  Versed:    0.5 mg       Fentanyl:     50 mcg    Total Contrast used: 80 ml

## 2022-08-03 NOTE — FLOWSHEET NOTE
Pt arrived to CT holding. PT states he does not have a ride home, attempting to secure a ride so that he may have sedation at this time. Medical history, allergies, and home medications reviewed with patient. Electronically signed by Metr Cross RN on 8/3/2022 at 9:28 AM    Pt taken to cath lab 3 at approx 1130 for procedure. Electronically signed by Mert Cross RN on 8/3/2022 at 11:44 AM      Pt arrived back to ct holding. Pt reports 0/10 pain to lower back, an improvement from prior. Pt able to move all extremities without complication. Pts VSS. Call to  to set up lyft. PT eating dinner tray. Electronically signed by Mert Cross RN on 8/3/2022 at 1:17 PM    Lyft set up by radiology check-in office. Pt dressing into street clothes at this time. Discharge instructions provided and reviewed with patient. Pt has no questions or concerns at this time. Pt wishes to walk to ER exit where lyft is to pick him up. Electronically signed by Mert Cross RN on 8/3/2022 at 1:37 PM    Report called to [facility]  To notify that fistula not yet ready for use and to continue with use of tunneled HD cath. Aniyah Callejas.  Electronically signed by Mert Cross RN on 8/3/2022 at 1:41 PM

## 2022-08-04 ENCOUNTER — POST-OP TELEPHONE (OUTPATIENT)
Dept: INTERVENTIONAL RADIOLOGY/VASCULAR | Age: 52
End: 2022-08-04

## 2022-08-04 NOTE — FLOWSHEET NOTE
RN call to patient to follow up post-op, as pt had fistulagram on 8/3/22 with Dr. Kathryn Johnson. Pt reports mild soreness to site. Pt instructed to take OTC pain reliever such as tylenol or ibuprofen if needed. Pt confirms site and dressing is clean, dry, and intact. Pt instructed to call back the radiology dept and request to speak with a nurse if any questions or concerns should develop at [phone].

## 2022-08-15 ENCOUNTER — HOSPITAL ENCOUNTER (EMERGENCY)
Age: 52
Discharge: HOME | End: 2022-08-15
Attending: EMERGENCY MEDICINE
Payer: MEDICARE

## 2022-08-15 ENCOUNTER — TELEPHONE (OUTPATIENT)
Dept: CARDIOLOGY CLINIC | Age: 52
End: 2022-08-15

## 2022-08-15 VITALS
HEIGHT: 69 IN | WEIGHT: 200 LBS | SYSTOLIC BLOOD PRESSURE: 110 MMHG | RESPIRATION RATE: 20 BRPM | BODY MASS INDEX: 29.62 KG/M2 | HEART RATE: 92 BPM | DIASTOLIC BLOOD PRESSURE: 76 MMHG | OXYGEN SATURATION: 98 %

## 2022-08-15 DIAGNOSIS — Z71.1 FEARED COMPLAINT WITHOUT DIAGNOSIS: Primary | ICD-10-CM

## 2022-08-15 LAB
ALBUMIN SERPL-MCNC: 3.4 G/DL (ref 3.5–4.6)
ALP BLD-CCNC: 69 U/L (ref 35–104)
ALT SERPL-CCNC: 18 U/L (ref 0–41)
ANION GAP SERPL CALCULATED.3IONS-SCNC: 13 MEQ/L (ref 9–15)
AST SERPL-CCNC: 17 U/L (ref 0–40)
BASOPHILS ABSOLUTE: 0.1 K/UL (ref 0–0.2)
BASOPHILS RELATIVE PERCENT: 0.7 %
BILIRUB SERPL-MCNC: 0.4 MG/DL (ref 0.2–0.7)
BUN BLDV-MCNC: 45 MG/DL (ref 6–20)
CALCIUM SERPL-MCNC: 9 MG/DL (ref 8.5–9.9)
CHLORIDE BLD-SCNC: 101 MEQ/L (ref 95–107)
CO2: 21 MEQ/L (ref 20–31)
CREAT SERPL-MCNC: 8.7 MG/DL (ref 0.7–1.2)
EOSINOPHILS ABSOLUTE: 0.1 K/UL (ref 0–0.7)
EOSINOPHILS RELATIVE PERCENT: 1.6 %
GFR AFRICAN AMERICAN: 7.9
GFR NON-AFRICAN AMERICAN: 6.5
GLOBULIN: 4 G/DL (ref 2.3–3.5)
GLUCOSE BLD-MCNC: 84 MG/DL (ref 70–99)
HCT VFR BLD CALC: 26 % (ref 42–52)
HEMOGLOBIN: 8.8 G/DL (ref 14–18)
LYMPHOCYTES ABSOLUTE: 0.4 K/UL (ref 1–4.8)
LYMPHOCYTES RELATIVE PERCENT: 5.4 %
MCH RBC QN AUTO: 27.6 PG (ref 27–31.3)
MCHC RBC AUTO-ENTMCNC: 33.7 % (ref 33–37)
MCV RBC AUTO: 81.9 FL (ref 80–100)
MONOCYTES ABSOLUTE: 1.3 K/UL (ref 0.2–0.8)
MONOCYTES RELATIVE PERCENT: 16.8 %
NEUTROPHILS ABSOLUTE: 5.9 K/UL (ref 1.4–6.5)
NEUTROPHILS RELATIVE PERCENT: 75.5 %
PDW BLD-RTO: 15.5 % (ref 11.5–14.5)
PLATELET # BLD: 265 K/UL (ref 130–400)
POTASSIUM SERPL-SCNC: 4.7 MEQ/L (ref 3.4–4.9)
RBC # BLD: 3.18 M/UL (ref 4.7–6.1)
SODIUM BLD-SCNC: 135 MEQ/L (ref 135–144)
TOTAL PROTEIN: 7.4 G/DL (ref 6.3–8)
WBC # BLD: 7.9 K/UL (ref 4.8–10.8)

## 2022-08-15 PROCEDURE — 99283 EMERGENCY DEPT VISIT LOW MDM: CPT

## 2022-08-15 PROCEDURE — 36415 COLL VENOUS BLD VENIPUNCTURE: CPT

## 2022-08-15 PROCEDURE — 80053 COMPREHEN METABOLIC PANEL: CPT

## 2022-08-15 PROCEDURE — 85025 COMPLETE CBC W/AUTO DIFF WBC: CPT

## 2022-08-15 NOTE — TELEPHONE ENCOUNTER
Patient's defibrillator has been going off since Saturday-went off this morning and when he was walking outside-I suggested (told him) he needs to go to the ER-he did not want to-I told him he really should-he said OK-

## 2022-08-15 NOTE — ED PROVIDER NOTES
[address] [facility] ED  EMERGENCY DEPARTMENT ENCOUNTER      Pt Name: Boogie Agustin  MRN: [id number]  Bassemgfebony 1970  Date of evaluation: 8/15/2022  Provider: Yolanda Lyon MD    CHIEF COMPLAINT       Chief Complaint   Patient presents with    AICD Problem     alarmed         HISTORY OF PRESENT ILLNESS   (Location/Symptom, Timing/Onset, Context/Setting, Quality, Duration, Modifying Factors, Severity)  Note limiting factors. 61-year-old male presenting with concern for AICD alarm. States he heard an alarm go off 2 times today. He has no symptoms. There is no shock. Nursing Notes were reviewed. REVIEW OF SYSTEMS    (2-9 systems for level 4, 10 or more for level 5)     Review of Systems   All other systems reviewed and are negative. Except as noted above the remainder of the review of systems was reviewed and negative.        PAST MEDICAL HISTORY     Past Medical History:   Diagnosis Date    Abnormal EKG 09/27/2019    Acute kidney injury (BLUE) with acute tubular necrosis (ATN) (HCC)     AICD (automatic cardioverter/defibrillator) present     Cardiomyopathy ([address]     per echo 8/2019    Chronic combined systolic and diastolic CHF (congestive heart failure) ([address] 09/27/2019    D[address] patient ([facility] [address]     ETOH abuse     Hypertension     Tobacco abuse          SURGICAL HISTORY       Past Surgical History:   Procedure Laterality Date    CARDIAC PACEMAKER PLACEMENT      DIALYSIS CATHETER INSERTION Right 04/12/2022    15.5 F x 19 cm SYMETREX LONG TERM HEMODIALYSIS CATHETER    FISTULAGRAM Left 08/03/2022    Completed by Dr. Claire x 3 placed (See implants)    HERNIA REPAIR Right 02/10/2021    RIGHT INGUINAL HERNIA REPAIR WITH MESH [facility]mers [facility] [address]  [facility]  8/3/2022    IR EMBOLIZATION VENOUS 8/3/2022 [facility] SPECIAL PROCEDURE    IR PERC ARTERIOVENOUS FISTULA CREATION  05/16/2022    IR PERC ARTERIOVENOUS FISTULA CREATION 5/16/2022 [facility] SPECIAL PROCEDURE    IR TUNNELED CATHETER PLACEMENT GREATER THAN 5 YEARS  2022    IR TUNNELED CATHETER PLACEMENT GREATER THAN 5 YEARS 2022 [facility] PROCEDURE         CURRENT MEDICATIONS       Current Discharge Medication List        CONTINUE these medications which have NOT CHANGED    Details   torsemide (DEMADEX) 20 MG tablet take 1 tablet by mouth every morning and take 1 tablet by mouth at bedtime  Qty: 180 tablet, Refills: 2      carvedilol (COREG) 25 MG tablet take 1 tablet by mouth twice a day HOLD FOR SBP<100 OR HR <60  Qty: 180 tablet, Refills: 1      hydrALAZINE (APRESOLINE) 50 MG tablet take 1 tablet by mouth twice a day      calcium acetate (PHOSLO) 667 MG CAPS capsule TAKE 1 CAPSULE BY MOUTH THREE TIMES DAILY WITH MEALS      sacubitril-valsartan (ENTRESTO) 24-26 MG per tablet Take 1 tablet by mouth 2 times daily  Qty: 60 tablet, Refills: 2      ivabradine (CORLANOR) 5 MG TABS tablet Take 1 tablet by mouth 2 times daily (with meals)  Qty: 60 tablet, Refills: 3             ALLERGIES     Lipitor [atorvastatin]    FAMILY HISTORY       Family History   Problem Relation Age of Onset    Coronary Art Dis Mother           SOCIAL HISTORY       Social History     Socioeconomic History    Marital status: Life Partner     Spouse name: Sundeep Nicolas    Number of children: None    Years of education: None    Highest education level: None   Tobacco Use    Smoking status: Former     Packs/day: 1.00     Years: 4.00     Pack years: 4.00     Types: Cigarettes     Quit date: 12/3/2020     Years since quittin.6    Smokeless tobacco: Never    Tobacco comments:     currently smoking only couple cigarettes daily   Substance and Sexual Activity    Alcohol use: Not Currently    Drug use: Never     Social Determinants of Health     Financial Resource Strain: Low Risk     Difficulty of Paying Living Expenses: Not hard at all   Food Insecurity: No Food Insecurity    Worried About Running Out of Food in the Last Year: Never true    Ran Out of Food in the Last Year: Never true       SCREENINGS               PHYSICAL EXAM    (up to 7 for level 4, 8 or more for level 5)     ED Triage Vitals [08/15/22 1244]   BP Temp Temp src Heart Rate Resp SpO2 Height Weight   (!) 147/69 -- -- (!) 101 18 100 % 5' 9\" (1.753 m) 200 lb (90.7 kg)       Physical Exam  Vitals and nursing note reviewed. Constitutional:       General: He is not in acute distress. Appearance: Normal appearance. He is well-developed. He is not ill-appearing. HENT:      Head: Normocephalic and atraumatic. Mouth/Throat:      Mouth: Mucous membranes are moist.      Pharynx: Oropharynx is clear. Eyes:      Extraocular Movements: Extraocular movements intact. Conjunctiva/sclera: Conjunctivae normal.   Cardiovascular:      Rate and Rhythm: Normal rate and regular rhythm. Pulmonary:      Effort: Pulmonary effort is normal.      Breath sounds: Normal breath sounds. Abdominal:      General: Bowel sounds are normal.      Palpations: Abdomen is soft. Tenderness: There is no abdominal tenderness. Musculoskeletal:         General: No deformity. Normal range of motion. Cervical back: Normal range of motion and neck supple. Skin:     General: Skin is warm and dry. Capillary Refill: Capillary refill takes less than 2 seconds. Neurological:      General: No focal deficit present. Mental Status: He is alert and oriented to person, place, and time. Mental status is at baseline. Cranial Nerves: No cranial nerve deficit. Psychiatric:         Thought Content:  Thought content normal.       DIAGNOSTIC RESULTS     EKG: All EKG's are interpreted by the Emergency Department Physician who either signs or Co-signs this chart in the absence of a cardiologist.    RADIOLOGY:   Non-plain film images such as CT, Ultrasound and MRI are read by the radiologist. Plain radiographic images are visualized and preliminarily interpreted by the emergency physician with the below findings:    Interpretation per the Radiologist below, if available at the time of this note:    No orders to display       LABS:  Labs Reviewed - No data to display    All other labs were within normal range or not returned as of this dictation. EMERGENCY DEPARTMENT COURSE and DIFFERENTIAL DIAGNOSIS/MDM:   Vitals:    Vitals:    08/15/22 1244   BP: (!) 147/69   Pulse: (!) 101   Resp: 18   SpO2: 100%   Weight: 200 lb (90.7 kg)   Height: 5' 9\" (1.753 m)       MDM  Number of Diagnoses or Management Options  Feared complaint without diagnosis  Diagnosis management comments: No shock fired. Alarm did go off and patient instructed to unplug and replug in [employer] monitor at home after speaking with rep. Procedures    CRITICAL CARE TIME   Total Critical Care time was 0 minutes, excluding separately reportable procedures. There was a high probability of clinically significant/life threatening deterioration in the patient's condition which required my urgent intervention. FINAL IMPRESSION      1.  Feared complaint without diagnosis          DISPOSITION/PLAN   DISPOSITION Decision To Discharge 08/15/2022 01:27:59 PM      (Please note that portions of this note were completed with a voice recognition program.  Efforts were made to edit the dictations but occasionally words are mis-transcribed.)    Padmini Mcfarland MD (electronically signed)  Attending Emergency Physician        Padmini Mcfarland MD  08/15/22 9802

## 2022-08-16 ENCOUNTER — CARE COORDINATION (OUTPATIENT)
Dept: CARE COORDINATION | Age: 52
End: 2022-08-16

## 2022-08-17 ENCOUNTER — CARE COORDINATION (OUTPATIENT)
Dept: CARE COORDINATION | Age: 52
End: 2022-08-17

## 2022-08-17 NOTE — CARE COORDINATION
ACM received a return call from patient he left voicemail. ACM return call left message requesting a return call. Contact information supplied.   This is an attempt for [facility] outreach

## 2022-08-24 ENCOUNTER — CARE COORDINATION (OUTPATIENT)
Dept: CARE COORDINATION | Age: 52
End: 2022-08-24

## 2022-08-24 NOTE — CARE COORDINATION
ACM called patient. Left message requesting a return call for [facility] out reach. Contact information supplied.

## 2022-08-28 ENCOUNTER — APPOINTMENT (OUTPATIENT)
Dept: CT IMAGING | Age: 52
End: 2022-08-28
Payer: MEDICARE

## 2022-08-28 ENCOUNTER — APPOINTMENT (OUTPATIENT)
Dept: GENERAL RADIOLOGY | Age: 52
End: 2022-08-28
Payer: MEDICARE

## 2022-08-28 ENCOUNTER — HOSPITAL ENCOUNTER (EMERGENCY)
Age: 52
Discharge: HOME | End: 2022-08-28
Payer: MEDICARE

## 2022-08-28 VITALS
OXYGEN SATURATION: 100 % | SYSTOLIC BLOOD PRESSURE: 101 MMHG | TEMPERATURE: 98.5 F | DIASTOLIC BLOOD PRESSURE: 51 MMHG | RESPIRATION RATE: 18 BRPM | BODY MASS INDEX: 29.53 KG/M2 | HEART RATE: 88 BPM | WEIGHT: 200 LBS

## 2022-08-28 DIAGNOSIS — I95.1 ORTHOSTATIC HYPOTENSION: Primary | ICD-10-CM

## 2022-08-28 LAB
ALBUMIN SERPL-MCNC: 2.9 G/DL (ref 3.5–4.6)
ALP BLD-CCNC: 81 U/L (ref 35–104)
ALT SERPL-CCNC: 31 U/L (ref 0–41)
ANION GAP SERPL CALCULATED.3IONS-SCNC: 12 MEQ/L (ref 9–15)
AST SERPL-CCNC: 27 U/L (ref 0–40)
BASOPHILS ABSOLUTE: 0.1 K/UL (ref 0–0.2)
BASOPHILS RELATIVE PERCENT: 0.8 %
BILIRUB SERPL-MCNC: 0.6 MG/DL (ref 0.2–0.7)
BUN BLDV-MCNC: 25 MG/DL (ref 6–20)
CALCIUM SERPL-MCNC: 8.8 MG/DL (ref 8.5–9.9)
CHLORIDE BLD-SCNC: 93 MEQ/L (ref 95–107)
CHP ED QC CHECK: YES
CO2: 26 MEQ/L (ref 20–31)
CREAT SERPL-MCNC: 7.81 MG/DL (ref 0.7–1.2)
EOSINOPHILS ABSOLUTE: 0 K/UL (ref 0–0.7)
EOSINOPHILS RELATIVE PERCENT: 0.4 %
ETHANOL PERCENT: NORMAL G/DL
ETHANOL: <10 MG/DL (ref 0–0.08)
GFR AFRICAN AMERICAN: 8.9
GFR NON-AFRICAN AMERICAN: 7.4
GLOBULIN: 4.3 G/DL (ref 2.3–3.5)
GLUCOSE BLD-MCNC: 102 MG/DL
GLUCOSE BLD-MCNC: 102 MG/DL (ref 70–99)
HCT VFR BLD CALC: 24 % (ref 42–52)
HEMOGLOBIN: 7.8 G/DL (ref 14–18)
LACTIC ACID: 1.4 MMOL/L (ref 0.5–2.2)
LYMPHOCYTES ABSOLUTE: 0.4 K/UL (ref 1–4.8)
LYMPHOCYTES RELATIVE PERCENT: 2.9 %
MAGNESIUM: 2.1 MG/DL (ref 1.7–2.4)
MCH RBC QN AUTO: 26.3 PG (ref 27–31.3)
MCHC RBC AUTO-ENTMCNC: 32.6 % (ref 33–37)
MCV RBC AUTO: 80.6 FL (ref 80–100)
MONOCYTES ABSOLUTE: 0.9 K/UL (ref 0.2–0.8)
MONOCYTES RELATIVE PERCENT: 7 %
NEUTROPHILS ABSOLUTE: 11 K/UL (ref 1.4–6.5)
NEUTROPHILS RELATIVE PERCENT: 88.9 %
PDW BLD-RTO: 15.5 % (ref 11.5–14.5)
PLATELET # BLD: 294 K/UL (ref 130–400)
POTASSIUM SERPL-SCNC: 3.2 MEQ/L (ref 3.4–4.9)
PRO-BNP: NORMAL PG/ML
PROCALCITONIN: 2.68 NG/ML (ref 0–0.15)
RBC # BLD: 2.98 M/UL (ref 4.7–6.1)
SODIUM BLD-SCNC: 131 MEQ/L (ref 135–144)
TOTAL PROTEIN: 7.2 G/DL (ref 6.3–8)
TROPONIN: 0.05 NG/ML (ref 0–0.01)
WBC # BLD: 12.4 K/UL (ref 4.8–10.8)

## 2022-08-28 PROCEDURE — 2580000003 HC RX 258: Performed by: STUDENT IN AN ORGANIZED HEALTH CARE EDUCATION/TRAINING PROGRAM

## 2022-08-28 PROCEDURE — 83735 ASSAY OF MAGNESIUM: CPT

## 2022-08-28 PROCEDURE — 87040 BLOOD CULTURE FOR BACTERIA: CPT

## 2022-08-28 PROCEDURE — 80053 COMPREHEN METABOLIC PANEL: CPT

## 2022-08-28 PROCEDURE — 71045 X-RAY EXAM CHEST 1 VIEW: CPT

## 2022-08-28 PROCEDURE — 83605 ASSAY OF LACTIC ACID: CPT

## 2022-08-28 PROCEDURE — 87077 CULTURE AEROBIC IDENTIFY: CPT

## 2022-08-28 PROCEDURE — 83880 ASSAY OF NATRIURETIC PEPTIDE: CPT

## 2022-08-28 PROCEDURE — 96360 HYDRATION IV INFUSION INIT: CPT

## 2022-08-28 PROCEDURE — 87186 SC STD MICRODIL/AGAR DIL: CPT

## 2022-08-28 PROCEDURE — 93005 ELECTROCARDIOGRAM TRACING: CPT | Performed by: STUDENT IN AN ORGANIZED HEALTH CARE EDUCATION/TRAINING PROGRAM

## 2022-08-28 PROCEDURE — 82077 ASSAY SPEC XCP UR&BREATH IA: CPT

## 2022-08-28 PROCEDURE — 36415 COLL VENOUS BLD VENIPUNCTURE: CPT

## 2022-08-28 PROCEDURE — 85025 COMPLETE CBC W/AUTO DIFF WBC: CPT

## 2022-08-28 PROCEDURE — 84145 PROCALCITONIN (PCT): CPT

## 2022-08-28 PROCEDURE — 99285 EMERGENCY DEPT VISIT HI MDM: CPT

## 2022-08-28 PROCEDURE — 84484 ASSAY OF TROPONIN QUANT: CPT

## 2022-08-28 PROCEDURE — 70450 CT HEAD/BRAIN W/O DYE: CPT

## 2022-08-28 PROCEDURE — 96361 HYDRATE IV INFUSION ADD-ON: CPT

## 2022-08-28 PROCEDURE — 87150 DNA/RNA AMPLIFIED PROBE: CPT

## 2022-08-28 PROCEDURE — 6370000000 HC RX 637 (ALT 250 FOR IP): Performed by: STUDENT IN AN ORGANIZED HEALTH CARE EDUCATION/TRAINING PROGRAM

## 2022-08-28 RX ORDER — POTASSIUM CHLORIDE 750 MG/1
20 TABLET, FILM COATED, EXTENDED RELEASE ORAL ONCE
Status: COMPLETED | OUTPATIENT
Start: 2022-08-28 | End: 2022-08-28

## 2022-08-28 RX ORDER — 0.9 % SODIUM CHLORIDE 0.9 %
250 INTRAVENOUS SOLUTION INTRAVENOUS ONCE
Status: COMPLETED | OUTPATIENT
Start: 2022-08-28 | End: 2022-08-28

## 2022-08-28 RX ADMIN — SODIUM CHLORIDE 250 ML: 9 INJECTION, SOLUTION INTRAVENOUS at 10:34

## 2022-08-28 RX ADMIN — SODIUM CHLORIDE 250 ML: 9 INJECTION, SOLUTION INTRAVENOUS at 11:58

## 2022-08-28 RX ADMIN — POTASSIUM CHLORIDE 20 MEQ: 750 TABLET, FILM COATED, EXTENDED RELEASE ORAL at 10:35

## 2022-08-28 ASSESSMENT — ENCOUNTER SYMPTOMS
CONSTIPATION: 0
WHEEZING: 0
PHOTOPHOBIA: 0
ABDOMINAL DISTENTION: 0
SHORTNESS OF BREATH: 0
DIARRHEA: 0
COUGH: 0
VOMITING: 0
ABDOMINAL PAIN: 0
NAUSEA: 0

## 2022-08-28 ASSESSMENT — PAIN - FUNCTIONAL ASSESSMENT
PAIN_FUNCTIONAL_ASSESSMENT: NONE - DENIES PAIN

## 2022-08-28 NOTE — ED NOTES
Orthostatics completed  Laying /54 HR 88  Sitting /54 HR 89  Standing 73/31 HR 90   RR 18 O2 100%  While standing pt became dizzy and began to lose balance, pt was immediatly told to sit back down in the bed for the remainder of the test. Pt BP was 73/31 and was then placed in trendelenburg Cookie PA was made aware.  pt BP is now  122/56     Ann-Marie Hayes  08/28/22 1000

## 2022-08-28 NOTE — ED PROVIDER NOTES
[address] [facility] ED  EMERGENCY DEPARTMENT ENCOUNTER      Pt Name: Barbara Cates  MRN: [id number]  Arms[place]argag[place] 1970  Date of evaluation: 8/28/2022  Provider: Javid Hunt [place] Jennifer       Chief Complaint   Patient presents with    Hypotension     85/49 in triage         HISTORY OF PRESENT ILLNESS   (Location/Symptom, Timing/Onset, Context/Setting, Quality, Duration, Modifying Factors, Severity)  Note limiting factors. Barbara Cates is a 50 y.o. male who per chart reviews past medical history of CAD CHF cardiomyopathy s/p AICD end-stage renal disease on dialysis Monday Wednesday Friday compliant hypertension alcohol abuse presents to the emergency department for hypotension. Pt states he has felt lightheaded over the last 2 days when going from a sitting to a standing position. He checked his blood pressure at home, states it was \"low\" but is unable to tell me the reading. BP 89/45 on arrival to the ED. He states sx worsened after taking his antihypertensives at home this morning. No recent medication changes. Follows with Dr. Amy Stern. He states asx at rest. Denies any associated sx. No recent illnesses. Denies fever chills cp sob swelling abd pain nvd headache visual changes numbness tingling weakness bleeding diarrhea constipation urinary sx syncope diaphoresis wounds cough. No blood thinners. Pt is somewhat of a poor historian. He did go to dialysis on Friday. HPI    Nursing Notes were reviewed. REVIEW OF SYSTEMS    (2-9 systems for level 4, 10 or more for level 5)     Review of Systems   Constitutional:  Negative for chills, fatigue and fever. HENT:  Negative for congestion. Eyes:  Negative for photophobia. Respiratory:  Negative for cough, shortness of breath and wheezing. Cardiovascular:  Negative for chest pain and palpitations. Gastrointestinal:  Negative for abdominal distention, abdominal pain, constipation, diarrhea, nausea and vomiting.    Genitourinary: Negative for dysuria, frequency and hematuria. Musculoskeletal:  Negative for myalgias. Allergic/Immunologic: Negative for immunocompromised state. Neurological:  Positive for light-headedness. Negative for dizziness, tremors, seizures, syncope, facial asymmetry, speech difficulty, weakness, numbness and headaches. All other systems reviewed and are negative. Except as noted above the remainder of the review of systems was reviewed and negative.        PAST MEDICAL HISTORY     Past Medical History:   Diagnosis Date    Abnormal EKG 09/27/2019    Acute kidney injury (BLUE) with acute tubular necrosis (ATN) (HCC)     AICD (automatic cardioverter/defibrillator) present     Cardiomyopathy ([facility] [address]     per echo 8/2019    Chronic combined systolic and diastolic CHF (congestive heart failure) ([facility] [address] 09/27/2019    Dialysis patient ([facility] [address]     ETOH abuse     Hypertension     Tobacco abuse          SURGICAL HISTORY       Past Surgical History:   Procedure Laterality Date    CARDIAC PACEMAKER PLACEMENT      DIALYSIS CATHETER INSERTION Right 04/12/2022    15.5 F x 19 cm SYMETREX LONG TERM HEMODIALYSIS CATHETER    FISTULAGRAM Left 08/03/2022    Completed by Dr. Marcelino Marie x 3 placed (See implants)    HERNIA REPAIR Right 02/10/2021    RIGHT INGUINAL HERNIA REPAIR WITH MESH performed by Stephen Alexander MD at [id number]  8/3/2022    IR EMBOLIZATION VENOUS 8/3/2022 [facility]ED[facility]    IR PERC ARTERIOVENOUS FISTULA CREATION  05/16/2022    IR PERC ARTERIOVENOUS FISTULA CREATION 5/16/2022 [facility]ED[facility]    IR TUNNELED CATHETER PLACEMENT GREATER THAN 5 YEARS  04/12/2022    IR TUNNELED CATHETER PLACEMENT GREATER THAN 5 YEARS 4/12/2022 [facility]ED[facility]         CURRENT MEDICATIONS       Previous Medications    CALCIUM ACETATE (PHOSLO) 667 MG CAPS CAPSULE    TAKE 1 CAPSULE BY MOUTH THREE TIMES DAILY WITH MEALS    CARVEDILOL (COREG) 25 MG TABLET    take 1 tablet by mouth twice a day HOLD FOR SBP<100 OR HR <60    HYDRALAZINE (APRESOLINE) 50 MG TABLET    take 1 tablet by mouth twice a day    IVABRADINE (CORLANOR) 5 MG TABS TABLET    Take 1 tablet by mouth 2 times daily (with meals)    SACUBITRIL-VALSARTAN (ENTRESTO) 24-26 MG PER TABLET    Take 1 tablet by mouth 2 times daily    TORSEMIDE (DEMADEX) 20 MG TABLET    take 1 tablet by mouth every morning and take 1 tablet by mouth at bedtime       ALLERGIES     Lipitor [atorvastatin]    FAMILY HISTORY       Family History   Problem Relation Age of Onset    Coronary Art Dis Mother           SOCIAL HISTORY       Social History     Socioeconomic History    Marital status: Life Partner     Spouse name: Eleanor Davenport    Number of children: None    Years of education: None    Highest education level: None   Tobacco Use    Smoking status: Former     Packs/day: 1.00     Years: 4.00     Pack years: 4.00     Types: Cigarettes     Quit date: 12/3/2020     Years since quittin.7    Smokeless tobacco: Never    Tobacco comments:     currently smoking only couple cigarettes daily   Vaping Use    Vaping Use: Never used   Substance and Sexual Activity    Alcohol use: Not Currently    Drug use: Never     Social Determinants of Health     Financial Resource Strain: Low Risk     Difficulty of Paying Living Expenses: Not hard at all   Food Insecurity: No Food Insecurity    Worried About Running Out of Food in the Last Year: Never true    Ran Out of Food in the Last Year: Never true       SCREENINGS         [place] Coma Scale  Eye Opening: Spontaneous  Best Verbal Response: Oriented  Best Motor Response: Obeys commands  Marty Coma Scale Score: 15                     CIWA Assessment  BP: (!) 101/51  Heart Rate: 88                 PHYSICAL EXAM    (up to 7 for level 4, 8 or more for level 5)     ED Triage Vitals [22 0809]   BP Temp Temp Source Heart Rate Resp SpO2 Height Weight   (!) 85/49 98.5 °F (36.9 °C) Oral 94 18 100 % -- 200 lb (90.7 kg) Physical Exam  Constitutional:       General: He is not in acute distress. Appearance: He is well-developed. He is not ill-appearing or toxic-appearing. HENT:      Head: Normocephalic and atraumatic. Nose: Nose normal.      Mouth/Throat:      Mouth: Mucous membranes are moist.   Eyes:      Pupils: Pupils are equal, round, and reactive to light. Cardiovascular:      Rate and Rhythm: Normal rate and regular rhythm. Heart sounds: No murmur heard. No friction rub. No gallop. Pulmonary:      Effort: Pulmonary effort is normal.      Breath sounds: Normal breath sounds. No wheezing, rhonchi or rales. Abdominal:      General: Bowel sounds are normal. There is no distension. Palpations: Abdomen is soft. Tenderness: There is no abdominal tenderness. There is no guarding or rebound. Musculoskeletal:         General: No swelling. Cervical back: Normal range of motion. Right lower leg: No edema. Left lower leg: No edema. Skin:     General: Skin is warm and dry. Capillary Refill: Capillary refill takes less than 2 seconds. Findings: No rash. Neurological:      General: No focal deficit present. Mental Status: He is alert and oriented to person, place, and time. GCS: GCS eye subscore is 4. GCS verbal subscore is 5. GCS motor subscore is 6. Cranial Nerves: Cranial nerves are intact. Sensory: Sensation is intact. Motor: Motor function is intact. Coordination: Coordination is intact. Gait: Gait is intact. DIAGNOSTIC RESULTS     EKG: All EKG's are interpreted by the Emergency Department Physician who either signs or Co-signs this chart in the absence of a cardiologist.    EKG shows NSR with HR 87, L axis, Qtc 500, no ST changes. Incomplete RBBB. No change compared to 5/14/22.         RADIOLOGY:   Non-plain film images such as CT, Ultrasound and MRI are read by the radiologist. Plain radiographic images are visualized and preliminarily interpreted by the emergency physician with the below findings:        Interpretation per the Radiologist below, if available at the time of this note:    CT Head WO Contrast   Final Result   Impression:      Right sphenoid and posterior bilateral ethmoid sinusitis. All CT scans at this facility use dose modulation, iterative reconstruction, and/or weight based dosing when appropriate to reduce radiation dose to as low as reasonably achievable.        XR CHEST PORTABLE   Final Result   NO ACUTE CARDIOPULMONARY DISEASE            ED BEDSIDE ULTRASOUND:   Performed by ED Physician - none    LABS:  Labs Reviewed   COMPREHENSIVE METABOLIC PANEL - Abnormal; Notable for the following components:       Result Value    Sodium 131 (*)     Potassium 3.2 (*)     Chloride 93 (*)     Glucose 102 (*)     BUN 25 (*)     Creatinine 7.81 (*)     GFR Non- 7.4 (*)     GFR  8.9 (*)     Albumin 2.9 (*)     Globulin 4.3 (*)     All other components within normal limits    Narrative:     CALL  [facility]  ED tel. [phone],  CREA results called to and read back by Dr Jean Peter, 08/28/2022 10:19, by POCDE  PABLO results called to and read back by Dr Vidal Stevens, 08/28/2022 10:18, by POCDE   CBC WITH AUTO DIFFERENTIAL - Abnormal; Notable for the following components:    WBC 12.4 (*)     RBC 2.98 (*)     Hemoglobin 7.8 (*)     Hematocrit 24.0 (*)     MCH 26.3 (*)     MCHC 32.6 (*)     RDW 15.5 (*)     Neutrophils Absolute 11.0 (*)     Lymphocytes Absolute 0.4 (*)     Monocytes Absolute 0.9 (*)     All other components within normal limits   TROPONIN - Abnormal; Notable for the following components:    Troponin 0.049 (*)     All other components within normal limits    Narrative:     CALL  [facility]  [facility] tel. [phone],  PABLO results called to and read back by Dr Vidal Stevens, 08/28/2022 10:18, by POCDE   PROCALCITONIN - Abnormal; Notable for the following components:    Procalcitonin 2.68 (*)     All other components within normal limits    Narrative:     Janessa Atkins  [facility] tel. [phone],  PABLO results called to and read back by Dr Juana Church, 08/28/2022 10:18, by POCDE   POCT GLUCOSE - Normal   CULTURE, BLOOD 2   CULTURE, BLOOD 1   ETHANOL   LACTIC ACID   MAGNESIUM    Narrative:     Annalisa Elkins tel. [phone],  CREA results called to and read back by Dr Janet Mao, 08/28/2022 10:19, by POCDE  PABLO results called to and read back by Dr Juana Church, 08/28/2022 10:18, by [facility]    Narrative:     Annalisa Elkins tel. [phone],  PABLO results called to and read back by Dr Juana Church, 08/28/2022 10:18, by Sunday Marks [address]       All other labs were within normal range or not returned as of this dictation. EMERGENCY DEPARTMENT COURSE and DIFFERENTIAL DIAGNOSIS/MDM:   Vitals:    Vitals:    08/28/22 0944 08/28/22 0949 08/28/22 1051 08/28/22 1154   BP: (!) 107/52  (!) 121/57 (!) 101/51   Pulse: 90  98 88   Resp: 16 18     Temp:       TempSrc:       SpO2: 100% 100%     Weight:               MDM    Pt is a 51 yo M who presents to the ED for evaluation of hypotension,lightheadedness. Afebrile, hypotensive to 85/49 on arrival. Given 250 cc IV NS in the ED. EKG shows NSR with HR 87, L axis, Qtc 500, no ST changes. Incomplete RBBB. No change compared to 5/14/22. CMP is remarkable for sodium of 131 potassium 3.2 which is replaced in the ED and stable end-stage renal disease creatinine 7.8. Mild elevation in white count to 12.4. Hemoglobin 7.8, likely related to anemia of chronic disease. Patient denies any active bleeding, no thinners. Troponin is elevated to 0.049 which is baseline for patient. Likely secondary to end-stage renal disease. No ST elevation on EKG and patient denies any chest pain or shortness of breath. Procalcitonin 2.68 again likely 2/2 to ESRD. BNP 22k, baseline. Blood cultures pending. Remainder of labs are unremarkable.   Chest x-ray shows no acute intrathoracic process, no infiltrates. CT head is negative. Patient orthostatic positive in the ED. Systolic dropped from 426 to 73 upon standing with associated lightheadedness. Discussed case with ED attending Dr. Joseph Dang. Will give additional 250 cc IV NS at this time as pt not clinically fluid overloaded, no signs of overload on CXR. Less concern for sepsis at this time, no source of infection. Dr. Joseph Dang agrees. Discussed case with both cardiology (Dr. Sandra Velez) and Dr. Irina Patino (nephrology) who both agree that patient is likely over diuresed, on several anti hypertensive (Torsemide, Coreg, Hydralazine, Entresto) contributing to his symptoms and hypotension. Cardiology and nephrology recommend discontinuing Torsemide and Hydralazine. BP improved, maintaining map of 65. Pt feeling better. Steady ambulatory gait. He is asking to go home at this time. Dr. Joseph Dang ED attending agrees with discharge home at this time. He will follow up with nephrology and cardiology in 1-2 days. Return the ED immediately for worsening sx, given warning signs for which he should return. Pt understands, agrees to plan. REASSESSMENT          CRITICAL CARE TIME   Total Critical Care time was 0 minutes, excluding separately reportable procedures. There was a high probability of clinically significant/life threatening deterioration in the patient's condition which required my urgent intervention. CONSULTS:  None    PROCEDURES:  Unless otherwise noted below, none     Procedures        FINAL IMPRESSION      1.  Orthostatic hypotension          DISPOSITION/PLAN   DISPOSITION Discharge - Pending Orders Complete 08/28/2022 11:04:57 AM      PATIENT REFERRED TO:  Viviana Sahu MD  [address] [facility] [address] [facility]  [phone]    Schedule an appointment as soon as possible for a visit in 1 day      Narinder Mcintosh, DO  [address] [place] [address]  [phone]    Schedule an appointment as soon as possible for a visit in 1 day      Lang

## 2022-08-28 NOTE — ED TRIAGE NOTES
Patient reports hypotension that started yesterday. Blood pressure noted to be 85/49 in triage. Patient states that he feels weak.

## 2022-08-28 NOTE — DISCHARGE INSTRUCTIONS
STOP taking Torsemide and Hydralazine  Please have your dialysis center call Dr. Lisa Lan tomorrow when you arrive  Return to the ED for worsening symptoms

## 2022-08-29 LAB
ACINETOBACTER CALCOAC BAUMANNII COMPLEX BY PCR: NOT DETECTED
BACTEROIDES FRAGILIS BY PCR: NOT DETECTED
BLOOD CULTURE, ROUTINE: ABNORMAL
CANDIDA ALBICANS BY PCR: NOT DETECTED
CANDIDA AURIS BY PCR: NOT DETECTED
CANDIDA GLABRATA BY PCR: NOT DETECTED
CANDIDA KRUSEI BY PCR: NOT DETECTED
CANDIDA PARAPSILOSIS BY PCR: NOT DETECTED
CANDIDA TROPICALIS BY PCR: NOT DETECTED
CRYPTOCOCCUS NEOFORMANS/GATTII BY PCR: NOT DETECTED
CULTURE, BLOOD 2: ABNORMAL
EKG ATRIAL RATE: 87 BPM
EKG P AXIS: 55 DEGREES
EKG P-R INTERVAL: 204 MS
EKG Q-T INTERVAL: 416 MS
EKG QRS DURATION: 104 MS
EKG QTC CALCULATION (BAZETT): 500 MS
EKG R AXIS: -40 DEGREES
EKG T AXIS: 33 DEGREES
EKG VENTRICULAR RATE: 87 BPM
ENTEROBACTER CLOACAE COMPLEX BY PCR: NOT DETECTED
ENTEROBACTERALES BY PCR: NOT DETECTED
ENTEROCOCCUS FAECALIS BY PCR: DETECTED
ENTEROCOCCUS FAECIUM BY PCR: NOT DETECTED
ESCHERICHIA COLI BY PCR: NOT DETECTED
HAEMOPHILUS INFLUENZAE BY PCR: NOT DETECTED
KLEBSIELLA AEROGENES BY PCR: NOT DETECTED
KLEBSIELLA OXYTOCA BY PCR: NOT DETECTED
KLEBSIELLA PNEUMONIAE GROUP BY PCR: NOT DETECTED
LISTERIA MONOCYTOGENES BY PCR: NOT DETECTED
NEISSERIA MENINGITIDIS BY PCR: NOT DETECTED
PROTEUS SPECIES BY PCR: NOT DETECTED
PSEUDOMONAS AERUGINOSA BY PCR: NOT DETECTED
SALMONELLA SPECIES BY PCR: NOT DETECTED
SERRATIA MARCESCENS BY PCR: NOT DETECTED
STAPHYLOCOCCUS AUREUS BY PCR: NOT DETECTED
STAPHYLOCOCCUS EPIDERMIDIS BY PCR: NOT DETECTED
STAPHYLOCOCCUS LUGDUNENSIS BY PCR: NOT DETECTED
STAPHYLOCOCCUS SPECIES BY PCR: NOT DETECTED
STENOTROPHOMONAS MALTOPHILIA BY PCR: NOT DETECTED
STREPTOCOCCUS AGALACTIAE BY PCR: NOT DETECTED
STREPTOCOCCUS PNEUMONIAE BY PCR: NOT DETECTED
STREPTOCOCCUS PYOGENES  BY PCR: NOT DETECTED
STREPTOCOCCUS SPECIES BY PCR: NOT DETECTED
VANCOMYCIN RESISTANT BY PCR: NOT DETECTED

## 2022-08-29 PROCEDURE — 93010 ELECTROCARDIOGRAM REPORT: CPT | Performed by: INTERNAL MEDICINE

## 2022-08-31 ENCOUNTER — CARE COORDINATION (OUTPATIENT)
Dept: CARE COORDINATION | Age: 52
End: 2022-08-31

## 2022-09-01 LAB
CULTURE, BLOOD ID SENSITIVITY: ABNORMAL
ORGANISM: ABNORMAL

## 2022-09-02 ENCOUNTER — APPOINTMENT (OUTPATIENT)
Dept: INTERVENTIONAL RADIOLOGY/VASCULAR | Age: 52
DRG: 314 | End: 2022-09-02
Payer: MEDICARE

## 2022-09-02 ENCOUNTER — HOSPITAL ENCOUNTER (INPATIENT)
Age: 52
LOS: 4 days | Discharge: ANOTHER HOSPITAL | DRG: 314 | End: 2022-09-06
Attending: INTERNAL MEDICINE | Admitting: INTERNAL MEDICINE
Payer: MEDICARE

## 2022-09-02 ENCOUNTER — CARE COORDINATION (OUTPATIENT)
Dept: CARE COORDINATION | Age: 52
End: 2022-09-02

## 2022-09-02 ENCOUNTER — APPOINTMENT (OUTPATIENT)
Dept: CT IMAGING | Age: 52
DRG: 314 | End: 2022-09-02
Payer: MEDICARE

## 2022-09-02 DIAGNOSIS — A41.9 SEPTICEMIA (HCC): Primary | ICD-10-CM

## 2022-09-02 DIAGNOSIS — N18.6 ESRD (END STAGE RENAL DISEASE) ON DIALYSIS (HCC): ICD-10-CM

## 2022-09-02 DIAGNOSIS — Z99.2 ESRD (END STAGE RENAL DISEASE) ON DIALYSIS (HCC): ICD-10-CM

## 2022-09-02 DIAGNOSIS — Z98.890 S/P ARTERIOVENOUS (AV) FISTULA CREATION: ICD-10-CM

## 2022-09-02 DIAGNOSIS — T82.898A INADEQUATE FLOW OF DIALYSIS ARTERIOVENOUS FISTULA, INITIAL ENCOUNTER (HCC): ICD-10-CM

## 2022-09-02 PROBLEM — T82.9XXA COMPLICATION OF VASCULAR DIALYSIS CATHETER: Status: ACTIVE | Noted: 2022-09-02

## 2022-09-02 PROBLEM — R78.81 BACTEREMIA: Status: ACTIVE | Noted: 2022-09-02

## 2022-09-02 PROBLEM — T80.219A CENTRAL LINE INFECTION, INITIAL ENCOUNTER: Status: ACTIVE | Noted: 2022-09-02

## 2022-09-02 PROBLEM — B95.2 BACTEREMIA DUE TO ENTEROCOCCUS: Status: ACTIVE | Noted: 2022-09-02

## 2022-09-02 LAB
ABO/RH: NORMAL
ALBUMIN SERPL-MCNC: 2.6 G/DL (ref 3.5–4.6)
ALP BLD-CCNC: 68 U/L (ref 35–104)
ALT SERPL-CCNC: 21 U/L (ref 0–41)
ANION GAP SERPL CALCULATED.3IONS-SCNC: 10 MEQ/L (ref 9–15)
ANTIBODY SCREEN: NORMAL
AST SERPL-CCNC: 24 U/L (ref 0–40)
BACTERIA: NEGATIVE /HPF
BASOPHILS ABSOLUTE: 0 K/UL (ref 0–0.2)
BASOPHILS RELATIVE PERCENT: 0.6 %
BILIRUB SERPL-MCNC: 0.7 MG/DL (ref 0.2–0.7)
BILIRUBIN URINE: NEGATIVE
BLOOD, URINE: NEGATIVE
BUN BLDV-MCNC: 21 MG/DL (ref 6–20)
CALCIUM SERPL-MCNC: 8.4 MG/DL (ref 8.5–9.9)
CHLORIDE BLD-SCNC: 98 MEQ/L (ref 95–107)
CLARITY: CLEAR
CO2: 26 MEQ/L (ref 20–31)
COLOR: YELLOW
CREAT SERPL-MCNC: 7.06 MG/DL (ref 0.7–1.2)
EOSINOPHILS ABSOLUTE: 0.1 K/UL (ref 0–0.7)
EOSINOPHILS RELATIVE PERCENT: 1.3 %
EPITHELIAL CELLS, UA: NORMAL /HPF (ref 0–5)
GFR AFRICAN AMERICAN: 10
GFR NON-AFRICAN AMERICAN: 8.3
GLOBULIN: 3.9 G/DL (ref 2.3–3.5)
GLUCOSE BLD-MCNC: 101 MG/DL (ref 70–99)
GLUCOSE URINE: NEGATIVE MG/DL
HCT VFR BLD CALC: 22.2 % (ref 42–52)
HCT VFR BLD CALC: 22.8 % (ref 42–52)
HEMOGLOBIN: 7.4 G/DL (ref 14–18)
HEMOGLOBIN: 7.6 G/DL (ref 14–18)
HYALINE CASTS: NORMAL /HPF (ref 0–5)
KETONES, URINE: NEGATIVE MG/DL
LACTIC ACID: 0.9 MMOL/L (ref 0.5–2.2)
LEUKOCYTE ESTERASE, URINE: NEGATIVE
LYMPHOCYTES ABSOLUTE: 0.4 K/UL (ref 1–4.8)
LYMPHOCYTES RELATIVE PERCENT: 5 %
MCH RBC QN AUTO: 26.8 PG (ref 27–31.3)
MCHC RBC AUTO-ENTMCNC: 33.2 % (ref 33–37)
MCV RBC AUTO: 80.8 FL (ref 80–100)
MONOCYTES ABSOLUTE: 0.9 K/UL (ref 0.2–0.8)
MONOCYTES RELATIVE PERCENT: 10.4 %
NEUTROPHILS ABSOLUTE: 7.2 K/UL (ref 1.4–6.5)
NEUTROPHILS RELATIVE PERCENT: 82.7 %
NITRITE, URINE: NEGATIVE
PDW BLD-RTO: 15.6 % (ref 11.5–14.5)
PH UA: >=9 (ref 5–9)
PLATELET # BLD: 259 K/UL (ref 130–400)
POTASSIUM SERPL-SCNC: 3.3 MEQ/L (ref 3.4–4.9)
PROCALCITONIN: 1.63 NG/ML (ref 0–0.15)
PROTEIN UA: >=300 MG/DL
RBC # BLD: 2.75 M/UL (ref 4.7–6.1)
RBC UA: NORMAL /HPF (ref 0–5)
SODIUM BLD-SCNC: 134 MEQ/L (ref 135–144)
SPECIFIC GRAVITY UA: 1.01 (ref 1–1.03)
TOTAL PROTEIN: 6.5 G/DL (ref 6.3–8)
URINE REFLEX TO CULTURE: ABNORMAL
UROBILINOGEN, URINE: 1 E.U./DL
WBC # BLD: 8.7 K/UL (ref 4.8–10.8)
WBC UA: NORMAL /HPF (ref 0–5)

## 2022-09-02 PROCEDURE — 2580000003 HC RX 258: Performed by: INTERNAL MEDICINE

## 2022-09-02 PROCEDURE — 87040 BLOOD CULTURE FOR BACTERIA: CPT

## 2022-09-02 PROCEDURE — 87449 NOS EACH ORGANISM AG IA: CPT

## 2022-09-02 PROCEDURE — 2500000003 HC RX 250 WO HCPCS: Performed by: RADIOLOGY

## 2022-09-02 PROCEDURE — A4641 RADIOPHARM DX AGENT NOC: HCPCS

## 2022-09-02 PROCEDURE — 85018 HEMOGLOBIN: CPT

## 2022-09-02 PROCEDURE — 36415 COLL VENOUS BLD VENIPUNCTURE: CPT

## 2022-09-02 PROCEDURE — 82607 VITAMIN B-12: CPT

## 2022-09-02 PROCEDURE — 93005 ELECTROCARDIOGRAM TRACING: CPT

## 2022-09-02 PROCEDURE — 2500000003 HC RX 250 WO HCPCS: Performed by: INTERNAL MEDICINE

## 2022-09-02 PROCEDURE — 2709999900 IR REMOVE TUNNELED CVAD WO SQ PORT/PUMP

## 2022-09-02 PROCEDURE — 86901 BLOOD TYPING SEROLOGIC RH(D): CPT

## 2022-09-02 PROCEDURE — 2709999900 IR NONTUNNELED VASCULAR CATHETER > 5 YEARS

## 2022-09-02 PROCEDURE — 36556 INSERT NON-TUNNEL CV CATH: CPT | Performed by: RADIOLOGY

## 2022-09-02 PROCEDURE — 87150 DNA/RNA AMPLIFIED PROBE: CPT

## 2022-09-02 PROCEDURE — 83550 IRON BINDING TEST: CPT

## 2022-09-02 PROCEDURE — 83605 ASSAY OF LACTIC ACID: CPT

## 2022-09-02 PROCEDURE — 96365 THER/PROPH/DIAG IV INF INIT: CPT

## 2022-09-02 PROCEDURE — 83540 ASSAY OF IRON: CPT

## 2022-09-02 PROCEDURE — 76937 US GUIDE VASCULAR ACCESS: CPT | Performed by: RADIOLOGY

## 2022-09-02 PROCEDURE — 80053 COMPREHEN METABOLIC PANEL: CPT

## 2022-09-02 PROCEDURE — 6360000002 HC RX W HCPCS: Performed by: INTERNAL MEDICINE

## 2022-09-02 PROCEDURE — 99223 1ST HOSP IP/OBS HIGH 75: CPT | Performed by: RADIOLOGY

## 2022-09-02 PROCEDURE — 6360000004 HC RX CONTRAST MEDICATION

## 2022-09-02 PROCEDURE — 6360000002 HC RX W HCPCS: Performed by: RADIOLOGY

## 2022-09-02 PROCEDURE — 76937 US GUIDE VASCULAR ACCESS: CPT

## 2022-09-02 PROCEDURE — 36589 REMOVAL TUNNELED CV CATH: CPT | Performed by: RADIOLOGY

## 2022-09-02 PROCEDURE — 87328 CRYPTOSPORIDIUM AG IA: CPT

## 2022-09-02 PROCEDURE — 6360000002 HC RX W HCPCS: Performed by: STUDENT IN AN ORGANIZED HEALTH CARE EDUCATION/TRAINING PROGRAM

## 2022-09-02 PROCEDURE — 86850 RBC ANTIBODY SCREEN: CPT

## 2022-09-02 PROCEDURE — 1210000000 HC MED SURG R&B

## 2022-09-02 PROCEDURE — 82728 ASSAY OF FERRITIN: CPT

## 2022-09-02 PROCEDURE — 87324 CLOSTRIDIUM AG IA: CPT

## 2022-09-02 PROCEDURE — 85014 HEMATOCRIT: CPT

## 2022-09-02 PROCEDURE — 86900 BLOOD TYPING SEROLOGIC ABO: CPT

## 2022-09-02 PROCEDURE — 87507 IADNA-DNA/RNA PROBE TQ 12-25: CPT

## 2022-09-02 PROCEDURE — 36556 INSERT NON-TUNNEL CV CATH: CPT

## 2022-09-02 PROCEDURE — 6370000000 HC RX 637 (ALT 250 FOR IP): Performed by: STUDENT IN AN ORGANIZED HEALTH CARE EDUCATION/TRAINING PROGRAM

## 2022-09-02 PROCEDURE — 77001 FLUOROGUIDE FOR VEIN DEVICE: CPT

## 2022-09-02 PROCEDURE — 6370000000 HC RX 637 (ALT 250 FOR IP): Performed by: INTERNAL MEDICINE

## 2022-09-02 PROCEDURE — 36589 REMOVAL TUNNELED CV CATH: CPT

## 2022-09-02 PROCEDURE — 82746 ASSAY OF FOLIC ACID SERUM: CPT

## 2022-09-02 PROCEDURE — 81001 URINALYSIS AUTO W/SCOPE: CPT

## 2022-09-02 PROCEDURE — 85025 COMPLETE CBC W/AUTO DIFF WBC: CPT

## 2022-09-02 PROCEDURE — 2580000003 HC RX 258: Performed by: STUDENT IN AN ORGANIZED HEALTH CARE EDUCATION/TRAINING PROGRAM

## 2022-09-02 PROCEDURE — 84145 PROCALCITONIN (PCT): CPT

## 2022-09-02 PROCEDURE — 99222 1ST HOSP IP/OBS MODERATE 55: CPT | Performed by: INTERNAL MEDICINE

## 2022-09-02 PROCEDURE — 87070 CULTURE OTHR SPECIMN AEROBIC: CPT

## 2022-09-02 PROCEDURE — 77001 FLUOROGUIDE FOR VEIN DEVICE: CPT | Performed by: RADIOLOGY

## 2022-09-02 PROCEDURE — 2580000003 HC RX 258: Performed by: RADIOLOGY

## 2022-09-02 PROCEDURE — 99285 EMERGENCY DEPT VISIT HI MDM: CPT

## 2022-09-02 PROCEDURE — 74176 CT ABD & PELVIS W/O CONTRAST: CPT

## 2022-09-02 RX ORDER — SODIUM CHLORIDE 0.9 % (FLUSH) 0.9 %
10 SYRINGE (ML) INJECTION PRN
Status: DISCONTINUED | OUTPATIENT
Start: 2022-09-02 | End: 2022-09-07 | Stop reason: HOSPADM

## 2022-09-02 RX ORDER — POLYETHYLENE GLYCOL 3350 17 G/17G
17 POWDER, FOR SOLUTION ORAL DAILY PRN
Status: DISCONTINUED | OUTPATIENT
Start: 2022-09-02 | End: 2022-09-07 | Stop reason: HOSPADM

## 2022-09-02 RX ORDER — SODIUM CHLORIDE 0.9 % (FLUSH) 0.9 %
10 SYRINGE (ML) INJECTION EVERY 12 HOURS SCHEDULED
Status: DISCONTINUED | OUTPATIENT
Start: 2022-09-02 | End: 2022-09-07 | Stop reason: HOSPADM

## 2022-09-02 RX ORDER — SODIUM CHLORIDE 9 MG/ML
INJECTION, SOLUTION INTRAVENOUS PRN
Status: DISCONTINUED | OUTPATIENT
Start: 2022-09-02 | End: 2022-09-07 | Stop reason: HOSPADM

## 2022-09-02 RX ORDER — LIDOCAINE HYDROCHLORIDE 20 MG/ML
INJECTION, SOLUTION INFILTRATION; PERINEURAL
Status: COMPLETED | OUTPATIENT
Start: 2022-09-02 | End: 2022-09-02

## 2022-09-02 RX ORDER — HEPARIN SODIUM 1000 [USP'U]/ML
INJECTION, SOLUTION INTRAVENOUS; SUBCUTANEOUS
Status: COMPLETED | OUTPATIENT
Start: 2022-09-02 | End: 2022-09-02

## 2022-09-02 RX ORDER — HEPARIN SODIUM 5000 [USP'U]/ML
5000 INJECTION, SOLUTION INTRAVENOUS; SUBCUTANEOUS 2 TIMES DAILY
Status: DISCONTINUED | OUTPATIENT
Start: 2022-09-02 | End: 2022-09-03

## 2022-09-02 RX ORDER — ACETAMINOPHEN 325 MG/1
650 TABLET ORAL EVERY 6 HOURS PRN
Status: DISCONTINUED | OUTPATIENT
Start: 2022-09-02 | End: 2022-09-07 | Stop reason: HOSPADM

## 2022-09-02 RX ORDER — DICYCLOMINE HYDROCHLORIDE 10 MG/1
10 CAPSULE ORAL
Status: DISCONTINUED | OUTPATIENT
Start: 2022-09-02 | End: 2022-09-03

## 2022-09-02 RX ORDER — ONDANSETRON 2 MG/ML
4 INJECTION INTRAMUSCULAR; INTRAVENOUS EVERY 6 HOURS PRN
Status: DISCONTINUED | OUTPATIENT
Start: 2022-09-02 | End: 2022-09-02

## 2022-09-02 RX ORDER — PROMETHAZINE HYDROCHLORIDE 12.5 MG/1
12.5 TABLET ORAL EVERY 6 HOURS PRN
Status: DISCONTINUED | OUTPATIENT
Start: 2022-09-02 | End: 2022-09-07 | Stop reason: HOSPADM

## 2022-09-02 RX ORDER — ACETAMINOPHEN 650 MG/1
650 SUPPOSITORY RECTAL EVERY 6 HOURS PRN
Status: DISCONTINUED | OUTPATIENT
Start: 2022-09-02 | End: 2022-09-07 | Stop reason: HOSPADM

## 2022-09-02 RX ORDER — ACETAMINOPHEN 325 MG/1
650 TABLET ORAL ONCE
Status: COMPLETED | OUTPATIENT
Start: 2022-09-02 | End: 2022-09-02

## 2022-09-02 RX ORDER — CALCIUM ACETATE 667 MG/1
667 CAPSULE ORAL
Status: DISCONTINUED | OUTPATIENT
Start: 2022-09-02 | End: 2022-09-07 | Stop reason: HOSPADM

## 2022-09-02 RX ORDER — 0.9 % SODIUM CHLORIDE 0.9 %
INTRAVENOUS SOLUTION INTRAVENOUS CONTINUOUS PRN
Status: COMPLETED | OUTPATIENT
Start: 2022-09-02 | End: 2022-09-02

## 2022-09-02 RX ORDER — CARVEDILOL 3.12 MG/1
6.25 TABLET ORAL 2 TIMES DAILY WITH MEALS
Status: DISCONTINUED | OUTPATIENT
Start: 2022-09-02 | End: 2022-09-04

## 2022-09-02 RX ORDER — 0.9 % SODIUM CHLORIDE 0.9 %
500 INTRAVENOUS SOLUTION INTRAVENOUS ONCE
Status: COMPLETED | OUTPATIENT
Start: 2022-09-02 | End: 2022-09-02

## 2022-09-02 RX ADMIN — HEPARIN SODIUM 5000 UNITS: 5000 INJECTION INTRAVENOUS; SUBCUTANEOUS at 20:33

## 2022-09-02 RX ADMIN — HEPARIN SODIUM 2900 UNITS: 1000 INJECTION INTRAVENOUS; SUBCUTANEOUS at 15:27

## 2022-09-02 RX ADMIN — CALCIUM ACETATE 667 MG: 667 CAPSULE ORAL at 16:21

## 2022-09-02 RX ADMIN — Medication 10 ML: at 20:33

## 2022-09-02 RX ADMIN — LIDOCAINE HYDROCHLORIDE 29 ML: 20 INJECTION, SOLUTION INFILTRATION; PERINEURAL at 15:21

## 2022-09-02 RX ADMIN — DICYCLOMINE HYDROCHLORIDE 10 MG: 10 CAPSULE ORAL at 16:19

## 2022-09-02 RX ADMIN — BARIUM SULFATE 450 ML: 20 SUSPENSION ORAL at 16:29

## 2022-09-02 RX ADMIN — PIPERACILLIN AND TAZOBACTAM 3375 MG: 3; .375 INJECTION, POWDER, FOR SOLUTION INTRAVENOUS at 09:46

## 2022-09-02 RX ADMIN — ACETAMINOPHEN 650 MG: 325 TABLET ORAL at 10:03

## 2022-09-02 RX ADMIN — VANCOMYCIN HYDROCHLORIDE 1250 MG: 1.25 INJECTION, POWDER, LYOPHILIZED, FOR SOLUTION INTRAVENOUS at 10:55

## 2022-09-02 RX ADMIN — SODIUM CHLORIDE 500 ML: 9 INJECTION, SOLUTION INTRAVENOUS at 09:46

## 2022-09-02 RX ADMIN — SODIUM CHLORIDE 500 ML: 9 INJECTION, SOLUTION INTRAVENOUS at 15:10

## 2022-09-02 RX ADMIN — CALCIUM ACETATE 667 MG: 667 CAPSULE ORAL at 14:24

## 2022-09-02 ASSESSMENT — PAIN - FUNCTIONAL ASSESSMENT: PAIN_FUNCTIONAL_ASSESSMENT: NONE - DENIES PAIN

## 2022-09-02 ASSESSMENT — ENCOUNTER SYMPTOMS
BLOOD IN STOOL: 0
PHOTOPHOBIA: 0
EYE DISCHARGE: 0
COUGH: 0
BACK PAIN: 0
CONSTIPATION: 0
ABDOMINAL DISTENTION: 0
DIARRHEA: 0
VOMITING: 0
GASTROINTESTINAL NEGATIVE: 1
ABDOMINAL PAIN: 0
DIARRHEA: 1
NAUSEA: 0
SHORTNESS OF BREATH: 0
RESPIRATORY NEGATIVE: 1
EYES NEGATIVE: 1
WHEEZING: 0

## 2022-09-02 ASSESSMENT — PAIN SCALES - GENERAL: PAINLEVEL_OUTOF10: 0

## 2022-09-02 NOTE — PROGRESS NOTES
Spoke To Dr. Ray Casper.   We will have PermCath removed and temporary dialysis catheter placed with plan to change back to PermCath once blood cultures are clear

## 2022-09-02 NOTE — CARE COORDINATION
[facility] has made several attempts to contact patient for regular scheduled [address] outreach. [facility] has not been able to connect with patient unable to contact. [facility] notes patient came to ER and is currently admitted in [facility] as inpatient. [facility] will sign off at this time.

## 2022-09-02 NOTE — CONSULTS
[facility]  Consult Note           Reason for Consult:  bacteremia  Requesting Physician:  Dr. Carlos Ellis    Chief Complaint:  dizziness  History Obtained From:  patient, electronic medical record    History of Present Ilness:    50y.o. year old male admitted with + blood cultures. Has ESRD on HD. Sees Dr. Khang Dyer at [facility] in [address] [place] [address]. Has permcath on right. Has percutaneous AVF placed in may. Recent intervention on it. Hasn't been cleared to use by Dr. Kathryn christianson. Was in ER earlier in week with dizziness. Sent out but blood cx + enterococcus. Now being admitted. Last hd was Wednesday. Past Medical History:        Diagnosis Date    Abnormal EKG 09/27/2019    Acute kidney injury (BLUE) with acute tubular necrosis (ATN) (HCC)     AICD (automatic cardioverter/defibrillator) present     Cardiomyopathy ([facility]     per echo 8/2019    Chronic combined systolic and diastolic CHF (congestive heart failure) ([facility] 09/27/2019    Dialysis patient ([facility]     ETOH abuse     Hypertension     Tobacco abuse        Past Surgical History:        Procedure Laterality Date    CARDIAC PACEMAKER PLACEMENT      DIALYSIS CATHETER INSERTION Right 04/12/2022    15.5 F x 19 cm SYMETREX LONG TERM HEMODIALYSIS CATHETER    FISTULAGRAM Left 08/03/2022    Completed by Dr. Elkin Ojeda x 3 placed (See implants)    HERNIA REPAIR Right 02/10/2021    RIGHT INGUINAL HERNIA REPAIR WITH MESH performed [facility] Delf[facility] [id number]  [facility]  8/3/2022    IR EMBOLIZATION VENOUS 8/3/2022 [facility] PROCED[facility]    IR PERC ARTERIOVENOUS FISTULA CREATION  05/16/2022    IR PERC ARTERIOVENOUS FISTULA CREATION 5/16/2022 [facility]ED[facility]    IR TUNNELED CATHETER PLACEMENT GREATER THAN 5 YEARS  04/12/2022    IR TUNNELED CATHETER PLACEMENT GREATER THAN 5 YEARS 4/12/2022 [facility] PROCED[facility]       Home Medications:    No current facility-administered medications on file prior to encounter. Current Outpatient Medications on File Prior to Encounter   Medication Sig Dispense Refill    torsemide (DEMADEX) 20 MG tablet take 1 tablet by mouth every morning and take 1 tablet by mouth at bedtime 180 tablet 2    carvedilol (COREG) 25 MG tablet take 1 tablet by mouth twice a day HOLD FOR SBP<100 OR HR <60 180 tablet 1    hydrALAZINE (APRESOLINE) 50 MG tablet take 1 tablet by mouth twice a day      calcium acetate (PHOSLO) 667 MG CAPS capsule TAKE 1 CAPSULE BY MOUTH THREE TIMES DAILY WITH MEALS      sacubitril-valsartan (ENTRESTO) 24-26 MG per tablet Take 1 tablet by mouth 2 times daily 60 tablet 2    ivabradine (CORLANOR) 5 MG TABS tablet Take 1 tablet by mouth 2 times daily (with meals) 60 tablet 3       Allergies:  Lipitor [atorvastatin]    Social History:    Social History     Socioeconomic History    Marital status: Life Partner     Spouse name: Meggan Lopes    Number of children: Not on file    Years of education: Not on file    Highest education level: Not on file   Occupational History    Not on file   Tobacco Use    Smoking status: Former     Packs/day: 1.00     Years: 4.00     Pack years: 4.00     Types: Cigarettes     Quit date: 12/3/2020     Years since quittin.7    Smokeless tobacco: Never    Tobacco comments:     currently smoking only couple cigarettes daily   Vaping Use    Vaping Use: Never used   Substance and Sexual Activity    Alcohol use: Not Currently    Drug use: Never    Sexual activity: Not on file   Other Topics Concern    Not on file   Social History Narrative    Not on file     Social Determinants of Health     Financial Resource Strain: Low Risk     Difficulty of Paying Living Expenses: Not hard at all   Food Insecurity: No Food Insecurity    Worried About Running Out of Food in the Last Year: Never true    Ran Out of Food in the Last Year: Never true   Transportation Needs: Not on file   Physical Activity: Not on file   Stress: Not on file   Social Connections: Not on file embolization of the medial brachial vein to route blood flow through the cephalic vein. Evaluation in 2 weeks with ultrasound is recommended. CLINICAL DATA: Patient with lack of maturation of AV fistula due to competing blood flow through the medial brachial vein seen on ultrasound. RADIATION DOSE: 26.53 mGy. PROCEDURES PERFORMED: 1. Ultrasound guidance for vascular access. Ultrasound images saved to PACS. 2. Fluoroscopy-guided fistulogram. 3. IV conscious sedation, sedation time 60 minutes. 4. Coil embolization of the medial brachial vein 5. Ultrasound evaluation of the left upper extremity arteries and veins and fistula with grayscale and color Doppler PROCEDURE: Following the discussion of the procedure, alternatives, risks versus benefits, informed consent was obtained from the patient. Specifically, risks of after-biopsy pain at the site, rare possibility of excessive hemorrhage, infection, injury to the adjacent organs were discussed and the patient verbalized understanding. Pre-procedure evaluation confirmed that the patient was an appropriate candidate for conscious sedation. Adequate sedation was maintained during the entire procedure. Vital signs, pulse oximetry, and response to verbal commands were monitored and recorded by the nurse throughout the procedure and the recovery period. The flow sheet was placed in the medical record including the medications and dosages used. The patient returned to baseline neurologic and physiologic status prior to leaving the department. No immediate sedation related complications were noted. Medication for conscious sedation was administered via IV route. 60 minutes of conscious sedation was provided. Following universal protocol, patient and site verification was performed with a \"timeout\" prior to the procedure. Prior to procedure, ultrasound evaluation of the left upper extremity arteries and veins was performed with color flow and grayscale imaging.  Ultrasound demonstrated a agent cephalic vein with sluggish blood flow. There is significant blood flow through the medial brachial vein and somewhat to a less extent through the basilic vein. No evidence of thrombus or stenosis in any veins. The brachial artery and radial artery are patent. Informed and written consent was obtained from the patient following discussion of risks, benefits and alternatives to this procedure. The was patient placed supine on the angiographic table. The patient's left upper extremity radial artery to radial vein fistula site was then prepped and draped in normal sterile fashion. A small amount of local lidocaine anesthesia was injected subcutaneously. Ultrasound was used to study the AV fistula prior to accessing it. The vein was patent to the level of the proximal arm. Using ultrasound access, puncture was made of the medial brachial vein in a retrograde fashion using a 21 GA needle, ultrasound images saved to PACS. A wire was advanced and a 6 [employer]be short sheath was placed. Through the sheath a Rebit catheter was advanced into the vein close to the site of arteriovenous anastomosis and fistulogram was performed through the catheter. Through the catheter contrast was hand injected, and serial digital subtraction angiography of the fistula was performed. The anastomosis, and proximal and mid outflow vein were patent. There was extensive blood flows seen flowing through the medial brachial vein and then into the basilic vein from collaterals. There is good flow through the cephalic vein without any evidence of stenosis. Following that embolization coils were deployed through the catheter into the medial brachial vein. Repeat fistulogram demonstrated very sluggish to completely stopped blood flow in the medial brachial and significantly decreased blood flow in the basilic. The access site was then sutured with a pursestring suture and sterile dressing was placed.  There were no immediate complications. The patient was discharged in normal and stable condition. IR EMBOLIZATION VENOUS    1. Successful fistulogram of left arm dialysis AV fistula from radial artery to radial vein. 2.  Successful coil embolization of the medial brachial vein to route blood flow through the cephalic vein. Evaluation in 2 weeks with ultrasound is recommended. CLINICAL DATA: Patient with lack of maturation of AV fistula due to competing blood flow through the medial brachial vein seen on ultrasound. RADIATION DOSE: 26.53 mGy. PROCEDURES PERFORMED: 1. Ultrasound guidance for vascular access. Ultrasound images saved to PACS. 2. Fluoroscopy-guided fistulogram. 3. IV conscious sedation, sedation time 60 minutes. 4. Coil embolization of the medial brachial vein 5. Ultrasound evaluation of the left upper extremity arteries and veins and fistula with grayscale and color Doppler PROCEDURE: Following the discussion of the procedure, alternatives, risks versus benefits, informed consent was obtained from the patient. Specifically, risks of after-biopsy pain at the site, rare possibility of excessive hemorrhage, infection, injury to the adjacent organs were discussed and the patient verbalized understanding. Pre-procedure evaluation confirmed that the patient was an appropriate candidate for conscious sedation. Adequate sedation was maintained during the entire procedure. Vital signs, pulse oximetry, and response to verbal commands were monitored and recorded by the nurse throughout the procedure and the recovery period. The flow sheet was placed in the medical record including the medications and dosages used. The patient returned to baseline neurologic and physiologic status prior to leaving the department. No immediate sedation related complications were noted. Medication for conscious sedation was administered via IV route. 60 minutes of conscious sedation was provided.  Following universal protocol, patient and site verification was performed with a \"timeout\" prior to the procedure. Prior to procedure, ultrasound evaluation of the left upper extremity arteries and veins was performed with color flow and grayscale imaging. Ultrasound demonstrated a agent cephalic vein with sluggish blood flow. There is significant blood flow through the medial brachial vein and somewhat to a less extent through the basilic vein. No evidence of thrombus or stenosis in any veins. The brachial artery and radial artery are patent. Informed and written consent was obtained from the patient following discussion of risks, benefits and alternatives to this procedure. The was patient placed supine on the angiographic table. The patient's left upper extremity radial artery to radial vein fistula site was then prepped and draped in normal sterile fashion. A small amount of local lidocaine anesthesia was injected subcutaneously. Ultrasound was used to study the AV fistula prior to accessing it. The vein was patent to the level of the proximal arm. Using ultrasound access, puncture was made of the medial brachial vein in a retrograde fashion using a 21 GA needle, ultrasound images saved to PACS. A wire was advanced and a 6 Western Bebe short sheath was placed. Through the sheath a [employer] catheter was advanced into the vein close to the site of arteriovenous anastomosis and fistulogram was performed through the catheter. Through the catheter contrast was hand injected, and serial digital subtraction angiography of the fistula was performed. The anastomosis, and proximal and mid outflow vein were patent. There was extensive blood flows seen flowing through the medial brachial vein and then into the basilic vein from collaterals. There is good flow through the cephalic vein without any evidence of stenosis. Following that embolization coils were deployed through the catheter into the medial brachial vein.  Repeat fistulogram demonstrated very sluggish to completely stopped blood flow in the medial brachial and significantly decreased blood flow in the basilic. The access site was then sutured with a pursestring suture and sterile dressing was placed. There were no immediate complications. The patient was discharged in normal and stable condition. IR US HEMODIALYSIS ACCESS EVAL    1. Successful fistulogram of left arm dialysis AV fistula from radial artery to radial vein. 2.  Successful coil embolization of the medial brachial vein to route blood flow through the cephalic vein. Evaluation in 2 weeks with ultrasound is recommended. CLINICAL DATA: Patient with lack of maturation of AV fistula due to competing blood flow through the medial brachial vein seen on ultrasound. RADIATION DOSE: 26.53 mGy. PROCEDURES PERFORMED: 1. Ultrasound guidance for vascular access. Ultrasound images saved to PACS. 2. Fluoroscopy-guided fistulogram. 3. IV conscious sedation, sedation time 60 minutes. 4. Coil embolization of the medial brachial vein 5. Ultrasound evaluation of the left upper extremity arteries and veins and fistula with grayscale and color Doppler PROCEDURE: Following the discussion of the procedure, alternatives, risks versus benefits, informed consent was obtained from the patient. Specifically, risks of after-biopsy pain at the site, rare possibility of excessive hemorrhage, infection, injury to the adjacent organs were discussed and the patient verbalized understanding. Pre-procedure evaluation confirmed that the patient was an appropriate candidate for conscious sedation. Adequate sedation was maintained during the entire procedure. Vital signs, pulse oximetry, and response to verbal commands were monitored and recorded by the nurse throughout the procedure and the recovery period. The flow sheet was placed in the medical record including the medications and dosages used.  The patient returned to baseline neurologic and physiologic status prior to leaving the department. No immediate sedation related complications were noted. Medication for conscious sedation was administered via IV route. 60 minutes of conscious sedation was provided. Following universal protocol, patient and site verification was performed with a \"timeout\" prior to the procedure. Prior to procedure, ultrasound evaluation of the left upper extremity arteries and veins was performed with color flow and grayscale imaging. Ultrasound demonstrated a agent cephalic vein with sluggish blood flow. There is significant blood flow through the medial brachial vein and somewhat to a less extent through the basilic vein. No evidence of thrombus or stenosis in any veins. The brachial artery and radial artery are patent. Informed and written consent was obtained from the patient following discussion of risks, benefits and alternatives to this procedure. The was patient placed supine on the angiographic table. The patient's left upper extremity radial artery to radial vein fistula site was then prepped and draped in normal sterile fashion. A small amount of local lidocaine anesthesia was injected subcutaneously. Ultrasound was used to study the AV fistula prior to accessing it. The vein was patent to the level of the proximal arm. Using ultrasound access, puncture was made of the medial brachial vein in a retrograde fashion using a 21 GA needle, ultrasound images saved to PACS. A wire was advanced and a 6 Western Bebe short sheath was placed. Through the sheath a [employer] Systemsenstein catheter was advanced into the vein close to the site of arteriovenous anastomosis and fistulogram was performed through the catheter. Through the catheter contrast was hand injected, and serial digital subtraction angiography of the fistula was performed. The anastomosis, and proximal and mid outflow vein were patent. There was extensive blood flows seen flowing through the medial brachial vein and then into the basilic vein from collaterals. There is good flow through the cephalic vein without any evidence of stenosis. Following that embolization coils were deployed through the catheter into the medial brachial vein. Repeat fistulogram demonstrated very sluggish to completely stopped blood flow in the medial brachial and significantly decreased blood flow in the basilic. The access site was then sutured with a pursestring suture and sterile dressing was placed. There were no immediate complications. The patient was discharged in normal and stable condition. Assessment/  51 yo man with ESRD on HD due to HTN. Has been on hd several months. Access is right permcath. Has developing avf (percutaneous) placed by Dr. Herbert Severin. Not ready for use yet. Admitted with weakness. Found to have enterococcus bacteremia. Most likely from HD catheter. Has anemia and low k as well    Plan/  1- Will d/w Dr. Herbert Severin. One option is to remove HD catheter today and let him go without hd til next Tuesday. Cr is 7 but BUN is in 20s and has some residual kidney function. Other option is to keep hd cath in, hd tomorrow, and then remove on Tuesday and place new one once blood cx are clear  2- retacrit for anemia  3. vancomycin    Thank you for the consultation. Please do not hesitate to call with questions.     Jorden Nuñez MD

## 2022-09-02 NOTE — CONSULTS
Infectious Disease     Patient Name: Abhijeet Hutton  Date: 9/2/2022  YOB: 1970  Medical Record Number: [id number]        Enterococcus faecalis bacteremia  Most likely infected dialysis catheter  Need to rule out endocarditis      History of Present Illness:  End-stage renal disease ATN on hemodialysis coronary disease cardiomyopathy congestive heart failure alcohol abuse  Tobacco abuse    Patient had +2 sets positive blood cultures when seen in the emergency department 8/28/2022 was called back into the hospital when these turned positive growing Enterococcus faecalis  On 8/28/2020 to get low blood pressure lightheadedness diarrhea    No fevers chills sweats no shortness of breath no chest pain       8/28/22 0901    Culture, Blood Id Sensitivity  Abnormal   Cult,Blood:  POSITIVE Blood Culture   Performed at [facility], [address] [place] [address]   ([phone]     Organism Enterococcus faecalis Abnormal     Culture, Blood Id Sensitivity For susceptibility, refer to previous culture. Organism Enterococcus faecalis Abnormal     Culture, Blood Id Sensitivity 2ND COLONY TYPE   For susceptibility, refer to previous culture. Resulting Agency [address] [facility]     Patient admitted to hospital 9/1/2022      Patient does give a history of chills sweats fevers when dialysis was finished        Review of Systems   Constitutional:  Negative for chills, diaphoresis, fatigue and fever. HENT: Negative. Respiratory: Negative. Cardiovascular: Negative. Gastrointestinal:  Positive for diarrhea. Negative for abdominal pain, constipation, nausea and vomiting. Genitourinary: Negative. Musculoskeletal: Negative. Skin: Negative. Neurological:  Positive for dizziness, light-headedness and headaches. Hematological: Negative. Psychiatric/Behavioral: Negative.        Review of Systems: All 14 review of systems negative other than as stated above    Social History     Tobacco Use    Smoking status: Former     Packs/day: 1.00     Years: 4.00     Pack years: 4.00     Types: Cigarettes     Quit date: 12/3/2020     Years since quittin.7    Smokeless tobacco: Never    Tobacco comments:     currently smoking only couple cigarettes daily   Vaping Use    Vaping Use: Never used   Substance Use Topics    Alcohol use: Not Currently    Drug use: Never         Past Medical History:   Diagnosis Date    Abnormal EKG 2019    Acute kidney injury (BLUE) with acute tubular necrosis (ATN) (HCC)     AICD (automatic cardioverter/defibrillator) present     Cardiomyopathy ([facility] [address]     per echo 2019    Chronic combined systolic and diastolic CHF (congestive heart failure) ([facility] [address] 2019    Dialysis patient ([facility] [address]     ETOH abuse     Hypertension     Tobacco abuse            Past Surgical History:   Procedure Laterality Date    CARDIAC PACEMAKER PLACEMENT      DIALYSIS CATHETER INSERTION Right 2022    15.5 F x 19 cm SYMETREX LONG TERM HEMODIALYSIS CATHETER    FISTULAGRAM Left 2022    Completed by Dr. Valeri Boo - Jose Rodriguez x 3 placed (See implants)    HERNIA REPAIR Right 02/10/2021    RIGHT INGUINAL HERNIA REPAIR WITH MESH performed by Cris Craft MD at [id number]  8/3/2022    IR EMBOLIZATION VENOUS 8/3/2022 [facility] PROCED[facility]    IR PERC ARTERIOVENOUS FISTULA CREATION  2022    IR PERC ARTERIOVENOUS FISTULA CREATION 2022 [facility] PROCED[facility]    IR TUNNELED CATHETER PLACEMENT GREATER THAN 5 YEARS  2022    IR TUNNELED CATHETER PLACEMENT GREATER THAN 5 YEARS 2022 [facility]         No current facility-administered medications on file prior to encounter.      Current Outpatient Medications on File Prior to Encounter   Medication Sig Dispense Refill    torsemide (DEMADEX) 20 MG tablet take 1 tablet by mouth every morning and take 1 tablet by mouth at bedtime (Patient not taking: Reported on 2022) 180 tablet 2 carvedilol (COREG) 25 MG tablet take 1 tablet by mouth twice a day HOLD FOR SBP<100 OR HR <60 180 tablet 1    hydrALAZINE (APRESOLINE) 50 MG tablet take 1 tablet by mouth twice a day (Patient not taking: Reported on 9/2/2022)      calcium acetate (PHOSLO) 667 MG CAPS capsule TAKE 1 CAPSULE BY MOUTH THREE TIMES DAILY WITH MEALS      sacubitril-valsartan (ENTRESTO) 24-26 MG per tablet Take 1 tablet by mouth 2 times daily (Patient not taking: Reported on 9/2/2022) 60 tablet 2    ivabradine (CORLANOR) 5 MG TABS tablet Take 1 tablet by mouth 2 times daily (with meals) 60 tablet 3       Allergies   Allergen Reactions    Lipitor [Atorvastatin] Other (See Comments)     Coughing          Family History   Problem Relation Age of Onset    Coronary Art Dis Mother          Physical Exam:      Physical Exam  Constitutional:       Appearance: He is not ill-appearing. HENT:      Head: Normocephalic and atraumatic. Mouth/Throat:      Pharynx: No oropharyngeal exudate. Eyes:      Extraocular Movements: Extraocular movements intact. Conjunctiva/sclera: Conjunctivae normal.      Pupils: Pupils are equal, round, and reactive to light. Neck:      Vascular: Carotid bruit present. Cardiovascular:      Heart sounds: Normal heart sounds. No murmur heard. Pulmonary:      Effort: Pulmonary effort is normal. No respiratory distress. Breath sounds: Normal breath sounds. No stridor. No wheezing, rhonchi or rales. Chest:      Chest wall: No tenderness. Breasts:     Right: No axillary adenopathy or supraclavicular adenopathy. Left: No axillary adenopathy or supraclavicular adenopathy. Abdominal:      General: Abdomen is flat. Bowel sounds are normal. There is no distension. Palpations: Abdomen is soft. There is no mass. Tenderness: There is no abdominal tenderness. There is no right CVA tenderness, left CVA tenderness, guarding or rebound. Hernia: No hernia is present.    Musculoskeletal: General: No swelling, tenderness, deformity or signs of injury. Cervical back: Normal range of motion and neck supple. No rigidity or tenderness. Right lower leg: No edema. Left lower leg: No edema. Lymphadenopathy:      Cervical: No cervical adenopathy. Upper Body:      Right upper body: No supraclavicular, axillary or epitrochlear adenopathy. Left upper body: No supraclavicular, axillary or epitrochlear adenopathy. Skin:     General: Skin is warm and dry. Coloration: Skin is not jaundiced or pale. Findings: No bruising, erythema, lesion or rash. Neurological:      General: No focal deficit present. Blood pressure (!) 104/47, pulse 81, temperature 98 °F (36.7 °C), temperature source Oral, resp. rate 18, height 6' (1.829 m), weight 202 lb (91.6 kg), SpO2 98 %.       .   Lab Results   Component Value Date    WBC 8.7 09/02/2022    HGB 7.4 (L) 09/02/2022    HCT 22.2 (L) 09/02/2022    MCV 80.8 09/02/2022     09/02/2022     Lab Results   Component Value Date/Time     09/02/2022 09:15 AM    K 3.3 09/02/2022 09:15 AM    K 4.0 01/22/2022 06:14 AM    CL 98 09/02/2022 09:15 AM    CO2 26 09/02/2022 09:15 AM    BUN 21 09/02/2022 09:15 AM    CREATININE 7.06 09/02/2022 09:15 AM    GLUCOSE 101 09/02/2022 09:15 AM    CALCIUM 8.4 09/02/2022 09:15 AM                ASSESSMENT:  Patient Active Problem List   Diagnosis    Hypertensive urgency    Cardiomyopathy ([facility] [address]    Systolic and diastolic CHF, acute (HCC)    BLUE (acute kidney injury) ([address]    Abnormal EKG    Chronic combined systolic and diastolic CHF (congestive heart failure) (HCC)    Acute decompensated heart failure (HCC)    Chest pain    Pulmonary edema, acute (HCC)    NSTEMI (non-ST elevated myocardial infarction) ([facility] [address]    Inguinal hernia of right side without obstruction or gangrene    CHF (congestive heart failure), NYHA class I, acute on chronic, combined ([address]    Systolic congestive heart failure ([address]    Acute systolic heart failure (HCC)    Acute on chronic combined systolic (congestive) and diastolic (congestive) heart failure (HCC)    Bacteremia         PLAN:  Enterococcus faecalis bacteremia  Most likely infected dialysis catheter  Need to rule out endocarditis    Vancomycin IV  Await identification and sensitivities    Repeat blood cultures    Dialysis catheter already removed

## 2022-09-02 NOTE — ED PROVIDER NOTES
[facility] [address]  EMERGENCY DEPARTMENT ENCOUNTER      Pt Name: Jose J Burns  MRN: [id number]  Bassem[place]febony 1970  Date of evaluation: 9/2/2022  Provider: Jordana Miller Dr       Chief Complaint   Patient presents with    Other     Positive blood cultures         HISTORY OF PRESENT ILLNESS   (Location/Symptom, Timing/Onset, Context/Setting, Quality, Duration, Modifying Factors, Severity)  Note limiting factors. Jose J Burns is a 50 y.o. male who per chart review has a past medical history of NSTEMI CHF cardiomyopathy end-stage renal disease on dialysis Monday Wednesday Friday compliant presents to the emergency department for positive blood cultures, + for enterococcus faecalis. Pt was seen in the ED on 8/28 for low blood pressure and lightheadedness. Dx with orthostatic hypotension, discontinued torsemide and hydralazine at that time per nephrology and cardiology. BP has improved at home per patient. Pt states he has been having diarrhea this week however improving, has not had any since yesterday. He is still feeling lightheaded when going from sitting to standing position. He states a few days ago he fainted when standing up from toilet. He did not hit his head. States no injuries from the fall. He has been compliant with dialysis. Denies fever, chills, chest pain, sob, cough, urinary sx (makes small amount of urine), abd pain, vomiting, nausea, back pain headache dizziness numbness tingling weakness blood in the stool or urine. HPI    Nursing Notes were reviewed. REVIEW OF SYSTEMS    (2-9 systems for level 4, 10 or more for level 5)     Review of Systems   Constitutional:  Negative for chills, fatigue and fever. HENT:  Negative for congestion. Eyes:  Negative for photophobia. Respiratory:  Negative for cough, shortness of breath and wheezing. Cardiovascular:  Negative for chest pain and palpitations. Gastrointestinal:  Positive for diarrhea.  Negative for abdominal pain, blood in stool, constipation, nausea and vomiting. Genitourinary:  Negative for dysuria, frequency and hematuria. Musculoskeletal:  Negative for myalgias. Allergic/Immunologic: Negative for immunocompromised state. Neurological:  Positive for syncope and light-headedness. Negative for dizziness, weakness and headaches. All other systems reviewed and are negative. Except as noted above the remainder of the review of systems was reviewed and negative.        PAST MEDICAL HISTORY     Past Medical History:   Diagnosis Date    Abnormal EKG 09/27/2019    Acute kidney injury (BLUE) with acute tubular necrosis (ATN) (HCC)     AICD (automatic cardioverter/defibrillator) present     Cardiomyopathy ([facility] [address]     per echo 8/2019    Chronic combined systolic and diastolic CHF (congestive heart failure) ([facility] [address] 09/27/2019    Dialysis patient ([facility] [address]     ETOH abuse     Hypertension     Tobacco abuse          SURGICAL HISTORY       Past Surgical History:   Procedure Laterality Date    CARDIAC PACEMAKER PLACEMENT      DIALYSIS CATHETER INSERTION Right 04/12/2022    15.5 F x 19 cm SYMETREX LONG TERM HEMODIALYSIS CATHETER    FISTULAGRAM Left 08/03/2022    Completed by Dr. Panfilo Lewis x 3 placed (See implants)    HERNIA REPAIR Right 02/10/2021    RIGHT INGUINAL HERNIA REPAIR WITH MESH performed by Beryl Fernandez MD at [id number]  8/3/2022    IR EMBOLIZATION VENOUS 8/3/2022 [facility] PROCED[facility]    IR PERC ARTERIOVENOUS FISTULA CREATION  05/16/2022    IR PERC ARTERIOVENOUS FISTULA CREATION 5/16/2022 [facility] PROCED[facility]    IR TUNNELED CATHETER PLACEMENT GREATER THAN 5 YEARS  04/12/2022    IR TUNNELED CATHETER PLACEMENT GREATER THAN 5 YEARS 4/12/2022 [facility] PROCED[facility]         CURRENT MEDICATIONS       Current Discharge Medication List        CONTINUE these medications which have NOT CHANGED    Details   torsemide (DEMADEX) 20 MG tablet take 1 tablet by mouth every Source Heart Rate Resp SpO2 Height Weight   (!) 109/58 100.2 °F (37.9 °C) Oral 91 18 100 % 6' (1.829 m) 202 lb (91.6 kg)       Physical Exam  Constitutional:       General: He is not in acute distress. Appearance: He is well-developed. He is not ill-appearing, toxic-appearing or diaphoretic. HENT:      Head: Normocephalic and atraumatic. Right Ear: Tympanic membrane, ear canal and external ear normal.      Left Ear: Tympanic membrane, ear canal and external ear normal.      Nose: Nose normal.      Mouth/Throat:      Mouth: Mucous membranes are moist.   Eyes:      Pupils: Pupils are equal, round, and reactive to light. Cardiovascular:      Rate and Rhythm: Normal rate and regular rhythm. Pulses: Normal pulses. Heart sounds: No murmur heard. No friction rub. No gallop. Pulmonary:      Effort: Pulmonary effort is normal. No respiratory distress. Breath sounds: Normal breath sounds. No wheezing, rhonchi or rales. Abdominal:      General: Bowel sounds are normal. There is no distension. Palpations: Abdomen is soft. Tenderness: There is no abdominal tenderness. There is no guarding or rebound. Musculoskeletal:         General: No swelling. Cervical back: Normal range of motion. Right lower leg: No edema. Left lower leg: No edema. Skin:     General: Skin is warm and dry. Capillary Refill: Capillary refill takes less than 2 seconds. Findings: No rash. Neurological:      General: No focal deficit present. Mental Status: He is alert and oriented to person, place, and time. DIAGNOSTIC RESULTS     EKG: All EKG's are interpreted by the Emergency Department Physician who either signs or Co-signs this chart in the absence of a cardiologist.    EKG shows NSR with HR 91, normal axis,  Qtc 501 no ST changes. 1st degree AV block.         RADIOLOGY:   Non-plain film images such as CT, Ultrasound and MRI are read by the radiologist. Shima Romo radiographic images are visualized and preliminarily interpreted by the emergency physician with the below findings:        Interpretation per the Radiologist below, if available at the time of this note:    IR REMOVE TUNNELED CVAD WO SQ PORT/PUMP    (Results Pending)   IR NONTUNNELED VASCULAR CATHETER > 5 YEARS    (Results Pending)   CT ABDOMEN PELVIS WO CONTRAST Additional Contrast? Oral    (Results Pending)         ED BEDSIDE ULTRASOUND:   Performed by ED Physician - none    LABS:  Labs Reviewed   COMPREHENSIVE METABOLIC PANEL - Abnormal; Notable for the following components:       Result Value    Sodium 134 (*)     Potassium 3.3 (*)     Glucose 101 (*)     BUN 21 (*)     Creatinine 7.06 (*)     GFR Non- 8.3 (*)     GFR  10.0 (*)     Calcium 8.4 (*)     Albumin 2.6 (*)     Globulin 3.9 (*)     All other components within normal limits    Narrative:     CALL  [facility] tel. [phone],  creat results called to and read back by [employer], 09/02/2022 10:39, by  [facility]   CBC WITH AUTO DIFFERENTIAL - Abnormal; Notable for the following components:    RBC 2.75 (*)     Hemoglobin 7.4 (*)     Hematocrit 22.2 (*)     MCH 26.8 (*)     RDW 15.6 (*)     Neutrophils Absolute 7.2 (*)     Lymphocytes Absolute 0.4 (*)     Monocytes Absolute 0.9 (*)     All other components within normal limits   PROCALCITONIN - Abnormal; Notable for the following components:    Procalcitonin 1.63 (*)     All other components within normal limits   URINALYSIS WITH REFLEX TO CULTURE - Abnormal; Notable for the following components:    Protein, UA >=300 (*)     All other components within normal limits   CULTURE, BLOOD 1   CULTURE, BLOOD 2   GASTROINTESTINAL PANEL, MOLECULAR   C DIFF TOXIN/ANTIGEN   LACTIC ACID   MICROSCOPIC URINALYSIS   CRYPTOSPORIDIUM ANTIGEN, STOOL   IRON AND TIBC   FERRITIN   HEMOGLOBIN AND HEMATOCRIT   VITAMIN B12 & FOLATE       All other labs were within normal range or not returned as of this dictation. EMERGENCY DEPARTMENT COURSE and DIFFERENTIAL DIAGNOSIS/MDM:   Vitals:    Vitals:    09/02/22 1000 09/02/22 1056 09/02/22 1100 09/02/22 1141   BP: 116/62 (!) 101/55 (!) 103/54 (!) 104/47   Pulse: 90 87 86 81   Resp: 19 17 15 18   Temp:    98 °F (36.7 °C)   TempSrc:    Oral   SpO2: 99% 100% 100% 98%   Weight:       Height:           MDM    Patient is a 61-year-old male presents the ED for positive blood cultures. Temp 100.2, HDS on arrival to the ED. He was given po tylenol and 500 cc IV NS in the ED. EKG shows NSR with HR 91, normal axis,  Qtc 501 no ST changes. 1st degree AV block. CMP is remarkable for sodium of 134 potassium 3.3 stable baseline kidney function with creatinine of 7. Remainder of labs are unremarkable. Stable anemia hemoglobin of 7.4. White count improved to 8.7. Normal lactic. Procalcitonin 1.63.  UA is negative for UTI. Patient started on IV vancomycin and IV Zosyn. Unclear etiology of sepsis, suspect infected dialysis catheter versus GI cause. Pt to be admitted. Dr. Yani Luu accepts to floor. Stable for admission. REASSESSMENT          CRITICAL CARE TIME   Total Critical Care time was 0 minutes, excluding separately reportable procedures. There was a high probability of clinically significant/life threatening deterioration in the patient's condition which required my urgent intervention. CONSULTS:  PHARMACY TO DOSE VANCOMYCIN  IP CONSULT TO INTERVENTIONAL RADIOLOGY  IP CONSULT TO NEPHROLOGY  IP CONSULT TO INFECTIOUS DISEASES    PROCEDURES:  Unless otherwise noted below, none     Procedures        FINAL IMPRESSION      1. Septicemia ([facility] [address]          DISPOSITION/PLAN   DISPOSITION Admitted 09/02/2022 10:28:14 AM      PATIENT REFERRED TO:  No follow-up provider specified. DISCHARGE MEDICATIONS:  Current Discharge Medication List        Controlled Substances Monitoring:     No flowsheet data found.     (Please note that portions of this note were completed with a voice recognition program.  Efforts were made to edit the dictations but occasionally words are mis-transcribed.)    He Dorantes PA-C (electronically signed)             He Dorantes PA-C  09/02/22 [id number]

## 2022-09-02 NOTE — PROGRESS NOTES
IR CONSULT CALLED TO DR Darius Butler Electronically signed by Bruna Reyes on 9/2/2022 at 11:55 AM  NEPHRO CONSULT CALLED TO DR [address] Electronically signed by Bruna Reyes on 9/2/2022 at 11:57 AM  INF DIS CONSULT CALLED TO DR Green Electronically signed by Bruna Reyes on 9/2/2022 at 12:24 PM

## 2022-09-02 NOTE — H&P
Hospital Medicine  History and Physical    Patient:  Manuelito Mcfarland  MRN: [id number]    CHIEF COMPLAINT:    Chief Complaint   Patient presents with    Other     Positive blood cultures       History Obtained From:  Patient, EMR  Primary Care Physician: Nikhil Carranza MD    HISTORY OF PRESENT ILLNESS:   The patient is a 50 y.o. male with known diagnosis of end-stage kidney disease for which he is on hemodialysis through right subclavian catheter. Patient came to the emergency room several days ago complaining of not feeling well and having lightheadedness. He was discharged home however. Blood cultures but was done during that emergency room visitation came back positive therefore emergency room staff had to call him and ask him to come back for admission. Patient continues to have lightheadedness and not feeling well. No fever or chills. No chest pain or palpitation. No cough or congestion. No abdominal pain, nausea or vomiting. Patient reported that she has been having liquid diarrhea for the last several days. No blood in it.     Past Medical History:      Diagnosis Date    Abnormal EKG 09/27/2019    Acute kidney injury (BLUE) with acute tubular necrosis (ATN) (HCC)     AICD (automatic cardioverter/defibrillator) present     Cardiomyopathy ([facility]     per echo 8/2019    Chronic combined systolic and diastolic CHF (congestive heart failure) ([facility] 09/27/2019    Dialysis patient ([facility]     ETOH abuse     Hypertension     Tobacco abuse        Past Surgical History:      Procedure Laterality Date    CARDIAC PACEMAKER PLACEMENT      DIALYSIS CATHETER INSERTION Right 04/12/2022    15.5 F x 19 cm SYMETREX LONG TERM HEMODIALYSIS CATHETER    FISTULAGRAM Left 08/03/2022    Completed by Dr. Ball Comment x 3 placed (See implants)    HERNIA REPAIR Right 02/10/2021    RIGHT INGUINAL HERNIA REPAIR WITH MESH performed by Lashawn Donald MD at [address] Count includes the [facility]  8/3/2022    IR EMBOLIZATION VENOUS 8/3/2022 [facility] PROCEDURE    IR PERC ARTERIOVENOUS FISTULA CREATION  05/16/2022    IR PERC ARTERIOVENOUS FISTULA CREATION 5/16/2022 [facility] PROCEDURE    IR TUNNELED CATHETER PLACEMENT GREATER THAN 5 YEARS  04/12/2022    IR TUNNELED CATHETER PLACEMENT GREATER THAN 5 YEARS 4/12/2022 [facility] PROCED[facility]       Medications Prior to Admission:    Prior to Admission medications    Medication Sig Start Date End Date Taking? Authorizing Provider   torsemide (DEMADEX) 20 MG tablet take 1 tablet by mouth every morning and take 1 tablet by mouth at bedtime  Patient not taking: Reported on 9/2/2022 7/8/22   MICAH Barrios   carvedilol (COREG) 25 MG tablet take 1 tablet by mouth twice a day HOLD FOR SBP<100 OR HR <60 6/28/22   Loree Jameson MD   hydrALAZINE (APRESOLINE) 50 MG tablet take 1 tablet by mouth twice a day  Patient not taking: Reported on 9/2/2022 5/11/22   Historical Provider, MD   calcium acetate (PHOSLO) 667 MG CAPS capsule TAKE 1 CAPSULE BY MOUTH THREE TIMES DAILY WITH MEALS 4/29/22   Historical Provider, MD   sacubitril-valsartan (ENTRESTO) 24-26 MG per tablet Take 1 tablet by mouth 2 times daily  Patient not taking: Reported on 9/2/2022 4/21/22   MICAH Barrios   ivabradine (CORLANOR) 5 MG TABS tablet Take 1 tablet by mouth 2 times daily (with meals) 4/21/22   MICAH Barrios       Allergies:  Lipitor [atorvastatin]    Social History:   TOBACCO:   reports that he quit smoking about 20 months ago. His smoking use included cigarettes. He has a 4.00 pack-year smoking history. He has never used smokeless tobacco.  ETOH:   reports that he does not currently use alcohol.       Family History:       Problem Relation Age of Onset    Coronary Art Dis Mother        REVIEW OF SYSTEMS:  Ten systems reviewed and negative except for stated in HPI    Physical Exam:    Vitals: BP (!) 104/47   Pulse 81   Temp 98 °F (36.7 °C) (Oral)   Resp 18   Ht 6' (1.829 m)   [employer] 202 lb (91.6 kg)   SpO2 98%   BMI 27.40 kg/m²   General appearance: alert, appears stated age and cooperative, no apparent distress. Skin: Skin color, texture, turgor normal. No rashes or lesions  HEENT: Head: Normocephalic, no lesions, without obvious abnormality. Neck: no adenopathy, no carotid bruit, no JVD, supple, symmetrical, trachea midline, and thyroid not enlarged, symmetric, no tenderness/mass/nodules  Lungs: clear to auscultation bilaterally hemodialysis catheter in the right upper corner of the chest.  Heart: regular rate and rhythm, S1, S2 normal, no murmur, click, rub or gallop  Abdomen: soft, non-tender; bowel sounds normal; no masses,  no organomegaly  Extremities: extremities normal, atraumatic, no cyanosis or edema  Neurologic: Mental status: Alert, oriented, thought content appropriate     Recent Labs     09/02/22  0915   WBC 8.7   HGB 7.4*        Recent Labs     09/02/22  0915   *   K 3.3*   CL 98   CO2 26   BUN 21*   CREATININE 7.06*   GLUCOSE 101*   AST 24   ALT 21   BILITOT 0.7   ALKPHOS 68     Troponin T: No results for input(s): TROPONINI in the last 72 hours. ABGs: No results found for: PHART, PO2ART, JOJ6GRR  INR: No results for input(s): INR in the last 72 hours. URINALYSIS:  Recent Labs     09/02/22  0945   COLORU Yellow   PHUR >=9.0   WBCUA 0-2   RBCUA 0-2   BACTERIA Negative   CLARITYU Clear   SPECGRAV 1.010   LEUKOCYTESUR Negative   UROBILINOGEN 1.0   BILIRUBINUR Negative   BLOODU Negative   GLUCOSEU Negative     -----------------------------------------------------------------   No results found. Assessment and Plan     *Enterococcus bacteremia. Presumptive source is a hemodialysis catheter. *End-stage renal disease on hemodialysis. *Cardiomyopathy. Echocardiogram done in January 2022 showed EF 20%. No clinical evidence of acute decompensation of heart failure. *Liquid diarrhea for the last week or so.   Probable viral versus bacterial enterocolitis. No abdominal pain. No abdominal pain tenderness. 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 I could not exclude other possible etiologies. *History of hypertension but his blood pressure is on the low side which is a probably secondary to bacteremia. *Few other medical issues not listed above. Plan:  I had accepted to admit patient to the medical floor. Continue vancomycin that was started in the emergency room department. Plan is to remove hemodialysis catheter and place temporary 1. Requested ID consultation. Requested the stool culture and analysis  CT abdomen and pelvis to rule out the possibility of intra-abdominal source of bacteremia although it is less likely clinically. Hold his BP meds to prevent further hypotension. I requested iron study, folate and B12. Patient may benefit from iron infusion if his iron saturation is less than 40% and also may benefit from erythropoietin. Patient's status is  dynamic and evolutionary therefore the aforementioned assessment and plan may or may not be complete or conclusive.   The patient will likely require to have additional work-up, investigation and therapeutic intervention that will be determined based on the clinical progression and follow-up test result            Patient Active Problem List   Diagnosis Code    Hypertensive urgency I16.0    Cardiomyopathy ([address] F74.8    Systolic and diastolic CHF, acute ([address] I50.41    BLUE (acute kidney injury) ([address] N17.9    Abnormal EKG R94.31    Chronic combined systolic and diastolic CHF (congestive heart failure) ([address] I50.42    Acute decompensated heart failure (HCC) I50.9    Chest pain R07.9    Pulmonary edema, acute ([facility]) J81.0    NSTEMI (non-ST elevated myocardial infarction) ([address] I21.4    Inguinal hernia of right side without obstruction or gangrene K40.90    CHF (congestive heart failure), NYHA class I, acute on chronic, combined ([address] E77.82    Systolic congestive heart failure (HCC) J43.13    Acute systolic heart failure (HCC) I50.21    Acute on chronic combined systolic (congestive) and diastolic (congestive) heart failure (HCC) I50.43    Bacteremia R78.81       Hung Montano MD, MD  Admitting Hospitalist    TTS: 85mins where I focused more than 75% of my attention on rendering care, and planning treatment course for this patient, in addition to talking to RN team, mid levels, consulting with other physicians and following up on labs and imaging. High Risk Readmission Screening Tool Score Noted.      Emergency Contact:

## 2022-09-02 NOTE — CARE COORDINATION
[facility] [place] Case Management Initial Discharge Assessment    Met with Patient to discuss discharge plan. PCP: Conrado Villanueva MD                                  Date of Last Visit: 6/28/2022  Dr. Desmond Bender Patient: No        [facility] Notified: no    If no PCP, list provided? N/A    Discharge Planning    Living Arrangements: independently at home    Who do you live with? Girlfriend    Who helps you with your care:  self or family    If lives at home:     Do you have any barriers navigating in your home? no    Patient can perform ADL? Yes    Current Services (outpatient and in home) :  None    Dialysis: Yes, Location [facility], Chair Time 4921 MWF    Is transportation available to get to your appointments? Yes, friends or family     DME Equipment:  yes - cane    Respiratory equipment: None    Respiratory provider:  no     Pharmacy:  yes - [address] [place] [address] with Medication Assistance Program?  No      Patient agreeable to [place] [id number]? Declined    Patient agreeable to SNF/Rehab? Declined    Other discharge needs identified? N/A    Does Patient Have a High-Risk for Readmission Diagnosis (CHF, PN, MI, COPD)? Yes, see care coordinator assessment    If Yes,    Consult with pulmonologist? N/A  Consult with cardiologist? N/A  Cardiac Rehab referral if EF <35%? N/A  Consult with Pharmacy for medication assessment prior to discharge? Yes  Consult with Behavioral health to aid in depression, anxiety, or coping issues? No  Palliative Care Consult? No  Pulmonary Rehab order for COPD, PN, and CHF (if EF > 35%)? N/A   Does patient have a reliable scale and know how to read it (for CHF)? Yes  Nutrition consult for CHF? No  Respiratory therapy consult that includes bedside instruction on administration of nebulizers and/or inhalers, and assessment of oxygen and equipment needs in the home? No    Initial Discharge Plan? (Note: please see concurrent daily documentation for any updates after initial note). Met with pt at bedside to discuss discharge planning. Pt plans to return home with his girlfriend. Pt is a dialysis pt and goes to [facility] in [facility]. Pt will need dialysis today per his scheduled days of MWF.      Readmission Risk              Risk of Unplanned Readmission:  0         Electronically signed by Brittany Whalen RN on 9/2/2022 at 9:45 AM

## 2022-09-02 NOTE — ED TRIAGE NOTES
Pt arrived via ems from home after being called to return to er d/t positive blood cx from few days ago. O/a pt a/o x 3 skin pink w/d resp non labored. Pt c/o dizziness with getting up  and moving around.  Pt due for dialysis today

## 2022-09-02 NOTE — OR NURSING
REMOVAL OF TUNNELED HD CATHETER AND INSERTION OF NON-TUNNELED HD CATHETER - NO SEDATION     1503 - Patient assisted to IR table in supine position. Consent verified for Temporary Hemodialysis Catheter placement and removal of Tunneled Hemodialysis Catheter. Patient placed on vitals monitor, VSS, on RA. Patient appears calm at this time. 1509 - Left neck vessel assessed for patency using Ultrasound guidance. Site to be approved by Dr. Atilio Montgomery, who along with IR staff offered reassurance to patient. 1520 - Timeout performed for procedure. Left neck area cleaned generously with Chloroprep and full body drape applied. 80 - Dr. Atilio Montgomery administered Lidocaine 2% to Left neck area to numb the skin. Using U/S guidance, Dr. Atilio Montgomery used a 21G access needle from 5 Fr x 10 cm MP set to gain access into vessel. 1524 - Medcomp 11.5F x 20cm [place] Duo-Flow IJ double lumen catheter (LOT# [id number],  Expires: 07/10/2026) placed. Placement confirmed by fluoro imaging as well as both ports aspirating blood and flushing easily per Dr Atilio Montgomery. 1527 - Red port instilled with 1.6 ml of heparin by Dr. Atilio Montgomery. 1527 - Blue port instilled with 1.3 ml of heparin by Dr. Atilio Montgomery.     1528 - 2% lidocaine injected around right hemodialysis catheter site per Dr Kassidy Montgomery     [id number] - Hemodialysis catheter removed gently per Dr Atilio Montgomery. Manual pressure held over subclavian site above insertion site per  IR Tech. Tip placed in sterile cup and order received to send to lab for culture. 1534 - Non-Tunneled Catheter sutured to skin with Prolene 2.0 suture. Catheter insertion site dressed with Biopatch, gauze and large tegaderm by NH, IR Tech. Catheter ready for use, order placed. 1542 - Manual pressure released at catheter removal site once hemostasis achieved. Steri strips applied to site. No bleeding noted. Gauze and Tegaderm dressing applied. Pt tolerated well. Vitals stable.  Report called to RN on [address]. Transport requested to return pt to his room.  Electronically signed by Puja Perez RN on 9/2/2022 at 3:43 PM

## 2022-09-02 NOTE — CARE COORDINATION
09/02/22    From:Home with girlfriend, cane,   Dialysis MWF [facility] [address]    Admit: Positive blood cultures    PMH: BLUE, AICD, CHF, HTN, ETOH, Cardiomyopathy    Anticipated Discharge Disposition:Home    Patient Mobility or PT/OT ordered:  Consults:     Clinical: BLD CULTURES- Positive  Barriers to Discharge:IV ATB    Assessments: CMI and DCCOP DONE.

## 2022-09-02 NOTE — PROGRESS NOTES
Per specials, patient to go down for line re-placement within the hour  Med rec complete via patient  A&O x4  States he has had diarrhea the past few days, awaiting stool sample  Pt states hx of fall at home and dizziness when using the bathroom. Encouraged patient to do deep breathing exercises and prevent straining while having BM as it can cause vasovagal response. Patient verbalized understanding.    Bed alarm on, call light with reach, fall socks on  Denies any needs at this time

## 2022-09-02 NOTE — CONSULTS
with acute tubular necrosis (ATN) ([facility])     AICD (automatic cardioverter/defibrillator) present     Cardiomyopathy ([facility]     per echo 2019    Chronic combined systolic and diastolic CHF (congestive heart failure) ([facility] 2019    Dialysis patient ([facility]     ETOH abuse     Hypertension     Tobacco abuse        Social History     Socioeconomic History    Marital status: Life Partner     Spouse name: Khadra Solorio   Tobacco Use    Smoking status: Former     Packs/day: 1.00     Years: 4.00     Pack years: 4.00     Types: Cigarettes     Quit date: 12/3/2020     Years since quittin.7    Smokeless tobacco: Never    Tobacco comments:     currently smoking only couple cigarettes daily   Vaping Use    Vaping Use: Never used   Substance and Sexual Activity    Alcohol use: Not Currently    Drug use: Never     Social Determinants of Health     Financial Resource Strain: Low Risk     Difficulty of Paying Living Expenses: Not hard at all   Food Insecurity: No Food Insecurity    Worried About Running Out of Food in the Last Year: Never true    Ran Out of Food in the Last Year: Never true       Allergies   Allergen Reactions    Lipitor [Atorvastatin] Other (See Comments)     Coughing        No current facility-administered medications on file prior to encounter.      Current Outpatient Medications on File Prior to Encounter   Medication Sig Dispense Refill    torsemide (DEMADEX) 20 MG tablet take 1 tablet by mouth every morning and take 1 tablet by mouth at bedtime (Patient not taking: Reported on 2022) 180 tablet 2    carvedilol (COREG) 25 MG tablet take 1 tablet by mouth twice a day HOLD FOR SBP<100 OR HR <60 180 tablet 1    hydrALAZINE (APRESOLINE) 50 MG tablet take 1 tablet by mouth twice a day (Patient not taking: Reported on 2022)      calcium acetate (PHOSLO) 667 MG CAPS capsule TAKE 1 CAPSULE BY MOUTH THREE TIMES DAILY WITH MEALS      sacubitril-valsartan (ENTRESTO) 24-26 MG per tablet Take 1 tablet by mouth 2 times daily (Patient not taking: Reported on 9/2/2022) 60 tablet 2    ivabradine (CORLANOR) 5 MG TABS tablet Take 1 tablet by mouth 2 times daily (with meals) 60 tablet 3       Review of Systems   Constitutional:  Positive for activity change and fatigue. Negative for chills, diaphoresis and fever. HENT: Negative. Negative for congestion, ear pain, hearing loss and tinnitus. Eyes: Negative. Negative for photophobia. Respiratory: Negative. Negative for cough, shortness of breath and wheezing. Cardiovascular: Negative. Negative for chest pain, palpitations and leg swelling. Gastrointestinal: Negative. Negative for abdominal pain, constipation, diarrhea, nausea and vomiting. Genitourinary: Negative. Negative for dysuria, flank pain, frequency, hematuria and urgency. Musculoskeletal: Negative. Negative for back pain and neck pain. Skin: Negative. Negative for rash. Allergic/Immunologic: Negative for environmental allergies. Neurological:  Positive for light-headedness. Negative for dizziness, tremors, weakness and headaches. Hematological:  Does not bruise/bleed easily. Psychiatric/Behavioral: Negative. Negative for hallucinations and suicidal ideas. The patient is not nervous/anxious. OBJECTIVE:  BP (!) 108/52   Pulse 84   Temp 98 °F (36.7 °C) (Oral)   Resp 16   Ht 6' (1.829 m)   Wt 202 lb (91.6 kg)   SpO2 100%   BMI 27.40 kg/m²     Physical Exam  Constitutional:       General: He is not in acute distress. Appearance: He is well-developed. He is not diaphoretic. HENT:      Head: Normocephalic and atraumatic. Nose: Nose normal.      Mouth/Throat:      Pharynx: No oropharyngeal exudate. Eyes:      General: No scleral icterus. Right eye: No discharge. Left eye: No discharge. Conjunctiva/sclera: Conjunctivae normal.   Neck:      Thyroid: No thyromegaly. Vascular: No JVD. Trachea: No tracheal deviation.    Cardiovascular:      Rate and Rhythm: Normal rate and regular rhythm. Heart sounds: Normal heart sounds. No murmur heard. No friction rub. No gallop. Pulmonary:      Effort: No respiratory distress. Breath sounds: No stridor. No wheezing or rales. Chest:      Chest wall: No tenderness. Comments: Tunneled HD CVC site is unremarkable. Abdominal:      General: Bowel sounds are normal. There is no distension. Palpations: Abdomen is soft. There is no mass. Tenderness: There is no abdominal tenderness. There is no guarding or rebound. Hernia: No hernia is present. Musculoskeletal:         General: No tenderness or deformity. Cervical back: Neck supple. Skin:     General: Skin is dry. Coloration: Skin is not pale. Findings: No erythema or rash. Neurological:      Mental Status: He is alert and oriented to person, place, and time. Cranial Nerves: No cranial nerve deficit. Psychiatric:         Behavior: Behavior normal.         Thought Content: Thought content normal.         Judgment: Judgment normal.       Lab Results   Component Value Date/Time    WBC 8.7 09/02/2022 09:15 AM    HGB 7.4 09/02/2022 09:15 AM    HCT 22.2 09/02/2022 09:15 AM     09/02/2022 09:15 AM    MCV 80.8 09/02/2022 09:15 AM           ASSESSMENT ANDPLAN:    Assessment: Patient with end-stage kidney disease, currently receiving dialysis through a tunneled right internal jugular vein dialysis catheter. Blood cultures returned positive for Enterococcus. Concern for catheter infection. Patient awaiting for newly created percutaneous fistula to mature. Plan: Removal of the tunneled right internal jugular vein subcutaneous dialysis catheter. Placement of a left internal jugular vein temporary dialysis catheter until infection clears.     Brandi Le MD, Miguel Briggs

## 2022-09-02 NOTE — PROGRESS NOTES
Per specials, TEMPORARY dialysis vas cath placed today, patient will need a permanent one prior to discharge as he cannot go home with the one that is currently placed.

## 2022-09-03 LAB
ADENOVIRUS F 40 41 PCR: NOT DETECTED
ALBUMIN SERPL-MCNC: 2.6 G/DL (ref 3.5–4.6)
ALP BLD-CCNC: 65 U/L (ref 35–104)
ALT SERPL-CCNC: 22 U/L (ref 0–41)
ANION GAP SERPL CALCULATED.3IONS-SCNC: 12 MEQ/L (ref 9–15)
AST SERPL-CCNC: 20 U/L (ref 0–40)
ASTROVIRUS PCR: NOT DETECTED
BASOPHILS ABSOLUTE: 0 K/UL (ref 0–0.2)
BASOPHILS RELATIVE PERCENT: 0.7 %
BILIRUB SERPL-MCNC: 0.5 MG/DL (ref 0.2–0.7)
BUN BLDV-MCNC: 28 MG/DL (ref 6–20)
C DIFF TOXIN/ANTIGEN: NORMAL
CALCIUM SERPL-MCNC: 8.2 MG/DL (ref 8.5–9.9)
CAMPYLOBACTER PCR: NOT DETECTED
CHLORIDE BLD-SCNC: 100 MEQ/L (ref 95–107)
CO2: 23 MEQ/L (ref 20–31)
CREAT SERPL-MCNC: 7.8 MG/DL (ref 0.7–1.2)
CRYPTOSPORIDIUM ANTIGEN STOOL: NORMAL
CRYPTOSPORIDIUM PCR: NOT DETECTED
CYCLOSPORA CAYETANENSIS PCR: NOT DETECTED
E COLI ENTEROAGGREGATIVE PCR: NOT DETECTED
E COLI ENTEROPATHOGENIC PCR: NOT DETECTED
E COLI ENTEROTOXIGENIC PCR: NOT DETECTED
E COLI SHIGELLA/ENTEROINVASIVE PCR: NOT DETECTED
ENTAMOEBA HISTOLYTICA PCR: NOT DETECTED
EOSINOPHILS ABSOLUTE: 0.1 K/UL (ref 0–0.7)
EOSINOPHILS RELATIVE PERCENT: 1.7 %
FERRITIN: 2592 NG/ML (ref 30–400)
FOLATE: 7.2 NG/ML
GFR AFRICAN AMERICAN: 8.9
GFR NON-AFRICAN AMERICAN: 7.4
GIARDIA LAMBLIA PCR: NOT DETECTED
GLOBULIN: 3.9 G/DL (ref 2.3–3.5)
GLUCOSE BLD-MCNC: 92 MG/DL (ref 70–99)
HCT VFR BLD CALC: 21.9 % (ref 42–52)
HEMOGLOBIN: 7.6 G/DL (ref 14–18)
HEPATITIS B SURFACE ANTIGEN INTERPRETATION: NORMAL
IRON SATURATION: 26 % (ref 20–55)
IRON: 27 UG/DL (ref 59–158)
LV EF: 50 %
LVEF MODALITY: NORMAL
LYMPHOCYTES ABSOLUTE: 0.5 K/UL (ref 1–4.8)
LYMPHOCYTES RELATIVE PERCENT: 7.3 %
MCH RBC QN AUTO: 27.6 PG (ref 27–31.3)
MCHC RBC AUTO-ENTMCNC: 34.5 % (ref 33–37)
MCV RBC AUTO: 79.9 FL (ref 80–100)
MONOCYTES ABSOLUTE: 0.8 K/UL (ref 0.2–0.8)
MONOCYTES RELATIVE PERCENT: 11.6 %
NEUTROPHILS ABSOLUTE: 5.5 K/UL (ref 1.4–6.5)
NEUTROPHILS RELATIVE PERCENT: 78.7 %
NOROVIRUS GI GII PCR: NOT DETECTED
PDW BLD-RTO: 15.6 % (ref 11.5–14.5)
PLATELET # BLD: 248 K/UL (ref 130–400)
PLESIOMONAS SHIGELLOIDES PCR: NOT DETECTED
POTASSIUM REFLEX MAGNESIUM: 3.6 MEQ/L (ref 3.4–4.9)
RBC # BLD: 2.75 M/UL (ref 4.7–6.1)
ROTAVIRUS A PCR: NOT DETECTED
SALMONELLA PCR: NOT DETECTED
SAPOVIRUS PCR: NOT DETECTED
SHIGA-LIKE TOXIN-PRODUCING E. COLI (STEC) STX1/STX2: NOT DETECTED
SODIUM BLD-SCNC: 135 MEQ/L (ref 135–144)
TOTAL IRON BINDING CAPACITY: 105 UG/DL (ref 250–450)
TOTAL PROTEIN: 6.5 G/DL (ref 6.3–8)
UNSATURATED IRON BINDING CAPACITY: 78 UG/DL (ref 112–347)
VIBRIO CHOLERAE PCR: NOT DETECTED
VIBRIO PCR: NOT DETECTED
VITAMIN B-12: 581 PG/ML (ref 232–1245)
WBC # BLD: 7 K/UL (ref 4.8–10.8)
YERSINIA ENTEROCOLITICA PCR: NOT DETECTED

## 2022-09-03 PROCEDURE — 99232 SBSQ HOSP IP/OBS MODERATE 35: CPT | Performed by: INTERNAL MEDICINE

## 2022-09-03 PROCEDURE — 1210000000 HC MED SURG R&B

## 2022-09-03 PROCEDURE — 6360000002 HC RX W HCPCS: Performed by: INTERNAL MEDICINE

## 2022-09-03 PROCEDURE — 6370000000 HC RX 637 (ALT 250 FOR IP): Performed by: INTERNAL MEDICINE

## 2022-09-03 PROCEDURE — 2500000003 HC RX 250 WO HCPCS: Performed by: INTERNAL MEDICINE

## 2022-09-03 PROCEDURE — 36415 COLL VENOUS BLD VENIPUNCTURE: CPT

## 2022-09-03 PROCEDURE — 80053 COMPREHEN METABOLIC PANEL: CPT

## 2022-09-03 PROCEDURE — 87340 HEPATITIS B SURFACE AG IA: CPT

## 2022-09-03 PROCEDURE — 5A1D70Z PERFORMANCE OF URINARY FILTRATION, INTERMITTENT, LESS THAN 6 HOURS PER DAY: ICD-10-PCS | Performed by: INTERNAL MEDICINE

## 2022-09-03 PROCEDURE — 93306 TTE W/DOPPLER COMPLETE: CPT

## 2022-09-03 PROCEDURE — 85025 COMPLETE CBC W/AUTO DIFF WBC: CPT

## 2022-09-03 PROCEDURE — 2580000003 HC RX 258: Performed by: INTERNAL MEDICINE

## 2022-09-03 RX ORDER — FERROUS SULFATE 325(65) MG
325 TABLET ORAL 2 TIMES DAILY WITH MEALS
Status: DISCONTINUED | OUTPATIENT
Start: 2022-09-06 | End: 2022-09-07 | Stop reason: HOSPADM

## 2022-09-03 RX ORDER — DICYCLOMINE HYDROCHLORIDE 10 MG/1
10 CAPSULE ORAL 2 TIMES DAILY
Status: DISCONTINUED | OUTPATIENT
Start: 2022-09-03 | End: 2022-09-05

## 2022-09-03 RX ORDER — HEPARIN SODIUM 5000 [USP'U]/ML
5000 INJECTION, SOLUTION INTRAVENOUS; SUBCUTANEOUS EVERY 8 HOURS SCHEDULED
Status: DISCONTINUED | OUTPATIENT
Start: 2022-09-03 | End: 2022-09-07 | Stop reason: HOSPADM

## 2022-09-03 RX ORDER — POTASSIUM CHLORIDE 20 MEQ/1
20 TABLET, EXTENDED RELEASE ORAL ONCE
Status: COMPLETED | OUTPATIENT
Start: 2022-09-03 | End: 2022-09-03

## 2022-09-03 RX ADMIN — Medication 10 ML: at 11:55

## 2022-09-03 RX ADMIN — IRON SUCROSE 200 MG: 20 INJECTION, SOLUTION INTRAVENOUS at 11:54

## 2022-09-03 RX ADMIN — POTASSIUM CHLORIDE 20 MEQ: 1500 TABLET, EXTENDED RELEASE ORAL at 09:29

## 2022-09-03 RX ADMIN — CARVEDILOL 6.25 MG: 3.12 TABLET, FILM COATED ORAL at 09:28

## 2022-09-03 RX ADMIN — Medication 10 ML: at 22:12

## 2022-09-03 RX ADMIN — CALCIUM ACETATE 667 MG: 667 CAPSULE ORAL at 13:00

## 2022-09-03 RX ADMIN — ACETAMINOPHEN 650 MG: 325 TABLET ORAL at 22:03

## 2022-09-03 RX ADMIN — DICYCLOMINE HYDROCHLORIDE 10 MG: 10 CAPSULE ORAL at 05:24

## 2022-09-03 RX ADMIN — HEPARIN SODIUM 5000 UNITS: 5000 INJECTION INTRAVENOUS; SUBCUTANEOUS at 22:03

## 2022-09-03 RX ADMIN — DICYCLOMINE HYDROCHLORIDE 10 MG: 10 CAPSULE ORAL at 22:04

## 2022-09-03 RX ADMIN — CALCIUM ACETATE 667 MG: 667 CAPSULE ORAL at 09:29

## 2022-09-03 ASSESSMENT — PAIN SCALES - GENERAL
PAINLEVEL_OUTOF10: 3
PAINLEVEL_OUTOF10: 0
PAINLEVEL_OUTOF10: 3

## 2022-09-03 ASSESSMENT — PAIN DESCRIPTION - ORIENTATION
ORIENTATION: LEFT
ORIENTATION: LEFT

## 2022-09-03 ASSESSMENT — ENCOUNTER SYMPTOMS
ABDOMINAL PAIN: 0
CONSTIPATION: 0
DIARRHEA: 1
VOMITING: 0
RESPIRATORY NEGATIVE: 1
NAUSEA: 0

## 2022-09-03 ASSESSMENT — PAIN DESCRIPTION - DESCRIPTORS
DESCRIPTORS: ACHING
DESCRIPTORS: ACHING

## 2022-09-03 ASSESSMENT — PAIN DESCRIPTION - LOCATION
LOCATION: SHOULDER
LOCATION: SHOULDER

## 2022-09-03 NOTE — FLOWSHEET NOTE
Pt has been awake in room most of day watching TV denies any discomfort and has been very pleasant with staff. Pt request that his girlfriend be his POA and his brother be the second one. Will try to get a hold of isaac to see if that can be changed. Pt will be going to dialysis today Report was given to dialysis staff and they were to put in transport. Pt aware. Electronically signed by Alex Watkins LPN on 1/2/5511 at 6:99 PM  Dialysis staff said 2and 1/2 hour treatment and temp cath not working Dr. Svetlana Godoy aware and will let Dr. Hever Virgen know. Removed 2liters.   .Electronically signed by Alex Watkins LPN on 9/0/0333 at 6:50 PM

## 2022-09-03 NOTE — PROGRESS NOTES
Patient is doing fairly well. Uneventful night. No dizziness. His diarrhea had resolved. No abdominal pain. No nausea or vomiting. No fever or chills. No cough or congestion. No attics, loss of consciousness or seizure. General appearance: alert, appears stated age and cooperative, no apparent distress. Skin: Skin color, texture, turgor normal. No rashes or lesions  HEENT: Head: Normocephalic, no lesions, without obvious abnormality. Neck: no adenopathy, no carotid bruit, no JVD, supple, symmetrical, trachea midline, and thyroid not enlarged, symmetric, no tenderness/mass/nodules  Lungs: clear to auscultation bilaterally hemodialysis catheter in the right upper corner of the chest was removed and new temporary catheter in the left upper chest had been replaced. Heart: regular rate and rhythm, S1, S2 normal, no murmur, click, rub or gallop  Abdomen: soft, non-tender; bowel sounds normal; no masses,  no organomegaly  Extremities: extremities normal, atraumatic, no cyanosis or edema  Neurologic: Mental status: Alert, oriented, thought content appropriate         *Enterococcus bacteremia. Presumptive source is a hemodialysis catheter. Right upper chest dialysis catheter was removed. Temporary catheter was placed on the left side. Echocardiogram to rule out endocarditis is pending. Continue vancomycin as guided by infectious disease. Repeat blood cultures pending. *End-stage renal disease on hemodialysis. *Hypokalemia. Potassium supplementation. *Cardiomyopathy. Echocardiogram done in January 2022 showed EF 20%. No clinical evidence of acute decompensation of heart failure. *Enteritis, clinically and also seen on CT. Diarrhea resolved. Stool culture thus far is negative. *Abnormal wall thickening of the colon as seen on CT. Patient would need to have a colonoscopy. This will be arranged to be completed in the outpatient setting given his current bacteremia status.   I already plugged in appointment request on the discharge document electronically so does not get missed on discharge. 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 I could not exclude other possible etiologies. No melena, no hematemesis. Iron studies consistent with anemia of chronic disease. I started patient on iron supplementation as well as group Procrit. Goal is to keep his hemoglobin above 9.5. Patient will need to have colonoscopy as listed above given colon wall thickening as seen on CT.     *History of hypertension but his blood pressure is on the low side which is a probably secondary to bacteremia. Off BP meds     *Few other medical issues not listed above.

## 2022-09-03 NOTE — PROGRESS NOTES
Infectious Disease     Patient Name: Octavio Goodpasture  Date: 9/3/2022  YOB: 1970  Medical Record Number: [id number]        Enterococcus faecalis bacteremia  Most likely infected dialysis catheter  Need to rule out endocarditis      Patient had +2 sets positive blood cultures when seen in the emergency department 2022 was called back into the hospital when these turned positive growing Enterococcus faecalis  On 2020 to get low blood pressure lightheadedness diarrhea    No fevers chills sweats no shortness of breath no chest pain       22 0901    Culture, Blood Id Sensitivity  Abnormal   Cult,Blood:  POSITIVE Blood Culture   Performed at [facility], [address] [place] [address]   [phone]     Organism Enterococcus faecalis Abnormal     Culture, Blood Id Sensitivity For susceptibility, refer to previous culture. Organism Enterococcus faecalis Abnormal     Culture, Blood Id Sensitivity 2ND COLONY TYPE   For susceptibility, refer to previous culture. Resulting Agency [address] [facility]     Patient admitted to hospital 2022      Patient does give a history of chills sweats fevers when dialysis was finished        Review of Systems   Constitutional:  Negative for chills, diaphoresis, fatigue and fever. Respiratory: Negative. Cardiovascular: Negative. Gastrointestinal:  Positive for diarrhea. Negative for abdominal pain, constipation, nausea and vomiting.      Review of Systems: All 14 review of systems negative other than as stated above    Social History     Tobacco Use    Smoking status: Former     Packs/day: 1.00     Years: 4.00     Pack years: 4.00     Types: Cigarettes     Quit date: 12/3/2020     Years since quittin.7    Smokeless tobacco: Never    Tobacco comments:     currently smoking only couple cigarettes daily   Vaping Use    Vaping Use: Never used   Substance Use Topics    Alcohol use: Not Currently    Drug use: Never         Past Medical History:   Diagnosis Date    Abnormal EKG 09/27/2019    Acute kidney injury (BLUE) with acute tubular necrosis (ATN) (HCC)     AICD (automatic cardioverter/defibrillator) present     Cardiomyopathy ([facility]     per echo 8/2019    Chronic combined systolic and diastolic CHF (congestive heart failure) ([facility] 09/27/2019    Dialysis patient ([facility]     ETOH abuse     Hypertension     Tobacco abuse            Past Surgical History:   Procedure Laterality Date    CARDIAC PACEMAKER PLACEMENT      DIALYSIS CATHETER INSERTION Right 04/12/2022    15.5 F x 19 cm SYMETREX LONG TERM HEMODIALYSIS CATHETER    FISTULAGRAM Left 08/03/2022    Completed by Dr. Leonard Villar x 3 placed (See implants)    HERNIA REPAIR Right 02/10/2021    RIGHT INGUINAL HERNIA REPAIR WITH MESH performed by Chaparrita Banks MD at [address] [address]  8/3/2022    IR EMBOLIZATION VENOUS 8/3/2022 [facility]    IR PERC ARTERIOVENOUS FISTULA CREATION  05/16/2022    IR PERC ARTERIOVENOUS FISTULA CREATION 5/16/2022 [facility]    IR TUNNELED CATHETER PLACEMENT GREATER THAN 5 YEARS  04/12/2022    IR TUNNELED CATHETER PLACEMENT GREATER THAN 5 YEARS 4/12/2022 [facility]         No current facility-administered medications on file prior to encounter.      Current Outpatient Medications on File Prior to Encounter   Medication Sig Dispense Refill    torsemide (DEMADEX) 20 MG tablet take 1 tablet by mouth every morning and take 1 tablet by mouth at bedtime (Patient not taking: Reported on 9/2/2022) 180 tablet 2    carvedilol (COREG) 25 MG tablet take 1 tablet by mouth twice a day HOLD FOR SBP<100 OR HR <60 180 tablet 1    hydrALAZINE (APRESOLINE) 50 MG tablet take 1 tablet by mouth twice a day (Patient not taking: Reported on 9/2/2022)      calcium acetate (PHOSLO) 667 MG CAPS capsule TAKE 1 CAPSULE BY MOUTH THREE TIMES DAILY WITH MEALS      sacubitril-valsartan (ENTRESTO) 24-26 MG per tablet Take 1 tablet by mouth 2 times daily (Patient not taking: Reported on 9/2/2022) 60 tablet 2    ivabradine (CORLANOR) 5 MG TABS tablet Take 1 tablet by mouth 2 times daily (with meals) 60 tablet 3       Allergies   Allergen Reactions    Lipitor [Atorvastatin] Other (See Comments)     Coughing          Family History   Problem Relation Age of Onset    Coronary Art Dis Mother          Physical Exam:      Physical Exam  Cardiovascular:      Heart sounds: Normal heart sounds. No murmur heard. Pulmonary:      Effort: Pulmonary effort is normal. No respiratory distress. Breath sounds: Normal breath sounds. No stridor. No wheezing, rhonchi or rales. Chest:      Chest wall: No tenderness. Abdominal:      General: Abdomen is flat. Bowel sounds are normal. There is no distension. Palpations: Abdomen is soft. There is no mass. Tenderness: There is no abdominal tenderness. There is no guarding. Blood pressure (!) 109/56, pulse 88, temperature 98.2 °F (36.8 °C), temperature source Oral, resp. rate 18, height 6' (1.829 m), weight 202 lb (91.6 kg), SpO2 100 %.       .   Lab Results   Component Value Date    WBC 7.0 09/03/2022    HGB 7.6 (L) 09/03/2022    HCT 21.9 (L) 09/03/2022    MCV 79.9 (L) 09/03/2022     09/03/2022     Lab Results   Component Value Date/Time     09/03/2022 05:28 AM    K 3.6 09/03/2022 05:28 AM     09/03/2022 05:28 AM    CO2 23 09/03/2022 05:28 AM    BUN 28 09/03/2022 05:28 AM    CREATININE 7.80 09/03/2022 05:28 AM    GLUCOSE 92 09/03/2022 05:28 AM    CALCIUM 8.2 09/03/2022 05:28 AM      8/28/22 0947     Culture, Blood Id Sensitivity  Abnormal   Cult,Blood:  POSITIVE Blood Culture   Performed at [address] [place] [address], [address] [place] [address]   [phone]     Organism Enterococcus faecalis Abnormal     Organism Enterococcus faecalis Abnormal     Culture, Blood Id Sensitivity 2ND COLONY TYPE    Resulting Agency [facility]    Enterococcus faecalis (2)    Antibiotic Interpretation Microscan  Method Status    ampicillin Sensitive <=2 mcg/mL BACTERIAL SUSCEPTIBILITY PANEL BY ROSEANNE     vancomycin Sensitive 1 mcg/mL BACTERIAL SUSCEPTIBILITY PANEL BY ROSEANNE       Enterococcus faecalis (3)    Antibiotic Interpretation Microscan  Method Status    ampicillin Sensitive <=2 mcg/mL BACTERIAL SUSCEPTIBILITY PANEL BY ROSEANNE     vancomycin Sensitive 1 mcg/mL BACTERIAL SUSCEPTIBILITY PANEL BY ROSEANNE                          ASSESSMENT:  Patient Active Problem List   Diagnosis    Hypertensive urgency    Cardiomyopathy ([facility]    Systolic and diastolic CHF, acute ([facility])    BLUE (acute kidney injury) ([facility]    Abnormal EKG    Chronic combined systolic and diastolic CHF (congestive heart failure) (HCC)    Acute decompensated heart failure ([facility])    Chest pain    Pulmonary edema, acute ([facility])    NSTEMI (non-ST elevated myocardial infarction) ([facility])    Inguinal hernia of right side without obstruction or gangrene    CHF (congestive heart failure), NYHA class I, acute on chronic, combined (HCC)    Systolic congestive heart failure (HCC)    Acute systolic heart failure (HCC)    Acute on chronic combined systolic (congestive) and diastolic (congestive) heart failure (HCC)    Bacteremia due to Enterococcus    Complication of vascular dialysis catheter    Central line infection, initial encounter    Septicemia ([facility]         PLAN:  Enterococcus faecalis bacteremia  Most likely infected dialysis catheter  Need to rule out endocarditis      Enterococcus is sensitive to both vancomycin and ampicillin    Repeat blood cultures pending    Dialysis catheter already removed    Plan to treat with vancomycin for 3 weeks blood cultures remain negative dosing to happen with dialysis

## 2022-09-03 NOTE — DIALYSIS
Pt tolerated 3hr tx well. Tx ended 30 min early do to poor function of left neck temporary catheter. Dr. Pura Dueñas aware. 2.09L fluid removal. Report called to Lobo Lugo LPN.

## 2022-09-03 NOTE — PROGRESS NOTES
HGB 7.4* 7.6* 7.6*     --  248     CMP:    Recent Labs     09/02/22  0915 09/03/22  0528   * 135   K 3.3* 3.6   CL 98 100   CO2 26 23   BUN 21* 28*   CREATININE 7.06* 7.80*   GLUCOSE 101* 92   CALCIUM 8.4* 8.2*   LABGLOM 8.3* 7.4*     Troponin: No results for input(s): TROPONINI in the last 72 hours. BNP: No results for input(s): BNP in the last 72 hours. INR: No results for input(s): INR in the last 72 hours. Lipids: No results for input(s): CHOL, LDLDIRECT, TRIG, HDL, AMYLASE, LIPASE in the last 72 hours. Liver:   Recent Labs     09/03/22  0528   AST 20   ALT 22   ALKPHOS 65   PROT 6.5   LABALBU 2.6*   BILITOT 0.5     Iron:    Recent Labs     09/02/22  1859   FERRITIN 2,592*     Urinalysis: No results for input(s): UA in the last 72 hours.     Objective:  Vitals: BP (!) 109/56   Pulse 88   Temp 98.2 °F (36.8 °C) (Oral)   Resp 18   Ht 6' (1.829 m)   Wt 202 lb (91.6 kg)   SpO2 100%   BMI 27.40 kg/m²    Wt Readings from Last 3 Encounters:   09/02/22 202 lb (91.6 kg)   08/28/22 200 lb (90.7 kg)   08/15/22 200 lb (90.7 kg)      24HR INTAKE/OUTPUT:    Intake/Output Summary (Last 24 hours) at 9/3/2022 0287  Last data filed at 9/3/2022 0428  Gross per 24 hour   Intake --   Output 750 ml   Net -750 ml       General: alert, in no apparent distress  HEENT: normocephalic, atraumatic, anicteric  Neck: supple, no mass  Lungs: non-labored respirations, clear to auscultation bilaterally  Heart: regular rate and rhythm, no murmurs or rubs  Abdomen: soft, non-tender, non-distended  Ext: no cyanosis, no peripheral edema  Neuro: alert and oriented, no gross abnormalities  Psych: normal mood and affect  Skin: no rash      Electronically signed by Orlando Caballero DO, MD

## 2022-09-03 NOTE — CARE COORDINATION
Patient going down for hemodialysis today. Had temp vas cath placed yesterday, after perma cath was removed and tip sent for culture. Patient has +blood cultures from 8/28 for Enterococcus Faecalis, patient on iv antibiotics and ID has been consulted. Patient will need a another permacath placed once blood cultures are negative. Cdiff pending. CM to follow for d/c planning. At this time the patient is hoping to return home w/ his girlfriend w/ no needs. Will need to monitor for IV antibiotic needs at d/c.

## 2022-09-04 LAB
ACINETOBACTER CALCOAC BAUMANNII COMPLEX BY PCR: NOT DETECTED
ANION GAP SERPL CALCULATED.3IONS-SCNC: 13 MEQ/L (ref 9–15)
BACTEROIDES FRAGILIS BY PCR: NOT DETECTED
BASOPHILS ABSOLUTE: 0.1 K/UL (ref 0–0.2)
BASOPHILS RELATIVE PERCENT: 0.7 %
BLOOD CULTURE, ROUTINE: ABNORMAL
BUN BLDV-MCNC: 22 MG/DL (ref 6–20)
CALCIUM SERPL-MCNC: 8.5 MG/DL (ref 8.5–9.9)
CANDIDA ALBICANS BY PCR: NOT DETECTED
CANDIDA AURIS BY PCR: NOT DETECTED
CANDIDA GLABRATA BY PCR: NOT DETECTED
CANDIDA KRUSEI BY PCR: NOT DETECTED
CANDIDA PARAPSILOSIS BY PCR: NOT DETECTED
CANDIDA TROPICALIS BY PCR: NOT DETECTED
CHLORIDE BLD-SCNC: 99 MEQ/L (ref 95–107)
CO2: 25 MEQ/L (ref 20–31)
CREAT SERPL-MCNC: 6.49 MG/DL (ref 0.7–1.2)
CRYPTOCOCCUS NEOFORMANS/GATTII BY PCR: NOT DETECTED
CULTURE, BLOOD 2: ABNORMAL
ENTEROBACTER CLOACAE COMPLEX BY PCR: NOT DETECTED
ENTEROBACTERALES BY PCR: NOT DETECTED
ENTEROCOCCUS FAECALIS BY PCR: DETECTED
ENTEROCOCCUS FAECIUM BY PCR: NOT DETECTED
EOSINOPHILS ABSOLUTE: 0.2 K/UL (ref 0–0.7)
EOSINOPHILS RELATIVE PERCENT: 1.8 %
ESCHERICHIA COLI BY PCR: NOT DETECTED
GFR AFRICAN AMERICAN: 11
GFR NON-AFRICAN AMERICAN: 9.1
GLUCOSE BLD-MCNC: 91 MG/DL (ref 70–99)
HAEMOPHILUS INFLUENZAE BY PCR: NOT DETECTED
HCT VFR BLD CALC: 26.4 % (ref 42–52)
HEMOGLOBIN: 8.7 G/DL (ref 14–18)
KLEBSIELLA AEROGENES BY PCR: NOT DETECTED
KLEBSIELLA OXYTOCA BY PCR: NOT DETECTED
KLEBSIELLA PNEUMONIAE GROUP BY PCR: NOT DETECTED
LISTERIA MONOCYTOGENES BY PCR: NOT DETECTED
LYMPHOCYTES ABSOLUTE: 0.7 K/UL (ref 1–4.8)
LYMPHOCYTES RELATIVE PERCENT: 7.9 %
MCH RBC QN AUTO: 26.9 PG (ref 27–31.3)
MCHC RBC AUTO-ENTMCNC: 33 % (ref 33–37)
MCV RBC AUTO: 81.5 FL (ref 80–100)
MONOCYTES ABSOLUTE: 0.7 K/UL (ref 0.2–0.8)
MONOCYTES RELATIVE PERCENT: 8.7 %
NEISSERIA MENINGITIDIS BY PCR: NOT DETECTED
NEUTROPHILS ABSOLUTE: 6.8 K/UL (ref 1.4–6.5)
NEUTROPHILS RELATIVE PERCENT: 80.9 %
PDW BLD-RTO: 16 % (ref 11.5–14.5)
PLATELET # BLD: 318 K/UL (ref 130–400)
POTASSIUM SERPL-SCNC: 3.4 MEQ/L (ref 3.4–4.9)
PROTEUS SPECIES BY PCR: NOT DETECTED
PSEUDOMONAS AERUGINOSA BY PCR: NOT DETECTED
RBC # BLD: 3.23 M/UL (ref 4.7–6.1)
SALMONELLA SPECIES BY PCR: NOT DETECTED
SERRATIA MARCESCENS BY PCR: NOT DETECTED
SODIUM BLD-SCNC: 137 MEQ/L (ref 135–144)
STAPHYLOCOCCUS AUREUS BY PCR: NOT DETECTED
STAPHYLOCOCCUS EPIDERMIDIS BY PCR: NOT DETECTED
STAPHYLOCOCCUS LUGDUNENSIS BY PCR: NOT DETECTED
STAPHYLOCOCCUS SPECIES BY PCR: NOT DETECTED
STENOTROPHOMONAS MALTOPHILIA BY PCR: NOT DETECTED
STREPTOCOCCUS AGALACTIAE BY PCR: NOT DETECTED
STREPTOCOCCUS PNEUMONIAE BY PCR: NOT DETECTED
STREPTOCOCCUS PYOGENES  BY PCR: NOT DETECTED
STREPTOCOCCUS SPECIES BY PCR: NOT DETECTED
VANCOMYCIN RESISTANT BY PCR: NOT DETECTED
WBC # BLD: 8.4 K/UL (ref 4.8–10.8)

## 2022-09-04 PROCEDURE — 87077 CULTURE AEROBIC IDENTIFY: CPT

## 2022-09-04 PROCEDURE — 85025 COMPLETE CBC W/AUTO DIFF WBC: CPT

## 2022-09-04 PROCEDURE — 99223 1ST HOSP IP/OBS HIGH 75: CPT | Performed by: INTERNAL MEDICINE

## 2022-09-04 PROCEDURE — 36415 COLL VENOUS BLD VENIPUNCTURE: CPT

## 2022-09-04 PROCEDURE — 6360000002 HC RX W HCPCS: Performed by: INTERNAL MEDICINE

## 2022-09-04 PROCEDURE — 6370000000 HC RX 637 (ALT 250 FOR IP): Performed by: INTERNAL MEDICINE

## 2022-09-04 PROCEDURE — P9047 ALBUMIN (HUMAN), 25%, 50ML: HCPCS | Performed by: INTERNAL MEDICINE

## 2022-09-04 PROCEDURE — 99232 SBSQ HOSP IP/OBS MODERATE 35: CPT | Performed by: INTERNAL MEDICINE

## 2022-09-04 PROCEDURE — 1210000000 HC MED SURG R&B

## 2022-09-04 PROCEDURE — 2580000003 HC RX 258: Performed by: INTERNAL MEDICINE

## 2022-09-04 PROCEDURE — 80048 BASIC METABOLIC PNL TOTAL CA: CPT

## 2022-09-04 PROCEDURE — 87186 SC STD MICRODIL/AGAR DIL: CPT

## 2022-09-04 PROCEDURE — 2500000003 HC RX 250 WO HCPCS: Performed by: INTERNAL MEDICINE

## 2022-09-04 RX ORDER — CARVEDILOL 3.12 MG/1
3.12 TABLET ORAL 2 TIMES DAILY WITH MEALS
Status: DISCONTINUED | OUTPATIENT
Start: 2022-09-04 | End: 2022-09-07 | Stop reason: HOSPADM

## 2022-09-04 RX ORDER — 0.9 % SODIUM CHLORIDE 0.9 %
250 INTRAVENOUS SOLUTION INTRAVENOUS ONCE
Status: COMPLETED | OUTPATIENT
Start: 2022-09-04 | End: 2022-09-04

## 2022-09-04 RX ORDER — ALBUMIN (HUMAN) 12.5 G/50ML
25 SOLUTION INTRAVENOUS 2 TIMES DAILY
Status: COMPLETED | OUTPATIENT
Start: 2022-09-04 | End: 2022-09-05

## 2022-09-04 RX ADMIN — HEPARIN SODIUM 5000 UNITS: 5000 INJECTION INTRAVENOUS; SUBCUTANEOUS at 20:43

## 2022-09-04 RX ADMIN — HEPARIN SODIUM 5000 UNITS: 5000 INJECTION INTRAVENOUS; SUBCUTANEOUS at 06:01

## 2022-09-04 RX ADMIN — CARVEDILOL 3.12 MG: 3.12 TABLET, FILM COATED ORAL at 17:22

## 2022-09-04 RX ADMIN — CALCIUM ACETATE 667 MG: 667 CAPSULE ORAL at 08:34

## 2022-09-04 RX ADMIN — DICYCLOMINE HYDROCHLORIDE 10 MG: 10 CAPSULE ORAL at 12:47

## 2022-09-04 RX ADMIN — VANCOMYCIN HYDROCHLORIDE 1000 MG: 1 INJECTION, POWDER, LYOPHILIZED, FOR SOLUTION INTRAVENOUS at 12:48

## 2022-09-04 RX ADMIN — IRON SUCROSE 200 MG: 20 INJECTION, SOLUTION INTRAVENOUS at 09:54

## 2022-09-04 RX ADMIN — CALCIUM ACETATE 667 MG: 667 CAPSULE ORAL at 12:45

## 2022-09-04 RX ADMIN — CARVEDILOL 6.25 MG: 3.12 TABLET, FILM COATED ORAL at 08:34

## 2022-09-04 RX ADMIN — Medication 10 ML: at 11:35

## 2022-09-04 RX ADMIN — DICYCLOMINE HYDROCHLORIDE 10 MG: 10 CAPSULE ORAL at 20:43

## 2022-09-04 RX ADMIN — VANCOMYCIN HYDROCHLORIDE 1000 MG: 1 INJECTION, POWDER, LYOPHILIZED, FOR SOLUTION INTRAVENOUS at 12:51

## 2022-09-04 RX ADMIN — ALBUMIN (HUMAN) 25 G: 0.25 INJECTION, SOLUTION INTRAVENOUS at 11:34

## 2022-09-04 RX ADMIN — SODIUM CHLORIDE 250 ML: 9 INJECTION, SOLUTION INTRAVENOUS at 13:56

## 2022-09-04 RX ADMIN — ALBUMIN (HUMAN) 25 G: 0.25 INJECTION, SOLUTION INTRAVENOUS at 20:42

## 2022-09-04 RX ADMIN — Medication 10 ML: at 20:44

## 2022-09-04 RX ADMIN — HEPARIN SODIUM 5000 UNITS: 5000 INJECTION INTRAVENOUS; SUBCUTANEOUS at 14:00

## 2022-09-04 RX ADMIN — CALCIUM ACETATE 667 MG: 667 CAPSULE ORAL at 17:21

## 2022-09-04 ASSESSMENT — ENCOUNTER SYMPTOMS
CHEST TIGHTNESS: 0
BLOOD IN STOOL: 0
COUGH: 0
GASTROINTESTINAL NEGATIVE: 1
RESPIRATORY NEGATIVE: 1
VOMITING: 0
SHORTNESS OF BREATH: 0
WHEEZING: 0
EYES NEGATIVE: 1
NAUSEA: 0
CONSTIPATION: 0
STRIDOR: 0
DIARRHEA: 1
ABDOMINAL PAIN: 0

## 2022-09-04 NOTE — CARE COORDINATION
IV BENEFIT REQUEST FORM    FAX FROM:  [facility]                        [address] [facility]    REQUESTED BY: Electronically signed by Jason Biggs RN on 2022 at 10:41 AM                                               RN/C3: PHONE: [phone])     DATE:/TIME OF REQUEST: 22  TIME: 10:41 AM      TO: [address] TO: [phone]    PHONE: [phone]     THIS PATIENT HAS BEEN IDENTIFIED TO POSSIBLY NEED LONG TERM IV'S. PLEASE CHECK INSURANCE COVERAGE FOR THE FOLLOWING PT/DRUGS. PATIENT'S NAME: Eliana Queen                              [facility]   PATIENT'S : 1970  PATIENT ADDRESS: [place] [address]  SSN:    ()     PAYOR NAME:  Payor: Venda Gut / Plan: MEDICARE PART A AND B / Product Type: *No Product type* /     DRUG: (vancomycin)                         DOSE: (1000mg)            FREQUENCY: Q (24) HR        __________ CHECK HERE IF PT HAS NO INSURANCE AND REQUESTING SELF PAY COST. *IF [facility] IS NOT A PROVIDER FOR THIS PATIENT, PLEASE FORWARD INFO VIA FAX TO [facility] @ [phone],(PHONE NUMBER: [phone]) TO RUN BENEFIT VERIFICATION AND NOTIFY THE ABOVE C3 OF THIS PLAN. (FAX FACE SHEET WITH DEMOGRAPHICS AND INSURANCE INFO WITH THIS FORM.)  PLEASE FAX BENEFIT INFO TO: THE [phone]    This message is intended only for the use of the individual or entity to which it is addressed and may contain information that is privileged, confidential, and exempt from disclosure under applicable law. If the reader of the notice is not intended recipient of the employee/agent responsible for delivering the message to the intended recipient, you are hereby notified than any dissemination, distribution or copying of this communication is strictly prohibited.  Please contact the sender for further instructions on handling the information.

## 2022-09-04 NOTE — FLOWSHEET NOTE
Pt has been awake in room this AM doing nothing not sleeping not watching TV just laying there. Dr. Mariela Squires in to see pt and explained to him that the temp cath is not working and he wants Dr. Marie Andrade to check it out might need a new one again. Dr. Morrison also in to see and told pt that he has endocarditis and will need a ARACELI on Tuesday and will do it around 8:00 in am and to remain NPO after midnight on Monday. Dr. Mandeep Jimenez called and told  that he will check it on Tuesday. Perfect served dr Malini Butler and told him dr Lian Hernandez is going to do araceli Tuesday [address]. Electronically signed by Franco Johnson LPN on 8/0/4332 at 92:38 AM

## 2022-09-04 NOTE — PROGRESS NOTES
Infectious Disease     Patient Name: Aissatou Johnston  Date: 9/4/2022  YOB: 1970  Medical Record Number: [id number]        Enterococcus faecalis bacteremia  Most likely infected dialysis catheter  Need to rule out endocarditis      Patient had +2 sets positive blood cultures  No fevers chills sweats no shortness of breath no chest pain        Patient admitted to hospital 9/1/2022      Patient does give a history of chills sweats fevers when dialysis was finished        Review of Systems   Constitutional:  Negative for chills, diaphoresis, fatigue and fever. Respiratory: Negative. Cardiovascular: Negative. Gastrointestinal:  Positive for diarrhea. Negative for abdominal pain, constipation, nausea and vomiting. Physical Exam  Cardiovascular:      Heart sounds: Normal heart sounds. No murmur heard. Pulmonary:      Effort: Pulmonary effort is normal. No respiratory distress. Breath sounds: Normal breath sounds. No stridor. No wheezing, rhonchi or rales. Chest:      Chest wall: No tenderness. Abdominal:      General: Abdomen is flat. Bowel sounds are normal. There is no distension. Palpations: Abdomen is soft. There is no mass. Tenderness: There is no abdominal tenderness. There is no guarding. Blood pressure (!) 94/52, pulse 84, temperature 98.1 °F (36.7 °C), resp. rate 12, height 6' (1.829 m), weight 202 lb (91.6 kg), SpO2 100 %.       .   Lab Results   Component Value Date    WBC 8.4 09/04/2022    HGB 8.7 (L) 09/04/2022    HCT 26.4 (L) 09/04/2022    MCV 81.5 09/04/2022     09/04/2022     Lab Results   Component Value Date/Time     09/04/2022 06:20 AM    K 3.4 09/04/2022 06:20 AM    K 3.6 09/03/2022 05:28 AM    CL 99 09/04/2022 06:20 AM    CO2 25 09/04/2022 06:20 AM    BUN 22 09/04/2022 06:20 AM    CREATININE 6.49 09/04/2022 06:20 AM    GLUCOSE 91 09/04/2022 06:20 AM    CALCIUM 8.5 09/04/2022 06:20 AM      8/28/22 0947     Culture, Blood Id Sensitivity removed    Plan to treat with vancomycin for 3 weeks blood cultures remain negative dosing to happen with dialysis

## 2022-09-04 NOTE — FLOWSHEET NOTE
9/4/22 @ Severo Rasheed Notified Dr. Janie Peterson of Cardiology consult via [facility]    9/4/22 @ Deneen Corbin Notified Dr. Mu Trujillo of IR consult via [address] [place] [address]

## 2022-09-04 NOTE — CONSULTS
Inpatient consult to Cardiology  Consult performed by: Rosa Soler MD  Consult ordered by: Edward Jacinto MD        Patient Name: Elizabeth Eisenberg Date: 2022  9:08 AM  MR #: [id number]  : 1970    Attending Physician: Edward Jacinto MD  Reason for consult: AR ? IE    History of Presenting Illness:      Marita Naidu is a 50 y.o. male on hospital day 2 with a history of . History Obtained From:  patient, electronic medical record    Pt admitted with weakness and dizizness. Was seen in ER and released but called back subsequently due to + Blood Cultures taken in the ER. Echo reveals improved EF from 20 to 50% but new severe eccentric AR. Pt has NICM with Dual chamber ICD in place. He HAS ESRD on HD.    History:      EKG: SR   Past Medical History:   Diagnosis Date    Abnormal EKG 2019    Acute kidney injury (BLUE) with acute tubular necrosis (ATN) (HCC)     AICD (automatic cardioverter/defibrillator) present     Cardiomyopathy ([address]     per echo 2019    Chronic combined systolic and diastolic CHF (congestive heart failure) ([address] 2019    Dialysis patient ([address]     ETOH abuse     Hypertension     Tobacco abuse      Past Surgical History:   Procedure Laterality Date    CARDIAC PACEMAKER PLACEMENT      DIALYSIS CATHETER INSERTION Right 2022    15.5 F x 19 cm SYMETREX LONG TERM HEMODIALYSIS CATHETER    FISTULAGRAM Left 2022    Completed by Dr. Helene Strickland x 3 placed (See implants)    HERNIA REPAIR Right 02/10/2021    RIGHT INGUINAL HERNIA REPAIR WITH MESH performed by Bill Corbin MD [facility] [id number]  [facility]  8/3/2022    IR EMBOLIZATION VENOUS 8/3/2022 [facility]    IR PERC ARTERIOVENOUS FISTULA CREATION  2022    IR PERC ARTERIOVENOUS FISTULA CREATION 2022 [facility]    IR TUNNELED CATHETER PLACEMENT GREATER THAN 5 YEARS  2022    IR TUNNELED CATHETER PLACEMENT GREATER THAN 5 YEARS 2022 [facility]       Family History  Family History   Problem Relation Age of Onset    Coronary Art Dis Mother      [] Unable to obtain due to ventilated and/ or neurologic status    Social History     Socioeconomic History    Marital status: Life Partner     Spouse name: Clifford [place]    Number of children: Not on file    Years of education: Not on file    Highest education level: Not on file   Occupational History    Not on file   Tobacco Use    Smoking status: Former     Packs/day: 1.00     Years: 4.00     Pack years: 4.00     Types: Cigarettes     Quit date: 12/3/2020     Years since quittin.7    Smokeless tobacco: Never    Tobacco comments:     currently smoking only couple cigarettes daily   Vaping Use    Vaping Use: Never used   Substance and Sexual Activity    Alcohol use: Not Currently    Drug use: Never    Sexual activity: Not on file   Other Topics Concern    Not on file   Social History Narrative    Not on file     Social Determinants of Health     Financial Resource Strain: Low Risk     Difficulty of Paying Living Expenses: Not hard at all   Food Insecurity: No Food Insecurity    Worried About Running Out of Food in the Last Year: Never true    Ran Out of Food in the Last Year: Never true   Transportation Needs: Not on file   Physical Activity: Not on file   Stress: Not on file   Social Connections: Not on file   Intimate Partner Violence: Not on file   Housing Stability: Not on file      [] Unable to obtain due to ventilated and/ or neurologic status      Home Medications:      Medications Prior to Admission: torsemide (DEMADEX) 20 MG tablet, take 1 tablet by mouth every morning and take 1 tablet by mouth at bedtime (Patient not taking: Reported on 2022)  carvedilol (COREG) 25 MG tablet, take 1 tablet by mouth twice a day HOLD FOR SBP<100 OR HR <60  hydrALAZINE (APRESOLINE) 50 MG tablet, take 1 tablet by mouth twice a day (Patient not taking: Reported on 2022)  calcium acetate (PHOSLO) 667 MG CAPS capsule, TAKE 1 CAPSULE BY MOUTH THREE TIMES DAILY WITH MEALS  sacubitril-valsartan (ENTRESTO) 24-26 MG per tablet, Take 1 tablet by mouth 2 times daily (Patient not taking: Reported on 9/2/2022)  ivabradine (CORLANOR) 5 MG TABS tablet, Take 1 tablet by mouth 2 times daily (with meals)    Current Hospital Medications:     Scheduled Meds:   albumin human  25 g IntraVENous BID    sodium chloride  250 mL IntraVENous Once    carvedilol  3.125 mg Oral BID WC    vancomycin  1,000 mg IntraVENous Once    heparin (porcine)  5,000 Units SubCUTAneous 3 times per day    [START ON 9/5/2022] epoetin nirali-epbx  3,000 Units SubCUTAneous Once per day on Mon Wed Fri    [START ON 9/6/2022] ferrous sulfate  325 mg Oral BID WC    dicyclomine  10 mg Oral BID    vancomycin  1,000 mg IntraVENous Once    sodium chloride flush  10 mL IntraVENous 2 times per day    vancomycin (VANCOCIN) intermittent dosing (placeholder)   Other RX Placeholder    calcium acetate  667 mg Oral TID WC     Continuous Infusions:   sodium chloride       PRN Meds:.sodium chloride flush, sodium chloride, promethazine **OR** [DISCONTINUED] ondansetron, polyethylene glycol, acetaminophen **OR** acetaminophen  .   sodium chloride          Allergies: Allergies   Allergen Reactions    Lipitor [Atorvastatin] Other (See Comments)     Coughing         Review of Systems:       Review of Systems   Constitutional:  Positive for fatigue. Negative for diaphoresis. HENT: Negative. Eyes: Negative. Respiratory: Negative. Negative for cough, chest tightness, shortness of breath, wheezing and stridor. Cardiovascular: Negative. Negative for chest pain, palpitations and leg swelling. Gastrointestinal: Negative. Negative for blood in stool and nausea. Genitourinary: Negative. Musculoskeletal: Negative. Skin: Negative. Neurological:  Positive for weakness. Negative for dizziness, syncope and light-headedness. Hematological: Negative. Psychiatric/Behavioral: Negative. Objective Findings:     Vitals:BP (!) 94/52   Pulse 84   Temp 98.1 °F (36.7 °C)   Resp 12   Ht 6' (1.829 m)   Wt 202 lb (91.6 kg)   SpO2 100%   BMI 27.40 kg/m²      Physical Examination:    Physical Exam   Constitutional: No distress. He appears chronically ill and acutely ill. HENT:   Normal cephalic and Atraumatic   Eyes: Pupils are equal, round, and reactive to light. Neck: Thyroid normal. No JVD present. No neck adenopathy. No thyromegaly present. Cardiovascular: Normal rate, regular rhythm, intact distal pulses and normal pulses. Murmur heard. Holodiastolic murmur is present with a grade of 3/4. Pulmonary/Chest: Effort normal and breath sounds normal. He has no wheezes. He has no rales. He exhibits no tenderness. Abdominal: Soft. Bowel sounds are normal. There is no abdominal tenderness. Musculoskeletal:         General: No tenderness or edema. Normal range of motion. Cervical back: Normal range of motion and neck supple. Neurological: He is alert and oriented to person, place, and time. Skin: Skin is warm. No cyanosis. Nails show no clubbing.        Results/ Medications reviewed 9/4/2022, 11:19 AM     Laboratory, Microbiology, Pathology, Radiology, Cardiology, Medications and Transcriptions reviewed  Scheduled Meds:   albumin human  25 g IntraVENous BID    sodium chloride  250 mL IntraVENous Once    carvedilol  3.125 mg Oral BID WC    vancomycin  1,000 mg IntraVENous Once    heparin (porcine)  5,000 Units SubCUTAneous 3 times per day    [START ON 9/5/2022] epoetin nirali-epbx  3,000 Units SubCUTAneous Once per day on Mon Wed Fri    [START ON 9/6/2022] ferrous sulfate  325 mg Oral BID WC    dicyclomine  10 mg Oral BID    vancomycin  1,000 mg IntraVENous Once    sodium chloride flush  10 mL IntraVENous 2 times per day    vancomycin (VANCOCIN) intermittent dosing (placeholder)   Other RX Placeholder    calcium acetate  667 mg Oral TID  Continuous Infusions:   sodium chloride         Recent Labs     09/02/22  0915 09/02/22  1859 09/03/22  0528 09/04/22  0620   WBC 8.7  --  7.0 8.4   HGB 7.4* 7.6* 7.6* 8.7*   HCT 22.2* 22.8* 21.9* 26.4*   MCV 80.8  --  79.9* 81.5     --  248 318     Recent Labs     09/02/22  0915 09/03/22  0528 09/04/22  0620   * 135 137   K 3.3* 3.6 3.4   CL 98 100 99   CO2 26 23 25   BUN 21* 28* 22*   CREATININE 7.06* 7.80* 6.49*     Recent Labs     09/02/22  0915 09/03/22  0528   AST 24 20   ALT 21 22   BILITOT 0.7 0.5   ALKPHOS 68 65     No results for input(s): LIPASE, AMYLASE in the last 72 hours. Recent Labs     09/02/22 0915 09/03/22  0528   PROT 6.5 6.5     ECHO Complete 2D W Doppler W Color    Result Date: 9/4/2022  Transthoracic Echocardiography Report (TTE)  Demographics   Patient Name    Angie Gamboa Gender               Male   Patient Number  [id number]       Race                 [place]                                  Ethnicity   Visit Number    [id number]      Room Number          W280   Corporate ID                   Date of Study        09/03/2022   Accession       [id number]     Referring Physician  Number   Date of Birth   1970     Sonographer          Alissa Mitzy   Age             50 year(s)     Interpreting         [facility])                                 Physician            Cardiology                                                      Eugenio Vernon MD  Procedure Type of Study   TTE procedure:ECHO COMPLETE 2D W/DOP W/COLOR. Procedure Date Date: 09/03/2022 Start: 10:34 AM Study Location: Portable Indications:Bacteremia. Patient Status: Routine Height: 72 inches Weight: 202 pounds BSA: 2.14 m^2 BMI: 27.4 kg/m^2 HR: 84 bpm BP: 109/56 mmHg  Conclusions   Summary  Pacing wire noted  Normal aortic valve structure and function. 3+ AR Eccentric  Normal left ventricle structure and function. Left ventricular ejection fraction is visually estimated at 50%. E/A flow reversal noted. Suggestive of diastolic dysfunction. Signature   ----------------------------------------------------------------  Electronically signed by Isidoro Lopez MD(Interpreting  physician) on 09/04/2022 08:38 AM  ----------------------------------------------------------------   Findings  Left Ventricle Normal left ventricle structure and function. Left ventricular ejection fraction is visually estimated at 50%. E/A flow reversal noted. Suggestive of diastolic dysfunction. Right Ventricle Normal right ventricle structure and function. Normal right ventricle systolic pressure. Left Atrium Normal left atrium. Right Atrium Normal right atrium. Mitral Valve Normal mitral valve structure and function. Tricuspid Valve Normal tricuspid valve structure and function. Mild TR RVSP 29 mmHg Aortic Valve Normal aortic valve structure and function. 3+ AR Eccentric Pulmonic Valve Normal pulmonic valve structure and function. Pericardial Effusion No evidence of pericardial effusion. Pleural Effusion No evidence of pleural effusion. Aorta \ Miscellaneous The aorta is within normal limits. M-Mode Measurements (cm)   LVIDd: 5.42 cm                         LVIDs: 4.03 cm  IVSd: 1.24 cm                          IVSs: 1.43 cm  LVPWd: 1.36 cm                         LVPWs: 1.63 cm  Rt. Vent.  Dimension: 2.59 cm           AO Root Dimension: 3.52 cm                                         ACS: 1.95 cm                                         LA: 3.52 cm                                         LVOT: 2.38 cm  Doppler Measurements:   AV Velocity:0.05 m/s                   MV Peak E-Wave: 0.53 m/s  AV Peak Gradient: 15.26 mmHg           MV Peak A-Wave: 0.77 m/s  AV Mean Gradient: 7.98 mmHg  AV Area (Continuity):4.74 cm^2         MV P1/2t: 37.2 msec  TR Velocity:2.55 m/s                   MVA by PHT5.91 cm^2  TR Gradient:25.95 mmHg  Valves  Mitral Valve   Peak E-Wave: 0.53 m/s             Peak A-Wave: 0.77 m/s  P1/2t: 37.2 msec                  E/A Ratio: 0.68  Mean Velocity: 0.74 m/s           Peak Gradient: 1.1 mmHg  Mean Gradient: 2.37 mmHg          Deceleration Time: 118.9 msec  Area (PHT): 5.91 cm^2             Area (continuity): 6.53 cm^2   Aortic Valve   Peak Velocity: 1.95 m/s                Mean Velocity: 1.34 m/s  Peak Gradient: 15.26 mmHg              Mean Gradient: 7.98 mmHg  Area (continuity): 4.74 cm^2  AV VTI: 35.12 cm  AI P1/2t: 477.3 msec  Cusp Separation: 1.95 cm   Tricuspid Valve   TR Velocity: 2.55 m/s              TR Gradient: 25.95 mmHg   LVOT   Peak Velocity: 1.87 m/s               Mean Velocity: 1.26 m/s  Peak Gradient: 13.96 mmHg             Mean Gradient: 7.32 mmHg  LVOT Diameter: 2.38 cm                LVOT VTI: 37.45 cm  Structures  Left Atrium   LA Dimension: 3.52 cm                        LA Area: 16.86 cm^2  LA/Aorta: 1  LA Volume/Index: 61.26 ml /29 m^2   Left Ventricle   Diastolic Dimension: 1.59 cm          Systolic Dimension: 9.00 cm  Septum Diastolic: 0.03 cm             Septum Systolic: 1.83 cm  PW Diastolic: 0.35 cm                 PW Systolic: 2.36 cm                                        FS: 25.7 %  LV EDV/LV EDV Index: 142.83 ml/67 m^2 LV ESV/LV ESV Index: 71.4 ml/33 m^2  EF Calculated: 50 %                                        CI: 6.54 l/min*m^2  CO: 13.99 l/min  LVOT Diameter: 2.38 cm   Right Ventricle   Diastolic Dimension: 7.13 cm  Aorta/ Miscellaneous Aorta   Aortic Root: 3.52 cm  LVOT Diameter: 2.38 cm      CT ABDOMEN PELVIS WO CONTRAST Additional Contrast? Oral    Result Date: 9/2/2022  EXAM: CT ABDOMEN PELVIS WO CONTRAST COMPARISON: January 18, 2022. HISTORY:  53-year-old male presenting with diarrhea and abdominal pain. TECHNIQUE: Nonionic contrast enhanced helical abdomen and pelvis CT was performed. Coronal and sagittal reconstructions also obtained. Automated dose exposure control was used for this exam Oral contrast was also administered.  FINDINGS: Lower thorax: Limited evaluation of the lower chest demonstrates clear lung bases bilaterally. Cardiomegaly is seen. Solid organs: The liver, pancreas, and adrenal glands are unremarkable. The spleen is slightly prominent in size. Biliary system: No evidence of biliary ductal dilatation. Gallbladder appears normal. Genitourinary: The kidneys are normal in appearance bilaterally with no evidence of hydronephrosis. No stones are appreciated. The bladder is unremarkable. . Gastrointestinal: The stomach is distended, the stomach and small bowel demonstrate no evidence of focal wall thickening. Scattered stool visualized within the large bowel, scattered diverticular disease but no evidence of acute diverticulitis is seen. Fluid-filled cecum is seen. A focal area of circumferential wall thickening in the distal rectosigmoid pain and apical cord configuration is visualized on axial series 2 image 73 similar prior study. There is no evidence of bowel obstruction. Fluid Collections:None Lymph nodes: No adenopathy by size criteria. Vascular structures: Visualized vascular structures appear normal. Osseous and soft tissue structures: Degenerative bone changes seen, small solid visualized along the L3-4 intervertebral disc. Bone windows demonstrate no suspicious osteolytic or osteoblastic lesions. No fracture identified. Abundance of stool in the large bowel, diverticular disease but no evidence of acute diverticulitis. There is a circumferential wall thickening visualized in the rectosigmoid most prominent is visualized distally in the rectosigmoid seen on axial series 2 image 73 would recommend further evaluation with colonoscopy or barium enema. Distended stomach with fluid in the small and large bowel loops would recommend clinical correlation for enteritis. CT Head WO Contrast    Result Date: 8/28/2022  CT Brain. Contrast medium:  without contrast.. History:  Dizziness.  Technical factors: CT imaging of the brain was obtained and formatted as 5 mm contiguous axial images. 2.5 mm contiguous axial images were obtained through the osseous structures. Sagittal and coronal reconstruction obtained during postprocessing. Comparison:  None. Findings: Extra-axial spaces:  Normal. Intracranial hemorrhage:  None. Ventricular system: [Negative. Basal Cisterns:  Without anomaly. Cerebral Parenchyma: Without anomaly. Midline Shift:  None. Cerebellum:  No anomaly identified. Paranasal sinuses and mastoid air cells:  Mild mucosal thickening posterior ethmoid and right sphenoid sinuses. Visualized Orbits:  Negative. Impression: Right sphenoid and posterior bilateral ethmoid sinusitis. All CT scans at this facility use dose modulation, iterative reconstruction, and/or weight based dosing when appropriate to reduce radiation dose to as low as reasonably achievable. XR CHEST PORTABLE    Result Date: 8/28/2022  EXAMINATION: XR CHEST PORTABLE CLINICAL HISTORY: HYPERTENSION COMPARISONS: MAY 14, 2022 FINDINGS: Pacemaker generator and wires unchanged in position. Dual-lumen right jugular vein central line unchanged. Osseous structures intact. Cardiopericardial silhouette normal. Lungs clear. NO ACUTE CARDIOPULMONARY DISEASE      Active Hospital Problems    Diagnosis Date Noted    Bacteremia due to Enterococcus [R78.81, B95.2] 09/02/2022     Priority: Medium    Complication of vascular dialysis catheter [T82. 9XXA] 09/02/2022     Priority: Medium    Central line infection, initial encounter [facility] [place] 09/02/2022     Priority: Medium    Septicemia ([facility] [address] [A41.9] 09/02/2022     Priority: Medium         Impression/Plan:   Bacteremia - on iv ABX  New Severe AR - I did not appreciate Vegetation but I am concerned about IE. RBA discussed for ANYA- tentative 0800 Tuesday AM.   HTN Stable  NICM- improved LVEF Dual chamber ICD in  place. I did not recognize any vegetations on the leads but TTE is suboptima. Will have better images with ANYA. Will need to resume Entresto when more stable. Thank you for allowing us to participate in the care of this patient. Will continue to follow. Please call if questions or concerns arise.     Electronically signed by Teodoro Post MD on 9/4/2022 at 11:19 AM

## 2022-09-04 NOTE — CARE COORDINATION
Patient had hemodialysis yesterday, only had a 2 hr tx d/t temp vas cath issues. New consult orders today for Dr. Komal Harper to recheck temp vas cath, as well as a new consult for cardiology for new AR, suspect endocarditis may need ANYA per order. Perma cath was removed and tip sent for culture. Patient has +blood cultures from 8/28 for Enterococcus Faecalis, patient on iv antibiotics and ID has been consulted. Per ID note the patient will need 3 weeks of IV vanco hopefully with dialysis. Patient will need another permacath placed once blood cultures are negative. Cdiff pending. CM to follow for d/c planning. IV check sent in case patient will need home IV antibiotic treatments.

## 2022-09-04 NOTE — PROGRESS NOTES
Nephrology Progress Note    Assessment:  ESRDX  Enterococcus sepsis  Temp. Dialysis catheter poor function  Cardiomyopathy %  Hypertension low presently  Anemia      Plan: continue antibiotic   Dr Karly Lea to evauate temp.  Jam Anand may need replacing   had only 2 hours tx yesterday  decrease coreg dose    Patient Active Problem List:     Hypertensive urgency     Cardiomyopathy ([facility]     Systolic and diastolic CHF, acute ([facility])     BLUE (acute kidney injury) ([facility]     Abnormal EKG     Chronic combined systolic and diastolic CHF (congestive heart failure) ([facility] [address]     Acute decompensated heart failure (HCC)     Chest pain     Pulmonary edema, acute ([facility])     NSTEMI (non-ST elevated myocardial infarction) ([facility] [address]     Inguinal hernia of right side without obstruction or gangrene     CHF (congestive heart failure), NYHA class I, acute on chronic, combined (HCC)     Systolic congestive heart failure (HCC)     Acute systolic heart failure (HCC)     Acute on chronic combined systolic (congestive) and diastolic (congestive) heart failure ([facility] [address]     Bacteremia due to Enterococcus     Complication of vascular dialysis catheter     Central line infection, initial encounter     Septicemia ([facility] [address]      Subjective:  Admit Date: 9/2/2022    Interval History: no issues    Medications:  Scheduled Meds:   albumin human  25 g IntraVENous BID    sodium chloride  250 mL IntraVENous Once    heparin (porcine)  5,000 Units SubCUTAneous 3 times per day    [START ON 9/5/2022] epoetin nirali-epbx  3,000 Units SubCUTAneous Once per day on Mon Wed Fri    iron sucrose  200 mg IntraVENous Q24H    [START ON 9/6/2022] ferrous sulfate  325 mg Oral BID WC    dicyclomine  10 mg Oral BID    vancomycin  1,000 mg IntraVENous Once    sodium chloride flush  10 mL IntraVENous 2 times per day    vancomycin (VANCOCIN) intermittent dosing (placeholder)   Other RX Placeholder    calcium acetate  667 mg Oral TID WC    carvedilol  6.25 mg Oral BID WC     Continuous Infusions:   sodium chloride         CBC:   Recent Labs     09/03/22  0528 09/04/22  0620   WBC 7.0 8.4   HGB 7.6* 8.7*    318     CMP:    Recent Labs     09/02/22  0915 09/03/22  0528 09/04/22  0620   * 135 137   K 3.3* 3.6 3.4   CL 98 100 99   CO2 26 23 25   BUN 21* 28* 22*   CREATININE 7.06* 7.80* 6.49*   GLUCOSE 101* 92 91   CALCIUM 8.4* 8.2* 8.5   LABGLOM 8.3* 7.4* 9.1*     Troponin: No results for input(s): TROPONINI in the last 72 hours. BNP: No results for input(s): BNP in the last 72 hours. INR: No results for input(s): INR in the last 72 hours. Lipids: No results for input(s): CHOL, LDLDIRECT, TRIG, HDL, AMYLASE, LIPASE in the last 72 hours. Liver:   Recent Labs     09/03/22 0528   AST 20   ALT 22   ALKPHOS 65   PROT 6.5   LABALBU 2.6*   BILITOT 0.5     Iron:    Recent Labs     09/02/22  1859   FERRITIN 2,592*     Urinalysis: No results for input(s): UA in the last 72 hours.     Objective:  Vitals: BP (!) 94/52   Pulse 84   Temp 98.1 °F (36.7 °C)   Resp 12   Ht 6' (1.829 m)   Wt 202 lb (91.6 kg)   SpO2 100%   BMI 27.40 kg/m²    Wt Readings from Last 3 Encounters:   09/02/22 202 lb (91.6 kg)   08/28/22 200 lb (90.7 kg)   08/15/22 200 lb (90.7 kg)      24HR INTAKE/OUTPUT:    Intake/Output Summary (Last 24 hours) at 9/4/2022 9415  Last data filed at 9/3/2022 1900  Gross per 24 hour   Intake 400 ml   Output 2490 ml   Net -2090 ml       General: alert, in no apparent distress  HEENT: normocephalic, atraumatic, anicteric  Neck: supple, no mass  Lungs: non-labored respirations, clear to auscultation bilaterally  Heart: regular rate and rhythm, no murmurs or rubs  Abdomen: soft, non-tender, non-distended  Ext: no cyanosis, no peripheral edema  Neuro: alert and oriented, no gross abnormalities  Psych: normal mood and affect  Skin: no rash      Electronically signed by Orlando Caballero DO, MD

## 2022-09-05 LAB
ANION GAP SERPL CALCULATED.3IONS-SCNC: 11 MEQ/L (ref 9–15)
BASOPHILS ABSOLUTE: 0 K/UL (ref 0–0.2)
BASOPHILS RELATIVE PERCENT: 0.6 %
BUN BLDV-MCNC: 36 MG/DL (ref 6–20)
CALCIUM SERPL-MCNC: 8.6 MG/DL (ref 8.5–9.9)
CHLORIDE BLD-SCNC: 104 MEQ/L (ref 95–107)
CO2: 24 MEQ/L (ref 20–31)
CREAT SERPL-MCNC: 7.73 MG/DL (ref 0.7–1.2)
CULTURE CATHETER TIP: NORMAL
CULTURE, BLOOD ID SENSITIVITY: ABNORMAL
EOSINOPHILS ABSOLUTE: 0.1 K/UL (ref 0–0.7)
EOSINOPHILS RELATIVE PERCENT: 1.7 %
GFR AFRICAN AMERICAN: 9
GFR NON-AFRICAN AMERICAN: 7.5
GLUCOSE BLD-MCNC: 92 MG/DL (ref 70–99)
HCT VFR BLD CALC: 23.5 % (ref 42–52)
HEMOGLOBIN: 7.8 G/DL (ref 14–18)
LYMPHOCYTES ABSOLUTE: 0.5 K/UL (ref 1–4.8)
LYMPHOCYTES RELATIVE PERCENT: 7.2 %
MCH RBC QN AUTO: 26.7 PG (ref 27–31.3)
MCHC RBC AUTO-ENTMCNC: 33.2 % (ref 33–37)
MCV RBC AUTO: 80.4 FL (ref 80–100)
MONOCYTES ABSOLUTE: 0.7 K/UL (ref 0.2–0.8)
MONOCYTES RELATIVE PERCENT: 9.4 %
NEUTROPHILS ABSOLUTE: 6.2 K/UL (ref 1.4–6.5)
NEUTROPHILS RELATIVE PERCENT: 81.1 %
ORGANISM: ABNORMAL
ORGANISM: ABNORMAL
PDW BLD-RTO: 15.6 % (ref 11.5–14.5)
PLATELET # BLD: 291 K/UL (ref 130–400)
POTASSIUM SERPL-SCNC: 3.8 MEQ/L (ref 3.4–4.9)
RBC # BLD: 2.92 M/UL (ref 4.7–6.1)
SODIUM BLD-SCNC: 139 MEQ/L (ref 135–144)
WBC # BLD: 7.6 K/UL (ref 4.8–10.8)

## 2022-09-05 PROCEDURE — 6370000000 HC RX 637 (ALT 250 FOR IP): Performed by: INTERNAL MEDICINE

## 2022-09-05 PROCEDURE — 85025 COMPLETE CBC W/AUTO DIFF WBC: CPT

## 2022-09-05 PROCEDURE — 99232 SBSQ HOSP IP/OBS MODERATE 35: CPT | Performed by: INTERNAL MEDICINE

## 2022-09-05 PROCEDURE — 2500000003 HC RX 250 WO HCPCS: Performed by: INTERNAL MEDICINE

## 2022-09-05 PROCEDURE — 2580000003 HC RX 258: Performed by: INTERNAL MEDICINE

## 2022-09-05 PROCEDURE — 36415 COLL VENOUS BLD VENIPUNCTURE: CPT

## 2022-09-05 PROCEDURE — 80048 BASIC METABOLIC PNL TOTAL CA: CPT

## 2022-09-05 PROCEDURE — P9047 ALBUMIN (HUMAN), 25%, 50ML: HCPCS | Performed by: INTERNAL MEDICINE

## 2022-09-05 PROCEDURE — 6360000002 HC RX W HCPCS: Performed by: INTERNAL MEDICINE

## 2022-09-05 PROCEDURE — 1210000000 HC MED SURG R&B

## 2022-09-05 RX ORDER — DEXTROSE AND SODIUM CHLORIDE 5; .9 G/100ML; G/100ML
INJECTION, SOLUTION INTRAVENOUS CONTINUOUS
Status: DISCONTINUED | OUTPATIENT
Start: 2022-09-06 | End: 2022-09-06

## 2022-09-05 RX ORDER — GENTAMICIN SULFATE 80 MG/100ML
80 INJECTION, SOLUTION INTRAVENOUS
Status: DISCONTINUED | OUTPATIENT
Start: 2022-09-05 | End: 2022-09-07 | Stop reason: HOSPADM

## 2022-09-05 RX ADMIN — ALBUMIN (HUMAN) 25 G: 0.25 INJECTION, SOLUTION INTRAVENOUS at 10:27

## 2022-09-05 RX ADMIN — AMPICILLIN SODIUM 2000 MG: 2 INJECTION, POWDER, FOR SOLUTION INTRAMUSCULAR; INTRAVENOUS at 13:02

## 2022-09-05 RX ADMIN — CALCIUM ACETATE 667 MG: 667 CAPSULE ORAL at 10:27

## 2022-09-05 RX ADMIN — CALCIUM ACETATE 667 MG: 667 CAPSULE ORAL at 15:12

## 2022-09-05 RX ADMIN — CALCIUM ACETATE 667 MG: 667 CAPSULE ORAL at 17:40

## 2022-09-05 RX ADMIN — IRON SUCROSE 200 MG: 20 INJECTION, SOLUTION INTRAVENOUS at 17:39

## 2022-09-05 RX ADMIN — Medication 10 ML: at 10:28

## 2022-09-05 RX ADMIN — HEPARIN SODIUM 5000 UNITS: 5000 INJECTION INTRAVENOUS; SUBCUTANEOUS at 15:12

## 2022-09-05 RX ADMIN — EPOETIN ALFA-EPBX 3000 UNITS: 3000 INJECTION, SOLUTION INTRAVENOUS; SUBCUTANEOUS at 10:28

## 2022-09-05 RX ADMIN — GENTAMICIN SULFATE 80 MG: 80 INJECTION, SOLUTION INTRAVENOUS at 15:16

## 2022-09-05 RX ADMIN — DICYCLOMINE HYDROCHLORIDE 10 MG: 10 CAPSULE ORAL at 10:28

## 2022-09-05 RX ADMIN — CARVEDILOL 3.12 MG: 3.12 TABLET, FILM COATED ORAL at 17:40

## 2022-09-05 RX ADMIN — HEPARIN SODIUM 5000 UNITS: 5000 INJECTION INTRAVENOUS; SUBCUTANEOUS at 06:26

## 2022-09-05 ASSESSMENT — ENCOUNTER SYMPTOMS
COUGH: 0
WHEEZING: 0
BLOOD IN STOOL: 0
RESPIRATORY NEGATIVE: 1
GASTROINTESTINAL NEGATIVE: 1
SHORTNESS OF BREATH: 0
ABDOMINAL PAIN: 0
DIARRHEA: 1
NAUSEA: 0
STRIDOR: 0
CHEST TIGHTNESS: 0
VOMITING: 0
CONSTIPATION: 0
EYES NEGATIVE: 1

## 2022-09-05 NOTE — PROGRESS NOTES
Progress Note  Patient: Nicky Soliz  Unit/Bed: V394/L040-76  YOB: 1970  MRN: [id number]  Acct: [de-identified]   Admitting Diagnosis: Bacteremia [R78.81]  Septicemia ([facility] [A41.9]  Admit Date:  2022  Hospital Day: 3    Chief Complaint:BActeremia    Histories:  Past Medical History:   Diagnosis Date    Abnormal EKG 2019    Acute kidney injury (BLUE) with acute tubular necrosis (ATN) (HCC)     AICD (automatic cardioverter/defibrillator) present     Cardiomyopathy ([facility]     per echo 2019    Chronic combined systolic and diastolic CHF (congestive heart failure) ([facility] 2019    Dialysis patient ([facility]     ETOH abuse     Hypertension     Tobacco abuse      Past Surgical History:   Procedure Laterality Date    CARDIAC PACEMAKER PLACEMENT      DIALYSIS CATHETER INSERTION Right 2022    15.5 F x 19 cm SYMETREX LONG TERM HEMODIALYSIS CATHETER    FISTULAGRAM Left 2022    Completed by Dr. Marcelino Lao Erps x 3 placed (See implants)    HERNIA REPAIR Right 02/10/2021    RIGHT INGUINAL HERNIA REPAIR WITH MESH performed by Eduardo Guardado MD at [id number]  8/3/2022    IR EMBOLIZATION VENOUS 8/3/2022 [facility] PROCEDURE    IR PERC ARTERIOVENOUS FISTULA CREATION  2022    IR PERC ARTERIOVENOUS FISTULA CREATION 2022 [facility] PROCEDURE    IR TUNNELED CATHETER PLACEMENT GREATER THAN 5 YEARS  2022    IR TUNNELED CATHETER PLACEMENT GREATER THAN 5 YEARS 2022 [facility] PROCED[facility]     Family History   Problem Relation Age of Onset    Coronary Art Dis Mother      Social History     Socioeconomic History    Marital status: Life Partner     Spouse name: Amador Riggins   Tobacco Use    Smoking status: Former     Packs/day: 1.00     Years: 4.00     Pack years: 4.00     Types: Cigarettes     Quit date: 12/3/2020     Years since quittin.7    Smokeless tobacco: Never    Tobacco comments:     currently smoking only couple cigarettes daily Vaping Use    Vaping Use: Never used   Substance and Sexual Activity    Alcohol use: Not Currently    Drug use: Never     Social Determinants of Health     Financial Resource Strain: Low Risk     Difficulty of Paying Living Expenses: Not hard at all   Food Insecurity: No Food Insecurity    Worried About [address] [facility] in the Last Year: Never true    [address] [facility] in the Last Year: Never true       Subjective/HPI no fever no sob no cp. No acute events. Persistent + Blood Culture. EKG:        Review of Systems:   Review of Systems   Constitutional:  Positive for fatigue. Negative for diaphoresis. HENT: Negative. Eyes: Negative. Respiratory: Negative. Negative for cough, chest tightness, shortness of breath, wheezing and stridor. Cardiovascular: Negative. Negative for chest pain, palpitations and leg swelling. Gastrointestinal: Negative. Negative for blood in stool and nausea. Genitourinary: Negative. Musculoskeletal: Negative. Skin: Negative. Neurological:  Positive for weakness. Negative for dizziness, syncope and light-headedness. Hematological: Negative. Psychiatric/Behavioral: Negative. Physical Examination:    BP (!) 95/46   Pulse 88   Temp 97.2 °F (36.2 °C) (Oral)   Resp 18   Ht 6' (1.829 m)   Wt 202 lb (91.6 kg)   SpO2 99%   BMI 27.40 kg/m²    Physical Exam   Constitutional: He appears healthy. No distress. HENT:   Normal cephalic and Atraumatic   Eyes: Pupils are equal, round, and reactive to light. Neck: Thyroid normal. No JVD present. No neck adenopathy. No thyromegaly present. Cardiovascular: Normal rate, regular rhythm, intact distal pulses and normal pulses. Murmur heard. Diastolic murmur is present. Pulmonary/Chest: Effort normal and breath sounds normal. He has no wheezes. He has no rales. He exhibits no tenderness. Abdominal: Soft. Bowel sounds are normal. There is no abdominal tenderness.    Musculoskeletal:         General: No tenderness or edema. Normal range of motion. Cervical back: Normal range of motion and neck supple. Neurological: He is alert and oriented to person, place, and time. Skin: Skin is warm. No cyanosis. Nails show no clubbing.      LABS:  CBC:   Lab Results   Component Value Date/Time    WBC 7.6 09/05/2022 05:00 AM    RBC 2.92 09/05/2022 05:00 AM    HGB 7.8 09/05/2022 05:00 AM    HCT 23.5 09/05/2022 05:00 AM    MCV 80.4 09/05/2022 05:00 AM    MCH 26.7 09/05/2022 05:00 AM    MCHC 33.2 09/05/2022 05:00 AM    RDW 15.6 09/05/2022 05:00 AM     09/05/2022 05:00 AM     CBC with Differential:    Lab Results   Component Value Date/Time    WBC 7.6 09/05/2022 05:00 AM    RBC 2.92 09/05/2022 05:00 AM    HGB 7.8 09/05/2022 05:00 AM    HCT 23.5 09/05/2022 05:00 AM     09/05/2022 05:00 AM    MCV 80.4 09/05/2022 05:00 AM    MCH 26.7 09/05/2022 05:00 AM    MCHC 33.2 09/05/2022 05:00 AM    RDW 15.6 09/05/2022 05:00 AM    LYMPHOPCT 7.2 09/05/2022 05:00 AM    MONOPCT 9.4 09/05/2022 05:00 AM    BASOPCT 0.6 09/05/2022 05:00 AM    MONOSABS 0.7 09/05/2022 05:00 AM    LYMPHSABS 0.5 09/05/2022 05:00 AM    EOSABS 0.1 09/05/2022 05:00 AM    BASOSABS 0.0 09/05/2022 05:00 AM     CMP:    Lab Results   Component Value Date/Time     09/05/2022 05:00 AM    K 3.8 09/05/2022 05:00 AM    K 3.6 09/03/2022 05:28 AM     09/05/2022 05:00 AM    CO2 24 09/05/2022 05:00 AM    BUN 36 09/05/2022 05:00 AM    CREATININE 7.73 09/05/2022 05:00 AM    GFRAA 9.0 09/05/2022 05:00 AM    LABGLOM 7.5 09/05/2022 05:00 AM    GLUCOSE 92 09/05/2022 05:00 AM    PROT 6.5 09/03/2022 05:28 AM    LABALBU 2.6 09/03/2022 05:28 AM    CALCIUM 8.6 09/05/2022 05:00 AM    BILITOT 0.5 09/03/2022 05:28 AM    ALKPHOS 65 09/03/2022 05:28 AM    AST 20 09/03/2022 05:28 AM    ALT 22 09/03/2022 05:28 AM     BMP:    Lab Results   Component Value Date/Time     09/05/2022 05:00 AM    K 3.8 09/05/2022 05:00 AM    K 3.6 09/03/2022 05:28 AM     09/05/2022 05:00 AM    CO2 24 09/05/2022 05:00 AM    BUN 36 09/05/2022 05:00 AM    LABALBU 2.6 09/03/2022 05:28 AM    CREATININE 7.73 09/05/2022 05:00 AM    CALCIUM 8.6 09/05/2022 05:00 AM    GFRAA 9.0 09/05/2022 05:00 AM    LABGLOM 7.5 09/05/2022 05:00 AM    GLUCOSE 92 09/05/2022 05:00 AM     Magnesium:    Lab Results   Component Value Date/Time    MG 2.1 08/28/2022 08:45 AM     Troponin:    Lab Results   Component Value Date/Time    TROPONINI 0.049 08/28/2022 08:45 AM        Active Hospital Problems    Diagnosis Date Noted    Bacteremia due to Enterococcus [R78.81, B95.2] 09/02/2022     Priority: Medium    Complication of vascular dialysis catheter [T82. 9XXA] 09/02/2022     Priority: Medium    Central line infection, initial encounter Orion Sims 09/02/2022     Priority: Medium    Septicemia ([address] [A41.9] 09/02/2022     Priority: Medium        Assessment/Plan:  Bacteremia - on iv ABX. Persistent + Blood cultures  New Severe AR - I did not appreciate Vegetation but I am concerned about IE. RBA discussed for ANYA- tentative 0800 tokmorrow AM.   HTN Stable  NICM- improved LVEF Dual chamber ICD in  place. I did not recognize any vegetations on the leads but TTE is suboptimal. Will have better images with ANYA. Will need to resume Entresto when more stable.        Electronically signed by Christiano Durbin MD on 9/5/2022 at 10:44 AM

## 2022-09-05 NOTE — PROGRESS NOTES
Nephrology Progress Note    Assessment:  ESRDX  Left arm fistula healing  Enterococcus sepsis infected right dialysis site  Hypertension  ANEMIA      Plan: dialysis today  catheter to be evaluated by Amira    Patient Active Problem List:     Hypertensive urgency     Cardiomyopathy ([facility]     Systolic and diastolic CHF, acute ([facility])     BLUE (acute kidney injury) ([facility]     Abnormal EKG     Chronic combined systolic and diastolic CHF (congestive heart failure) ([facility]     Acute decompensated heart failure ([facility]     Chest pain     Pulmonary edema, acute ([facility])     NSTEMI (non-ST elevated myocardial infarction) ([facility]     Inguinal hernia of right side without obstruction or gangrene     CHF (congestive heart failure), NYHA class I, acute on chronic, combined ([facility])     Systolic congestive heart failure (HCC)     Acute systolic heart failure (HCC)     Acute on chronic combined systolic (congestive) and diastolic (congestive) heart failure ([facility]     Bacteremia due to Enterococcus     Complication of vascular dialysis catheter     Central line infection, initial encounter     [facility]      Subjective:  Admit Date: 9/2/2022    Interval History: no issues    Medications:  Scheduled Meds:   albumin human  25 g IntraVENous BID    carvedilol  3.125 mg Oral BID     heparin (porcine)  5,000 Units SubCUTAneous 3 times per day    epoetin nirali-epbx  3,000 Units SubCUTAneous Once per day on Mon Wed Fri    [START ON 9/6/2022] ferrous sulfate  325 mg Oral BID     dicyclomine  10 mg Oral BID    sodium chloride flush  10 mL IntraVENous 2 times per day    vancomycin (VANCOCIN) intermittent dosing (placeholder)   Other RX Placeholder    calcium acetate  667 mg Oral TID      Continuous Infusions:   sodium chloride         CBC:   Recent Labs     09/04/22  0620 09/05/22  0500   WBC 8.4 7.6   HGB 8.7* 7.8*    291     CMP:    Recent Labs     09/02/22  0915 09/03/22  0528 09/04/22  0620   * 135 137   K 3.3* 3.6 3.4   CL 98 100 99   CO2 26 23 25   BUN 21* 28* 22*   CREATININE 7.06* 7.80* 6.49*   GLUCOSE 101* 92 91   CALCIUM 8.4* 8.2* 8.5   LABGLOM 8.3* 7.4* 9.1*     Troponin: No results for input(s): TROPONINI in the last 72 hours. BNP: No results for input(s): BNP in the last 72 hours. INR: No results for input(s): INR in the last 72 hours. Lipids: No results for input(s): CHOL, LDLDIRECT, TRIG, HDL, AMYLASE, LIPASE in the last 72 hours. Liver:   Recent Labs     09/03/22  0528   AST 20   ALT 22   ALKPHOS 65   PROT 6.5   LABALBU 2.6*   BILITOT 0.5     Iron:    Recent Labs     09/02/22  1859   FERRITIN 2,592*     Urinalysis: No results for input(s): UA in the last 72 hours.     Objective:  Vitals: /61   Pulse 87   Temp 97.2 °F (36.2 °C) (Oral)   Resp 18   Ht 6' (1.829 m)   Wt 202 lb (91.6 kg)   SpO2 99%   BMI 27.40 kg/m²    Wt Readings from Last 3 Encounters:   09/02/22 202 lb (91.6 kg)   08/28/22 200 lb (90.7 kg)   08/15/22 200 lb (90.7 kg)      24HR INTAKE/OUTPUT:  No intake or output data in the 24 hours ending 09/05/22 0812    General: alert, in no apparent distress  HEENT: normocephalic, atraumatic, anicteric  Neck: supple, no mass  Lungs: non-labored respirations, clear to auscultation bilaterally  Heart: regular rate and rhythm, no murmurs or rubs  Abdomen: soft, non-tender, non-distended  Ext: no cyanosis, no peripheral edema  Neuro: alert and oriented, no gross abnormalities  Psych: normal mood and affect  Skin: no rash      Electronically signed by Denise Alvarado DO, MD

## 2022-09-05 NOTE — PROGRESS NOTES
Uneventful night. Patient feels well. No chest pain or palpitation. Resolution of the diarrhea. No headaches, loss of conscious or seizure    General appearance: alert, appears stated age and cooperative, no apparent distress. Skin: Skin color, texture, turgor normal. No rashes or lesions  HEENT: Head: Normocephalic, no lesions, without obvious abnormality. Neck: no adenopathy, no carotid bruit, no JVD, supple, symmetrical, trachea midline, and thyroid not enlarged, symmetric, no tenderness/mass/nodules  Lungs: clear to auscultation bilaterally hemodialysis catheter in the right upper corner of the chest was removed and new temporary catheter in the left upper chest had been replaced. Heart: regular rate and rhythm, S1, S2 normal, no murmur, click, rub or gallop  Abdomen: soft, non-tender; bowel sounds normal; no masses,  no organomegaly  Extremities: extremities normal, atraumatic, no cyanosis or edema  Neurologic: Mental status: Alert, oriented, thought content appropriate      *Enterococcus bacteremia. Presumptive source is a hemodialysis catheter. Right upper chest dialysis catheter was removed. Temporary catheter was placed on the left side. Antibiotic is guided by infectious disease. Repeat blood culture in a.m.  ANYA in a.m. to confirm or exclude endocarditis. Echocardiogram showed new aortic regurgitation. Suspect endocarditis. ANYA in a.m. *End-stage renal disease on hemodialysis. *Hypokalemia. Potassium supplementation. *Cardiomyopathy. Echocardiogram done in January 2022 showed EF 20%. Repeat echo showed ejection fraction at 50%. No clinical evidence of acute decompensation of heart failure. *Enteritis, clinically and also seen on CT. Diarrhea resolved. Stool culture thus far is negative. *Abnormal wall thickening of the colon as seen on CT. Patient would need to have a colonoscopy.   This will be arranged to be completed in the outpatient setting given his current bacteremia status. I already plugged in appointment request on the discharge document electronically so does not get missed on discharge. 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 I could not exclude other possible etiologies. No melena, no hematemesis. Iron studies consistent with anemia of chronic disease. I started patient on iron supplementation as well as group Procrit. Goal is to keep his hemoglobin above 9.5. Patient will need to have colonoscopy as listed above given colon wall thickening as seen on CT.     *History of hypertension but his blood pressure is on the low side which is a probably secondary to bacteremia. Off BP meds     *Few other medical issues not listed above.

## 2022-09-05 NOTE — CARE COORDINATION
Patient not medically ready for d/c. Per ID patient still has +blood cultures and needs ANYA d/t suspected endocarditis. ANYA scheduled for tomorrow at 8am. Iv check sent and is pending. Patient will need long term iv at d/c. Per id vas cath tip neg so far, and patient will need another perm. Vas cath. Dr. Gonsales to check non-funct. Temp vas cath tomorrow. CM to follow for any new d/c needs or changes to the d/c plan. Patient wants to return home, need to get therapy orders if we feel the patient needs placement. Per notes patient may also need a colonoscopy as outpatient at d/c.

## 2022-09-05 NOTE — PROGRESS NOTES
Infectious Disease     Patient Name: Leo Lao  Date: 9/5/2022  YOB: 1970  Medical Record Number: [id number]        Enterococcus faecalis bacteremia  Most likely infected dialysis catheter  Need to rule out endocarditis      Patient had 4 sets positive blood cultures  No fevers chills sweats no shortness of breath no chest pain        Review of Systems   Constitutional:  Negative for chills, diaphoresis, fatigue and fever. Respiratory: Negative. Cardiovascular: Negative. Gastrointestinal:  Positive for diarrhea. Negative for abdominal pain, constipation, nausea and vomiting. Physical Exam  Cardiovascular:      Heart sounds: Normal heart sounds. No murmur heard. Pulmonary:      Effort: Pulmonary effort is normal. No respiratory distress. Breath sounds: Normal breath sounds. No stridor. No wheezing, rhonchi or rales. Chest:      Chest wall: No tenderness. Abdominal:      General: Abdomen is flat. Bowel sounds are normal. There is no distension. Palpations: Abdomen is soft. There is no mass. Tenderness: There is no abdominal tenderness. There is no guarding. Blood pressure (!) 95/46, pulse 88, temperature 97.2 °F (36.2 °C), temperature source Oral, resp. rate 18, height 6' (1.829 m), weight 202 lb (91.6 kg), SpO2 99 %.       .   Lab Results   Component Value Date    WBC 7.6 09/05/2022    HGB 7.8 (L) 09/05/2022    HCT 23.5 (L) 09/05/2022    MCV 80.4 09/05/2022     09/05/2022     Lab Results   Component Value Date/Time     09/05/2022 05:00 AM    K 3.8 09/05/2022 05:00 AM    K 3.6 09/03/2022 05:28 AM     09/05/2022 05:00 AM    CO2 24 09/05/2022 05:00 AM    BUN 36 09/05/2022 05:00 AM    CREATININE 7.73 09/05/2022 05:00 AM    GLUCOSE 92 09/05/2022 05:00 AM    CALCIUM 8.6 09/05/2022 05:00 AM      8/28/22 0947     Culture, Blood Id Sensitivity  Abnormal   Cult,Blood:  POSITIVE Blood Culture   Performed at [address] [place] [phone]   [phone]     Organism Enterococcus faecalis Abnormal     Organism Enterococcus faecalis Abnormal     Culture, Blood Id Sensitivity 2ND COLONY TYPE    Resulting Agency [address] [facility]        Susceptibility    Enterococcus faecalis (2)    Antibiotic Interpretation Microscan  Method Status    ampicillin Sensitive <=2 mcg/mL BACTERIAL SUSCEPTIBILITY PANEL BY ROSEANNE     vancomycin Sensitive 1 mcg/mL BACTERIAL SUSCEPTIBILITY PANEL BY ROSEANNE       Enterococcus faecalis (3)    Antibiotic Interpretation Microscan  Method Status    ampicillin Sensitive <=2 mcg/mL BACTERIAL SUSCEPTIBILITY PANEL BY ROSEANNE     vancomycin Sensitive 1 mcg/mL BACTERIAL SUSCEPTIBILITY PANEL BY ROSEANNE                  Catheter tip showed no growth        ASSESSMENT:  Patient Active Problem List   Diagnosis    Hypertensive urgency    Cardiomyopathy ([facility]    Systolic and diastolic CHF, acute (HCC)    BLUE (acute kidney injury) ([facility]    Abnormal EKG    Chronic combined systolic and diastolic CHF (congestive heart failure) (HCC)    Acute decompensated heart failure ([facility])    Chest pain    Pulmonary edema, acute ([facility])    NSTEMI (non-ST elevated myocardial infarction) ([facility])    Inguinal hernia of right side without obstruction or gangrene    CHF (congestive heart failure), NYHA class I, acute on chronic, combined (HCC)    Systolic congestive heart failure (HCC)    Acute systolic heart failure (HCC)    Acute on chronic combined systolic (congestive) and diastolic (congestive) heart failure (HCC)    Bacteremia due to Enterococcus    Complication of vascular dialysis catheter    Central line infection, initial encounter    [facility]         PLAN:  Enterococcus faecalis bacteremia  Most likely infected dialysis catheter  Need to rule out endocarditis      Repeat blood cultures positive from 9/2/2022  4 positive sets  Transesophageal echo pending    Change patient ampicillin gentamicin combination because of high-grade bacteremia possible endocarditis

## 2022-09-06 ENCOUNTER — APPOINTMENT (OUTPATIENT)
Dept: INTERVENTIONAL RADIOLOGY/VASCULAR | Age: 52
DRG: 314 | End: 2022-09-06
Payer: MEDICARE

## 2022-09-06 ENCOUNTER — APPOINTMENT (OUTPATIENT)
Dept: CARDIAC CATH/INVASIVE PROCEDURES | Age: 52
DRG: 314 | End: 2022-09-06
Payer: MEDICARE

## 2022-09-06 VITALS
BODY MASS INDEX: 29.02 KG/M2 | TEMPERATURE: 98.7 F | DIASTOLIC BLOOD PRESSURE: 84 MMHG | WEIGHT: 214.29 LBS | HEART RATE: 89 BPM | SYSTOLIC BLOOD PRESSURE: 133 MMHG | RESPIRATION RATE: 18 BRPM | OXYGEN SATURATION: 99 % | HEIGHT: 72 IN

## 2022-09-06 PROBLEM — I33.0 ACUTE BACTERIAL ENDOCARDITIS: Status: ACTIVE | Noted: 2022-09-06

## 2022-09-06 LAB
EKG ATRIAL RATE: 91 BPM
EKG P AXIS: 55 DEGREES
EKG P-R INTERVAL: 214 MS
EKG Q-T INTERVAL: 408 MS
EKG QRS DURATION: 96 MS
EKG QTC CALCULATION (BAZETT): 501 MS
EKG R AXIS: -41 DEGREES
EKG T AXIS: 31 DEGREES
EKG VENTRICULAR RATE: 91 BPM
LV EF: 50 %
LVEF MODALITY: NORMAL
SARS-COV-2, NAAT: NOT DETECTED

## 2022-09-06 PROCEDURE — 6360000002 HC RX W HCPCS: Performed by: INTERNAL MEDICINE

## 2022-09-06 PROCEDURE — 93312 ECHO TRANSESOPHAGEAL: CPT | Performed by: INTERNAL MEDICINE

## 2022-09-06 PROCEDURE — B246ZZ4 ULTRASONOGRAPHY OF RIGHT AND LEFT HEART, TRANSESOPHAGEAL: ICD-10-PCS | Performed by: INTERNAL MEDICINE

## 2022-09-06 PROCEDURE — 87040 BLOOD CULTURE FOR BACTERIA: CPT

## 2022-09-06 PROCEDURE — 2580000003 HC RX 258: Performed by: INTERNAL MEDICINE

## 2022-09-06 PROCEDURE — 2580000003 HC RX 258: Performed by: RADIOLOGY

## 2022-09-06 PROCEDURE — 6370000000 HC RX 637 (ALT 250 FOR IP): Performed by: INTERNAL MEDICINE

## 2022-09-06 PROCEDURE — 77001 FLUOROGUIDE FOR VEIN DEVICE: CPT | Performed by: RADIOLOGY

## 2022-09-06 PROCEDURE — 99233 SBSQ HOSP IP/OBS HIGH 50: CPT | Performed by: RADIOLOGY

## 2022-09-06 PROCEDURE — 36415 COLL VENOUS BLD VENIPUNCTURE: CPT

## 2022-09-06 PROCEDURE — 36580 REPLACE CVAD CATH: CPT | Performed by: RADIOLOGY

## 2022-09-06 PROCEDURE — 87635 SARS-COV-2 COVID-19 AMP PRB: CPT

## 2022-09-06 PROCEDURE — 36598 INJ W/FLUOR EVAL CV DEVICE: CPT

## 2022-09-06 PROCEDURE — 99233 SBSQ HOSP IP/OBS HIGH 50: CPT | Performed by: INTERNAL MEDICINE

## 2022-09-06 PROCEDURE — 93312 ECHO TRANSESOPHAGEAL: CPT

## 2022-09-06 PROCEDURE — 6370000000 HC RX 637 (ALT 250 FOR IP)

## 2022-09-06 PROCEDURE — 99232 SBSQ HOSP IP/OBS MODERATE 35: CPT | Performed by: INTERNAL MEDICINE

## 2022-09-06 PROCEDURE — 6360000002 HC RX W HCPCS: Performed by: RADIOLOGY

## 2022-09-06 PROCEDURE — 2500000003 HC RX 250 WO HCPCS: Performed by: RADIOLOGY

## 2022-09-06 PROCEDURE — 02HV33Z INSERTION OF INFUSION DEVICE INTO SUPERIOR VENA CAVA, PERCUTANEOUS APPROACH: ICD-10-PCS | Performed by: RADIOLOGY

## 2022-09-06 PROCEDURE — 6360000002 HC RX W HCPCS

## 2022-09-06 PROCEDURE — 90935 HEMODIALYSIS ONE EVALUATION: CPT

## 2022-09-06 PROCEDURE — 2709999900 IR FLUORO GUIDED CVA DEVICE REPLACEMENT

## 2022-09-06 PROCEDURE — 93321 DOPPLER ECHO F-UP/LMTD STD: CPT

## 2022-09-06 PROCEDURE — 93325 DOPPLER ECHO COLOR FLOW MAPG: CPT

## 2022-09-06 PROCEDURE — 6360000004 HC RX CONTRAST MEDICATION: Performed by: RADIOLOGY

## 2022-09-06 PROCEDURE — 36580 REPLACE CVAD CATH: CPT

## 2022-09-06 PROCEDURE — 2500000003 HC RX 250 WO HCPCS: Performed by: INTERNAL MEDICINE

## 2022-09-06 RX ORDER — HEPARIN SODIUM 1000 [USP'U]/ML
1000 INJECTION, SOLUTION INTRAVENOUS; SUBCUTANEOUS PRN
Status: DISCONTINUED | OUTPATIENT
Start: 2022-09-06 | End: 2022-09-07 | Stop reason: HOSPADM

## 2022-09-06 RX ORDER — SODIUM CHLORIDE 0.9 % (FLUSH) 0.9 %
5-40 SYRINGE (ML) INJECTION EVERY 12 HOURS SCHEDULED
Status: CANCELLED | OUTPATIENT
Start: 2022-09-06

## 2022-09-06 RX ORDER — SODIUM CHLORIDE 0.9 % (FLUSH) 0.9 %
5-40 SYRINGE (ML) INJECTION PRN
Status: DISCONTINUED | OUTPATIENT
Start: 2022-09-06 | End: 2022-09-07 | Stop reason: HOSPADM

## 2022-09-06 RX ORDER — CARVEDILOL 3.12 MG/1
3.12 TABLET ORAL 2 TIMES DAILY WITH MEALS
Qty: 60 TABLET | Refills: 3 | Status: SHIPPED | OUTPATIENT
Start: 2022-09-06

## 2022-09-06 RX ORDER — SODIUM CHLORIDE 9 MG/ML
INJECTION, SOLUTION INTRAVENOUS PRN
Status: DISCONTINUED | OUTPATIENT
Start: 2022-09-06 | End: 2022-09-07 | Stop reason: HOSPADM

## 2022-09-06 RX ORDER — SODIUM CHLORIDE 9 MG/ML
INJECTION, SOLUTION INTRAVENOUS PRN
Status: CANCELLED | OUTPATIENT
Start: 2022-09-06

## 2022-09-06 RX ORDER — HEPARIN SODIUM 1000 [USP'U]/ML
INJECTION, SOLUTION INTRAVENOUS; SUBCUTANEOUS
Status: COMPLETED | OUTPATIENT
Start: 2022-09-06 | End: 2022-09-06

## 2022-09-06 RX ORDER — POLYETHYLENE GLYCOL 3350 17 G/17G
17 POWDER, FOR SOLUTION ORAL DAILY PRN
Qty: 527 G | Refills: 1 | Status: SHIPPED | OUTPATIENT
Start: 2022-09-06 | End: 2022-10-06

## 2022-09-06 RX ORDER — LIDOCAINE HYDROCHLORIDE 20 MG/ML
INJECTION, SOLUTION INFILTRATION; PERINEURAL
Status: COMPLETED | OUTPATIENT
Start: 2022-09-06 | End: 2022-09-06

## 2022-09-06 RX ORDER — SODIUM CHLORIDE 0.9 % (FLUSH) 0.9 %
5-40 SYRINGE (ML) INJECTION EVERY 12 HOURS SCHEDULED
Status: DISCONTINUED | OUTPATIENT
Start: 2022-09-06 | End: 2022-09-07 | Stop reason: HOSPADM

## 2022-09-06 RX ORDER — FERROUS SULFATE 325(65) MG
325 TABLET ORAL 2 TIMES DAILY WITH MEALS
Qty: 30 TABLET | Refills: 3 | Status: SHIPPED | OUTPATIENT
Start: 2022-09-06

## 2022-09-06 RX ORDER — HEPARIN SODIUM (PORCINE) LOCK FLUSH IV SOLN 100 UNIT/ML 100 UNIT/ML
6000 SOLUTION INTRAVENOUS PRN
Status: CANCELLED | OUTPATIENT
Start: 2022-09-06

## 2022-09-06 RX ORDER — HEPARIN SODIUM 5000 [USP'U]/ML
5000 INJECTION, SOLUTION INTRAVENOUS; SUBCUTANEOUS EVERY 8 HOURS SCHEDULED
COMMUNITY
Start: 2022-09-06

## 2022-09-06 RX ORDER — SODIUM CHLORIDE 0.9 % (FLUSH) 0.9 %
5-40 SYRINGE (ML) INJECTION PRN
Status: CANCELLED | OUTPATIENT
Start: 2022-09-06

## 2022-09-06 RX ORDER — MIDODRINE HYDROCHLORIDE 5 MG/1
5 TABLET ORAL
Qty: 90 TABLET | Refills: 3 | Status: SHIPPED | OUTPATIENT
Start: 2022-09-06

## 2022-09-06 RX ORDER — SODIUM CHLORIDE 9 MG/ML
INJECTION, SOLUTION INTRAVENOUS CONTINUOUS
Status: DISCONTINUED | OUTPATIENT
Start: 2022-09-06 | End: 2022-09-07 | Stop reason: HOSPADM

## 2022-09-06 RX ORDER — GENTAMICIN SULFATE 80 MG/100ML
80 INJECTION, SOLUTION INTRAVENOUS
COMMUNITY
Start: 2022-09-07

## 2022-09-06 RX ORDER — MIDODRINE HYDROCHLORIDE 5 MG/1
5 TABLET ORAL
Status: DISCONTINUED | OUTPATIENT
Start: 2022-09-06 | End: 2022-09-07 | Stop reason: HOSPADM

## 2022-09-06 RX ADMIN — Medication 10 ML: at 21:22

## 2022-09-06 RX ADMIN — AMPICILLIN SODIUM 2000 MG: 2 INJECTION, POWDER, FOR SOLUTION INTRAMUSCULAR; INTRAVENOUS at 11:44

## 2022-09-06 RX ADMIN — MIDODRINE HYDROCHLORIDE 5 MG: 5 TABLET ORAL at 10:45

## 2022-09-06 RX ADMIN — SODIUM CHLORIDE: 9 INJECTION, SOLUTION INTRAVENOUS at 13:16

## 2022-09-06 RX ADMIN — HEPARIN SODIUM 4600 UNITS: 1000 INJECTION INTRAVENOUS; SUBCUTANEOUS at 13:26

## 2022-09-06 RX ADMIN — AMPICILLIN SODIUM 2000 MG: 2 INJECTION, POWDER, FOR SOLUTION INTRAMUSCULAR; INTRAVENOUS at 00:05

## 2022-09-06 RX ADMIN — ACETAMINOPHEN 650 MG: 325 TABLET ORAL at 18:30

## 2022-09-06 RX ADMIN — SODIUM CHLORIDE: 9 INJECTION, SOLUTION INTRAVENOUS at 10:51

## 2022-09-06 RX ADMIN — IOPAMIDOL 12 ML: 755 INJECTION, SOLUTION INTRAVENOUS at 13:20

## 2022-09-06 RX ADMIN — DEXTROSE AND SODIUM CHLORIDE: 5; 900 INJECTION, SOLUTION INTRAVENOUS at 03:48

## 2022-09-06 RX ADMIN — CALCIUM ACETATE 667 MG: 667 CAPSULE ORAL at 10:47

## 2022-09-06 RX ADMIN — FERROUS SULFATE TAB 325 MG (65 MG ELEMENTAL FE) 325 MG: 325 (65 FE) TAB at 10:45

## 2022-09-06 RX ADMIN — Medication 10 ML: at 10:48

## 2022-09-06 RX ADMIN — LIDOCAINE HYDROCHLORIDE 8 ML: 20 INJECTION, SOLUTION INFILTRATION; PERINEURAL at 13:16

## 2022-09-06 RX ADMIN — CARVEDILOL 3.12 MG: 3.12 TABLET, FILM COATED ORAL at 10:45

## 2022-09-06 ASSESSMENT — ENCOUNTER SYMPTOMS
EYES NEGATIVE: 1
GASTROINTESTINAL NEGATIVE: 1
COUGH: 0
SHORTNESS OF BREATH: 0
CHEST TIGHTNESS: 0
RESPIRATORY NEGATIVE: 1
NAUSEA: 0
WHEEZING: 0
STRIDOR: 0
BLOOD IN STOOL: 0

## 2022-09-06 ASSESSMENT — PAIN DESCRIPTION - DESCRIPTORS
DESCRIPTORS: SORE
DESCRIPTORS: SORE

## 2022-09-06 ASSESSMENT — PAIN DESCRIPTION - ORIENTATION
ORIENTATION: LEFT
ORIENTATION: LEFT

## 2022-09-06 ASSESSMENT — PAIN SCALES - GENERAL
PAINLEVEL_OUTOF10: 5
PAINLEVEL_OUTOF10: 5

## 2022-09-06 ASSESSMENT — PAIN DESCRIPTION - LOCATION
LOCATION: NECK
LOCATION: NECK

## 2022-09-06 NOTE — BRIEF OP NOTE
Preliminary  Procedure Note, Full Note To Follow in PACS  Vascular and Interventional Radiology      [facility]  1970  [id number]    Date of Procedure: 09/06/22    Physician: Guilherme Maya MD, DABR    Pre-Op Diagnosis: Temporary left IJ HD catheter is too short, has been pulled into SVC. Post-Op Diagnosis: Same       Procedure: Exchange of a temporary catheter for a longer catheter. Now functioning and may be used for dialysis    Estimated Blood Loss (mL): Minimal    Complications: None    Specimens: none    Implants: Temporary left IJ HD CVC    Drains: None    Findings: Temporary left IJ HD catheter is too short, has been pulled into SVC.     Electronically signed by Ariana Ozuna MD on 9/6/2022 at 1:42 PM

## 2022-09-06 NOTE — DISCHARGE SUMMARY
Hospital Medicine Discharge Summary    Octavio Goodpasture  :  1970  MRN:  [id number]    Admit date:  2022  Discharge date:  2022    Admitting Physician:  Lucina Barba MD  Primary Care Physician:  Ashley Quiles MD      Discharge Diagnoses:      *Sepsis present on admission. *Enterococcus bacteremia. Presumptive source is a hemodialysis catheter. Right upper chest dialysis catheter was removed. Temporary catheter was placed on the left side. Antibiotic is guided by infectious disease. *Aortic valve endocarditis confirmed by ANYA associated with the perforation of the aortic valve leaflet. I discussed his case with Dr. Heavenly Shields who is recommending to transfer patient to [facility] for valvular surgery evaluation. *End-stage renal disease on hemodialysis. *Hypokalemia. Potassium supplementation. *Cardiomyopathy. Echocardiogram done in 2022 showed EF 20%. Repeat echo showed ejection fraction at 50%. No clinical evidence of acute decompensation of heart failure. *Enteritis, clinically and also seen on CT. Diarrhea resolved. Stool culture thus far is negative. *Abnormal wall thickening of the colon as seen on CT. Patient would need to have a colonoscopy. This will be arranged to be completed in the outpatient setting given his current bacteremia status. I already plugged in appointment request on the discharge document electronically so does not get missed on discharge. 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 I could not exclude other possible etiologies. No melena, no hematemesis. Iron studies consistent with anemia of chronic disease. I started patient on iron supplementation as well as group Procrit. Goal is to keep his hemoglobin above 9.5.   Patient will need to have colonoscopy as listed above given colon wall thickening as seen on CT.     *History of hypertension but his blood pressure is on the low side which is a probably secondary to bacteremia. Off BP meds     *Few other medical issues not listed above to be addressed in the outpatient setting. Hospital Course:   Joesph Salgado is a 50 y.o. male that was admitted and treated at [facility] for the aforementioned medical issues. Patient had been on antibiotic since admission. Is currently on ampicillin and gentamicin. Patient had ANYA this morning which showed large vegetation associated with the perforation of the aortic valve leaflet. Dr. Sophia Jones is recommending patient to be transferred to [facility] for valvular surgery recommendation. Otherwise patient is feeling well. He is watching TV. No chest pain or palpitation. No abdominal pain, nausea, vomiting, diarrhea, dysuria or hematuria. Patient was seen by the following consultants while admitted to [facility]:   Consults:  Jen  IP CONSULT TO INFECTIOUS DISEASES  IP CONSULT TO CARDIOLOGY  IP CONSULT TO INTERVENTIONAL RADIOLOGY    Significant Diagnostic Studies:    ECHO Complete 2D W Doppler W Color    Result Date: 9/4/2022  Transthoracic Echocardiography Report (TTE)  Demographics   Patient Name    Jovon Durbin Gender               Male   Patient Number  [id number]       Race                 Dois Axe                                  Ethnicity   Visit Number    [id number]      Room Number          W280   Corporate ID                   Date of Study        09/03/2022   Accession       [id number]     Referring Physician  Number   Date of Birth   1970     Sonographer          Quique Saldana   Age             50 year(s)     Interpreting         [facility])                                 Physician            Cardiology                                                      Odell Dahl MD  Procedure Type of Study   TTE procedure:ECHO COMPLETE 2D W/DOP W/COLOR.   Procedure Date Date: 09/03/2022 Start: 10:34 AM Study Location: Portable Indications:Bacteremia. Patient Status: Routine Height: 72 inches Weight: 202 pounds BSA: 2.14 m^2 BMI: 27.4 kg/m^2 HR: 84 bpm BP: 109/56 mmHg  Conclusions   Summary  Pacing wire noted  Normal aortic valve structure and function. 3+ AR Eccentric  Normal left ventricle structure and function. Left ventricular ejection fraction is visually estimated at 50%. E/A flow reversal noted. Suggestive of diastolic dysfunction. Signature   ----------------------------------------------------------------  Electronically signed by Ольга Fernando MD(Interpreting  physician) on 09/04/2022 08:38 AM  ----------------------------------------------------------------   Findings  Left Ventricle Normal left ventricle structure and function. Left ventricular ejection fraction is visually estimated at 50%. E/A flow reversal noted. Suggestive of diastolic dysfunction. Right Ventricle Normal right ventricle structure and function. Normal right ventricle systolic pressure. Left Atrium Normal left atrium. Right Atrium Normal right atrium. Mitral Valve Normal mitral valve structure and function. Tricuspid Valve Normal tricuspid valve structure and function. Mild TR RVSP 29 mmHg Aortic Valve Normal aortic valve structure and function. 3+ AR Eccentric Pulmonic Valve Normal pulmonic valve structure and function. Pericardial Effusion No evidence of pericardial effusion. Pleural Effusion No evidence of pleural effusion. Aorta \ Miscellaneous The aorta is within normal limits. M-Mode Measurements (cm)   LVIDd: 5.42 cm                         LVIDs: 4.03 cm  IVSd: 1.24 cm                          IVSs: 1.43 cm  LVPWd: 1.36 cm                         LVPWs: 1.63 cm  Rt. Vent.  Dimension: 2.59 cm           AO Root Dimension: 3.52 cm                                         ACS: 1.95 cm                                         LA: 3.52 cm                                         LVOT: 2.38 cm  Doppler Measurements:   AV Velocity:0.05 m/s MV Peak E-Wave: 0.53 m/s  AV Peak Gradient: 15.26 mmHg           MV Peak A-Wave: 0.77 m/s  AV Mean Gradient: 7.98 mmHg  AV Area (Continuity):4.74 cm^2         MV P1/2t: 37.2 msec  TR Velocity:2.55 m/s                   MVA by PHT5.91 cm^2  TR Gradient:25.95 mmHg  Valves  Mitral Valve   Peak E-Wave: 0.53 m/s             Peak A-Wave: 0.77 m/s  P1/2t: 37.2 msec                  E/A Ratio: 0.68  Mean Velocity: 0.74 m/s           Peak Gradient: 1.1 mmHg  Mean Gradient: 2.37 mmHg          Deceleration Time: 118.9 msec  Area (PHT): 5.91 cm^2             Area (continuity): 6.53 cm^2   Aortic Valve   Peak Velocity: 1.95 m/s                Mean Velocity: 1.34 m/s  Peak Gradient: 15.26 mmHg              Mean Gradient: 7.98 mmHg  Area (continuity): 4.74 cm^2  AV VTI: 35.12 cm  AI P1/2t: 477.3 msec  Cusp Separation: 1.95 cm   Tricuspid Valve   TR Velocity: 2.55 m/s              TR Gradient: 25.95 mmHg   LVOT   Peak Velocity: 1.87 m/s               Mean Velocity: 1.26 m/s  Peak Gradient: 13.96 mmHg             Mean Gradient: 7.32 mmHg  LVOT Diameter: 2.38 cm                LVOT VTI: 37.45 cm  Structures  Left Atrium   LA Dimension: 3.52 cm                        LA Area: 16.86 cm^2  LA/Aorta: 1  LA Volume/Index: 61.26 ml /29 m^2   Left Ventricle   Diastolic Dimension: 8.22 cm          Systolic Dimension: 1.49 cm  Septum Diastolic: 1.80 cm             Septum Systolic: 8.21 cm  PW Diastolic: 0.73 cm                 PW Systolic: 7.69 cm                                        FS: 25.7 %  LV EDV/LV EDV Index: 142.83 ml/67 m^2 LV ESV/LV ESV Index: 71.4 ml/33 m^2  EF Calculated: 50 %                                        CI: 6.54 l/min*m^2  CO: 13.99 l/min  LVOT Diameter: 2.38 cm   Right Ventricle   Diastolic Dimension: 9.39 cm  Aorta/ Miscellaneous Aorta   Aortic Root: 3.52 cm  LVOT Diameter: 2.38 cm      CT ABDOMEN PELVIS WO CONTRAST Additional Contrast? Oral    Result Date: 9/2/2022  EXAM: CT ABDOMEN PELVIS WO CONTRAST COMPARISON: January 18, 2022. HISTORY:  53-year-old male presenting with diarrhea and abdominal pain. TECHNIQUE: Nonionic contrast enhanced helical abdomen and pelvis CT was performed. Coronal and sagittal reconstructions also obtained. Automated dose exposure control was used for this exam Oral contrast was also administered. FINDINGS: Lower thorax: Limited evaluation of the lower chest demonstrates clear lung bases bilaterally. Cardiomegaly is seen. Solid organs: The liver, pancreas, and adrenal glands are unremarkable. The spleen is slightly prominent in size. Biliary system: No evidence of biliary ductal dilatation. Gallbladder appears normal. Genitourinary: The kidneys are normal in appearance bilaterally with no evidence of hydronephrosis. No stones are appreciated. The bladder is unremarkable. . Gastrointestinal: The stomach is distended, the stomach and small bowel demonstrate no evidence of focal wall thickening. Scattered stool visualized within the large bowel, scattered diverticular disease but no evidence of acute diverticulitis is seen. Fluid-filled cecum is seen. A focal area of circumferential wall thickening in the distal rectosigmoid pain and apical cord configuration is visualized on axial series 2 image 73 similar prior study. There is no evidence of bowel obstruction. Fluid Collections:None Lymph nodes: No adenopathy by size criteria. Vascular structures: Visualized vascular structures appear normal. Osseous and soft tissue structures: Degenerative bone changes seen, small solid visualized along the L3-4 intervertebral disc. Bone windows demonstrate no suspicious osteolytic or osteoblastic lesions. No fracture identified. Abundance of stool in the large bowel, diverticular disease but no evidence of acute diverticulitis.  There is a circumferential wall thickening visualized in the rectosigmoid most prominent is visualized distally in the rectosigmoid seen on axial series 2 image 73 would recommend further evaluation with colonoscopy or barium enema. Distended stomach with fluid in the small and large bowel loops would recommend clinical correlation for enteritis. IR FLUORO GUIDED CVA DEVICE PLMT/REPLACE/REMOVAL    Impression: 1. Successful placement of a temporary central venous dialysis catheter in the left internal jugular vein for dialysis. 2. Successful removal of a tunneled right internal jugular vein dialysis catheter with subcutaneous cuff. Radiation dose: 6.17 mGy Additional clinical data: Agent has a left upper extremity AV fistula for dialysis. Fistula failed during dialysis and was unable to be repaired percutaneously. Procedure: 1. Ultrasound guidance for vascular access. 2.   Fluoroscopic guidance for placement of a central line. 3.   Placement of a central venous line using the left internal jugular vein. 4. Successful removal of a permanent tunneled right internal jugular vein subcutaneous catheter Body of Report: Pre-procedure evaluation confirmed that the patient was an appropriate candidate for conscious sedation. Adequate sedation was maintained during the entire procedure. Vital signs, pulse oximetry, and response to verbal commands were monitored and recorded by the nurse throughout the procedure and the recovery period. The flow sheet was placed in the medical record including the medications and dosages used. The patient returned to baseline neurologic and physiologic status prior to leaving the department. No immediate sedation related complications were noted. Medication for conscious sedation was administered via IV route. Informed and written consent was obtained from the patient following discussion of risks, benefits and alternatives to this procedure. The was patient placed supine on the angiographic table. The patient's neck and chest were then prepped and draped in  normal sterile fashion.   A small amount of local lidocaine anesthesia was injected subcutaneously. Ultrasound was used to study the jugular vein we intended to use prior to accessing it. The vein was patent. Using ultrasound access, puncture was made of the left internal jugular vein using a 21 GA needle. A wire was advanced into the IVC, a short incision was made at the puncture site and serial dilatation performed. The catheter was then advanced over the wire. The tip of the catheter lies at the SVC/right atrial junction. The line flushes and aspirates appropriately. The catheter lines were both flushed with 10 cc of normal saline, and then blocked with 100 units/cc heparin. The catheter was sutured to the skin with nylon suture, and sterile bandaging was placed. The right chest and neck including the existing tunneled catheter were prepped and draped in sterile fashion. Lidocaine was used to anesthetize the skin tunnel site. Hemostats were used to dissect the Dacron anchor cuff from the surrounding tissue. Under  gentle traction and while holding pressure at the neck venotomy site, the catheter was easily removed. The catheter was checked for any missing components, catheter was intact. Pressure was held at the neck until hemostasis was achieved. Sterile dressing was applied. Patient tolerated the procedure well without any complications. IR NONTUNNELED VASCULAR CATHETER > 5 YEARS    Impression: 1. Successful placement of a temporary central venous dialysis catheter in the left internal jugular vein for dialysis. 2. Successful removal of a tunneled right internal jugular vein dialysis catheter with subcutaneous cuff. Radiation dose: 6.17 mGy Additional clinical data: Agent has a left upper extremity AV fistula for dialysis. Fistula failed during dialysis and was unable to be repaired percutaneously. Procedure: 1. Ultrasound guidance for vascular access. 2.   Fluoroscopic guidance for placement of a central line.  3.   Placement of a central venous line using the left internal jugular vein. 4. Successful removal of a permanent tunneled right internal jugular vein subcutaneous catheter Body of Report: Pre-procedure evaluation confirmed that the patient was an appropriate candidate for conscious sedation. Adequate sedation was maintained during the entire procedure. Vital signs, pulse oximetry, and response to verbal commands were monitored and recorded by the nurse throughout the procedure and the recovery period. The flow sheet was placed in the medical record including the medications and dosages used. The patient returned to baseline neurologic and physiologic status prior to leaving the department. No immediate sedation related complications were noted. Medication for conscious sedation was administered via IV route. Informed and written consent was obtained from the patient following discussion of risks, benefits and alternatives to this procedure. The was patient placed supine on the angiographic table. The patient's neck and chest were then prepped and draped in  normal sterile fashion. A small amount of local lidocaine anesthesia was injected subcutaneously. Ultrasound was used to study the jugular vein we intended to use prior to accessing it. The vein was patent. Using ultrasound access, puncture was made of the left internal jugular vein using a 21 GA needle. A wire was advanced into the IVC, a short incision was made at the puncture site and serial dilatation performed. The catheter was then advanced over the wire. The tip of the catheter lies at the SVC/right atrial junction. The line flushes and aspirates appropriately. The catheter lines were both flushed with 10 cc of normal saline, and then blocked with 100 units/cc heparin. The catheter was sutured to the skin with nylon suture, and sterile bandaging was placed. The right chest and neck including the existing tunneled catheter were prepped and draped in sterile fashion.  Lidocaine was used to anesthetize the skin tunnel site. Hemostats were used to dissect the Dacron anchor cuff from the surrounding tissue. Under  gentle traction and while holding pressure at the neck venotomy site, the catheter was easily removed. The catheter was checked for any missing components, catheter was intact. Pressure was held at the neck until hemostasis was achieved. Sterile dressing was applied. Patient tolerated the procedure well without any complications. IR ULTRASOUND GUIDANCE VASCULAR ACCESS    Impression: 1. Successful placement of a temporary central venous dialysis catheter in the left internal jugular vein for dialysis. 2. Successful removal of a tunneled right internal jugular vein dialysis catheter with subcutaneous cuff. Radiation dose: 6.17 mGy Additional clinical data: Agent has a left upper extremity AV fistula for dialysis. Fistula failed during dialysis and was unable to be repaired percutaneously. Procedure: 1. Ultrasound guidance for vascular access. 2.   Fluoroscopic guidance for placement of a central line. 3.   Placement of a central venous line using the left internal jugular vein. 4. Successful removal of a permanent tunneled right internal jugular vein subcutaneous catheter Body of Report: Pre-procedure evaluation confirmed that the patient was an appropriate candidate for conscious sedation. Adequate sedation was maintained during the entire procedure. Vital signs, pulse oximetry, and response to verbal commands were monitored and recorded by the nurse throughout the procedure and the recovery period. The flow sheet was placed in the medical record including the medications and dosages used. The patient returned to baseline neurologic and physiologic status prior to leaving the department. No immediate sedation related complications were noted. Medication for conscious sedation was administered via IV route.  Informed and written consent was obtained from the patient following discussion of risks, benefits and alternatives to this procedure. The was patient placed supine on the angiographic table. The patient's neck and chest were then prepped and draped in  normal sterile fashion. A small amount of local lidocaine anesthesia was injected subcutaneously. Ultrasound was used to study the jugular vein we intended to use prior to accessing it. The vein was patent. Using ultrasound access, puncture was made of the left internal jugular vein using a 21 GA needle. A wire was advanced into the IVC, a short incision was made at the puncture site and serial dilatation performed. The catheter was then advanced over the wire. The tip of the catheter lies at the SVC/right atrial junction. The line flushes and aspirates appropriately. The catheter lines were both flushed with 10 cc of normal saline, and then blocked with 100 units/cc heparin. The catheter was sutured to the skin with nylon suture, and sterile bandaging was placed. The right chest and neck including the existing tunneled catheter were prepped and draped in sterile fashion. Lidocaine was used to anesthetize the skin tunnel site. Hemostats were used to dissect the Dacron anchor cuff from the surrounding tissue. Under  gentle traction and while holding pressure at the neck venotomy site, the catheter was easily removed. The catheter was checked for any missing components, catheter was intact. Pressure was held at the neck until hemostasis was achieved. Sterile dressing was applied. Patient tolerated the procedure well without any complications. IR REMOVE TUNNELED CVAD WO SQ PORT/PUMP    Impression: 1. Successful placement of a temporary central venous dialysis catheter in the left internal jugular vein for dialysis. 2. Successful removal of a tunneled right internal jugular vein dialysis catheter with subcutaneous cuff. Radiation dose: 6.17 mGy Additional clinical data: Agent has a left upper extremity AV fistula for dialysis.  Fistula failed during dialysis and was unable to be repaired percutaneously. Procedure: 1. Ultrasound guidance for vascular access. 2.   Fluoroscopic guidance for placement of a central line. 3.   Placement of a central venous line using the left internal jugular vein. 4. Successful removal of a permanent tunneled right internal jugular vein subcutaneous catheter Body of Report: Pre-procedure evaluation confirmed that the patient was an appropriate candidate for conscious sedation. Adequate sedation was maintained during the entire procedure. Vital signs, pulse oximetry, and response to verbal commands were monitored and recorded by the nurse throughout the procedure and the recovery period. The flow sheet was placed in the medical record including the medications and dosages used. The patient returned to baseline neurologic and physiologic status prior to leaving the department. No immediate sedation related complications were noted. Medication for conscious sedation was administered via IV route. Informed and written consent was obtained from the patient following discussion of risks, benefits and alternatives to this procedure. The was patient placed supine on the angiographic table. The patient's neck and chest were then prepped and draped in  normal sterile fashion. A small amount of local lidocaine anesthesia was injected subcutaneously. Ultrasound was used to study the jugular vein we intended to use prior to accessing it. The vein was patent. Using ultrasound access, puncture was made of the left internal jugular vein using a 21 GA needle. A wire was advanced into the IVC, a short incision was made at the puncture site and serial dilatation performed. The catheter was then advanced over the wire. The tip of the catheter lies at the SVC/right atrial junction. The line flushes and aspirates appropriately.   The catheter lines were both flushed with 10 cc of normal saline, and then blocked with 100 units/cc heparin. The catheter was sutured to the skin with nylon suture, and sterile bandaging was placed. The right chest and neck including the existing tunneled catheter were prepped and draped in sterile fashion. Lidocaine was used to anesthetize the skin tunnel site. Hemostats were used to dissect the Dacron anchor cuff from the surrounding tissue. Under  gentle traction and while holding pressure at the neck venotomy site, the catheter was easily removed. The catheter was checked for any missing components, catheter was intact. Pressure was held at the neck until hemostasis was achieved. Sterile dressing was applied. Patient tolerated the procedure well without any complications. Discharge Medications:         Medication List        ASK your doctor about these medications      calcium acetate 667 MG Caps capsule  Commonly known as: PHOSLO     carvedilol 25 MG tablet  Commonly known as: COREG  take 1 tablet by mouth twice a day HOLD FOR SBP<100 OR HR <60     Corlanor 5 MG Tabs tablet  Generic drug: ivabradine  Take 1 tablet by mouth 2 times daily (with meals)     hydrALAZINE 50 MG tablet  Commonly known as: APRESOLINE     sacubitril-valsartan 24-26 MG per tablet  Commonly known as: ENTRESTO  Take 1 tablet by mouth 2 times daily     torsemide 20 MG tablet  Commonly known as: DEMADEX  take 1 tablet by mouth every morning and take 1 tablet by mouth at bedtime              [facility]:   [facility] [id number] [facility] Any [facility] needs that were indicated and/or required as been addressed and set up by Social Work. Condition at discharge: Pt was medically stable at the time of discharge. Significant improvement in clinical condition compared to initial condition at presentation to hospital    Activity: activity as tolerated, fall precautions. Total time taken for discharging this patient: 40 minutes. Greater than 70% of time was spent focused exclusively on this patient.  Time was taken to review chart, discuss plans with consultants, reconciling medications, discussing plan answering questions with patient. Acacia Olmstead MD  9/6/2022, 8:49 AM  ----------------------------------------------------------------------------------------------------------------------    Aissatou Buoy,     Please return to ER or call 911 if you develop any significant signs or symptoms. I may not have addressed all of your medical illnesses or the abnormal blood work or imaging therefore please ask your PCP Emiliana Waddell MD and other out patient specialists and providers  to obtain [facility] record entirely to follow up on all of the abnormal labs, imaging and findings that I have and have not addressed during your hospitalization. Discharging you from the hospital does not mean that your medical care ends here and now. You may still need additional work up, investigation, monitoring, and treatment to be handled from this point on by outside providers including your PCP, Emiliana Waddell MD , Specialists and other healthcare providers. Please review your list of discharge medications prior to resuming medications you might still have at home, as the medications you need to be taking, dosages or how often you must take them may have changed. For medication questions, contact your retail pharmacy and your PCP, Emiliana Waddell MD .     ** I STRONGLY RECOMMEND that you follow up with Emiliana Waddell MD within 3 to 5 days for a post hospitalization evaluation. This specific office visit is covered by your insurance, and is not the same as your annual doctor visit/ check up. This office visit is important, as it may prevent need for repeat and/or future hospitalizations. **    Your medical team at [facility] ([facility]) appreciates the opportunity to work with you to get well! Sincerely,  Destiny De La Cruz MD Follow up with Dr. Emiliana Waddell MD in the next 7 days or sooner if needed.     Please return to ER or call 911 if you develop any significant signs or symptoms. I may or may not have addressed all of your symptoms, medical issues,  Illnesses, or all of the abnormal blood work or imaging therefore please ask your PCP and other specialists to obtain [phone] [facility] record entirely to follow up on all of your symptoms, illnesses, abnormal labs, imaging and findings that I have and have not addressed during your hospitalization. Discharging you from the hospital does not mean that your medical care ends here and now. You may still need to have additional work up, investigation, monitoring, surveillance, and treatment to be handled from this point on by in the out patient setting by  out patient providers including your PCP, Specialists and other healthcare providers. For medication questions, contact your retail pharmacy and your PCP. Your medical team at [facility] ([facility]) appreciates the opportunity to work with you to get well!     Nikko Mendoza MD

## 2022-09-06 NOTE — PROGRESS NOTES
Physician Progress Note      PATIENT:               Isabel Craft  CSN #:                  [id number]  :                       1970  ADMIT DATE:       2022 9:08 AM  DISCH DATE:  Ximena Covert  PROVIDER #:        Sully Hurtado DO          QUERY TEXT:    Patient admitted with presumed dialysis catheter infection. Noted   documentation of sepsis in multiple PN. In order to support the diagnosis of   sepsis, please include additional clinical indicators in your documentation. Or please document if the diagnosis of sepsis has been ruled out after further   study    The medical record reflects the following:  Risk Factors: bloodstream infection with enterococcus, ESRD, bacteremia  Clinical Indicators: WBC 8.7/7.0/8.4/7.6, no bands, T 97.2-100.2, +blood   cultures  Treatment: NS bolus 1.5L, [email or web address], Tylenol, Garamycin, Zosyn, Vancomycin    Jabier MADRIGAL, RN, CDS  [phone]  Options provided:  -- Sepsis present as evidenced by, Please document evidence.   -- Sepsis was ruled out after study  -- Other - I will add my own diagnosis  -- Disagree - Not applicable / Not valid  -- Disagree - Clinically unable to determine / Unknown  -- Refer to Clinical Documentation Reviewer    PROVIDER RESPONSE TEXT:    Sepsis is present as evidenced by ANYA and blood cultures    Query created by: Vinayak Thurston on 2022 11:36 AM      Electronically signed by:  Sully Hurtado DO 2022 11:44 AM

## 2022-09-06 NOTE — CARE COORDINATION
AM INTERDISCIPLINARY ROUNDS COMPLETED TO DISCUSS DISCHARGE PLAN. PER NURSE, PT TO TRANSFER TO [address] [place] [address]. CONTACTED [address] [facility] [address]. SPOKE WITH JOHN RN WHO SAID SHE WILL CONTACT [facility] AND WILL CALL BACK TO PROVIDE UPDATE. CM TO FOLLOW. JOHN FROM [facility] INFORMED THAT [facility] TRANSF[facility] IS NOT SHOWING THIS PATIENT SO FAR. THIS CM UPDATED PT'S NURSE WHO WILL MESSAGE DR. CASTILLO. WILL FOLLOW. WAS INFORMED THAT PT TO TRANSFER TO [facility] MAIN [place] IN [facility].

## 2022-09-06 NOTE — PROGRESS NOTES
Nephrology Progress Note    Assessment:  ESRDX  Hypotension  OHD systolic HF      Plan:    Patient Active Problem List:     Hypertensive urgency     Cardiomyopathy ([facility]     Systolic and diastolic CHF, acute ([facility])     BLUE (acute kidney injury) ([facility]     Abnormal EKG     Chronic combined systolic and diastolic CHF (congestive heart failure) (HCC)     Acute decompensated heart failure ([facility])     Chest pain     Pulmonary edema, acute ([facility])     NSTEMI (non-ST elevated myocardial infarction) ([facility]     Inguinal hernia of right side without obstruction or gangrene     CHF (congestive heart failure), NYHA class I, acute on chronic, combined (HCC)     Systolic congestive heart failure (HCC)     Acute systolic heart failure (HCC)     Acute on chronic combined systolic (congestive) and diastolic (congestive) heart failure ([facility]     Bacteremia due to Enterococcus     Complication of vascular dialysis catheter     Central line infection, initial encounter     Septicemia ([facility]      Subjective:  Admit Date: 9/2/2022    Interval History: stable    Medications:  Scheduled Meds:   sodium chloride flush  5-40 mL IntraVENous 2 times per day    midodrine  5 mg Oral TID     ampicillin IVPB 2000 mg  2,000 mg IntraVENous Q12H    gentamicin  80 mg IntraVENous Q48H    carvedilol  3.125 mg Oral BID     heparin (porcine)  5,000 Units SubCUTAneous 3 times per day    epoetin nirali-epbx  3,000 Units SubCUTAneous Once per day on Mon Wed Fri    ferrous sulfate  325 mg Oral BID     sodium chloride flush  10 mL IntraVENous 2 times per day    calcium acetate  667 mg Oral TID      Continuous Infusions:   sodium chloride      sodium chloride         CBC:   Recent Labs     09/04/22  0620 09/05/22  0500   WBC 8.4 7.6   HGB 8.7* 7.8*    291     CMP:    Recent Labs     09/04/22  0620 09/05/22  0500    139   K 3.4 3.8   CL 99 104   CO2 25 24   BUN 22* 36*   CREATININE 6.49* 7.73*   GLUCOSE 91 92   CALCIUM 8.5 8.6   LABGLOM 9.1* 7.5* Troponin: No results for input(s): TROPONINI in the last 72 hours. BNP: No results for input(s): BNP in the last 72 hours. INR: No results for input(s): INR in the last 72 hours. Lipids: No results for input(s): CHOL, LDLDIRECT, TRIG, HDL, AMYLASE, LIPASE in the last 72 hours. Liver: No results for input(s): AST, ALT, ALKPHOS, PROT, LABALBU, BILITOT in the last 72 hours. Invalid input(s): BILDIR  Iron:  No results for input(s): IRONS, FERRITIN in the last 72 hours. Invalid input(s): LABIRONS  Urinalysis: No results for input(s): UA in the last 72 hours.     Objective:  Vitals: BP (!) 95/46   Pulse 88   Temp 97.2 °F (36.2 °C) (Oral)   Resp 18   Ht 6' (1.829 m)   Wt 202 lb (91.6 kg)   SpO2 99%   BMI 27.40 kg/m²    Wt Readings from Last 3 Encounters:   09/02/22 202 lb (91.6 kg)   08/28/22 200 lb (90.7 kg)   08/15/22 200 lb (90.7 kg)      24HR INTAKE/OUTPUT:  No intake or output data in the 24 hours ending 09/06/22 0820    General: alert, in no apparent distress  HEENT: normocephalic, atraumatic, anicteric  Neck: supple, no mass  Lungs: non-labored respirations, clear to auscultation bilaterally  Heart: regular rate and rhythm, no murmurs or rubs  Abdomen: soft, non-tender, non-distended  Ext: no cyanosis, no peripheral edema  Neuro: alert and oriented, no gross abnormalities  Psych: normal mood and affect  Skin: no rash      Electronically signed by Pedro Sarmiento DO, MD

## 2022-09-06 NOTE — DIALYSIS
Contacted Dr. Adam Batista about patients last reported K level of 3.8 form 09/05/22 at 0500.  New order to change K bath to 3 for treatment on 09/06/22

## 2022-09-06 NOTE — PLAN OF CARE
Problem: Safety - Adult  Goal: Free from fall injury  Outcome: Adequate for Discharge     Problem: ABCDS Injury Assessment  Goal: Absence of physical injury  Outcome: Adequate for Discharge     Problem: Chronic Conditions and Co-morbidities  Goal: Patient's chronic conditions and co-morbidity symptoms are monitored and maintained or improved  Outcome: Progressing

## 2022-09-06 NOTE — DISCHARGE INSTR - COC
Continuity of Care Form    Patient Name: Debby Vazquez   :  1970  MRN:  [id number]    Admit date:  2022  Discharge date:  22    Code Status Order: Full Code   Advance Directives:     Admitting Physician:  Vernon Cole MD  PCP: Heron Gotti MD    Discharging Nurse: [facility] Unit/Room#: M810/G114-36  Discharging Unit Phone Number: [phone]    Emergency Contact:   Extended Emergency Contact Information  Primary Emergency Contact: fiona hansen  Home Phone: [phone]  Relation: Other  Secondary Emergency Contact: Josias Esposito  Mobile Phone: [phone]  Relation: Brother/Sister  Preferred language: English    Past Surgical History:  Past Surgical History:   Procedure Laterality Date    CARDIAC PACEMAKER PLACEMENT      DIALYSIS CATHETER INSERTION Right 2022    15.5 F x 19 cm SYMETREX LONG TERM HEMODIALYSIS CATHETER    FISTULAGRAM Left 2022    Completed by Dr. Atilio Jin x 3 placed (See implants)    HERNIA REPAIR Right 02/10/2021    RIGHT INGUINAL HERNIA REPAIR WITH MESH performed by Miguel Ángel Mims MD at [address] [facility] VENOUS  2022    IR EMBOLIZATION VENOUS 8/3/2022 [facility] PROCEDURE    IR NONTUNNELED VASCULAR CATHETER  2022    IR NONTUNNELED VASCULAR CATHETER 2022 [facility] PROCEDURE    IR PERC ARTERIOVENOUS FISTULA CREATION  2022    IR PERC ARTERIOVENOUS FISTULA CREATION 2022 [facility] PROCEDURE    IR TUNNELED CATHETER PLACEMENT GREATER THAN 5 YEARS  2022    IR TUNNELED CATHETER PLACEMENT GREATER THAN 5 YEARS 2022 [facility] PROCEDURE    OTHER SURGICAL HISTORY Left 2022    non-tunneled dialysis catheter exchange by Dr. Atilio Montgomery - Symetrex 15.5 F x 23 cm ( Lot #  [id number] Exp: 2027 )       Immunization History:   Immunization History   Administered Date(s) Administered    COVID-19, PFIZER GRAY top, DO NOT Dilute, (age 15 y+), IM, 30 mcg/0.3 mL 2022    COVID-19, scale): Pain Level: 3  Last Weight:   Wt Readings from Last 1 Encounters:   09/06/22 217 lb 9.5 oz (98.7 kg)     Mental Status:  oriented, alert, logical, and thought processes intact    IV Access:  - Dialysis Catheter  - site  left, insertion date: 9/6/22    Nursing Mobility/ADLs:  Walking   Independent  Transfer  Independent  Bathing  Independent  Dressing  Independent  Toileting  Independent  Feeding  Independent  Med [address]  Assisted  Med Delivery   whole    Wound Care Documentation and Therapy:  Incision 02/10/21 Groin Anterior;Right (Active)   Number of days: 573        Elimination:  Continence: Bowel: Yes  Bladder: Yes  Urinary Catheter: None   Colostomy/Ileostomy/Ileal Conduit: No       Date of Last BM: 9/5/22    Intake/Output Summary (Last 24 hours) at 9/6/2022 1833  Last data filed at 9/6/2022 1425  Gross per 24 hour   Intake --   Output 300 ml   Net -300 ml     No intake/output data recorded. Safety Concerns:     None    Impairments/Disabilities:      None    Nutrition Therapy:  Current Nutrition Therapy:   - Oral Diet:  Low Fat, Low Sodium (2gm), and high fiber     Routes of Feeding: Oral  Liquids: No Restrictions  Daily Fluid Restriction: no  Last Modified Barium Swallow with Video (Video Swallowing Test): not done    Treatments at the Time of Hospital Discharge:   Respiratory Treatments: n/a  Oxygen Therapy:  is not on home oxygen therapy.   Ventilator:    - No ventilator support    Rehab Therapies: Physical Therapy and Occupational Therapy  Weight Bearing Status/Restrictions: No weight bearing restrictions  Other Medical Equipment (for information only, NOT a DME order):  portable telemetry monitoring   Other Treatments: n/a    Patient's personal belongings (please select all that are sent with patient):  Phone, clothes     RN SIGNATURE:  Electronically signed by Mary Irizarry RN on 9/6/22 at 6:36 PM EDT    CASE MANAGEMENT/SOCIAL WORK SECTION    Inpatient Status Date: ***    Readmission Risk Assessment Score:  Readmission Risk              Risk of Unplanned Readmission:  24           Discharging to Facility/ Agency   Name:   Address:  Phone:  Fax:    Dialysis Facility (if applicable)   Name:  Address:  Dialysis Schedule:  Phone:  Fax:    / signature: {Esignature:[id number]}    PHYSICIAN SECTION    Prognosis: {Prognosis:9977499803}    Condition at Discharge: Yasmin Bates Patient Condition:379395326}    Rehab Potential (if transferring to Rehab): {Prognosis:4152846275}    Recommended Labs or Other Treatments After Discharge: ***    Physician Certification: I certify the above information and transfer of Jose J Burns  is necessary for the continuing treatment of the diagnosis listed and that he requires {Admit to Appropriate Level of Care:66706} for {GREATER/LESS:937350809} 30 days.      Update Admission H&P: {CHP DME Changes in SCDXQ:186993855}    PHYSICIAN SIGNATURE:  {Esignature:[id number]}

## 2022-09-06 NOTE — PROGRESS NOTES
[facility] called back. I was able to speak and give report to Dr Beny Hamilton ( cardiologist ) She accepted pt to be admitted there for further investigation and Cradiac surgery clearance. Transfer pending bed availability.

## 2022-09-06 NOTE — PROGRESS NOTES
Progress Note  Patient: Denis Duke  Unit/Bed: V691/X354-05  YOB: 1970  MRN: [id number]  Acct: [de-identified]   Admitting Diagnosis: Bacteremia [R78.81]  Septicemia ([facility] [A41.9]  Admit Date:  9/2/2022  Hospital Day: 4    Chief Complaint:BActeremia    Histories:  Past Medical History:   Diagnosis Date    Abnormal EKG 09/27/2019    Acute kidney injury (BLUE) with acute tubular necrosis (ATN) (HCC)     AICD (automatic cardioverter/defibrillator) present     Cardiomyopathy ([facility]     per echo 8/2019    Chronic combined systolic and diastolic CHF (congestive heart failure) ([facility] 09/27/2019    Dialysis patient ([facility]     ETOH abuse     Hypertension     Tobacco abuse      Past Surgical History:   Procedure Laterality Date    CARDIAC PACEMAKER PLACEMENT      DIALYSIS CATHETER INSERTION Right 04/12/2022    15.5 F x 19 cm SYMETREX LONG TERM HEMODIALYSIS CATHETER    FISTULAGRAM Left 08/03/2022    Completed by Dr. Rod Funez Esters x 3 placed (See implants)    HERNIA REPAIR Right 02/10/2021    RIGHT INGUINAL HERNIA REPAIR WITH MESH performed by Ignacio Mendoza MD at [address]  8/3/2022    IR EMBOLIZATION VENOUS 8/3/2022 [facility] PROCED[facility]    IR NONTUNNELED VASCULAR CATHETER  9/2/2022    IR NONTUNNELED VASCULAR CATHETER 9/2/2022 [facility] PROCEDURE    IR PERC ARTERIOVENOUS FISTULA CREATION  05/16/2022    IR PERC ARTERIOVENOUS FISTULA CREATION 5/16/2022 [facility] PROCEDURE    IR TUNNELED CATHETER PLACEMENT GREATER THAN 5 YEARS  04/12/2022    IR TUNNELED CATHETER PLACEMENT GREATER THAN 5 YEARS 4/12/2022 [facility] PROCEDURE     Family History   Problem Relation Age of Onset    Coronary Art Dis Mother      Social History     Socioeconomic History    Marital status: Life Partner     Spouse name: Rain Rust   Tobacco Use    Smoking status: Former     Packs/day: 1.00     Years: 4.00     Pack years: 4.00     Types: Cigarettes     Quit date: 12/3/2020     Years since quittin.7    Smokeless tobacco: Never    Tobacco comments:     currently smoking only couple cigarettes daily   Vaping Use    Vaping Use: Never used   Substance and Sexual Activity    Alcohol use: Not Currently    Drug use: Never     Social Determinants of Health     Financial Resource Strain: Low Risk     Difficulty of Paying Living Expenses: Not hard at all   Food Insecurity: No Food Insecurity    Worried About [employer] [employer] in the Last Year: Never true    Ran Out of Food in the Last Year: Never true       Subjective/HPI   PT had ANYA today. Difficult intubation as pt was not cooperative. Scant blood noted on Probe. At the end of the case there was no further blood noted. EKG: SR         Review of Systems:   Review of Systems   Constitutional:  Positive for fatigue. Negative for diaphoresis. HENT: Negative. Eyes: Negative. Respiratory: Negative. Negative for cough, chest tightness, shortness of breath, wheezing and stridor. Cardiovascular: Negative. Negative for chest pain, palpitations and leg swelling. Gastrointestinal: Negative. Negative for blood in stool and nausea. Genitourinary: Negative. Musculoskeletal: Negative. Skin: Negative. Neurological:  Positive for weakness. Negative for dizziness, syncope and light-headedness. Hematological: Negative. Psychiatric/Behavioral: Negative. Physical Examination:    BP (!) 95/46   Pulse 88   Temp 97.2 °F (36.2 °C) (Oral)   Resp 18   Ht 6' (1.829 m)   Wt 202 lb (91.6 kg)   SpO2 99%   BMI 27.40 kg/m²    Physical Exam   Constitutional: He appears healthy. No distress. HENT:   Normal cephalic and Atraumatic   Eyes: Pupils are equal, round, and reactive to light. Neck: Thyroid normal. No JVD present. No neck adenopathy. No thyromegaly present. Cardiovascular: Normal rate, regular rhythm, intact distal pulses and normal pulses. Murmur heard. Diastolic murmur is present.   Pulmonary/Chest: Effort normal and breath sounds normal. He has no wheezes. He has no rales. He exhibits no tenderness. Abdominal: Soft. Bowel sounds are normal. There is no abdominal tenderness. Musculoskeletal:         General: No tenderness or edema. Normal range of motion. Cervical back: Normal range of motion and neck supple. Neurological: He is alert and oriented to person, place, and time. Skin: Skin is warm. No cyanosis. Nails show no clubbing.      LABS:  CBC:   Lab Results   Component Value Date/Time    WBC 7.6 09/05/2022 05:00 AM    RBC 2.92 09/05/2022 05:00 AM    HGB 7.8 09/05/2022 05:00 AM    HCT 23.5 09/05/2022 05:00 AM    MCV 80.4 09/05/2022 05:00 AM    MCH 26.7 09/05/2022 05:00 AM    MCHC 33.2 09/05/2022 05:00 AM    RDW 15.6 09/05/2022 05:00 AM     09/05/2022 05:00 AM     CBC with Differential:    Lab Results   Component Value Date/Time    WBC 7.6 09/05/2022 05:00 AM    RBC 2.92 09/05/2022 05:00 AM    HGB 7.8 09/05/2022 05:00 AM    HCT 23.5 09/05/2022 05:00 AM     09/05/2022 05:00 AM    MCV 80.4 09/05/2022 05:00 AM    MCH 26.7 09/05/2022 05:00 AM    MCHC 33.2 09/05/2022 05:00 AM    RDW 15.6 09/05/2022 05:00 AM    LYMPHOPCT 7.2 09/05/2022 05:00 AM    MONOPCT 9.4 09/05/2022 05:00 AM    BASOPCT 0.6 09/05/2022 05:00 AM    MONOSABS 0.7 09/05/2022 05:00 AM    LYMPHSABS 0.5 09/05/2022 05:00 AM    EOSABS 0.1 09/05/2022 05:00 AM    BASOSABS 0.0 09/05/2022 05:00 AM     CMP:    Lab Results   Component Value Date/Time     09/05/2022 05:00 AM    K 3.8 09/05/2022 05:00 AM    K 3.6 09/03/2022 05:28 AM     09/05/2022 05:00 AM    CO2 24 09/05/2022 05:00 AM    BUN 36 09/05/2022 05:00 AM    CREATININE 7.73 09/05/2022 05:00 AM    GFRAA 9.0 09/05/2022 05:00 AM    LABGLOM 7.5 09/05/2022 05:00 AM    GLUCOSE 92 09/05/2022 05:00 AM    PROT 6.5 09/03/2022 05:28 AM    LABALBU 2.6 09/03/2022 05:28 AM    CALCIUM 8.6 09/05/2022 05:00 AM    BILITOT 0.5 09/03/2022 05:28 AM    ALKPHOS 65 09/03/2022 05:28 AM    AST 20 09/03/2022 05:28 AM    ALT 22 09/03/2022 05:28 AM     BMP:    Lab Results   Component Value Date/Time     09/05/2022 05:00 AM    K 3.8 09/05/2022 05:00 AM    K 3.6 09/03/2022 05:28 AM     09/05/2022 05:00 AM    CO2 24 09/05/2022 05:00 AM    BUN 36 09/05/2022 05:00 AM    LABALBU 2.6 09/03/2022 05:28 AM    CREATININE 7.73 09/05/2022 05:00 AM    CALCIUM 8.6 09/05/2022 05:00 AM    GFRAA 9.0 09/05/2022 05:00 AM    LABGLOM 7.5 09/05/2022 05:00 AM    GLUCOSE 92 09/05/2022 05:00 AM     Magnesium:    Lab Results   Component Value Date/Time    MG 2.1 08/28/2022 08:45 AM     Troponin:    Lab Results   Component Value Date/Time    TROPONINI 0.049 08/28/2022 08:45 AM        Active Hospital Problems    Diagnosis Date Noted    Bacteremia due to Enterococcus [R78.81, B95.2] 09/02/2022     Priority: Medium    Complication of vascular dialysis catheter [T82. 9XXA] 09/02/2022     Priority: Medium    Central line infection, initial encounter Rupa Ochoa 09/02/2022     Priority: Medium    Septicemia ([facility] [A41.9] 09/02/2022     Priority: Medium        Assessment/Plan:  Bacteremia - on iv ABX. Persistent + Blood cultures  New Severe AR -  Both Noncoronary and Right Coronary Cusps with large vegetation. ANterior MV leaflet Perforated with moderate MR likely seeded from Aortic regurgitant Jet. I did not appreciate Vegetations on ICD leads. I discussed with Dr. Osman Curiel and Dr. Jennifer Rolle surgeon at [facility] this am.   Rec Transfer ASAP  11/4/2020 Cath ( as part of NICM work up) showed normal coronaries. HTN Stable  NICM- improved LVEF Dual chamber ICD in  place. Entresto on hold due to lOw BP.         Electronically signed by Braulio Roberts MD on 9/6/2022 at 9:02 AM

## 2022-09-06 NOTE — PROGRESS NOTES
Vascular and Interventional Radiology   Nikki Delacruz. Mu De La Rosa      Chief Complaint:   Chief Complaint   Patient presents with    Other     Positive blood cultures        Subjective: Liborio Wild is a 50 y.o. male with a recently placed left IJ temp HD CVC. CVC not functioning well. Objective:   /61   Pulse 87   Temp 97.2 °F (36.2 °C) (Oral)   Resp 18   Ht 6' (1.829 m)   Wt 202 lb (91.6 kg)   SpO2 99%   BMI 27.40 kg/m²     Physical:   alert and  not in acute distress    Normocephalic, without obvious abnormality, atraumatic    Neck supple. No adenopathy. Thyroid symmetric, normal size, and without nodularity    normal rate, normal S1 and S2, no gallops, intact distal pulses, and no carotid bruits    chest clear, no wheezing, rales, normal symmetric air entry    Left IJ temp HD CVC site is unremarkable    Lab:  Recent Labs     09/05/22  0500   WBC 7.6   RBC 2.92*   HGB 7.8*   HCT 23.5*   MCV 80.4   MCH 26.7*   MCHC 33.2   RDW 15.6*          No results for input(s): INR, PROTIME in the last 72 hours. Recent Labs     09/04/22  0620 09/05/22  0500   CREATININE 6.49* 7.73*       Assessment: Liborio Wild is a 50 y.o. male with ESKD on HD. Had bacteremia, removed right tunneled HD CVC 4 days ago and placed a temp left IJ CVC which is not functioning well with slow flow. Plan: Replace left temp HD CVC. Venogram to assess cause of malfunction. Awaiting assessment of newly created fistula. Nikki Delacruz.  Mu De La Rosa  9/6/2022,  1:44 PM

## 2022-09-06 NOTE — PROGRESS NOTES
Nephrology Progress Note    Assessment:  Hypotension presently came in Hypertensive with multiple med  Enterococcus septecmia  ESRDX  Anemia      Plan: add proamitine  but do ECHO this morning  Catheter to be replaced  needs catheter out d/t endocarditis    Patient Active Problem List:     Hypertensive urgency     Cardiomyopathy ([facility]     Systolic and diastolic CHF, acute ([facility])     BLUE (acute kidney injury) ([facility]     Abnormal EKG     Chronic combined systolic and diastolic CHF (congestive heart failure) ([facility]     Acute decompensated heart failure ([facility]     Chest pain     Pulmonary edema, acute ([facility])     NSTEMI (non-ST elevated myocardial infarction) ([facility]     Inguinal hernia of right side without obstruction or gangrene     CHF (congestive heart failure), NYHA class I, acute on chronic, combined (HCC)     Systolic congestive heart failure (HCC)     Acute systolic heart failure (HCC)     Acute on chronic combined systolic (congestive) and diastolic (congestive) heart failure ([facility]     Bacteremia due to Enterococcus     Complication of vascular dialysis catheter     Central line infection, initial encounter     Septicemia ([facility]      Subjective:  Admit Date: 9/2/2022    Interval History: no issues  catheter left side    Medications:  Scheduled Meds:   sodium chloride flush  5-40 mL IntraVENous 2 times per day    midodrine  5 mg Oral TID     ampicillin IVPB 2000 mg  2,000 mg IntraVENous Q12H    gentamicin  80 mg IntraVENous Q48H    carvedilol  3.125 mg Oral BID     heparin (porcine)  5,000 Units SubCUTAneous 3 times per day    epoetin nirali-epbx  3,000 Units SubCUTAneous Once per day on Mon Wed Fri    ferrous sulfate  325 mg Oral BID     sodium chloride flush  10 mL IntraVENous 2 times per day    calcium acetate  667 mg Oral TID      Continuous Infusions:   sodium chloride      sodium chloride         CBC:   Recent Labs     09/04/22  0620 09/05/22  0500   WBC 8.4 7.6   HGB 8.7* 7.8*    291     CMP: Recent Labs     09/04/22  0620 09/05/22  0500    139   K 3.4 3.8   CL 99 104   CO2 25 24   BUN 22* 36*   CREATININE 6.49* 7.73*   GLUCOSE 91 92   CALCIUM 8.5 8.6   LABGLOM 9.1* 7.5*     Troponin: No results for input(s): TROPONINI in the last 72 hours. BNP: No results for input(s): BNP in the last 72 hours. INR: No results for input(s): INR in the last 72 hours. Lipids: No results for input(s): CHOL, LDLDIRECT, TRIG, HDL, AMYLASE, LIPASE in the last 72 hours. Liver: No results for input(s): AST, ALT, ALKPHOS, PROT, LABALBU, BILITOT in the last 72 hours. Invalid input(s): BILDIR  Iron:  No results for input(s): IRONS, FERRITIN in the last 72 hours. Invalid input(s): LABIRONS  Urinalysis: No results for input(s): UA in the last 72 hours.     Objective:  Vitals: BP (!) 95/46   Pulse 88   Temp 97.2 °F (36.2 °C) (Oral)   Resp 18   Ht 6' (1.829 m)   Wt 202 lb (91.6 kg)   SpO2 99%   BMI 27.40 kg/m²    Wt Readings from Last 3 Encounters:   09/02/22 202 lb (91.6 kg)   08/28/22 200 lb (90.7 kg)   08/15/22 200 lb (90.7 kg)      24HR INTAKE/OUTPUT:  No intake or output data in the 24 hours ending 09/06/22 0822    General: alert, in no apparent distress  HEENT: normocephalic, atraumatic, anicteric  Neck: supple, no mass  Lungs: non-labored respirations, clear to auscultation bilaterally  Heart: regular rate and rhythm, no murmurs or rubs  Abdomen: soft, non-tender, non-distended  Ext: no cyanosis, no peripheral edema  Neuro: alert and oriented, no gross abnormalities  Psych: normal mood and affect  Skin: no rash      Electronically signed by Johnnie Marino DO, MD

## 2022-09-06 NOTE — OR NURSING
NO SEDATION    1300 Patient assisted to IR table in supine position with help of IR staff. Patient placed onto specials monitor. Vitals signs stable. [id number] Consent verified for Tunneled Hemodialysis catheter exchange insertion. Fluoro images taken and reviewed. 1310 Existing L hemodialysis site generously prepped with large tinted chloraprep and once site dry, full body drape applied using sterile technique. 200 Dr. Darshan Cox arrived, talked with patient prior to starting the procedure. Discussed the plan of care. 1316 Time out done. 46 Dr. Darshan Cox  administered 2% lidocaine to existing hemodialysis site. Heparin removed from both ports. 36 Copilot control valve attached to port of existing hemodialysis catheter. Contrast injection being performed at this time. Guidewire inserted through existing hemodialysis catheter under fluoro guidance. Catheter removed over the wire. 1321 Symetrex 15.5 F by 23 cm HD catheter ( Lot #  [id number] Exp: 04/04/2027 ). Catheter not being tunneled at this time. Placement verified with fluoro. 1325 Red port aspirate and inject appropriately per Dr. Darshan Cox. Blue port aspirates and injects appropriately per Dr. Darshan Cox. Catheter ready to use for dialysis. 1326 Heparin instilled into red and blue 2.3 ml into each port. 1327 1 ml of additional 2 % lidocaine injected to place 2-0 prolene sutures to secure the catheter to the patient's skin. 1328 Sutures placed without complications. Patient tolerated procedure well.     1331 Suture site without bleeding at this time. Pt tolerated procedure well. No ectopy noted on monitor. VSS. Catheter site dressed with Biopatch, 4x4 and Tegaderm. Pt. was given support and reassurance throughout procedure. 5 Pt assisted off table and back onto inpatient bed. Per Dialysis, not yet ready for patient. Pt to go back to inpt room at this time. Report called to [facility].

## 2022-09-06 NOTE — PROGRESS NOTES
[facility] [address] [facility]        Susceptibility    Enterococcus faecalis (2)    Antibiotic Interpretation Microscan  Method Status    ampicillin Sensitive <=2 mcg/mL BACTERIAL SUSCEPTIBILITY PANEL BY ROSEANNE     vancomycin Sensitive 1 mcg/mL BACTERIAL SUSCEPTIBILITY PANEL BY ROSEANNE       Enterococcus faecalis (3)    Antibiotic Interpretation Microscan  Method Status    ampicillin Sensitive <=2 mcg/mL BACTERIAL SUSCEPTIBILITY PANEL BY ROSEANNE     vancomycin Sensitive 1 mcg/mL BACTERIAL SUSCEPTIBILITY PANEL BY ROSEANNE                  Catheter tip showed no growth        ASSESSMENT    PLAN:  Enterococcus faecalis bacteremia  endocarditis      Repeat blood cultures positive from 9/2/2022  4 positive sets  Transesophageal echo shows your valve vegetation    ampicillin gentamicin combination   Probably needs valve replaced

## 2022-09-06 NOTE — PROGRESS NOTES
Pt. Returns to pre/post cath. Drowsy, easily aroused. Received report from Jeffrey Hernandez. Vitals remains stable. Pt. Resting without complaints or distress. 0930 No changes. Pt resting with eyes closed. VSS. Report given to Emerson SALGUERO [facility].  8723 Transferred back to room.

## 2022-09-07 NOTE — PROGRESS NOTES
2215   Pt discharged with [facility] to [facility]. Pt is in no distress or discomfort. All belongings with pt.  Electronically signed by Gracia Cheema RN on 9/6/2022 at 11:35 PM

## 2022-09-09 NOTE — PROGRESS NOTES
Physician Progress Note      PATIENT:               Iglesia Mayorga  CSN #:                  [id number]  :                       1970  ADMIT DATE:       2022 9:08 AM  [address] DATE:        2022 10:15 PM  RESPONDING  PROVIDER #:        Johnnie Ham MD          QUERY TEXT:    Patient admitted with presumed dialysis catheter infection. Noted   documentation of sepsis in multiple PN. In order to support the diagnosis of   sepsis, please include additional clinical indicators in your documentation. Or please document if the diagnosis of sepsis has been ruled out after further   study    The medical record reflects the following:  Risk Factors: bloodstream infection with enterococcus, ESRD, bacteremia  Clinical Indicators: WBC 8.7/7.0/8.4/7.6, no bands, T 97.2-100.2, +blood   cultures  Treatment: NS bolus 1.5L, [email or web address], Tylenol, Garamycin, Zosyn, Vancomycin    Anthony MADRIGAL, RN, CDS  [phone]  Options provided:  -- Sepsis present as evidenced by, Please document evidence. -- Sepsis was ruled out after study  -- Other - I will add my own diagnosis  -- Disagree - Not applicable / Not valid  -- Disagree - Clinically unable to determine / Unknown  -- Refer to Clinical Documentation Reviewer    PROVIDER RESPONSE TEXT:    Sepsis was ruled out after study.     Query created by: Maritza Ham on 2022 1:14 PM      Electronically signed by:  Johnnie Ham MD 2022 6:03 PM

## 2022-09-11 LAB — BLOOD CULTURE, ROUTINE: NORMAL

## 2022-09-23 ENCOUNTER — TELEPHONE (OUTPATIENT)
Dept: INTERVENTIONAL RADIOLOGY/VASCULAR | Age: 52
End: 2022-09-23

## 2022-09-23 NOTE — TELEPHONE ENCOUNTER
Per rojas pt needs a 2 wk f/u from [facility] procedure on 09/21/22   Left a message for pt to cb so I can let them know date and time it was made. Spoke with cristobal hanna on phone and stats report is not done yet but if any questions u can call her.

## 2022-09-29 ENCOUNTER — TELEPHONE (OUTPATIENT)
Dept: GASTROENTEROLOGY | Age: 52
End: 2022-09-29

## 2022-10-04 ASSESSMENT — ENCOUNTER SYMPTOMS
GASTROINTESTINAL NEGATIVE: 1
EYES NEGATIVE: 1
SORE THROAT: 0
WHEEZING: 0
SHORTNESS OF BREATH: 0
NAUSEA: 0
ABDOMINAL PAIN: 0
RESPIRATORY NEGATIVE: 1
COLOR CHANGE: 0
TROUBLE SWALLOWING: 0
COUGH: 0
BACK PAIN: 0
DIARRHEA: 0
VOMITING: 0

## 2022-10-04 NOTE — PROGRESS NOTES
VASCULAR MEDICINE AND INTERVENTIONAL RADIOLOGY DEPARTMENT:         Lashanda Melgoza, a male of 50 y.o. came to the office 10/6/2022. Chief Complaint   Patient presents with    Post-Op Check     2 wk f/u from [facility] on 09/21     PROGRESS NOTE:     SUBJECTIVE:     10/06/2022: Lashanda Melgoza S/P Wavelinq LUE radial artery/radial vein AV fistula creation 5/16/2022 by Dr. Ambreen Jeffrey. He has missed several follow up US maturation scan in clinic. He is here for US maturation scan. Apparently was in hospital at [facility] in September and had fistulagram to the AVF and had coil embolization of collateral vein off proximal cephalic. S/P coil embolization of medial brachial vein 8/3/2022 during fistulagram evaluation to assist with maturation. He denies any complications with LUE. Currently receiving HD via right IJ tunneled CVC placed by Dr. Ambreen Jeffrey. Denies chest pain. Denies dyspnea. No complaints today. 6/22/2022: Lashanda Melgoza is S/P 5 weeks creation of percutaneous [place] LUE AV fistula done 5/16/2022 radial artery/radial vein CZ. He missed his two week US follow up in clinic. Was here last week for 4 week US follow up to evaluate AVF maturation and B/P too low to calculate flows. [facility] and states meds were adjusted and is feeling much better. He denies any adverse symptoms to LUE. Denies chest pain. Denies dyspnea. 6/14/2022: Lashanda Melgoza is here for 4 week US follow up evaluation S/P LUE endovascular wavelinq AV fistula creation done 5/16/2022 radial artery/radial vein CZ, here for maturation US follow up. He denies any adverse symptoms to LUE. He comes in this morning feeling week and tired stating he took his blood pressure medications. This happens every morning after taking them. Denies chest pain. Denies dyspnea.      Family History   Problem Relation Age of Onset    Coronary Art Dis Mother        Past Surgical History:   Procedure Laterality Date    CARDIAC PACEMAKER PLACEMENT      DIALYSIS CATHETER INSERTION Right 2022    15.5 F x 19 cm SYMETREX LONG TERM HEMODIALYSIS CATHETER    FISTULAGRAM Left 2022    Completed by Dr. Christian Keller x 3 placed (See implants)    HERNIA REPAIR Right 02/10/2021    RIGHT INGUINAL HERNIA REPAIR WITH MESH [facility] [address]    IR EMBOLIZATION VENOUS 8/3/2022 [facility] SPECIAL PROCEDURE    IR NONTUNNELED VASCULAR CATHETER  2022    IR NONTUNNELED VASCULAR CATHETER 2022 [facility] SPECIAL PROCEDURE    IR PERC ARTERIOVENOUS FISTULA CREATION  2022    IR PERC ARTERIOVENOUS FISTULA CREATION 2022 [facility] SPECIAL PROCEDURE    IR TUNNELED CATHETER PLACEMENT GREATER THAN 5 YEARS  2022    IR TUNNELED CATHETER PLACEMENT GREATER THAN 5 YEARS 2022 [facility] SPECIAL PROCEDURE    OTHER SURGICAL HISTORY Left 2022    non-tunneled dialysis catheter exchange by Dr. Layton Ortiz - Symetrex 15.5 F x 23 cm ( Lot #  [id number] Exp: 2027 )        Past Medical History:   Diagnosis Date    Abnormal EKG 2019    Acute kidney injury (BLUE) with acute tubular necrosis (ATN) (HCC)     AICD (automatic cardioverter/defibrillator) present     Cardiomyopathy ([facility]     per echo 2019    Chronic combined systolic and diastolic CHF (congestive heart failure) ([address] 2019    Dialysis patient ([facility]     ETOH abuse     Hypertension     Tobacco abuse        Social History     Socioeconomic History    Marital status: Life Partner     Spouse name: James Schaefer   Tobacco Use    Smoking status: Former     Packs/day: 1.00     Years: 4.00     Pack years: 4.00     Types: Cigarettes     Quit date: 12/3/2020     Years since quittin.8    Smokeless tobacco: Never    Tobacco comments:     currently smoking only couple cigarettes daily   Vaping Use    Vaping Use: Never used   Substance and Sexual Activity    Alcohol use: Not Currently    Drug use: Never     Social Determinants Respiratory: Negative. Negative for cough, shortness of breath and wheezing. Cardiovascular: Negative. Negative for chest pain, palpitations and leg swelling. Gastrointestinal: Negative. Negative for abdominal pain, diarrhea, nausea and vomiting. Endocrine: Negative. Genitourinary:  Negative for difficulty urinating, dysuria and hematuria. LUE AVF   Musculoskeletal: Negative. Negative for back pain. Skin: Negative. Negative for color change, rash and wound. Neurological:  Negative for dizziness, weakness, light-headedness, numbness and headaches. Hematological: Negative. Does not bruise/bleed easily. Psychiatric/Behavioral: Negative. The patient is not nervous/anxious. All other systems reviewed and are negative. OBJECTIVE:  BP (!) 142/80   Pulse 78   Ht 6' (1.829 m)   Wt 214 lb (97.1 kg)   BMI 29.02 kg/m²    Vitals:    10/06/22 1519   BP: (!) 142/80   Pulse: 78   Weight: 214 lb (97.1 kg)   Height: 6' (1.829 m)         Physical Exam  Constitutional:       General: He is not in acute distress. Appearance: Normal appearance. He is not ill-appearing. HENT:      Head: Normocephalic. Nose: No congestion. Cardiovascular:      Rate and Rhythm: Normal rate. Heart sounds: Normal heart sounds. Arteriovenous access: Left arteriovenous access is present. Comments: LUE W[place]q AV fistula positive thrill and bruit  Pulmonary:      Effort: Pulmonary effort is normal.   Abdominal:      General: Bowel sounds are normal.      Palpations: Abdomen is soft. Musculoskeletal:         General: Normal range of motion. Cervical back: Normal range of motion. Right lower leg: No edema. Left lower leg: No edema. Skin:     General: Skin is warm and dry. Neurological:      Mental Status: He is alert and oriented to person, place, and time.    Psychiatric:         Mood and Affect: Mood normal.         Behavior: Behavior normal.       5/16/2022:  Impression 1.  Successful left arm radial artery to radial vein percutaneous fistula creation. 2.  Follow-up evaluation in 2 weeks planned to evaluate for fistula maturation and usability for dialysis. CLINICAL DATA:   End-stage kidney disease requiring dialysis       RADIATION DOSE: 53.01 mGy. PROCEDURES PERFORMED:   1. Dialysis arteriovenous fistula creation in the arm. 2. Venogram of the arm by injection of contrast   3. Arteriogram of the arm by injection of contrast   4. Ultrasound-guided venous access, ultrasound images saved to PACS   5. Ultrasound guided arterial access, ultrasound images saved to PACS   6. Creation of an arteriovenous fistula with radiofrequency ablation between radial artery and radial vein. 6/14/2022:   Impression   1. Blood pressure and cardiac output were very low today due to patient taking all blood pressure medication prior to coming to appointment. Blood flow through the cephalic vein was too slow to accurately perform the study. Brachial artery blood flow was    also low consistent with hypotension. Patient was monitored until blood pressure was higher and patient was able to leaving our department. Study will be rescheduled in the near future. Patient instructed not to take all blood pressure medication at the    same time prior to coming to the appointment and to follow up with his primary care physician regarding blood pressure control. COMPARISON:No prior studies are available for comparison. DIAGNOSIS: End-stage kidney disease       8/3/2022:   Impression   1. Successful fistulogram of left arm dialysis AV fistula from radial artery to radial vein. 2.  Successful coil embolization of the medial brachial vein to route blood flow through the cephalic vein. Evaluation in 2 weeks with ultrasound is recommended.        CLINICAL DATA:   Patient with lack of maturation of AV fistula due to competing blood flow through the medial brachial vein seen on ultrasound. ASSESSMENT AND PLAN:      Above procedures reviewed in order to prepare for US scan today. Follow up [address] done in clinic today I was personally present for and evaluated with results discussed with patient. Dr. Shy Peraza to post results. Diagnosis Orders   1. Acquired AV fistula, not surgically created ([facility]      Percutaneous Wavel[place]q      2. ESRD (end stage renal disease) on dialysis ([address]        3. S/P arteriovenous (AV) fistula creation              Plan:     No orders of the defined types were placed in this encounter. No orders of the defined types were placed in this encounter. --  After review of LUE US maturation scan, the Wavelinq AV fistula is mature and ready for initial access for HD. Will arrange for assistance with access from [facility]. Next week. Creation zone radial artery/radial vein is patent with good flow. Cephalic vein is dominant primary adequate outflow pressures. No Basilic vein outflow. Embolization coils are noted in brachial vein. HD center to be notified. --  No further follow up care required in IR. Total time spent for this encounter:  65  minutes.       Yamila Lubin, JULIO CESAR - CNP

## 2022-10-06 ENCOUNTER — OFFICE VISIT (OUTPATIENT)
Dept: INTERVENTIONAL RADIOLOGY/VASCULAR | Age: 52
End: 2022-10-06
Payer: MEDICARE

## 2022-10-06 VITALS
WEIGHT: 214 LBS | HEIGHT: 72 IN | SYSTOLIC BLOOD PRESSURE: 142 MMHG | DIASTOLIC BLOOD PRESSURE: 80 MMHG | BODY MASS INDEX: 28.99 KG/M2 | HEART RATE: 78 BPM

## 2022-10-06 DIAGNOSIS — Z98.890 S/P ARTERIOVENOUS (AV) FISTULA CREATION: ICD-10-CM

## 2022-10-06 DIAGNOSIS — I77.0 ACQUIRED AV FISTULA, NOT SURGICALLY CREATED (HCC): Primary | ICD-10-CM

## 2022-10-06 DIAGNOSIS — Z99.2 ESRD (END STAGE RENAL DISEASE) ON DIALYSIS (HCC): ICD-10-CM

## 2022-10-06 DIAGNOSIS — N18.6 ESRD (END STAGE RENAL DISEASE) ON DIALYSIS (HCC): ICD-10-CM

## 2022-10-06 PROCEDURE — 3017F COLORECTAL CA SCREEN DOC REV: CPT | Performed by: NURSE PRACTITIONER

## 2022-10-06 PROCEDURE — G8417 CALC BMI ABV UP PARAM F/U: HCPCS | Performed by: NURSE PRACTITIONER

## 2022-10-06 PROCEDURE — G8484 FLU IMMUNIZE NO ADMIN: HCPCS | Performed by: NURSE PRACTITIONER

## 2022-10-06 PROCEDURE — 99417 PROLNG OP E/M EACH 15 MIN: CPT | Performed by: NURSE PRACTITIONER

## 2022-10-06 PROCEDURE — G8427 DOCREV CUR MEDS BY ELIG CLIN: HCPCS | Performed by: NURSE PRACTITIONER

## 2022-10-06 PROCEDURE — 99215 OFFICE O/P EST HI 40 MIN: CPT | Performed by: NURSE PRACTITIONER

## 2022-10-06 PROCEDURE — 1036F TOBACCO NON-USER: CPT | Performed by: NURSE PRACTITIONER

## 2022-10-06 PROCEDURE — 1111F DSCHRG MED/CURRENT MED MERGE: CPT | Performed by: NURSE PRACTITIONER

## 2022-10-06 NOTE — TELEPHONE ENCOUNTER
The patient would like to follow up with Jessica love. The patient said he will call back if needed.  Mailed a letter

## 2022-10-12 PROBLEM — I77.0 ACQUIRED AV FISTULA, NOT SURGICALLY CREATED (HCC): Status: ACTIVE | Noted: 2022-10-12

## 2022-11-28 ENCOUNTER — TELEPHONE (OUTPATIENT)
Dept: INTERVENTIONAL RADIOLOGY/VASCULAR | Age: 52
End: 2022-11-28

## 2022-11-28 NOTE — TELEPHONE ENCOUNTER
Patient was given this information prior to leaving the office / via phone conversation - voiced understanding  2.   Spoke to Zeetl in [address]: 12/05/2022 @ 9:30AM  >  You will need to arrive at 9:00AM  and check in at the Diagnostic Imaging Check In desk.   >  Patient to hold Aspirin for 5 days prior to procedure

## 2022-12-02 ENCOUNTER — PRE-PROCEDURE TELEPHONE (OUTPATIENT)
Dept: INTERVENTIONAL RADIOLOGY/VASCULAR | Age: 52
End: 2022-12-02

## 2022-12-05 ENCOUNTER — TELEPHONE (OUTPATIENT)
Dept: INTERVENTIONAL RADIOLOGY/VASCULAR | Age: 52
End: 2022-12-05

## 2022-12-05 NOTE — TELEPHONE ENCOUNTER
Called pt to inform them that their appt for 12/06/2022 @ 2:30pm has been canceled; due to patient taking his Aspirin.  would like patient to reschedule appt.

## 2022-12-09 ENCOUNTER — TELEPHONE (OUTPATIENT)
Dept: INTERVENTIONAL RADIOLOGY/VASCULAR | Age: 52
End: 2022-12-09

## 2022-12-09 NOTE — TELEPHONE ENCOUNTER
Patient was given this information via phone conversation - voiced understanding  2.   Spoke to Mckenna/Zhanna in [address] ON: 12/12/2022 @ 9:00 am (with Dr. Brandy Morris)  >  You will need to arrive at 8:30 am from home and check in at the [address] In desk.   >  Patient to hold Aspirin for 5 days prior to procedure

## 2022-12-12 ENCOUNTER — TELEPHONE (OUTPATIENT)
Dept: INTERVENTIONAL RADIOLOGY/VASCULAR | Age: 52
End: 2022-12-12

## 2022-12-12 NOTE — TELEPHONE ENCOUNTER
NP at [facility] was given this information via phone conversation - voiced understanding  2.   Spoke to [employer] in [address]: 12/20/2022 @ 1:00 pm  >  You will need to arrive at 12:30 pm from home and check in at the Diagnostic Imaging Check In desk.   >  Patient to hold Aspirin for 5 days prior to procedure

## 2022-12-20 ENCOUNTER — HOSPITAL ENCOUNTER (OUTPATIENT)
Dept: INTERVENTIONAL RADIOLOGY/VASCULAR | Age: 52
Discharge: HOME | End: 2022-12-22
Payer: MEDICARE

## 2022-12-20 VITALS — DIASTOLIC BLOOD PRESSURE: 96 MMHG | SYSTOLIC BLOOD PRESSURE: 127 MMHG | OXYGEN SATURATION: 98 % | HEART RATE: 107 BPM

## 2022-12-20 PROCEDURE — 2709999900 IR REMOVE TUNNELED CVAD WO SQ PORT/PUMP

## 2022-12-20 PROCEDURE — 36589 REMOVAL TUNNELED CV CATH: CPT

## 2022-12-20 PROCEDURE — 2500000003 HC RX 250 WO HCPCS: Performed by: RADIOLOGY

## 2022-12-20 RX ORDER — LIDOCAINE HYDROCHLORIDE 20 MG/ML
INJECTION, SOLUTION INFILTRATION; PERINEURAL
Status: COMPLETED | OUTPATIENT
Start: 2022-12-20 | End: 2022-12-20

## 2022-12-20 RX ADMIN — LIDOCAINE HYDROCHLORIDE 5 ML: 20 INJECTION, SOLUTION INFILTRATION; PERINEURAL at 13:00

## 2022-12-20 NOTE — OR NURSING
NO SEDATION    1229 Pt assisted onto specials table. Procedure explained. Consent obtained. History, allergies, medications reviewed. 1235 R chest permcath site dressing removed site assessed, cleansed generously with chloroprep and draped in sterile fashion. RN noted that previously placed left chest tunneled catheter by Dr. Toni Brink no longer present. LDA discontinued. Pt now has a right chest permcath, labeled Glidepath 23 cm, he states he does not remember where it was placed. 1300 Dr. Leticia Rubio here speaking with pt. Time out performed. R chest site numbed with 2% lidocaine around catheter site. 1313 With assist of  hemostats, Dr. Leticia Rubio removed tunneled hemodialysis catheter 23 cm. Catheter is intact. 1323 Moderate manual pressure held above clavicle site. Pt tolerating well. Hemostasis achieved at site. Steri strips applied, with a guaze and Tegaderm dressing. No bleeding noted. [phone] Pt assisted off table. Discharge instructions reviewed with patient. Pt walked to exit. Pt would like to go to cafeteria for lunch and then pt using [id number] to return home.

## 2022-12-20 NOTE — DISCHARGE INSTRUCTIONS
Tunneled Dialysis Catheter Removal Discharge Instructions    Activity:  Rest, avoid strenuous activity such as bending or lifting heavy objects. Diet:  Resume usual diet    Medication:  * Resume home medication unless otherwise instructed by your physician. * (If Applicable) If taking any blood thinners or Aspirin, speak with your physician before restarting them. * May take mild pain reliever, as needed, such as Acetaminophen or Ibuprofen. INSTRUCTIONS OR COMMENTS RELATIVE TO SPECIFIC EXAM PERFORMED:    - May remove Tegaderm and gauze dressing after 24 hours. - Do not remove steri-strips, they will fall off over the next 1--2 weeks. - May shower after 24 hours. Pat the site with dry cloth to dry, do not rub. - Do not lift anything over 10 pounds for the next 24 - 48 hours. - Monitor the site for the next 24 - 48 hours. - For large amount of bleeding or drainage, Go to ER for evaluation.  - If any signs of infection (redness, swelling, drainage/pus) at the site, Go to ER for evaluation. IF YOU DEVELOP ANY OF THE FOLLOWING SYMPTOMS, CONTACT PHYSICIAN LISTED BELOW:  Physician performing procedure: DR. Villatoro [facility] - [phone]. Ask to be put in contact with Radiology Nurse  After hours contact the Emergency Department. * Extreme pain that increases after leaving the hospital  * Active bleeding (refer to above instructions)  * Chills, fever above 100 degrees Fahrenheit and fatigue. * Weakness, dizziness, lightheadedness or fainting. If you are unable to contact any of the above physician and you feel you have a major problem, please go to the Emergency Room for treatment.

## 2022-12-20 NOTE — FLOWSHEET NOTE
Spoke with Liborio Chowdhury from [facility] in [facility]. Confirmed the tunneled HD catheter is ok to remove, left arm WavelinQ fistula has been working well without any issues. Verbal order noted from Dr Scott Lei in media for removal of tunneled from 11/7/22.     Dr Villatoro [facility]) performing radiologist updated, imaging order given for removal.

## 2023-01-26 ENCOUNTER — OFFICE VISIT (OUTPATIENT)
Dept: CARDIOLOGY CLINIC | Age: 53
End: 2023-01-26
Payer: MEDICARE

## 2023-01-26 VITALS
DIASTOLIC BLOOD PRESSURE: 82 MMHG | HEART RATE: 94 BPM | BODY MASS INDEX: 30.33 KG/M2 | SYSTOLIC BLOOD PRESSURE: 102 MMHG | WEIGHT: 223.6 LBS

## 2023-01-26 DIAGNOSIS — I50.22 CHRONIC SYSTOLIC CHF (CONGESTIVE HEART FAILURE), NYHA CLASS 1 (HCC): Primary | ICD-10-CM

## 2023-01-26 PROCEDURE — 93000 ELECTROCARDIOGRAM COMPLETE: CPT | Performed by: INTERNAL MEDICINE

## 2023-01-26 PROCEDURE — 99214 OFFICE O/P EST MOD 30 MIN: CPT | Performed by: INTERNAL MEDICINE

## 2023-01-26 PROCEDURE — 3074F SYST BP LT 130 MM HG: CPT | Performed by: INTERNAL MEDICINE

## 2023-01-26 PROCEDURE — 3017F COLORECTAL CA SCREEN DOC REV: CPT | Performed by: INTERNAL MEDICINE

## 2023-01-26 PROCEDURE — G8417 CALC BMI ABV UP PARAM F/U: HCPCS | Performed by: INTERNAL MEDICINE

## 2023-01-26 PROCEDURE — 1036F TOBACCO NON-USER: CPT | Performed by: INTERNAL MEDICINE

## 2023-01-26 PROCEDURE — G8427 DOCREV CUR MEDS BY ELIG CLIN: HCPCS | Performed by: INTERNAL MEDICINE

## 2023-01-26 PROCEDURE — G8484 FLU IMMUNIZE NO ADMIN: HCPCS | Performed by: INTERNAL MEDICINE

## 2023-01-26 PROCEDURE — 3079F DIAST BP 80-89 MM HG: CPT | Performed by: INTERNAL MEDICINE

## 2023-01-26 RX ORDER — APIXABAN 5 MG/1
TABLET, FILM COATED ORAL
COMMUNITY
Start: 2023-01-10

## 2023-01-26 RX ORDER — OMEPRAZOLE 20 MG/1
CAPSULE, DELAYED RELEASE ORAL
COMMUNITY
Start: 2022-11-09

## 2023-01-26 RX ORDER — B,C/FOLIC/ZINC/SELENOMETH/D3/E 0.8-12.5MG
TABLET ORAL
COMMUNITY
Start: 2022-12-13

## 2023-01-26 RX ORDER — SEVELAMER CARBONATE 800 MG/1
TABLET, FILM COATED ORAL
COMMUNITY
Start: 2022-12-02

## 2023-01-26 RX ORDER — METOPROLOL SUCCINATE 25 MG/1
TABLET, EXTENDED RELEASE ORAL
COMMUNITY
Start: 2022-09-23

## 2023-01-26 RX ORDER — VALSARTAN 40 MG/1
TABLET ORAL
COMMUNITY
Start: 2022-09-23

## 2023-01-26 NOTE — PROGRESS NOTES
Chief Complaint   Patient presents with    New Patient    Follow-Up from [facility]       9-27-19: Patient presents for initial medical evaluation. Patient is followed on a regular basis by Dr. Syed Vickers MD. S/p recent hospitlzation for SOB, fatigue, CP, cough. Noted to have AD systolic HF with EF of 90%. HTN urgency and BLUE as well. Was drinking ETOH and smoking 2ppd. No other drugs. No previous cardiac hx. No hx of MI, CHF or arrhythmia. No hx of LHC or stress test. Marisel Barrientos hx of DM or HLP. EKG is abnormal. Renal function as of 9-23-19 is   Stage IV, Cr of 3.38. Pt denies chest pain, dyspnea, dyspnea on exertion, change in exercise capacity, fatigue,  nausea, vomiting, diarrhea, constipation, motor weakness, insomnia, weight loss, syncope, dizziness, lightheadedness, palpitations, PND, orthopnea, or claudication at this time. BP and HR are ok. Compliant with meds. renal artery duplex US is negative   VQ scan with very small matched defects. 10-18-19: s/p normal nuclear stress test. EF of 50%. He would like to go back to work. Pt denies chest pain, dyspnea, dyspnea on exertion, change in exercise capacity, fatigue,  nausea, vomiting, diarrhea, constipation, motor weakness, insomnia, weight loss, syncope, dizziness, lightheadedness, palpitations, PND, orthopnea, or claudication. No nitro use. BP and hr are good. . No LE discoloration or ulcers. No LE edema. No CHF type symptoms. Lipid profile is normal. No recent hospitalization. No change in meds. Compliant with meds. Has CKD stage IV, Cr 2.79, GFR 25. No ETOH.     1-26-23: Status post hospitalization [facility] in January 2023. He was admitted for atrial fibrillation/atrial flutter. He was resumed on his home metoprolol. He was started on anticoagulation with Eliquis.   Patient with history of end-stage renal disease on hemodialysis, history of endocarditis status post aortic valve replacement, mitral valve replacement AICD explant nonischemic cardiomyopathy that recovered ejection fraction of 50% by recent echo hypertension hyperlipidemia.   Status post nuclear stress test in 2019 with ejection fraction of 50%  Last cardiac catheterization in 2020 with normal coronaries    Patient Active Problem List   Diagnosis    Hypertensive urgency    Cardiomyopathy ([facility] [address]    Systolic and diastolic CHF, acute (HCC)    BLUE (acute kidney injury) ([facility] [address]    Abnormal EKG    Chronic combined systolic and diastolic CHF (congestive heart failure) (HCC)    Acute decompensated heart failure ([facility])    Chest pain    Pulmonary edema, acute ([facility])    NSTEMI (non-ST elevated myocardial infarction) ([facility]    Inguinal hernia of right side without obstruction or gangrene    CHF (congestive heart failure), NYHA class I, acute on chronic, combined (HCC)    Systolic congestive heart failure (HCC)    Acute systolic heart failure (HCC)    Acute on chronic combined systolic (congestive) and diastolic (congestive) heart failure ([facility]    Bacteremia due to Enterococcus    Complication of vascular dialysis catheter    Central line infection, initial encounter    Septicemia ([facility]    Acute bacterial endocarditis    Acquired AV fistula, not surgically created [facility])       Past Surgical History:   Procedure Laterality Date    CARDIAC PACEMAKER PLACEMENT      DIALYSIS CATHETER INSERTION Right 04/12/2022    15.5 F x 19 cm SYMETREX LONG TERM HEMODIALYSIS CATHETER    FISTULAGRAM Left 08/03/2022    Completed by Dr. Kathryn Urena x 3 placed (See implants)    HERNIA REPAIR Right 02/10/2021    RIGHT INGUINAL HERNIA REPAIR WITH MESH performed by Delfina Gaviria MD at [id number] [facility]  08/03/2022    IR EMBOLIZATION VENOUS 8/3/2022 [facility]    IR NONTUNNELED VASCULAR CATHETER  09/02/2022    IR NONTUNNELED VASCULAR CATHETER 9/2/2022 [facility] PROCED[facility]    IR PERC ARTERIOVENOUS FISTULA CREATION  05/16/2022    IR PERC ARTERIOVENOUS FISTULA CREATION 5/16/2022 [facility] PROCEDURE    IR TUNNELED CATHETER PLACEMENT GREATER THAN 5 YEARS  2022    IR TUNNELED CATHETER PLACEMENT GREATER THAN 5 YEARS 2022 [facility] PROCEDURE    OTHER SURGICAL HISTORY Left 2022    non-tunneled dialysis catheter exchange by Dr. Almodovar [facility] - [employer] 15.5 F x 23 cm ( Lot #  [id number] Exp: 2027 )       Social History     Socioeconomic History    Marital status: Life Partner     Spouse name: Abdiel Alfaro   Tobacco Use    Smoking status: Former     Packs/day: 1.00     Years: 4.00     Pack years: 4.00     Types: Cigarettes     Quit date: 12/3/2020     Years since quittin.1    Smokeless tobacco: Never    Tobacco comments:     currently smoking only couple cigarettes daily   Vaping Use    Vaping Use: Never used   Substance and Sexual Activity    Alcohol use: Not Currently    Drug use: Never     Social Determinants of Health     Financial Resource Strain: Low Risk     Difficulty of Paying Living Expenses: Not hard at all   Food Insecurity: No Food Insecurity    Worried About Running Out of Food in the Last Year: Never true    Ran Out of Food in the Last Year: Never true       Family History   Problem Relation Age of Onset    Coronary Art Dis Mother        Current Outpatient Medications   Medication Sig Dispense Refill    ASPIRIN 81 PO Take 1 tablet by mouth daily      acetaminophen (OFIRMEV) 10 MG/ML injection Take 650 mg by mouth      ELIQUIS 5 MG TABS tablet       metoprolol succinate (TOPROL XL) 25 MG extended release tablet Take by mouth      metoprolol tartrate (LOPRESSOR) 25 MG tablet Take 1 mg by mouth      Multiple Vitamins-Minerals (RENAPLEX-D) TABS TAKE 1 TABLET BY MOUTH EVERY DAY      omeprazole (PRILOSEC) 20 MG delayed release capsule take 1 capsule by mouth once daily      sevelamer (RENVELA) 800 MG tablet TAKE 1 TABLET BY MOUTH THREE TIMES A DAY WITH MEALS      valsartan (DIOVAN) 40 MG tablet Take by mouth      epoetin nirali-epbx (RETACRIT) 3000 UNIT/ML SOLN injection Inject 1 mL into the skin three times a week 21.9 mL     ferrous sulfate (IRON 325) 325 (65 Fe) MG tablet Take 1 tablet by mouth 2 times daily (with meals) 30 tablet 3    midodrine (PROAMATINE) 5 MG tablet Take 1 tablet by mouth 3 times daily (with meals) 90 tablet 3    ampicillin (OMNIPEN) infusion Infuse 2,000 mg intravenously in the morning and 2,000 mg in the evening. Compound per protocol. Iqra Santiago 0    calcium acetate (PHOSLO) 667 MG CAPS capsule TAKE 1 CAPSULE BY MOUTH THREE TIMES DAILY WITH MEALS      Doxercalciferol (HECTOROL IV) 2 mcg      gentamicin (GARAMYCIN) 0.8-0.9 MG/ML-% Infuse 100 mLs intravenously every 48 hours      heparin, porcine, 5000 UNIT/ML injection Inject 1 mL into the skin every 8 hours (Patient not taking: No sig reported)       No current facility-administered medications for this visit. Lipitor [atorvastatin]    Review of Systems:  General ROS: negative  Psychological ROS: negative  Hematological and Lymphatic ROS: No history of blood clots or bleeding disorder. Respiratory ROS: no cough, shortness of breath, or wheezing  Cardiovascular ROS: no chest pain or dyspnea on exertion  Gastrointestinal ROS: no abdominal pain, change in bowel habits, or black or bloody stools  Genito-Urinary ROS: no dysuria, trouble voiding, or hematuria  Musculoskeletal ROS: negative  Neurological ROS: no TIA or stroke symptoms  Dermatological ROS: negative    VITALS:  Blood pressure 102/82, pulse 94, weight 223 lb 9.6 oz (101.4 kg). Body mass index is 30.33 kg/m². Physical Examination:  General appearance - alert, well appearing, and in no distress  Mental status - alert, oriented to person, place, and time  Neck - Neck is supple, no JVD or carotid bruits. No thyromegaly or adenopathy.    Chest - clear to auscultation, no wheezes, rales or rhonchi, symmetric air entry  Heart - normal rate, regular rhythm, normal S1, S2, no murmurs, rubs, clicks or gallops  Abdomen - soft, nontender, nondistended, no masses or organomegaly  Neurological - alert, oriented, normal speech, no focal findings or movement disorder noted  Extremities - peripheral pulses normal, no pedal edema, no clubbing or cyanosis  Skin - normal coloration and turgor, no rashes, no suspicious skin lesions noted      EKG: normal sinus rhythm, nonspecific ST and T waves changes, Q waves in inferior and lateral leads. Orders Placed This Encounter   Procedures    LONGTERM CONTINUOUS CARDIAC EVENT MONITOR (ZIO)    EKG 12 lead       ASSESSMENT:     Diagnosis Orders   1. Hypertensive urgency     2. Systolic and diastolic CHF, acute (HCC)  NM MYOCARDIAL SPECT REST EXERCISE OR RX   3. Cardiomyopathy, unspecified type ([facility] [address]  NM MYOCARDIAL SPECT REST EXERCISE OR RX   4. Abnormal EKG  NM MYOCARDIAL SPECT REST EXERCISE OR RX   5. Chronic combined systolic and diastolic CHF (congestive heart failure) ([facility] - ? Related to HTN and ETOH. PLAN:     Patient will need to continue to follow up with you for their general medical care     As always, aggressive risk factor modification is strongly recommended. We should adhere to the JNC VIII guidelines for HTN management and the NCEP ATP III guidelines for LDL-C management. Cardiac diet is always recommended with low fat, cholesterol, calories and sodium. Continue medications at current doses. Recheck echo in future. Placed on 1 week event monitor. If he has persistent atrial flutter/atrial fibrillation then we will arrange for cardioversion. Continue with oral anticoagulation given history of atrial flutter    Follow up with DR. Ellsworth/nephro. Patient was advised and encouraged to check blood pressure at home or at a pharmacy, maintain a logbook, and also call us back if blood pressure are above the target ranges or if it is low. Patient clearly understands and agrees to the instructions.      We will need to continue to monitor muscle and liver enzymes, BUN, CR, and electrolytes. The importance of daily weights, dietary sodium restriction, and contact with cardiology if weight is increased more than 3 lbs in any 48 hour period was stressed. The patient has been advised to contact us if they experience progressive SOB, orthopnea, PND or progressive edema. No nephrotoxic agents. Smoking cessation was strongly recommended    No ETOH.

## 2023-03-10 DIAGNOSIS — I50.22 CHRONIC SYSTOLIC CHF (CONGESTIVE HEART FAILURE), NYHA CLASS 1 (HCC): ICD-10-CM

## 2023-06-19 DIAGNOSIS — N18.6 ESRD (END STAGE RENAL DISEASE) ON DIALYSIS (HCC): Primary | ICD-10-CM

## 2023-06-19 DIAGNOSIS — Z98.890 S/P ARTERIOVENOUS (AV) FISTULA CREATION: ICD-10-CM

## 2023-06-19 DIAGNOSIS — I77.0 ACQUIRED AV FISTULA, NOT SURGICALLY CREATED (HCC): Primary | ICD-10-CM

## 2023-06-19 DIAGNOSIS — T82.898A INADEQUATE FLOW OF DIALYSIS ARTERIOVENOUS FISTULA, INITIAL ENCOUNTER (HCC): ICD-10-CM

## 2023-06-19 DIAGNOSIS — Z99.2 ESRD (END STAGE RENAL DISEASE) ON DIALYSIS (HCC): ICD-10-CM

## 2023-06-19 DIAGNOSIS — N18.6 ESRD (END STAGE RENAL DISEASE) ON DIALYSIS (HCC): ICD-10-CM

## 2023-06-19 DIAGNOSIS — Z99.2 ESRD (END STAGE RENAL DISEASE) ON DIALYSIS (HCC): Primary | ICD-10-CM

## 2023-07-27 ENCOUNTER — OFFICE VISIT (OUTPATIENT)
Dept: CARDIOLOGY CLINIC | Age: 53
End: 2023-07-27
Payer: MEDICARE

## 2023-07-27 VITALS
BODY MASS INDEX: 29.45 KG/M2 | HEART RATE: 107 BPM | WEIGHT: 222.2 LBS | HEIGHT: 73 IN | SYSTOLIC BLOOD PRESSURE: 82 MMHG | DIASTOLIC BLOOD PRESSURE: 62 MMHG | OXYGEN SATURATION: 95 %

## 2023-07-27 DIAGNOSIS — Z91.199 HISTORY OF NONCOMPLIANCE WITH MEDICAL TREATMENT: ICD-10-CM

## 2023-07-27 DIAGNOSIS — I42.8 NONISCHEMIC CARDIOMYOPATHY (HCC): ICD-10-CM

## 2023-07-27 DIAGNOSIS — F10.11 HISTORY OF ALCOHOL ABUSE: ICD-10-CM

## 2023-07-27 DIAGNOSIS — Z95.810 AICD (AUTOMATIC CARDIOVERTER/DEFIBRILLATOR) PRESENT: ICD-10-CM

## 2023-07-27 DIAGNOSIS — I10 HYPERTENSION, UNSPECIFIED TYPE: ICD-10-CM

## 2023-07-27 DIAGNOSIS — I50.22 CHRONIC SYSTOLIC CHF (CONGESTIVE HEART FAILURE), NYHA CLASS 1 (HCC): Primary | ICD-10-CM

## 2023-07-27 DIAGNOSIS — I38 VALVULAR HEART DISEASE: ICD-10-CM

## 2023-07-27 PROCEDURE — 99214 OFFICE O/P EST MOD 30 MIN: CPT | Performed by: INTERNAL MEDICINE

## 2023-07-27 PROCEDURE — 3078F DIAST BP <80 MM HG: CPT | Performed by: INTERNAL MEDICINE

## 2023-07-27 PROCEDURE — G8417 CALC BMI ABV UP PARAM F/U: HCPCS | Performed by: INTERNAL MEDICINE

## 2023-07-27 PROCEDURE — 1036F TOBACCO NON-USER: CPT | Performed by: INTERNAL MEDICINE

## 2023-07-27 PROCEDURE — 3017F COLORECTAL CA SCREEN DOC REV: CPT | Performed by: INTERNAL MEDICINE

## 2023-07-27 PROCEDURE — 3074F SYST BP LT 130 MM HG: CPT | Performed by: INTERNAL MEDICINE

## 2023-07-27 PROCEDURE — 93000 ELECTROCARDIOGRAM COMPLETE: CPT | Performed by: INTERNAL MEDICINE

## 2023-07-27 PROCEDURE — G8427 DOCREV CUR MEDS BY ELIG CLIN: HCPCS | Performed by: INTERNAL MEDICINE

## 2023-07-27 RX ORDER — ROSUVASTATIN CALCIUM 20 MG/1
20 TABLET, COATED ORAL NIGHTLY
COMMUNITY
Start: 2023-02-23

## 2023-07-27 RX ORDER — LOSARTAN POTASSIUM 25 MG/1
25 TABLET ORAL DAILY
Qty: 30 TABLET | Refills: 11 | COMMUNITY
Start: 2023-02-23 | End: 2024-02-23

## 2023-07-27 NOTE — PROGRESS NOTES
Chief Complaint   Patient presents with    6 Month Follow-Up       9-27-19: Patient presents for initial medical evaluation. Patient is followed on a regular basis by Dr. Edward William MD. S/p recent hospitlzation for SOB, fatigue, CP, cough. Noted to have AD systolic HF with EF of 28%. HTN urgency and BLUE as well. Was drinking ETOH and smoking 2ppd. No other drugs. No previous cardiac hx. No hx of MI, CHF or arrhythmia. No hx of LHC or stress test. Andrew Jimenez hx of DM or HLP. EKG is abnormal. Renal function as of 9-23-19 is   Stage IV, Cr of 3.38. Pt denies chest pain, dyspnea, dyspnea on exertion, change in exercise capacity, fatigue,  nausea, vomiting, diarrhea, constipation, motor weakness, insomnia, weight loss, syncope, dizziness, lightheadedness, palpitations, PND, orthopnea, or claudication at this time. BP and HR are ok. Compliant with meds. renal artery duplex US is negative   VQ scan with very small matched defects. 10-18-19: s/p normal nuclear stress test. EF of 50%. He would like to go back to work. Pt denies chest pain, dyspnea, dyspnea on exertion, change in exercise capacity, fatigue,  nausea, vomiting, diarrhea, constipation, motor weakness, insomnia, weight loss, syncope, dizziness, lightheadedness, palpitations, PND, orthopnea, or claudication. No nitro use. BP and hr are good. . No LE discoloration or ulcers. No LE edema. No CHF type symptoms. Lipid profile is normal. No recent hospitalization. No change in meds. Compliant with meds. Has CKD stage IV, Cr 2.79, GFR 25. No ETOH.     1-26-23: Status post hospitalization [facility] in January 2023. He was admitted for atrial fibrillation/atrial flutter. He was resumed on his home metoprolol. He was started on anticoagulation with Eliquis.   Patient with history of end-stage renal disease on hemodialysis, history of endocarditis status post aortic valve replacement, mitral valve replacement AICD explant nonischemic cardiomyopathy that recovered

## 2024-08-07 ENCOUNTER — APPOINTMENT (OUTPATIENT)
Dept: RADIOLOGY | Facility: HOSPITAL | Age: 54
End: 2024-08-07
Payer: MEDICAID

## 2024-08-07 ENCOUNTER — APPOINTMENT (OUTPATIENT)
Dept: CARDIOLOGY | Facility: HOSPITAL | Age: 54
End: 2024-08-07
Payer: MEDICAID

## 2024-08-07 ENCOUNTER — HOSPITAL ENCOUNTER (EMERGENCY)
Facility: HOSPITAL | Age: 54
Discharge: HOME | End: 2024-08-07
Attending: STUDENT IN AN ORGANIZED HEALTH CARE EDUCATION/TRAINING PROGRAM
Payer: MEDICAID

## 2024-08-07 VITALS
RESPIRATION RATE: 18 BRPM | OXYGEN SATURATION: 97 % | BODY MASS INDEX: 29.26 KG/M2 | HEART RATE: 96 BPM | TEMPERATURE: 97.3 F | DIASTOLIC BLOOD PRESSURE: 101 MMHG | SYSTOLIC BLOOD PRESSURE: 146 MMHG | WEIGHT: 216 LBS | HEIGHT: 72 IN

## 2024-08-07 DIAGNOSIS — R07.9 CHEST PAIN, UNSPECIFIED TYPE: Primary | ICD-10-CM

## 2024-08-07 LAB
ALBUMIN SERPL BCP-MCNC: 4.1 G/DL (ref 3.4–5)
ALP SERPL-CCNC: 65 U/L (ref 33–120)
ALT SERPL W P-5'-P-CCNC: 24 U/L (ref 10–52)
ANION GAP SERPL CALC-SCNC: 13 MMOL/L (ref 10–20)
APTT PPP: 50 SECONDS (ref 27–38)
AST SERPL W P-5'-P-CCNC: 16 U/L (ref 9–39)
BASOPHILS # BLD AUTO: 0.04 X10*3/UL (ref 0–0.1)
BASOPHILS NFR BLD AUTO: 0.8 %
BILIRUB SERPL-MCNC: 1 MG/DL (ref 0–1.2)
BNP SERPL-MCNC: 442 PG/ML (ref 0–99)
BUN SERPL-MCNC: 34 MG/DL (ref 6–23)
CALCIUM SERPL-MCNC: 9.4 MG/DL (ref 8.6–10.3)
CARDIAC TROPONIN I PNL SERPL HS: 18 NG/L (ref 0–20)
CARDIAC TROPONIN I PNL SERPL HS: 22 NG/L (ref 0–20)
CHLORIDE SERPL-SCNC: 98 MMOL/L (ref 98–107)
CO2 SERPL-SCNC: 32 MMOL/L (ref 21–32)
CREAT SERPL-MCNC: 6.04 MG/DL (ref 0.5–1.3)
EGFRCR SERPLBLD CKD-EPI 2021: 10 ML/MIN/1.73M*2
EOSINOPHIL # BLD AUTO: 0.39 X10*3/UL (ref 0–0.7)
EOSINOPHIL NFR BLD AUTO: 7.8 %
ERYTHROCYTE [DISTWIDTH] IN BLOOD BY AUTOMATED COUNT: 12.9 % (ref 11.5–14.5)
GLUCOSE SERPL-MCNC: 73 MG/DL (ref 74–99)
HCT VFR BLD AUTO: 32 % (ref 41–52)
HGB BLD-MCNC: 10.8 G/DL (ref 13.5–17.5)
IMM GRANULOCYTES # BLD AUTO: 0.01 X10*3/UL (ref 0–0.7)
IMM GRANULOCYTES NFR BLD AUTO: 0.2 % (ref 0–0.9)
INR PPP: 1.3 (ref 0.9–1.1)
LYMPHOCYTES # BLD AUTO: 0.69 X10*3/UL (ref 1.2–4.8)
LYMPHOCYTES NFR BLD AUTO: 13.7 %
MAGNESIUM SERPL-MCNC: 2.18 MG/DL (ref 1.6–2.4)
MCH RBC QN AUTO: 28.6 PG (ref 26–34)
MCHC RBC AUTO-ENTMCNC: 33.8 G/DL (ref 32–36)
MCV RBC AUTO: 85 FL (ref 80–100)
MONOCYTES # BLD AUTO: 1.27 X10*3/UL (ref 0.1–1)
MONOCYTES NFR BLD AUTO: 25.3 %
NEUTROPHILS # BLD AUTO: 2.62 X10*3/UL (ref 1.2–7.7)
NEUTROPHILS NFR BLD AUTO: 52.2 %
NRBC BLD-RTO: 0 /100 WBCS (ref 0–0)
PHOSPHATE SERPL-MCNC: 4.1 MG/DL (ref 2.5–4.9)
PLATELET # BLD AUTO: 269 X10*3/UL (ref 150–450)
POTASSIUM SERPL-SCNC: 3.6 MMOL/L (ref 3.5–5.3)
PROT SERPL-MCNC: 6.8 G/DL (ref 6.4–8.2)
PROTHROMBIN TIME: 14.3 SECONDS (ref 9.8–12.8)
RBC # BLD AUTO: 3.77 X10*6/UL (ref 4.5–5.9)
SODIUM SERPL-SCNC: 139 MMOL/L (ref 136–145)
WBC # BLD AUTO: 5 X10*3/UL (ref 4.4–11.3)

## 2024-08-07 PROCEDURE — 71045 X-RAY EXAM CHEST 1 VIEW: CPT | Performed by: RADIOLOGY

## 2024-08-07 PROCEDURE — 84075 ASSAY ALKALINE PHOSPHATASE: CPT | Performed by: STUDENT IN AN ORGANIZED HEALTH CARE EDUCATION/TRAINING PROGRAM

## 2024-08-07 PROCEDURE — 36415 COLL VENOUS BLD VENIPUNCTURE: CPT | Performed by: STUDENT IN AN ORGANIZED HEALTH CARE EDUCATION/TRAINING PROGRAM

## 2024-08-07 PROCEDURE — 84484 ASSAY OF TROPONIN QUANT: CPT | Performed by: STUDENT IN AN ORGANIZED HEALTH CARE EDUCATION/TRAINING PROGRAM

## 2024-08-07 PROCEDURE — 85610 PROTHROMBIN TIME: CPT | Performed by: STUDENT IN AN ORGANIZED HEALTH CARE EDUCATION/TRAINING PROGRAM

## 2024-08-07 PROCEDURE — 83880 ASSAY OF NATRIURETIC PEPTIDE: CPT | Performed by: STUDENT IN AN ORGANIZED HEALTH CARE EDUCATION/TRAINING PROGRAM

## 2024-08-07 PROCEDURE — 85025 COMPLETE CBC W/AUTO DIFF WBC: CPT | Performed by: STUDENT IN AN ORGANIZED HEALTH CARE EDUCATION/TRAINING PROGRAM

## 2024-08-07 PROCEDURE — 85730 THROMBOPLASTIN TIME PARTIAL: CPT | Performed by: STUDENT IN AN ORGANIZED HEALTH CARE EDUCATION/TRAINING PROGRAM

## 2024-08-07 PROCEDURE — 93005 ELECTROCARDIOGRAM TRACING: CPT

## 2024-08-07 PROCEDURE — 2500000001 HC RX 250 WO HCPCS SELF ADMINISTERED DRUGS (ALT 637 FOR MEDICARE OP): Performed by: STUDENT IN AN ORGANIZED HEALTH CARE EDUCATION/TRAINING PROGRAM

## 2024-08-07 PROCEDURE — 99284 EMERGENCY DEPT VISIT MOD MDM: CPT

## 2024-08-07 PROCEDURE — 84100 ASSAY OF PHOSPHORUS: CPT | Performed by: STUDENT IN AN ORGANIZED HEALTH CARE EDUCATION/TRAINING PROGRAM

## 2024-08-07 PROCEDURE — 83735 ASSAY OF MAGNESIUM: CPT | Performed by: STUDENT IN AN ORGANIZED HEALTH CARE EDUCATION/TRAINING PROGRAM

## 2024-08-07 PROCEDURE — 71045 X-RAY EXAM CHEST 1 VIEW: CPT

## 2024-08-07 RX ORDER — LOSARTAN POTASSIUM 25 MG/1
25 TABLET ORAL ONCE
Status: COMPLETED | OUTPATIENT
Start: 2024-08-07 | End: 2024-08-07

## 2024-08-07 RX ORDER — METOPROLOL TARTRATE 25 MG/1
25 TABLET, FILM COATED ORAL ONCE
Status: COMPLETED | OUTPATIENT
Start: 2024-08-07 | End: 2024-08-07

## 2024-08-07 ASSESSMENT — COLUMBIA-SUICIDE SEVERITY RATING SCALE - C-SSRS
2. HAVE YOU ACTUALLY HAD ANY THOUGHTS OF KILLING YOURSELF?: NO
1. IN THE PAST MONTH, HAVE YOU WISHED YOU WERE DEAD OR WISHED YOU COULD GO TO SLEEP AND NOT WAKE UP?: NO
6. HAVE YOU EVER DONE ANYTHING, STARTED TO DO ANYTHING, OR PREPARED TO DO ANYTHING TO END YOUR LIFE?: NO

## 2024-08-07 ASSESSMENT — PAIN SCALES - GENERAL
PAINLEVEL_OUTOF10: 0 - NO PAIN
PAINLEVEL_OUTOF10: 0 - NO PAIN

## 2024-08-07 ASSESSMENT — LIFESTYLE VARIABLES
EVER HAD A DRINK FIRST THING IN THE MORNING TO STEADY YOUR NERVES TO GET RID OF A HANGOVER: NO
EVER FELT BAD OR GUILTY ABOUT YOUR DRINKING: NO
HAVE YOU EVER FELT YOU SHOULD CUT DOWN ON YOUR DRINKING: NO
TOTAL SCORE: 0
HAVE PEOPLE ANNOYED YOU BY CRITICIZING YOUR DRINKING: NO

## 2024-08-07 ASSESSMENT — PAIN DESCRIPTION - DESCRIPTORS: DESCRIPTORS: OTHER (COMMENT)

## 2024-08-07 ASSESSMENT — PAIN - FUNCTIONAL ASSESSMENT: PAIN_FUNCTIONAL_ASSESSMENT: 0-10

## 2024-08-07 NOTE — ED PROVIDER NOTES
HPI   No chief complaint on file.      Pt is a 53 y/o M w/ a PMHx of pAfib, CAD, Nonischemic cardiomyopathy with previous EF of 20% now normal on Last stress in 2023, s/p MVR and AVR 2/2 endocarditis, ESRD who p/w chest pain that started at dialysis around 3pm. Pt states that it last about 1 hour and he received 2 hrs of iHD today but the rest of the session was cut short. Pain resolved with sublingual nitroglycerin and EMS gave at least 162mgof ASA on way to hospital. Pt states that he has not had CP at all over the last few months. Denies nausea, diaphoresis, nausea, vomiting, abdominal pain, LE numbness or paralysis, no fevers, no headaches. Pt states currently without chest pain and feels well. Pt endorses last stress was about 1 year ago which was normal. Pt does endorse running out of metoprolol and valsartan for at least 5 days and missed cards appt with Dr. Castro on 7/25 and denies h/o DVT/PE, recent travel, and recent surgery and no shortness of breath.               Patient History   No past medical history on file.  No past surgical history on file.  No family history on file.  Social History     Tobacco Use    Smoking status: Not on file    Smokeless tobacco: Not on file   Substance Use Topics    Alcohol use: Not on file    Drug use: Not on file       Physical Exam   ED Triage Vitals   Temp Pulse Resp BP   -- -- -- --      SpO2 Temp src Heart Rate Source Patient Position   -- -- -- --      BP Location FiO2 (%)     -- --       Physical Exam  Constitutional:       General: He is not in acute distress.     Appearance: Normal appearance. He is not ill-appearing.   HENT:      Mouth/Throat:      Mouth: Mucous membranes are moist.   Eyes:      General: No scleral icterus.     Extraocular Movements: Extraocular movements intact.      Pupils: Pupils are equal, round, and reactive to light.   Cardiovascular:      Rate and Rhythm: Regular rhythm. Tachycardia present.      Pulses: Normal pulses.      Heart sounds:  No murmur heard.     No friction rub.      Comments: Left AVF iHD cannulation needles on place in left upper arm  Pulmonary:      Effort: No respiratory distress.      Breath sounds: Rales present. No wheezing.      Comments: Rales at bases  Abdominal:      General: Abdomen is flat. Bowel sounds are normal. There is no distension.      Palpations: Abdomen is soft. There is no mass.      Tenderness: There is no abdominal tenderness.   Musculoskeletal:      Right lower leg: No edema.      Left lower leg: No edema.   Skin:     General: Skin is dry.      Capillary Refill: Capillary refill takes less than 2 seconds.      Findings: No rash.   Neurological:      General: No focal deficit present.      Mental Status: He is alert and oriented to person, place, and time.      Cranial Nerves: No cranial nerve deficit.      Sensory: No sensory deficit.      Motor: No weakness.   Psychiatric:         Mood and Affect: Mood normal.         Behavior: Behavior normal.           ED Course & MDM   ED Course as of 08/08/24 0310   Wed Aug 07, 2024   1615 EKG Examined and Independently Interpreted by me:  Sinus tachycardia of 107 bpm  No STEs or TWIs that or new  No new ST Depressions  No Wellen's, Brugada, or AV blocks      Charlie Menchaca MD [MARCIN]   1815 Spoke to Dr. Castro of cardiology and from his perspective is ok for discharge with close follow-up with him in the next few days. Tachycardia likely due to volume removal and withdrawal tachycardia as patient has not taken his metoprolol in a few days. Low suspicion for PE given already on eliquis currently no pain and no shortness of breath or other risk factors. Will give metoprolol PO and repeat vitals and if improved will discharge home with Cardiology follow-up.  [MARCIN]      ED Course User Index  [MARCIN] Charlie Menchaca MD         Diagnoses as of 08/08/24 0310   Chest pain, unspecified type                 No data recorded                                 Medical  Decision Making  Pt is a 55 y/o M w/ a PMHx of pAfib, CAD, Nonischemic cardiomyopathy with prevous EF of 20% now normal on Lasdt stress in 2023, s/p MVR and AVR 2/2 endocarditis, ESRD who p/w chest pain concerning for possible ACS with a HEART score of 4-5 and high risk. Low suspicion for dissection, Ptx, and PE (Wells Score 1.5 for tachycardia) given his history and physical exam.  Will get:  - CBC, CMP, Mag, Phos, Coags, Trop x 2, BNP  - Already received ASA  - Will get CXR  - Cardiac monitoring  - Will discuss case with cards as patient likely will need admission given his high heart score and risk for MACE        Procedure  Procedures     Charlie Menchaca MD  08/07/24 1615       Charlie Menchaca MD  08/07/24 1621       Charlie Menchaca MD  08/07/24 1636       Charlie Menchaca MD  08/07/24 1637       Charlie Menchaca MD  08/08/24 0310

## 2024-08-08 NOTE — DISCHARGE INSTRUCTIONS
You were seen at [facility] ER for chest pain at dialysis today. You had normal lab work twice, EKG, and chest xray. The doctor spoke to your cardiologist who recommended prompt follow-up with Dr. Castro.     Please continue taking your medications as prescribed (The doctor resent your metoprolol to your pharmacy to refill your current medication until you see your cardiologist).     Please call your Cardiologist office in the morning to schedule a close follow-up in the next few days for discussion of further testing.     You should return to the ER for persistent and worsening chest pain, especially if it radiates to the jaw or back and is associated with shortness of breath and increase or new lower extremity swelling, or for any other concerns.     Below is reference to the American Heart Association for additional information on chest pain symptoms.   https://www.heart.org/en/health-topics/house-calls/what-does-chest-pain-mean#:~:text=The%20most%20important%20thing%20to,dizziness%2C%20or%20shortness%20of%20breath.

## 2024-08-09 LAB
ATRIAL RATE: 107 BPM
P AXIS: 44 DEGREES
P OFFSET: 157 MS
P ONSET: 118 MS
PR INTERVAL: 188 MS
Q ONSET: 212 MS
QRS COUNT: 18 BEATS
QRS DURATION: 92 MS
QT INTERVAL: 356 MS
QTC CALCULATION(BAZETT): 475 MS
QTC FREDERICIA: 431 MS
R AXIS: -32 DEGREES
T AXIS: 82 DEGREES
T OFFSET: 390 MS
VENTRICULAR RATE: 107 BPM

## 2024-11-14 ENCOUNTER — APPOINTMENT (OUTPATIENT)
Dept: GENERAL RADIOLOGY | Age: 54
DRG: 391 | End: 2024-11-14
Payer: MEDICARE

## 2024-11-14 ENCOUNTER — HOSPITAL ENCOUNTER (INPATIENT)
Age: 54
LOS: 1 days | Discharge: HOME | DRG: 391 | End: 2024-11-16
Attending: EMERGENCY MEDICINE | Admitting: INTERNAL MEDICINE
Payer: MEDICARE

## 2024-11-14 DIAGNOSIS — I42.9 CARDIOMYOPATHY, UNSPECIFIED TYPE (HCC): ICD-10-CM

## 2024-11-14 DIAGNOSIS — R07.9 CHEST PAIN, UNSPECIFIED TYPE: Primary | ICD-10-CM

## 2024-11-14 LAB
ALBUMIN SERPL-MCNC: 4.2 G/DL (ref 3.5–4.6)
ALBUMIN SERPL-MCNC: 4.3 G/DL (ref 3.5–4.6)
ALP SERPL-CCNC: 85 U/L (ref 35–104)
ALP SERPL-CCNC: 86 U/L (ref 35–104)
ALT SERPL-CCNC: 24 U/L (ref 0–41)
ALT SERPL-CCNC: 25 U/L (ref 0–41)
ANION GAP SERPL CALCULATED.3IONS-SCNC: 12 MEQ/L (ref 9–15)
ANION GAP SERPL CALCULATED.3IONS-SCNC: 18 MEQ/L (ref 9–15)
AST SERPL-CCNC: 17 U/L (ref 0–40)
AST SERPL-CCNC: 21 U/L (ref 0–40)
BASE EXCESS ARTERIAL: 3 (ref -3–3)
BASOPHILS # BLD: 0 K/UL (ref 0–0.2)
BASOPHILS # BLD: 0 K/UL (ref 0–0.2)
BASOPHILS NFR BLD: 0.1 %
BASOPHILS NFR BLD: 0.2 %
BILIRUB SERPL-MCNC: 0.5 MG/DL (ref 0.2–0.7)
BILIRUB SERPL-MCNC: 1.1 MG/DL (ref 0.2–0.7)
BUN SERPL-MCNC: 24 MG/DL (ref 6–20)
BUN SERPL-MCNC: 31 MG/DL (ref 6–20)
CALCIUM IONIZED: 1.34 MMOL/L (ref 1.12–1.32)
CALCIUM SERPL-MCNC: 10.3 MG/DL (ref 8.5–9.9)
CALCIUM SERPL-MCNC: 9.7 MG/DL (ref 8.5–9.9)
CHLORIDE SERPL-SCNC: 98 MEQ/L (ref 95–107)
CHLORIDE SERPL-SCNC: 98 MEQ/L (ref 95–107)
CO2 SERPL-SCNC: 25 MEQ/L (ref 20–31)
CO2 SERPL-SCNC: 31 MEQ/L (ref 20–31)
CREAT SERPL-MCNC: 6.3 MG/DL (ref 0.7–1.2)
CREAT SERPL-MCNC: 7.51 MG/DL (ref 0.7–1.2)
EKG ATRIAL RATE: 102 BPM
EKG ATRIAL RATE: 102 BPM
EKG P AXIS: 35 DEGREES
EKG P AXIS: 70 DEGREES
EKG P-R INTERVAL: 176 MS
EKG P-R INTERVAL: 192 MS
EKG Q-T INTERVAL: 366 MS
EKG Q-T INTERVAL: 380 MS
EKG QRS DURATION: 78 MS
EKG QRS DURATION: 92 MS
EKG QTC CALCULATION (BAZETT): 477 MS
EKG QTC CALCULATION (BAZETT): 495 MS
EKG R AXIS: -28 DEGREES
EKG R AXIS: -53 DEGREES
EKG T AXIS: 78 DEGREES
EKG T AXIS: 81 DEGREES
EKG VENTRICULAR RATE: 102 BPM
EKG VENTRICULAR RATE: 102 BPM
EOSINOPHIL # BLD: 0.2 K/UL (ref 0–0.7)
EOSINOPHIL # BLD: 0.5 K/UL (ref 0–0.7)
EOSINOPHIL NFR BLD: 1.2 %
EOSINOPHIL NFR BLD: 3.2 %
ERYTHROCYTE [DISTWIDTH] IN BLOOD BY AUTOMATED COUNT: 14.9 % (ref 11.5–14.5)
ERYTHROCYTE [DISTWIDTH] IN BLOOD BY AUTOMATED COUNT: 15.2 % (ref 11.5–14.5)
GLOBULIN SER CALC-MCNC: 3.5 G/DL (ref 2.3–3.5)
GLOBULIN SER CALC-MCNC: 3.6 G/DL (ref 2.3–3.5)
GLUCOSE BLD-MCNC: 106 MG/DL (ref 70–99)
GLUCOSE BLD-MCNC: 111 MG/DL (ref 70–99)
GLUCOSE BLD-MCNC: 146 MG/DL (ref 70–99)
GLUCOSE BLD-MCNC: 344 MG/DL (ref 70–99)
GLUCOSE BLD-MCNC: 94 MG/DL (ref 70–99)
GLUCOSE SERPL-MCNC: 112 MG/DL (ref 70–99)
GLUCOSE SERPL-MCNC: 121 MG/DL (ref 70–99)
HCO3 ARTERIAL: 27 MMOL/L (ref 21–29)
HCT VFR BLD AUTO: 37 % (ref 42–52)
HCT VFR BLD AUTO: 37.1 % (ref 42–52)
HCT VFR BLD AUTO: 44 % (ref 41–53)
HGB BLD CALC-MCNC: 14.9 GM/DL (ref 13.5–17.5)
HGB BLD-MCNC: 11.8 G/DL (ref 14–18)
HGB BLD-MCNC: 12.2 G/DL (ref 14–18)
LACTATE BLDV-SCNC: 1.9 MMOL/L (ref 0.5–2.2)
LACTATE BLDV-SCNC: 3.9 MMOL/L (ref 0.5–2.2)
LACTATE: 5.16 MMOL/L (ref 0.4–2)
LACTIC ACID, SEPSIS: 1.6 MMOL/L (ref 0.5–1.9)
LYMPHOCYTES # BLD: 0.2 K/UL (ref 1–4.8)
LYMPHOCYTES # BLD: 0.7 K/UL (ref 1–4.8)
LYMPHOCYTES NFR BLD: 1.3 %
LYMPHOCYTES NFR BLD: 4.3 %
MAGNESIUM SERPL-MCNC: 2.1 MG/DL (ref 1.7–2.4)
MCH RBC QN AUTO: 27.1 PG (ref 27–31.3)
MCH RBC QN AUTO: 27.7 PG (ref 27–31.3)
MCHC RBC AUTO-ENTMCNC: 31.8 % (ref 33–37)
MCHC RBC AUTO-ENTMCNC: 33 % (ref 33–37)
MCV RBC AUTO: 82.2 FL (ref 79–92.2)
MCV RBC AUTO: 87.1 FL (ref 79–92.2)
MONOCYTES # BLD: 0.5 K/UL (ref 0.2–0.8)
MONOCYTES # BLD: 1.1 K/UL (ref 0.2–0.8)
MONOCYTES NFR BLD: 4.2 %
MONOCYTES NFR BLD: 7.1 %
NEUTROPHILS # BLD: 11.7 K/UL (ref 1.4–6.5)
NEUTROPHILS # BLD: 13.7 K/UL (ref 1.4–6.5)
NEUTS SEG NFR BLD: 84.9 %
NEUTS SEG NFR BLD: 92.8 %
O2 SAT, ARTERIAL: 96 % (ref 93–100)
PCO2 ARTERIAL: 37 MM HG (ref 35–45)
PERFORMED ON: ABNORMAL
PERFORMED ON: NORMAL
PH ARTERIAL: 7.47 (ref 7.35–7.45)
PLATELET # BLD AUTO: 250 K/UL (ref 130–400)
PLATELET # BLD AUTO: 274 K/UL (ref 130–400)
PO2 ARTERIAL: 77 MM HG (ref 75–108)
POC CHLORIDE: 104 MEQ/L (ref 99–110)
POC CREATININE: 5.8 MG/DL (ref 0.8–1.3)
POC SAMPLE TYPE: ABNORMAL
POTASSIUM SERPL-SCNC: 3.5 MEQ/L (ref 3.5–5.1)
POTASSIUM SERPL-SCNC: 3.9 MEQ/L (ref 3.4–4.9)
POTASSIUM SERPL-SCNC: 4.2 MEQ/L (ref 3.4–4.9)
PROT SERPL-MCNC: 7.8 G/DL (ref 6.3–8)
PROT SERPL-MCNC: 7.8 G/DL (ref 6.3–8)
RBC # BLD AUTO: 4.26 M/UL (ref 4.7–6.1)
RBC # BLD AUTO: 4.5 M/UL (ref 4.7–6.1)
SODIUM BLD-SCNC: 143 MEQ/L (ref 136–145)
SODIUM SERPL-SCNC: 141 MEQ/L (ref 135–144)
SODIUM SERPL-SCNC: 141 MEQ/L (ref 135–144)
TCO2 ARTERIAL: 28 MMOL/L (ref 21–32)
TROPONIN, HIGH SENSITIVITY: 52 NG/L (ref 0–19)
TROPONIN, HIGH SENSITIVITY: 53 NG/L (ref 0–19)
TROPONIN, HIGH SENSITIVITY: 54 NG/L (ref 0–19)
TROPONIN, HIGH SENSITIVITY: 55 NG/L (ref 0–19)
WBC # BLD AUTO: 12.6 K/UL (ref 4.8–10.8)
WBC # BLD AUTO: 16.2 K/UL (ref 4.8–10.8)

## 2024-11-14 PROCEDURE — 93005 ELECTROCARDIOGRAM TRACING: CPT | Performed by: EMERGENCY MEDICINE

## 2024-11-14 PROCEDURE — G0378 HOSPITAL OBSERVATION PER HR: HCPCS

## 2024-11-14 PROCEDURE — 84484 ASSAY OF TROPONIN QUANT: CPT

## 2024-11-14 PROCEDURE — 80053 COMPREHEN METABOLIC PANEL: CPT

## 2024-11-14 PROCEDURE — 36600 WITHDRAWAL OF ARTERIAL BLOOD: CPT

## 2024-11-14 PROCEDURE — 71045 X-RAY EXAM CHEST 1 VIEW: CPT

## 2024-11-14 PROCEDURE — 2580000003 HC RX 258: Performed by: INTERNAL MEDICINE

## 2024-11-14 PROCEDURE — 6370000000 HC RX 637 (ALT 250 FOR IP): Performed by: INTERNAL MEDICINE

## 2024-11-14 PROCEDURE — 93005 ELECTROCARDIOGRAM TRACING: CPT | Performed by: INTERNAL MEDICINE

## 2024-11-14 PROCEDURE — 99285 EMERGENCY DEPT VISIT HI MDM: CPT

## 2024-11-14 PROCEDURE — 84295 ASSAY OF SERUM SODIUM: CPT

## 2024-11-14 PROCEDURE — 84132 ASSAY OF SERUM POTASSIUM: CPT

## 2024-11-14 PROCEDURE — 96375 TX/PRO/DX INJ NEW DRUG ADDON: CPT

## 2024-11-14 PROCEDURE — 2500000003 HC RX 250 WO HCPCS: Performed by: EMERGENCY MEDICINE

## 2024-11-14 PROCEDURE — 36415 COLL VENOUS BLD VENIPUNCTURE: CPT

## 2024-11-14 PROCEDURE — 82803 BLOOD GASES ANY COMBINATION: CPT

## 2024-11-14 PROCEDURE — 6360000002 HC RX W HCPCS: Performed by: EMERGENCY MEDICINE

## 2024-11-14 PROCEDURE — 96361 HYDRATE IV INFUSION ADD-ON: CPT

## 2024-11-14 PROCEDURE — 85014 HEMATOCRIT: CPT

## 2024-11-14 PROCEDURE — 82565 ASSAY OF CREATININE: CPT

## 2024-11-14 PROCEDURE — 96374 THER/PROPH/DIAG INJ IV PUSH: CPT

## 2024-11-14 PROCEDURE — 99223 1ST HOSP IP/OBS HIGH 75: CPT | Performed by: INTERNAL MEDICINE

## 2024-11-14 PROCEDURE — 82330 ASSAY OF CALCIUM: CPT

## 2024-11-14 PROCEDURE — 83735 ASSAY OF MAGNESIUM: CPT

## 2024-11-14 PROCEDURE — 82435 ASSAY OF BLOOD CHLORIDE: CPT

## 2024-11-14 PROCEDURE — 83605 ASSAY OF LACTIC ACID: CPT

## 2024-11-14 PROCEDURE — 87040 BLOOD CULTURE FOR BACTERIA: CPT

## 2024-11-14 PROCEDURE — 2580000003 HC RX 258: Performed by: STUDENT IN AN ORGANIZED HEALTH CARE EDUCATION/TRAINING PROGRAM

## 2024-11-14 PROCEDURE — 85025 COMPLETE CBC W/AUTO DIFF WBC: CPT

## 2024-11-14 RX ORDER — 0.9 % SODIUM CHLORIDE 0.9 %
500 INTRAVENOUS SOLUTION INTRAVENOUS ONCE
Status: DISCONTINUED | OUTPATIENT
Start: 2024-11-14 | End: 2024-11-14

## 2024-11-14 RX ORDER — ACETAMINOPHEN 325 MG/1
650 TABLET ORAL EVERY 6 HOURS PRN
Status: DISCONTINUED | OUTPATIENT
Start: 2024-11-14 | End: 2024-11-16 | Stop reason: HOSPADM

## 2024-11-14 RX ORDER — ACETAMINOPHEN 650 MG/1
650 SUPPOSITORY RECTAL EVERY 6 HOURS PRN
Status: DISCONTINUED | OUTPATIENT
Start: 2024-11-14 | End: 2024-11-16 | Stop reason: HOSPADM

## 2024-11-14 RX ORDER — 0.9 % SODIUM CHLORIDE 0.9 %
250 INTRAVENOUS SOLUTION INTRAVENOUS ONCE
Status: COMPLETED | OUTPATIENT
Start: 2024-11-14 | End: 2024-11-14

## 2024-11-14 RX ORDER — INSULIN LISPRO 100 [IU]/ML
0-8 INJECTION, SOLUTION INTRAVENOUS; SUBCUTANEOUS
Status: DISCONTINUED | OUTPATIENT
Start: 2024-11-14 | End: 2024-11-16 | Stop reason: HOSPADM

## 2024-11-14 RX ORDER — ONDANSETRON 4 MG/1
4 TABLET, ORALLY DISINTEGRATING ORAL EVERY 8 HOURS PRN
Status: DISCONTINUED | OUTPATIENT
Start: 2024-11-14 | End: 2024-11-16 | Stop reason: HOSPADM

## 2024-11-14 RX ORDER — SEVELAMER CARBONATE 800 MG/1
800 TABLET, FILM COATED ORAL
Status: DISCONTINUED | OUTPATIENT
Start: 2024-11-14 | End: 2024-11-16 | Stop reason: HOSPADM

## 2024-11-14 RX ORDER — ONDANSETRON 2 MG/ML
4 INJECTION INTRAMUSCULAR; INTRAVENOUS ONCE
Status: COMPLETED | OUTPATIENT
Start: 2024-11-14 | End: 2024-11-14

## 2024-11-14 RX ORDER — SODIUM CHLORIDE 0.9 % (FLUSH) 0.9 %
5-40 SYRINGE (ML) INJECTION EVERY 12 HOURS SCHEDULED
Status: DISCONTINUED | OUTPATIENT
Start: 2024-11-14 | End: 2024-11-16 | Stop reason: HOSPADM

## 2024-11-14 RX ORDER — MIDODRINE HYDROCHLORIDE 5 MG/1
5 TABLET ORAL
Status: DISCONTINUED | OUTPATIENT
Start: 2024-11-14 | End: 2024-11-15

## 2024-11-14 RX ORDER — ASPIRIN 81 MG/1
81 TABLET ORAL DAILY
Status: DISCONTINUED | OUTPATIENT
Start: 2024-11-14 | End: 2024-11-16 | Stop reason: HOSPADM

## 2024-11-14 RX ORDER — 0.9 % SODIUM CHLORIDE 0.9 %
500 INTRAVENOUS SOLUTION INTRAVENOUS ONCE
Status: COMPLETED | OUTPATIENT
Start: 2024-11-14 | End: 2024-11-14

## 2024-11-14 RX ORDER — ONDANSETRON 2 MG/ML
4 INJECTION INTRAMUSCULAR; INTRAVENOUS EVERY 6 HOURS PRN
Status: DISCONTINUED | OUTPATIENT
Start: 2024-11-14 | End: 2024-11-16 | Stop reason: HOSPADM

## 2024-11-14 RX ORDER — MAGNESIUM SULFATE IN WATER 40 MG/ML
2000 INJECTION, SOLUTION INTRAVENOUS PRN
Status: DISCONTINUED | OUTPATIENT
Start: 2024-11-14 | End: 2024-11-14

## 2024-11-14 RX ORDER — POTASSIUM CHLORIDE 1500 MG/1
40 TABLET, EXTENDED RELEASE ORAL PRN
Status: DISCONTINUED | OUTPATIENT
Start: 2024-11-14 | End: 2024-11-14

## 2024-11-14 RX ORDER — SODIUM CHLORIDE 9 MG/ML
INJECTION, SOLUTION INTRAVENOUS PRN
Status: DISCONTINUED | OUTPATIENT
Start: 2024-11-14 | End: 2024-11-16 | Stop reason: HOSPADM

## 2024-11-14 RX ORDER — METOPROLOL SUCCINATE 25 MG/1
25 TABLET, EXTENDED RELEASE ORAL DAILY
Status: DISCONTINUED | OUTPATIENT
Start: 2024-11-14 | End: 2024-11-16 | Stop reason: HOSPADM

## 2024-11-14 RX ORDER — SODIUM CHLORIDE 0.9 % (FLUSH) 0.9 %
5-40 SYRINGE (ML) INJECTION PRN
Status: DISCONTINUED | OUTPATIENT
Start: 2024-11-14 | End: 2024-11-16 | Stop reason: HOSPADM

## 2024-11-14 RX ORDER — GLUCAGON 1 MG/ML
1 KIT INJECTION PRN
Status: DISCONTINUED | OUTPATIENT
Start: 2024-11-14 | End: 2024-11-16 | Stop reason: HOSPADM

## 2024-11-14 RX ORDER — DEXTROSE MONOHYDRATE 100 MG/ML
INJECTION, SOLUTION INTRAVENOUS CONTINUOUS PRN
Status: DISCONTINUED | OUTPATIENT
Start: 2024-11-14 | End: 2024-11-16 | Stop reason: HOSPADM

## 2024-11-14 RX ORDER — POTASSIUM CHLORIDE 7.45 MG/ML
10 INJECTION INTRAVENOUS PRN
Status: DISCONTINUED | OUTPATIENT
Start: 2024-11-14 | End: 2024-11-14

## 2024-11-14 RX ORDER — POLYETHYLENE GLYCOL 3350 17 G/17G
17 POWDER, FOR SOLUTION ORAL DAILY PRN
Status: DISCONTINUED | OUTPATIENT
Start: 2024-11-14 | End: 2024-11-16 | Stop reason: HOSPADM

## 2024-11-14 RX ADMIN — SODIUM CHLORIDE, PRESERVATIVE FREE 10 ML: 5 INJECTION INTRAVENOUS at 20:12

## 2024-11-14 RX ADMIN — SEVELAMER CARBONATE 800 MG: 800 TABLET, FILM COATED ORAL at 18:35

## 2024-11-14 RX ADMIN — SODIUM BICARBONATE 50 MEQ: 84 INJECTION INTRAVENOUS at 01:05

## 2024-11-14 RX ADMIN — APIXABAN 5 MG: 5 TABLET, FILM COATED ORAL at 12:41

## 2024-11-14 RX ADMIN — MIDODRINE HYDROCHLORIDE 5 MG: 5 TABLET ORAL at 08:33

## 2024-11-14 RX ADMIN — ACETAMINOPHEN 325MG 650 MG: 325 TABLET ORAL at 06:29

## 2024-11-14 RX ADMIN — APIXABAN 5 MG: 5 TABLET, FILM COATED ORAL at 20:12

## 2024-11-14 RX ADMIN — ASPIRIN 81 MG: 81 TABLET, COATED ORAL at 09:15

## 2024-11-14 RX ADMIN — SODIUM CHLORIDE 250 ML: 9 INJECTION, SOLUTION INTRAVENOUS at 05:24

## 2024-11-14 RX ADMIN — ONDANSETRON 4 MG: 2 INJECTION, SOLUTION INTRAMUSCULAR; INTRAVENOUS at 01:06

## 2024-11-14 RX ADMIN — MIDODRINE HYDROCHLORIDE 5 MG: 5 TABLET ORAL at 12:41

## 2024-11-14 RX ADMIN — MIDODRINE HYDROCHLORIDE 5 MG: 5 TABLET ORAL at 18:35

## 2024-11-14 RX ADMIN — SODIUM CHLORIDE 500 ML: 9 INJECTION, SOLUTION INTRAVENOUS at 08:25

## 2024-11-14 ASSESSMENT — LIFESTYLE VARIABLES
HOW OFTEN DO YOU HAVE A DRINK CONTAINING ALCOHOL: NEVER
HOW MANY STANDARD DRINKS CONTAINING ALCOHOL DO YOU HAVE ON A TYPICAL DAY: PATIENT DOES NOT DRINK

## 2024-11-14 ASSESSMENT — PAIN DESCRIPTION - LOCATION: LOCATION: BACK

## 2024-11-14 ASSESSMENT — ENCOUNTER SYMPTOMS
ABDOMINAL DISTENTION: 0
EYE DISCHARGE: 0
WHEEZING: 0
CHEST TIGHTNESS: 1
APNEA: 0
CHEST TIGHTNESS: 0
PHOTOPHOBIA: 0
NAUSEA: 1
ABDOMINAL PAIN: 0
SORE THROAT: 0
SHORTNESS OF BREATH: 0
COUGH: 0
VOMITING: 1

## 2024-11-14 ASSESSMENT — PAIN DESCRIPTION - ORIENTATION: ORIENTATION: MID;RIGHT;LEFT

## 2024-11-14 ASSESSMENT — PAIN SCALES - GENERAL: PAINLEVEL_OUTOF10: 4

## 2024-11-14 ASSESSMENT — PAIN - FUNCTIONAL ASSESSMENT: PAIN_FUNCTIONAL_ASSESSMENT: 0-10

## 2024-11-14 NOTE — CONSULTS
[facility]  Consult Note           Reason for Consult:  ESRD  Requesting Physician:  Dr. Marquez    Chief Complaint:  chest pain  History Obtained From:  patient, electronic medical record    History of Present Ilness:    54 y.o. year old male admitted with chest pain.  Patient has end-stage renal disease maintained on hemodialysis Monday Wednesday Friday.  Goes to [facility] and sees Dr. Ellsworth.  Last dialysis was yesterday.  Had about 2-1/2 L removed.  Patient has history of mitral valve replacement due to infective endocarditis.  Had previous EF of 20 to 25% but subsequently normalized.  Considered to have nonischemic cardiomyopathy.  Maintained on Eliquis.  Access is AV fistula.  This morning had low blood pressure.  Had vomited few times.  Got IV fluid bolus.  Says he has grandkids at home with similar symptoms of vomiting / gi issues    Past Medical History:        Diagnosis Date    Abnormal EKG 09/27/2019    Acute kidney injury (BLUE) with acute tubular necrosis (ATN) ([facility])     AICD (automatic cardioverter/defibrillator) present     Cardiomyopathy (HCC)     per echo 8/2019    Chronic combined systolic and diastolic CHF (congestive heart failure) (HCC) 09/27/2019    Dialysis patient (HCC)     ETOH abuse     Hypertension     Tobacco abuse        Past Surgical History:        Procedure Laterality Date    CARDIAC PACEMAKER PLACEMENT      DIALYSIS CATHETER INSERTION Right 04/12/2022    15.5 F x 19 cm SYMETREX LONG TERM HEMODIALYSIS CATHETER    FISTULAGRAM Left 08/03/2022    Completed by Dr. Belia Lopez Embolization Coils x 3 placed (See implants)    HERNIA REPAIR Right 02/10/2021    RIGHT INGUINAL HERNIA REPAIR WITH MESH performed [place] Salgado [place] [facility]    IR EMBOLIZATION VENOUS  08/03/2022    IR EMBOLIZATION VENOUS 8/3/2022 [facility] PROCEDURE    IR NONTUNNELED VASCULAR CATHETER  09/02/2022    IR NONTUNNELED VASCULAR CATHETER 9/2/2022 [facility] PROCEDURE    IR PERC  without murmur, gallop or rub.  Abdomen:  +bs, soft, nt, nd, no hepatosplenomegaly   Extremities: Extremities warm to touch, pink, with no edema.  Psychiatric: mood and affect appropriate; judgement and insight intact  Musculoskeletal:  Rom, muscular strength intact; digits, nails normal    Data/  CBC:   Lab Results   Component Value Date/Time    WBC 12.6 11/14/2024 05:06 AM    RBC 4.50 11/14/2024 05:06 AM    HGB 12.2 11/14/2024 05:06 AM    HCT 37.0 11/14/2024 05:06 AM    MCV 82.2 11/14/2024 05:06 AM    MCH 27.1 11/14/2024 05:06 AM    MCHC 33.0 11/14/2024 05:06 AM    RDW 15.2 11/14/2024 05:06 AM     11/14/2024 05:06 AM     BMP:    Lab Results   Component Value Date/Time     11/14/2024 05:06 AM    K 4.2 11/14/2024 05:06 AM    K 3.9 11/14/2024 01:12 AM    CL 98 11/14/2024 05:06 AM    CO2 31 11/14/2024 05:06 AM    BUN 31 11/14/2024 05:06 AM    CREATININE 7.51 11/14/2024 05:06 AM    CREATININE 5.8 11/14/2024 01:02 AM    CALCIUM 9.7 11/14/2024 05:06 AM    GFRAA 9.0 09/05/2022 05:00 AM    LABGLOM 8.0 11/14/2024 05:06 AM    GLUCOSE 121 11/14/2024 05:06 AM     XR CHEST PORTABLE    Result Date: 11/14/2024  EXAMINATION: ONE XRAY VIEW OF THE CHEST 11/14/2024 1:08 am COMPARISON: None. HISTORY: ORDERING SYSTEM PROVIDED HISTORY: Chest Pain TECHNOLOGIST PROVIDED HISTORY: Reason for exam:->Chest Pain What reading provider will be dictating this exam?->CRC FINDINGS: Normal cardiomediastinal silhouette.  Aortic valve replacement noted.  Lungs clear.  No pneumothorax or effusion. Body wall soft tissues unremarkable. Osseous thorax and sternal wires intact.     No acute cardiopulmonary process.       Assessment/  54-year-old man with end-stage renal disease on hemodialysis Monday Wednesday Friday.  Gurinder Oakley with Dr. Ellsworth.  Has AV fistula.  Has history of endocarditis requiring mitral valve replacement.  Admitted with chest pain.  Also with leukocytosis with white count up to 16,000.  Down to 12.  Does have some

## 2024-11-14 NOTE — PROGRESS NOTES
Spiritual Health History and Assessment/Progress Note  [facility]    Crisis, Palliative Care,  ,  ,      Name: Vanesa Esposito MRN: [id number]    Age: 54 y.o.     Sex: male   Language: English   Amish: Worship   Chest pain     Date: 11/14/2024            Total Time Calculated: 20 min       Rapid response called for pt with low blood pressure and chest pain per the nursing supervisor. Pt awake and able to engage. Pt is Worship. Well tended and no spiritual needs in these moments.            Spiritual Assessment began in [facility]W TELEMETRY        Referral/Consult From: Multi-disciplinary team   Encounter Overview/Reason: Crisis, Palliative Care  Service Provided For: Patient    Lela, Belief, Meaning:   Patient is connected with a lela tradition or spiritual practice  Family/Friends No family/friends present      Importance and Influence:  Patient has no beliefs influential to healthcare decision-making identified during this visit  Family/Friends No family/friends present    Community:  Patient feels well-supported. Support system includes: Extended family  Family/Friends No family/friends present    Assessment and Plan of Care:     Patient Interventions include: Facilitated expression of thoughts and feelings  Family/Friends Interventions include: No family/friends present    Patient Plan of Care: Spiritual Care available upon further referral  Family/Friends Plan of Care: No family/friends present    Electronically signed by MELISSA Olivo on 11/14/2024 at 10:43 AM

## 2024-11-14 NOTE — CONSULTS
Inpatient consult to Cardiology  Consult performed by: Troy Oliver MD  Consult ordered by: Marlene Murphy MD      Patient Name: Vanesa Esposito  Admit Date: 2024 12:59 AM  MR #: [id number]  : 1970    Attending Physician: Colby Marquez DO  Reason for consult: Chest pain    History of Presenting Illness:      Vanesa Esposito is a 54 y.o. male on hospital day 0 with a history of normal coronaries by cath 2020, history of endocarditis status post aortic valve replacement and mitral valve replacement, ICD explantation, nonischemic cardiomyopathy with recovered EF 50% by TTE 9 /3/2022, hypertension, hyperlipidemia, atrial fibrillation, end-stage renal disease on hemodialysis admitted to the hospital for chest pain. History Obtained From:  patient, electronic medical record    EKG sinus tachycardia with a ischemic changes compared to prior  Troponins flat 53, 55, 52  Patient admitted hypotensive and with bouts of diarrhea      History:      Past Medical History:   Diagnosis Date    Abnormal EKG 2019    Acute kidney injury (BLUE) with acute tubular necrosis (ATN) (HCC)     AICD (automatic cardioverter/defibrillator) present     Cardiomyopathy (HCC)     per echo 2019    Chronic combined systolic and diastolic CHF (congestive heart failure) (HCC) 2019    Dialysis patient (HCC)     ETOH abuse     Hypertension     Tobacco abuse      Past Surgical History:   Procedure Laterality Date    CARDIAC PACEMAKER PLACEMENT      DIALYSIS CATHETER INSERTION Right 2022    15.5 F x 19 cm SYMETREX LONG TERM HEMODIALYSIS CATHETER    FISTULAGRAM Left 2022    Completed by Dr. Belia Lopez Embolization Coils x 3 placed (See implants)    HERNIA REPAIR Right 02/10/2021    RIGHT INGUINAL HERNIA REPAIR WITH MESH performed by Casey Salgado MD at [facility] OR    IR EMBOLIZATION VENOUS  2022    IR EMBOLIZATION VENOUS 8/3/2022 [facility] SPECIAL PROCEDURE    IR NONTUNNELED VASCULAR CATHETER  2022  morning and 2,000 mg in the evening. Compound per protocol..  calcium acetate (PHOSLO) 667 MG CAPS capsule, TAKE 1 CAPSULE BY MOUTH THREE TIMES DAILY WITH MEALS    Current Hospital Medications:   Scheduled Meds:   aspirin  81 mg Oral Daily    apixaban  5 mg Oral BID    midodrine  5 mg Oral TID WC    sodium chloride flush  5-40 mL IntraVENous 2 times per day     Continuous Infusions:   sodium chloride       PRN Meds:.sodium chloride flush, sodium chloride, potassium chloride **OR** potassium alternative oral replacement **OR** potassium chloride, magnesium sulfate, ondansetron **OR** ondansetron, melatonin, polyethylene glycol, acetaminophen **OR** acetaminophen   sodium chloride        Allergies:     Allergies   Allergen Reactions    Lipitor [Atorvastatin] Other (See Comments)     Coughing       Review of Systems:       [x] CV, Resp, Neuro, , and all other systems reviewed and negative other than listed in HPI.       Objective Findings:     Vitals:   Vitals:    11/14/24 0631 11/14/24 0632 11/14/24 0744 11/14/24 0821   BP: 93/67 93/67 (!) 78/52 (!) 72/46   Pulse: (!) 104 (!) 103 (!) 103    Resp:   18    Temp:       TempSrc:   Oral    SpO2:  95% 93%    Weight:       Height:            Physical Examination:  General: Alert oriented x4 no acute distress  HEENT: Normocephalic, No scleral icterus.  Neck: No JVD.  Heart: Regular, no murmur, no rub/gallop. No RV heave.  Lungs: Clear to ascultation, no rales/wheezing/rhonchi. Good chest wall excursion.  Abdomen: Soft non tender non distended   extremities: No clubbing/cyanosis, no edema.   Skin: Warm, dry, normal turgor, no rash, no bruise, no petichiae.  Neuro: No myoclonus or tremor.   Psych: Normal affect    Results/ Medications reviewed 11/14/2024, 8:22 AM     Laboratory, Microbiology, Pathology, Radiology, Cardiology, Medications and Transcriptions reviewed  Scheduled Meds:   aspirin  81 mg Oral Daily    apixaban  5 mg Oral BID    midodrine  5 mg Oral TID     sodium

## 2024-11-14 NOTE — CARE COORDINATION
Case Management Assessment  Initial Evaluation    Date/Time of Evaluation: 11/14/2024 9:30 AM  Assessment Completed by: VIKTORIA Antunez    If patient is discharged prior to next notation, then this note serves as note for discharge by case management.    Patient Name: Vanesa Esposito                   YOB: 1970  Diagnosis: Chest pain [R07.9]  Chest pain, unspecified type [R07.9]                   Date / Time: 11/14/2024 12:59 AM    Patient Admission Status: Observation   Readmission Risk (Low < 19, Mod (19-27), High > 27): No data recorded  Current PCP: Romain Amaro MD  PCP verified by CM? Yes    Chart Reviewed: Yes      History Provided by: Patient  Patient Orientation: Alert and Oriented    Patient Cognition: Alert    Hospitalization in the last 30 days (Readmission):  No    If yes, Readmission Assessment in CM Navigator will be completed.    Advance Directives:      Code Status: Full Code   Patient's Primary Decision Maker is: Named in Scanned ACP Document    Primary Decision Maker: Josias Esposito - Brother/Sister - [phone]    Discharge Planning:    Patient lives with: Spouse/Significant Other Type of Home: House  Primary Care Giver: Self  Patient Support Systems include: Spouse/Significant Other   Current Financial resources: Medicaid  Current community resources: None  Current services prior to admission: None            Current DME:              Type of Home Care services:  None    ADLS  Prior functional level: Independent in ADLs/IADLs  Current functional level: Independent in ADLs/IADLs    PT AM-PAC:   /24  OT AM-PAC:   /24    Family can provide assistance at DC: Yes  Would you like Case Management to discuss the discharge plan with any other family members/significant others, and if so, who? Yes (BROTHER)  Plans to Return to Present Housing: Yes  Other Identified Issues/Barriers to RETURNING to current housing: MEDICAL CLEARANCE  Potential Assistance needed at discharge: Other

## 2024-11-14 NOTE — ACP (ADVANCE CARE PLANNING)
Advance Care Planning   Healthcare Decision Maker:    Primary Decision Maker: Josias Esposito - Brother/Sister - [phone]    Click here to complete Healthcare Decision Makers including selection of the Healthcare Decision Maker Relationship (ie \"Primary\").  Today we documented Decision Maker(s) consistent with ACP documents on file.       Electronically signed by VIKTORIA Antunez on 11/14/2024 at 9:26 AM

## 2024-11-14 NOTE — PLAN OF CARE
Problem: Chronic Conditions and Co-morbidities  Goal: Patient's chronic conditions and co-morbidity symptoms are monitored and maintained or improved  Outcome: Progressing     Problem: Discharge Planning  Goal: Discharge to home or other facility with appropriate resources  Outcome: Progressing  Flowsheets (Taken 11/14/2024 0266)  Discharge to home or other facility with appropriate resources:   Identify barriers to discharge with patient and caregiver   Arrange for needed discharge resources and transportation as appropriate   Identify discharge learning needs (meds, wound care, etc)     Problem: Pain  Goal: Verbalizes/displays adequate comfort level or baseline comfort level  Outcome: Progressing     Problem: Skin/Tissue Integrity  Goal: Absence of new skin breakdown  Description: 1.  Monitor for areas of redness and/or skin breakdown  2.  Assess vascular access sites hourly  3.  Every 4-6 hours minimum:  Change oxygen saturation probe site  4.  Every 4-6 hours:  If on nasal continuous positive airway pressure, respiratory therapy assess nares and determine need for appliance change or resting period.  Outcome: Progressing     Problem: Safety - Adult  Goal: Free from fall injury  Outcome: Progressing

## 2024-11-14 NOTE — SIGNIFICANT EVENT
Rapid response called this morning out of concern for hypotension. On my arrival systolic BP was in 70s, MAPs in 50s, and HR was low 100s. Patient denied to me any dizziness/chest pain but did mention he was dizzy upon standing up. RN said he was complaining of chest pain earlier this morning. 12 lead obtained prior to my arrival was reviewed and was stable compared to his EKG earlier this morning with no new ST changes. Per RN, patient did get HD yesterday and got 2.6 L taken off. Patient also mentioned he vomited roughly 5x last night.     Plan:   -500 cc IVF bolus ordered. Cautious use of IVF in setting of ESRD. If remains hypotensive could likely tolerate another 250-500 cc bolus  -Requested RN give his morning midodrine dose now  -Trop, lactate pending

## 2024-11-14 NOTE — H&P
Hospitalist Group   History and Physical      CHIEF COMPLAINT: Chest pain    History of Present Illness:  54 y.o. male with a history of nonischemic cardiomyopathy EF 20%, status post AICD, ESRD on dialysis, hypertension, presents with chest pain.  Patient had dialysis earlier in the day.  At home he started having chest pain that was parasternal nonradiating.  Associated with nausea and vomiting and diaphoresis.  Patient vomited on the way to the hospital.  He was hypotensive but blood pressure improved in the ER.  Patient mentioned that he has been having frequent diarrhea as well.  He said that has been dizzy and all of his symptoms started hours after dialysis.  Patient mentioned that he still urinates.    REVIEW OF SYSTEMS:  no fevers, chills, has cp, sob, has n/v, no ha, vision/hearing changes, wt changes, hot/cold flashes, other open skin lesions, has diarrhea, no constipation, dysuria/hematuria unless noted in HPI. Complete ROS performed with the patient and is otherwise negative.      PMH:  Past Medical History:   Diagnosis Date    Abnormal EKG 09/27/2019    Acute kidney injury (BLUE) with acute tubular necrosis (ATN) (HCC)     AICD (automatic cardioverter/defibrillator) present     Cardiomyopathy (HCC)     per echo 8/2019    Chronic combined systolic and diastolic CHF (congestive heart failure) (HCC) 09/27/2019    Dialysis patient (HCC)     ETOH abuse     Hypertension     Tobacco abuse        Surgical History:  Past Surgical History:   Procedure Laterality Date    CARDIAC PACEMAKER PLACEMENT      DIALYSIS CATHETER INSERTION Right 04/12/2022    15.5 F x 19 cm SYMETREX LONG TERM HEMODIALYSIS CATHETER    FISTULAGRAM Left 08/03/2022    Completed by Dr. Belia Lopez Embolization Coils x 3 placed (See implants)    HERNIA REPAIR Right 02/10/2021    RIGHT INGUINAL HERNIA REPAIR WITH MESH performed [place] Salgado MD [place] [facility] [place]    IR EMBOLIZATION VENOUS  08/03/2022    IR EMBOLIZATION VENOUS 8/3/2022  organomegaly  Neurologic: reflexes normal and symmetric, no cranial nerve deficit     LABS:  Recent Labs     11/14/24 0102 11/14/24 0112   NA  --  141   K  --  3.9   CL  --  98   CO2  --  25   BUN  --  24*   CREATININE 5.8* 6.30*   GLUCOSE  --  112*   CALCIUM  --  10.3*       Recent Labs     11/14/24 0102 11/14/24 0112   WBC  --  16.2*   RBC  --  4.26*   HGB 14.9 11.8*   HCT  --  37.1*   MCV  --  87.1   MCH  --  27.7   MCHC  --  31.8*   RDW  --  14.9*   PLT  --  274       Recent Labs     11/14/24 0102   POCGLU 94  146*       CBC with Differential:    Lab Results   Component Value Date/Time    WBC 16.2 11/14/2024 01:12 AM    RBC 4.26 11/14/2024 01:12 AM    HGB 11.8 11/14/2024 01:12 AM    HCT 37.1 11/14/2024 01:12 AM     11/14/2024 01:12 AM    MCV 87.1 11/14/2024 01:12 AM    MCH 27.7 11/14/2024 01:12 AM    MCHC 31.8 11/14/2024 01:12 AM    RDW 14.9 11/14/2024 01:12 AM    LYMPHOPCT 4.3 11/14/2024 01:12 AM    MONOPCT 7.1 11/14/2024 01:12 AM    EOSPCT 3.2 11/14/2024 01:12 AM    BASOPCT 0.1 11/14/2024 01:12 AM    MONOSABS 1.1 11/14/2024 01:12 AM    LYMPHSABS 0.7 11/14/2024 01:12 AM    EOSABS 0.5 11/14/2024 01:12 AM    BASOSABS 0.0 11/14/2024 01:12 AM     CMP:    Lab Results   Component Value Date/Time     11/14/2024 01:12 AM    K 3.9 11/14/2024 01:12 AM    K 3.6 09/03/2022 05:28 AM    CL 98 11/14/2024 01:12 AM    CO2 25 11/14/2024 01:12 AM    BUN 24 11/14/2024 01:12 AM    CREATININE 6.30 11/14/2024 01:12 AM    CREATININE 5.8 11/14/2024 01:02 AM    GFRAA 9.0 09/05/2022 05:00 AM    LABGLOM 9.9 11/14/2024 01:12 AM    GLUCOSE 112 11/14/2024 01:12 AM    CALCIUM 10.3 11/14/2024 01:12 AM    BILITOT 0.5 11/14/2024 01:12 AM    ALKPHOS 86 11/14/2024 01:12 AM    AST 21 11/14/2024 01:12 AM    ALT 25 11/14/2024 01:12 AM         EKG: Sinus tachycardia with occasional premature ventricular contraction    ASSESSMENT/ PLAN::      Principal Problem:    Chest pain  Resolved Problems:    * No resolved hospital problems.

## 2024-11-14 NOTE — FLOWSHEET NOTE
Nurse in room to round on patient. Patient complaining of 10/10 tight chest pain. EKG performed, BP 81/50. No orders in at the time, rapid called, patient still hypotensive 68/50, fluid bolus ordered midodrine administered to patient.Electronically signed by Bette Rose RN on 11/14/2024 at 4:51 PM    0846- Patients BP now 116/77, Patient resting comfortably in bed, states he is feeling much better.Electronically signed by Bette Rose RN on 11/14/2024 at 4:59 PM

## 2024-11-14 NOTE — H&P
organomegaly  Neurologic: reflexes normal and symmetric, no cranial nerve deficit     LABS:  Recent Labs     11/14/24 0102 11/14/24 0112   NA  --  141   K  --  3.9   CL  --  98   CO2  --  25   BUN  --  24*   CREATININE 5.8* 6.30*   GLUCOSE  --  112*   CALCIUM  --  10.3*       Recent Labs     11/14/24 0102 11/14/24 0112   WBC  --  16.2*   RBC  --  4.26*   HGB 14.9 11.8*   HCT  --  37.1*   MCV  --  87.1   MCH  --  27.7   MCHC  --  31.8*   RDW  --  14.9*   PLT  --  274       Recent Labs     11/14/24 0102   POCGLU 94  146*       CBC with Differential:    Lab Results   Component Value Date/Time    WBC 16.2 11/14/2024 01:12 AM    RBC 4.26 11/14/2024 01:12 AM    HGB 11.8 11/14/2024 01:12 AM    HCT 37.1 11/14/2024 01:12 AM     11/14/2024 01:12 AM    MCV 87.1 11/14/2024 01:12 AM    MCH 27.7 11/14/2024 01:12 AM    MCHC 31.8 11/14/2024 01:12 AM    RDW 14.9 11/14/2024 01:12 AM    LYMPHOPCT 4.3 11/14/2024 01:12 AM    MONOPCT 7.1 11/14/2024 01:12 AM    EOSPCT 3.2 11/14/2024 01:12 AM    BASOPCT 0.1 11/14/2024 01:12 AM    MONOSABS 1.1 11/14/2024 01:12 AM    LYMPHSABS 0.7 11/14/2024 01:12 AM    EOSABS 0.5 11/14/2024 01:12 AM    BASOSABS 0.0 11/14/2024 01:12 AM     CMP:    Lab Results   Component Value Date/Time     11/14/2024 01:12 AM    K 3.9 11/14/2024 01:12 AM    K 3.6 09/03/2022 05:28 AM    CL 98 11/14/2024 01:12 AM    CO2 25 11/14/2024 01:12 AM    BUN 24 11/14/2024 01:12 AM    CREATININE 6.30 11/14/2024 01:12 AM    CREATININE 5.8 11/14/2024 01:02 AM    GFRAA 9.0 09/05/2022 05:00 AM    LABGLOM 9.9 11/14/2024 01:12 AM    GLUCOSE 112 11/14/2024 01:12 AM    CALCIUM 10.3 11/14/2024 01:12 AM    BILITOT 0.5 11/14/2024 01:12 AM    ALKPHOS 86 11/14/2024 01:12 AM    AST 21 11/14/2024 01:12 AM    ALT 25 11/14/2024 01:12 AM         EKG: Sinus tachycardia with occasional premature ventricular contraction    ASSESSMENT/ PLAN::      Principal Problem:    Chest pain  Resolved Problems:    * No resolved hospital problems.

## 2024-11-15 ENCOUNTER — APPOINTMENT (OUTPATIENT)
Age: 54
DRG: 391 | End: 2024-11-15
Attending: INTERNAL MEDICINE
Payer: MEDICARE

## 2024-11-15 PROBLEM — I95.9 HYPOTENSION: Status: ACTIVE | Noted: 2024-11-15

## 2024-11-15 LAB
ALBUMIN SERPL-MCNC: 3.9 G/DL (ref 3.5–4.6)
ALP SERPL-CCNC: 71 U/L (ref 35–104)
ALT SERPL-CCNC: 23 U/L (ref 0–41)
ANION GAP SERPL CALCULATED.3IONS-SCNC: 13 MEQ/L (ref 9–15)
AST SERPL-CCNC: 19 U/L (ref 0–40)
BACTERIA BLD CULT ORG #2: NORMAL
BACTERIA BLD CULT: NORMAL
BASOPHILS # BLD: 0 K/UL (ref 0–0.2)
BASOPHILS NFR BLD: 0.1 %
BILIRUB SERPL-MCNC: 0.6 MG/DL (ref 0.2–0.7)
BUN SERPL-MCNC: 45 MG/DL (ref 6–20)
CALCIUM SERPL-MCNC: 9 MG/DL (ref 8.5–9.9)
CHLORIDE SERPL-SCNC: 96 MEQ/L (ref 95–107)
CO2 SERPL-SCNC: 26 MEQ/L (ref 20–31)
CREAT SERPL-MCNC: 10.28 MG/DL (ref 0.7–1.2)
ECHO AO ROOT DIAM: 4.5 CM
ECHO AO ROOT INDEX: 2.15 CM/M2
ECHO AV AREA PEAK VELOCITY: 1.5 CM2
ECHO AV AREA VTI: 1.8 CM2
ECHO AV AREA/BSA PEAK VELOCITY: 0.7 CM2/M2
ECHO AV AREA/BSA VTI: 0.9 CM2/M2
ECHO AV MEAN GRADIENT: 7 MMHG
ECHO AV MEAN VELOCITY: 1.2 M/S
ECHO AV PEAK GRADIENT: 12 MMHG
ECHO AV PEAK VELOCITY: 2.2 M/S
ECHO AV VELOCITY RATIO: 0.45
ECHO AV VTI: 29.3 CM
ECHO BSA: 2.13 M2
ECHO LA DIAMETER INDEX: 1.82 CM/M2
ECHO LA DIAMETER: 3.8 CM
ECHO LA TO AORTIC ROOT RATIO: 0.84
ECHO LA VOL A-L A2C: 89 ML (ref 18–58)
ECHO LA VOL A-L A4C: 68 ML (ref 18–58)
ECHO LA VOL MOD A2C: 84 ML (ref 18–58)
ECHO LA VOL MOD A4C: 68 ML (ref 18–58)
ECHO LA VOLUME AREA LENGTH: 80 ML
ECHO LA VOLUME INDEX A-L A2C: 43 ML/M2 (ref 16–34)
ECHO LA VOLUME INDEX A-L A4C: 33 ML/M2 (ref 16–34)
ECHO LA VOLUME INDEX AREA LENGTH: 38 ML/M2 (ref 16–34)
ECHO LA VOLUME INDEX MOD A2C: 40 ML/M2 (ref 16–34)
ECHO LA VOLUME INDEX MOD A4C: 33 ML/M2 (ref 16–34)
ECHO LV E' LATERAL VELOCITY: 10.9 CM/S
ECHO LV EDV A2C: 47 ML
ECHO LV EDV A4C: 72 ML
ECHO LV EDV BP: 64 ML (ref 67–155)
ECHO LV EDV INDEX A4C: 34 ML/M2
ECHO LV EDV INDEX BP: 31 ML/M2
ECHO LV EDV NDEX A2C: 22 ML/M2
ECHO LV EJECTION FRACTION A2C: 30 %
ECHO LV EJECTION FRACTION A4C: 46 %
ECHO LV EJECTION FRACTION BIPLANE: 40 % (ref 55–100)
ECHO LV ESV A2C: 33 ML
ECHO LV ESV A4C: 39 ML
ECHO LV ESV BP: 38 ML (ref 22–58)
ECHO LV ESV INDEX A2C: 16 ML/M2
ECHO LV ESV INDEX A4C: 19 ML/M2
ECHO LV ESV INDEX BP: 18 ML/M2
ECHO LV FRACTIONAL SHORTENING: 28 % (ref 28–44)
ECHO LV INTERNAL DIMENSION DIASTOLE INDEX: 1.39 CM/M2
ECHO LV INTERNAL DIMENSION DIASTOLIC: 2.9 CM (ref 4.2–5.9)
ECHO LV INTERNAL DIMENSION SYSTOLIC INDEX: 1 CM/M2
ECHO LV INTERNAL DIMENSION SYSTOLIC: 2.1 CM
ECHO LV IVSD: 2 CM (ref 0.6–1)
ECHO LV IVSS: 2.1 CM
ECHO LV MASS 2D: 219.5 G (ref 88–224)
ECHO LV MASS INDEX 2D: 105 G/M2 (ref 49–115)
ECHO LV POSTERIOR WALL DIASTOLIC: 1.7 CM (ref 0.6–1)
ECHO LV POSTERIOR WALL SYSTOLIC: 2.1 CM
ECHO LV RELATIVE WALL THICKNESS RATIO: 1.17
ECHO LVOT AREA: 3.1 CM2
ECHO LVOT AV VTI INDEX: 0.57
ECHO LVOT DIAM: 2 CM
ECHO LVOT MEAN GRADIENT: 2 MMHG
ECHO LVOT PEAK GRADIENT: 4 MMHG
ECHO LVOT PEAK VELOCITY: 1 M/S
ECHO LVOT STROKE VOLUME INDEX: 24.9 ML/M2
ECHO LVOT SV: 52.1 ML
ECHO LVOT VTI: 16.6 CM
ECHO MV A VELOCITY: 1.5 M/S
ECHO MV AREA VTI: 1.4 CM2
ECHO MV E VELOCITY: 1.13 M/S
ECHO MV E/A RATIO: 0.75
ECHO MV E/E' LATERAL: 10.37
ECHO MV LVOT VTI INDEX: 2.26
ECHO MV MAX VELOCITY: 2 M/S
ECHO MV MEAN GRADIENT: 7 MMHG
ECHO MV MEAN VELOCITY: 1.1 M/S
ECHO MV PEAK GRADIENT: 17 MMHG
ECHO MV VTI: 37.5 CM
ECHO PV MAX VELOCITY: 1.2 M/S
ECHO PV PEAK GRADIENT: 6 MMHG
ECHO RV INTERNAL DIMENSION: 3.1 CM
ECHO RV TAPSE: 1.7 CM (ref 1.7–?)
ECHO TV REGURGITANT MAX VELOCITY: 2.57 M/S
ECHO TV REGURGITANT PEAK GRADIENT: 26 MMHG
EOSINOPHIL # BLD: 0.4 K/UL (ref 0–0.7)
EOSINOPHIL NFR BLD: 6.4 %
ERYTHROCYTE [DISTWIDTH] IN BLOOD BY AUTOMATED COUNT: 15 % (ref 11.5–14.5)
ESTIMATED AVERAGE GLUCOSE: 97 MG/DL
GLOBULIN SER CALC-MCNC: 3 G/DL (ref 2.3–3.5)
GLUCOSE BLD-MCNC: 93 MG/DL (ref 70–99)
GLUCOSE SERPL-MCNC: 97 MG/DL (ref 70–99)
HBA1C MFR BLD: 5 % (ref 4–6)
HCT VFR BLD AUTO: 34 % (ref 42–52)
HGB BLD-MCNC: 11.4 G/DL (ref 14–18)
LYMPHOCYTES # BLD: 0.4 K/UL (ref 1–4.8)
LYMPHOCYTES NFR BLD: 5.6 %
MCH RBC QN AUTO: 27.5 PG (ref 27–31.3)
MCHC RBC AUTO-ENTMCNC: 33.5 % (ref 33–37)
MCV RBC AUTO: 81.9 FL (ref 79–92.2)
MONOCYTES # BLD: 1.2 K/UL (ref 0.2–0.8)
MONOCYTES NFR BLD: 18.1 %
NEUTROPHILS # BLD: 4.7 K/UL (ref 1.4–6.5)
NEUTS SEG NFR BLD: 69.2 %
PERFORMED ON: NORMAL
PLATELET # BLD AUTO: 231 K/UL (ref 130–400)
POTASSIUM SERPL-SCNC: 3.7 MEQ/L (ref 3.4–4.9)
PROT SERPL-MCNC: 6.9 G/DL (ref 6.3–8)
RBC # BLD AUTO: 4.15 M/UL (ref 4.7–6.1)
SODIUM SERPL-SCNC: 135 MEQ/L (ref 135–144)
WBC # BLD AUTO: 6.8 K/UL (ref 4.8–10.8)

## 2024-11-15 PROCEDURE — 6370000000 HC RX 637 (ALT 250 FOR IP): Performed by: INTERNAL MEDICINE

## 2024-11-15 PROCEDURE — 83036 HEMOGLOBIN GLYCOSYLATED A1C: CPT

## 2024-11-15 PROCEDURE — G0378 HOSPITAL OBSERVATION PER HR: HCPCS

## 2024-11-15 PROCEDURE — 97162 PT EVAL MOD COMPLEX 30 MIN: CPT

## 2024-11-15 PROCEDURE — 99233 SBSQ HOSP IP/OBS HIGH 50: CPT | Performed by: INTERNAL MEDICINE

## 2024-11-15 PROCEDURE — 96361 HYDRATE IV INFUSION ADD-ON: CPT

## 2024-11-15 PROCEDURE — 93306 TTE W/DOPPLER COMPLETE: CPT

## 2024-11-15 PROCEDURE — 93306 TTE W/DOPPLER COMPLETE: CPT | Performed by: INTERNAL MEDICINE

## 2024-11-15 PROCEDURE — 80053 COMPREHEN METABOLIC PANEL: CPT

## 2024-11-15 PROCEDURE — 2580000003 HC RX 258: Performed by: INTERNAL MEDICINE

## 2024-11-15 PROCEDURE — 85025 COMPLETE CBC W/AUTO DIFF WBC: CPT

## 2024-11-15 PROCEDURE — 1210000000 HC MED SURG R&B

## 2024-11-15 PROCEDURE — 97165 OT EVAL LOW COMPLEX 30 MIN: CPT

## 2024-11-15 PROCEDURE — 36415 COLL VENOUS BLD VENIPUNCTURE: CPT

## 2024-11-15 RX ORDER — 0.9 % SODIUM CHLORIDE 0.9 %
INTRAVENOUS SOLUTION INTRAVENOUS CONTINUOUS PRN
Status: COMPLETED | OUTPATIENT
Start: 2024-11-15 | End: 2024-11-15

## 2024-11-15 RX ORDER — 0.9 % SODIUM CHLORIDE 0.9 %
250 INTRAVENOUS SOLUTION INTRAVENOUS ONCE
Status: COMPLETED | OUTPATIENT
Start: 2024-11-15 | End: 2024-11-15

## 2024-11-15 RX ADMIN — SODIUM CHLORIDE 250 ML: 9 INJECTION, SOLUTION INTRAVENOUS at 13:51

## 2024-11-15 RX ADMIN — ASPIRIN 81 MG: 81 TABLET, COATED ORAL at 08:10

## 2024-11-15 RX ADMIN — SODIUM CHLORIDE, PRESERVATIVE FREE 10 ML: 5 INJECTION INTRAVENOUS at 22:17

## 2024-11-15 RX ADMIN — SODIUM CHLORIDE 250 ML: 9 INJECTION, SOLUTION INTRAVENOUS at 14:43

## 2024-11-15 RX ADMIN — SEVELAMER CARBONATE 800 MG: 800 TABLET, FILM COATED ORAL at 08:10

## 2024-11-15 RX ADMIN — MIDODRINE HYDROCHLORIDE 5 MG: 5 TABLET ORAL at 08:09

## 2024-11-15 RX ADMIN — SEVELAMER CARBONATE 800 MG: 800 TABLET, FILM COATED ORAL at 12:45

## 2024-11-15 RX ADMIN — APIXABAN 5 MG: 5 TABLET, FILM COATED ORAL at 22:19

## 2024-11-15 RX ADMIN — SODIUM CHLORIDE, PRESERVATIVE FREE 10 ML: 5 INJECTION INTRAVENOUS at 08:11

## 2024-11-15 RX ADMIN — APIXABAN 5 MG: 5 TABLET, FILM COATED ORAL at 08:10

## 2024-11-15 ASSESSMENT — PAIN SCALES - GENERAL
PAINLEVEL_OUTOF10: 0
PAINLEVEL_OUTOF10: 0

## 2024-11-15 NOTE — PROGRESS NOTES
Physician Progress Note    11/15/2024   3:19 PM    Name:  Vanesa Esposito  MRN:    [id number]      Day: 0     Admit Date: 11/14/2024 12:59 AM  PCP: Romain Amaro MD    Code Status:  Full Code    Assessment and Plan:        1.  Hypotension suspect due to hypovolemia from recent GI illness and fluid removal with dialysis  -Give back some IV fluid  -Nephrology stopped midodrine.  May need to resume if blood pressure remains low  -TTE reviewed.  EF 60 to 65%.  Moderate concentric hypertrophy noted.  -Discontinue diuretic, valsartan  -On Toprol XL with holding parameter  -Monitor overnight given hypotension / rapid response this afternoon    Paroxysmal atrial fibrillation on Eliquis  ESRD on HD MWF.  Nephrology following  History of endocarditis s/p MV ring and AVR  History of nonischemic cardiomyopathy with recovered ejection fraction    Diet: ADULT DIET; Regular; 4 carb choices (60 gm/meal)  Full Code      Electronically signed by Colby Marquez DO on 11/15/2024 at 3:19 PM    Subjective:     Asymptomatic.  No further nausea or vomiting.    Later in the afternoon, rapid response was called for hypotension.  Patient admits to feeling dizzy initially but now feels well.  Receiving 250 cc bolus.    Current Facility-Administered Medications   Medication Dose Route Frequency Provider Last Rate Last Admin    sodium chloride 0.9 % bolus 250 mL  250 mL IntraVENous Once Colby Marquez .9 mL/hr at 11/15/24 1443 250 mL at 11/15/24 1443    aspirin EC tablet 81 mg  81 mg Oral Daily Marlene Murphy MD   81 mg at 11/15/24 0810    apixaban (ELIQUIS) tablet 5 mg  5 mg Oral BID Marlene Murphy MD   5 mg at 11/15/24 0810    sodium chloride flush 0.9 % injection 5-40 mL  5-40 mL IntraVENous 2 times per day Marlene Murphy MD   10 mL at 11/15/24 0811    sodium chloride flush 0.9 % injection 5-40 mL  5-40 mL IntraVENous PRN Marlene Murphy MD        0.9 % sodium chloride infusion   IntraVENous PRN aMrlene Murphy MD         ondansetron (ZOFRAN-ODT) disintegrating tablet 4 mg  4 mg Oral Q8H PRN Marlene Murphy MD        Or    ondansetron (ZOFRAN) injection 4 mg  4 mg IntraVENous Q6H PRN Marlene Murphy MD        melatonin tablet 3 mg  3 mg Oral Nightly PRN Marlene Murphy MD        polyethylene glycol (GLYCOLAX) packet 17 g  17 g Oral Daily PRN Marlene Murphy MD        acetaminophen (TYLENOL) tablet 650 mg  650 mg Oral Q6H PRN Marlene Murphy MD   650 mg at 24 0629    Or    acetaminophen (TYLENOL) suppository 650 mg  650 mg Rectal Q6H PRN Marlene Murphy MD        glucose chewable tablet 16 g  4 tablet Oral PRN Colby Marquez R, DO        dextrose bolus 10% 125 mL  125 mL IntraVENous PRN Colby Marquez R, DO        Or    dextrose bolus 10% 250 mL  250 mL IntraVENous PRN Colby Marquez R, DO        glucagon injection 1 mg  1 mg SubCUTAneous PRN Colby Marquez R, DO        dextrose 10 % infusion   IntraVENous Continuous PRN Colby Marquez R, DO        insulin lispro (HUMALOG,ADMELOG) injection vial 0-8 Units  0-8 Units SubCUTAneous 4x Daily AC & HS Colby Marquez R, DO        sevelamer (RENVELA) tablet 800 mg  800 mg Oral TID WC Colby Marquez, DO   800 mg at 11/15/24 1245    metoprolol succinate (TOPROL XL) extended release tablet 25 mg  25 mg Oral Daily Colby Marquez R, DO           Physical Examination:      Vitals:  BP (!) 85/58   Pulse (!) 107   Temp 98.2 °F (36.8 °C) (Oral)   Resp 18   Ht 1.752 m (5' 8.98\")   Wt 93.2 kg (205 lb 7.5 oz)   SpO2 97%   BMI 30.36 kg/m²   Temp (24hrs), Av.5 °F (36.9 °C), Min:98.2 °F (36.8 °C), Max:99.1 °F (37.3 °C)      General appearance: alert, cooperative and no distress.  Obese  Mental Status: oriented to person, place and time and normal affect  Lungs: clear to auscultation bilaterally, normal effort  Heart: regular rate and rhythm, no murmur  Abdomen: soft, nontender, nondistended, bowel sounds present, no masses  Extremities: no edema, redness, tenderness in the calves. Cap

## 2024-11-15 NOTE — PROGRESS NOTES
Physical Therapy Med Surg Initial Assessment  Facility/Department: [address] TELEMETRY  Room: [place] [id number]       NAME: Vanesa Esposito  : 1970 (54 y.o.)  MRN: [id number]  CODE STATUS: Full Code    Date of Service: 11/15/2024    Patient Diagnosis(es): Chest pain [R07.9]  Chest pain, unspecified type [R07.9]   Chief Complaint   Patient presents with    Chest Pain     Patient Active Problem List    Diagnosis Date Noted    Systolic congestive heart failure ([facility])     NSTEMI (non-ST elevated myocardial infarction) ([facility])     Acquired AV fistula, not surgically created ([facility]) 10/12/2022    Acute bacterial endocarditis 2022    Bacteremia due to Enterococcus 2022    Complication of vascular dialysis catheter 2022    Central line infection, initial encounter 2022    Septicemia ([facility]) 2022    Acute on chronic combined systolic (congestive) and diastolic (congestive) heart failure ([facility]) 2022    Acute systolic heart failure ([facility]) 01/15/2022    CHF (congestive heart failure), NYHA class I, acute on chronic, combined ([facility]) 2021    Inguinal hernia of right side without obstruction or gangrene 2021    Pulmonary edema, acute 2020    Acute decompensated heart failure ([facility]) 2020    Chest pain     Abnormal EKG 2019    Chronic combined systolic and diastolic CHF (congestive heart failure) ([facility]) 2019    Cardiomyopathy ([facility]) 2019    Systolic and diastolic CHF, acute ([facility]) 2019    BLUE (acute kidney injury) ([facility]) 2019    Hypertensive urgency 2019        Past Medical History:   Diagnosis Date    Abnormal EKG 2019    Acute kidney injury (BLUE) with acute tubular necrosis (ATN) ([facility])     AICD (automatic cardioverter/defibrillator) present     Cardiomyopathy ([facility])     per echo 2019    Chronic combined systolic and diastolic CHF (congestive heart failure) ([facility]) 2019    Dialysis patient ([facility])     ETOH abuse     Hypertension     Tobacco  Individual   Time In 1340   Time Out 1348   Minutes 8           Kary Smithtom, PT, 11/15/24 at 2:04 PM         Definitions for assistance levels  Independent = pt does not require any physical supervision or assistance from another person for activity completion. Device may be needed.  Stand by assistance = pt requires verbal cues or instructions from another person, close to but not touching, to perform the activity  Minimal assistance= pt performs 75% or more of the activity; assistance is required to complete the activity  Moderate assistance= pt performs 50% of the activity; assistance is required to complete the activity  Maximal assistance = pt performs 25% of the activity; assistance is required to complete the activity  Dependent = pt requires total physical assistance to accomplish the task

## 2024-11-15 NOTE — PLAN OF CARE
Problem: Chronic Conditions and Co-morbidities  Goal: Patient's chronic conditions and co-morbidity symptoms are monitored and maintained or improved  11/14/2024 2223 by Zandra Baca, RN  Outcome: Progressing  11/14/2024 1721 by Bette Rose, RN  Outcome: Progressing  Flowsheets (Taken 11/14/2024 0545 by Katalina Gibbs, RN)  Care Plan - Patient's Chronic Conditions and Co-Morbidity Symptoms are Monitored and Maintained or Improved:   Monitor and assess patient's chronic conditions and comorbid symptoms for stability, deterioration, or improvement   Collaborate with multidisciplinary team to address chronic and comorbid conditions and prevent exacerbation or deterioration   Update acute care plan with appropriate goals if chronic or comorbid symptoms are exacerbated and prevent overall improvement and discharge     Problem: Safety - Adult  Goal: Free from fall injury  11/14/2024 2223 by Zandra Baca, RN  Outcome: Progressing  11/14/2024 1721 by Bette Rose, RN  Outcome: Progressing

## 2024-11-15 NOTE — PROGRESS NOTES
[facility] OCCUPATIONAL THERAPY EVALUATION - ACUTE     NAME: Vanesa Esposito  : 1970 (54 y.o.)  MRN: [id number]  CODE STATUS: Full Code  Room: [facility]    Date of Service: 11/15/2024    Patient Diagnosis(es): Chest pain [R07.9]  Chest pain, unspecified type [R07.9]   Patient Active Problem List    Diagnosis Date Noted    Systolic congestive heart failure ([facility])     NSTEMI (non-ST elevated myocardial infarction) ([facility])     Acquired AV fistula, not surgically created ([facility]) 10/12/2022    Acute bacterial endocarditis 2022    Bacteremia due to Enterococcus 2022    Complication of vascular dialysis catheter 2022    Central line infection, initial encounter 2022    Septicemia ([facility]) 2022    Acute on chronic combined systolic (congestive) and diastolic (congestive) heart failure ([facility]) 2022    Acute systolic heart failure ([facility]) 01/15/2022    CHF (congestive heart failure), NYHA class I, acute on chronic, combined ([facility]) 2021    Inguinal hernia of right side without obstruction or gangrene 2021    Pulmonary edema, acute 2020    Acute decompensated heart failure ([facility]) 2020    Chest pain     Abnormal EKG 2019    Chronic combined systolic and diastolic CHF (congestive heart failure) ([facility]) 2019    Cardiomyopathy ([facility]) 2019    Systolic and diastolic CHF, acute ([facility]) 2019    BLUE (acute kidney injury) ([facility]) 2019    Hypertensive urgency 2019        Past Medical History:   Diagnosis Date    Abnormal EKG 2019    Acute kidney injury (BLUE) with acute tubular necrosis (ATN) ([facility])     AICD (automatic cardioverter/defibrillator) present     Cardiomyopathy ([facility])     per echo 2019    Chronic combined systolic and diastolic CHF (congestive heart failure) ([facility]) 2019    Dialysis patient ([facility])     ETOH abuse     Hypertension     Tobacco abuse      Past Surgical History:   Procedure Laterality Date    CARDIAC PACEMAKER PLACEMENT    Assistance: Independent  Homemaking Assistance: Needs assistance  Ambulation Assistance: Independent (with SPC PRN)  Transfer Assistance: Independent  Active : No  Additional Comments: Pt takes Uber to dialysis    OBJECTIVE:     Orientation Status:  Orientation  Overall Orientation Status: Within Functional Limits  Orientation Level: Oriented X4    Observation:  Observation/Palpation  Posture: Good  Observation: Pt became hypotensive and catatonic.  Rapid response called.  BP 85/58    Cognition Status:  Cognition  Overall Cognitive Status: WFL    Perception Status:  Perception  Overall Perceptual Status: WFL    Vision and Hearing Status:  Hearing  Hearing: Within functional limits   Vision - Basic Assessment  Prior Vision: No visual deficits  Visual History: No significant visual history  Patient Visual Report: No visual complaint reported.  Visual Field Cut: No  Oculo Motor Control: WNL    GROSS ASSESSMENT AROM/PROM:  AROM: Within functional limits       ROM:   LUE AROM (degrees)  LUE AROM : WFL  Left Hand AROM (degrees)  Left Hand AROM: WFL  RUE AROM (degrees)  RUE AROM : WFL  Right Hand AROM (degrees)  Right Hand AROM: WFL    UE STRENGTH:  Strength: Within functional limits    UE COORDINATION:  Coordination: Within functional limits    UE TONE:  Tone: Normal    UE SENSATION:  Sensation: Intact    Hand Dominance:  Hand Dominance  Hand Dominance: Right    ADL Status:  ADL  Grooming: Setup  UE Bathing: Setup  LE Bathing: Setup  UE Dressing: Setup  LE Dressing: Setup  Putting On/Taking Off Footwear: Setup  Toileting: Unable to assess (Comment)    Functional Mobility:    Transfers  Sit to stand: Independent  Stand to sit: Independent      Bed Mobility  Bed mobility  Supine to Sit: Independent  Sit to Supine: Dependent/Total    Seated and Standing Balance:  Balance  Sitting: Intact  Standing: Intact    Functional Endurance:  Activity Tolerance  Activity Tolerance: Treatment limited secondary to medical

## 2024-11-15 NOTE — PROGRESS NOTES
Physical Therapy   Facility/Department: [facility] MED SURG W174/W174-01    NAME: Vanesa Esposito    : 1970 (54 y.o.)  MRN: [id number]    Account: [id number]  Gender: male    PT evaluation and treatment orders received. Chart reviewed. PT eval attempted at 1040.     Patient Unavailable: pt off unit.  Will attempt PT evaluation again at earliest availability.      Electronically signed by Amberly Jacobson PT on 11/15/24 at 10:41 AM EST

## 2024-11-15 NOTE — PROGRESS NOTES
[facility] Occupational Therapy      Date: 11/15/2024  Patient Name: Vanesa Esposito        MRN: [id number]  Account: [id number]   : 1970  (54 y.o.)  Room: [facility]/[place] [address]    Chart reviewed, attempted OT at 1145 for evaluation. Patient not seen 2° to:    Pt. off floor for test/procedurePt off floor at dialysis     Will attempt again when able.    Electronically signed by JAIMIE Sykes on 11/15/2024 at 11:46 AM

## 2024-11-15 NOTE — DIALYSIS
Post Dialysis: Stable treatment, /79, P 105, R 18, Wt 93.2 kg, T 98.3 1000ml removed. Report called to Shelia Ferrari.

## 2024-11-15 NOTE — PROGRESS NOTES
Cardiology progress note      Patient Name: Vanesa Esposito  Admit Date: 2024 12:59 AM  MR #: [id number]  : 1970    Attending Physician: Colby Marquez DO  Reason for consult: Chest pain    History of Presenting Illness:      Vanesa Esposito is a 54 y.o. male on hospital day 0 with a history of normal coronaries by cath 2020, history of endocarditis status post aortic valve replacement and mitral valve replacement, ICD explantation, nonischemic cardiomyopathy with recovered EF 50% by TTE 9 /3/2022, hypertension, hyperlipidemia, atrial fibrillation, end-stage renal disease on hemodialysis admitted to the hospital for chest pain. History Obtained From:  patient, electronic medical record    EKG sinus tachycardia with a ischemic changes compared to prior  Troponins flat 53, 55, 52  Patient admitted hypotensive and with bouts of diarrhea  ==============  Hospital course  11/15/2024  Patient was examined and evaluated while getting dialysis  Patient denies any more chest pain  Troponins peaked at 55, flat pattern, patient is ESRD  Telemetry sinus rhythm no arrhythmias  Echocardiogram pending  Okay to discharge patient home after echocardiogram if no major findings      History:      Past Medical History:   Diagnosis Date    Abnormal EKG 2019    Acute kidney injury (BLUE) with acute tubular necrosis (ATN) ([facility])     AICD (automatic cardioverter/defibrillator) present     Cardiomyopathy ([facility])     per echo 2019    Chronic combined systolic and diastolic CHF (congestive heart failure) ([facility]) 2019    Dialysis patient ([facility])     ETOH abuse     Hypertension     Tobacco abuse      Past Surgical History:   Procedure Laterality Date    CARDIAC PACEMAKER PLACEMENT      DIALYSIS CATHETER INSERTION Right 2022    15.5 F x 19 cm SYMETREX LONG TERM HEMODIALYSIS CATHETER    FISTULAGRAM Left 2022    Completed by Dr. Belia Lopez Embolization Coils x 3 placed (See implants)    HERNIA REPAIR Right

## 2024-11-15 NOTE — PROGRESS NOTES
Renal Progress Note    Assessment and Plan:    54-year-old man with end-stage renal disease on hemodialysis Monday Wednesday Friday.  Gurinder Oakley with Dr. Ellsworth.  Has AV fistula.  Has history of endocarditis requiring mitral valve replacement.  Admitted with chest pain.  Also with leukocytosis with white count up to 16,000.  Down to 12.  Does have some missed treatments as an outpatient.     Plan/  1- held all bp meds  2- stop midodrine  3- he had lactate up slightly with leukocytosis and hx of endocarditis.  Will order blood cx x 2.  Has AVF so no dialysis reason for infection  4. Hd today with limitied UF  5. Renvela for high phos  6. Ok for dc from renal standpoint at any point.        Patient Active Problem List:     Hypertensive urgency     Cardiomyopathy ([facility])     Systolic and diastolic CHF, acute ([facility])     BLUE (acute kidney injury) ([facility])     Abnormal EKG     Chronic combined systolic and diastolic CHF (congestive heart failure) ([facility])     Acute decompensated heart failure ([facility])     Chest pain     Pulmonary edema, acute     NSTEMI (non-ST elevated myocardial infarction) ([facility])     Inguinal hernia of right side without obstruction or gangrene     CHF (congestive heart failure), NYHA class I, acute on chronic, combined ([facility])     Systolic congestive heart failure ([facility])     Acute systolic heart failure ([facility])     Acute on chronic combined systolic (congestive) and diastolic (congestive) heart failure ([facility])     Bacteremia due to Enterococcus     Complication of vascular dialysis catheter     Central line infection, initial encounter     Septicemia ([facility])     Acute bacterial endocarditis     Acquired AV fistula, not surgically created ([facility])      Subjective:   Admit Date: 11/14/2024    Interval History: pt seen on hd.  No hd related complications. Bp good        Medications:   Scheduled Meds:   aspirin  81 mg Oral Daily    apixaban  5 mg Oral BID    midodrine  5 mg Oral TID     sodium chloride flush  5-40 mL

## 2024-11-15 NOTE — CODE DOCUMENTATION
IV fluid bolus infusing. Rapid response ended. No chest pain or shortness of breath. Patient had dialysis this morning. 1 liter removed.

## 2024-11-16 VITALS
TEMPERATURE: 98.2 F | SYSTOLIC BLOOD PRESSURE: 110 MMHG | WEIGHT: 205.47 LBS | HEIGHT: 69 IN | HEART RATE: 102 BPM | RESPIRATION RATE: 20 BRPM | OXYGEN SATURATION: 98 % | DIASTOLIC BLOOD PRESSURE: 80 MMHG | BODY MASS INDEX: 30.43 KG/M2

## 2024-11-16 LAB
ALBUMIN SERPL-MCNC: 3.8 G/DL (ref 3.5–4.6)
ALP SERPL-CCNC: 79 U/L (ref 35–104)
ALT SERPL-CCNC: 28 U/L (ref 0–41)
ANION GAP SERPL CALCULATED.3IONS-SCNC: 15 MEQ/L (ref 9–15)
AST SERPL-CCNC: 31 U/L (ref 0–40)
BASOPHILS # BLD: 0 K/UL (ref 0–0.2)
BASOPHILS NFR BLD: 0.5 %
BILIRUB SERPL-MCNC: 0.5 MG/DL (ref 0.2–0.7)
BUN SERPL-MCNC: 34 MG/DL (ref 6–20)
CALCIUM SERPL-MCNC: 9.1 MG/DL (ref 8.5–9.9)
CHLORIDE SERPL-SCNC: 93 MEQ/L (ref 95–107)
CO2 SERPL-SCNC: 27 MEQ/L (ref 20–31)
CREAT SERPL-MCNC: 7.82 MG/DL (ref 0.7–1.2)
EOSINOPHIL # BLD: 0.3 K/UL (ref 0–0.7)
EOSINOPHIL NFR BLD: 4.9 %
ERYTHROCYTE [DISTWIDTH] IN BLOOD BY AUTOMATED COUNT: 14.5 % (ref 11.5–14.5)
GLOBULIN SER CALC-MCNC: 3.1 G/DL (ref 2.3–3.5)
GLUCOSE SERPL-MCNC: 95 MG/DL (ref 70–99)
HCT VFR BLD AUTO: 33.3 % (ref 42–52)
HGB BLD-MCNC: 11.6 G/DL (ref 14–18)
LYMPHOCYTES # BLD: 0.9 K/UL (ref 1–4.8)
LYMPHOCYTES NFR BLD: 13.5 %
MAGNESIUM SERPL-MCNC: 2.2 MG/DL (ref 1.7–2.4)
MCH RBC QN AUTO: 28.1 PG (ref 27–31.3)
MCHC RBC AUTO-ENTMCNC: 34.8 % (ref 33–37)
MCV RBC AUTO: 80.6 FL (ref 79–92.2)
MONOCYTES # BLD: 1.5 K/UL (ref 0.2–0.8)
MONOCYTES NFR BLD: 22.3 %
NEUTROPHILS # BLD: 3.8 K/UL (ref 1.4–6.5)
NEUTS SEG NFR BLD: 58.5 %
PLATELET # BLD AUTO: 231 K/UL (ref 130–400)
POTASSIUM SERPL-SCNC: 3.4 MEQ/L (ref 3.4–4.9)
PROT SERPL-MCNC: 6.9 G/DL (ref 6.3–8)
RBC # BLD AUTO: 4.13 M/UL (ref 4.7–6.1)
SODIUM SERPL-SCNC: 135 MEQ/L (ref 135–144)
WBC # BLD AUTO: 6.5 K/UL (ref 4.8–10.8)

## 2024-11-16 PROCEDURE — 36415 COLL VENOUS BLD VENIPUNCTURE: CPT

## 2024-11-16 PROCEDURE — 83735 ASSAY OF MAGNESIUM: CPT

## 2024-11-16 PROCEDURE — 85025 COMPLETE CBC W/AUTO DIFF WBC: CPT

## 2024-11-16 PROCEDURE — 80053 COMPREHEN METABOLIC PANEL: CPT

## 2024-11-16 PROCEDURE — 6370000000 HC RX 637 (ALT 250 FOR IP): Performed by: INTERNAL MEDICINE

## 2024-11-16 RX ADMIN — APIXABAN 5 MG: 5 TABLET, FILM COATED ORAL at 11:39

## 2024-11-16 RX ADMIN — METOPROLOL SUCCINATE 25 MG: 25 TABLET, EXTENDED RELEASE ORAL at 11:38

## 2024-11-16 RX ADMIN — SEVELAMER CARBONATE 800 MG: 800 TABLET, FILM COATED ORAL at 11:38

## 2024-11-16 RX ADMIN — ASPIRIN 81 MG: 81 TABLET, COATED ORAL at 11:38

## 2024-11-16 ASSESSMENT — PAIN SCALES - GENERAL
PAINLEVEL_OUTOF10: 0

## 2024-11-16 NOTE — DISCHARGE SUMMARY
[facility] Medicine Discharge Summary    aVnesa Esposito  :  1970  MRN:  [id number]    Admit date:  2024  Discharge date:  2024    Admitting Physician:  Marlene Murphy MD  Primary Care Physician:  Romain Amaro MD    Discharge Diagnoses:    Principal Problem:    Chest pain  Active Problems:    Hypotension  Resolved Problems:    * No resolved hospital problems. *    Chief Complaint   Patient presents with    Chest Pain       Condition: improved   Activity: no restrictions   Diet: regular / renal  Disposition: home  Functional Status: ambulatory    Significant Findings:     Recent Labs     24  0508 11/15/24  0550 24  0506   WBC 6.5 6.8 12.6*   HGB 11.6* 11.4* 12.2*   HCT 33.3* 34.0* 37.0*   MCV 80.6 81.9 82.2    231 250         Hospital Course:   54-year-old male with history of paroxysmal atrial fibrillation on Eliquis, prior endocarditis s/p MV ring and AVR, prior nonischemic cardiomyopathy with recovered EF, ESRD on HD MWF was hospitalized after presenting with nausea and vomiting.  Children in his home were having similar symptoms.  He was monitored primarily because of hypotension and his home diuretic and valsartan were discontinued.  Hypotension was felt to be secondary to hypovolemia from GI illness and fluid removal from dialysis.    He also had previously been on midodrine which nephrology recommended to discontinue.  By , his blood pressure was normal and he was ambulating around the room without complication.    He was followed by cardiology and nephrology during the hospitalization.      Exam on Discharge:   /80   Pulse (!) 102   Temp 98.2 °F (36.8 °C) (Oral)   Resp 20   Ht 1.752 m (5' 8.98\")   Wt 93.2 kg (205 lb 7.5 oz)   SpO2 98%   BMI 30.36 kg/m²   General appearance: alert, cooperative and no distress  Mental Status: oriented to person, place and time and normal affect  Lungs: clear to auscultation bilaterally, normal

## 2024-11-16 NOTE — PROGRESS NOTES
Nephrology Progress Note    Assessment:  ESRDX M-W-F  Hx Endocarditis  Aortic valve bioprosthetic           Plan: may discharge today next treatment Monday    Patient Active Problem List:     Hypertensive urgency     Cardiomyopathy (HCC)     Systolic and diastolic CHF, acute ([facility])     BLUE (acute kidney injury) ([facility])     Abnormal EKG     Chronic combined systolic and diastolic CHF (congestive heart failure) ([facility])     Acute decompensated heart failure ([facility])     Chest pain     Pulmonary edema, acute     NSTEMI (non-ST elevated myocardial infarction) ([facility])     Inguinal hernia of right side without obstruction or gangrene     CHF (congestive heart failure), NYHA class I, acute on chronic, combined ([facility])     Systolic congestive heart failure ([facility])     Acute systolic heart failure ([facility])     Acute on chronic combined systolic (congestive) and diastolic (congestive) heart failure ([facility])     Bacteremia due to Enterococcus     Complication of vascular dialysis catheter     Central line infection, initial encounter     Septicemia ([facility])     Acute bacterial endocarditis     Acquired AV fistula, not surgically created ([facility])     Hypotension      Subjective:  Admit Date: 11/14/2024    Interval History: stable    Medications:  Scheduled Meds:   aspirin  81 mg Oral Daily    apixaban  5 mg Oral BID    sodium chloride flush  5-40 mL IntraVENous 2 times per day    insulin lispro  0-8 Units SubCUTAneous 4x Daily AC & HS    sevelamer  800 mg Oral TID WC    metoprolol succinate  25 mg Oral Daily     Continuous Infusions:   sodium chloride      dextrose         CBC:   Recent Labs     11/15/24  0550 11/16/24  0508   WBC 6.8 6.5   HGB 11.4* 11.6*    231     CMP:    Recent Labs     11/14/24  0506 11/15/24  0550 11/16/24  0508    135 135   K 4.2 3.7 3.4   CL 98 96 93*   CO2 31 26 27   BUN 31* 45* 34*   CREATININE 7.51* 10.28* 7.82*   GLUCOSE 121* 97 95   CALCIUM 9.7 9.0 9.1   LABGLOM 8.0* 5.5* 7.7*     Troponin: No results for  input(s): \"TROPONINI\" in the last 72 hours.  BNP: No results for input(s): \"BNP\" in the last 72 hours.  INR: No results for input(s): \"INR\" in the last 72 hours.  Lipids: No results for input(s): \"CHOL\", \"LDLDIRECT\", \"TRIG\", \"HDL\", \"AMYLASE\", \"LIPASE\" in the last 72 hours.  Liver:   Recent Labs     11/16/24  0508   AST 31   ALT 28   ALKPHOS 79   BILITOT 0.5     Iron:  No results for input(s): \"FERRITIN\" in the last 72 hours.    Invalid input(s): \"IRONS\", \"LABIRONS\"  Urinalysis: No results for input(s): \"UA\" in the last 72 hours.    Objective:  Vitals: /89   Pulse 99   Temp 97.3 °F (36.3 °C) (Oral)   Resp 20   Ht 1.752 m (5' 8.98\")   Wt 93.2 kg (205 lb 7.5 oz)   SpO2 99%   BMI 30.36 kg/m²    Wt Readings from Last 3 Encounters:   11/15/24 93.2 kg (205 lb 7.5 oz)   07/27/23 100.8 kg (222 lb 3.2 oz)   01/26/23 101.4 kg (223 lb 9.6 oz)      24HR INTAKE/OUTPUT:  No intake or output data in the 24 hours ending 11/16/24 0702    General: alert, in no apparent distress  HEENT: normocephalic, atraumatic, anicteric  Neck: supple, no mass  Lungs: non-labored respirations, clear to auscultation bilaterally  Heart: regular rate and rhythm, no murmurs or rubs  Abdomen: soft, non-tender, non-distended  Ext: no cyanosis, no peripheral edema  Neuro: alert and oriented, no gross abnormalities  Psych: normal mood and affect  Skin: no rash      Electronically signed by Enoch Lopez DO

## 2024-11-16 NOTE — PROGRESS NOTES
Embolization Coils x 3 placed (See implants)    HERNIA REPAIR Right 02/10/2021    RIGHT INGUINAL HERNIA REPAIR WITH MESH performed by Casey Salgado MD at [facility]    IR EMBOLIZATION VENOUS  08/03/2022    IR EMBOLIZATION VENOUS 8/3/2022 [facility]    IR NONTUNNELED VASCULAR CATHETER  09/02/2022    IR NONTUNNELED VASCULAR CATHETER 9/2/2022 [facility]    IR PERC ARTERIOVENOUS FISTULA CREATION  05/16/2022    IR PERC ARTERIOVENOUS FISTULA CREATION 5/16/2022 [facility]    IR TUNNELED CATHETER PLACEMENT GREATER THAN 5 YEARS  04/12/2022    IR TUNNELED CATHETER PLACEMENT GREATER THAN 5 YEARS 4/12/2022 [facility]    OTHER SURGICAL HISTORY Left 09/06/2022    non-tunneled dialysis catheter exchange by Dr. Celaya - Symetrex 15.5 F x 23 cm ( Lot #  [id number] Exp: 04/04/2027 )     Family History  Family History   Problem Relation Age of Onset    Coronary Art Dis Mother      [] Unable to obtain due to ventilated and/ or neurologic status  Social History     Socioeconomic History    Marital status: Life Partner     Spouse name: Angle    Number of children: Not on file    Years of education: Not on file    Highest education level: Not on file   Occupational History    Not on file   Tobacco Use    Smoking status: Former     Current packs/day: 0.00     Average packs/day: 1 pack/day for 4.0 years (4.0 ttl pk-yrs)     Types: Cigarettes     Start date: 12/3/2016     Quit date: 12/3/2020     Years since quitting: 3.9    Smokeless tobacco: Never    Tobacco comments:     currently smoking only couple cigarettes daily   Vaping Use    Vaping status: Never Used   Substance and Sexual Activity    Alcohol use: Not Currently    Drug use: Never    Sexual activity: Not on file   Other Topics Concern    Not on file   Social History Narrative    Not on file     Social Determinants of Health     Financial Resource Strain: Low Risk  (11/28/2023)    [facility]     97.3 °F (36.3 °C) 98.4 °F (36.9 °C)   TempSrc: Oral Oral Oral Oral   SpO2: 97% 100% 99% 100%   Weight:       Height:            Physical Examination:  General: Alert oriented x4 no acute distress  HEENT: Normocephalic, No scleral icterus.  Neck: No JVD.  Heart: Regular, no murmur, no rub/gallop. No RV heave.  Lungs: Clear to ascultation, no rales/wheezing/rhonchi. Good chest wall excursion.  Abdomen: Soft non tender non distended   extremities: No clubbing/cyanosis, no edema.   Skin: Warm, dry, normal turgor, no rash, no bruise, no petichiae.  Neuro: No myoclonus or tremor.   Psych: Normal affect    Results/ Medications reviewed 11/16/2024, 10:22 AM     Laboratory, Microbiology, Pathology, Radiology, Cardiology, Medications and Transcriptions reviewed  Scheduled Meds:   aspirin  81 mg Oral Daily    apixaban  5 mg Oral BID    sodium chloride flush  5-40 mL IntraVENous 2 times per day    insulin lispro  0-8 Units SubCUTAneous 4x Daily AC & HS    sevelamer  800 mg Oral TID WC    metoprolol succinate  25 mg Oral Daily     Continuous Infusions:   sodium chloride      dextrose         Recent Labs     11/14/24  0506 11/15/24  0550 11/16/24  0508   WBC 12.6* 6.8 6.5   HGB 12.2* 11.4* 11.6*   HCT 37.0* 34.0* 33.3*   MCV 82.2 81.9 80.6    231 231     Recent Labs     11/14/24  0506 11/15/24  0550 11/16/24  0508    135 135   K 4.2 3.7 3.4   CL 98 96 93*   CO2 31 26 27   BUN 31* 45* 34*   CREATININE 7.51* 10.28* 7.82*     No results for input(s): \"INR\" in the last 72 hours.    Invalid input(s): \"PROT\"  No results found for this or any previous visit.       Impression:     -Chest pain.  Resolved  Sounds atypical  Patient already had an ischemic evaluation with a coronary angiogram 2020 which showed normal coronaries  EKG no signs of ischemia  Troponins flat 50-55  53  Follow-up echocardiogram  Okay to discharge patient home after echocardiogram if no major findings    -Elevated troponins  Likely demand ischemia  No

## 2024-11-18 ENCOUNTER — TELEPHONE (OUTPATIENT)
Dept: FAMILY MEDICINE CLINIC | Age: 54
End: 2024-11-18

## 2024-11-18 NOTE — TELEPHONE ENCOUNTER
Care Transitions Initial Follow Up Call    Outreach made within 2 business days of discharge: Yes    Patient: Vanesa Esposito Patient : 1970   MRN: [id number]  Reason for Admission: Chest pain   Discharge Date: 24       Spoke with: THIEN X1    Discharge department/facility: [facility]        Scheduled appointment with PCP within 7-14 days    Follow Up  Future Appointments   Date Time Provider Department Center   2024  1:00 PM [facility]   2024  8:15 AM Yan Palomo DO [facility] [place]

## 2024-11-18 NOTE — PROGRESS NOTES
Physical Therapy  Facility/Department: [facility] MED SURG W174/W174-01  Physical Therapy Discharge      NAME: Vanesa Esposito    : 1970 (54 y.o.)  MRN: [id number]    Account: [id number]  Gender: male      Patient has been discharged from acute care hospital. DC patient from current PT program.      Electronically signed by Kary Patiño PT on 24 at 3:03 PM EST

## 2024-11-19 LAB
BACTERIA BLD CULT ORG #2: NORMAL
BACTERIA BLD CULT: NORMAL
EKG ATRIAL RATE: 109 BPM
EKG P AXIS: 57 DEGREES
EKG P-R INTERVAL: 186 MS
EKG Q-T INTERVAL: 358 MS
EKG QRS DURATION: 86 MS
EKG QTC CALCULATION (BAZETT): 482 MS
EKG R AXIS: -37 DEGREES
EKG T AXIS: 78 DEGREES
EKG VENTRICULAR RATE: 109 BPM

## 2024-11-19 NOTE — TELEPHONE ENCOUNTER
Care Transitions Initial Follow Up Call    Outreach made within 2 business days of discharge: Yes    Patient: Vanesa Esposito Patient : 1970   MRN: [id number]  Reason for Admission: Chest pain   Discharge Date: 24       Spoke with: THIEN X1    Discharge department/facility: [facility]        Scheduled appointment with PCP within 7-14 days    Follow Up  Future Appointments   Date Time Provider Department Center   2024  1:00 PM [facility]   2024  8:15 AM Yan Palomo DO [facility] [place]

## 2024-11-20 NOTE — TELEPHONE ENCOUNTER
Care Transitions Initial Follow Up Call    Outreach made within 2 business days of discharge: No    Patient: Vanesa Esposito Patient : 1970   MRN: [id number]  Reason for Admission: Chest pain   Discharge Date: 24       Spoke with: THIEN X1    Discharge department/facility: [facility]        Scheduled appointment with PCP within 7-14 days    Follow Up  Future Appointments   Date Time Provider Department Center   2024  8:15 AM Hol[place], DO [facility] [place]

## 2025-03-14 ENCOUNTER — APPOINTMENT (OUTPATIENT)
Dept: RADIOLOGY | Facility: HOSPITAL | Age: 55
DRG: 313 | End: 2025-03-14
Payer: MEDICARE

## 2025-03-14 ENCOUNTER — HOSPITAL ENCOUNTER (EMERGENCY)
Dept: CARDIOLOGY | Facility: HOSPITAL | Age: 55
Discharge: HOME | DRG: 313 | End: 2025-03-14
Payer: MEDICARE

## 2025-03-14 ENCOUNTER — HOSPITAL ENCOUNTER (INPATIENT)
Facility: HOSPITAL | Age: 55
LOS: 2 days | Discharge: HOME | End: 2025-03-16
Attending: STUDENT IN AN ORGANIZED HEALTH CARE EDUCATION/TRAINING PROGRAM | Admitting: STUDENT IN AN ORGANIZED HEALTH CARE EDUCATION/TRAINING PROGRAM
Payer: MEDICARE

## 2025-03-14 DIAGNOSIS — R07.9 CHEST PAIN, UNSPECIFIED TYPE: Primary | ICD-10-CM

## 2025-03-14 LAB
ALBUMIN SERPL BCP-MCNC: 4.5 G/DL (ref 3.4–5)
ALP SERPL-CCNC: 69 U/L (ref 33–120)
ALT SERPL W P-5'-P-CCNC: 19 U/L (ref 10–52)
ANION GAP SERPL CALC-SCNC: 17 MMOL/L (ref 10–20)
AST SERPL W P-5'-P-CCNC: 14 U/L (ref 9–39)
BASOPHILS # BLD AUTO: 0.06 X10*3/UL (ref 0–0.1)
BASOPHILS NFR BLD AUTO: 0.9 %
BILIRUB SERPL-MCNC: 1.4 MG/DL (ref 0–1.2)
BNP SERPL-MCNC: 584 PG/ML (ref 0–99)
BUN SERPL-MCNC: 43 MG/DL (ref 6–23)
CALCIUM SERPL-MCNC: 9.4 MG/DL (ref 8.6–10.3)
CARDIAC TROPONIN I PNL SERPL HS: 30 NG/L (ref 0–20)
CARDIAC TROPONIN I PNL SERPL HS: 32 NG/L (ref 0–20)
CHLORIDE SERPL-SCNC: 96 MMOL/L (ref 98–107)
CO2 SERPL-SCNC: 28 MMOL/L (ref 21–32)
CREAT SERPL-MCNC: 6.9 MG/DL (ref 0.5–1.3)
EGFRCR SERPLBLD CKD-EPI 2021: 9 ML/MIN/1.73M*2
EOSINOPHIL # BLD AUTO: 0.35 X10*3/UL (ref 0–0.7)
EOSINOPHIL NFR BLD AUTO: 5.3 %
ERYTHROCYTE [DISTWIDTH] IN BLOOD BY AUTOMATED COUNT: 14.8 % (ref 11.5–14.5)
GLUCOSE SERPL-MCNC: 83 MG/DL (ref 74–99)
HCT VFR BLD AUTO: 34.6 % (ref 41–52)
HGB BLD-MCNC: 12.5 G/DL (ref 13.5–17.5)
IMM GRANULOCYTES # BLD AUTO: 0.01 X10*3/UL (ref 0–0.7)
IMM GRANULOCYTES NFR BLD AUTO: 0.2 % (ref 0–0.9)
INR PPP: 1.2 (ref 0.9–1.1)
LYMPHOCYTES # BLD AUTO: 0.72 X10*3/UL (ref 1.2–4.8)
LYMPHOCYTES NFR BLD AUTO: 10.9 %
MAGNESIUM SERPL-MCNC: 2.2 MG/DL (ref 1.6–2.4)
MCH RBC QN AUTO: 29.3 PG (ref 26–34)
MCHC RBC AUTO-ENTMCNC: 36.1 G/DL (ref 32–36)
MCV RBC AUTO: 81 FL (ref 80–100)
MONOCYTES # BLD AUTO: 0.86 X10*3/UL (ref 0.1–1)
MONOCYTES NFR BLD AUTO: 13 %
NEUTROPHILS # BLD AUTO: 4.61 X10*3/UL (ref 1.2–7.7)
NEUTROPHILS NFR BLD AUTO: 69.7 %
NRBC BLD-RTO: 0 /100 WBCS (ref 0–0)
PLATELET # BLD AUTO: 270 X10*3/UL (ref 150–450)
POTASSIUM SERPL-SCNC: 3 MMOL/L (ref 3.5–5.3)
PROT SERPL-MCNC: 7.7 G/DL (ref 6.4–8.2)
PROTHROMBIN TIME: 13.7 SECONDS (ref 9.8–12.4)
RBC # BLD AUTO: 4.26 X10*6/UL (ref 4.5–5.9)
SODIUM SERPL-SCNC: 138 MMOL/L (ref 136–145)
WBC # BLD AUTO: 6.6 X10*3/UL (ref 4.4–11.3)

## 2025-03-14 PROCEDURE — 71045 X-RAY EXAM CHEST 1 VIEW: CPT | Performed by: RADIOLOGY

## 2025-03-14 PROCEDURE — 36415 COLL VENOUS BLD VENIPUNCTURE: CPT | Performed by: PHYSICIAN ASSISTANT

## 2025-03-14 PROCEDURE — 1210000001 HC SEMI-PRIVATE ROOM DAILY

## 2025-03-14 PROCEDURE — 2550000001 HC RX 255 CONTRASTS: Performed by: STUDENT IN AN ORGANIZED HEALTH CARE EDUCATION/TRAINING PROGRAM

## 2025-03-14 PROCEDURE — 80053 COMPREHEN METABOLIC PANEL: CPT | Performed by: PHYSICIAN ASSISTANT

## 2025-03-14 PROCEDURE — 84484 ASSAY OF TROPONIN QUANT: CPT | Performed by: PHYSICIAN ASSISTANT

## 2025-03-14 PROCEDURE — 85025 COMPLETE CBC W/AUTO DIFF WBC: CPT | Performed by: PHYSICIAN ASSISTANT

## 2025-03-14 PROCEDURE — 93005 ELECTROCARDIOGRAM TRACING: CPT

## 2025-03-14 PROCEDURE — 71045 X-RAY EXAM CHEST 1 VIEW: CPT

## 2025-03-14 PROCEDURE — 71275 CT ANGIOGRAPHY CHEST: CPT

## 2025-03-14 PROCEDURE — 83880 ASSAY OF NATRIURETIC PEPTIDE: CPT | Performed by: PHYSICIAN ASSISTANT

## 2025-03-14 PROCEDURE — 71275 CT ANGIOGRAPHY CHEST: CPT | Performed by: RADIOLOGY

## 2025-03-14 PROCEDURE — G0378 HOSPITAL OBSERVATION PER HR: HCPCS

## 2025-03-14 PROCEDURE — 99285 EMERGENCY DEPT VISIT HI MDM: CPT | Mod: 25 | Performed by: STUDENT IN AN ORGANIZED HEALTH CARE EDUCATION/TRAINING PROGRAM

## 2025-03-14 PROCEDURE — 99222 1ST HOSP IP/OBS MODERATE 55: CPT | Performed by: NURSE PRACTITIONER

## 2025-03-14 PROCEDURE — 85610 PROTHROMBIN TIME: CPT | Performed by: PHYSICIAN ASSISTANT

## 2025-03-14 PROCEDURE — 83735 ASSAY OF MAGNESIUM: CPT | Performed by: PHYSICIAN ASSISTANT

## 2025-03-14 RX ADMIN — IOHEXOL 75 ML: 350 INJECTION, SOLUTION INTRAVENOUS at 21:15

## 2025-03-14 ASSESSMENT — HEART SCORE
AGE: 45-64
HEART SCORE: 6
ECG: NON-SPECIFIC REPOLARIZATION DISTURBANCE
RISK FACTORS: >2 RISK FACTORS OR HX OF ATHEROSCLEROTIC DISEASE
TROPONIN: 1-3 TIMES NORMAL LIMIT
HISTORY: MODERATELY SUSPICIOUS

## 2025-03-14 ASSESSMENT — PAIN DESCRIPTION - LOCATION: LOCATION: CHEST

## 2025-03-14 ASSESSMENT — PAIN DESCRIPTION - PAIN TYPE: TYPE: ACUTE PAIN

## 2025-03-14 ASSESSMENT — LIFESTYLE VARIABLES
TOTAL SCORE: 0
EVER FELT BAD OR GUILTY ABOUT YOUR DRINKING: NO
HAVE YOU EVER FELT YOU SHOULD CUT DOWN ON YOUR DRINKING: NO
EVER HAD A DRINK FIRST THING IN THE MORNING TO STEADY YOUR NERVES TO GET RID OF A HANGOVER: NO
HAVE PEOPLE ANNOYED YOU BY CRITICIZING YOUR DRINKING: NO

## 2025-03-14 ASSESSMENT — PAIN - FUNCTIONAL ASSESSMENT: PAIN_FUNCTIONAL_ASSESSMENT: 0-10

## 2025-03-14 ASSESSMENT — PAIN DESCRIPTION - FREQUENCY: FREQUENCY: CONSTANT/CONTINUOUS

## 2025-03-15 PROBLEM — R07.9 CHEST PAIN, UNSPECIFIED TYPE: Status: RESOLVED | Noted: 2025-03-14 | Resolved: 2025-03-15

## 2025-03-15 PROCEDURE — G0378 HOSPITAL OBSERVATION PER HR: HCPCS

## 2025-03-15 PROCEDURE — 1200000002 HC GENERAL ROOM WITH TELEMETRY DAILY

## 2025-03-15 PROCEDURE — 99463 SAME DAY NB DISCHARGE: CPT | Performed by: STUDENT IN AN ORGANIZED HEALTH CARE EDUCATION/TRAINING PROGRAM

## 2025-03-15 PROCEDURE — 99232 SBSQ HOSP IP/OBS MODERATE 35: CPT | Performed by: STUDENT IN AN ORGANIZED HEALTH CARE EDUCATION/TRAINING PROGRAM

## 2025-03-15 PROCEDURE — 2500000001 HC RX 250 WO HCPCS SELF ADMINISTERED DRUGS (ALT 637 FOR MEDICARE OP): Performed by: STUDENT IN AN ORGANIZED HEALTH CARE EDUCATION/TRAINING PROGRAM

## 2025-03-15 PROCEDURE — 2500000001 HC RX 250 WO HCPCS SELF ADMINISTERED DRUGS (ALT 637 FOR MEDICARE OP): Performed by: NURSE PRACTITIONER

## 2025-03-15 PROCEDURE — 2500000004 HC RX 250 GENERAL PHARMACY W/ HCPCS (ALT 636 FOR OP/ED): Performed by: STUDENT IN AN ORGANIZED HEALTH CARE EDUCATION/TRAINING PROGRAM

## 2025-03-15 PROCEDURE — 2500000002 HC RX 250 W HCPCS SELF ADMINISTERED DRUGS (ALT 637 FOR MEDICARE OP, ALT 636 FOR OP/ED): Performed by: STUDENT IN AN ORGANIZED HEALTH CARE EDUCATION/TRAINING PROGRAM

## 2025-03-15 RX ORDER — ACETAMINOPHEN 325 MG/1
650 TABLET ORAL EVERY 4 HOURS PRN
Status: DISCONTINUED | OUTPATIENT
Start: 2025-03-15 | End: 2025-03-16 | Stop reason: HOSPADM

## 2025-03-15 RX ORDER — METOPROLOL SUCCINATE 25 MG/1
25 TABLET, EXTENDED RELEASE ORAL 2 TIMES DAILY
Status: DISCONTINUED | OUTPATIENT
Start: 2025-03-15 | End: 2025-03-16 | Stop reason: HOSPADM

## 2025-03-15 RX ORDER — SEVELAMER CARBONATE 800 MG/1
1600 TABLET, FILM COATED ORAL
Status: DISCONTINUED | OUTPATIENT
Start: 2025-03-15 | End: 2025-03-16 | Stop reason: HOSPADM

## 2025-03-15 RX ORDER — VALSARTAN 80 MG/1
40 TABLET ORAL 2 TIMES DAILY
Status: DISCONTINUED | OUTPATIENT
Start: 2025-03-15 | End: 2025-03-16 | Stop reason: HOSPADM

## 2025-03-15 RX ORDER — ACETAMINOPHEN 160 MG/5ML
650 SOLUTION ORAL EVERY 4 HOURS PRN
Status: DISCONTINUED | OUTPATIENT
Start: 2025-03-15 | End: 2025-03-16 | Stop reason: HOSPADM

## 2025-03-15 RX ORDER — SEVELAMER CARBONATE 800 MG/1
1600 TABLET, FILM COATED ORAL
COMMUNITY

## 2025-03-15 RX ORDER — LIDOCAINE AND PRILOCAINE 25; 25 MG/G; MG/G
1 CREAM TOPICAL
COMMUNITY

## 2025-03-15 RX ORDER — SODIUM BICARBONATE 650 MG/1
1300 TABLET ORAL 2 TIMES DAILY
Status: DISCONTINUED | OUTPATIENT
Start: 2025-03-15 | End: 2025-03-16 | Stop reason: HOSPADM

## 2025-03-15 RX ORDER — SODIUM BICARBONATE 650 MG/1
2 TABLET ORAL 2 TIMES DAILY
COMMUNITY

## 2025-03-15 RX ORDER — ONDANSETRON HYDROCHLORIDE 2 MG/ML
4 INJECTION, SOLUTION INTRAVENOUS EVERY 8 HOURS PRN
Status: DISCONTINUED | OUTPATIENT
Start: 2025-03-15 | End: 2025-03-16 | Stop reason: HOSPADM

## 2025-03-15 RX ORDER — OMEPRAZOLE 20 MG/1
20 CAPSULE, DELAYED RELEASE ORAL
COMMUNITY

## 2025-03-15 RX ORDER — METOPROLOL SUCCINATE 25 MG/1
25 TABLET, EXTENDED RELEASE ORAL 2 TIMES DAILY
COMMUNITY

## 2025-03-15 RX ORDER — VALSARTAN 40 MG/1
40 TABLET ORAL 2 TIMES DAILY
COMMUNITY

## 2025-03-15 RX ORDER — PANTOPRAZOLE SODIUM 40 MG/1
40 TABLET, DELAYED RELEASE ORAL
Status: DISCONTINUED | OUTPATIENT
Start: 2025-03-16 | End: 2025-03-16 | Stop reason: HOSPADM

## 2025-03-15 RX ORDER — ACETAMINOPHEN 650 MG/1
650 SUPPOSITORY RECTAL EVERY 4 HOURS PRN
Status: DISCONTINUED | OUTPATIENT
Start: 2025-03-15 | End: 2025-03-16 | Stop reason: HOSPADM

## 2025-03-15 RX ORDER — ONDANSETRON 4 MG/1
4 TABLET, FILM COATED ORAL EVERY 8 HOURS PRN
Status: DISCONTINUED | OUTPATIENT
Start: 2025-03-15 | End: 2025-03-16 | Stop reason: HOSPADM

## 2025-03-15 RX ADMIN — ACETAMINOPHEN 650 MG: 325 TABLET ORAL at 22:50

## 2025-03-15 RX ADMIN — VALSARTAN 40 MG: 80 TABLET, FILM COATED ORAL at 15:29

## 2025-03-15 RX ADMIN — SODIUM BICARBONATE 1300 MG: 650 TABLET ORAL at 22:51

## 2025-03-15 RX ADMIN — SEVELAMER CARBONATE 1600 MG: 800 TABLET, FILM COATED ORAL at 18:35

## 2025-03-15 RX ADMIN — SODIUM BICARBONATE 1300 MG: 650 TABLET ORAL at 15:29

## 2025-03-15 RX ADMIN — VALSARTAN 40 MG: 80 TABLET, FILM COATED ORAL at 22:50

## 2025-03-15 RX ADMIN — METOPROLOL SUCCINATE 25 MG: 25 TABLET, EXTENDED RELEASE ORAL at 22:50

## 2025-03-15 RX ADMIN — METOPROLOL SUCCINATE 25 MG: 25 TABLET, EXTENDED RELEASE ORAL at 15:29

## 2025-03-15 SDOH — ECONOMIC STABILITY: HOUSING INSECURITY: AT ANY TIME IN THE PAST 12 MONTHS, WERE YOU HOMELESS OR LIVING IN A SHELTER (INCLUDING NOW)?: NO

## 2025-03-15 SDOH — SOCIAL STABILITY: SOCIAL INSECURITY: WITHIN THE LAST YEAR, HAVE YOU BEEN AFRAID OF YOUR PARTNER OR EX-PARTNER?: NO

## 2025-03-15 SDOH — SOCIAL STABILITY: SOCIAL INSECURITY
WITHIN THE LAST YEAR, HAVE YOU BEEN KICKED, HIT, SLAPPED, OR OTHERWISE PHYSICALLY HURT BY YOUR PARTNER OR EX-PARTNER?: NO

## 2025-03-15 SDOH — SOCIAL STABILITY: SOCIAL INSECURITY: HAVE YOU HAD THOUGHTS OF HARMING ANYONE ELSE?: NO

## 2025-03-15 SDOH — ECONOMIC STABILITY: FOOD INSECURITY: WITHIN THE PAST 12 MONTHS, YOU WORRIED THAT YOUR FOOD WOULD RUN OUT BEFORE YOU GOT THE MONEY TO BUY MORE.: OFTEN TRUE

## 2025-03-15 SDOH — ECONOMIC STABILITY: HOUSING INSECURITY: IN THE LAST 12 MONTHS, WAS THERE A TIME WHEN YOU WERE NOT ABLE TO PAY THE MORTGAGE OR RENT ON TIME?: NO

## 2025-03-15 SDOH — SOCIAL STABILITY: SOCIAL INSECURITY: WITHIN THE LAST YEAR, HAVE YOU BEEN HUMILIATED OR EMOTIONALLY ABUSED IN OTHER WAYS BY YOUR PARTNER OR EX-PARTNER?: NO

## 2025-03-15 SDOH — SOCIAL STABILITY: SOCIAL INSECURITY: ABUSE: ADULT

## 2025-03-15 SDOH — SOCIAL STABILITY: SOCIAL INSECURITY: DO YOU FEEL ANYONE HAS EXPLOITED OR TAKEN ADVANTAGE OF YOU FINANCIALLY OR OF YOUR PERSONAL PROPERTY?: NO

## 2025-03-15 SDOH — SOCIAL STABILITY: SOCIAL INSECURITY
WITHIN THE LAST YEAR, HAVE YOU BEEN RAPED OR FORCED TO HAVE ANY KIND OF SEXUAL ACTIVITY BY YOUR PARTNER OR EX-PARTNER?: NO

## 2025-03-15 SDOH — ECONOMIC STABILITY: HOUSING INSECURITY: IN THE PAST 12 MONTHS, HOW MANY TIMES HAVE YOU MOVED WHERE YOU WERE LIVING?: 0

## 2025-03-15 SDOH — SOCIAL STABILITY: SOCIAL INSECURITY: HAS ANYONE EVER THREATENED TO HURT YOUR FAMILY OR YOUR PETS?: NO

## 2025-03-15 SDOH — ECONOMIC STABILITY: FOOD INSECURITY: HOW HARD IS IT FOR YOU TO PAY FOR THE VERY BASICS LIKE FOOD, HOUSING, MEDICAL CARE, AND HEATING?: VERY HARD

## 2025-03-15 SDOH — ECONOMIC STABILITY: FOOD INSECURITY: WITHIN THE PAST 12 MONTHS, THE FOOD YOU BOUGHT JUST DIDN'T LAST AND YOU DIDN'T HAVE MONEY TO GET MORE.: OFTEN TRUE

## 2025-03-15 SDOH — SOCIAL STABILITY: SOCIAL INSECURITY: ARE YOU OR HAVE YOU BEEN THREATENED OR ABUSED PHYSICALLY, EMOTIONALLY, OR SEXUALLY BY ANYONE?: NO

## 2025-03-15 SDOH — ECONOMIC STABILITY: INCOME INSECURITY: IN THE PAST 12 MONTHS HAS THE ELECTRIC, GAS, OIL, OR WATER COMPANY THREATENED TO SHUT OFF SERVICES IN YOUR HOME?: NO

## 2025-03-15 SDOH — ECONOMIC STABILITY: TRANSPORTATION INSECURITY: IN THE PAST 12 MONTHS, HAS LACK OF TRANSPORTATION KEPT YOU FROM MEDICAL APPOINTMENTS OR FROM GETTING MEDICATIONS?: NO

## 2025-03-15 SDOH — SOCIAL STABILITY: SOCIAL INSECURITY: WERE YOU ABLE TO COMPLETE ALL THE BEHAVIORAL HEALTH SCREENINGS?: YES

## 2025-03-15 SDOH — SOCIAL STABILITY: SOCIAL INSECURITY: DO YOU FEEL UNSAFE GOING BACK TO THE PLACE WHERE YOU ARE LIVING?: NO

## 2025-03-15 SDOH — SOCIAL STABILITY: SOCIAL INSECURITY: ARE THERE ANY APPARENT SIGNS OF INJURIES/BEHAVIORS THAT COULD BE RELATED TO ABUSE/NEGLECT?: NO

## 2025-03-15 SDOH — SOCIAL STABILITY: SOCIAL INSECURITY: HAVE YOU HAD ANY THOUGHTS OF HARMING ANYONE ELSE?: NO

## 2025-03-15 SDOH — SOCIAL STABILITY: SOCIAL INSECURITY: DOES ANYONE TRY TO KEEP YOU FROM HAVING/CONTACTING OTHER FRIENDS OR DOING THINGS OUTSIDE YOUR HOME?: NO

## 2025-03-15 ASSESSMENT — PATIENT HEALTH QUESTIONNAIRE - PHQ9
1. LITTLE INTEREST OR PLEASURE IN DOING THINGS: NOT AT ALL
SUM OF ALL RESPONSES TO PHQ9 QUESTIONS 1 & 2: 0
2. FEELING DOWN, DEPRESSED OR HOPELESS: NOT AT ALL

## 2025-03-15 ASSESSMENT — COGNITIVE AND FUNCTIONAL STATUS - GENERAL
DAILY ACTIVITIY SCORE: 24
PATIENT BASELINE BEDBOUND: NO
DAILY ACTIVITIY SCORE: 24
MOBILITY SCORE: 24
MOBILITY SCORE: 24

## 2025-03-15 ASSESSMENT — ACTIVITIES OF DAILY LIVING (ADL)
BATHING: INDEPENDENT
HEARING - RIGHT EAR: FUNCTIONAL
ADEQUATE_TO_COMPLETE_ADL: YES
ADEQUATE_TO_COMPLETE_ADL: YES
HEARING - LEFT EAR: FUNCTIONAL
DRESSING YOURSELF: INDEPENDENT
TOILETING: INDEPENDENT
LACK_OF_TRANSPORTATION: NO
DRESSING YOURSELF: INDEPENDENT
JUDGMENT_ADEQUATE_SAFELY_COMPLETE_DAILY_ACTIVITIES: YES
BATHING: INDEPENDENT
FEEDING YOURSELF: INDEPENDENT
TOILETING: INDEPENDENT
GROOMING: INDEPENDENT
PATIENT'S MEMORY ADEQUATE TO SAFELY COMPLETE DAILY ACTIVITIES?: YES
PATIENT'S MEMORY ADEQUATE TO SAFELY COMPLETE DAILY ACTIVITIES?: YES
HEARING - LEFT EAR: FUNCTIONAL
LACK_OF_TRANSPORTATION: NO
GROOMING: INDEPENDENT
WALKS IN HOME: INDEPENDENT
WALKS IN HOME: INDEPENDENT
HEARING - RIGHT EAR: FUNCTIONAL
FEEDING YOURSELF: INDEPENDENT

## 2025-03-15 ASSESSMENT — PAIN - FUNCTIONAL ASSESSMENT
PAIN_FUNCTIONAL_ASSESSMENT: 0-10
PAIN_FUNCTIONAL_ASSESSMENT: 0-10

## 2025-03-15 ASSESSMENT — LIFESTYLE VARIABLES
AUDIT-C TOTAL SCORE: 0
HOW MANY STANDARD DRINKS CONTAINING ALCOHOL DO YOU HAVE ON A TYPICAL DAY: PATIENT DOES NOT DRINK
AUDIT-C TOTAL SCORE: 0
SUBSTANCE_ABUSE_PAST_12_MONTHS: NO
HOW OFTEN DO YOU HAVE 6 OR MORE DRINKS ON ONE OCCASION: NEVER
HOW OFTEN DO YOU HAVE A DRINK CONTAINING ALCOHOL: NEVER
SKIP TO QUESTIONS 9-10: 1
PRESCIPTION_ABUSE_PAST_12_MONTHS: NO

## 2025-03-15 ASSESSMENT — PAIN SCALES - GENERAL
PAINLEVEL_OUTOF10: 0 - NO PAIN
PAINLEVEL_OUTOF10: 0 - NO PAIN
PAINLEVEL_OUTOF10: 3

## 2025-03-15 ASSESSMENT — PAIN SCALES - WONG BAKER: WONGBAKER_NUMERICALRESPONSE: NO HURT

## 2025-03-15 NOTE — NURSING NOTE
ICU RN called to bedside regarding accessed AV fistula. Fistula de-accessed, pressure held, no bleeding confirmed.

## 2025-03-15 NOTE — ED PROVIDER NOTES
HPI   Chief Complaint   Patient presents with    Chest Pain     Left sided chest pain/pressure       This is a 54-year-old male with PMH ESRD on HD MWF last dialyzed just PTA, Paroxysmal A-fib, no longer on Eliquis, cardiomyopathy, HTN, CHF presenting for evaluation of midsternal/left-sided chest pressure.  Was sitting at dialysis when it started.  Was given nitro x 1 and symptoms alleviated.  He completed dialysis.  Currently without symptoms.  No associated shortness of breath, cough, fevers, chills, vomiting, diaphoresis.  Recently came back from [place] 2 days ago.      History provided by:  Patient   used: No            Patient History   Past Medical History:   Diagnosis Date    Chronic kidney failure      No past surgical history on file.  No family history on file.  Social History     Tobacco Use    Smoking status: Former     Types: Cigarettes    Smokeless tobacco: Never   Vaping Use    Vaping status: Never Used   Substance Use Topics    Alcohol use: Not Currently    Drug use: Never       Physical Exam   ED Triage Vitals [03/14/25 1802]   Temperature Heart Rate Respirations BP   36.6 °C (97.9 °F) (!) 102 18 140/88      Pulse Ox Temp Source Heart Rate Source Patient Position   96 % Temporal Monitor Sitting      BP Location FiO2 (%)     Right arm --       Physical Exam    General: Vitals noted, no distress. Afebrile  Neck: Trachea midline. No JVD appreciated.  Cardiac: Tachycardic rate regular rhythm.  Grade 2/6 systolic murmur heard best at the LSB  Pulmonary: Lungs clear bilaterally with good aeration. No rales, rhonchi or wheezing  Abdomen: Soft. Nontender  Extremities: LUE fistula with good thrill  Neuro: Alert and oriented    ED Course & MDM   Diagnoses as of 03/14/25 2251   Chest pain, unspecified type                 No data recorded     Victoria Coma Scale Score: 15 (03/14/25 1805 : Nessa Bishop RN)                           Medical Decision Making  DDx: ACS, PE,  chau-/myocarditis, CHF, dysrhythmia    Patient is tachycardic.  Blood pressure 140/88.  96% on room air.  No increased work of breathing.  Currently without symptoms.  His troponins are not 32 and 30 and his BNP is 584 which is consistent with his baseline.  Potassium is 3.0 his creatinine is 6.9 mm laboratory evaluation unremarkable.  He recently traveled to [place] and is tachycardic so a PE study was performed showing no acute PE.  His chest x-ray showed cardiomegaly with venous congestion.  Patient has high heart score.  Plan to hospitalize.  Discussed with hospitalist who accepted.  This visit was staffed with the attending physician Dr. Arthur.      Disclaimer: This note was dictated using speech recognition software. An attempt at proofreading was made to minimize errors. Minor errors in transcription may be present. Please call if questions.    Amount and/or Complexity of Data Reviewed  Labs: ordered.  Radiology: ordered.  ECG/medicine tests: ordered and independent interpretation performed.     Details: EKG interpreted by me: Sinus tachycardia.  Rate 108.  LAD.  LAFB.  Inferior Q waves.  No STEMI.  EKG interpreted by me: Sinus tachycardia.  Rate 108.  LAFB.  Inferior Q waves.  No STEMI.        Procedure  Procedures     Dagoberto Hernandez PA-C  03/14/25 3669

## 2025-03-15 NOTE — CONSULTS
[facility] Nephrology  Consult Note           Reason for Consult:  ESRD   Requesting Physician:  Dr. Reyes     Chief Complaint:  chest pain  History Obtained From:  patient, electronic medical record    History of Present Ilness:    54 y.o. male with history s/f ESRD on IHD MWF, endocarditis s/p AVr, MVr, AICD explant, NICM that has improved, HTN, HLD who presented from dialysis for chest pain during rx, was able to complete rx. Pt resolved w/ nitroglycerin. Gets HD at [facility]. Low k on presentation    Past Medical History:    Past Medical History:   Diagnosis Date    Chronic kidney failure        Past Surgical History:    No past surgical history on file.    Home Medications:    No current facility-administered medications on file prior to encounter.     Current Outpatient Medications on File Prior to Encounter   Medication Sig Dispense Refill    lidocaine-prilocaine (Emla) 2.5-2.5 % cream Apply 1 Application topically 1 time if needed (Apply a small amount to skin 3 times a week as directed). Apply thin layer to access, 1 hour before dialysis, cover with plastic wrap      metoprolol succinate XL (Toprol-XL) 25 mg 24 hr tablet Take 1 tablet (25 mg) by mouth 2 times a day. Take 2 tabs in the am and 1 tab in the evening      omeprazole (PriLOSEC) 20 mg DR capsule Take 1 capsule (20 mg) by mouth once daily in the morning. Take before meals. Do not crush or chew.      sevelamer carbonate (Renvela) 800 mg tablet Take 2 tablets (1,600 mg) by mouth 3 times daily (morning, midday, late afternoon). With meals,1 tab with snacks BID      sodium bicarbonate 650 mg tablet Take 2 tablets (1,300 mg) by mouth 2 times a day.      valsartan (Diovan) 40 mg tablet Take 1 tablet (40 mg) by mouth 2 times a day.         Allergies:  Atorvastatin and Lisinopril    Social History:    Social History     Socioeconomic History    Marital status: Single     Spouse name: Not on file    Number of children: Not on file    Years of  education: Not on file    Highest education level: Not on file   Occupational History    Not on file   Tobacco Use    Smoking status: Former     Types: Cigarettes    Smokeless tobacco: Never   Vaping Use    Vaping status: Never Used   Substance and Sexual Activity    Alcohol use: Not Currently    Drug use: Never    Sexual activity: Not Currently   Other Topics Concern    Not on file   Social History Narrative    Not on file     Social Drivers of Health     Financial Resource Strain: High Risk (3/15/2025)    Overall Financial Resource Strain (CARDIA)     Difficulty of Paying Living Expenses: Very hard   Food Insecurity: Food Insecurity Present (3/15/2025)    Hunger Vital Sign     Worried About Running Out of Food in the Last Year: Often true     Ran Out of Food in the Last Year: Often true   Transportation Needs: No Transportation Needs (3/15/2025)    PRAPARE - Transportation     Lack of Transportation (Medical): No     Lack of Transportation (Non-Medical): No   Physical Activity: Not on file   Stress: Not on file   Social Connections: Not on file   Intimate Partner Violence: Not At Risk (3/15/2025)    Humiliation, Afraid, Rape, and Kick questionnaire     Fear of Current or Ex-Partner: No     Emotionally Abused: No     Physically Abused: No     Sexually Abused: No   Housing Stability: Low Risk  (3/15/2025)    Housing Stability Vital Sign     Unable to Pay for Housing in the Last Year: No     Number of Times Moved in the Last Year: 0     Homeless in the Last Year: No       Family History:   No family history on file.    Review of Systems:   Positives in bold  Constitutional: fever, chills, fatigue, malaise   HENT:  rhinorrhea, sinus pain, sore throat, epistaxis    Eyes:  photophobia, visual disturbance, eye redness  Respiratory: shortness of breath, cough, hemoptysis    Cardiovascular: chest pain, palpitations, orthopnea, leg swelling   Gastrointestinal: abdominal pain, nausea, vomiting, diarrhea, constipation  "  Endocrine: polydipsia, polyphagia, cold intolerance, heat intolerance  Genitourinary: dysuria, flank pain, frequency, urgency   Hematologic: easy bruising, easy bleeding  Musculoskeletal: back pain, neck pain, myalgias, arthalgias  Neurological: syncope, lightheadedness, dizziness, seizures, tremors, weakness  Psychiatric/Behavioral: anxiety, depression, hallucinations  Skin: rash, itching    Physical exam:   Constitutional:  VITALS:  /82   Pulse 98   Temp 36.3 °C (97.3 °F)   Resp 17   Ht 1.803 m (5' 10.98\")   Wt 95.6 kg (210 lb 12.2 oz)   SpO2 93%   BMI 29.41 kg/m²     General: alert, in no apparent distress  HEENT: normocephalic, atraumatic, anicteric  Lungs: non-labored respirations, clear to auscultation bilaterally  Heart: regular rate and rhythm, no murmurs or rubs  Abdomen: soft, non-tender, non-distended  Ext: no peripheral edema  Neuro: alert, follows commands    Data/  CBC:   Lab Results   Component Value Date    WBC 6.6 03/14/2025    RBC 4.26 (L) 03/14/2025    HGB 12.5 (L) 03/14/2025    HCT 34.6 (L) 03/14/2025    MCV 81 03/14/2025    MCH 29.3 03/14/2025    MCHC 36.1 (H) 03/14/2025    RDW 14.8 (H) 03/14/2025     03/14/2025     BMP:    Lab Results   Component Value Date     03/14/2025    K 3.0 (L) 03/14/2025    CL 96 (L) 03/14/2025    CO2 28 03/14/2025    BUN 43 (H) 03/14/2025    CREATININE 6.90 (H) 03/14/2025    CALCIUM 9.4 03/14/2025    GLUCOSE 83 03/14/2025       Assessment:  54 y.o. male with history s/f ESRD on IHD MWF, endocarditis s/p AVr, MVr, AICD explant, NICM that has improved, HTN, HLD who presented from dialysis for chest pain during rx    ESRD on IHD MWF at [facility], pt of Dr. Sodeinde  Anemia of renal disease   Hypokalemia   Secondary renal hyperparathyroidism  HTN  Chest pain     Plan:  - continue IHD MWF   - no indication for CODI     Thank you for the consultation. Will continue to follow  Please do not hesitate to call with questions.    Jose Daniel Meneses, " MD

## 2025-03-15 NOTE — CARE PLAN
The patient's goals for the shift include remain HDS    The clinical goals for the shift include remain HDS

## 2025-03-15 NOTE — NURSING NOTE
Pharmacy tech updated pt has two pill bottles with medications in them, and one empty pill bottle. Medications are not narcotics, verified times two nurses.  This nurse was instructed to label and place medications in pt specific bin and pharmacy will grab them later.  ALBINA Razo updated.  Pt aware also.

## 2025-03-15 NOTE — NURSING NOTE
At 1625, Doctor Bliss stated that patient is not a suicide risk and that patient does not need to have a sitter.

## 2025-03-15 NOTE — DISCHARGE SUMMARY
Discharge Diagnosis  Chest pain, unspecified type    Issues Requiring Follow-Up      Discharge Meds     Medication List      ASK your doctor about these medications     lidocaine-prilocaine 2.5-2.5 % cream; Commonly known as: Emla   metoprolol succinate XL 25 mg 24 hr tablet; Commonly known as: Toprol-XL   omeprazole 20 mg DR capsule; Commonly known as: PriLOSEC   sevelamer carbonate 800 mg tablet; Commonly known as: Renvela   sodium bicarbonate 650 mg tablet   valsartan 40 mg tablet; Commonly known as: Diovan       Test Results Pending At Discharge  Pending Labs       No current pending labs.            Hospital Course   54-year-old male with past medical history of ESRD on HD, hypertension, who presented to the ER with chest pain.  He had an episode of chest pain while on the dialysis table which got resolved after taking a nitro.  Patient does say that he does jumping jacks and sit ups and never gets chest pain.  He he gets these kind of episodes once every 4 to 6 months.  Has not had any further episodes of chest pain does not feel short of breath does not feel dizzy or lightheaded.  Troponins which were mildly elevated have remained flat last stress test was about 2 years ago.  EKG showed sinus tachycardia with no significant ST-T segment changes, vitally stable.  Patient is okay to be discharged will wait for cardiology evaluation before discharge we will order an outpatient stress test.    Pertinent Physical Exam At Time of Discharge  Physical Exam  HENT:      Head: Normocephalic and atraumatic.   Eyes:      Extraocular Movements: Extraocular movements intact.      Conjunctiva/sclera: Conjunctivae normal.   Cardiovascular:      Rate and Rhythm: Normal rate and regular rhythm.   Pulmonary:      Effort: Pulmonary effort is normal. No respiratory distress.      Breath sounds: Normal breath sounds. No stridor.   Abdominal:      Palpations: Abdomen is soft.      Tenderness: There is no abdominal tenderness. There  is no guarding or rebound.   Musculoskeletal:         General: No swelling or deformity.   Skin:     Findings: No lesion or rash.   Neurological:      General: No focal deficit present.      Mental Status: He is oriented to person, place, and time.   Psychiatric:         Mood and Affect: Mood normal.         Behavior: Behavior normal.         Outpatient Follow-Up  No future appointments.      Heber Cooper MD

## 2025-03-15 NOTE — H&P
Medical Group History and Physical    ASSESSMENT & PLAN:     Chest Pain  ESRD on HD  - usual schedule M/W/F  - Completed full HD treatment 3/14  - during dialysis developed acute chest pain that was relieved with nitro.  - follows with [facility]rix  - Last echo performed [11/2024] showing EF 60-65%, mild aortic stenosis  - [11/2023] NM gated stress showing no evidence of inducible ischemia  - trop 30_32, elevated  -> but unreliable d/t ESRD  - Admit under observation, plan for discharge after cardiology evaluation.    Hypokalemia with ESRD HD  - Replace with oral 20mEq Kcl  - Have nephrology assess repeated hypokalemic events following to hemodialysis.    VTE Prophylaxis: lovenox?     HERSON Julio-CNP    HISTORY OF PRESENT ILLNESS:   Chief Complaint: chest pain    History Of Present Illness:    Estee Antonio is a 54 y.o. male with a significant past medical history of ESRD on HD, presenting with c/o acute chest pain that began during HD and was relieved after 1 dose SL nitroglycerin. Pt is chest pain free at the time of my exam. He is seen resting in bed without any c/o palpitations, syncope, dizziness, or lightheadedness. Last cardiac work-up showing EF 60-65%, w mild aortic stenosis and last gated stress [11/2023] showing no evidence of inducible ischemia.  Patient denies any chest pain at the time of my exam, no complaints of nausea or vomiting.  No diaphoresis.  Chest pain was described as sharp nonradiating sudden in nature.  Lab work showing hypokalemia K3.0, this appears to be consistent with prior hemodialysis treatments, may be a precipitating cause for the the acute chest pain.  There is no evidence of arrhythmia on monitor, vital signs are stable.    Admit under observation to general medicine for cardiology eval of acute chest pain in a.m.     Review of systems: 10 point review of systems is otherwise negative except as mentioned above.    PAST HISTORIES:       Past Medical  "History:  Medical Problems         Past Surgical History:  No past surgical history on file.       Social History:  He reports that he has quit smoking. His smoking use included cigarettes. He has never used smokeless tobacco. He reports that he does not currently use alcohol. He reports that he does not use drugs.    Family History:  No family history on file.     Allergies:  Patient has no known allergies.    OBJECTIVE:       Last Recorded Vitals:  Vitals:    03/14/25 1802 03/14/25 1926 03/14/25 2032 03/14/25 2200   BP: 140/88 143/80 125/86 118/78   BP Location: Right arm Right arm Right arm    Patient Position: Sitting Sitting Sitting    Pulse: (!) 102 (!) 109 (!) 105 (!) 104   Resp: 18 17 18 18   Temp: 36.6 °C (97.9 °F) 36.7 °C (98 °F)     TempSrc: Temporal Temporal     SpO2: 96% 98% 96% (!) 93%   Weight: 93.9 kg (207 lb)      Height: 1.803 m (5' 11\")          Last I/O:  No intake/output data recorded.    Physical Exam  Vitals and nursing note reviewed.   Constitutional:       Appearance: Normal appearance. He is normal weight.   HENT:      Head: Normocephalic and atraumatic.      Nose: Nose normal.      Mouth/Throat:      Mouth: Mucous membranes are moist.      Pharynx: Oropharynx is clear.   Eyes:      Extraocular Movements: Extraocular movements intact.      Conjunctiva/sclera: Conjunctivae normal.      Pupils: Pupils are equal, round, and reactive to light.   Cardiovascular:      Rate and Rhythm: Normal rate and regular rhythm.      Pulses: Normal pulses.      Heart sounds: Normal heart sounds.   Pulmonary:      Effort: Pulmonary effort is normal.      Breath sounds: Normal breath sounds.   Abdominal:      General: Abdomen is flat. Bowel sounds are normal.      Palpations: Abdomen is soft.   Genitourinary:     Comments: anuric  Musculoskeletal:         General: Normal range of motion.      Cervical back: Normal range of motion and neck supple.   Skin:     General: Skin is warm and dry.      Capillary Refill: " Capillary refill takes less than 2 seconds.   Neurological:      General: No focal deficit present.      Mental Status: He is alert and oriented to person, place, and time. Mental status is at baseline.   Psychiatric:         Mood and Affect: Mood normal.         Behavior: Behavior normal.         Thought Content: Thought content normal.         Judgment: Judgment normal.           Scheduled Medications    PRN Medications    Continuous Medications      Outpatient Medications:  Prior to Admission medications    Not on File       LABS AND IMAGING:     Labs:  Results for orders placed or performed during the hospital encounter of 03/14/25 (from the past 24 hours)   Comprehensive Metabolic Panel   Result Value Ref Range    Glucose 83 74 - 99 mg/dL    Sodium 138 136 - 145 mmol/L    Potassium 3.0 (L) 3.5 - 5.3 mmol/L    Chloride 96 (L) 98 - 107 mmol/L    Bicarbonate 28 21 - 32 mmol/L    Anion Gap 17 10 - 20 mmol/L    Urea Nitrogen 43 (H) 6 - 23 mg/dL    Creatinine 6.90 (H) 0.50 - 1.30 mg/dL    eGFR 9 (L) >60 mL/min/1.73m*2    Calcium 9.4 8.6 - 10.3 mg/dL    Albumin 4.5 3.4 - 5.0 g/dL    Alkaline Phosphatase 69 33 - 120 U/L    Total Protein 7.7 6.4 - 8.2 g/dL    AST 14 9 - 39 U/L    Bilirubin, Total 1.4 (H) 0.0 - 1.2 mg/dL    ALT 19 10 - 52 U/L   Magnesium   Result Value Ref Range    Magnesium 2.20 1.60 - 2.40 mg/dL   Protime-INR   Result Value Ref Range    Protime 13.7 (H) 9.8 - 12.4 seconds    INR 1.2 (H) 0.9 - 1.1   Troponin I, High Sensitivity, Initial   Result Value Ref Range    Troponin I, High Sensitivity 30 (H) 0 - 20 ng/L   CBC and Auto Differential   Result Value Ref Range    WBC 6.6 4.4 - 11.3 x10*3/uL    nRBC 0.0 0.0 - 0.0 /100 WBCs    RBC 4.26 (L) 4.50 - 5.90 x10*6/uL    Hemoglobin 12.5 (L) 13.5 - 17.5 g/dL    Hematocrit 34.6 (L) 41.0 - 52.0 %    MCV 81 80 - 100 fL    MCH 29.3 26.0 - 34.0 pg    MCHC 36.1 (H) 32.0 - 36.0 g/dL    RDW 14.8 (H) 11.5 - 14.5 %    Platelets 270 150 - 450 x10*3/uL    Neutrophils %  69.7 40.0 - 80.0 %    Immature Granulocytes %, Automated 0.2 0.0 - 0.9 %    Lymphocytes % 10.9 13.0 - 44.0 %    Monocytes % 13.0 2.0 - 10.0 %    Eosinophils % 5.3 0.0 - 6.0 %    Basophils % 0.9 0.0 - 2.0 %    Neutrophils Absolute 4.61 1.20 - 7.70 x10*3/uL    Immature Granulocytes Absolute, Automated 0.01 0.00 - 0.70 x10*3/uL    Lymphocytes Absolute 0.72 (L) 1.20 - 4.80 x10*3/uL    Monocytes Absolute 0.86 0.10 - 1.00 x10*3/uL    Eosinophils Absolute 0.35 0.00 - 0.70 x10*3/uL    Basophils Absolute 0.06 0.00 - 0.10 x10*3/uL   B-type natriuretic peptide   Result Value Ref Range     (H) 0 - 99 pg/mL   Troponin, High Sensitivity, 1 Hour   Result Value Ref Range    Troponin I, High Sensitivity 32 (H) 0 - 20 ng/L        Imaging:  CT angio chest for pulmonary embolism   Final Result   No acute pulmonary embolus to the segmental level.        Nonspecific 1.1 cm pretracheal lymph node.             MACRO:   None.        Signed by: Evan Finkelstein 3/14/2025 9:52 PM   Dictation workstation:   [id number]      XR chest 1 view   Final Result   1.  Cardiomegaly with venous congestion.                  MACRO:   None        Signed by: Rika Cottrell 3/14/2025 6:40 PM   Dictation workstation:   [id number]

## 2025-03-16 VITALS
TEMPERATURE: 97.7 F | HEART RATE: 87 BPM | BODY MASS INDEX: 29.51 KG/M2 | WEIGHT: 210.76 LBS | OXYGEN SATURATION: 98 % | DIASTOLIC BLOOD PRESSURE: 97 MMHG | RESPIRATION RATE: 18 BRPM | HEIGHT: 71 IN | SYSTOLIC BLOOD PRESSURE: 135 MMHG

## 2025-03-16 LAB
ALBUMIN SERPL BCP-MCNC: 3.9 G/DL (ref 3.4–5)
ALP SERPL-CCNC: 62 U/L (ref 33–120)
ALT SERPL W P-5'-P-CCNC: 18 U/L (ref 10–52)
ANION GAP SERPL CALC-SCNC: 15 MMOL/L (ref 10–20)
AST SERPL W P-5'-P-CCNC: 12 U/L (ref 9–39)
ATRIAL RATE: 108 BPM
ATRIAL RATE: 108 BPM
BASOPHILS # BLD AUTO: 0.03 X10*3/UL (ref 0–0.1)
BASOPHILS NFR BLD AUTO: 0.5 %
BILIRUB SERPL-MCNC: 0.6 MG/DL (ref 0–1.2)
BUN SERPL-MCNC: 63 MG/DL (ref 6–23)
CALCIUM SERPL-MCNC: 9.5 MG/DL (ref 8.6–10.3)
CHLORIDE SERPL-SCNC: 97 MMOL/L (ref 98–107)
CO2 SERPL-SCNC: 28 MMOL/L (ref 21–32)
CREAT SERPL-MCNC: 9.54 MG/DL (ref 0.5–1.3)
EGFRCR SERPLBLD CKD-EPI 2021: 6 ML/MIN/1.73M*2
EOSINOPHIL # BLD AUTO: 0.65 X10*3/UL (ref 0–0.7)
EOSINOPHIL NFR BLD AUTO: 10 %
ERYTHROCYTE [DISTWIDTH] IN BLOOD BY AUTOMATED COUNT: 14.7 % (ref 11.5–14.5)
GLUCOSE SERPL-MCNC: 117 MG/DL (ref 74–99)
HCT VFR BLD AUTO: 33.6 % (ref 41–52)
HGB BLD-MCNC: 11.6 G/DL (ref 13.5–17.5)
HOLD SPECIMEN: NORMAL
IMM GRANULOCYTES # BLD AUTO: 0.01 X10*3/UL (ref 0–0.7)
IMM GRANULOCYTES NFR BLD AUTO: 0.2 % (ref 0–0.9)
LYMPHOCYTES # BLD AUTO: 0.81 X10*3/UL (ref 1.2–4.8)
LYMPHOCYTES NFR BLD AUTO: 12.4 %
MAGNESIUM SERPL-MCNC: 2.54 MG/DL (ref 1.6–2.4)
MCH RBC QN AUTO: 28.9 PG (ref 26–34)
MCHC RBC AUTO-ENTMCNC: 34.5 G/DL (ref 32–36)
MCV RBC AUTO: 84 FL (ref 80–100)
MONOCYTES # BLD AUTO: 0.76 X10*3/UL (ref 0.1–1)
MONOCYTES NFR BLD AUTO: 11.7 %
NEUTROPHILS # BLD AUTO: 4.26 X10*3/UL (ref 1.2–7.7)
NEUTROPHILS NFR BLD AUTO: 65.2 %
NRBC BLD-RTO: 0 /100 WBCS (ref 0–0)
P AXIS: 55 DEGREES
P AXIS: 58 DEGREES
P OFFSET: 169 MS
P OFFSET: 169 MS
P ONSET: 126 MS
P ONSET: 132 MS
PLATELET # BLD AUTO: 229 X10*3/UL (ref 150–450)
POTASSIUM SERPL-SCNC: 3.8 MMOL/L (ref 3.5–5.3)
PR INTERVAL: 170 MS
PR INTERVAL: 178 MS
PROT SERPL-MCNC: 7 G/DL (ref 6.4–8.2)
Q ONSET: 215 MS
Q ONSET: 217 MS
QRS COUNT: 18 BEATS
QRS COUNT: 18 BEATS
QRS DURATION: 86 MS
QRS DURATION: 88 MS
QT INTERVAL: 362 MS
QT INTERVAL: 376 MS
QTC CALCULATION(BAZETT): 485 MS
QTC CALCULATION(BAZETT): 503 MS
QTC FREDERICIA: 440 MS
QTC FREDERICIA: 457 MS
R AXIS: -61 DEGREES
R AXIS: -72 DEGREES
RBC # BLD AUTO: 4.01 X10*6/UL (ref 4.5–5.9)
SODIUM SERPL-SCNC: 136 MMOL/L (ref 136–145)
T AXIS: 84 DEGREES
T AXIS: 84 DEGREES
T OFFSET: 398 MS
T OFFSET: 403 MS
VENTRICULAR RATE: 108 BPM
VENTRICULAR RATE: 108 BPM
WBC # BLD AUTO: 6.5 X10*3/UL (ref 4.4–11.3)

## 2025-03-16 PROCEDURE — 83735 ASSAY OF MAGNESIUM: CPT | Performed by: NURSE PRACTITIONER

## 2025-03-16 PROCEDURE — 2500000001 HC RX 250 WO HCPCS SELF ADMINISTERED DRUGS (ALT 637 FOR MEDICARE OP): Performed by: STUDENT IN AN ORGANIZED HEALTH CARE EDUCATION/TRAINING PROGRAM

## 2025-03-16 PROCEDURE — 80053 COMPREHEN METABOLIC PANEL: CPT | Performed by: NURSE PRACTITIONER

## 2025-03-16 PROCEDURE — 99232 SBSQ HOSP IP/OBS MODERATE 35: CPT | Performed by: STUDENT IN AN ORGANIZED HEALTH CARE EDUCATION/TRAINING PROGRAM

## 2025-03-16 PROCEDURE — 85025 COMPLETE CBC W/AUTO DIFF WBC: CPT | Performed by: NURSE PRACTITIONER

## 2025-03-16 PROCEDURE — 2500000004 HC RX 250 GENERAL PHARMACY W/ HCPCS (ALT 636 FOR OP/ED): Performed by: STUDENT IN AN ORGANIZED HEALTH CARE EDUCATION/TRAINING PROGRAM

## 2025-03-16 PROCEDURE — 36415 COLL VENOUS BLD VENIPUNCTURE: CPT | Performed by: NURSE PRACTITIONER

## 2025-03-16 PROCEDURE — 2500000002 HC RX 250 W HCPCS SELF ADMINISTERED DRUGS (ALT 637 FOR MEDICARE OP, ALT 636 FOR OP/ED): Performed by: STUDENT IN AN ORGANIZED HEALTH CARE EDUCATION/TRAINING PROGRAM

## 2025-03-16 PROCEDURE — G0378 HOSPITAL OBSERVATION PER HR: HCPCS

## 2025-03-16 RX ADMIN — SODIUM BICARBONATE 1300 MG: 650 TABLET ORAL at 08:14

## 2025-03-16 RX ADMIN — SEVELAMER CARBONATE 1600 MG: 800 TABLET, FILM COATED ORAL at 12:03

## 2025-03-16 RX ADMIN — METOPROLOL SUCCINATE 25 MG: 25 TABLET, EXTENDED RELEASE ORAL at 08:14

## 2025-03-16 RX ADMIN — SEVELAMER CARBONATE 1600 MG: 800 TABLET, FILM COATED ORAL at 08:15

## 2025-03-16 RX ADMIN — VALSARTAN 40 MG: 80 TABLET, FILM COATED ORAL at 08:14

## 2025-03-16 ASSESSMENT — PAIN SCALES - GENERAL: PAINLEVEL_OUTOF10: 0 - NO PAIN

## 2025-03-16 ASSESSMENT — PAIN SCALES - WONG BAKER: WONGBAKER_NUMERICALRESPONSE: NO HURT

## 2025-03-16 NOTE — PROGRESS NOTES
"Estee Antonio is a 54 y.o. male on day 2 of admission presenting with Chest pain, unspecified type.    Subjective   Patient seen and examined.  He has not had any further episodes of chest pain denies being dizzy lightheaded or having shortness of breath.       Objective     Physical Exam  HENT:      Head: Normocephalic and atraumatic.   Eyes:      Extraocular Movements: Extraocular movements intact.      Conjunctiva/sclera: Conjunctivae normal.   Cardiovascular:      Rate and Rhythm: Normal rate and regular rhythm.   Pulmonary:      Effort: Pulmonary effort is normal. No respiratory distress.      Breath sounds: Normal breath sounds. No stridor.   Abdominal:      Palpations: Abdomen is soft.      Tenderness: There is no abdominal tenderness. There is no guarding or rebound.   Musculoskeletal:         General: No swelling or deformity.   Skin:     Findings: No lesion or rash.   Neurological:      General: No focal deficit present.      Mental Status: He is oriented to person, place, and time.   Psychiatric:         Mood and Affect: Mood normal.      Last Recorded Vitals  Blood pressure (!) 133/95, pulse 88, temperature 36.7 °C (98.1 °F), temperature source Temporal, resp. rate 18, height 1.803 m (5' 10.98\"), weight 95.6 kg (210 lb 12.2 oz), SpO2 97%.  Intake/Output last 3 Shifts:  I/O last 3 completed shifts:  In: 800 (8.4 mL/kg) [P.O.:800]  Out: - (0 mL/kg)   Weight: 95.6 kg     Relevant Results                              Assessment/Plan   Assessment & Plan    Pleasant 54-year-old gentleman with past medical history of ESRD on HD, hypertension who presented to the ER with chest pain.  Cardiology was consulted but they have not seen the patient yet.  I called his cardiologist Dr. Balderrama and discussed the case with him patient had a recent cardiac echo which did not show any reduced ejection fraction or wall motion abnormality it did show slight hypertrophy with diastolic dysfunction which is most likely due to " uncontrolled hypertension.  He has not had any further episodes of chest pain troponins remain flat EKG did not show any acute ischemic changes.  His blood pressure has been stable while he is in the hospital.  Will be discharged home with advised to follow-up as an outpatient with his cardiologist.  An outpatient stress test has been ordered.             Heber Cooper MD

## 2025-03-16 NOTE — NURSING NOTE
At 1719, on march 15, 2025 this nurse sent a note to Doctor Garza in regards to patient being assessed.

## 2025-03-16 NOTE — CARE PLAN
The patient's goals for the shift include remain HDS    The clinical goals for the shift include patient will not fall throughout the shift

## 2025-03-16 NOTE — CARE PLAN
The patient's goals for the shift include remain HDS    The clinical goals for the shift include patient will not fall throughout the shift    Problem: Pain - Adult  Goal: Verbalizes/displays adequate comfort level or baseline comfort level  Outcome: Progressing

## 2025-03-25 NOTE — DOCUMENTATION CLARIFICATION NOTE
"    PATIENT:               JOSEPH BENITES  ACCT #:                  [id number]  MRN:                       [id number]  :                       1970  ADMIT DATE:       3/14/2025 5:48 PM  DISCH DATE:        3/16/2025 1:20 PM  RESPONDING PROVIDER #:        [id number]          PROVIDER RESPONSE TEXT:    Acute on Chronic Diastolic Congestive Heart Failure    CDI QUERY TEXT:    Clarification    Instruction:    Based on your assessment of the patient and the clinical information, please provide the requested documentation by clicking on the appropriate radio button and enter any additional information if prompted.    Question: Please further clarify the type and acuity of congestive heart failure    When answering this query, please exercise your independent professional judgment. The fact that a question is being asked, does not imply that any particular answer is desired or expected.    The patient's clinical indicators include:  Clinical Information: 55 yo male admitted with chest pain while on dialysis. PMHx: ESRD, cardiomyopathy, CHF    Clinical Indicators:  Vitals (3/14 2302) Temp-36.9 HR-102 RR-14 BP;121/97 SPO2-95 RA,  (3/15 0000) 92 RA    BNP: 584 (3/14)  EKG showed sinus tachycardia with no significant ST-T segment changes  CXR: IMPRESSION: (3/14)  1.  Cardiomegaly with venous congestion.    3/14 ED Dagoberto Hernandez PA: \"CHF presenting for evaluation of midsternal/left-sided chest pressure.  Was sitting at dialysis when it started.  Was given nitro x 1 and symptoms alleviated.  He completed dialysis.  Currently without symptoms.  No associated shortness of breath, cough, fevers, chills, vomiting, diaphoresis.\"    3/14  Catalina Mccarty NP: \"- Last echo performed [2024] showing EF 60-65%, mild aortic stenosis  - [2023] NM gated stress showing no evidence of inducible ischemia  - trop 30_32, elevated  -> but unreliable d/t ESRD\"      Treatment: Dialysis, metoprolol succinate XL 25 mg 24 hr tablet; Commonly " known as: Toprol-XL    Risk Factors:CHF venous congestions  Options provided:  -- Chronic Diastolic Congestive Heart Failure  -- Acute on Chronic Diastolic Congestive Heart Failure  -- Other - I will add my own diagnosis  -- Refer to Clinical Documentation Reviewer    Query created by: Torie Mercado on 3/21/2025 9:17 AM      Electronically signed by:  TRINI JAMIL MD 3/25/2025 4:01 PM

## 2025-03-28 NOTE — DOCUMENTATION CLARIFICATION NOTE
"    PATIENT:               JOSEPH BENITES  ACCT #:                  [id number]  MRN:                       [id number]  :                       1970  ADMIT DATE:       3/14/2025 5:48 PM  DISCH DATE:        3/16/2025 1:20 PM  RESPONDING PROVIDER #:        [id number]          PROVIDER RESPONSE TEXT:    Acute on Chronic Diastolic CHF    CDI QUERY TEXT:    Clarification    Instruction:    Based on your assessment of the patient and the clinical information, please provide the requested documentation by clicking on the appropriate radio button and enter any additional information if prompted.    Question: Please further clarify the most likely etiology of chest pain on admission after final work up    When answering this query, please exercise your independent professional judgment. The fact that a question is being asked, does not imply that any particular answer is desired or expected.    The patient's clinical indicators include:  Clinical Information:  The patient is a 54 year old male who presented with chest pain.    Clinical Indicators:    HR Range on 3/14:  102-109    Discharge Summary 3/15 \"He had an episode of chest pain while on the dialysis table which got resolved after taking a nitro.\"  \"Has not had any further episodes of chest pain does not feel short of breath does not feel dizzy or lightheaded.  Troponins which were mildly elevated have remained flat last stress test was about 2 years ago.  EKG showed sinus tachycardia with no significant ST-T segment changes, vitally stable.\"    Documentation Clarification Note 3/16 \"Acute on Chronic Diastolic Congestive Heart Failure.\"    CXR 3/14 \"Cardiomegaly with venous congestion.\"    Treatment:  EKG, p.o. Toprol-XL, Valsartan    Risk Factors:  tachycardia, acute on chronic diastolic CHF  Options provided:  -- Tachycardia  -- Acute on Chronic Diastolic CHF  -- Multifactorial - Tachycardia and Acute on Chronic Diastolic CHF  -- Other - I will add my own " diagnosis  -- Refer to Clinical Documentation Reviewer    Query created by: Marisol Perez on 3/28/2025 9:39 AM      Electronically signed by:  TRINI JAMIL MD 3/28/2025 3:46 PM

## 2025-04-21 ENCOUNTER — APPOINTMENT (OUTPATIENT)
Dept: CARDIOLOGY | Facility: HOSPITAL | Age: 55
End: 2025-04-21
Payer: MEDICARE

## 2025-04-21 ENCOUNTER — APPOINTMENT (OUTPATIENT)
Dept: RADIOLOGY | Facility: HOSPITAL | Age: 55
End: 2025-04-21
Payer: MEDICARE

## 2025-04-21 ENCOUNTER — HOSPITAL ENCOUNTER (EMERGENCY)
Facility: HOSPITAL | Age: 55
Discharge: HOME | End: 2025-04-21
Attending: STUDENT IN AN ORGANIZED HEALTH CARE EDUCATION/TRAINING PROGRAM
Payer: MEDICARE

## 2025-04-21 VITALS
HEIGHT: 70 IN | BODY MASS INDEX: 30.78 KG/M2 | SYSTOLIC BLOOD PRESSURE: 106 MMHG | HEART RATE: 110 BPM | DIASTOLIC BLOOD PRESSURE: 77 MMHG | RESPIRATION RATE: 18 BRPM | OXYGEN SATURATION: 95 % | WEIGHT: 215 LBS | TEMPERATURE: 97.5 F

## 2025-04-21 DIAGNOSIS — R07.9 CHEST PAIN, UNSPECIFIED TYPE: Primary | ICD-10-CM

## 2025-04-21 LAB
ALBUMIN SERPL BCP-MCNC: 4.4 G/DL (ref 3.4–5)
ALP SERPL-CCNC: 83 U/L (ref 33–120)
ALT SERPL W P-5'-P-CCNC: 23 U/L (ref 10–52)
ANION GAP SERPL CALC-SCNC: 17 MMOL/L (ref 10–20)
AST SERPL W P-5'-P-CCNC: 19 U/L (ref 9–39)
BASOPHILS # BLD AUTO: 0.05 X10*3/UL (ref 0–0.1)
BASOPHILS NFR BLD AUTO: 0.7 %
BILIRUB SERPL-MCNC: 1 MG/DL (ref 0–1.2)
BUN SERPL-MCNC: 36 MG/DL (ref 6–23)
CALCIUM SERPL-MCNC: 9.1 MG/DL (ref 8.6–10.3)
CARDIAC TROPONIN I PNL SERPL HS: 17 NG/L (ref 0–20)
CARDIAC TROPONIN I PNL SERPL HS: 19 NG/L (ref 0–20)
CHLORIDE SERPL-SCNC: 93 MMOL/L (ref 98–107)
CO2 SERPL-SCNC: 31 MMOL/L (ref 21–32)
CREAT SERPL-MCNC: 6.27 MG/DL (ref 0.5–1.3)
EGFRCR SERPLBLD CKD-EPI 2021: 10 ML/MIN/1.73M*2
EOSINOPHIL # BLD AUTO: 0.41 X10*3/UL (ref 0–0.7)
EOSINOPHIL NFR BLD AUTO: 5.9 %
ERYTHROCYTE [DISTWIDTH] IN BLOOD BY AUTOMATED COUNT: 13 % (ref 11.5–14.5)
GLUCOSE SERPL-MCNC: 102 MG/DL (ref 74–99)
HCT VFR BLD AUTO: 31.7 % (ref 41–52)
HGB BLD-MCNC: 11.2 G/DL (ref 13.5–17.5)
IMM GRANULOCYTES # BLD AUTO: 0.02 X10*3/UL (ref 0–0.7)
IMM GRANULOCYTES NFR BLD AUTO: 0.3 % (ref 0–0.9)
LYMPHOCYTES # BLD AUTO: 0.64 X10*3/UL (ref 1.2–4.8)
LYMPHOCYTES NFR BLD AUTO: 9.3 %
MAGNESIUM SERPL-MCNC: 2.05 MG/DL (ref 1.6–2.4)
MCH RBC QN AUTO: 28.9 PG (ref 26–34)
MCHC RBC AUTO-ENTMCNC: 35.3 G/DL (ref 32–36)
MCV RBC AUTO: 82 FL (ref 80–100)
MONOCYTES # BLD AUTO: 0.76 X10*3/UL (ref 0.1–1)
MONOCYTES NFR BLD AUTO: 11 %
NEUTROPHILS # BLD AUTO: 5.02 X10*3/UL (ref 1.2–7.7)
NEUTROPHILS NFR BLD AUTO: 72.8 %
NRBC BLD-RTO: 0 /100 WBCS (ref 0–0)
PLATELET # BLD AUTO: 249 X10*3/UL (ref 150–450)
POTASSIUM SERPL-SCNC: 3.1 MMOL/L (ref 3.5–5.3)
PROT SERPL-MCNC: 7.5 G/DL (ref 6.4–8.2)
RBC # BLD AUTO: 3.88 X10*6/UL (ref 4.5–5.9)
SODIUM SERPL-SCNC: 138 MMOL/L (ref 136–145)
WBC # BLD AUTO: 6.9 X10*3/UL (ref 4.4–11.3)

## 2025-04-21 PROCEDURE — 36415 COLL VENOUS BLD VENIPUNCTURE: CPT | Performed by: STUDENT IN AN ORGANIZED HEALTH CARE EDUCATION/TRAINING PROGRAM

## 2025-04-21 PROCEDURE — 71045 X-RAY EXAM CHEST 1 VIEW: CPT

## 2025-04-21 PROCEDURE — 83735 ASSAY OF MAGNESIUM: CPT | Performed by: STUDENT IN AN ORGANIZED HEALTH CARE EDUCATION/TRAINING PROGRAM

## 2025-04-21 PROCEDURE — 93005 ELECTROCARDIOGRAM TRACING: CPT

## 2025-04-21 PROCEDURE — 85025 COMPLETE CBC W/AUTO DIFF WBC: CPT | Performed by: STUDENT IN AN ORGANIZED HEALTH CARE EDUCATION/TRAINING PROGRAM

## 2025-04-21 PROCEDURE — 71045 X-RAY EXAM CHEST 1 VIEW: CPT | Performed by: RADIOLOGY

## 2025-04-21 PROCEDURE — 99285 EMERGENCY DEPT VISIT HI MDM: CPT | Performed by: STUDENT IN AN ORGANIZED HEALTH CARE EDUCATION/TRAINING PROGRAM

## 2025-04-21 PROCEDURE — 84484 ASSAY OF TROPONIN QUANT: CPT | Performed by: STUDENT IN AN ORGANIZED HEALTH CARE EDUCATION/TRAINING PROGRAM

## 2025-04-21 PROCEDURE — 80053 COMPREHEN METABOLIC PANEL: CPT | Performed by: STUDENT IN AN ORGANIZED HEALTH CARE EDUCATION/TRAINING PROGRAM

## 2025-04-21 ASSESSMENT — COLUMBIA-SUICIDE SEVERITY RATING SCALE - C-SSRS
2. HAVE YOU ACTUALLY HAD ANY THOUGHTS OF KILLING YOURSELF?: NO
6. HAVE YOU EVER DONE ANYTHING, STARTED TO DO ANYTHING, OR PREPARED TO DO ANYTHING TO END YOUR LIFE?: NO
1. IN THE PAST MONTH, HAVE YOU WISHED YOU WERE DEAD OR WISHED YOU COULD GO TO SLEEP AND NOT WAKE UP?: NO

## 2025-04-21 ASSESSMENT — LIFESTYLE VARIABLES
EVER HAD A DRINK FIRST THING IN THE MORNING TO STEADY YOUR NERVES TO GET RID OF A HANGOVER: NO
HAVE YOU EVER FELT YOU SHOULD CUT DOWN ON YOUR DRINKING: NO
HAVE PEOPLE ANNOYED YOU BY CRITICIZING YOUR DRINKING: NO
TOTAL SCORE: 0
EVER FELT BAD OR GUILTY ABOUT YOUR DRINKING: NO

## 2025-04-21 ASSESSMENT — HEART SCORE
ECG: NORMAL
RISK FACTORS: >2 RISK FACTORS OR HX OF ATHEROSCLEROTIC DISEASE
HEART SCORE: 3
TROPONIN: LESS THAN OR EQUAL TO NORMAL LIMIT
AGE: 45-64
HISTORY: SLIGHTLY SUSPICIOUS

## 2025-04-21 ASSESSMENT — PAIN - FUNCTIONAL ASSESSMENT: PAIN_FUNCTIONAL_ASSESSMENT: 0-10

## 2025-04-21 ASSESSMENT — PAIN SCALES - GENERAL: PAINLEVEL_OUTOF10: 0 - NO PAIN

## 2025-04-22 LAB
ATRIAL RATE: 107 BPM
ATRIAL RATE: 109 BPM
P AXIS: 25 DEGREES
P AXIS: 45 DEGREES
P OFFSET: 164 MS
P OFFSET: 165 MS
P ONSET: 125 MS
P ONSET: 126 MS
PR INTERVAL: 172 MS
PR INTERVAL: 176 MS
Q ONSET: 212 MS
Q ONSET: 213 MS
QRS COUNT: 18 BEATS
QRS COUNT: 18 BEATS
QRS DURATION: 86 MS
QRS DURATION: 98 MS
QT INTERVAL: 376 MS
QT INTERVAL: 378 MS
QTC CALCULATION(BAZETT): 501 MS
QTC CALCULATION(BAZETT): 509 MS
QTC FREDERICIA: 456 MS
QTC FREDERICIA: 461 MS
R AXIS: -67 DEGREES
R AXIS: -68 DEGREES
T AXIS: 65 DEGREES
T AXIS: 80 DEGREES
T OFFSET: 400 MS
T OFFSET: 402 MS
VENTRICULAR RATE: 107 BPM
VENTRICULAR RATE: 109 BPM

## 2025-04-22 NOTE — ED PROVIDER NOTES
HPI   Chief Complaint   Patient presents with    Chest Pain     Pt arrived via squad from dialysis stating he had CP with 45 min left of his treatment pt denies any pain at this time          History provided by:  Patient and medical records  54-year-old male with history of ESRD on hemodialysis presenting for evaluation of chest pain that started during dialysis.  On arrival to ED patient is chest pain-free.  Review of past medical records show multiple prior presentations with chest pain during dialysis.  Previously had negative workups.  Denies any shortness of breath.  States that they took off a large volume of fluid today.      Patient History   Medical History[1]  Surgical History[2]  Family History[3]  Social History[4]    Physical Exam   ED Triage Vitals   Temperature Heart Rate Respirations BP   04/21/25 1815 04/21/25 1815 04/21/25 1815 04/21/25 1815   36.4 °C (97.5 °F) (!) 106 16 95/65      Pulse Ox Temp src Heart Rate Source Patient Position   04/21/25 1815 -- 04/21/25 1815 04/21/25 1935   97 %  Monitor Sitting      BP Location FiO2 (%)     04/21/25 1935 --     Right arm        Physical Exam  Vitals and nursing note reviewed.   Constitutional:       General: He is not in acute distress.  HENT:      Head: Atraumatic.      Mouth/Throat:      Mouth: Mucous membranes are moist.      Pharynx: Oropharynx is clear.   Eyes:      Extraocular Movements: Extraocular movements intact.      Conjunctiva/sclera: Conjunctivae normal.      Pupils: Pupils are equal, round, and reactive to light.   Cardiovascular:      Rate and Rhythm: Tachycardia present.      Pulses: Normal pulses.   Pulmonary:      Effort: Pulmonary effort is normal. No respiratory distress.      Breath sounds: Normal breath sounds.   Abdominal:      General: There is no distension.      Palpations: Abdomen is soft.      Tenderness: There is no abdominal tenderness. There is no guarding or rebound.   Musculoskeletal:         General: No deformity.       Cervical back: Neck supple.   Skin:     General: Skin is warm and dry.   Neurological:      Mental Status: He is alert and oriented to person, place, and time. Mental status is at baseline.      Cranial Nerves: No cranial nerve deficit.      Sensory: No sensory deficit.      Motor: No weakness.   Psychiatric:         Mood and Affect: Mood normal.         Behavior: Behavior normal.           ED Course & MDM   Diagnoses as of 04/21/25 2306   Chest pain, unspecified type                 No data recorded     [place] Coma Scale Score: 15 (04/21/25 1820 : Catalina Mckeon RN) HEART Score: 3 (04/21/25 2305 : Ever Arthur MD)                 Labs Reviewed   CBC WITH AUTO DIFFERENTIAL - Abnormal       Result Value    WBC 6.9      nRBC 0.0      RBC 3.88 (*)     Hemoglobin 11.2 (*)     Hematocrit 31.7 (*)     MCV 82      MCH 28.9      MCHC 35.3      RDW 13.0      Platelets 249      Neutrophils % 72.8      Immature Granulocytes %, Automated 0.3      Lymphocytes % 9.3      Monocytes % 11.0      Eosinophils % 5.9      Basophils % 0.7      Neutrophils Absolute 5.02      Immature Granulocytes Absolute, Automated 0.02      Lymphocytes Absolute 0.64 (*)     Monocytes Absolute 0.76      Eosinophils Absolute 0.41      Basophils Absolute 0.05     COMPREHENSIVE METABOLIC PANEL - Abnormal    Glucose 102 (*)     Sodium 138      Potassium 3.1 (*)     Chloride 93 (*)     Bicarbonate 31      Anion Gap 17      Urea Nitrogen 36 (*)     Creatinine 6.27 (*)     eGFR 10 (*)     Calcium 9.1      Albumin 4.4      Alkaline Phosphatase 83      Total Protein 7.5      AST 19      Bilirubin, Total 1.0      ALT 23     MAGNESIUM - Normal    Magnesium 2.05     SERIAL TROPONIN-INITIAL - Normal    Troponin I, High Sensitivity 17      Narrative:     Less than 99th percentile of normal range cutoff-  Female and children under 18 years old <14 ng/L; Male <21 ng/L: Negative  Repeat testing should be performed if clinically indicated.     Female and  children under 18 years old 14-50 ng/L; Male 21-50 ng/L:  Consistent with possible cardiac damage and possible increased clinical   risk. Serial measurements may help to assess extent of myocardial damage.     >50 ng/L: Consistent with cardiac damage, increased clinical risk and  myocardial infarction. Serial measurements may help assess extent of   myocardial damage.      NOTE: Children less than 1 year old may have higher baseline troponin   levels and results should be interpreted in conjunction with the overall   clinical context.     NOTE: Troponin I testing is performed using a different   testing methodology at [facility] than at other   [facility]. Direct result comparisons should only   be made within the same method.   SERIAL TROPONIN, 1 HOUR - Normal    Troponin I, High Sensitivity 19      Narrative:     Less than 99th percentile of normal range cutoff-  Female and children under 18 years old <14 ng/L; Male <21 ng/L: Negative  Repeat testing should be performed if clinically indicated.     Female and children under 18 years old 14-50 ng/L; Male 21-50 ng/L:  Consistent with possible cardiac damage and possible increased clinical   risk. Serial measurements may help to assess extent of myocardial damage.     >50 ng/L: Consistent with cardiac damage, increased clinical risk and  myocardial infarction. Serial measurements may help assess extent of   myocardial damage.      NOTE: Children less than 1 year old may have higher baseline troponin   levels and results should be interpreted in conjunction with the overall   clinical context.     NOTE: Troponin I testing is performed using a different   testing methodology at [facility] than at other   [facility]. Direct result comparisons should only   be made within the same method.   TROPONIN SERIES- (INITIAL, 1 HR)    Narrative:     The following orders were created for panel order Troponin I Series, High Sensitivity (0, 1  HR).  Procedure                               Abnormality         Status                     ---------                               -----------         ------                     Troponin I, High Sensiti...[[id number]]  Normal              Final result               Troponin, High Sensitivi...[[id number]]  Normal              Final result                 Please view results for these tests on the individual orders.     XR chest 1 view   Final Result   No acute cardiopulmonary process.        MACRO:   None        Signed by: Kiran Curiel 4/21/2025 6:32 PM   Dictation workstation:   [id number]                Medical Decision Making  Amount and/or Complexity of Data Reviewed  ECG/medicine tests: ordered and independent interpretation performed. Decision-making details documented in ED Course.     Details: Twelve-lead ECG obtained at 1801 by my interpretation demonstrates sinus tachycardia with a rate of 109, no STEMI.  Left anterior fascicular block.    Repeat twelve-lead ECG obtained at 2048 per my interpretation demonstrates sinus tachycardia with a rate of 107, incomplete right bundle branch block, no STEMI.    54-year-old male presenting with left-sided chest pain during dialysis that is since resolved.  ECG is nonischemic.  Workup obtained to rule out ACS.    Chest x-ray is clear.  No evidence of edema.  Patient's labs reviewed.  CBC without leukocytosis, baseline H&H.  Metabolic panel consistent with ESRD.  No hyperkalemia.  Troponin negative x 2.  Do not suspect ACS.  Patient mildly tachycardic throughout ED stay.  Suspect this is likely due to his recent dialysis and large volume removal.  No evidence for infection.  Low suspicion for PE.    Patient resting comfortably on reevaluation.  Reports he feels at baseline.  Recommended continued outpatient follow-up with cardiology and return to ED for any new or worsening symptoms.      Procedure  Procedures       [1]   Past Medical History:  Diagnosis Date     Chronic kidney failure    [2] History reviewed. No pertinent surgical history.  [3] No family history on file.  [4]   Social History  Tobacco Use    Smoking status: Former     Types: Cigarettes    Smokeless tobacco: Never   Vaping Use    Vaping status: Never Used   Substance Use Topics    Alcohol use: Not Currently    Drug use: Never        Ever Arthur MD  04/21/25 8125

## 2025-07-31 NOTE — PROGRESS NOTES
PRE-OPERATIVE NOTE     Chief Complaint had concerns including Pre-Op Exam (8/15/2025 - Right Eye Cataract with Dr. López Terry ) and Office Visit.    Subjective   BRIEF HISTORY LEADING to SURGERY: Kurt Lanza is a 67 year old male here for pre-operative anesthesia consult for surgery as requested by the surgeon.     Review of Systems  As documented above.    Objective   PHYSICAL EXAM  Vitals:    07/31/25 1258   BP: 97/58   Pulse: 72   Resp: 16   Temp: 97.4 °F (36.3 °C)   TempSrc: Temporal   SpO2: 100%   Weight: 102.1 kg (225 lb)   Height: 5' 8\" (1.727 m)   PainSc:  0   Pain Education: No   BMI (Calculated): 34.21     Physical Exam  General: Alert.    Respiratory: Normal respiratory effort.   Cardiovascular: Normal rate and regular rhythm. Normal S1, S2. Murmurs are not present.  Lungs: Clear to auscultation. Wheezing, rales or rhonchi not present.  Musculoskeletal: Gait: normal.  Psychiatric: Mood is normal and affect is normal.    RESULTS REVIEW         Most Recent Na+  141 (7/10/2025)  K+  3.7 (7/10/2025)   Glucose  161 (7/10/2025)  GFR  >90 (7/10/2025)   The last AST was abnormal at 81 (7/8/2025) (Normal <38 Units/L).The last Total Bilirubin was abnormal at 3.7 (7/8/2025) (Normal is 0.2 - 1.0 mg/dL).    Most Recent WBC  4.5 (7/10/2025)  HGB  11.5 (7/10/2025)     Platelet  75 (7/10/2025)   INR  1.4 (6/3/2020)      The last Hemoglobin was abnormal at 11.5 (7/10/2025) (Normal 13 - 17 g/dL).The last Platelet count was abnormal at 75 (7/10/2025) (Normal 140-450 K/mcL).The last INR was elevated at 1.4 (6/3/2020).       PAST MEDICAL HISTORIES     Medications: has a current medication list which includes the following prescription(s): lactulose, xifaxan, metoprolol succinate, clonidine, lisinopril, rosuvastatin, glipizide, onetouch delica plus [id number], hydrochlorothiazide, tramadol, onetouch ultra, pantoprazole, meloxicam, and vitamin d (ergocalciferol).    Problem List: has Trigger finger; High cholesterol; Type  Progress Note  Patient: Simona Moreno  Unit/Bed: I739/I611-05  YOB: 1970  MRN: [id number]  Acct: [de-identified]   Admitting Diagnosis: Shortness of breath [R06.02]  Acute on chronic combined systolic (congestive) and diastolic (congestive) heart failure (HCC) [I50.43]  Acute decompensated heart failure ([address] [I50.9]  Chest pain, unspecified type [R07.9]  Admit Date:  2022  Hospital Day: 1    Chief Complaint: CP    Histories:  Past Medical History:   Diagnosis Date    Abnormal EKG 2019    Acute kidney injury (BLUE) with acute tubular necrosis (ATN) [facility]     AICD (automatic cardioverter/defibrillator) present     Cardiomyopathy ([facility]     per echo 2019    Chronic combined systolic and diastolic CHF (congestive heart failure) ([facility] [facility] 2019    ETOH abuse     Hypertension     Tobacco abuse      Past Surgical History:   Procedure Laterality Date    CARDIAC PACEMAKER PLACEMENT      HERNIA REPAIR Right 2/10/2021    RIGHT INGUINAL HERNIA REPAIR WITH MESH performed by Dejuan Mendes MD at [address] History   Problem Relation Age of Onset    Coronary Art Dis Mother      Social History     Socioeconomic History    Marital status: Single     Spouse name: None    Number of children: None    Years of education: None    Highest education level: None   Occupational History    None   Tobacco Use    Smoking status: Current Every Day Smoker     Packs/day: 1.00     Types: Cigarettes     Last attempt to quit: 12/3/2020     Years since quittin.3    Smokeless tobacco: Never Used    Tobacco comment: currently smoking only couple cigarettes daily   Substance and Sexual Activity    Alcohol use:  Yes    Drug use: Never    Sexual activity: None   Other Topics Concern    None   Social History Narrative    None     Social Determinants of Health     Financial Resource Strain: Low Risk     Difficulty of Paying Living Expenses: Not hard at all   Food Insecurity: No Food Insecurity 2 diabetes mellitus, without long-term current use of insulin  (CMD); Essential hypertension; Neuropathy; Vitamin D deficiency; Snoring; Memory changes; Osteomyelitis (CMD); Benign prostatic hyperplasia without lower urinary tract symptoms; Elevated liver enzymes; Thrombocytopenia (CMD); Leg cramp; Type 2 diabetes mellitus with obesity  (CMD); Knee pain; Primary osteoarthritis of both knees; Mild aortic valve stenosis; History of endocarditis; Nonrheumatic mitral valve regurgitation; Cirrhosis  (CMD); BEN (obstructive sleep apnea); Hyperammonemia  (CMD); Class 1 obesity due to excess calories with body mass index (BMI) of 34.0 to 34.9 in adult; and Hepatic encephalopathy (CMD) on their problem list.    Medical:  has a past medical history of Alcoholism in recovery  (CMD), Arthritis, Cataract, Colon polyps, Diabetes mellitus  (CMD), Diverticulitis, Elevated liver enzymes, Esophageal varices with bleeding in diseases classified elsewhere  (CMD), Essential (primary) hypertension, Glaucoma suspect, High cholesterol, Liver cirrhosis  (CMD), and Sleep apnea.    Surgical:  has a past surgical history that includes incision and drainage; incision and drainage; Esophagogastroduodenoscopy (EGD) (2016); Colonoscopy; colostomy (2015); Liver biopsy; Colonoscopy w/ polypectomy (01/2018); and esophagogastroduodenoscopy transoral flex w/bx single or mult (02/10/2021).    Family: Family history is unknown by patient.    Social:  reports that he has never smoked. He has never been exposed to tobacco smoke. He has never used smokeless tobacco. He reports that he does not currently use alcohol. He reports that he does not use drugs.    Allergies: has no known allergies.    SURGICAL RISK AND SCREENINGS     Family History Risks  Adverse Reaction to Anesthesia: No  Bleeding Problems: No  Malignant Hyperthermia: No    Personal Surgical History  Anesthetic Complications: No  Bleeding Problems: No  Problems with Surgery: No  Ben (Obstructive   Worried About Running Out of Food in the Last Year: Never true    Ginette of Food in the Last Year: Never true   Transportation Needs:     Lack of Transportation (Medical): Not on file    Lack of Transportation (Non-Medical): Not on file   Physical Activity:     Days of Exercise per Week: Not on file    Minutes of Exercise per Session: Not on file   Stress:     Feeling of Stress : Not on file   Social Connections:     Frequency of Communication with Friends and Family: Not on file    Frequency of Social Gatherings with Friends and Family: Not on file    Attends Restoration Services: Not on file    Active Member of [address] [place] [address] [employer] or Organizations: Not on file    Attends Club or Organization Meetings: Not on file    Marital Status: Not on file   Intimate Partner Violence:     Fear of Current or Ex-Partner: Not on file    Emotionally Abused: Not on file    Physically Abused: Not on file    Sexually Abused: Not on file   Housing Stability:     Unable to Pay for Housing in the Last Year: Not on file    Number of [place] in the Last Year: Not on file    Unstable Housing in the Last Year: Not on file       Subjective/HPI tired. No acute events. No cp no fever. On RA sats 100%    EKG: SR 93        Review of Systems:   Review of Systems   Constitutional: Negative. Negative for diaphoresis and fatigue. HENT: Negative. Eyes: Negative. Respiratory: Positive for shortness of breath. Negative for cough, chest tightness, wheezing and stridor. Cardiovascular: Negative. Negative for chest pain, palpitations and leg swelling. Gastrointestinal: Negative. Negative for blood in stool and nausea. Genitourinary: Negative. Musculoskeletal: Negative. Skin: Negative. Neurological: Negative. Negative for dizziness, syncope, weakness and light-headedness. Hematological: Negative. Psychiatric/Behavioral: Negative.           Physical Examination:    BP (!) 144/95   Pulse 93   Temp 97.2 °F (36.2 Sleep Apnea) is on Problem List    Malnutrition Screening Tool Current Weight 225 lbs  Have you recently lost weight without trying? No  Have you been eating poorly because of a decreased appetite? No  Score = 0, Not at Risk       Functional Capacity  Are you able to do light housework, dusting, wash dishes, climb a flight of stairs or walk up a hill without chest pain or problems breathing (>4 METS)? Yes    Revised Cardiac Risk Index  High Risk Surgery  (Intraperitoneal; intrathoracic; suprainguinal vascular) No   History of Ischemic Heart Disease or Cardiac Chest Pain No;    Congestive Heart Failure No; Ejection Fraction 63 (04/10/2025)   History of Stroke or TIA No   Last Creatinine >2 No; 0.68 g/dL (07/10/2025)   Prescribed Insulin No   Estimated Risk of a Major Cardiac Complication 0 risk factors = 3.9%     Acute Kidney Injury Prevention:  GEOFF Risk is Medium based on criteria below:  CIRRHOSIS  Diabetes  Recommendations  - 24 hours before surgery hold ACE-I/ARB, Diuretics and Potassium  - 5 days before surgery hold NSAIDs    Advance care planning documents on file - no     ASSESSMENT AND PLAN        Preop Optimization  - OPTIMIZED for SURGERY: YES patient is medically optimized for surgery.  - Workup reviewed and/or ordered: no additional workup needed  - Pre-Op management of pacemaker, dialysis or steroids: not needed.  - Post-Op DVT prophylaxis: per surgical team  - Smoking Status: Never Smoker  .     Pertinent Conditions     #Type 2 Diabetes Mellitus With Obesity (Cmd) the last A1c was 5.7 (05/28/2025), this is trending up compared to 5.2 (01/17/2025).    #Essential Hypertension today's blood pressure was BP 97/58    #Ben (Obstructive Sleep Apnea)    #Thrombocytopenia (Cmd) the last platelet count was 75 (7/10/2025).    #Cirrhosis (Cmd)        Before Surgery/Procedure Hold (Do Not Take) these Medications  Medications and Supplements:  - Morning of surgery hold any Vitamins AND for 2 weeks prior hold any  °C) (Oral)   Resp 18   Ht 6' (1.829 m)   Wt 207 lb 9.6 oz (94.2 kg)   SpO2 100%   BMI 28.16 kg/m²    Physical Exam   Constitutional: He appears healthy. No distress. HENT:   Normal cephalic and Atraumatic   Eyes: Pupils are equal, round, and reactive to light. Neck: Thyroid normal. No JVD present. No neck adenopathy. No thyromegaly present. Cardiovascular: Normal rate, regular rhythm, normal heart sounds, intact distal pulses and normal pulses. Pulmonary/Chest: Effort normal and breath sounds normal. He has no wheezes. He has no rales. He exhibits no tenderness. Abdominal: Soft. Bowel sounds are normal. There is no abdominal tenderness. Musculoskeletal:         General: No tenderness or edema. Normal range of motion. Cervical back: Normal range of motion and neck supple. Neurological: He is alert and oriented to person, place, and time. Skin: Skin is warm. No cyanosis. Nails show no clubbing.        LABS:  CBC:   Lab Results   Component Value Date    WBC 6.3 04/12/2022    RBC 4.20 04/12/2022    HGB 9.4 04/12/2022    HCT 29.6 04/12/2022    MCV 70.5 04/12/2022    MCH 22.3 04/12/2022    MCHC 31.6 04/12/2022    RDW 21.4 04/12/2022     04/12/2022     CBC with Differential:    Lab Results   Component Value Date    WBC 6.3 04/12/2022    RBC 4.20 04/12/2022    HGB 9.4 04/12/2022    HCT 29.6 04/12/2022     04/12/2022    MCV 70.5 04/12/2022    MCH 22.3 04/12/2022    MCHC 31.6 04/12/2022    RDW 21.4 04/12/2022    LYMPHOPCT 7.3 04/12/2022    MONOPCT 11.8 04/12/2022    BASOPCT 0.5 04/12/2022    MONOSABS 0.7 04/12/2022    LYMPHSABS 0.5 04/12/2022    EOSABS 0.2 04/12/2022    BASOSABS 0.0 04/12/2022     CMP:    Lab Results   Component Value Date     04/12/2022    K 3.8 04/12/2022    K 4.0 01/22/2022     04/12/2022    CO2 16 04/12/2022    BUN 73 04/12/2022    CREATININE 7.29 04/12/2022    GFRAA 9.7 04/12/2022    LABGLOM 8.0 04/12/2022    GLUCOSE 100 04/12/2022    PROT 6.2 04/11/2022 LABALBU 3.3 04/11/2022    CALCIUM 8.1 04/12/2022    BILITOT 0.5 04/11/2022    ALKPHOS 111 04/11/2022    AST 16 04/11/2022    ALT 14 04/11/2022     BMP:    Lab Results   Component Value Date     04/12/2022    K 3.8 04/12/2022    K 4.0 01/22/2022     04/12/2022    CO2 16 04/12/2022    BUN 73 04/12/2022    LABALBU 3.3 04/11/2022    CREATININE 7.29 04/12/2022    CALCIUM 8.1 04/12/2022    GFRAA 9.7 04/12/2022    LABGLOM 8.0 04/12/2022    GLUCOSE 100 04/12/2022     Magnesium:    Lab Results   Component Value Date    MG 2.9 04/12/2022     Troponin:    Lab Results   Component Value Date    TROPONINI 0.051 04/11/2022        Active Hospital Problems    Diagnosis Date Noted    Acute on chronic combined systolic (congestive) and diastolic (congestive) heart failure ([facility] [I50.43] 04/11/2022     Priority: Low        Assessment/Plan:  1. CP- Noncardiac  2. SOB - Etiology not clear sats are 100% on RA. He is not volume overloaded and does not appear to be decompensated from his chronic Combined HF. He will start HD today. We will start Entresto. 3. CKD- to start HD today. 4. No CAD  5. NICM s/p ICD.           Electronically signed by Rome Pendleton MD on 4/12/2022 at 9:07 AM Vitamin E or Herbals     Anticoagulation: none on med list    Antidiabetic:    - Morning of surgery hold (glipiZIDE (GLUCOTROL) 10 MG tablet [[id number]])    GEOFF Risk (Diuretics, ACE-I/ARB, NSAIDs):    - 24 hours before surgery hold (lisinopril (ZESTRIL) 40 MG tablet [[id number]])  - 24 hours before surgery hold (hydroCHLOROthiazide 25 MG tablet [[id number]]) and if taking potassium  - 5 days before surgery hold (meloxicam (MOBIC) 15 MG tablet [[id number]])      Continue all other medications unless an alternative plan was advised with the surgeon and/or specialist.          Electronically signed by: Coleen Randall DO

## 2025-08-16 ENCOUNTER — APPOINTMENT (OUTPATIENT)
Dept: RADIOLOGY | Facility: HOSPITAL | Age: 55
End: 2025-08-16
Payer: MEDICARE

## 2025-08-16 ENCOUNTER — HOSPITAL ENCOUNTER (EMERGENCY)
Dept: CARDIOLOGY | Facility: HOSPITAL | Age: 55
Discharge: HOME | End: 2025-08-16
Payer: MEDICARE

## 2025-08-16 ENCOUNTER — HOSPITAL ENCOUNTER (EMERGENCY)
Facility: HOSPITAL | Age: 55
Discharge: HOME | End: 2025-08-16
Attending: STUDENT IN AN ORGANIZED HEALTH CARE EDUCATION/TRAINING PROGRAM
Payer: MEDICARE

## 2025-08-16 VITALS
HEIGHT: 69 IN | RESPIRATION RATE: 17 BRPM | DIASTOLIC BLOOD PRESSURE: 100 MMHG | SYSTOLIC BLOOD PRESSURE: 138 MMHG | OXYGEN SATURATION: 94 % | BODY MASS INDEX: 30.36 KG/M2 | TEMPERATURE: 97.5 F | WEIGHT: 205 LBS | HEART RATE: 100 BPM

## 2025-08-16 DIAGNOSIS — Z78.9 ADMITS TO ALCOHOL CONSUMPTION: ICD-10-CM

## 2025-08-16 DIAGNOSIS — Z99.2 END-STAGE RENAL DISEASE ON HEMODIALYSIS (MULTI): Primary | ICD-10-CM

## 2025-08-16 DIAGNOSIS — N18.6 END-STAGE RENAL DISEASE ON HEMODIALYSIS (MULTI): Primary | ICD-10-CM

## 2025-08-16 LAB
ALBUMIN SERPL BCP-MCNC: 4.9 G/DL (ref 3.4–5)
ALP SERPL-CCNC: 77 U/L (ref 33–120)
ALT SERPL W P-5'-P-CCNC: 12 U/L (ref 10–52)
ANION GAP SERPL CALC-SCNC: 20 MMOL/L (ref 10–20)
AST SERPL W P-5'-P-CCNC: 16 U/L (ref 9–39)
BASOPHILS # BLD AUTO: 0.04 X10*3/UL (ref 0–0.1)
BASOPHILS NFR BLD AUTO: 0.6 %
BILIRUB SERPL-MCNC: 0.7 MG/DL (ref 0–1.2)
BUN SERPL-MCNC: 41 MG/DL (ref 6–23)
CALCIUM SERPL-MCNC: 10.2 MG/DL (ref 8.6–10.3)
CHLORIDE SERPL-SCNC: 91 MMOL/L (ref 98–107)
CO2 SERPL-SCNC: 27 MMOL/L (ref 21–32)
CREAT SERPL-MCNC: 9.19 MG/DL (ref 0.5–1.3)
EGFRCR SERPLBLD CKD-EPI 2021: 6 ML/MIN/1.73M*2
EOSINOPHIL # BLD AUTO: 0.31 X10*3/UL (ref 0–0.7)
EOSINOPHIL NFR BLD AUTO: 4.4 %
ERYTHROCYTE [DISTWIDTH] IN BLOOD BY AUTOMATED COUNT: 13.8 % (ref 11.5–14.5)
ETHANOL SERPL-MCNC: 152 MG/DL
GLUCOSE SERPL-MCNC: 85 MG/DL (ref 74–99)
HCT VFR BLD AUTO: 40.2 % (ref 41–52)
HGB BLD-MCNC: 13.6 G/DL (ref 13.5–17.5)
IMM GRANULOCYTES # BLD AUTO: 0.02 X10*3/UL (ref 0–0.7)
IMM GRANULOCYTES NFR BLD AUTO: 0.3 % (ref 0–0.9)
LYMPHOCYTES # BLD AUTO: 0.58 X10*3/UL (ref 1.2–4.8)
LYMPHOCYTES NFR BLD AUTO: 8.3 %
MAGNESIUM SERPL-MCNC: 2.75 MG/DL (ref 1.6–2.4)
MCH RBC QN AUTO: 29.4 PG (ref 26–34)
MCHC RBC AUTO-ENTMCNC: 33.8 G/DL (ref 32–36)
MCV RBC AUTO: 87 FL (ref 80–100)
MONOCYTES # BLD AUTO: 0.76 X10*3/UL (ref 0.1–1)
MONOCYTES NFR BLD AUTO: 10.8 %
NEUTROPHILS # BLD AUTO: 5.32 X10*3/UL (ref 1.2–7.7)
NEUTROPHILS NFR BLD AUTO: 75.6 %
NRBC BLD-RTO: 0 /100 WBCS (ref 0–0)
PLATELET # BLD AUTO: 240 X10*3/UL (ref 150–450)
POTASSIUM SERPL-SCNC: 3.5 MMOL/L (ref 3.5–5.3)
PROT SERPL-MCNC: 8.6 G/DL (ref 6.4–8.2)
RBC # BLD AUTO: 4.62 X10*6/UL (ref 4.5–5.9)
SODIUM SERPL-SCNC: 134 MMOL/L (ref 136–145)
WBC # BLD AUTO: 7 X10*3/UL (ref 4.4–11.3)

## 2025-08-16 PROCEDURE — 82077 ASSAY SPEC XCP UR&BREATH IA: CPT | Performed by: PHYSICIAN ASSISTANT

## 2025-08-16 PROCEDURE — 99284 EMERGENCY DEPT VISIT MOD MDM: CPT | Performed by: STUDENT IN AN ORGANIZED HEALTH CARE EDUCATION/TRAINING PROGRAM

## 2025-08-16 PROCEDURE — 85025 COMPLETE CBC W/AUTO DIFF WBC: CPT | Performed by: PHYSICIAN ASSISTANT

## 2025-08-16 PROCEDURE — 99285 EMERGENCY DEPT VISIT HI MDM: CPT | Mod: 25

## 2025-08-16 PROCEDURE — 36415 COLL VENOUS BLD VENIPUNCTURE: CPT | Performed by: PHYSICIAN ASSISTANT

## 2025-08-16 PROCEDURE — 71045 X-RAY EXAM CHEST 1 VIEW: CPT | Performed by: RADIOLOGY

## 2025-08-16 PROCEDURE — 83735 ASSAY OF MAGNESIUM: CPT | Performed by: PHYSICIAN ASSISTANT

## 2025-08-16 PROCEDURE — 93005 ELECTROCARDIOGRAM TRACING: CPT

## 2025-08-16 PROCEDURE — 80053 COMPREHEN METABOLIC PANEL: CPT | Performed by: PHYSICIAN ASSISTANT

## 2025-08-16 PROCEDURE — 71045 X-RAY EXAM CHEST 1 VIEW: CPT

## 2025-08-16 ASSESSMENT — LIFESTYLE VARIABLES
EVER HAD A DRINK FIRST THING IN THE MORNING TO STEADY YOUR NERVES TO GET RID OF A HANGOVER: NO
TOTAL SCORE: 0
HAVE YOU EVER FELT YOU SHOULD CUT DOWN ON YOUR DRINKING: NO
EVER FELT BAD OR GUILTY ABOUT YOUR DRINKING: NO
HAVE PEOPLE ANNOYED YOU BY CRITICIZING YOUR DRINKING: NO

## 2025-08-16 ASSESSMENT — PAIN - FUNCTIONAL ASSESSMENT: PAIN_FUNCTIONAL_ASSESSMENT: 0-10

## 2025-08-16 ASSESSMENT — PAIN SCALES - GENERAL: PAINLEVEL_OUTOF10: 0 - NO PAIN

## 2025-08-17 LAB
ATRIAL RATE: 98 BPM
P AXIS: 57 DEGREES
P OFFSET: 147 MS
P ONSET: 98 MS
PR INTERVAL: 228 MS
Q ONSET: 212 MS
QRS COUNT: 16 BEATS
QRS DURATION: 104 MS
QT INTERVAL: 356 MS
QTC CALCULATION(BAZETT): 454 MS
QTC FREDERICIA: 419 MS
R AXIS: -10 DEGREES
T AXIS: 73 DEGREES
T OFFSET: 390 MS
VENTRICULAR RATE: 98 BPM

## (undated) DEVICE — GOWN,AURORA,NONREINFORCED,LARGE: Brand: MEDLINE

## (undated) DEVICE — MARKER SURG SKIN GENTIAN VLT REG TIP W/ 6IN RUL

## (undated) DEVICE — DRAIN SURG PENROSE 0.25X12 IN CLOSED WND DRAINAGE PREM SIL

## (undated) DEVICE — GLOVE ORANGE PI 7 1/2   MSG9075

## (undated) DEVICE — GAUZE,SPONGE,FLUFF,6"X6.75",STRL,10/TRAY: Brand: MEDLINE

## (undated) DEVICE — SINGLE PORT MANIFOLD: Brand: NEPTUNE 2

## (undated) DEVICE — APPLICATOR MEDICATED 26 CC SOLUTION HI LT ORNG CHLORAPREP

## (undated) DEVICE — SUTURE PROL SZ 0 L30IN NONABSORBABLE BLU L36MM CT-1 1/2 CIR 8424H

## (undated) DEVICE — SUTURE VCRL SZ 2-0 L27IN ABSRB UD L26MM SH 1/2 CIR J417H

## (undated) DEVICE — TUBING, SUCTION, 1/4" X 10', STRAIGHT: Brand: MEDLINE

## (undated) DEVICE — GOWN,AURORA,NONRNF,XL,30/CS: Brand: MEDLINE

## (undated) DEVICE — YANKAUER,BULB TIP,W/O VENT,RIGID,STERILE: Brand: MEDLINE

## (undated) DEVICE — SYRINGE IRRIG 60ML SFT PLIABLE BLB EZ TO GRP 1 HND USE W/

## (undated) DEVICE — SUTURE ABSORBABLE BRAIDED 0 CT-1 8X27 IN UD VICRYL JJ41G

## (undated) DEVICE — Device

## (undated) DEVICE — TRAY PREP DRY W/ PREM GLV 2 APPL 6 SPNG 2 UNDPD 1 OVERWRAP

## (undated) DEVICE — LABEL MED MINI W/ MARKER

## (undated) DEVICE — SPONGE,LAP,18"X18",DLX,XR,ST,5/PK,40/PK: Brand: MEDLINE

## (undated) DEVICE — SUTURE VCRL SZ 3-0 L27IN ABSRB UD L26MM SH 1/2 CIR J416H

## (undated) DEVICE — PACK,LAPAROTOMY,NO GOWNS: Brand: MEDLINE

## (undated) DEVICE — COUNTER NDL 40 COUNT HLD 70 FOAM BLK ADH W/ MAG

## (undated) DEVICE — ELECTRODE PT RET AD L9FT HI MOIST COND ADH HYDRGEL CORDED

## (undated) DEVICE — NEPTUNE E-SEP SMOKE EVACUATION PENCIL, COATED, 70MM BLADE, PUSH BUTTON SWITCH: Brand: NEPTUNE E-SEP

## (undated) DEVICE — TOWEL,OR,DSP,ST,BLUE,STD,4/PK,20PK/CS: Brand: MEDLINE